# Patient Record
Sex: FEMALE | Race: BLACK OR AFRICAN AMERICAN | Employment: OTHER | URBAN - METROPOLITAN AREA
[De-identification: names, ages, dates, MRNs, and addresses within clinical notes are randomized per-mention and may not be internally consistent; named-entity substitution may affect disease eponyms.]

---

## 2018-02-23 ENCOUNTER — HOSPITAL ENCOUNTER (INPATIENT)
Age: 58
LOS: 10 days | Discharge: NURSING, REHAB OR LONG-TERM CARE | DRG: 482 | End: 2018-03-06
Admitting: HOSPITALIST
Payer: COMMERCIAL

## 2018-02-23 ENCOUNTER — APPOINTMENT (OUTPATIENT)
Dept: GENERAL RADIOLOGY | Age: 58
DRG: 482 | End: 2018-02-23
Payer: COMMERCIAL

## 2018-02-23 DIAGNOSIS — E11.65 TYPE 2 DIABETES MELLITUS WITH HYPERGLYCEMIA, UNSPECIFIED LONG TERM INSULIN USE STATUS: ICD-10-CM

## 2018-02-23 DIAGNOSIS — S72.141A CLOSED DISPLACED INTERTROCHANTERIC FRACTURE OF RIGHT FEMUR, INITIAL ENCOUNTER (HCC): Primary | ICD-10-CM

## 2018-02-23 PROCEDURE — 73502 X-RAY EXAM HIP UNI 2-3 VIEWS: CPT

## 2018-02-23 PROCEDURE — 71045 X-RAY EXAM CHEST 1 VIEW: CPT

## 2018-02-23 PROCEDURE — 99285 EMERGENCY DEPT VISIT HI MDM: CPT

## 2018-02-23 PROCEDURE — 73552 X-RAY EXAM OF FEMUR 2/>: CPT

## 2018-02-23 RX ORDER — ONDANSETRON 2 MG/ML
4 INJECTION INTRAMUSCULAR; INTRAVENOUS
Status: COMPLETED | OUTPATIENT
Start: 2018-02-23 | End: 2018-02-24

## 2018-02-23 RX ORDER — HYDROMORPHONE HYDROCHLORIDE 2 MG/ML
1 INJECTION, SOLUTION INTRAMUSCULAR; INTRAVENOUS; SUBCUTANEOUS
Status: COMPLETED | OUTPATIENT
Start: 2018-02-23 | End: 2018-02-24

## 2018-02-24 PROBLEM — S72.141A INTERTROCHANTERIC FRACTURE OF RIGHT FEMUR (HCC): Status: ACTIVE | Noted: 2018-02-24

## 2018-02-24 PROBLEM — E11.65 HYPERGLYCEMIA DUE TO TYPE 2 DIABETES MELLITUS (HCC): Status: ACTIVE | Noted: 2018-02-24

## 2018-02-24 LAB
ABO + RH BLD: NORMAL
ALBUMIN SERPL-MCNC: 3.1 G/DL (ref 3.5–5)
ALBUMIN/GLOB SERPL: 0.7 {RATIO} (ref 1.2–3.5)
ALP SERPL-CCNC: 63 U/L (ref 50–136)
ALT SERPL-CCNC: 17 U/L (ref 12–65)
ANION GAP SERPL CALC-SCNC: 11 MMOL/L (ref 7–16)
APPEARANCE UR: CLEAR
APTT PPP: 24.9 SEC (ref 23.2–35.3)
AST SERPL-CCNC: 14 U/L (ref 15–37)
BACTERIA SPEC CULT: NORMAL
BACTERIA URNS QL MICRO: 0 /HPF
BASOPHILS # BLD: 0 K/UL (ref 0–0.2)
BASOPHILS NFR BLD: 1 % (ref 0–2)
BILIRUB SERPL-MCNC: 0.2 MG/DL (ref 0.2–1.1)
BILIRUB UR QL: NEGATIVE
BLOOD GROUP ANTIBODIES SERPL: NORMAL
BUN SERPL-MCNC: 16 MG/DL (ref 6–23)
CALCIUM SERPL-MCNC: 9 MG/DL (ref 8.3–10.4)
CALCIUM SERPL-MCNC: 9 MG/DL (ref 8.3–10.4)
CASTS URNS QL MICRO: ABNORMAL /LPF
CHLORIDE SERPL-SCNC: 100 MMOL/L (ref 98–107)
CO2 SERPL-SCNC: 25 MMOL/L (ref 21–32)
COLOR UR: YELLOW
CREAT SERPL-MCNC: 0.76 MG/DL (ref 0.6–1)
DIFFERENTIAL METHOD BLD: ABNORMAL
EOSINOPHIL # BLD: 0.2 K/UL (ref 0–0.8)
EOSINOPHIL NFR BLD: 2 % (ref 0.5–7.8)
EPI CELLS #/AREA URNS HPF: ABNORMAL /HPF
ERYTHROCYTE [DISTWIDTH] IN BLOOD BY AUTOMATED COUNT: 12.4 % (ref 11.9–14.6)
EST. AVERAGE GLUCOSE BLD GHB EST-MCNC: 269 MG/DL
GLOBULIN SER CALC-MCNC: 4.6 G/DL (ref 2.3–3.5)
GLUCOSE BLD STRIP.AUTO-MCNC: 237 MG/DL (ref 65–100)
GLUCOSE BLD STRIP.AUTO-MCNC: 238 MG/DL (ref 65–100)
GLUCOSE BLD STRIP.AUTO-MCNC: 358 MG/DL (ref 65–100)
GLUCOSE BLD STRIP.AUTO-MCNC: 377 MG/DL (ref 65–100)
GLUCOSE SERPL-MCNC: 314 MG/DL (ref 65–100)
GLUCOSE UR STRIP.AUTO-MCNC: >1000 MG/DL
HBA1C MFR BLD: 11 % (ref 4.8–6)
HCT VFR BLD AUTO: 34.8 % (ref 35.8–46.3)
HGB BLD-MCNC: 12.2 G/DL (ref 11.7–15.4)
HGB UR QL STRIP: NEGATIVE
IMM GRANULOCYTES # BLD: 0 K/UL (ref 0–0.5)
IMM GRANULOCYTES NFR BLD AUTO: 0 % (ref 0–5)
INR PPP: 1.1
KETONES UR QL STRIP.AUTO: ABNORMAL MG/DL
LEUKOCYTE ESTERASE UR QL STRIP.AUTO: NEGATIVE
LYMPHOCYTES # BLD: 1.7 K/UL (ref 0.5–4.6)
LYMPHOCYTES NFR BLD: 20 % (ref 13–44)
MCH RBC QN AUTO: 29.8 PG (ref 26.1–32.9)
MCHC RBC AUTO-ENTMCNC: 35.1 G/DL (ref 31.4–35)
MCV RBC AUTO: 85.1 FL (ref 79.6–97.8)
MONOCYTES # BLD: 0.3 K/UL (ref 0.1–1.3)
MONOCYTES NFR BLD: 4 % (ref 4–12)
NEUTS SEG # BLD: 6.3 K/UL (ref 1.7–8.2)
NEUTS SEG NFR BLD: 73 % (ref 43–78)
NITRITE UR QL STRIP.AUTO: NEGATIVE
PH UR STRIP: 5.5 [PH] (ref 5–9)
PLATELET # BLD AUTO: 238 K/UL (ref 150–450)
PMV BLD AUTO: 11.5 FL (ref 10.8–14.1)
POTASSIUM SERPL-SCNC: 3.3 MMOL/L (ref 3.5–5.1)
PREALB SERPL-MCNC: 18.3 MG/DL (ref 18–35.7)
PROT SERPL-MCNC: 7.7 G/DL (ref 6.3–8.2)
PROT UR STRIP-MCNC: 30 MG/DL
PROTHROMBIN TIME: 13.5 SEC (ref 11.5–14.5)
PTH-INTACT SERPL-MCNC: 31.9 PG/ML (ref 14–72)
RBC # BLD AUTO: 4.09 M/UL (ref 4.05–5.25)
RBC #/AREA URNS HPF: ABNORMAL /HPF
SERVICE CMNT-IMP: NORMAL
SODIUM SERPL-SCNC: 136 MMOL/L (ref 136–145)
SP GR UR REFRACTOMETRY: 1.02 (ref 1–1.02)
SPECIMEN EXP DATE BLD: NORMAL
UROBILINOGEN UR QL STRIP.AUTO: 0.2 EU/DL (ref 0.2–1)
WBC # BLD AUTO: 8.6 K/UL (ref 4.3–11.1)
WBC URNS QL MICRO: ABNORMAL /HPF

## 2018-02-24 PROCEDURE — 83036 HEMOGLOBIN GLYCOSYLATED A1C: CPT | Performed by: HOSPITALIST

## 2018-02-24 PROCEDURE — 80053 COMPREHEN METABOLIC PANEL: CPT

## 2018-02-24 PROCEDURE — 74011250637 HC RX REV CODE- 250/637: Performed by: ORTHOPAEDIC SURGERY

## 2018-02-24 PROCEDURE — 81001 URINALYSIS AUTO W/SCOPE: CPT

## 2018-02-24 PROCEDURE — 77030036696 HC BOOT TRACT BUCKS S2SG -A

## 2018-02-24 PROCEDURE — 85730 THROMBOPLASTIN TIME PARTIAL: CPT | Performed by: ORTHOPAEDIC SURGERY

## 2018-02-24 PROCEDURE — 65270000029 HC RM PRIVATE

## 2018-02-24 PROCEDURE — 93005 ELECTROCARDIOGRAM TRACING: CPT

## 2018-02-24 PROCEDURE — 84134 ASSAY OF PREALBUMIN: CPT | Performed by: ORTHOPAEDIC SURGERY

## 2018-02-24 PROCEDURE — 82306 VITAMIN D 25 HYDROXY: CPT | Performed by: ORTHOPAEDIC SURGERY

## 2018-02-24 PROCEDURE — 96374 THER/PROPH/DIAG INJ IV PUSH: CPT

## 2018-02-24 PROCEDURE — 83970 ASSAY OF PARATHORMONE: CPT | Performed by: ORTHOPAEDIC SURGERY

## 2018-02-24 PROCEDURE — 85610 PROTHROMBIN TIME: CPT | Performed by: ORTHOPAEDIC SURGERY

## 2018-02-24 PROCEDURE — 85025 COMPLETE CBC W/AUTO DIFF WBC: CPT

## 2018-02-24 PROCEDURE — 82962 GLUCOSE BLOOD TEST: CPT

## 2018-02-24 PROCEDURE — 74011636637 HC RX REV CODE- 636/637: Performed by: HOSPITALIST

## 2018-02-24 PROCEDURE — 74011250636 HC RX REV CODE- 250/636: Performed by: HOSPITALIST

## 2018-02-24 PROCEDURE — 99253 IP/OBS CNSLTJ NEW/EST LOW 45: CPT | Performed by: PHYSICAL MEDICINE & REHABILITATION

## 2018-02-24 PROCEDURE — 51702 INSERT TEMP BLADDER CATH: CPT

## 2018-02-24 PROCEDURE — 36415 COLL VENOUS BLD VENIPUNCTURE: CPT | Performed by: HOSPITALIST

## 2018-02-24 PROCEDURE — 96376 TX/PRO/DX INJ SAME DRUG ADON: CPT

## 2018-02-24 PROCEDURE — 86900 BLOOD TYPING SEROLOGIC ABO: CPT

## 2018-02-24 PROCEDURE — 74011250636 HC RX REV CODE- 250/636

## 2018-02-24 PROCEDURE — 87641 MR-STAPH DNA AMP PROBE: CPT | Performed by: HOSPITALIST

## 2018-02-24 PROCEDURE — 77030005515 HC CATH URETH FOL14 BARD -B

## 2018-02-24 PROCEDURE — 86580 TB INTRADERMAL TEST: CPT | Performed by: HOSPITALIST

## 2018-02-24 PROCEDURE — 74011000302 HC RX REV CODE- 302: Performed by: HOSPITALIST

## 2018-02-24 PROCEDURE — 96375 TX/PRO/DX INJ NEW DRUG ADDON: CPT

## 2018-02-24 PROCEDURE — 74011250637 HC RX REV CODE- 250/637: Performed by: HOSPITALIST

## 2018-02-24 RX ORDER — LISINOPRIL 10 MG/1
10 TABLET ORAL DAILY
Status: ON HOLD | COMMUNITY
End: 2019-09-26 | Stop reason: SDUPTHER

## 2018-02-24 RX ORDER — SODIUM CHLORIDE, SODIUM LACTATE, POTASSIUM CHLORIDE, CALCIUM CHLORIDE 600; 310; 30; 20 MG/100ML; MG/100ML; MG/100ML; MG/100ML
75 INJECTION, SOLUTION INTRAVENOUS
Status: DISPENSED | OUTPATIENT
Start: 2018-02-24 | End: 2018-02-25

## 2018-02-24 RX ORDER — TRAMADOL HYDROCHLORIDE 50 MG/1
50 TABLET ORAL
Status: DISCONTINUED | OUTPATIENT
Start: 2018-02-24 | End: 2018-03-06 | Stop reason: HOSPADM

## 2018-02-24 RX ORDER — SODIUM CHLORIDE 0.9 % (FLUSH) 0.9 %
5-10 SYRINGE (ML) INJECTION EVERY 8 HOURS
Status: DISCONTINUED | OUTPATIENT
Start: 2018-02-24 | End: 2018-02-28 | Stop reason: HOSPADM

## 2018-02-24 RX ORDER — SODIUM CHLORIDE 0.9 % (FLUSH) 0.9 %
5-10 SYRINGE (ML) INJECTION AS NEEDED
Status: DISCONTINUED | OUTPATIENT
Start: 2018-02-24 | End: 2018-02-28 | Stop reason: HOSPADM

## 2018-02-24 RX ORDER — HYDROMORPHONE HYDROCHLORIDE 2 MG/ML
1 INJECTION, SOLUTION INTRAMUSCULAR; INTRAVENOUS; SUBCUTANEOUS
Status: COMPLETED | OUTPATIENT
Start: 2018-02-24 | End: 2018-02-24

## 2018-02-24 RX ORDER — SODIUM CHLORIDE 9 MG/ML
125 INJECTION, SOLUTION INTRAVENOUS CONTINUOUS
Status: DISCONTINUED | OUTPATIENT
Start: 2018-02-24 | End: 2018-02-28 | Stop reason: HOSPADM

## 2018-02-24 RX ORDER — ONDANSETRON 2 MG/ML
4 INJECTION INTRAMUSCULAR; INTRAVENOUS
Status: DISCONTINUED | OUTPATIENT
Start: 2018-02-24 | End: 2018-02-28 | Stop reason: HOSPADM

## 2018-02-24 RX ORDER — DEXTROSE 40 %
15 GEL (GRAM) ORAL AS NEEDED
Status: DISCONTINUED | OUTPATIENT
Start: 2018-02-24 | End: 2018-03-06 | Stop reason: HOSPADM

## 2018-02-24 RX ORDER — LISINOPRIL 5 MG/1
10 TABLET ORAL DAILY
Status: DISCONTINUED | OUTPATIENT
Start: 2018-02-25 | End: 2018-03-06 | Stop reason: HOSPADM

## 2018-02-24 RX ORDER — DEXTROSE 50 % IN WATER (D50W) INTRAVENOUS SYRINGE
25-50 AS NEEDED
Status: DISCONTINUED | OUTPATIENT
Start: 2018-02-24 | End: 2018-03-06 | Stop reason: HOSPADM

## 2018-02-24 RX ORDER — CEFAZOLIN SODIUM/WATER 2 G/20 ML
2 SYRINGE (ML) INTRAVENOUS ONCE
Status: ACTIVE | OUTPATIENT
Start: 2018-02-24 | End: 2018-02-24

## 2018-02-24 RX ORDER — OXYCODONE HYDROCHLORIDE 5 MG/1
5 TABLET ORAL
Status: DISCONTINUED | OUTPATIENT
Start: 2018-02-24 | End: 2018-02-28 | Stop reason: SDUPTHER

## 2018-02-24 RX ORDER — OXYCODONE HCL 5 MG/5 ML
5 SOLUTION, ORAL ORAL
Status: DISCONTINUED | OUTPATIENT
Start: 2018-02-24 | End: 2018-02-24

## 2018-02-24 RX ORDER — HYDROMORPHONE HYDROCHLORIDE 2 MG/ML
0.5 INJECTION, SOLUTION INTRAMUSCULAR; INTRAVENOUS; SUBCUTANEOUS
Status: DISCONTINUED | OUTPATIENT
Start: 2018-02-24 | End: 2018-02-28

## 2018-02-24 RX ORDER — METFORMIN HYDROCHLORIDE 500 MG/1
1000 TABLET ORAL 2 TIMES DAILY WITH MEALS
Status: ON HOLD | COMMUNITY
End: 2019-09-24

## 2018-02-24 RX ORDER — ACETAMINOPHEN 325 MG/1
650 TABLET ORAL EVERY 8 HOURS
Status: DISCONTINUED | OUTPATIENT
Start: 2018-02-24 | End: 2018-02-28 | Stop reason: HOSPADM

## 2018-02-24 RX ORDER — INSULIN LISPRO 100 [IU]/ML
INJECTION, SOLUTION INTRAVENOUS; SUBCUTANEOUS
Status: DISCONTINUED | OUTPATIENT
Start: 2018-02-24 | End: 2018-03-06 | Stop reason: HOSPADM

## 2018-02-24 RX ADMIN — OXYCODONE HYDROCHLORIDE 5 MG: 5 TABLET ORAL at 22:02

## 2018-02-24 RX ADMIN — ONDANSETRON 4 MG: 2 INJECTION INTRAMUSCULAR; INTRAVENOUS at 00:11

## 2018-02-24 RX ADMIN — ACETAMINOPHEN 650 MG: 325 TABLET ORAL at 14:16

## 2018-02-24 RX ADMIN — TRAMADOL HYDROCHLORIDE 50 MG: 50 TABLET, FILM COATED ORAL at 08:54

## 2018-02-24 RX ADMIN — ONDANSETRON 4 MG: 2 INJECTION INTRAMUSCULAR; INTRAVENOUS at 06:23

## 2018-02-24 RX ADMIN — OXYCODONE HYDROCHLORIDE 5 MG: 5 TABLET ORAL at 05:00

## 2018-02-24 RX ADMIN — TUBERCULIN PURIFIED PROTEIN DERIVATIVE 5 UNITS: 5 INJECTION, SOLUTION INTRADERMAL at 05:03

## 2018-02-24 RX ADMIN — INSULIN LISPRO 4 UNITS: 100 INJECTION, SOLUTION INTRAVENOUS; SUBCUTANEOUS at 22:11

## 2018-02-24 RX ADMIN — OXYCODONE HYDROCHLORIDE 5 MG: 5 TABLET ORAL at 11:47

## 2018-02-24 RX ADMIN — INSULIN LISPRO 4 UNITS: 100 INJECTION, SOLUTION INTRAVENOUS; SUBCUTANEOUS at 16:31

## 2018-02-24 RX ADMIN — SODIUM CHLORIDE 125 ML/HR: 900 INJECTION, SOLUTION INTRAVENOUS at 22:02

## 2018-02-24 RX ADMIN — INSULIN LISPRO 10 UNITS: 100 INJECTION, SOLUTION INTRAVENOUS; SUBCUTANEOUS at 08:40

## 2018-02-24 RX ADMIN — SODIUM CHLORIDE 2000 ML: 900 INJECTION, SOLUTION INTRAVENOUS at 07:30

## 2018-02-24 RX ADMIN — ACETAMINOPHEN 650 MG: 325 TABLET ORAL at 22:02

## 2018-02-24 RX ADMIN — HYDROMORPHONE HYDROCHLORIDE 1 MG: 2 INJECTION, SOLUTION INTRAMUSCULAR; INTRAVENOUS; SUBCUTANEOUS at 04:07

## 2018-02-24 RX ADMIN — INSULIN LISPRO 12 UNITS: 100 INJECTION, SOLUTION INTRAVENOUS; SUBCUTANEOUS at 11:30

## 2018-02-24 RX ADMIN — TRAMADOL HYDROCHLORIDE 50 MG: 50 TABLET, FILM COATED ORAL at 16:22

## 2018-02-24 RX ADMIN — HYDROMORPHONE HYDROCHLORIDE 1 MG: 2 INJECTION, SOLUTION INTRAMUSCULAR; INTRAVENOUS; SUBCUTANEOUS at 00:11

## 2018-02-24 RX ADMIN — ONDANSETRON 4 MG: 2 INJECTION INTRAMUSCULAR; INTRAVENOUS at 16:22

## 2018-02-24 RX ADMIN — HYDROMORPHONE HYDROCHLORIDE 1 MG: 2 INJECTION, SOLUTION INTRAMUSCULAR; INTRAVENOUS; SUBCUTANEOUS at 02:06

## 2018-02-24 NOTE — H&P
Hospitalist H&P/Consult Note     Admit Date:  2018 11:47 PM   Name:  Matias Mcallister   Age:  62 y.o.  :  1960   MRN:  [id number]   PCP:  None  Treatment Team: Attending Provider: Marleen Ferrer MD    HPI:   63 y/o female with hx diabetes was reportedly at a party or some other function. She was standing behind the DJ and went to sit in a chain but instead fell and landed on her right hip. No other injury reported. In ED xray reveals a comminuted intertrochanteric fracture. She has hx knee surgery and did not have any complications from anesthesia or bleeding. She is currently sedated from pain medications she received in ED. No reported hx cardiovascular or pulmonary disease. Her glucose is 314 without evidence of DKA or hyperosmolar coma. She used to be on insulin but her family members report she no longer takes it. Will admit for glucose control and surgical repair of hip.    10 systems reviewed and negative except as noted in HPI.cannot obtain due to somnolence  No past medical history on file. No past surgical history on file. None     No Known Allergies   Social History   Substance Use Topics    Smoking status: Not on file    Smokeless tobacco: Not on file    Alcohol use Not on file      No family history on file. There is no immunization history for the selected administration types on file for this patient.     Objective:   Patient Vitals for the past 24 hrs:   Temp Pulse Resp BP SpO2   18 0446 98.5 °F (36.9 °C) 93 16 146/88 99 %   18 0411 - 97 14 - 99 %   18 0325 - 90 13 125/74 96 %   18 0215 - 93 (!) 31 145/90 92 %   18 0151 - 99 - 156/78 98 %   18 0003 - - - 162/81 -   18 2350 - 100 - - 100 %   18 2343 98 °F (36.7 °C) 93 18 162/81 100 %     Oxygen Therapy  O2 Sat (%): 99 % (18 0446)  Pulse via Oximetry: 98 beats per minute (18 0411)  No intake or output data in the 24 hours ending 18 0502    Physical Exam:  General:    Well nourished. Somnolent secondary to pain medication  Eyes:   Normal sclera. Extraocular movements intact. ENT:  Normocephalic, atraumatic. Moist mucous membranes  CV:   Regular rate and rhythm. No murmur, rub, or gallop. Lungs:  Clear to auscultation bilaterally. No wheezing, rhonchi, or rales. Abdomen: Soft, nontender, nondistended. Bowel sounds normal.   Extremities: Warm and dry. No cyanosis or edema. tender right hip  Neurologic: CN II-XII grossly intact. Sensation intact. Skin:     No rashes or jaundice. No wounds. Psych:  sedated    I reviewed the labs, imaging, EKGs, telemetry, and other studies done this admission. Data Review:   Recent Results (from the past 24 hour(s))   CBC WITH AUTOMATED DIFF    Collection Time: 02/24/18 12:55 AM   Result Value Ref Range    WBC 8.6 4.3 - 11.1 K/uL    RBC 4.09 4.05 - 5.25 M/uL    HGB 12.2 11.7 - 15.4 g/dL    HCT 34.8 (L) 35.8 - 46.3 %    MCV 85.1 79.6 - 97.8 FL    MCH 29.8 26.1 - 32.9 PG    MCHC 35.1 (H) 31.4 - 35.0 g/dL    RDW 12.4 11.9 - 14.6 %    PLATELET 125 398 - 111 K/uL    MPV 11.5 10.8 - 14.1 FL    DF AUTOMATED      NEUTROPHILS 73 43 - 78 %    LYMPHOCYTES 20 13 - 44 %    MONOCYTES 4 4.0 - 12.0 %    EOSINOPHILS 2 0.5 - 7.8 %    BASOPHILS 1 0.0 - 2.0 %    IMMATURE GRANULOCYTES 0 0.0 - 5.0 %    ABS. NEUTROPHILS 6.3 1.7 - 8.2 K/UL    ABS. LYMPHOCYTES 1.7 0.5 - 4.6 K/UL    ABS. MONOCYTES 0.3 0.1 - 1.3 K/UL    ABS. EOSINOPHILS 0.2 0.0 - 0.8 K/UL    ABS. BASOPHILS 0.0 0.0 - 0.2 K/UL    ABS. IMM.  GRANS. 0.0 0.0 - 0.5 K/UL   METABOLIC PANEL, COMPREHENSIVE    Collection Time: 02/24/18 12:55 AM   Result Value Ref Range    Sodium 136 136 - 145 mmol/L    Potassium 3.3 (L) 3.5 - 5.1 mmol/L    Chloride 100 98 - 107 mmol/L    CO2 25 21 - 32 mmol/L    Anion gap 11 7 - 16 mmol/L    Glucose 314 (H) 65 - 100 mg/dL    BUN 16 6 - 23 MG/DL    Creatinine 0.76 0.6 - 1.0 MG/DL    GFR est AA >60 >60 ml/min/1.73m2    GFR est non-AA >60 >60 ml/min/1.73m2 Calcium 9.0 8.3 - 10.4 MG/DL    Bilirubin, total 0.2 0.2 - 1.1 MG/DL    ALT (SGPT) 17 12 - 65 U/L    AST (SGOT) 14 (L) 15 - 37 U/L    Alk. phosphatase 63 50 - 136 U/L    Protein, total 7.7 6.3 - 8.2 g/dL    Albumin 3.1 (L) 3.5 - 5.0 g/dL    Globulin 4.6 (H) 2.3 - 3.5 g/dL    A-G Ratio 0.7 (L) 1.2 - 3.5     URINALYSIS W/ RFLX MICROSCOPIC    Collection Time: 02/24/18  1:32 AM   Result Value Ref Range    Color YELLOW      Appearance CLEAR      Specific gravity 1.025 (H) 1.001 - 1.023      pH (UA) 5.5 5.0 - 9.0      Protein 30 (A) NEG mg/dL    Glucose >1000 mg/dL    Ketone TRACE (A) NEG mg/dL    Bilirubin NEGATIVE  NEG      Blood NEGATIVE  NEG      Urobilinogen 0.2 0.2 - 1.0 EU/dL    Nitrites NEGATIVE  NEG      Leukocyte Esterase NEGATIVE  NEG      WBC 0-3 0 /hpf    RBC 0-3 0 /hpf    Epithelial cells 0-3 0 /hpf    Bacteria 0 0 /hpf    Casts 0-3 0 /lpf   TYPE & SCREEN    Collection Time: 02/24/18  1:55 AM   Result Value Ref Range    Crossmatch Expiration 02/27/2018     ABO/Rh(D) Jeremiah Servant POSITIVE     Antibody screen NEG        Imaging Studies:  CXR Results  (Last 48 hours)               02/24/18 0039  XR CHEST SNGL V Final result    Impression:  IMPRESSION: Normal chest.               Narrative:  EXAM:  XR CHEST SNGL V       INDICATION:  Preop clearance       COMPARISON:  None. FINDINGS: A portable AP radiograph of the chest was obtained at 0036 hours. The   patient is on a cardiac monitor. The lungs are clear. The cardiac and   mediastinal contours and pulmonary vascularity are normal.  The bones and soft   tissues are grossly within normal limits.                 CT Results  (Last 48 hours)    None          Assessment and Plan:     Hospital Problems as of 2/24/2018  Never Reviewed          Codes Class Noted - Resolved POA    * (Principal)Intertrochanteric fracture of right femur ([facility] [address] ICD-10-CM: E41.769P  ICD-9-CM: 820.21  2/24/2018 - Present Yes        Hyperglycemia due to type 2 diabetes mellitus ([facility] [address] ICD-10-CM: E11.65  ICD-9-CM: 250.00  2/24/2018 - Present Yes                PLAN:  · Admit to ortho unit  · Hip fracture order set utilized  · Pain control  · Bedrest  · Hold antiplatelets/AC  · Ortho consulted  · Continue other home meds as ordered  · Will give insulin and IV fluid bolus to lower glucose. Other than the hyperglycemia do not see any contraindication for orthopedic surgery. Will add additional sliding scale coverage. Check A1c. Will need diabetic education and nutritional consult. Discussed with family at bedside    FEN:  NPO prior to surgery  DVT ppx:  SCDs until after surgery  Estimated LOS:  3 days  Anticipated DC needs:  Ppd ordered. CM consulted.   PT/OT evals after surgery  Risk:  high    Signed:  Fei Marroquin MD

## 2018-02-24 NOTE — PROGRESS NOTES
Incentive spirometer and fracture booklet teaching done per protocol. The opportunity for questions was given, all questions answered; will continue to monitor.

## 2018-02-24 NOTE — PROGRESS NOTES
Problem: Falls - Risk of  Goal: *Absence of Falls  Document Az Fall Risk and appropriate interventions in the flowsheet.    Outcome: Progressing Towards Goal  Fall Risk Interventions:            Medication Interventions: Assess postural VS orthostatic hypotension, Bed/chair exit alarm, Patient to call before getting OOB    Elimination Interventions: Bed/chair exit alarm, Call light in reach, Patient to call for help with toileting needs    History of Falls Interventions: Bed/chair exit alarm, Door open when patient unattended, Investigate reason for fall

## 2018-02-24 NOTE — PROGRESS NOTES
Problem: Falls - Risk of  Goal: *Absence of Falls  Document Az Fall Risk and appropriate interventions in the flowsheet.    Outcome: Progressing Towards Goal  Fall Risk Interventions:            Medication Interventions: Assess postural VS orthostatic hypotension, Bed/chair exit alarm, Evaluate medications/consider consulting pharmacy, Patient to call before getting OOB, Teach patient to arise slowly, Utilize gait belt for transfers/ambulation    Elimination Interventions: Bed/chair exit alarm, Call light in reach, Patient to call for help with toileting needs, Toilet paper/wipes in reach, Toileting schedule/hourly rounds    History of Falls Interventions: Bed/chair exit alarm, Consult care management for discharge planning, Door open when patient unattended, Evaluate medications/consider consulting pharmacy, Investigate reason for fall, Room close to nurse's station, Utilize gait belt for transfer/ambulation

## 2018-02-24 NOTE — PROGRESS NOTES
Orthopedic Joint Progress Note    2018  Admit Date: 2018  Admit Diagnosis: Intertrochanteric fracture of right femur ([address]    * No surgery found *    Subjective:     Mary Ellen Laguerre doing well. No complaints at this time. Review of Systems: Pertinent items are noted in HPI. Objective:     PT/OT:     PATIENT MOBILITY                           Vital Signs:    Blood pressure 138/86, pulse 95, temperature 98.1 °F (36.7 °C), resp. rate 17, height 5' 3\" (1.6 m), weight 79.4 kg (175 lb), SpO2 95 %.   Temp (24hrs), Av.2 °F (36.8 °C), Min:98 °F (36.7 °C), Max:98.5 °F (36.9 °C)      Pain Control:   Pain Assessment  Pain Scale 1: Visual  Pain Intensity 1: 0  Pain Onset 1: acute  Pain Location 1: Leg  Pain Orientation 1: Right  Pain Description 1: Tightness  Pain Intervention(s) 1: Medication (see MAR)    Meds:  Current Facility-Administered Medications   Medication Dose Route Frequency    0.9% sodium chloride infusion  125 mL/hr IntraVENous CONTINUOUS    sodium chloride 0.9 % bolus infusion 250 mL  250 mL IntraVENous CONTINUOUS    sodium chloride (NS) flush 5-10 mL  5-10 mL IntraVENous Q8H    sodium chloride (NS) flush 5-10 mL  5-10 mL IntraVENous PRN    acetaminophen (TYLENOL) tablet 650 mg  650 mg Oral Q8H    tuberculin injection 5 Units  5 Units IntraDERMal ONCE    ceFAZolin (ANCEF) 2 g/20 mL in sterile water IV syringe  2 g IntraVENous ONCE    ondansetron (ZOFRAN) injection 4 mg  4 mg IntraVENous Q6H PRN    dextrose 40% (GLUTOSE) oral gel 1 Tube  15 g Oral PRN    glucagon (GLUCAGEN) injection 1 mg  1 mg IntraMUSCular PRN    dextrose (D50W) injection syrg 12.5-25 g  25-50 mL IntraVENous PRN    insulin lispro (HUMALOG) injection   SubCUTAneous AC&HS    traMADol (ULTRAM) tablet 50 mg  50 mg Oral Q6H PRN    HYDROmorphone (PF) (DILAUDID) injection 0.5 mg  0.5 mg IntraVENous Q4H PRN    oxyCODONE IR (ROXICODONE) tablet 5 mg  5 mg Oral Q4H PRN    sodium chloride 0.45 % bolus infusion 500 mL  500 mL IntraVENous ONCE    lactated Ringers infusion  75 mL/hr IntraVENous ON CALL TO OR        LAB:    Lab Results   Component Value Date/Time    INR 1.1 02/24/2018 05:41 AM     Lab Results   Component Value Date/Time    HGB 12.2 02/24/2018 12:55 AM                 Physical Exam:  No significant changes    Assessment:      Principal Problem:    Intertrochanteric fracture of right femur ([facility] [address] (2/24/2018)    Active Problems:    Hyperglycemia due to type 2 diabetes mellitus ([facility] [address] (2/24/2018)         Plan:     Follow labs  Observe  Continue Care        Signed By: Linda Puente NP

## 2018-02-24 NOTE — ED PROVIDER NOTES
HPI Comments: 68-year-old female sent down  Hard in a chair having immediate pain in her right hip. Patient's unable to ambulate. Patient's leg appears slightly shortened and externally rotated. Patient is a 62 y.o. female presenting with hip pain. The history is provided by the patient. Hip Injury    This is a new problem. The problem occurs constantly. The pain is present in the right hip. The quality of the pain is described as aching. The pain is at a severity of 10/10. The pain is severe. Associated symptoms include limited range of motion and stiffness. She has tried nothing for the symptoms. There has been a history of trauma. No past medical history on file. No past surgical history on file. No family history on file. Social History     Social History    Marital status: SINGLE     Spouse name: N/A    Number of children: N/A    Years of education: N/A     Occupational History    Not on file. Social History Main Topics    Smoking status: Not on file    Smokeless tobacco: Not on file    Alcohol use Not on file    Drug use: Not on file    Sexual activity: Not on file     Other Topics Concern    Not on file     Social History Narrative         ALLERGIES: Review of patient's allergies indicates no known allergies. Review of Systems   Constitutional: Negative. Negative for activity change. HENT: Negative. Eyes: Negative. Respiratory: Negative. Cardiovascular: Negative. Gastrointestinal: Negative. Genitourinary: Negative. Musculoskeletal: Positive for stiffness. Skin: Negative. Neurological: Negative. Psychiatric/Behavioral: Negative. All other systems reviewed and are negative. Vitals:    02/23/18 2343   BP: 162/81   Pulse: 93   Resp: 18   Temp: 98 °F (36.7 °C)   SpO2: 100%   Weight: 79.4 kg (175 lb)   Height: 5' 3\" (1.6 m)            Physical Exam   Constitutional: She is oriented to person, place, and time.  She appears well-developed and well-nourished. No distress. HENT:   Head: Normocephalic and atraumatic. Right Ear: External ear normal.   Left Ear: External ear normal.   Nose: Nose normal.   Mouth/Throat: Oropharynx is clear and moist. No oropharyngeal exudate. Eyes: Conjunctivae and EOM are normal. Pupils are equal, round, and reactive to light. Right eye exhibits no discharge. Left eye exhibits no discharge. No scleral icterus. Neck: Normal range of motion. Neck supple. No JVD present. No tracheal deviation present. Cardiovascular: Normal rate, regular rhythm and intact distal pulses. Pulmonary/Chest: Effort normal and breath sounds normal. No stridor. No respiratory distress. She has no wheezes. She exhibits no tenderness. Abdominal: Soft. Bowel sounds are normal. She exhibits no distension and no mass. There is no tenderness. Musculoskeletal: She exhibits no edema or tenderness. Neurological: She is alert and oriented to person, place, and time. No cranial nerve deficit. Skin: Skin is warm and dry. No rash noted. She is not diaphoretic. No erythema. No pallor. Psychiatric: She has a normal mood and affect. Her behavior is normal. Thought content normal.   Nursing note and vitals reviewed. MDM  Number of Diagnoses or Management Options  Closed displaced intertrochanteric fracture of right femur, initial encounter [facility]):   Diagnosis management comments: theconsult to: Room 729    Assessment hip fracture admission for surgical repair.        Amount and/or Complexity of Data Reviewed  Clinical lab tests: ordered and reviewed  Tests in the radiology section of CPT®: ordered and reviewed  Tests in the medicine section of CPT®: ordered and reviewed    Risk of Complications, Morbidity, and/or Mortality  Presenting problems: moderate  Diagnostic procedures: moderate  Management options: moderate          ED Course       Procedures

## 2018-02-24 NOTE — PROGRESS NOTES
Blood sugar over normal limit; result-377; pt was given the 10 units prescribed in the sliding scale; Mary Moreno paged; waiting for a call back

## 2018-02-24 NOTE — PROGRESS NOTES
Pt blood  Sugar is 358; NP Gloria Tracy notified she ordered 12 units of humalog to be given; will continue to monitor pt condition

## 2018-02-24 NOTE — PROGRESS NOTES
TRANSFER - IN REPORT:    Verbal report received from HortenciaRN(name) on Sarai Anglin  being received from ER(unit) for routine progression of care      Report consisted of patients Situation, Background, Assessment and   Recommendations(SBAR). Information from the following report(s) SBAR, Kardex, ED Summary, [facility] and Recent Results was reviewed with the receiving nurse. Opportunity for questions and clarification was provided. Assessment completed upon patients arrival to unit and care assumed.

## 2018-02-24 NOTE — PROGRESS NOTES
Problem: Patient Education: Go to Patient Education Activity  Goal: Patient/Family Education  Nutrition   Received consult for Diet Education Diabetes Diet FRACTURE WITH OR 3/24, NO NEED TO COME TILL Monday (Manuel De Leon NP)  Reason for assessment: Referral received from nursing admission Malnutrition Screening Tool for recently lost 24-33# without trying and eating poorly due to decreased appetite. Assessment:  Diet order: Regular  Food/Nutrition and Pertinent History: The patient is noted to have a h/o diabetes. The patient reports that she has had it for about 5 years now. States that she recently stopped taking her insulin per her primary care doctor. States that she has had multiple stressors in her life recently. Her son  and she has recently been diagnosed with breast cancer. Discussed how stress can affect blood sugars. The patient reports that her appetite has been very poor over the past several months and the she has experienced a fair amount of weight loss of ~30# or so. Discussed how high blood sugars can cause weight loss. The patient understands. The patient states that she has already switched to diet sodas and mya and tries to watch the sugar she eats. RD reviewed carbohydrate sources with patient along with serving sizes and amounts per meal. RD provided various handouts for the patient. Anthropometrics:Height: 5' 3\" (160 cm),  Weight: 79.4 kg (175 lb), Weight Source: unknown, Body mass index is 31 kg/(m^2). BMI class of obesity class I. Patient with no weight history in EMR to verify stated weight loss. Macronutrient needs:  EER:  9694-3584 kcal /day (18-23 kcal/kg Actual BW)  EPR:  52-63 grams protein/day (1-1.2 grams/kg IBW)  Max CHO: 227 grams/day (50% of kcals)  Intake/Comparative Standards: Average intake for past 1 recorded meal(s): 25%.  This potentially meets ~41% of kcal and ~46% of protein needs Nutrition Diagnosis: Food and nutrition knowledge deficit r/t lack of exposure to nutrition related information as evidenced by patient with a current A1C of 11.0    Inadequate oral intake related to poor appetite as evidenced by patient reports recent 30# weight loss and not meeting needs since admission. Intervention:   Meals and snacks: Change to [facility] diet restriction  Nutrition Supplement therapy: Add Glucerna TID  Education: Provided patient with written and verbal instruction on diabetic diet. Handouts provided: My Diet Plan, Ready Set Start Counting, Diabetes Plate Planner  Patient verbalizes fair understanding of diet. Expect fair compliance. Nutrition Discharge Plan:  Too soon to be determined     [facility].  [address], [address] [place] [address],    [phone]

## 2018-02-24 NOTE — ROUTINE PROCESS
TRANSFER - OUT REPORT:    Verbal report given to Sarah Hatch RN on Juani Gaitan  being transferred to  for routine progression of care       Report consisted of patients Situation, Background, Assessment and   Recommendations(SBAR). Information from the following report(s) ED Summary was reviewed with the receiving nurse. Opportunity for questions and clarification was provided.       Patient transported with:   [employer]

## 2018-02-24 NOTE — CONSULTS
PM&R Rehab Consult    Subjective:     Date of Consultation:  February 24, 2018    Referring Physician:Dr Carola Dutton    Patient is a 62 y.o. female who is being seen for rehab recommendations and functional prognosis prior to surgical repair of a Right IT fx    Principal Problem:    Intertrochanteric fracture of right femur ([facility] [address] (2/24/2018)    Active Problems:    Hyperglycemia due to type 2 diabetes mellitus ([facility] [address] (2/24/2018)    HPI; Ms Franklin Naranjo is a previously functionally independent , obese 63 y/o AA female with hx diabetes and medical noncompliance with insulin who was reportedly at a party last night. She was standing behind the DJ and went to sit in a chair but instead fell and landed on her right hip. No other injury reported. In ED xray reveals a comminuted intertrochanteric fracture. She has hx knee surgery and did not have any complications from anesthesia or bleeding. Her glucose was 314 without evidence of DKA or hyperosmolar coma. She used to be on insulin but her family members report she no longer takes it. She was admitted by the hospitalist service glucose control and surgical repair of hip. She is currently on bedrest pending surgery tomorrow or Monday. Pain is controlled per patient. She is anxious to be able to get out of bed. Her hbg A1c is elevated at 11. She is tachycardic and hypertensive. dtgs are at bedside and asking if she can do rehab in [place] where they live. She lives independently and has worked at [employer] as a CNA . She is single. Reports that she will not be able to get into her own home right away due to steps. No past medical history on file. No family history on file.    Social History   Substance Use Topics    Smoking status: Not on file    Smokeless tobacco: Not on file    Alcohol use Not on file       0.9% sodium chloride infusion        CONTINUOUS        acetaminophen (TYLENOL) tablet 650 mg        EVERY 8 HOURS        ceFAZolin (ANCEF) 2 g/20 mL in sterile water IV syringe        ONCE        dextrose (D50W) injection syrg 12.5-25 g (HYPOGLYCEMIC PROTOCOL)        AS NEEDED        dextrose 40% (GLUTOSE) oral gel 1 Tube (HYPOGLYCEMIC PROTOCOL)        AS NEEDED        glucagon (GLUCAGEN) injection 1 mg (HYPOGLYCEMIC PROTOCOL)        AS NEEDED        HYDROmorphone (PF) (DILAUDID) injection 0.5 mg        EVERY 4 HOURS AS NEEDED        HYDROmorphone (PF) (DILAUDID) injection 1 mg        NOW        HYDROmorphone (PF) (DILAUDID) injection 1 mg        NOW        HYDROmorphone (PF) (DILAUDID) injection 1 mg        NOW        insulin lispro (HUMALOG) injection        4 TIMES DAILY BEFORE MEALS & NIGHTLY        lactated Ringers infusion        ON CALL TO OR        ondansetron (ZOFRAN) injection 4 mg        NOW        ondansetron (ZOFRAN) injection 4 mg        EVERY 6 HOURS AS NEEDED        oxyCODONE IR (ROXICODONE) tablet 5 mg        EVERY 4 HOURS AS NEEDED        sodium chloride (NS) flush 5-10 mL (SALINE LOCK FLUSH PANEL)        EVERY 8 HOURS        sodium chloride (NS) flush 5-10 mL (SALINE LOCK FLUSH PANEL)        AS NEEDED        sodium chloride 0.45 % bolus infusion 500 mL        ONCE        sodium chloride 0.9 % bolus infusion 250 mL        CONTINUOUS        traMADol (ULTRAM) tablet 50 mg        EVERY 6 HOURS AS NEEDED        tuberculin injection 5 Units        ONCE (FOR PPD USE)             No past surgical history on file. hx of previous knee surgery  Prior to Admission medications    Not on File     No Known Allergies     Review of Systems:  A comprehensive review of systems was negative except for that written in the HPI. Objective:     Vitals:  Blood pressure (!) 187/96, pulse (!) 115, temperature 97.3 °F (36.3 °C), resp. rate 18, height 5' 3\" (1.6 m), weight 175 lb (79.4 kg), SpO2 98 %.   Temp (24hrs), Av °F (36.7 °C), Min:97.3 °F (36.3 °C), Max:98.5 °F (36.9 °C)    Physical Exam:  General:  Alert, oriented and mood affect appropriate   Lungs:   Clear to auscultation bilaterally. Heart:  Regular rate and rhythm, S1, S2 stable, no murmur, click, rub or gallop. Abdomen:   Soft, non-tender. Bowel sounds present. No masses,  No organomegaly. obese   Genitourinary: Corey in place   Neuro Muscular: Stable. Skin:  No rashes, lesions, or signs/symptoms or infection. RLE externally rotated, shortened; significant tenderness and swelling right prox femur and hip. Pain with minimal ROM  No distal edema, pulses 2+, sensation intact       Labs/Studies:  Recent Results (from the past 72 hour(s))   CBC WITH AUTOMATED DIFF    Collection Time: 02/24/18 12:55 AM   Result Value Ref Range    WBC 8.6 4.3 - 11.1 K/uL    RBC 4.09 4.05 - 5.25 M/uL    HGB 12.2 11.7 - 15.4 g/dL    HCT 34.8 (L) 35.8 - 46.3 %    MCV 85.1 79.6 - 97.8 FL    MCH 29.8 26.1 - 32.9 PG    MCHC 35.1 (H) 31.4 - 35.0 g/dL    RDW 12.4 11.9 - 14.6 %    PLATELET 187 411 - 583 K/uL    MPV 11.5 10.8 - 14.1 FL    DF AUTOMATED      NEUTROPHILS 73 43 - 78 %    LYMPHOCYTES 20 13 - 44 %    MONOCYTES 4 4.0 - 12.0 %    EOSINOPHILS 2 0.5 - 7.8 %    BASOPHILS 1 0.0 - 2.0 %    IMMATURE GRANULOCYTES 0 0.0 - 5.0 %    ABS. NEUTROPHILS 6.3 1.7 - 8.2 K/UL    ABS. LYMPHOCYTES 1.7 0.5 - 4.6 K/UL    ABS. MONOCYTES 0.3 0.1 - 1.3 K/UL    ABS. EOSINOPHILS 0.2 0.0 - 0.8 K/UL    ABS. BASOPHILS 0.0 0.0 - 0.2 K/UL    ABS. IMM. GRANS. 0.0 0.0 - 0.5 K/UL   METABOLIC PANEL, COMPREHENSIVE    Collection Time: 02/24/18 12:55 AM   Result Value Ref Range    Sodium 136 136 - 145 mmol/L    Potassium 3.3 (L) 3.5 - 5.1 mmol/L    Chloride 100 98 - 107 mmol/L    CO2 25 21 - 32 mmol/L    Anion gap 11 7 - 16 mmol/L    Glucose 314 (H) 65 - 100 mg/dL    BUN 16 6 - 23 MG/DL    Creatinine 0.76 0.6 - 1.0 MG/DL    GFR est AA >60 >60 ml/min/1.73m2    GFR est non-AA >60 >60 ml/min/1.73m2    Calcium 9.0 8.3 - 10.4 MG/DL    Bilirubin, total 0.2 0.2 - 1.1 MG/DL    ALT (SGPT) 17 12 - 65 U/L    AST (SGOT) 14 (L) 15 - 37 U/L    Alk.  phosphatase 63 50 - 136 U/L    Protein, total 7.7 6.3 - 8.2 g/dL    Albumin 3.1 (L) 3.5 - 5.0 g/dL    Globulin 4.6 (H) 2.3 - 3.5 g/dL    A-G Ratio 0.7 (L) 1.2 - 3.5     URINALYSIS W/ RFLX MICROSCOPIC    Collection Time: 02/24/18  1:32 AM   Result Value Ref Range    Color YELLOW      Appearance CLEAR      Specific gravity 1.025 (H) 1.001 - 1.023      pH (UA) 5.5 5.0 - 9.0      Protein 30 (A) NEG mg/dL    Glucose >1000 mg/dL    Ketone TRACE (A) NEG mg/dL    Bilirubin NEGATIVE  NEG      Blood NEGATIVE  NEG      Urobilinogen 0.2 0.2 - 1.0 EU/dL    Nitrites NEGATIVE  NEG      Leukocyte Esterase NEGATIVE  NEG      WBC 0-3 0 /hpf    RBC 0-3 0 /hpf    Epithelial cells 0-3 0 /hpf    Bacteria 0 0 /hpf    Casts 0-3 0 /lpf   TYPE & SCREEN    Collection Time: 02/24/18  1:55 AM   Result Value Ref Range    Crossmatch Expiration 02/27/2018     ABO/Rh(D) Sebastián Gong POSITIVE     Antibody screen NEG    EKG, 12 LEAD, INITIAL    Collection Time: 02/24/18  2:12 AM   Result Value Ref Range    Ventricular Rate 92 BPM    Atrial Rate 92 BPM    P-R Interval 194 ms    QRS Duration 84 ms    Q-T Interval 382 ms    QTC Calculation (Bezet) 472 ms    Calculated P Axis 29 degrees    Calculated R Axis 7 degrees    Calculated T Axis 24 degrees    Diagnosis       !! AGE AND GENDER SPECIFIC ECG ANALYSIS !! Normal sinus rhythm  Possible Inferior infarct , age undetermined  Abnormal ECG  No previous ECGs available     MSSA/MRSA SC BY PCR, NASAL SWAB    Collection Time: 02/24/18  4:45 AM   Result Value Ref Range    Special Requests: NO SPECIAL REQUESTS      Culture result:        SA target not detected. A MRSA NEGATIVE, SA NEGATIVE test result does not preclude MRSA or SA nasal colonization.    PTH INTACT    Collection Time: 02/24/18  5:41 AM   Result Value Ref Range    Calcium 9.0 8.3 - 10.4 MG/DL    PTH, Intact 31.9 14.0 - 72.0 pg/mL   PREALBUMIN    Collection Time: 02/24/18  5:41 AM   Result Value Ref Range    Prealbumin 18.3 18.0 - 35.7 MG/DL   PROTHROMBIN TIME + INR Collection Time: 02/24/18  5:41 AM   Result Value Ref Range    Prothrombin time 13.5 11.5 - 14.5 sec    INR 1.1     PTT    Collection Time: 02/24/18  5:41 AM   Result Value Ref Range    aPTT 24.9 23.2 - 35.3 SEC   HEMOGLOBIN A1C WITH EAG    Collection Time: 02/24/18  5:41 AM   Result Value Ref Range    Hemoglobin A1c 11.0 (H) 4.8 - 6.0 %    Est. average glucose 269 mg/dL   GLUCOSE, POC    Collection Time: 02/24/18  8:59 AM   Result Value Ref Range    Glucose (POC) 377 (H) 65 - 100 mg/dL   GLUCOSE, POC    Collection Time: 02/24/18 11:46 AM   Result Value Ref Range    Glucose (POC) 358 (H) 65 - 100 mg/dL         Functional Assessment:  Functional Assessment  Fall in Past 12 Months: Yes  Fall With Injury: Yes (Describe)  Approximate Date of Fall: 2/23/2018  Decline in Gait/Transfer/Balance: Yes (comment) (Severe right hip pain after falling)       Ambulation:  Activity and Safety  Activity Level: Bed Rest  Ambulate: No (Comment)  Activity: In bed, Resting quietly  Activity Assistance: Partial (two people)  Weight Bearing Status: NWB (Non Weight Bearing)  NWB (Non Weight Bearing extremities: RLE (Right Lower Extremity)  Mode of Transportation: Stretcher, IV equipment  Repositioned: Head of bed elevated (degrees)  Patient Turned: Supine  Head of Bed Elevated: HOB 45  Assistive Device: Fall prevention device  Safety Measures: Bed in low position, Bed/Chair alarm on, Bed/Chair-Wheels locked, Fall prevention (comment), Family at bedside, Gripper socks, Call light within reach, Side rails X2     Impression/Plan:     Principal Problem:    Intertrochanteric fracture of right femur ([facility] [address] (2/24/2018)    Active Problems:    Hyperglycemia due to type 2 diabetes mellitus ([facility] [address] (2/24/2018)    s/p mechanical fall with a Right IT fx pending surgical fixation    Recommendations: Continue Acute Rehab Program  Coordination of rehab/medical care  Counseling of PM & R care issues management  Monitoring and management of medical conditions per plan of care/orders   -likely will not have the medical necessity to be appropriate for [facility]. Would need precert.  Her poorly controlled DM , htn and tachycardia may provide some medical necessity if this continues post op.   -Daughters request rehab in the [facility] where they live.   -ppd, dvt prev, pain mgt, risk of post op anemia, mgt of post op wound per primary  -excellent rehab potential. She is young and physically active and should do well with mobilization if pain tolerance is moderately high  Discussion with Family/Caregiver/Staff  Reviewed Therapies/Labs/Meds/Records    Signed By:  Jean Carlos Schwab MD     February 24, 2018

## 2018-02-24 NOTE — PROGRESS NOTES
ADMISSION FOLLOW UP:     Diabetic patient presented to ER late yesterday pm after attempting to sit down in a chair, missing chair and landed on right hip on floor. Immediate 10/10 aching pain to the right hip, found with displaced interochanteric fracture of the right femur. No additional complaint or injury. Admission glucose is 314. Patient somnolent due to narcotic pain meds and is unable to answer questions. Family reports she was formerly taking insulin, off for unknown length of time. Admitted to fracture center on routine pre op orders with Trauma Ortho consult, NPO. Also placed on SSI. Potassium is 3.3 on admission. Tachycardic and hypertensive on admission. Non compliance with insulin. A1C:  11.0. Single, lives in [place], [place], lives independently alone, works as a CNA at [employer]. Daughters live nearby, want rehab in [place], [place]. Current:  Glucose remains in the mid to high 300's despite covering ac and HS. Remains NPO. Seen by Sarah Beth Marroquin to at length regarding A1C of 11.0. Patient verbalizes understanding of end results of untreated diabetes. Understands she will need to restart lantus and add sliding scale. Agrees to nutrition and diabetes education. Pain controlled. Foam splint. Patient reports she last took insulin about 6 months ago, states was taking 10 units lantus bid x 2 years, MD took her off due to 30# weight loss. She is not aware of her A1C. Was taking metformin 100 mg bid and lisinopril 10 mg daily. See admission notes for additional test results. IMPRESSION:   Mechanical fall  Displaced interochanteric fracture of the right femur due to fall. Hypokalemia  Hyperglycemia  NIDDM:  A1C 11  Noncompliance with insulin.    Obesity    PLAN:  Plans in place for surgical fixation day after tomorrow   Diet until otherwise instructed  Analgesia  BMP, Mag, CBC in am  Diabetes and nutritionist consults post op  Begin lantus after surgery in addition to sliding scale insulin.    Reinstate lisinopril

## 2018-02-25 LAB
ANION GAP SERPL CALC-SCNC: 8 MMOL/L (ref 7–16)
APPEARANCE UR: ABNORMAL
ATRIAL RATE: 91 BPM
ATRIAL RATE: 92 BPM
BACTERIA URNS QL MICRO: 0 /HPF
BASOPHILS # BLD: 0 K/UL (ref 0–0.2)
BASOPHILS NFR BLD: 0 % (ref 0–2)
BILIRUB UR QL: NEGATIVE
BUN SERPL-MCNC: 14 MG/DL (ref 6–23)
CALCIUM SERPL-MCNC: 8 MG/DL (ref 8.3–10.4)
CALCULATED P AXIS, ECG09: 29 DEGREES
CALCULATED P AXIS, ECG09: 35 DEGREES
CALCULATED R AXIS, ECG10: 7 DEGREES
CALCULATED T AXIS, ECG11: 23 DEGREES
CALCULATED T AXIS, ECG11: 24 DEGREES
CHLORIDE SERPL-SCNC: 106 MMOL/L (ref 98–107)
CO2 SERPL-SCNC: 25 MMOL/L (ref 21–32)
COLOR UR: YELLOW
CREAT SERPL-MCNC: 0.56 MG/DL (ref 0.6–1)
CRYSTALS URNS QL MICRO: ABNORMAL /LPF
DIAGNOSIS, 93000: NORMAL
DIAGNOSIS, 93000: NORMAL
DIFFERENTIAL METHOD BLD: ABNORMAL
EOSINOPHIL # BLD: 0 K/UL (ref 0–0.8)
EOSINOPHIL NFR BLD: 1 % (ref 0.5–7.8)
ERYTHROCYTE [DISTWIDTH] IN BLOOD BY AUTOMATED COUNT: 12.5 % (ref 11.9–14.6)
FERRITIN SERPL-MCNC: 164 NG/ML (ref 8–388)
FOLATE SERPL-MCNC: 11.1 NG/ML (ref 3.1–17.5)
GLUCOSE BLD STRIP.AUTO-MCNC: 145 MG/DL (ref 65–100)
GLUCOSE BLD STRIP.AUTO-MCNC: 156 MG/DL (ref 65–100)
GLUCOSE BLD STRIP.AUTO-MCNC: 169 MG/DL (ref 65–100)
GLUCOSE BLD STRIP.AUTO-MCNC: 229 MG/DL (ref 65–100)
GLUCOSE BLD STRIP.AUTO-MCNC: 230 MG/DL (ref 65–100)
GLUCOSE SERPL-MCNC: 209 MG/DL (ref 65–100)
GLUCOSE UR STRIP.AUTO-MCNC: 100 MG/DL
HCT VFR BLD AUTO: 26.4 % (ref 35.8–46.3)
HGB BLD-MCNC: 8.9 G/DL (ref 11.7–15.4)
HGB UR QL STRIP: NEGATIVE
IMM GRANULOCYTES # BLD: 0 K/UL (ref 0–0.5)
IMM GRANULOCYTES NFR BLD AUTO: 0 % (ref 0–5)
IRON SERPL-MCNC: 37 UG/DL (ref 35–150)
KETONES UR QL STRIP.AUTO: NEGATIVE MG/DL
LEUKOCYTE ESTERASE UR QL STRIP.AUTO: ABNORMAL
LYMPHOCYTES # BLD: 1.5 K/UL (ref 0.5–4.6)
LYMPHOCYTES NFR BLD: 22 % (ref 13–44)
MAGNESIUM SERPL-MCNC: 1.6 MG/DL (ref 1.8–2.4)
MCH RBC QN AUTO: 28.7 PG (ref 26.1–32.9)
MCHC RBC AUTO-ENTMCNC: 33.7 G/DL (ref 31.4–35)
MCV RBC AUTO: 85.2 FL (ref 79.6–97.8)
MM INDURATION POC: 0 MM (ref 0–5)
MONOCYTES # BLD: 0.3 K/UL (ref 0.1–1.3)
MONOCYTES NFR BLD: 5 % (ref 4–12)
NEUTS SEG # BLD: 5 K/UL (ref 1.7–8.2)
NEUTS SEG NFR BLD: 72 % (ref 43–78)
NITRITE UR QL STRIP.AUTO: NEGATIVE
OTHER OBSERVATIONS,UCOM: ABNORMAL
P-R INTERVAL, ECG05: 192 MS
P-R INTERVAL, ECG05: 194 MS
PH UR STRIP: 5 [PH] (ref 5–9)
PLATELET # BLD AUTO: 185 K/UL (ref 150–450)
PMV BLD AUTO: 11.1 FL (ref 10.8–14.1)
POTASSIUM SERPL-SCNC: 3.3 MMOL/L (ref 3.5–5.1)
PPD POC: NEGATIVE NEGATIVE
PROT UR STRIP-MCNC: ABNORMAL MG/DL
Q-T INTERVAL, ECG07: 382 MS
Q-T INTERVAL, ECG07: 386 MS
QRS DURATION, ECG06: 80 MS
QRS DURATION, ECG06: 84 MS
QTC CALCULATION (BEZET), ECG08: 472 MS
QTC CALCULATION (BEZET), ECG08: 474 MS
RBC # BLD AUTO: 3.1 M/UL (ref 4.05–5.25)
RBC #/AREA URNS HPF: ABNORMAL /HPF
SODIUM SERPL-SCNC: 139 MMOL/L (ref 136–145)
SP GR UR REFRACTOMETRY: 1.03 (ref 1–1.02)
TRANSFERRIN SERPL-MCNC: 192 MG/DL (ref 202–364)
UROBILINOGEN UR QL STRIP.AUTO: 0.2 EU/DL (ref 0.2–1)
VENTRICULAR RATE, ECG03: 91 BPM
VENTRICULAR RATE, ECG03: 92 BPM
VIT B12 SERPL-MCNC: 282 PG/ML (ref 193–986)
WBC # BLD AUTO: 6.9 K/UL (ref 4.3–11.1)
WBC URNS QL MICRO: ABNORMAL /HPF

## 2018-02-25 PROCEDURE — 74011636637 HC RX REV CODE- 636/637: Performed by: HOSPITALIST

## 2018-02-25 PROCEDURE — 83735 ASSAY OF MAGNESIUM: CPT | Performed by: NURSE PRACTITIONER

## 2018-02-25 PROCEDURE — 84466 ASSAY OF TRANSFERRIN: CPT | Performed by: INTERNAL MEDICINE

## 2018-02-25 PROCEDURE — 74011250637 HC RX REV CODE- 250/637: Performed by: NURSE PRACTITIONER

## 2018-02-25 PROCEDURE — 93005 ELECTROCARDIOGRAM TRACING: CPT | Performed by: HOSPITALIST

## 2018-02-25 PROCEDURE — 82746 ASSAY OF FOLIC ACID SERUM: CPT | Performed by: INTERNAL MEDICINE

## 2018-02-25 PROCEDURE — 74011000258 HC RX REV CODE- 258: Performed by: INTERNAL MEDICINE

## 2018-02-25 PROCEDURE — 65270000029 HC RM PRIVATE

## 2018-02-25 PROCEDURE — 85025 COMPLETE CBC W/AUTO DIFF WBC: CPT | Performed by: NURSE PRACTITIONER

## 2018-02-25 PROCEDURE — 74011250637 HC RX REV CODE- 250/637: Performed by: HOSPITALIST

## 2018-02-25 PROCEDURE — 83540 ASSAY OF IRON: CPT | Performed by: INTERNAL MEDICINE

## 2018-02-25 PROCEDURE — 82607 VITAMIN B-12: CPT | Performed by: INTERNAL MEDICINE

## 2018-02-25 PROCEDURE — 77030019938 HC TBNG IV PCA ICUM -A

## 2018-02-25 PROCEDURE — 74011250636 HC RX REV CODE- 250/636: Performed by: HOSPITALIST

## 2018-02-25 PROCEDURE — 36415 COLL VENOUS BLD VENIPUNCTURE: CPT | Performed by: NURSE PRACTITIONER

## 2018-02-25 PROCEDURE — 74011250636 HC RX REV CODE- 250/636: Performed by: ORTHOPAEDIC SURGERY

## 2018-02-25 PROCEDURE — 82728 ASSAY OF FERRITIN: CPT | Performed by: INTERNAL MEDICINE

## 2018-02-25 PROCEDURE — 87086 URINE CULTURE/COLONY COUNT: CPT | Performed by: FAMILY MEDICINE

## 2018-02-25 PROCEDURE — 74011636637 HC RX REV CODE- 636/637: Performed by: INTERNAL MEDICINE

## 2018-02-25 PROCEDURE — 74011250637 HC RX REV CODE- 250/637: Performed by: INTERNAL MEDICINE

## 2018-02-25 PROCEDURE — 80048 BASIC METABOLIC PNL TOTAL CA: CPT | Performed by: NURSE PRACTITIONER

## 2018-02-25 PROCEDURE — 82962 GLUCOSE BLOOD TEST: CPT

## 2018-02-25 PROCEDURE — 74011250636 HC RX REV CODE- 250/636: Performed by: INTERNAL MEDICINE

## 2018-02-25 PROCEDURE — 74011250637 HC RX REV CODE- 250/637: Performed by: ORTHOPAEDIC SURGERY

## 2018-02-25 PROCEDURE — 81001 URINALYSIS AUTO W/SCOPE: CPT | Performed by: FAMILY MEDICINE

## 2018-02-25 RX ORDER — INSULIN GLARGINE 100 [IU]/ML
10 INJECTION, SOLUTION SUBCUTANEOUS ONCE
Status: COMPLETED | OUTPATIENT
Start: 2018-02-25 | End: 2018-02-25

## 2018-02-25 RX ORDER — POTASSIUM CHLORIDE 20 MEQ/1
40 TABLET, EXTENDED RELEASE ORAL
Status: COMPLETED | OUTPATIENT
Start: 2018-02-25 | End: 2018-02-25

## 2018-02-25 RX ADMIN — INSULIN LISPRO 2 UNITS: 100 INJECTION, SOLUTION INTRAVENOUS; SUBCUTANEOUS at 22:23

## 2018-02-25 RX ADMIN — ACETAMINOPHEN 650 MG: 325 TABLET ORAL at 14:38

## 2018-02-25 RX ADMIN — ACETAMINOPHEN 650 MG: 325 TABLET ORAL at 22:23

## 2018-02-25 RX ADMIN — SODIUM CHLORIDE 125 ML/HR: 900 INJECTION, SOLUTION INTRAVENOUS at 14:44

## 2018-02-25 RX ADMIN — Medication 10 ML: at 22:22

## 2018-02-25 RX ADMIN — ONDANSETRON 4 MG: 2 INJECTION INTRAMUSCULAR; INTRAVENOUS at 22:22

## 2018-02-25 RX ADMIN — OXYCODONE HYDROCHLORIDE 5 MG: 5 TABLET ORAL at 14:38

## 2018-02-25 RX ADMIN — HYDROMORPHONE HYDROCHLORIDE 0.5 MG: 2 INJECTION, SOLUTION INTRAMUSCULAR; INTRAVENOUS; SUBCUTANEOUS at 22:22

## 2018-02-25 RX ADMIN — LISINOPRIL 10 MG: 5 TABLET ORAL at 07:53

## 2018-02-25 RX ADMIN — INSULIN LISPRO 4 UNITS: 100 INJECTION, SOLUTION INTRAVENOUS; SUBCUTANEOUS at 07:53

## 2018-02-25 RX ADMIN — INSULIN LISPRO 2 UNITS: 100 INJECTION, SOLUTION INTRAVENOUS; SUBCUTANEOUS at 12:00

## 2018-02-25 RX ADMIN — OXYCODONE HYDROCHLORIDE 5 MG: 5 TABLET ORAL at 03:47

## 2018-02-25 RX ADMIN — TRAMADOL HYDROCHLORIDE 50 MG: 50 TABLET, FILM COATED ORAL at 12:00

## 2018-02-25 RX ADMIN — INSULIN GLARGINE 10 UNITS: 100 INJECTION, SOLUTION SUBCUTANEOUS at 11:01

## 2018-02-25 RX ADMIN — POTASSIUM CHLORIDE 40 MEQ: 20 TABLET, EXTENDED RELEASE ORAL at 16:46

## 2018-02-25 RX ADMIN — OXYCODONE HYDROCHLORIDE 5 MG: 5 TABLET ORAL at 09:22

## 2018-02-25 RX ADMIN — SODIUM CHLORIDE 125 ML/HR: 900 INJECTION, SOLUTION INTRAVENOUS at 03:48

## 2018-02-25 RX ADMIN — ACETAMINOPHEN 650 MG: 325 TABLET ORAL at 03:47

## 2018-02-25 RX ADMIN — MAGNESIUM SULFATE HEPTAHYDRATE 3 G: 500 INJECTION, SOLUTION INTRAMUSCULAR; INTRAVENOUS at 17:32

## 2018-02-25 RX ADMIN — SODIUM CHLORIDE 125 ML/HR: 900 INJECTION, SOLUTION INTRAVENOUS at 22:24

## 2018-02-25 RX ADMIN — TRAMADOL HYDROCHLORIDE 50 MG: 50 TABLET, FILM COATED ORAL at 06:39

## 2018-02-25 NOTE — PROGRESS NOTES
Problem: Falls - Risk of  Goal: *Absence of Falls  Document Az Fall Risk and appropriate interventions in the flowsheet.    Outcome: Progressing Towards Goal  Fall Risk Interventions:            Medication Interventions: Assess postural VS orthostatic hypotension, Bed/chair exit alarm, Evaluate medications/consider consulting pharmacy, Patient to call before getting OOB, Teach patient to arise slowly, Utilize gait belt for transfers/ambulation    Elimination Interventions: Bed/chair exit alarm, Call light in reach, Elevated toilet seat, Patient to call for help with toileting needs, Toilet paper/wipes in reach, Toileting schedule/hourly rounds    History of Falls Interventions: Bed/chair exit alarm, Consult care management for discharge planning, Door open when patient unattended, Investigate reason for fall, Evaluate medications/consider consulting pharmacy, Room close to nurse's station, Utilize gait belt for transfer/ambulation

## 2018-02-25 NOTE — PROGRESS NOTES
Problem: Falls - Risk of  Goal: *Absence of Falls  Document Az Fall Risk and appropriate interventions in the flowsheet.    Outcome: Progressing Towards Goal  Fall Risk Interventions:            Medication Interventions: Bed/chair exit alarm, Evaluate medications/consider consulting pharmacy, Patient to call before getting OOB    Elimination Interventions: Call light in reach, Patient to call for help with toileting needs    History of Falls Interventions: Bed/chair exit alarm, Consult care management for discharge planning

## 2018-02-25 NOTE — PROGRESS NOTES
Hospitalist Progress Note    2018  Admit Date: 2018 11:47 PM   NAME: Adrianna Boland   :  1960   MRN:  [id number]   Attending: Sean José MD  PCP:  None    SUBJECTIVE:   61 y/o female with hx diabetes was reportedly at a party or some other function. She was standing behind the DJ and went to sit in a chain but instead fell and landed on her right hip. In ED xray reveals a comminuted intertrochanteric fracture. Her glucose is 314 without evidence of DKA or hyperosmolar coma. She used to be on insulin but her family members report she no longer takes it. Admitted for glucose control and surgical repair of hip.  - pain controlled. Review of Systems negative with exception of pertinent positives noted above  PHYSICAL EXAM     Visit Vitals    /84 (BP 1 Location: Right arm, BP Patient Position: At rest)    Pulse 90    Temp 98.7 °F (37.1 °C)    Resp 16    Ht 5' 3\" (1.6 m)    Wt 79.4 kg (175 lb)    SpO2 95%    BMI 31 kg/m2      Temp (24hrs), Av.4 °F (36.9 °C), Min:97.7 °F (36.5 °C), Max:99.1 °F (37.3 °C)    Oxygen Therapy  O2 Sat (%): 95 % (18 1515)  Pulse via Oximetry: 90 beats per minute (18 0513)  O2 Device: Room air (18 0513)    Intake/Output Summary (Last 24 hours) at 18 1558  Last data filed at 18 0750   Gross per 24 hour   Intake                0 ml   Output              650 ml   Net             -650 ml      General: No acute distress    Lungs:  CTA Bilaterally. Heart:  Regular rate and rhythm,  No murmur, rub, or gallop  Abdomen: Soft, Non distended, Non tender, Positive bowel sounds  Extremities: No cyanosis, clubbing or edema  Neurologic:  No focal deficits    ASSESSMENT      Active Hospital Problems    Diagnosis Date Noted    Intertrochanteric fracture of right femur ([facility] [address] 2018    Hyperglycemia due to type 2 diabetes mellitus ([facility] [address] 2018     A/P  - Right hip fracture - plan for repair on .  Pain mgmt, dvt prophy as per ortho  - DM2 - uncontrolled. start lantus 10 units today and then re-order AFTER surgery planned for tomorrow. HA1C 11  - HTN - controlled. - Anemia - uncertain etiology.  Check lab panel  - hypokalemia/ hypomag - replete and repeat in AM    DVT Prophylaxis: scd    Signed By: Mitchell Ballard DO     February 25, 2018

## 2018-02-25 NOTE — PROGRESS NOTES
ORTHO:    PATIENT HAS A SUBTROCH HIP FRACTURE. SURGERY WILL BE PLANNED WITH Landon MCDOWELL FOR Monday OR Tuesday.

## 2018-02-26 ENCOUNTER — ANESTHESIA EVENT (OUTPATIENT)
Dept: SURGERY | Age: 58
DRG: 482 | End: 2018-02-26
Payer: COMMERCIAL

## 2018-02-26 LAB
25(OH)D3+25(OH)D2 SERPL-MCNC: 12.2 NG/ML (ref 30–100)
ANION GAP SERPL CALC-SCNC: 8 MMOL/L (ref 7–16)
BASOPHILS # BLD: 0 K/UL (ref 0–0.2)
BASOPHILS NFR BLD: 0 % (ref 0–2)
BUN SERPL-MCNC: 8 MG/DL (ref 6–23)
CALCIUM SERPL-MCNC: 7.9 MG/DL (ref 8.3–10.4)
CHLORIDE SERPL-SCNC: 106 MMOL/L (ref 98–107)
CO2 SERPL-SCNC: 25 MMOL/L (ref 21–32)
CREAT SERPL-MCNC: 0.48 MG/DL (ref 0.6–1)
DIFFERENTIAL METHOD BLD: ABNORMAL
EOSINOPHIL # BLD: 0.1 K/UL (ref 0–0.8)
EOSINOPHIL NFR BLD: 1 % (ref 0.5–7.8)
ERYTHROCYTE [DISTWIDTH] IN BLOOD BY AUTOMATED COUNT: 12.4 % (ref 11.9–14.6)
GLUCOSE BLD STRIP.AUTO-MCNC: 116 MG/DL (ref 65–100)
GLUCOSE BLD STRIP.AUTO-MCNC: 138 MG/DL (ref 65–100)
GLUCOSE BLD STRIP.AUTO-MCNC: 140 MG/DL (ref 65–100)
GLUCOSE BLD STRIP.AUTO-MCNC: 148 MG/DL (ref 65–100)
GLUCOSE SERPL-MCNC: 140 MG/DL (ref 65–100)
HCT VFR BLD AUTO: 27.1 % (ref 35.8–46.3)
HGB BLD-MCNC: 9.3 G/DL (ref 11.7–15.4)
IMM GRANULOCYTES # BLD: 0 K/UL (ref 0–0.5)
IMM GRANULOCYTES NFR BLD AUTO: 0 % (ref 0–5)
LYMPHOCYTES # BLD: 1.8 K/UL (ref 0.5–4.6)
LYMPHOCYTES NFR BLD: 25 % (ref 13–44)
MAGNESIUM SERPL-MCNC: 2.3 MG/DL (ref 1.8–2.4)
MCH RBC QN AUTO: 29.2 PG (ref 26.1–32.9)
MCHC RBC AUTO-ENTMCNC: 34.3 G/DL (ref 31.4–35)
MCV RBC AUTO: 85 FL (ref 79.6–97.8)
MM INDURATION POC: 0 MM (ref 0–5)
MONOCYTES # BLD: 0.3 K/UL (ref 0.1–1.3)
MONOCYTES NFR BLD: 5 % (ref 4–12)
NEUTS SEG # BLD: 5 K/UL (ref 1.7–8.2)
NEUTS SEG NFR BLD: 69 % (ref 43–78)
PLATELET # BLD AUTO: 196 K/UL (ref 150–450)
PMV BLD AUTO: 10.4 FL (ref 10.8–14.1)
POTASSIUM SERPL-SCNC: 3.7 MMOL/L (ref 3.5–5.1)
PPD POC: NEGATIVE NEGATIVE
RBC # BLD AUTO: 3.19 M/UL (ref 4.05–5.25)
SODIUM SERPL-SCNC: 139 MMOL/L (ref 136–145)
WBC # BLD AUTO: 7.1 K/UL (ref 4.3–11.1)

## 2018-02-26 PROCEDURE — 74011636637 HC RX REV CODE- 636/637: Performed by: INTERNAL MEDICINE

## 2018-02-26 PROCEDURE — 80048 BASIC METABOLIC PNL TOTAL CA: CPT | Performed by: NURSE PRACTITIONER

## 2018-02-26 PROCEDURE — 74011250637 HC RX REV CODE- 250/637: Performed by: NURSE PRACTITIONER

## 2018-02-26 PROCEDURE — 65270000029 HC RM PRIVATE

## 2018-02-26 PROCEDURE — 74011250636 HC RX REV CODE- 250/636: Performed by: ORTHOPAEDIC SURGERY

## 2018-02-26 PROCEDURE — 85025 COMPLETE CBC W/AUTO DIFF WBC: CPT | Performed by: NURSE PRACTITIONER

## 2018-02-26 PROCEDURE — 36415 COLL VENOUS BLD VENIPUNCTURE: CPT | Performed by: NURSE PRACTITIONER

## 2018-02-26 PROCEDURE — 74011250637 HC RX REV CODE- 250/637: Performed by: ORTHOPAEDIC SURGERY

## 2018-02-26 PROCEDURE — 74011250636 HC RX REV CODE- 250/636: Performed by: HOSPITALIST

## 2018-02-26 PROCEDURE — 74011250637 HC RX REV CODE- 250/637: Performed by: HOSPITALIST

## 2018-02-26 PROCEDURE — 82962 GLUCOSE BLOOD TEST: CPT

## 2018-02-26 PROCEDURE — 83735 ASSAY OF MAGNESIUM: CPT | Performed by: NURSE PRACTITIONER

## 2018-02-26 RX ORDER — SODIUM CHLORIDE, SODIUM LACTATE, POTASSIUM CHLORIDE, CALCIUM CHLORIDE 600; 310; 30; 20 MG/100ML; MG/100ML; MG/100ML; MG/100ML
75 INJECTION, SOLUTION INTRAVENOUS
Status: DISPENSED | OUTPATIENT
Start: 2018-02-26 | End: 2018-02-27

## 2018-02-26 RX ORDER — INSULIN GLARGINE 100 [IU]/ML
5 INJECTION, SOLUTION SUBCUTANEOUS
Status: DISCONTINUED | OUTPATIENT
Start: 2018-02-26 | End: 2018-02-27

## 2018-02-26 RX ORDER — CEFAZOLIN SODIUM/WATER 2 G/20 ML
2 SYRINGE (ML) INTRAVENOUS
Status: COMPLETED | OUTPATIENT
Start: 2018-02-27 | End: 2018-02-28

## 2018-02-26 RX ORDER — INSULIN GLARGINE 100 [IU]/ML
10 INJECTION, SOLUTION SUBCUTANEOUS
Status: DISCONTINUED | OUTPATIENT
Start: 2018-02-26 | End: 2018-02-26

## 2018-02-26 RX ADMIN — TRAMADOL HYDROCHLORIDE 50 MG: 50 TABLET, FILM COATED ORAL at 18:20

## 2018-02-26 RX ADMIN — ACETAMINOPHEN 650 MG: 325 TABLET ORAL at 05:11

## 2018-02-26 RX ADMIN — ACETAMINOPHEN 650 MG: 325 TABLET ORAL at 14:46

## 2018-02-26 RX ADMIN — ACETAMINOPHEN 650 MG: 325 TABLET ORAL at 22:45

## 2018-02-26 RX ADMIN — TRAMADOL HYDROCHLORIDE 50 MG: 50 TABLET, FILM COATED ORAL at 08:17

## 2018-02-26 RX ADMIN — ONDANSETRON 4 MG: 2 INJECTION INTRAMUSCULAR; INTRAVENOUS at 15:31

## 2018-02-26 RX ADMIN — Medication 5 ML: at 05:17

## 2018-02-26 RX ADMIN — ONDANSETRON 4 MG: 2 INJECTION INTRAMUSCULAR; INTRAVENOUS at 05:17

## 2018-02-26 RX ADMIN — SODIUM CHLORIDE 125 ML/HR: 900 INJECTION, SOLUTION INTRAVENOUS at 05:10

## 2018-02-26 RX ADMIN — ONDANSETRON 4 MG: 2 INJECTION INTRAMUSCULAR; INTRAVENOUS at 23:06

## 2018-02-26 RX ADMIN — Medication 10 ML: at 14:49

## 2018-02-26 RX ADMIN — Medication 10 ML: at 22:46

## 2018-02-26 RX ADMIN — LISINOPRIL 10 MG: 5 TABLET ORAL at 08:10

## 2018-02-26 RX ADMIN — INSULIN GLARGINE 5 UNITS: 100 INJECTION, SOLUTION SUBCUTANEOUS at 22:46

## 2018-02-26 RX ADMIN — OXYCODONE HYDROCHLORIDE 5 MG: 5 TABLET ORAL at 05:11

## 2018-02-26 RX ADMIN — OXYCODONE HYDROCHLORIDE 5 MG: 5 TABLET ORAL at 22:45

## 2018-02-26 RX ADMIN — OXYCODONE HYDROCHLORIDE 5 MG: 5 TABLET ORAL at 14:46

## 2018-02-26 NOTE — PROGRESS NOTES
Per notes:  Bishop Crain  Full code   No advance directives on file  Sister- Sheila Martinez, staff [address], [address] [facility]  /   [email or web address]\[facility]\"".[facility]

## 2018-02-26 NOTE — PROGRESS NOTES
Hospitalist Progress Note    2018  Admit Date: 2018 11:47 PM   NAME: Christiane Mackey   :  1960   MRN:  [id number]   Attending: Dante Marquez MD  PCP:  None    SUBJECTIVE:   61 y/o female with hx diabetes was reportedly at a party or some other function. She was standing behind the DJ and went to sit in a chain but instead fell and landed on her right hip. In ED xray reveals a comminuted intertrochanteric fracture. Her glucose is 314 without evidence of DKA or hyperosmolar coma. She used to be on insulin but her family members report she no longer takes it. Admitted for glucose control and surgical repair of hip.  - pt reports anxiety and pain. Review of Systems negative with exception of pertinent positives noted above  PHYSICAL EXAM     Visit Vitals    BP (!) 168/96 (BP 1 Location: Right arm, BP Patient Position: At rest)    Pulse (!) 112    Temp 98.3 °F (36.8 °C)    Resp 15    Ht 5' 3\" (1.6 m)    Wt 79.4 kg (175 lb)    SpO2 99%    BMI 31 kg/m2      Temp (24hrs), Av.6 °F (37 °C), Min:98.3 °F (36.8 °C), Max:98.9 °F (37.2 °C)    Oxygen Therapy  O2 Sat (%): 99 % (18 1444)  Pulse via Oximetry: 104 beats per minute (18 0529)  O2 Device: Room air (18 0529)    Intake/Output Summary (Last 24 hours) at 18 1753  Last data filed at 18 1514   Gross per 24 hour   Intake                0 ml   Output             1300 ml   Net            -1300 ml      General: No acute distress    Lungs:  CTA Bilaterally. Heart:  Regular rate and rhythm,  No murmur, rub, or gallop  Abdomen: Soft, Non distended, Non tender, Positive bowel sounds  Extremities: No cyanosis, clubbing or edema  Neurologic:  No focal deficits    ASSESSMENT      Active Hospital Problems    Diagnosis Date Noted    Intertrochanteric fracture of right femur ([facility] [address] 2018    Hyperglycemia due to type 2 diabetes mellitus ([facility] [address] 2018     A/P  - Right hip fracture - plan for repair on .  Pain mgmt, dvt prophy as per ortho  - Anxiety - ortho RN aware, will not add benzo or anxiolytic at this time. She does deny etoh use. - DM2 - reduced lantus dose to 5 units qhs for pre-op fasting. Recommend increasing back to 10 units qhs after surgery  - HTN - controlled. - Anemia - uncertain etiology.  Check lab panel  - hypokalemia/ hypomag - corrected    DVT Prophylaxis: scd    Signed By: Otilio Ruby,      February 26, 2018

## 2018-02-26 NOTE — PROGRESS NOTES
Spoke with patient regarding d/c planning. Alert and oriented x 4  Lives alone in a one story home with 5 step entry. Prior to admission totally independent and driving. PCP- Dr. Dhara Rivera and last seen 3 weeks ago  Denies having any problems filling Rx. Patient and family are requesting STR facility in [facility] post operatively. Patient and family will research available [place] facilities in her network and let me know of their preferences. Scheduled for surgery today. CM will continue to follow for any further needs. Care Management Interventions  PCP Verified by CM:  Yes  Mode of Transport at Discharge: BLS  Transition of Care Consult (CM Consult): Discharge Planning  Physical Therapy Consult: Yes  Current Support Network: Own Home, Lives Alone  Confirm Follow Up Transport: Family  Plan discussed with Pt/Family/Caregiver: Yes  Freedom of Choice Offered: Yes  Discharge Location  Discharge Placement: Rehab Unit Subacute

## 2018-02-26 NOTE — PROGRESS NOTES
Per Angelique Gonzalez, Fracture Coordinator, Patient's surgery will not be until Tuesday or Wednesday.

## 2018-02-26 NOTE — PROGRESS NOTES
Problem: Falls - Risk of  Goal: *Absence of Falls  Document Az Fall Risk and appropriate interventions in the flowsheet.    Outcome: Progressing Towards Goal  Fall Risk Interventions:            Medication Interventions: Bed/chair exit alarm, Evaluate medications/consider consulting pharmacy, Patient to call before getting OOB    Elimination Interventions: Bed/chair exit alarm, Call light in reach, Elevated toilet seat    History of Falls Interventions: Bed/chair exit alarm, Evaluate medications/consider consulting pharmacy

## 2018-02-26 NOTE — ANESTHESIA PREPROCEDURE EVALUATION
Anesthetic History   No history of anesthetic complications            Review of Systems / Medical History  Patient summary reviewed and pertinent labs reviewed    Pulmonary  Within defined limits                 Neuro/Psych   Within defined limits           Cardiovascular                  Exercise tolerance: >4 METS  Comments: Denies CP, SOB, Palps, Syncope, Fatigue   GI/Hepatic/Renal  Within defined limits              Endo/Other    Diabetes (A1c 11): poorly controlled, type 2    Anemia     Other Findings   Comments: K being repleted   Emery at a party           Physical Exam    Airway  Mallampati: II  TM Distance: 4 - 6 cm  Neck ROM: normal range of motion   Mouth opening: Normal     Cardiovascular    Rhythm: regular  Rate: normal         Dental  No notable dental hx       Pulmonary  Breath sounds clear to auscultation               Abdominal  GI exam deferred       Other Findings            Anesthetic Plan    ASA: 2  Anesthesia type: spinal            Anesthetic plan and risks discussed with: Patient and Family

## 2018-02-26 NOTE — DIABETES MGMT
Patient admitted with right hip fracture, seen by diabetes educator. Patient states she was diagnosed about five years ago and that her mother at the bedside is diabetic as well. Patient voices compliance with her metformin 5 00 mg BID that she states she gets for free at [email or web address]. Patient reports she was on insulin in the past, but does not state why she stopped it. Patient states she used insulin pens in the past and states she does not need reeducation on insulin injections. Patient states she has attended diabetic classes in the past. Patient was not checking her blood glucose at home. Patient requested a new glucometer. Provided patient with a One Touch Verio Flex glucometer as that is the meter she states she had in the past and is familiar with how to use it. Patient will need a prescription for glucometer supplies at discharge. Blood glucose 377 on admission. HgA1c 11 (eA). Patient's blood glucose ranged 145-230 yesterday with patient receiving a one time dose of Lantus 10 units and a total of 8 units of Humalog. Blood glucose 140 this morning. Reviewed current regimen of Humalog SSI with patient. Patient verbalized understanding. Pt given educational material, \"Diabetes Self-Management: A Patient Teaching Guide\" Patient does not feel up to education at this time. Patient to review at her convenience. Educator will follow along and provide education as patient condition allows.

## 2018-02-27 ENCOUNTER — ANESTHESIA (OUTPATIENT)
Dept: SURGERY | Age: 58
DRG: 482 | End: 2018-02-27
Payer: COMMERCIAL

## 2018-02-27 LAB
ANION GAP SERPL CALC-SCNC: 9 MMOL/L (ref 7–16)
BACTERIA SPEC CULT: NORMAL
BASOPHILS # BLD: 0 K/UL (ref 0–0.2)
BASOPHILS NFR BLD: 0 % (ref 0–2)
BUN SERPL-MCNC: 7 MG/DL (ref 6–23)
CALCIUM SERPL-MCNC: 8.1 MG/DL (ref 8.3–10.4)
CHLORIDE SERPL-SCNC: 105 MMOL/L (ref 98–107)
CO2 SERPL-SCNC: 25 MMOL/L (ref 21–32)
CREAT SERPL-MCNC: 0.44 MG/DL (ref 0.6–1)
DIFFERENTIAL METHOD BLD: ABNORMAL
EOSINOPHIL # BLD: 0.1 K/UL (ref 0–0.8)
EOSINOPHIL NFR BLD: 1 % (ref 0.5–7.8)
ERYTHROCYTE [DISTWIDTH] IN BLOOD BY AUTOMATED COUNT: 12.4 % (ref 11.9–14.6)
GLUCOSE BLD STRIP.AUTO-MCNC: 103 MG/DL (ref 65–100)
GLUCOSE BLD STRIP.AUTO-MCNC: 121 MG/DL (ref 65–100)
GLUCOSE BLD STRIP.AUTO-MCNC: 179 MG/DL (ref 65–100)
GLUCOSE BLD STRIP.AUTO-MCNC: 87 MG/DL (ref 65–100)
GLUCOSE SERPL-MCNC: 103 MG/DL (ref 65–100)
HCT VFR BLD AUTO: 28.2 % (ref 35.8–46.3)
HGB BLD-MCNC: 9.7 G/DL (ref 11.7–15.4)
IMM GRANULOCYTES # BLD: 0 K/UL (ref 0–0.5)
IMM GRANULOCYTES NFR BLD AUTO: 0 % (ref 0–5)
LYMPHOCYTES # BLD: 1.8 K/UL (ref 0.5–4.6)
LYMPHOCYTES NFR BLD: 24 % (ref 13–44)
MCH RBC QN AUTO: 29 PG (ref 26.1–32.9)
MCHC RBC AUTO-ENTMCNC: 34.4 G/DL (ref 31.4–35)
MCV RBC AUTO: 84.2 FL (ref 79.6–97.8)
MM INDURATION POC: NORMAL MM (ref 0–5)
MONOCYTES # BLD: 0.4 K/UL (ref 0.1–1.3)
MONOCYTES NFR BLD: 6 % (ref 4–12)
NEUTS SEG # BLD: 4.9 K/UL (ref 1.7–8.2)
NEUTS SEG NFR BLD: 69 % (ref 43–78)
PLATELET # BLD AUTO: 208 K/UL (ref 150–450)
PMV BLD AUTO: 10.7 FL (ref 10.8–14.1)
POTASSIUM SERPL-SCNC: 3.5 MMOL/L (ref 3.5–5.1)
PPD POC: NEGATIVE NEGATIVE
RBC # BLD AUTO: 3.35 M/UL (ref 4.05–5.25)
SERVICE CMNT-IMP: NORMAL
SODIUM SERPL-SCNC: 139 MMOL/L (ref 136–145)
WBC # BLD AUTO: 7.2 K/UL (ref 4.3–11.1)

## 2018-02-27 PROCEDURE — 65270000029 HC RM PRIVATE

## 2018-02-27 PROCEDURE — 74011250637 HC RX REV CODE- 250/637: Performed by: NURSE PRACTITIONER

## 2018-02-27 PROCEDURE — 74011250637 HC RX REV CODE- 250/637: Performed by: ORTHOPAEDIC SURGERY

## 2018-02-27 PROCEDURE — 74011250636 HC RX REV CODE- 250/636: Performed by: HOSPITALIST

## 2018-02-27 PROCEDURE — 74011636637 HC RX REV CODE- 636/637: Performed by: HOSPITALIST

## 2018-02-27 PROCEDURE — 74011250636 HC RX REV CODE- 250/636: Performed by: ORTHOPAEDIC SURGERY

## 2018-02-27 PROCEDURE — 80048 BASIC METABOLIC PNL TOTAL CA: CPT | Performed by: NURSE PRACTITIONER

## 2018-02-27 PROCEDURE — 36415 COLL VENOUS BLD VENIPUNCTURE: CPT | Performed by: NURSE PRACTITIONER

## 2018-02-27 PROCEDURE — 82962 GLUCOSE BLOOD TEST: CPT

## 2018-02-27 PROCEDURE — 85025 COMPLETE CBC W/AUTO DIFF WBC: CPT | Performed by: NURSE PRACTITIONER

## 2018-02-27 PROCEDURE — 74011250637 HC RX REV CODE- 250/637: Performed by: HOSPITALIST

## 2018-02-27 RX ADMIN — LISINOPRIL 10 MG: 5 TABLET ORAL at 09:33

## 2018-02-27 RX ADMIN — ACETAMINOPHEN 650 MG: 325 TABLET ORAL at 06:16

## 2018-02-27 RX ADMIN — HYDROMORPHONE HYDROCHLORIDE 0.5 MG: 2 INJECTION, SOLUTION INTRAMUSCULAR; INTRAVENOUS; SUBCUTANEOUS at 21:29

## 2018-02-27 RX ADMIN — ACETAMINOPHEN 650 MG: 325 TABLET ORAL at 21:29

## 2018-02-27 RX ADMIN — OXYCODONE HYDROCHLORIDE 5 MG: 5 TABLET ORAL at 06:16

## 2018-02-27 RX ADMIN — ONDANSETRON 4 MG: 2 INJECTION INTRAMUSCULAR; INTRAVENOUS at 06:25

## 2018-02-27 RX ADMIN — OXYCODONE HYDROCHLORIDE 5 MG: 5 TABLET ORAL at 11:51

## 2018-02-27 RX ADMIN — INSULIN LISPRO 2 UNITS: 100 INJECTION, SOLUTION INTRAVENOUS; SUBCUTANEOUS at 23:05

## 2018-02-27 RX ADMIN — Medication 10 ML: at 21:30

## 2018-02-27 RX ADMIN — HYDROMORPHONE HYDROCHLORIDE 0.5 MG: 2 INJECTION, SOLUTION INTRAMUSCULAR; INTRAVENOUS; SUBCUTANEOUS at 13:08

## 2018-02-27 RX ADMIN — SODIUM CHLORIDE 125 ML/HR: 900 INJECTION, SOLUTION INTRAVENOUS at 17:21

## 2018-02-27 RX ADMIN — ACETAMINOPHEN 650 MG: 325 TABLET ORAL at 13:08

## 2018-02-27 RX ADMIN — Medication 10 ML: at 06:16

## 2018-02-27 NOTE — PROGRESS NOTES
Hospitalist Progress Note    2018  Admit Date: 2018 11:47 PM   NAME: Crista Pham   :  1960   MRN:  [id number]   Attending: Tal Yao MD  PCP:  None    SUBJECTIVE:   Per H/P:  '61 y/o female with hx diabetes was reportedly at a party or some other function. She was standing behind the DJ and went to sit in a chain but instead fell and landed on her right hip. No other injury reported. In ED xray reveals a comminuted intertrochanteric fracture. She has hx knee surgery and did not have any complications from anesthesia or bleeding. She is currently sedated from pain medications she received in ED. No reported hx cardiovascular or pulmonary disease. Her glucose is 314 without evidence of DKA or hyperosmolar coma. She used to be on insulin but her family members report she no longer takes it. Will admit for glucose control and surgical repair of hip.'    - seen with her daughter, Randa Corral, at bedside. She reports her R hip pain is controlled. Awaiting surgery for today. Denies new concerns. Blood sugars controlled.       Review of Systems negative with exception of pertinent positives noted above  PHYSICAL EXAM     Visit Vitals    /82 (BP 1 Location: Right arm, BP Patient Position: At rest)    Pulse 86    Temp 98.5 °F (36.9 °C)    Resp 17    Ht 5' 3\" (1.6 m)    Wt 79.4 kg (175 lb)    SpO2 96%    BMI 31 kg/m2      Temp (24hrs), Av.6 °F (37 °C), Min:98.3 °F (36.8 °C), Max:99 °F (37.2 °C)    Patient Vitals for the past 24 hrs:   Temp Pulse Resp BP SpO2   18 0735 98.5 °F (36.9 °C) 86 17 144/82 96 %   18 0554 98.6 °F (37 °C) 98 17 160/89 97 %   18 2312 98.9 °F (37.2 °C) 71 15 (!) 159/92 99 %   18 2042 99 °F (37.2 °C) 97 15 148/84 97 %   18 1444 98.3 °F (36.8 °C) (!) 112 15 (!) 168/96 99 %   18 1136 98.5 °F (36.9 °C) (!) 107 15 156/85 97 %       Oxygen Therapy  O2 Sat (%): 96 % (18 0735)  Pulse via Oximetry: 104 beats per minute (18 0362)  O2 Device: Room air (02/26/18 0529)    Intake/Output Summary (Last 24 hours) at 02/27/18 0933  Last data filed at 02/27/18 0602   Gross per 24 hour   Intake                0 ml   Output             1875 ml   Net            -1875 ml      General: No acute distress, obese    Lungs:  CTA Bilaterally. Heart:  Regular rate and rhythm,  No murmur, rub, or gallop  Abdomen: Soft, Non distended, Non tender, Positive bowel sounds  Extremities: No cyanosis, clubbing or edema  Neurologic:  No focal deficits    Recent Results (from the past 24 hour(s))   GLUCOSE, POC    Collection Time: 02/26/18 11:12 AM   Result Value Ref Range    Glucose (POC) 148 (H) 65 - 100 mg/dL   GLUCOSE, POC    Collection Time: 02/26/18  4:16 PM   Result Value Ref Range    Glucose (POC) 140 (H) 65 - 100 mg/dL   GLUCOSE, POC    Collection Time: 02/26/18  9:39 PM   Result Value Ref Range    Glucose (POC) 116 (H) 65 - 100 mg/dL   PLEASE READ & DOCUMENT PPD TEST IN 72 HRS    Collection Time: 02/27/18  5:01 AM   Result Value Ref Range    PPD Negative Negative    mm Induration 0mm mm   CBC WITH AUTOMATED DIFF    Collection Time: 02/27/18  6:28 AM   Result Value Ref Range    WBC 7.2 4.3 - 11.1 K/uL    RBC 3.35 (L) 4.05 - 5.25 M/uL    HGB 9.7 (L) 11.7 - 15.4 g/dL    HCT 28.2 (L) 35.8 - 46.3 %    MCV 84.2 79.6 - 97.8 FL    MCH 29.0 26.1 - 32.9 PG    MCHC 34.4 31.4 - 35.0 g/dL    RDW 12.4 11.9 - 14.6 %    PLATELET 398 406 - 204 K/uL    MPV 10.7 (L) 10.8 - 14.1 FL    DF AUTOMATED      NEUTROPHILS 69 43 - 78 %    LYMPHOCYTES 24 13 - 44 %    MONOCYTES 6 4.0 - 12.0 %    EOSINOPHILS 1 0.5 - 7.8 %    BASOPHILS 0 0.0 - 2.0 %    IMMATURE GRANULOCYTES 0 0.0 - 5.0 %    ABS. NEUTROPHILS 4.9 1.7 - 8.2 K/UL    ABS. LYMPHOCYTES 1.8 0.5 - 4.6 K/UL    ABS. MONOCYTES 0.4 0.1 - 1.3 K/UL    ABS. EOSINOPHILS 0.1 0.0 - 0.8 K/UL    ABS. BASOPHILS 0.0 0.0 - 0.2 K/UL    ABS. IMM.  GRANS. 0.0 0.0 - 0.5 K/UL   METABOLIC PANEL, BASIC    Collection Time: 02/27/18  6:28 AM   Result Value Ref Range    Sodium 139 136 - 145 mmol/L    Potassium 3.5 3.5 - 5.1 mmol/L    Chloride 105 98 - 107 mmol/L    CO2 25 21 - 32 mmol/L    Anion gap 9 7 - 16 mmol/L    Glucose 103 (H) 65 - 100 mg/dL    BUN 7 6 - 23 MG/DL    Creatinine 0.44 (L) 0.6 - 1.0 MG/DL    GFR est AA >60 >60 ml/min/1.73m2    GFR est non-AA >60 >60 ml/min/1.73m2    Calcium 8.1 (L) 8.3 - 10.4 MG/DL   GLUCOSE, POC    Collection Time: 02/27/18  7:30 AM   Result Value Ref Range    Glucose (POC) 103 (H) 65 - 100 mg/dL     Imaging:    XR FEMUR RT 2 VS   Final Result   IMPRESSION: Comminuted intertrochanteric fracture. The mid and distal femur are   intact. XR CHEST SNGL V   Final Result   IMPRESSION: Normal chest.             XR HIP RT W OR WO PELV 2-3 VWS   Final Result   IMPRESSION: Comminuted intertrochanteric fracture which extends of the proximal   femoral diaphysis. ASSESSMENT      Hospital Problems as of 2/27/2018  Never Reviewed          Codes Class Noted - Resolved POA    * (Principal)Intertrochanteric fracture of right femur ([facility] ICD-10-CM: R82.546J  ICD-9-CM: 820.21  2/24/2018 - Present Yes        Hyperglycemia due to type 2 diabetes mellitus ([facility] ICD-10-CM: E11.65  ICD-9-CM: 250.00  2/24/2018 - Present Yes            Plan:  · To OR today per ortho. Pain management and DVT proph per Ortho. · T2DM- controlled. Increase lantus back to 10 units after surgery. · HTN- decent control. Adjust lisinopril as needed after surgery. · Anemia- slight iron deficiency. Monitor. DVT Prophylaxis: SCDs    Signed By: Marito Kline. Aleta Erickson MD     February 27, 2018      .

## 2018-02-27 NOTE — PROGRESS NOTES
Problem: Falls - Risk of  Goal: *Absence of Falls  Document Az Fall Risk and appropriate interventions in the flowsheet.    Outcome: Progressing Towards Goal  Fall Risk Interventions:            Medication Interventions: Bed/chair exit alarm, Patient to call before getting OOB    Elimination Interventions: Bed/chair exit alarm, Call light in reach    History of Falls Interventions: Bed/chair exit alarm, Door open when patient unattended

## 2018-02-27 NOTE — CONSULTS
Geriatric Fracture Consult  Patient: Spencer Jessica  YOB: 1960   MRN: [id number]      Consult Date: 2/27/2018     Consulting Physician: DR Darrian Turner    Chief Complaint: RIGHT INTERTROCHANTERIC HIP FRACTURE; OSTEOPOROSIS  History of Present Illness: Spencer Jessica is a 62 y.o.  female who is being seen for right hip pain after sustaining a fall at a party while trying to sit on a chain behind the DJ. Onset of symptoms was abrupt with unchanged course since that time. The pain is located in the right hip. Patient describes the pain as continuous and rated as moderate. Pain has been associated with movement. Patient denies other injuries. Review of Systems: A comprehensive review of systems was negative except for that written in the History of Present Illness. ED Presentation Time: < 8 hours  Mechanism of Injury: Fall from standing  Ambulatory Status: Independent  Past Medical History: No past medical history on file. Allergies: No Known Allergies   Past Surgical History: No past surgical history on file. Social History:   Social History     Social History    Marital status: SINGLE     Spouse name: N/A    Number of children: N/A    Years of education: N/A     Occupational History    Not on file. Social History Main Topics    Smoking status: Not on file    Smokeless tobacco: Not on file    Alcohol use Not on file    Drug use: Not on file    Sexual activity: Not on file     Other Topics Concern    Not on file     Social History Narrative      FAMILY HISTORY - REVIEWED - NO PERTINENT FAMILY HISTORY  Dwelling Status: Alone  Current Anticoagulant Medications: DENIES  History of falls: NO  Prior Fractures: DENIES  Osteoporosis Medications: NONE  Bone Density Tests: UNKNOWN  X-RAYS: Right Intertrochanteric/Subtrochanteric Fracture  Physical Exam:   PATIENT COMPLAINING OF RIGHT HIP PAIN AFTER A FALL AT A PARTY.  FOCUSED MUSCULOSKELETAL EXAM REVEALS DECREASED ROM TO RIGHT LE. THERE IS SHORTENING AND EXTERNAL ROTATION NOTED TO RIGHT LE. PATIENT IS TENDER TO PALPATION OVER RIGHT HIP AND GROIN. PATIENT HAS PAIN WITH LOG ROLLING. N/V INTACT. DENIES OTHER INJURIES.     Assessment / Plan: ORIF RIGHT IT/ST FRACTURE WITH IM NAIL; CONSULT PT/OT - NWB RIGHT LE; [address], WERE ADDRESSED WITH PATIENT AND DAUGHTER  Labs:    Lab Results   Component Value Date/Time    HGB 9.7 (L) 02/27/2018 06:28 AM    WBC 7.2 02/27/2018 06:28 AM    INR 1.1 02/24/2018 05:41 AM    Albumin 3.1 (L) 02/24/2018 12:55 AM      Preoperative Clearance: YES by Hospitalist          Signed by: Brina Stern NP   Today's Date: February 27, 2018

## 2018-02-27 NOTE — DIABETES MGMT
Patient's blood glucose ranged 116-148 yesterday with patient receiving Lantus 5 units. Blood glucose 103 this morning. Reviewed with patient. Patient reports her pain is controlled today but she is disappointed that she has to wait another day for surgery. Patient's daughter at bedside. \" I live in [place], I come down here every day thinking she is going to have surgery and we keep getting told it will be tomorrow. She's been here since Saturday. I need to work. Is it really going to be tomorrow? \" Primary nurse at bedside explaining why patient was rescheduled. Patient is not up to education at this time. Will follow along and provide education as patient condition allows.

## 2018-02-28 ENCOUNTER — APPOINTMENT (OUTPATIENT)
Dept: GENERAL RADIOLOGY | Age: 58
DRG: 482 | End: 2018-02-28
Attending: ORTHOPAEDIC SURGERY
Payer: COMMERCIAL

## 2018-02-28 PROBLEM — I10 ESSENTIAL HYPERTENSION: Status: ACTIVE | Noted: 2018-02-28

## 2018-02-28 LAB
BASOPHILS # BLD: 0 K/UL (ref 0–0.2)
BASOPHILS NFR BLD: 0 % (ref 0–2)
DIFFERENTIAL METHOD BLD: ABNORMAL
EOSINOPHIL # BLD: 0.2 K/UL (ref 0–0.8)
EOSINOPHIL NFR BLD: 2 % (ref 0.5–7.8)
ERYTHROCYTE [DISTWIDTH] IN BLOOD BY AUTOMATED COUNT: 12.3 % (ref 11.9–14.6)
GLUCOSE BLD STRIP.AUTO-MCNC: 118 MG/DL (ref 65–100)
GLUCOSE BLD STRIP.AUTO-MCNC: 150 MG/DL (ref 65–100)
GLUCOSE BLD STRIP.AUTO-MCNC: 164 MG/DL (ref 65–100)
GLUCOSE BLD STRIP.AUTO-MCNC: 210 MG/DL (ref 65–100)
HCT VFR BLD AUTO: 27.3 % (ref 35.8–46.3)
HGB BLD-MCNC: 9.3 G/DL (ref 11.7–15.4)
IMM GRANULOCYTES # BLD: 0 K/UL (ref 0–0.5)
IMM GRANULOCYTES NFR BLD AUTO: 0 % (ref 0–5)
LYMPHOCYTES # BLD: 1.5 K/UL (ref 0.5–4.6)
LYMPHOCYTES NFR BLD: 19 % (ref 13–44)
MCH RBC QN AUTO: 28.4 PG (ref 26.1–32.9)
MCHC RBC AUTO-ENTMCNC: 34.1 G/DL (ref 31.4–35)
MCV RBC AUTO: 83.5 FL (ref 79.6–97.8)
MONOCYTES # BLD: 0.4 K/UL (ref 0.1–1.3)
MONOCYTES NFR BLD: 5 % (ref 4–12)
NEUTS SEG # BLD: 5.9 K/UL (ref 1.7–8.2)
NEUTS SEG NFR BLD: 74 % (ref 43–78)
PLATELET # BLD AUTO: 225 K/UL (ref 150–450)
PMV BLD AUTO: 10.6 FL (ref 10.8–14.1)
RBC # BLD AUTO: 3.27 M/UL (ref 4.05–5.25)
WBC # BLD AUTO: 7.9 K/UL (ref 4.3–11.1)

## 2018-02-28 PROCEDURE — 74011250636 HC RX REV CODE- 250/636: Performed by: NURSE PRACTITIONER

## 2018-02-28 PROCEDURE — 97161 PT EVAL LOW COMPLEX 20 MIN: CPT

## 2018-02-28 PROCEDURE — 74011250636 HC RX REV CODE- 250/636: Performed by: HOSPITALIST

## 2018-02-28 PROCEDURE — 86850 RBC ANTIBODY SCREEN: CPT | Performed by: ANESTHESIOLOGY

## 2018-02-28 PROCEDURE — 73552 X-RAY EXAM OF FEMUR 2/>: CPT

## 2018-02-28 PROCEDURE — 74011250637 HC RX REV CODE- 250/637: Performed by: ANESTHESIOLOGY

## 2018-02-28 PROCEDURE — 77030007880 HC KT SPN EPDRL BBMI -B: Performed by: ANESTHESIOLOGY

## 2018-02-28 PROCEDURE — 76210000006 HC OR PH I REC 0.5 TO 1 HR: Performed by: ORTHOPAEDIC SURGERY

## 2018-02-28 PROCEDURE — 74011250637 HC RX REV CODE- 250/637: Performed by: ORTHOPAEDIC SURGERY

## 2018-02-28 PROCEDURE — 82962 GLUCOSE BLOOD TEST: CPT

## 2018-02-28 PROCEDURE — 77030008467 HC STPLR SKN COVD -B: Performed by: ORTHOPAEDIC SURGERY

## 2018-02-28 PROCEDURE — 74011000250 HC RX REV CODE- 250: Performed by: NURSE PRACTITIONER

## 2018-02-28 PROCEDURE — 77030035168: Performed by: ORTHOPAEDIC SURGERY

## 2018-02-28 PROCEDURE — 85025 COMPLETE CBC W/AUTO DIFF WBC: CPT | Performed by: NURSE PRACTITIONER

## 2018-02-28 PROCEDURE — C1713 ANCHOR/SCREW BN/BN,TIS/BN: HCPCS | Performed by: ORTHOPAEDIC SURGERY

## 2018-02-28 PROCEDURE — 77030003665 HC NDL SPN BBMI -A: Performed by: ANESTHESIOLOGY

## 2018-02-28 PROCEDURE — 77030002933 HC SUT MCRYL J&J -A: Performed by: ORTHOPAEDIC SURGERY

## 2018-02-28 PROCEDURE — 76010000162 HC OR TIME 1.5 TO 2 HR INTENSV-TIER 1: Performed by: ORTHOPAEDIC SURGERY

## 2018-02-28 PROCEDURE — 65270000029 HC RM PRIVATE

## 2018-02-28 PROCEDURE — 77030025487 HC SLV RESRB LOK SCR SYNT -C: Performed by: ORTHOPAEDIC SURGERY

## 2018-02-28 PROCEDURE — 77030018836 HC SOL IRR NACL ICUM -A: Performed by: ORTHOPAEDIC SURGERY

## 2018-02-28 PROCEDURE — C1769 GUIDE WIRE: HCPCS | Performed by: ORTHOPAEDIC SURGERY

## 2018-02-28 PROCEDURE — 74011250636 HC RX REV CODE- 250/636: Performed by: ANESTHESIOLOGY

## 2018-02-28 PROCEDURE — 74011250636 HC RX REV CODE- 250/636: Performed by: ORTHOPAEDIC SURGERY

## 2018-02-28 PROCEDURE — 0QS606Z REPOSITION RIGHT UPPER FEMUR WITH INTRAMEDULLARY INTERNAL FIXATION DEVICE, OPEN APPROACH: ICD-10-PCS | Performed by: ORTHOPAEDIC SURGERY

## 2018-02-28 PROCEDURE — 97166 OT EVAL MOD COMPLEX 45 MIN: CPT

## 2018-02-28 PROCEDURE — 74011000250 HC RX REV CODE- 250

## 2018-02-28 PROCEDURE — 76060000034 HC ANESTHESIA 1.5 TO 2 HR: Performed by: ORTHOPAEDIC SURGERY

## 2018-02-28 PROCEDURE — 74011250637 HC RX REV CODE- 250/637: Performed by: HOSPITALIST

## 2018-02-28 PROCEDURE — 77030014405 HC GD ROD RMR SYNT -C: Performed by: ORTHOPAEDIC SURGERY

## 2018-02-28 PROCEDURE — 74011250637 HC RX REV CODE- 250/637: Performed by: NURSE PRACTITIONER

## 2018-02-28 PROCEDURE — 77030016465 HC BIT DRL FLUT1 SYNT -C: Performed by: ORTHOPAEDIC SURGERY

## 2018-02-28 PROCEDURE — 74011250636 HC RX REV CODE- 250/636

## 2018-02-28 PROCEDURE — 74011636637 HC RX REV CODE- 636/637: Performed by: HOSPITALIST

## 2018-02-28 PROCEDURE — 77030011640 HC PAD GRND REM COVD -A: Performed by: ORTHOPAEDIC SURGERY

## 2018-02-28 PROCEDURE — 86923 COMPATIBILITY TEST ELECTRIC: CPT | Performed by: ANESTHESIOLOGY

## 2018-02-28 PROCEDURE — 36415 COLL VENOUS BLD VENIPUNCTURE: CPT | Performed by: ANESTHESIOLOGY

## 2018-02-28 DEVICE — 5.0MM TI ANGULAR STABLE LCKNG SCR T25 46MM F/IM NAILS-STER: Type: IMPLANTABLE DEVICE | Site: FEMUR | Status: FUNCTIONAL

## 2018-02-28 DEVICE — RESORBABLE SLEEVE FOR 5.0MM ANGULAR STABLE LCKNG SCR-STER: Type: IMPLANTABLE DEVICE | Site: FEMUR | Status: FUNCTIONAL

## 2018-02-28 DEVICE — 11MM/130 DEG TI CANN TFNA 380MM/RIGHT - STERILE
Type: IMPLANTABLE DEVICE | Site: FEMUR | Status: FUNCTIONAL
Brand: TFN-ADVANCE

## 2018-02-28 DEVICE — TFNA HELICAL BLADE 95MM STERILE
Type: IMPLANTABLE DEVICE | Site: FEMUR | Status: FUNCTIONAL
Brand: TFN-ADVANCE

## 2018-02-28 DEVICE — 5.0MM TI ANGULAR STABLE LCKNG SCR T25 44MM F/IM NAILS-STER: Type: IMPLANTABLE DEVICE | Site: FEMUR | Status: FUNCTIONAL

## 2018-02-28 RX ORDER — DOCUSATE SODIUM 100 MG/1
100 CAPSULE, LIQUID FILLED ORAL 2 TIMES DAILY
Status: DISCONTINUED | OUTPATIENT
Start: 2018-02-28 | End: 2018-03-06 | Stop reason: HOSPADM

## 2018-02-28 RX ORDER — OXYCODONE HYDROCHLORIDE 5 MG/1
10 TABLET ORAL
Status: DISCONTINUED | OUTPATIENT
Start: 2018-02-28 | End: 2018-02-28 | Stop reason: HOSPADM

## 2018-02-28 RX ORDER — ALBUTEROL SULFATE 0.83 MG/ML
2.5 SOLUTION RESPIRATORY (INHALATION) AS NEEDED
Status: DISCONTINUED | OUTPATIENT
Start: 2018-02-28 | End: 2018-02-28 | Stop reason: HOSPADM

## 2018-02-28 RX ORDER — ONDANSETRON 2 MG/ML
4 INJECTION INTRAMUSCULAR; INTRAVENOUS ONCE
Status: DISCONTINUED | OUTPATIENT
Start: 2018-02-28 | End: 2018-02-28 | Stop reason: HOSPADM

## 2018-02-28 RX ORDER — OXYCODONE HYDROCHLORIDE 5 MG/1
5 TABLET ORAL
Status: DISCONTINUED | OUTPATIENT
Start: 2018-02-28 | End: 2018-02-28

## 2018-02-28 RX ORDER — ENOXAPARIN SODIUM 100 MG/ML
30 INJECTION SUBCUTANEOUS EVERY 24 HOURS
Status: DISCONTINUED | OUTPATIENT
Start: 2018-03-01 | End: 2018-03-06 | Stop reason: HOSPADM

## 2018-02-28 RX ORDER — SODIUM CHLORIDE 0.9 % (FLUSH) 0.9 %
5-10 SYRINGE (ML) INJECTION AS NEEDED
Status: DISCONTINUED | OUTPATIENT
Start: 2018-02-28 | End: 2018-02-28 | Stop reason: HOSPADM

## 2018-02-28 RX ORDER — CEFAZOLIN SODIUM/WATER 2 G/20 ML
2 SYRINGE (ML) INTRAVENOUS
Status: DISPENSED | OUTPATIENT
Start: 2018-02-28 | End: 2018-03-01

## 2018-02-28 RX ORDER — HYDROMORPHONE HYDROCHLORIDE 2 MG/ML
0.5 INJECTION, SOLUTION INTRAMUSCULAR; INTRAVENOUS; SUBCUTANEOUS
Status: DISCONTINUED | OUTPATIENT
Start: 2018-02-28 | End: 2018-02-28 | Stop reason: HOSPADM

## 2018-02-28 RX ORDER — FERROUS SULFATE, DRIED 160(50) MG
1 TABLET, EXTENDED RELEASE ORAL
Status: DISCONTINUED | OUTPATIENT
Start: 2018-02-28 | End: 2018-03-06 | Stop reason: HOSPADM

## 2018-02-28 RX ORDER — MIDAZOLAM HYDROCHLORIDE 1 MG/ML
2 INJECTION, SOLUTION INTRAMUSCULAR; INTRAVENOUS ONCE
Status: DISCONTINUED | OUTPATIENT
Start: 2018-02-28 | End: 2018-02-28 | Stop reason: SDUPTHER

## 2018-02-28 RX ORDER — FENTANYL CITRATE 50 UG/ML
100 INJECTION, SOLUTION INTRAMUSCULAR; INTRAVENOUS ONCE
Status: DISCONTINUED | OUTPATIENT
Start: 2018-02-28 | End: 2018-02-28 | Stop reason: SDUPTHER

## 2018-02-28 RX ORDER — ONDANSETRON 2 MG/ML
4 INJECTION INTRAMUSCULAR; INTRAVENOUS
Status: DISCONTINUED | OUTPATIENT
Start: 2018-02-28 | End: 2018-03-06 | Stop reason: HOSPADM

## 2018-02-28 RX ORDER — MAG HYDROX/ALUMINUM HYD/SIMETH 200-200-20
30 SUSPENSION, ORAL (FINAL DOSE FORM) ORAL
Status: DISCONTINUED | OUTPATIENT
Start: 2018-02-28 | End: 2018-03-06 | Stop reason: HOSPADM

## 2018-02-28 RX ORDER — MIDAZOLAM HYDROCHLORIDE 1 MG/ML
2 INJECTION, SOLUTION INTRAMUSCULAR; INTRAVENOUS
Status: DISCONTINUED | OUTPATIENT
Start: 2018-02-28 | End: 2018-02-28 | Stop reason: HOSPADM

## 2018-02-28 RX ORDER — LIDOCAINE HYDROCHLORIDE 10 MG/ML
0.1 INJECTION INFILTRATION; PERINEURAL AS NEEDED
Status: DISCONTINUED | OUTPATIENT
Start: 2018-02-28 | End: 2018-02-28 | Stop reason: HOSPADM

## 2018-02-28 RX ORDER — SODIUM CHLORIDE, SODIUM LACTATE, POTASSIUM CHLORIDE, CALCIUM CHLORIDE 600; 310; 30; 20 MG/100ML; MG/100ML; MG/100ML; MG/100ML
75 INJECTION, SOLUTION INTRAVENOUS CONTINUOUS
Status: DISCONTINUED | OUTPATIENT
Start: 2018-02-28 | End: 2018-02-28 | Stop reason: SDUPTHER

## 2018-02-28 RX ORDER — SODIUM CHLORIDE, SODIUM LACTATE, POTASSIUM CHLORIDE, CALCIUM CHLORIDE 600; 310; 30; 20 MG/100ML; MG/100ML; MG/100ML; MG/100ML
100 INJECTION, SOLUTION INTRAVENOUS CONTINUOUS
Status: DISCONTINUED | OUTPATIENT
Start: 2018-02-28 | End: 2018-02-28 | Stop reason: HOSPADM

## 2018-02-28 RX ORDER — MIDAZOLAM HYDROCHLORIDE 1 MG/ML
2 INJECTION, SOLUTION INTRAMUSCULAR; INTRAVENOUS
Status: DISCONTINUED | OUTPATIENT
Start: 2018-02-28 | End: 2018-02-28 | Stop reason: SDUPTHER

## 2018-02-28 RX ORDER — DIPHENHYDRAMINE HYDROCHLORIDE 50 MG/ML
12.5 INJECTION, SOLUTION INTRAMUSCULAR; INTRAVENOUS
Status: DISCONTINUED | OUTPATIENT
Start: 2018-02-28 | End: 2018-02-28 | Stop reason: HOSPADM

## 2018-02-28 RX ORDER — OXYCODONE HYDROCHLORIDE 5 MG/1
5-10 TABLET ORAL
Status: DISCONTINUED | OUTPATIENT
Start: 2018-02-28 | End: 2018-03-06 | Stop reason: HOSPADM

## 2018-02-28 RX ORDER — SODIUM CHLORIDE 0.9 % (FLUSH) 0.9 %
5-10 SYRINGE (ML) INJECTION AS NEEDED
Status: DISCONTINUED | OUTPATIENT
Start: 2018-02-28 | End: 2018-03-06 | Stop reason: HOSPADM

## 2018-02-28 RX ORDER — SODIUM CHLORIDE 0.9 % (FLUSH) 0.9 %
5-10 SYRINGE (ML) INJECTION EVERY 8 HOURS
Status: DISCONTINUED | OUTPATIENT
Start: 2018-02-28 | End: 2018-03-06 | Stop reason: HOSPADM

## 2018-02-28 RX ORDER — ACETAMINOPHEN 325 MG/1
650 TABLET ORAL EVERY 8 HOURS
Status: DISCONTINUED | OUTPATIENT
Start: 2018-02-28 | End: 2018-03-06 | Stop reason: HOSPADM

## 2018-02-28 RX ORDER — NALOXONE HYDROCHLORIDE 0.4 MG/ML
0.2 INJECTION, SOLUTION INTRAMUSCULAR; INTRAVENOUS; SUBCUTANEOUS AS NEEDED
Status: DISCONTINUED | OUTPATIENT
Start: 2018-02-28 | End: 2018-02-28 | Stop reason: HOSPADM

## 2018-02-28 RX ORDER — LIDOCAINE HYDROCHLORIDE 10 MG/ML
0.1 INJECTION INFILTRATION; PERINEURAL AS NEEDED
Status: DISCONTINUED | OUTPATIENT
Start: 2018-02-28 | End: 2018-02-28 | Stop reason: SDUPTHER

## 2018-02-28 RX ORDER — OXYCODONE HYDROCHLORIDE 5 MG/1
5 TABLET ORAL
Status: DISCONTINUED | OUTPATIENT
Start: 2018-02-28 | End: 2018-02-28 | Stop reason: HOSPADM

## 2018-02-28 RX ORDER — NALOXONE HYDROCHLORIDE 0.4 MG/ML
0.1 INJECTION, SOLUTION INTRAMUSCULAR; INTRAVENOUS; SUBCUTANEOUS AS NEEDED
Status: DISCONTINUED | OUTPATIENT
Start: 2018-02-28 | End: 2018-02-28 | Stop reason: HOSPADM

## 2018-02-28 RX ORDER — FENTANYL CITRATE 50 UG/ML
100 INJECTION, SOLUTION INTRAMUSCULAR; INTRAVENOUS ONCE
Status: DISCONTINUED | OUTPATIENT
Start: 2018-02-28 | End: 2018-02-28 | Stop reason: HOSPADM

## 2018-02-28 RX ORDER — MIDAZOLAM HYDROCHLORIDE 1 MG/ML
2 INJECTION, SOLUTION INTRAMUSCULAR; INTRAVENOUS ONCE
Status: DISCONTINUED | OUTPATIENT
Start: 2018-02-28 | End: 2018-02-28 | Stop reason: HOSPADM

## 2018-02-28 RX ORDER — SODIUM CHLORIDE, SODIUM LACTATE, POTASSIUM CHLORIDE, CALCIUM CHLORIDE 600; 310; 30; 20 MG/100ML; MG/100ML; MG/100ML; MG/100ML
75 INJECTION, SOLUTION INTRAVENOUS CONTINUOUS
Status: DISCONTINUED | OUTPATIENT
Start: 2018-02-28 | End: 2018-03-03

## 2018-02-28 RX ORDER — FAMOTIDINE 20 MG/1
20 TABLET, FILM COATED ORAL ONCE
Status: COMPLETED | OUTPATIENT
Start: 2018-02-28 | End: 2018-02-28

## 2018-02-28 RX ORDER — SODIUM CHLORIDE, SODIUM LACTATE, POTASSIUM CHLORIDE, CALCIUM CHLORIDE 600; 310; 30; 20 MG/100ML; MG/100ML; MG/100ML; MG/100ML
75 INJECTION, SOLUTION INTRAVENOUS CONTINUOUS
Status: DISCONTINUED | OUTPATIENT
Start: 2018-02-28 | End: 2018-02-28 | Stop reason: HOSPADM

## 2018-02-28 RX ORDER — PROPOFOL 10 MG/ML
INJECTION, EMULSION INTRAVENOUS
Status: DISCONTINUED | OUTPATIENT
Start: 2018-02-28 | End: 2018-02-28 | Stop reason: HOSPADM

## 2018-02-28 RX ORDER — SODIUM CHLORIDE, SODIUM LACTATE, POTASSIUM CHLORIDE, CALCIUM CHLORIDE 600; 310; 30; 20 MG/100ML; MG/100ML; MG/100ML; MG/100ML
75 INJECTION, SOLUTION INTRAVENOUS
Status: DISPENSED | OUTPATIENT
Start: 2018-02-28 | End: 2018-03-01

## 2018-02-28 RX ORDER — OXYCODONE AND ACETAMINOPHEN 5; 325 MG/1; MG/1
1 TABLET ORAL AS NEEDED
Status: DISCONTINUED | OUTPATIENT
Start: 2018-02-28 | End: 2018-02-28 | Stop reason: HOSPADM

## 2018-02-28 RX ORDER — BUPIVACAINE HYDROCHLORIDE 5 MG/ML
INJECTION, SOLUTION EPIDURAL; INTRACAUDAL AS NEEDED
Status: DISCONTINUED | OUTPATIENT
Start: 2018-02-28 | End: 2018-02-28 | Stop reason: HOSPADM

## 2018-02-28 RX ORDER — PROPOFOL 10 MG/ML
INJECTION, EMULSION INTRAVENOUS AS NEEDED
Status: DISCONTINUED | OUTPATIENT
Start: 2018-02-28 | End: 2018-02-28 | Stop reason: HOSPADM

## 2018-02-28 RX ADMIN — MIDAZOLAM HYDROCHLORIDE 2 MG: 1 INJECTION, SOLUTION INTRAMUSCULAR; INTRAVENOUS at 07:09

## 2018-02-28 RX ADMIN — INSULIN LISPRO 2 UNITS: 100 INJECTION, SOLUTION INTRAVENOUS; SUBCUTANEOUS at 18:11

## 2018-02-28 RX ADMIN — PROPOFOL 30 MG: 10 INJECTION, EMULSION INTRAVENOUS at 07:59

## 2018-02-28 RX ADMIN — ONDANSETRON 4 MG: 2 INJECTION INTRAMUSCULAR; INTRAVENOUS at 18:17

## 2018-02-28 RX ADMIN — PROPOFOL 120 MCG/KG/MIN: 10 INJECTION, EMULSION INTRAVENOUS at 08:07

## 2018-02-28 RX ADMIN — ACETAMINOPHEN 650 MG: 325 TABLET ORAL at 05:54

## 2018-02-28 RX ADMIN — TRAMADOL HYDROCHLORIDE 50 MG: 50 TABLET, FILM COATED ORAL at 19:26

## 2018-02-28 RX ADMIN — OXYCODONE HYDROCHLORIDE 10 MG: 5 TABLET ORAL at 21:28

## 2018-02-28 RX ADMIN — ONDANSETRON 4 MG: 2 INJECTION INTRAMUSCULAR; INTRAVENOUS at 10:44

## 2018-02-28 RX ADMIN — OXYCODONE HYDROCHLORIDE 5 MG: 5 TABLET ORAL at 11:29

## 2018-02-28 RX ADMIN — ONDANSETRON 4 MG: 2 INJECTION INTRAMUSCULAR; INTRAVENOUS at 05:54

## 2018-02-28 RX ADMIN — DOCUSATE SODIUM 100 MG: 100 CAPSULE, LIQUID FILLED ORAL at 18:00

## 2018-02-28 RX ADMIN — OXYCODONE HYDROCHLORIDE 5 MG: 5 TABLET ORAL at 05:54

## 2018-02-28 RX ADMIN — Medication 10 ML: at 00:04

## 2018-02-28 RX ADMIN — ACETAMINOPHEN 650 MG: 325 TABLET ORAL at 12:24

## 2018-02-28 RX ADMIN — LISINOPRIL 10 MG: 5 TABLET ORAL at 11:29

## 2018-02-28 RX ADMIN — Medication 2 G: at 08:10

## 2018-02-28 RX ADMIN — CALCIUM CARBONATE 500 MG (1,250 MG)-VITAMIN D3 200 UNIT TABLET 1 TABLET: at 17:00

## 2018-02-28 RX ADMIN — ONDANSETRON 4 MG: 2 INJECTION INTRAMUSCULAR; INTRAVENOUS at 00:00

## 2018-02-28 RX ADMIN — ACETAMINOPHEN 650 MG: 325 TABLET ORAL at 21:27

## 2018-02-28 RX ADMIN — SODIUM CHLORIDE, SODIUM LACTATE, POTASSIUM CHLORIDE, AND CALCIUM CHLORIDE: 600; 310; 30; 20 INJECTION, SOLUTION INTRAVENOUS at 07:55

## 2018-02-28 RX ADMIN — TRAMADOL HYDROCHLORIDE 50 MG: 50 TABLET, FILM COATED ORAL at 13:06

## 2018-02-28 RX ADMIN — Medication 10 ML: at 05:55

## 2018-02-28 RX ADMIN — OXYCODONE HYDROCHLORIDE 5 MG: 5 TABLET ORAL at 00:00

## 2018-02-28 RX ADMIN — OXYCODONE HYDROCHLORIDE 10 MG: 5 TABLET ORAL at 15:53

## 2018-02-28 RX ADMIN — Medication 5 ML: at 13:08

## 2018-02-28 RX ADMIN — BUPIVACAINE HYDROCHLORIDE 2 ML: 5 INJECTION, SOLUTION EPIDURAL; INTRACAUDAL at 08:06

## 2018-02-28 RX ADMIN — INSULIN LISPRO 4 UNITS: 100 INJECTION, SOLUTION INTRAVENOUS; SUBCUTANEOUS at 21:54

## 2018-02-28 RX ADMIN — OXYCODONE HYDROCHLORIDE 5 MG: 5 TABLET ORAL at 10:44

## 2018-02-28 RX ADMIN — WATER 1 G: 1 INJECTION INTRAMUSCULAR; INTRAVENOUS; SUBCUTANEOUS at 15:53

## 2018-02-28 RX ADMIN — Medication 10 ML: at 21:28

## 2018-02-28 RX ADMIN — SODIUM CHLORIDE, SODIUM LACTATE, POTASSIUM CHLORIDE, AND CALCIUM CHLORIDE 75 ML/HR: 600; 310; 30; 20 INJECTION, SOLUTION INTRAVENOUS at 11:00

## 2018-02-28 RX ADMIN — FAMOTIDINE 20 MG: 20 TABLET, FILM COATED ORAL at 06:43

## 2018-02-28 RX ADMIN — PROPOFOL 30 MG: 10 INJECTION, EMULSION INTRAVENOUS at 08:00

## 2018-02-28 RX ADMIN — PROPOFOL 30 MG: 10 INJECTION, EMULSION INTRAVENOUS at 07:58

## 2018-02-28 NOTE — PROGRESS NOTES
Spoke with patient / family and they are no longer interested in [facility]. Patient / family have decided on [facility] and want no other choices because family have made contact with [facility] and have verified that they have beds available.     Referral sent

## 2018-02-28 NOTE — PERIOP NOTES
Patient Blood Band was placed on 2/24/18. This nurse contacted the blood bank and was informed to redraw a Type and screen and use the same blood band.

## 2018-02-28 NOTE — PERIOP NOTES
TRANSFER - IN REPORT:    Verbal report received from LORE Schmitz on Matias Gather  being received from  for routine progression of care      Report consisted of patients Situation, Background, Assessment and   Recommendations(SBAR). Information from the following report(s) SBAR was reviewed with the receiving nurse. Opportunity for questions and clarification was provided. Assessment to be completed upon patients arrival to unit and care to be assumed.

## 2018-02-28 NOTE — OP NOTES
Operative Report     Patient: Sarai Anglin MRN: [id number]  SSN: [id number]    YOB: 1960  Age: 62 y.o. Sex: female      Indications: Sarai Anglin was admitted to the hospital with a brief history of right intertrochanteric/subtrochanteric hip fracture. The patient now presents for ORIF. Date of Surgery: 2/28/2018     Preoperative Diagnosis:  Closed displaced subtrochanteric fracture of right femur, initial encounter ([facility] [S72.21XA]    Postoperative Diagnosis: Closed displaced subtrochanteric fracture of right femur, initial encounter ([facility] Elian Fontana    Surgeon(s) and Role:     * Janet Harper MD - Primary     Anesthesia: General    Procedures: Procedure(s):  ORIF RIGHT INTERTROCHANTERIC/SUBTROCHANTERIC FEMUR FRACTURE WITH INSERTION INTRA MEDULLARY NAIL        Procedure in Detail:  The patient was brought to the operative suite and placed in supine position. After adequate anesthesia was achieved, the patient was placed upon the fracture table. Peroneal post and foot holders were well padded. The patient underwent a closed reduction of the right intertrochanteric/subtrochanteric hip fracture. This was achieved by longitudinal traction, internal rotation, and adduction. AP and lateral fluoroscopic images confirmed the fracture was now reduced anatomically. right hip was then prepped and draped in the usual sterile fashion. A 3 cm incision was made over the tip of the greater trochanter. Hemostasis was achieved with Bovie cautery. Guide pin for a Synthes trochanteric fixation nail was inserted through the tip of the trochanter, across the fracture site, and into the canal of the femur. Its position was confirmed by fluoroscopy.   The proximal reamer was then passed by hand over this guide pin in order to open up a proximal portal.  Guide pin and reamer were removed a ball-tip guide wire was inserted through this portal, across the fracture site, and down to the epiphyseal scar of the knee.  The position of the guide wire was confirmed by AP and lateral fluoroscopic images. The 11.5 mm reamer was passed as a sound, and reaming was only performed by power if necessary. The Synthes trochanteric fixation nail was then passed over the guide wire without difficulty. The guide wire was removed and the targeting guide was inserted through a stab wound in the lateral aspect of the proximal femur. Guide pin was then inserted through the lateral cortex into the neck and head. It stopped just short of the subchondral bone. AP and lateral fluoroscopic images confirmed that the position of the guide pin was centered in the head. Depth gauge was used to determine the appropriate length helical blade. The reamers were passed over the guide pin in order to open up the lateral cortex neck and head. The appropriate length helical blade was then passed over the guide wire without difficulty. The set screw was passed from above with a spring wrench. Traction was removed. The position of the helical blade was confirmed by fluoroscopy. The fracture was then compressed to a stable position, using the proximal targeting guide. At this point, the targeting guides were removed. AP and lateral fluoroscopic images confirmed the fracture was reduced anatomically. Wounds were then irrigated with normal saline and closed in layers. Sterile, compressive dressings were applied. The patient was then removed from the fracture table and transferred to the postanesthesia care unit, alert and oriented, under the care of anesthesia. Estimated Blood Loss: 100 cc    Specimens: * No specimens in log *      Implants:   Implant Name Type Inv.  Item Serial No.  Lot No. LRB No. Used Action   NAIL TOR 130D [id number] RT -- [facility] STRL - [id number]  NAIL TOR 130D [id number] RT -- NA [facility] [id number] Right 1 Implanted   SLEEVE RESORB 5.0MM LCK SCR -- 2/PK STRL - [id number]  SLEEVE RESORB 5.0MM LCK SCR -- 2/PK STRL  [employer] [place] [id number] Right 1 Implanted   BLADE HELCL TFNA 95MM STRL --  - [id number]  BLADE HELCL TFNA 95MM STRL --   [employer] [place] [id number] Right 1 Implanted   SCR NL LCK ANGL STBL T25 5X46 -- TI STRL - [id number]  SCR NL LCK ANGL STBL T25 5X46 -- TI STRL  [employer] [place] [id number] Right 1 Implanted   SCR NL LCK ANGL STBL T25 5X44 -- TI STRL - [id number]   SCR NL LCK ANGL STBL T25 5X44 -- [place] [id number] Right 1 Implanted       Tourniquet Time: * No tourniquets in log *     Closure: Primary    Complications: None     Signed By: Luis Manuel Lora MD     February 28, 2018

## 2018-02-28 NOTE — PROGRESS NOTES
TRANSFER - OUT REPORT:    Verbal report given to Nick cheng RN on Margie Aviles  being transferred to PreOp for ordered procedure       Report consisted of patients Situation, Background, Assessment and   Recommendations(SBAR). Information from the following report(s) SBAR, Kardex and MAR was reviewed with the receiving nurse. Lines:   Peripheral IV 02/25/18 Right Forearm (Active)   Site Assessment Clean, dry, & intact 2/27/2018  8:11 PM   Phlebitis Assessment 0 2/27/2018  8:11 PM   Infiltration Assessment 0 2/27/2018  8:11 PM   Dressing Status Clean, dry, & intact 2/27/2018  8:11 PM   Dressing Type Transparent 2/27/2018  8:11 PM   Hub Color/Line Status Flushed;Patent; Infusing 2/27/2018  8:11 PM        Opportunity for questions and clarification was provided.

## 2018-02-28 NOTE — PROGRESS NOTES
Problem: Self Care Deficits Care Plan (Adult)  Goal: *Acute Goals and Plan of Care (Insert Text)  LTG 1: Pt will be S with toileting by 3/6/18 to prevent skin breakdown. LTG 2: Pt will be min A with LB dressing by 3/6/18 to reduce risk of falls. LTG 3: Pt will be SBA with bathing by 3/6/18 to promote good skin integrity. LTG 4: Pt will be SBA with toilet transfers by 3/6/18 to promote quality of life. LTG 5: Pt will be S with HEP by 3/6/18 to prevent deconditioning. OCCUPATIONAL THERAPY: Initial Assessment 2/28/2018  INPATIENT: Hospital Day: 6  Payor: BLUE CROSS / [place]: [place] BL[place] [facility] / Product Type: PPO /      NAME/AGE/GENDER: Cesar Posada is a 62 y.o. female   PRIMARY DIAGNOSIS:  Closed displaced subtrochanteric fracture of right femur, initial encounter ([facility] [S72.21XA] Intertrochanteric fracture of right femur ([facility] Intertrochanteric fracture of right femur ([facility]  Procedure(s) (LRB):  ORIF RIGHT SUBTROCHANTERIC FEMUR FRACTURE WITH INSERTION INTRA MEDULLARY NAIL  (Right)  Day of Surgery  ICD-10: Treatment Diagnosis:    · Generalized Muscle Weakness (M62.81)   Precautions/Allergies:     Review of patient's allergies indicates no known allergies. ASSESSMENT:     Ms. Aggie Cm presents at [facility] after PT. Pt was flat and a bit lethargic possibly due to pain meds. Pt transferred to chair with min Ax2. Discussed rehab options with pt being open to rehab. Pt would benefit from skilled services to address deficits in mobility to allow her to return home alone. This section established at most recent assessment   PROBLEM LIST (Impairments causing functional limitations):  1. Decreased Strength  2. Decreased ADL/Functional Activities  3. Decreased Transfer Abilities  4. Decreased Ambulation Ability/Technique  5. Decreased Balance  6. Increased Pain  7.  Decreased Flexibility/Joint Mobility   INTERVENTIONS PLANNED: (Benefits and precautions of occupational therapy have been discussed with the patient.)  1. Activities of daily living training  2. Group therapy  3. Therapeutic activity  4. Therapeutic exercise     TREATMENT PLAN: Frequency/Duration: Follow patient 3x to address above goals. Rehabilitation Potential For Stated Goals: Excellent     RECOMMENDED REHABILITATION/EQUIPMENT: (at time of discharge pending progress): Due to the probability of continued deficits (see above) this patient will likely need continued skilled occupational therapy after discharge. Equipment:    TBD              OCCUPATIONAL PROFILE AND HISTORY:   History of Present Injury/Illness (Reason for Referral):  Please see H&P  Past Medical History/Comorbidities:   Ms. Cb Avitia  has no past medical history on file. Ms. Cb Avitia  has no past surgical history on file. 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 Number of Personal Factors/Comorbidities that affect the Plan of Care: Expanded review of therapy/medical records (1-2):  MODERATE COMPLEXITY   ASSESSMENT OF OCCUPATIONAL PERFORMANCE[de-identified]   Activities of Daily Living:         Pt was I. Basic ADLs (From Assessment) Complex ADLs (From Assessment)   Basic ADL  Feeding: Independent  Oral Facial Hygiene/Grooming: Setup  Bathing: Moderate assistance  Upper Body Dressing: Setup  Lower Body Dressing: Maximum assistance  Toileting:  Moderate assistance     Grooming/Bathing/Dressing Activities of Daily Living                                     Most Recent Physical Functioning:   Gross Assessment:                  Posture:     Balance:  Sitting: Impaired  Sitting - Static: Good (unsupported)  Sitting - Dynamic: Fair (occasional)  Standing: Impaired  Standing - Static: Fair  Standing - Dynamic : Poor Bed Mobility:     Wheelchair Mobility:     Transfers:                 Patient Vitals for the past 6 hrs:   BP BP Patient Position SpO2 O2 Flow Rate (L/min) Pulse   18 0932 100/57 - 99 % - 89   18 0933 100/57 At rest 100 % 3 l/min 90   18 0937 104/64 - 98 % - 90   18 0942 124/68 - 100 % - 94   18 0947 115/72 - 99 % - 100   18 0952 123/71 - 95 % - 97   18 0957 116/76 - 93 % - 99   18 1002 115/79 At rest 93 % - 98   18 1004 - - 92 % - 100   18 1026 122/66 At rest 96 % - (!) 108   18 1040 150/89 At rest 98 % - 99   18 1055 171/89 At rest 98 % - 100   18 1110 (!) 157/97 At rest 97 % - 97   18 1503 123/79 At rest 97 % - (!) 107       Mental Status  Neurologic State: Alert  Orientation Level: Oriented X4  Cognition: Appropriate for age attention/concentration                          Physical Skills Involved:  1. Range of Motion  2. Balance  3. Strength  4. Gross Motor Control  5. Pain (acute) Cognitive Skills Affected (resulting in the inability to perform in a timely and safe manner):  1. N/A Psychosocial Skills Affected:  1. N/A   Number of elements that affect the Plan of Care: 5+:  HIGH COMPLEXITY   CLINICAL DECISION MAKIN [facility] [address] [id number] AM-PAC 6 Clicks   Daily Activity Inpatient Short Form  How much help from another person does the patient currently need. .. Total A Lot A Little None   1. Putting on and taking off regular lower body clothing? [x] 1   [] 2   [] 3   [] 4   2. Bathing (including washing, rinsing, drying)? [] 1   [x] 2   [] 3   [] 4   3. Toileting, which includes using toilet, bedpan or urinal?   [] 1   [x] 2   [] 3   [] 4   4. Putting on and taking off regular upper body clothing? [] 1   [] 2   [x] 3   [] 4   5. Taking care of personal grooming such as brushing teeth?    [] 1 [] 2   [x] 3   [] 4   6. Eating meals? [] 1   [] 2   [] 3   [x] 4   © 2007, Trustees of [address] [place] [address] [address] [address], under license to [employer]. All rights reserved      Score:  Initial: 15 Most Recent: X (Date: -- )    Interpretation of Tool:  Represents activities that are increasingly more difficult (i.e. Bed mobility, Transfers, Gait). Score 24 23 22-20 19-15 14-10 9-7 6     Modifier CH CI CJ CK CL CM CN      ? Self Care:     - CURRENT STATUS: CK - 40%-59% impaired, limited or restricted    - GOAL STATUS: CJ - 20%-39% impaired, limited or restricted    - D/C STATUS:  ---------------To be determined---------------  Payor: BLUE CROSS / [place] [facility] / Product Type: PPO /      Medical Necessity:     · Skilled intervention continues to be required due to decrease from PLOF. Reason for Services/Other Comments:  · Patient continues to require skilled intervention due to decreased mobility. Use of outcome tool(s) and clinical judgement create a POC that gives a: MODERATE COMPLEXITY         TREATMENT:   (In addition to Assessment/Re-Assessment sessions the following treatments were rendered)     Pre-treatment Symptoms/Complaints:  Pain in RLE  Pain: Initial:      Post Session:       Assessment/Reassessment only, no treatment provided today    Braces/Orthotics/Lines/Etc:   · O2 Device: Room air  Treatment/Session Assessment:    · Response to Treatment:  Agreeable  · Interdisciplinary Collaboration:   o Physical Therapist  · After treatment position/precautions:   o Up in chair  o Bed alarm/tab alert on  o Bed/Chair-wheels locked  o Bed in low position  o Call light within reach  o Family at bedside  o Side rails x 2   · Compliance with Program/Exercises: Will assess as treatment progresses. · Recommendations/Intent for next treatment session: \"Next visit will focus on advancements to more challenging activities and reduction in assistance provided\".   Total Treatment Duration:  OT Patient Time In/Time Out  Time In: 6008  Time Out: [address], OT

## 2018-02-28 NOTE — PROGRESS NOTES
Problem: Mobility Impaired (Adult and Pediatric)  Goal: *Acute Goals and Plan of Care (Insert Text)  STG:  (1.)Ms. Dee Cruz will perform bed mobility with CONTACT GUARD ASSIST within 3 treatment day(s). (2.)Ms. Dee Cruz will transfer from bed to chair and chair to bed with CONTACT GUARD ASSIST using the RW within 3 treatment day(s). (3.)Ms. Dee Cruz will ambulate with MINIMAL ASSIST for 50+ feet with RW within 3 treatment day(s). (4.)Ms. Dee Cruz will tolerate 25+ minutes of therapeutic activity/exercise while maintaining stable vitals to improve functional strength and mobility within 3 day(s). LTG:  (1.)Ms. Dee Cruz will perform bed mobility with STAND BY ASSISTANCE within 7 treatment day(s). (2.)Ms. Dee Cruz will transfer from bed to chair and chair to bed with STAND BY ASSIST using RWwithin 7 treatment day(s). (3.)Ms. Dee Cruz will ambulate with CONTACT GUARD ASSIST for 150+ feet with RW within 7 treatment day(s). (4.)Ms. Dee Cruz will demonstrate good dynamic standing balance utilizing RW within 7 day(s). ________________________________________________________________________________________________      PHYSICAL THERAPY: Initial Assessment, PM 2/28/2018  INPATIENT: Hospital Day: 6  Payor: BLUE CROSS / Plan[place] [facility] / Product Type: PPO /      WBAT R LE     NAME/AGE/GENDER: Rachna Flores is a 62 y.o. female   PRIMARY DIAGNOSIS: Closed displaced subtrochanteric fracture of right femur, [facility] [S72.21XA] Intertrochanteric fracture of right femur ([facility] Intertrochanteric fracture of right femur ([facility]  Procedure(s) (LRB):  ORIF RIGHT SUBTROCHANTERIC FEMUR FRACTURE WITH INSERTION INTRA MEDULLARY NAIL  (Right)  Day of Surgery  ICD-10: Treatment Diagnosis:   · Difficulty in walking, Not elsewhere classified (R26.2)  · History of falling (Z91.81)   Precaution/Allergies:  Review of patient's allergies indicates no known allergies.       ASSESSMENT:     Ms. Dee Cruz is a 62year old female s/p IM nailing of right subtrochanteric femur fracture. Patient seen this AM for initial physical therapy evaluation: presents s/p OT evaluation, oriented x4, and endorses 8/10 pain R LE; patient pre-medicated prior to mobility assessment. Patient lives alone in a single story residence with 5 steps to enter. At baseline, patient is an independent, community level ambulator, working as a CNA at [employer], and driving. Today BUE and LLE strength/ROM WFL, and sensation is intact to light touch distal B UE/LE. Patient performed bed mobility with minimal assistance and additional time, transfers with minimal assistance, and ambulation with x5ft from bed to chair with RW and minimal assistance x1-2. Patient demonstrated a slow, step-to, antalgic gait pattern with fair dynamic standing balance throughout. Ms. Dex Hull presents with decreased functional mobility and balance/gait status from independent baseline. Recommend continued skilled PT services to address stated deficits. Will follow with acute PT BID plan of care and progress toward stated goals during acute stay. This section established at most recent assessment   PROBLEM LIST (Impairments causing functional limitations):  1. Decreased Strength  2. Decreased ADL/Functional Activities  3. Decreased Transfer Abilities  4. Decreased Ambulation Ability/Technique  5. Decreased Balance  6. Increased Pain  7. Decreased Activity Tolerance  8. Increased Shortness of Breath  9. Decreased Flexibility/Joint Mobility  10. Decreased Knowledge of Precautions   INTERVENTIONS PLANNED: (Benefits and precautions of physical therapy have been discussed with the patient.)  1. Balance Exercise  2. Bed Mobility  3. Family Education  4. Gait Training  5. Home Exercise Program (HEP)  6. Range of Motion (ROM)  7. Therapeutic Activites  8. Therapeutic Exercise/Strengthening  9. Transfer Training  10.  Group Therapy     TREATMENT PLAN: Frequency/Duration: twice daily for duration of hospital stay  Rehabilitation Potential For Stated Goals: GOOD     RECOMMENDED REHABILITATION/EQUIPMENT: (at time of discharge pending progress): Due to the probability of continued deficits (see above) this patient will likely need continued skilled physical therapy after discharge. Equipment:    TBD              HISTORY:   History of Present Injury/Illness (Reason for Referral):  S/p right IM nailing; WBAT R LE  Past Medical History/Comorbidities:   Ms. Saud Mejia  has no past medical history on file. Ms. Saud Mejia  has no past surgical history on file. Social History/Living Environment:   Home Environment: Private residence  # Steps to Enter: 5  Rails to Enter: No  Wheelchair Ramp: No  One/Two Story Residence: One story  Living Alone: Yes  Support Systems: Family member(s)  Patient Expects to be Discharged to[de-identified] Rehabilitation facility  Current DME Used/Available at Home: None  Prior Level of Function/Work/Activity:  Patient lives alone in a single story residence with 5 steps to enter. At baseline, patient is an independent, community level ambulator, working as a CNA at [employer], and driving. Number of Personal Factors/Comorbidities that affect the Plan of Care: 1-2: MODERATE COMPLEXITY   EXAMINATION:   Most Recent Physical Functioning:   Gross Assessment:  AROM: Generally decreased, functional (R LE)  Strength: Generally decreased, functional (R LE)  Sensation: Intact (Light touch distal B UE/LE)               Posture:  Posture (WDL): Within defined limits  Balance:  Sitting: Impaired  Sitting - Static: Fair (occasional)  Sitting - Dynamic: Fair (occasional)  Standing: Impaired  Standing - Static: Fair  Standing - Dynamic : Poor Bed Mobility:  Supine to Sit: Minimum assistance; Additional time  Scooting: Minimum assistance; Additional time  Wheelchair Mobility:     Transfers:  Sit to Stand: Minimum assistance;Assist x2; Additional time  Stand to Sit: Minimum assistance  Bed to Chair: Minimum assistance; Additional time  Gait:     Base of Support: Narrowed; Center of gravity altered  Speed/Yaid: Shuffled; Slow  Step Length: Left shortened  Swing Pattern: Right asymmetrical  Stance: Left increased;Right decreased  Gait Abnormalities: Antalgic; Step to gait  Distance (ft): 5 Feet (ft)  Assistive Device: Walker, rolling  Ambulation - Level of Assistance: Minimal assistance;Assist x2;Assist x1  Interventions: Safety awareness training; Tactile cues; Verbal cues      Body Structures Involved:  1. Bones  2. Joints  3. Muscles  4. Ligaments Body Functions Affected:  1. Neuromusculoskeletal  2. Movement Related Activities and Participation Affected:  1. Mobility  2. Self Care  3. Domestic Life   Number of elements that affect the Plan of Care: 4+: HIGH COMPLEXITY   CLINICAL PRESENTATION:   Presentation: Stable and uncomplicated: LOW COMPLEXITY   CLINICAL DECISION MAKIN[address] [place] [id number] AM-PAC 6 Clicks   Basic Mobility Inpatient Short Form  How much difficulty does the patient currently have. .. Unable A Lot A Little None   1. Turning over in bed (including adjusting bedclothes, sheets and blankets)? [] 1   [] 2   [x] 3   [] 4   2. Sitting down on and standing up from a chair with arms ( e.g., wheelchair, bedside commode, etc.)   [] 1   [] 2   [x] 3   [] 4   3. Moving from lying on back to sitting on the side of the bed? [] 1   [] 2   [x] 3   [] 4   How much help from another person does the patient currently need. .. Total A Lot A Little None   4. Moving to and from a bed to a chair (including a wheelchair)? [] 1   [] 2   [x] 3   [] 4   5. Need to walk in hospital room? [] 1   [] 2   [x] 3   [] 4   6. Climbing 3-5 steps with a railing? [] 1   [x] 2   [] 3   [] 4   © , Trustees of [address] [place] [address], under license to [employer].  All rights reserved      Score:  Initial: 17 Most Recent: 17 (Date: 18 )    Interpretation of Tool:  Represents activities that are increasingly more difficult (i.e. Bed mobility, Transfers, Gait). Score 24 23 22-20 19-15 14-10 9-7 6     Modifier CH CI CJ CK CL CM CN      ? Mobility - Walking and Moving Around:     - CURRENT STATUS: CK - 40%-59% impaired, limited or restricted    - GOAL STATUS: CJ - 20%-39% impaired, limited or restricted    - D/C STATUS:  ---------------To be determined---------------  Payor: BLUE CROSS / [place] OF [address] / Product Type: PPO /      Medical Necessity:     · Patient demonstrates good rehab potential due to higher previous functional level. Reason for Services/Other Comments:  · Patient continues to require skilled intervention due to decreased functional mobility and balance/gait status from baseline. .   Use of outcome tool(s) and clinical judgement create a POC that gives a: Clear prediction of patient's progress: LOW COMPLEXITY            TREATMENT:   (In addition to Assessment/Re-Assessment sessions the following treatments were rendered)   Pre-treatment Symptoms/Complaints:    Pain: Initial:   Pain Intensity 1: 8  Pain Location 1: Hip  Pain Orientation 1: Right  Pain Intervention(s) 1: Ambulation/Increased Activity, Emotional support, Position, Repositioned, Rest  Post Session:  8/10; positioned to comfort     Assessment/Reassessment only, no treatment provided today    Braces/Orthotics/Lines/Etc:   · IV  · hinojosa catheter  · O2 Device: Room air  Treatment/Session Assessment:    · Response to Treatment:  See above. · Interdisciplinary Collaboration:   o Physical Therapist  o Occupational Therapist  o Registered Nurse  o Rehabilitation Attendant  · After treatment position/precautions:   o Up in chair  o Bed alarm/tab alert on  o Bed/Chair-wheels locked  o Bed in low position  o Call light within reach  o RN notified  o Family at bedside  o Need met; patient endorses comfort; RN updated   · Compliance with Program/Exercises: Will assess as treatment progresses.   · Recommendations/Intent for next treatment session: \"Next visit will focus on advancements to more challenging activities and reduction in assistance provided\".     Total Treatment Duration:  PT Patient Time In/Time Out  Time In: 1400  Time Out: [address], DPT

## 2018-02-28 NOTE — BRIEF OP NOTE
BRIEF OPERATIVE NOTE    Date of Procedure: 2/28/2018   Preoperative Diagnosis: Closed displaced subtrochanteric fracture of right femur, initial encounter ([facility] [S72.21XA]  Postoperative Diagnosis: Closed displaced subtrochanteric fracture of right [facility], [facility] [S72.21XA]    Procedure(s):  ORIF RIGHT SUBTROCHANTERIC FEMUR FRACTURE WITH INSERTION INTRA MEDULLARY NAIL   Surgeon(s) and Role:     * Valerie Chu MD - Primary         Assistant Staff: None      Surgical Staff:  Circ-1: Tomas Lim RN  Radiology Technician: RT Silverio  Scrub Tech-1: Alyse Poon  Scrub Tech-2: Bhavani Russell  Scrub Tech-3: Jackelyn Wheeler  Event Time In   Incision Start 0825   Incision Close 0920     Anesthesia: General   Estimated Blood Loss: 100 cc  Specimens: * No specimens in log *   Findings: none   Complications: none  Implants:   Implant Name Type Inv.  Item Serial No.  Lot No. LRB No. Used Action   NAIL TOR 130D [id number] RT -- TFNA STRL - [id number]  NAIL TOR 130D [id number] RT -- TFNA [facility] [place] [id number] Right 1 Implanted   SLEEVE RESORB 5.0MM LCK SCR -- 2/PK STRL - [id number]  SLEEVE RESORB 5.0MM LCK SCR -- 2/PK STRL  [employer] [place] [id number] Right 1 Implanted   BLADE HELCL TFNA 95MM STRL --  - [id number]  BLADE HELCL TFNA 95MM STRL --   [employer] [place] [id number] Right 1 Implanted   SCR NL LCK ANGL STBL T25 5X46 -- TI STRL - [id number]  SCR NL LCK ANGL STBL T25 5X46 -- TI STRL  [employer] [place] [id number] Right 1 Implanted   SCR NL LCK ANGL STBL T25 5X44 -- TI STRL - [id number]   SCR NL LCK ANGL STBL T25 5X44 -- Finn Rao [place] [id number] Right 1 Implanted

## 2018-02-28 NOTE — PERIOP NOTES
TRANSFER - OUT REPORT:    Verbal report given to Matilda TORREZ(name) on Juani Gaitan  being transferred to 727(unit) for routine post - op       Report consisted of patients Situation, Background, Assessment and   Recommendations(SBAR). Information from the following report(s) SBAR, OR Summary, Procedure Summary, Intake/Output and MAR was reviewed with the receiving nurse. Lines:   Peripheral IV 02/25/18 Right Forearm (Active)   Site Assessment Clean, dry, & intact 2/28/2018  9:33 AM   Phlebitis Assessment 0 2/28/2018  9:33 AM   Infiltration Assessment 0 2/28/2018  9:33 AM   Dressing Status Clean, dry, & intact 2/28/2018  9:33 AM   Dressing Type Transparent 2/28/2018  9:33 AM   Hub Color/Line Status Pink 2/28/2018  9:33 AM        Opportunity for questions and clarification was provided. Patient transported with:   Tech    VTE prophylaxis orders have been written for Juani Gaitan. Patient and family given floor number and nurses name. Family updated re: pt status after security code verified.

## 2018-02-28 NOTE — PROGRESS NOTES
TRANSFER - IN REPORT:    Verbal report received from Jovita Bautista RN(name) on Patricia Good  being received from PACU(unit) for routine progression of care      Report consisted of patients Situation, Background, Assessment and   Recommendations(SBAR). Information from the following report(s) SBAR, MAR and Recent Results was reviewed with the receiving nurse. Opportunity for questions and clarification was provided. Assessment to be completed upon patients arrival to unit and care assumed.

## 2018-02-28 NOTE — ANESTHESIA PROCEDURE NOTES
Spinal Block    Start time: 2/28/2018 8:00 AM  End time: 2/28/2018 8:06 AM  Performed by: Rafael Denson  Authorized by: Rafael Denson     Pre-procedure: Indications: primary anesthetic  Preanesthetic Checklist: patient identified, risks and benefits discussed, anesthesia consent, site marked, patient being monitored and timeout performed    Timeout Time: 08:00          Spinal Block:   Patient Position:  Left lateral decubitus  Prep Region:  Lumbar  Prep: chlorhexidine      Location:  L1-2  Technique:  Single shot  Local:  Lidocaine 1%  Local Dose (mL):  3    Needle:   Needle Type:  Pencan  Needle Gauge:  25 G  Attempts:  1      Events: CSF confirmed, no blood with aspiration and no paresthesia        Assessment:  Insertion:  Uncomplicated  Patient tolerance:  Patient tolerated the procedure well with no immediate complications  Anesthesiology Spinal Procedure Note    Risks and benefits were discussed with patient and plans are to proceed. Patient was placed in the left decubitus position. The back was prepped at the lumbar region with Chlorhexidine. 1% xylocaine was used as local at L1-L2. A  25g Pencan was passed 1 times. Easy aspiration of CSF was noted. 10 mg isobaric Bupivacaine  was injected.

## 2018-02-28 NOTE — PROGRESS NOTES
Problem: Interdisciplinary Rounds  Goal: Interdisciplinary Rounds  Outcome: Progressing Towards Goal  Interdisciplinary team rounds were held 2/28/2018 with the following team members:Care Management, Physical Therapy, Physician and . Plan of care discussed. See clinical pathway and/or care plan for interventions and desired outcomes.

## 2018-02-28 NOTE — ANESTHESIA POSTPROCEDURE EVALUATION
Post-Anesthesia Evaluation and Assessment    Patient: Ana Luisa Greco MRN: [id number]  SSN: [id number]    YOB: 1960  Age: 62 y.o. Sex: female       Cardiovascular Function/Vital Signs  Visit Vitals    /66 (BP 1 Location: Right arm, BP Patient Position: At rest)    Pulse (!) 108    Temp 36.4 °C (97.5 °F)    Resp 16    Ht 5' 3\" (1.6 m)    Wt 79.4 kg (175 lb)    SpO2 96%    BMI 31 kg/m2       Patient is status post spinal anesthesia for Procedure(s):  ORIF RIGHT SUBTROCHANTERIC FEMUR FRACTURE WITH INSERTION INTRA MEDULLARY NAIL . Nausea/Vomiting: None    Postoperative hydration reviewed and adequate. Pain:  Pain Scale 1: Numeric (0 - 10) (02/28/18 1002)  Pain Intensity 1: 0 (02/28/18 1002)   Managed    Neurological Status:   Neuro (WDL): Within Defined Limits (02/28/18 1002)  Neuro  Neurologic State: Alert (02/28/18 1002)  Orientation Level: Oriented X4 (02/28/18 1002)  Cognition: Appropriate decision making (02/28/18 1002)  Speech: Clear (02/28/18 1002)  LUE Motor Response: Purposeful (02/28/18 1002)  LLE Motor Response: Pharmacologically paralyzed (02/28/18 1002)  RUE Motor Response: Purposeful (02/28/18 1002)  RLE Motor Response: Pharmacologically paralyzed (02/28/18 1002)   At baseline    Mental Status and Level of Consciousness: Arousable    Pulmonary Status:   O2 Device: Room air (02/28/18 1002)   Adequate oxygenation and airway patent    Complications related to anesthesia: None    Post-anesthesia assessment completed.  No concerns    Signed By: Dee Dugan MD     February 28, 2018

## 2018-02-28 NOTE — PROGRESS NOTES
Hospitalist Progress Note    2018  Admit Date: 2018 11:47 PM   NAME: Neville Reyes   :  1960   MRN:  [id number]   Attending: Alma Crisostomo MD  PCP:  None    SUBJECTIVE:   Per H/P:  '63 y/o female with hx diabetes was reportedly at a party or some other function. She was standing behind the DJ and went to sit in a chain but instead fell and landed on her right hip. No other injury reported. In ED xray reveals a comminuted intertrochanteric fracture. She has hx knee surgery and did not have any complications from anesthesia or bleeding. She is currently sedated from pain medications she received in ED. No reported hx cardiovascular or pulmonary disease. Her glucose is 314 without evidence of DKA or hyperosmolar coma. She used to be on insulin but her family members report she no longer takes it. Will admit for glucose control and surgical repair of hip.'    - seen with her daughter, Gregory Halsted, at bedside. She reports her R hip pain is controlled. Awaiting surgery for today. Denies new concerns. Blood sugars controlled. - POD 0 from R femur intra-medullary nail insertion. Seen with daughter, Isabell Clark, and sister, Martha Richardson, at bedside. She reports she is in significant pain. Denies other concerns.        Review of Systems negative with exception of pertinent positives noted above  PHYSICAL EXAM     Visit Vitals    BP (!) 157/97 (BP 1 Location: Right arm, BP Patient Position: At rest)    Pulse 97    Temp 97.5 °F (36.4 °C)    Resp 16    Ht 5' 3\" (1.6 m)    Wt 79.4 kg (175 lb)    SpO2 97%    BMI 31 kg/m2      Temp (24hrs), Av.5 °F (36.9 °C), Min:97.5 °F (36.4 °C), Max:99.1 °F (37.3 °C)    Patient Vitals for the past 24 hrs:   Temp Pulse Resp BP SpO2   18 1110 - 97 16 (!) 157/97 97 %   18 1055 - 100 17 171/89 98 %   18 1040 - 99 16 150/89 98 %   18 1026 97.5 °F (36.4 °C) (!) 108 16 122/66 96 %   18 1004 - 100 14 - 92 %   18 1002 98.6 °F (37 °C) 98 14 115/79 93 %   02/28/18 0957 - 99 14 116/76 93 %   02/28/18 0952 - 97 14 123/71 95 %   02/28/18 0947 - 100 14 115/72 99 %   02/28/18 0942 - 94 14 124/68 100 %   02/28/18 0937 - 90 14 104/64 98 %   02/28/18 0933 98.6 °F (37 °C) 90 14 100/57 100 %   02/28/18 0932 - 89 - 100/57 99 %   02/28/18 0615 98.1 °F (36.7 °C) 98 16 (!) 191/101 96 %   02/28/18 0603 98.4 °F (36.9 °C) 100 17 (!) 191/108 97 %   02/27/18 2349 98.9 °F (37.2 °C) 99 17 147/77 96 %   02/27/18 2002 99.1 °F (37.3 °C) 97 17 165/90 99 %   02/27/18 1452 98.8 °F (37.1 °C) 95 17 146/85 95 %       Oxygen Therapy  O2 Sat (%): 97 % (02/28/18 1110)  Pulse via Oximetry: 100 beats per minute (02/28/18 1004)  O2 Device: Room air (02/28/18 1002)  O2 Flow Rate (L/min): 3 l/min (02/28/18 0933)    Intake/Output Summary (Last 24 hours) at 02/28/18 1209  Last data filed at 02/28/18 0933   Gross per 24 hour   Intake              500 ml   Output             2565 ml   Net            -2065 ml      General: Appears uncomfortable and in pain, obese    Lungs:  CTA Bilaterally.    Heart:  Regular rate and rhythm,  No murmur, rub, or gallop  Abdomen: Soft, Non distended, Non tender, Positive bowel sounds  Extremities: No cyanosis, clubbing or edema  Neurologic:  No focal deficits    Recent Results (from the past 24 hour(s))   GLUCOSE, POC    Collection Time: 02/27/18  3:46 PM   Result Value Ref Range    Glucose (POC) 121 (H) 65 - 100 mg/dL   GLUCOSE, POC    Collection Time: 02/27/18  9:54 PM   Result Value Ref Range    Glucose (POC) 179 (H) 65 - 100 mg/dL   GLUCOSE, POC    Collection Time: 02/28/18  7:00 AM   Result Value Ref Range    Glucose (POC) 150 (H) 65 - 100 mg/dL   CBC WITH AUTOMATED DIFF    Collection Time: 02/28/18  7:09 AM   Result Value Ref Range    WBC 7.9 4.3 - 11.1 K/uL    RBC 3.27 (L) 4.05 - 5.25 M/uL    HGB 9.3 (L) 11.7 - 15.4 g/dL    HCT 27.3 (L) 35.8 - 46.3 %    MCV 83.5 79.6 - 97.8 FL    MCH 28.4 26.1 - 32.9 PG    MCHC 34.1 31.4 - 35.0 g/dL    RDW 12.3 11.9 - 14.6 %    PLATELET 379 580 - 612 K/uL    MPV 10.6 (L) 10.8 - 14.1 FL    DF AUTOMATED      NEUTROPHILS 74 43 - 78 %    LYMPHOCYTES 19 13 - 44 %    MONOCYTES 5 4.0 - 12.0 %    EOSINOPHILS 2 0.5 - 7.8 %    BASOPHILS 0 0.0 - 2.0 %    IMMATURE GRANULOCYTES 0 0.0 - 5.0 %    ABS. NEUTROPHILS 5.9 1.7 - 8.2 K/UL    ABS. LYMPHOCYTES 1.5 0.5 - 4.6 K/UL    ABS. MONOCYTES 0.4 0.1 - 1.3 K/UL    ABS. EOSINOPHILS 0.2 0.0 - 0.8 K/UL    ABS. BASOPHILS 0.0 0.0 - 0.2 K/UL    ABS. IMM. GRANS. 0.0 0.0 - 0.5 K/UL   TYPE & SCREEN    Collection Time: 02/28/18  7:09 AM   Result Value Ref Range    Crossmatch Expiration 03/03/2018     ABO/Rh(D) Madonna Harness POSITIVE     Antibody screen NEG    GLUCOSE, POC    Collection Time: 02/28/18 11:51 AM   Result Value Ref Range    Glucose (POC) 118 (H) 65 - 100 mg/dL     Imaging:    XR FEMUR RT 2 VS   Final Result   IMPRESSION: Surgical fixation of right hip fracture. XR FEMUR RT 2 VS   Final Result   IMPRESSION: Comminuted intertrochanteric fracture. The mid and distal femur are   intact. XR CHEST SNGL V   Final Result   IMPRESSION: Normal chest.             XR HIP RT W OR WO PELV 2-3 VWS   Final Result   IMPRESSION: Comminuted intertrochanteric fracture which extends of the proximal   femoral diaphysis. NC XR TECHNOLOGIST SERVICE    (Results Pending)         ASSESSMENT      Hospital Problems as of 2/28/2018  Never Reviewed          Codes Class Noted - Resolved POA    Essential hypertension ICD-10-CM: I10  ICD-9-CM: 401.9  2/28/2018 - Present Unknown        * (Principal)Intertrochanteric fracture of right femur ([facility] ICD-10-CM: F62.440Q  ICD-9-CM: 820.21  2/24/2018 - Present Yes        Hyperglycemia due to type 2 diabetes mellitus ([facility] ICD-10-CM: E11.65  ICD-9-CM: 250.00  2/24/2018 - Present Yes            Plan:  · R femur fx- POD 0. Pain control per ortho. · T2DM- controlled. On SSI for now. Re-add lantus when eating better and needed.   · HTN- BP elevated and suspect this is pain related. If BP continues to be high when pain controlled, increase lisinopril. · Anemia- slight iron deficiency. Likely also related to blood loss from injury. Monitor and transfuse per ortho. DVT Prophylaxis: SCDs    Signed By: Jamison Wu. Cedrick Weinstein MD     February 28, 2018      .

## 2018-02-28 NOTE — PROGRESS NOTES
Problem: Falls - Risk of  Goal: *Absence of Falls  Document Az Fall Risk and appropriate interventions in the flowsheet.    Outcome: Progressing Towards Goal  Fall Risk Interventions:            Medication Interventions: Bed/chair exit alarm, Patient to call before getting OOB    Elimination Interventions: Bed/chair exit alarm, Call light in reach    History of Falls Interventions: Bed/chair exit alarm

## 2018-03-01 LAB
ANION GAP SERPL CALC-SCNC: 8 MMOL/L (ref 7–16)
BASOPHILS # BLD: 0 K/UL (ref 0–0.2)
BASOPHILS NFR BLD: 0 % (ref 0–2)
BUN SERPL-MCNC: 9 MG/DL (ref 6–23)
CALCIUM SERPL-MCNC: 7.6 MG/DL (ref 8.3–10.4)
CHLORIDE SERPL-SCNC: 104 MMOL/L (ref 98–107)
CO2 SERPL-SCNC: 27 MMOL/L (ref 21–32)
CREAT SERPL-MCNC: 0.54 MG/DL (ref 0.6–1)
DIFFERENTIAL METHOD BLD: ABNORMAL
EOSINOPHIL # BLD: 0.3 K/UL (ref 0–0.8)
EOSINOPHIL NFR BLD: 5 % (ref 0.5–7.8)
ERYTHROCYTE [DISTWIDTH] IN BLOOD BY AUTOMATED COUNT: 12.8 % (ref 11.9–14.6)
GLUCOSE BLD STRIP.AUTO-MCNC: 117 MG/DL (ref 65–100)
GLUCOSE BLD STRIP.AUTO-MCNC: 154 MG/DL (ref 65–100)
GLUCOSE BLD STRIP.AUTO-MCNC: 164 MG/DL (ref 65–100)
GLUCOSE BLD STRIP.AUTO-MCNC: 185 MG/DL (ref 65–100)
GLUCOSE BLD STRIP.AUTO-MCNC: 195 MG/DL (ref 65–100)
GLUCOSE BLD STRIP.AUTO-MCNC: 261 MG/DL (ref 65–100)
GLUCOSE BLD STRIP.AUTO-MCNC: 98 MG/DL (ref 65–100)
GLUCOSE SERPL-MCNC: 192 MG/DL (ref 65–100)
HCT VFR BLD AUTO: 24 % (ref 35.8–46.3)
HCT VFR BLD AUTO: 27 % (ref 35.8–46.3)
HGB BLD-MCNC: 8.2 G/DL (ref 11.7–15.4)
HGB BLD-MCNC: 8.5 G/DL (ref 11.7–15.4)
IMM GRANULOCYTES # BLD: 0 K/UL (ref 0–0.5)
IMM GRANULOCYTES NFR BLD AUTO: 0 % (ref 0–5)
LYMPHOCYTES # BLD: 1.1 K/UL (ref 0.5–4.6)
LYMPHOCYTES NFR BLD: 16 % (ref 13–44)
MCH RBC QN AUTO: 28.8 PG (ref 26.1–32.9)
MCHC RBC AUTO-ENTMCNC: 34.2 G/DL (ref 31.4–35)
MCV RBC AUTO: 84.2 FL (ref 79.6–97.8)
MONOCYTES # BLD: 0.4 K/UL (ref 0.1–1.3)
MONOCYTES NFR BLD: 6 % (ref 4–12)
NEUTS SEG # BLD: 4.9 K/UL (ref 1.7–8.2)
NEUTS SEG NFR BLD: 73 % (ref 43–78)
PLATELET # BLD AUTO: 221 K/UL (ref 150–450)
PMV BLD AUTO: 10.5 FL (ref 10.8–14.1)
POTASSIUM SERPL-SCNC: 3.2 MMOL/L (ref 3.5–5.1)
RBC # BLD AUTO: 2.85 M/UL (ref 4.05–5.25)
SODIUM SERPL-SCNC: 139 MMOL/L (ref 136–145)
WBC # BLD AUTO: 6.7 K/UL (ref 4.3–11.1)

## 2018-03-01 PROCEDURE — 97530 THERAPEUTIC ACTIVITIES: CPT

## 2018-03-01 PROCEDURE — 36415 COLL VENOUS BLD VENIPUNCTURE: CPT | Performed by: NURSE PRACTITIONER

## 2018-03-01 PROCEDURE — 74011250636 HC RX REV CODE- 250/636: Performed by: NURSE PRACTITIONER

## 2018-03-01 PROCEDURE — P9016 RBC LEUKOCYTES REDUCED: HCPCS | Performed by: ANESTHESIOLOGY

## 2018-03-01 PROCEDURE — 74011636637 HC RX REV CODE- 636/637: Performed by: HOSPITALIST

## 2018-03-01 PROCEDURE — 85025 COMPLETE CBC W/AUTO DIFF WBC: CPT | Performed by: NURSE PRACTITIONER

## 2018-03-01 PROCEDURE — 74011250637 HC RX REV CODE- 250/637: Performed by: INTERNAL MEDICINE

## 2018-03-01 PROCEDURE — 51798 US URINE CAPACITY MEASURE: CPT

## 2018-03-01 PROCEDURE — 80048 BASIC METABOLIC PNL TOTAL CA: CPT | Performed by: NURSE PRACTITIONER

## 2018-03-01 PROCEDURE — 65270000029 HC RM PRIVATE

## 2018-03-01 PROCEDURE — 85018 HEMOGLOBIN: CPT | Performed by: ORTHOPAEDIC SURGERY

## 2018-03-01 PROCEDURE — 74011250637 HC RX REV CODE- 250/637: Performed by: ORTHOPAEDIC SURGERY

## 2018-03-01 PROCEDURE — 36430 TRANSFUSION BLD/BLD COMPNT: CPT

## 2018-03-01 PROCEDURE — 82962 GLUCOSE BLOOD TEST: CPT

## 2018-03-01 PROCEDURE — 74011250637 HC RX REV CODE- 250/637: Performed by: NURSE PRACTITIONER

## 2018-03-01 PROCEDURE — 77030013131 HC IV BLD ST ICUM -A

## 2018-03-01 PROCEDURE — 74011000250 HC RX REV CODE- 250: Performed by: NURSE PRACTITIONER

## 2018-03-01 RX ORDER — SODIUM CHLORIDE 9 MG/ML
250 INJECTION, SOLUTION INTRAVENOUS AS NEEDED
Status: DISCONTINUED | OUTPATIENT
Start: 2018-03-01 | End: 2018-03-06 | Stop reason: HOSPADM

## 2018-03-01 RX ORDER — POTASSIUM CHLORIDE 20 MEQ/1
40 TABLET, EXTENDED RELEASE ORAL
Status: COMPLETED | OUTPATIENT
Start: 2018-03-01 | End: 2018-03-01

## 2018-03-01 RX ADMIN — OXYCODONE HYDROCHLORIDE 10 MG: 5 TABLET ORAL at 13:47

## 2018-03-01 RX ADMIN — WATER 1 G: 1 INJECTION INTRAMUSCULAR; INTRAVENOUS; SUBCUTANEOUS at 00:05

## 2018-03-01 RX ADMIN — INSULIN LISPRO 10 UNITS: 100 INJECTION, SOLUTION INTRAVENOUS; SUBCUTANEOUS at 15:34

## 2018-03-01 RX ADMIN — INSULIN LISPRO 2 UNITS: 100 INJECTION, SOLUTION INTRAVENOUS; SUBCUTANEOUS at 11:36

## 2018-03-01 RX ADMIN — ACETAMINOPHEN 650 MG: 325 TABLET ORAL at 22:08

## 2018-03-01 RX ADMIN — SODIUM CHLORIDE, SODIUM LACTATE, POTASSIUM CHLORIDE, AND CALCIUM CHLORIDE 75 ML/HR: 600; 310; 30; 20 INJECTION, SOLUTION INTRAVENOUS at 22:12

## 2018-03-01 RX ADMIN — ACETAMINOPHEN 650 MG: 325 TABLET ORAL at 04:40

## 2018-03-01 RX ADMIN — ENOXAPARIN SODIUM 30 MG: 30 INJECTION SUBCUTANEOUS at 09:10

## 2018-03-01 RX ADMIN — TRAMADOL HYDROCHLORIDE 50 MG: 50 TABLET, FILM COATED ORAL at 04:41

## 2018-03-01 RX ADMIN — OXYCODONE HYDROCHLORIDE 10 MG: 5 TABLET ORAL at 09:17

## 2018-03-01 RX ADMIN — INSULIN LISPRO 2 UNITS: 100 INJECTION, SOLUTION INTRAVENOUS; SUBCUTANEOUS at 09:09

## 2018-03-01 RX ADMIN — ONDANSETRON 4 MG: 2 INJECTION INTRAMUSCULAR; INTRAVENOUS at 10:19

## 2018-03-01 RX ADMIN — LISINOPRIL 10 MG: 5 TABLET ORAL at 09:09

## 2018-03-01 RX ADMIN — POTASSIUM CHLORIDE 40 MEQ: 20 TABLET, EXTENDED RELEASE ORAL at 09:08

## 2018-03-01 RX ADMIN — ACETAMINOPHEN 650 MG: 325 TABLET ORAL at 13:47

## 2018-03-01 RX ADMIN — SODIUM CHLORIDE, SODIUM LACTATE, POTASSIUM CHLORIDE, AND CALCIUM CHLORIDE 75 ML/HR: 600; 310; 30; 20 INJECTION, SOLUTION INTRAVENOUS at 04:46

## 2018-03-01 RX ADMIN — Medication 10 ML: at 04:41

## 2018-03-01 RX ADMIN — CALCIUM CARBONATE 500 MG (1,250 MG)-VITAMIN D3 200 UNIT TABLET 1 TABLET: at 09:09

## 2018-03-01 RX ADMIN — DOCUSATE SODIUM 100 MG: 100 CAPSULE, LIQUID FILLED ORAL at 09:09

## 2018-03-01 RX ADMIN — Medication 10 ML: at 22:08

## 2018-03-01 NOTE — PROGRESS NOTES
Problem: Mobility Impaired (Adult and Pediatric)  Goal: *Acute Goals and Plan of Care (Insert Text)  STG:  (1.)Ms. Zulema Lyon will perform bed mobility with CONTACT GUARD ASSIST within 3 treatment day(s). (2.)Ms. Zulema Lyon will transfer from bed to chair and chair to bed with CONTACT GUARD ASSIST using the RW within 3 treatment day(s). (3.)Ms. Zulema Lyon will ambulate with MINIMAL ASSIST for 50+ feet with RW within 3 treatment day(s). (4.)Ms. Zulema Lyon will tolerate 25+ minutes of therapeutic activity/exercise while maintaining stable vitals to improve functional strength and mobility within 3 day(s). LTG:  (1.)Ms. Zulema Lyon will perform bed mobility with STAND BY ASSISTANCE within 7 treatment day(s). (2.)Ms. Zulema Lyon will transfer from bed to chair and chair to bed with STAND BY ASSIST using RWwithin 7 treatment day(s). (3.)Ms. Zulema Lyon will ambulate with CONTACT GUARD ASSIST for 150+ feet with RW within 7 treatment day(s). (4.)Ms. Zulema Lyon will demonstrate good dynamic standing balance utilizing RW within 7 day(s). ________________________________________________________________________________________________    PHYSICAL THERAPY: Daily Note, Treatment Day: 1st, AM 3/1/2018  INPATIENT: Hospital Day: 7  Payor: Blanca Weems / Plan: [facility] hospital / Product Type: PPO /      WBAT R ANGIE     NAME/AGE/GENDER: Neville Reyes is a 62 y.o. female   PRIMARY DIAGNOSIS: Closed displaced subtrochanteric fracture of right femur, initial encounter ([facility] [S72.21XA] Intertrochanteric fracture of right femur ([facility] Intertrochanteric fracture of right femur ([facility]  Procedure(s) (LRB):  ORIF RIGHT SUBTROCHANTERIC FEMUR FRACTURE WITH INSERTION INTRA MEDULLARY NAIL  (Right)  1 Day Post-Op  ICD-10: Treatment Diagnosis:   · Difficulty in walking, Not elsewhere classified (R26.2)  · History of falling (Z91.81)   Precaution/Allergies:  Review of patient's allergies indicates no known allergies.       ASSESSMENT:     Ms. Barby Finch is a 62year old female s/p IM nailing of right subtrochanteric femur fracture. Patient was supine upon contact and agreeable to PT. Patient able to perform supine to sit and transfer to standing with min assist, additional time, and cues for improved/proper technique. Once standing patient able to ambulate 6' with use of rolling walker, CGA and cues for sequencing/walker manipulation. After 6' patient reports she feels dizzy/lightheaded. RN to bedside who checked BP and BGL (both WNL). Patient was positioned for comfort in recliner chair with needs in reach and RN at bedside. Will continue efforts. No goals have been met thus far. This section established at most recent assessment   PROBLEM LIST (Impairments causing functional limitations):  1. Decreased Strength  2. Decreased ADL/Functional Activities  3. Decreased Transfer Abilities  4. Decreased Ambulation Ability/Technique  5. Decreased Balance  6. Increased Pain  7. Decreased Activity Tolerance  8. Increased Shortness of Breath  9. Decreased Flexibility/Joint Mobility  10. Decreased Knowledge of Precautions   INTERVENTIONS PLANNED: (Benefits and precautions of physical therapy have been discussed with the patient.)  1. Balance Exercise  2. Bed Mobility  3. Family Education  4. Gait Training  5. Home Exercise Program (HEP)  6. Range of Motion (ROM)  7. Therapeutic Activites  8. Therapeutic Exercise/Strengthening  9. Transfer Training  10. Group Therapy     TREATMENT PLAN: Frequency/Duration: twice daily for duration of hospital stay  Rehabilitation Potential For Stated Goals: GOOD     RECOMMENDED REHABILITATION/EQUIPMENT: (at time of discharge pending progress): Due to the probability of continued deficits (see above) this patient will likely need continued skilled physical therapy after discharge.   Equipment:    TBD              HISTORY:   History of Present Injury/Illness (Reason for Referral):  S/p right IM nailing; WBAT R LE  Past Medical History/Comorbidities:   Ms. Dee Cruz  has no past medical history on file. Ms. Dee Cruz  has no past surgical history on file. Social History/Living Environment:   Home Environment: Private residence  # Steps to Enter: 5  Rails to Enter: No  Wheelchair Ramp: No  One/Two Story Residence: One story  Living Alone: Yes  Support Systems: Family member(s)  Patient Expects to be Discharged to[de-identified] Rehabilitation facility  Current DME Used/Available at Home: None  Prior Level of Function/Work/Activity:  Patient lives alone in a single story residence with 5 steps to enter. At baseline, patient is an independent, community level ambulator, working as a CNA at [employer], and driving. Number of Personal Factors/Comorbidities that affect the Plan of Care: 1-2: MODERATE COMPLEXITY   EXAMINATION:   Most Recent Physical Functioning:   Gross Assessment:                  Posture:     Balance:  Sitting: Impaired  Sitting - Static: Prop sitting  Sitting - Dynamic: Prop sitting  Standing: Impaired  Standing - Static: Fair  Standing - Dynamic : Fair Bed Mobility:  Supine to Sit: Minimum assistance; Additional time  Sit to Supine:  (NT)  Wheelchair Mobility:     Transfers:  Sit to Stand: Minimum assistance  Stand to Sit: Contact guard assistance  Gait:     Base of Support: Center of gravity altered; Widened  Speed/Yadi: Shuffled; Slow  Step Length: Right shortened;Left shortened  Gait Abnormalities: Decreased step clearance;Trunk sway increased; Shuffling gait; Step to gait  Distance (ft): 6 Feet (ft)  Assistive Device: Walker, rolling  Ambulation - Level of Assistance: Contact guard assistance  Interventions: Safety awareness training; Tactile cues; Verbal cues      Body Structures Involved:  1. Bones  2. Joints  3. Muscles  4. Ligaments Body Functions Affected:  1. Neuromusculoskeletal  2. Movement Related Activities and Participation Affected:  1. Mobility  2. Self Care  3.  Domestic Life   Number of elements that affect the Plan of Care: 4+: HIGH COMPLEXITY   CLINICAL PRESENTATION:   Presentation: Stable and uncomplicated: LOW COMPLEXITY   CLINICAL DECISION MAKING:   [facility] AM-PAC 6 Clicks   Basic Mobility Inpatient Short Form  How much difficulty does the patient currently have. .. Unable A Lot A Little None   1. Turning over in bed (including adjusting bedclothes, sheets and blankets)? [] 1   [] 2   [x] 3   [] 4   2. Sitting down on and standing up from a chair with arms ( e.g., wheelchair, bedside commode, etc.)   [] 1   [] 2   [x] 3   [] 4   3. Moving from lying on back to sitting on the side of the bed? [] 1   [] 2   [x] 3   [] 4   How much help from another person does the patient currently need. .. Total A Lot A Little None   4. Moving to and from a bed to a chair (including a wheelchair)? [] 1   [] 2   [x] 3   [] 4   5. Need to walk in hospital room? [] 1   [] 2   [x] 3   [] 4   6. Climbing 3-5 steps with a railing? [] 1   [x] 2   [] 3   [] 4   © 2007, Trustees of [facility], under license to [employer]. All rights reserved      Score:  Initial: 17 Most Recent: 17 (Date: 2/28/18 )    Interpretation of Tool:  Represents activities that are increasingly more difficult (i.e. Bed mobility, Transfers, Gait). Score 24 23 22-20 19-15 14-10 9-7 6     Modifier CH CI CJ CK CL CM CN      ? Mobility - Walking and Moving Around:     - CURRENT STATUS: CK - 40%-59% impaired, limited or restricted    - GOAL STATUS: CJ - 20%-39% impaired, limited or restricted    - D/C STATUS:  ---------------To be determined---------------  Payor: BLUE CROSS / Plan: [place]OSS [address] / Product Type: PPO /      Medical Necessity:     · Patient demonstrates good rehab potential due to higher previous functional level. Reason for Services/Other Comments:  · Patient continues to require skilled intervention due to decreased functional mobility and balance/gait status from baseline. .   Use of outcome tool(s) and clinical judgement create a POC that gives a: Clear prediction of patient's progress: LOW COMPLEXITY            TREATMENT:   (In addition to Assessment/Re-Assessment sessions the following treatments were rendered)   Pre-treatment Symptoms/Complaints:  No complaints  Pain: Initial:   Pain Intensity 1: 0  Post Session:  0/10 visually post treatment     Therapeutic Activity: (    15 Minutes): Therapeutic activities including bed mobility training, transfer training, static/dynamic standing balance training, ambulation on level ground, posture training, instruction in sequencing with rolling walker, and patient education to improve mobility, strength and balance. Required moderate Safety awareness training; Tactile cues; Verbal cues to promote static and dynamic balance in standing, promote coordination of bilateral, lower extremity(s) and to promote improved sequencing with rolling walker. Braces/Orthotics/Lines/Etc:   · IV  Treatment/Session Assessment:    · Response to Treatment:  See above. · Interdisciplinary Collaboration:   o Physical Therapy Assistant  o Registered Nurse  · After treatment position/precautions:   o Up in chair  o Bed alarm/tab alert on  o Bed/Chair-wheels locked  o Call light within reach  o RN notified  o Family at bedside  o Nurse at bedside  o Need met; patient endorses comfort; RN updated   · Compliance with Program/Exercises: Will assess as treatment progresses. · Recommendations/Intent for next treatment session: \"Next visit will focus on advancements to more challenging activities and reduction in assistance provided\".     Total Treatment Duration:  PT Patient Time In/Time Out  Time In: 0926  Time Out: [facility]

## 2018-03-01 NOTE — PROGRESS NOTES
OT Note:    OT attempted to see patient x2 today for therapy. Pt was not feeling well and was crying at this time. Will re-attempt to see patient at a later date/time.     Thanks,  Marilia Borrero

## 2018-03-01 NOTE — PROGRESS NOTES
Met with pt at bedside made aware declined for [address], agreeable to referral to [facility] completed via CC link will await response.

## 2018-03-01 NOTE — PROGRESS NOTES
Problem: Patient Education: Go to Patient Education Activity  Goal: Patient/Family Education  Nutrition  F/U  Assessment:  Diet order: [facility] with glucerna shake TID and ensure enlive TID  Food/Nutrition and Pertinent History: The patient was educated by Maikel Callaway RD, on [facility] on 2/24. I f/u with the patient today to provide further education, however, the patient was not interested in further education. She was seen getting her hair brushed by her daughter. Her daughter reports that she is not eating as she does not have an appetite. The patient states that she does not like the food here. However, her daughter has brought her in outside foods (hamburger yesterday) and she could not eat that either. She does not like the glucerna shakes being provided. She is s/p repair of hip fracture yesterday. Anthropometrics:Height: 5' 3\" (160 cm),  Weight: 79.4 kg (175 lb), Weight Source: unknown, Body mass index is 31 kg/(m^2). BMI class of obesity class I. Patient with no weight history in EMR to verify stated weight loss. Macronutrient needs:  EER:  0566-5501 kcal /day (18-23 kcal/kg Actual BW)  EPR:  52-63 grams protein/day (1-1.2 grams/kg IBW)  Max CHO: 227 grams/day (50% of kcals)  Intake/Comparative Standards: Average intake for past 2 recorded meal(s): 13%. This potentially meets ~16% of kcal and ~23% of protein needs      Nutrition Diagnosis:  Inadequate oral intake related to poor appetite as evidenced by patient report of no appetite and meeting above noted needs.     Intervention:   Meals and snacks: Continue [facility] diet-collected preferences. I encouraged patient's daughter to bring in outside foods. Nutrition Supplement therapy: D/C ensure enlive TID and decrease glucerna from TID to 1x/day. Education: Patient has been provided with [facility] diet education.    Nutrition Discharge Plan:  Too soon to be determined     Bronwyn Sanchez 87, [address], [address] [place] [address], [phone]

## 2018-03-01 NOTE — PROGRESS NOTES
Pt given 10 units of humalog; pt supposed to receive no more than 2 units of humalog; Dr. David Parra made aware; Blood sugar rechecked; value is 164; pt eating and drinking; will continue to monitor

## 2018-03-01 NOTE — DIABETES MGMT
Patient s/p hip surgery yesterday. Blood glucose ranged 118-210 yesterday with patient receiving Humalog 6 units. Blood glucose 185 this morning. Reviewed with patient. Patient reports she is still in a good bit of pain. Rates 8 on pain scale. Patient states her appetite is fair. Reviewed current regimen of Humalog SSI with patient. Educated patient that given her elevated fasting blood sugar provider may consider restarting Lantus to aid in glycemic control and to help with healing. Patient verbalized understanding. Physical therapy in to work with patient. Patient voices no questions regarding diabetes at this time.

## 2018-03-01 NOTE — PROGRESS NOTES
Hospitalist Progress Note    3/1/2018  Admit Date: 2018 11:47 PM   NAME: Nathan Alejandra   :  1960   MRN:  [id number]   Attending: Ok Castillo MD  PCP:  None    SUBJECTIVE:   Per H/P:  '61 y/o female with hx diabetes was reportedly at a party or some other function. She was standing behind the DJ and went to sit in a chain but instead fell and landed on her right hip. No other injury reported. In ED xray reveals a comminuted intertrochanteric fracture. She has hx knee surgery and did not have any complications from anesthesia or bleeding. She is currently sedated from pain medications she received in ED. No reported hx cardiovascular or pulmonary disease. Her glucose is 314 without evidence of DKA or hyperosmolar coma. She used to be on insulin but her family members report she no longer takes it. Will admit for glucose control and surgical repair of hip.'    - seen with her daughter, Kulwinder Boyd, at bedside. She reports her R hip pain is controlled. Awaiting surgery for today. Denies new concerns. Blood sugars controlled. - POD 0 from R femur intra-medullary nail insertion. Seen with daughter, Khadijah Swift, and sister, Josemanuel Gomez, at bedside. She reports she is in significant pain. Denies other concerns. 3/1- POD 1 for R femur intra-medullary nail insertion. Pain well controlled. No fever, chills, CP, abd pain or urinary symptoms. Had some nausea earlier which resolved with Zofran as per pt.       Review of Systems negative with exception of pertinent positives noted above  PHYSICAL EXAM     Visit Vitals    BP (!) 168/97 (BP 1 Location: Right arm, BP Patient Position: Sitting)    Pulse (!) 103    Temp 99.5 °F (37.5 °C)    Resp 12    Ht 5' 3\" (1.6 m)    Wt 79.4 kg (175 lb)    SpO2 95%    BMI 31 kg/m2      Temp (24hrs), Av.1 °F (37.3 °C), Min:98.3 °F (36.8 °C), Max:99.5 °F (37.5 °C)    Patient Vitals for the past 24 hrs:   Temp Pulse Resp BP SpO2   18 1339 99.5 °F (37.5 °C) (!) 103 12 (!) 168/97 95 %   03/01/18 1241 98.3 °F (36.8 °C) (!) 104 12 147/84 95 %   03/01/18 1135 99.5 °F (37.5 °C) 98 16 128/78 95 %   03/01/18 1112 99.4 °F (37.4 °C) 99 12 126/77 92 %   03/01/18 1057 99 °F (37.2 °C) 99 12 128/77 93 %   03/01/18 1038 99.5 °F (37.5 °C) (!) 101 16 129/73 93 %   03/01/18 0747 98.8 °F (37.1 °C) 97 18 147/82 91 %   03/01/18 0537 99 °F (37.2 °C) (!) 103 18 135/80 90 %   02/28/18 2354 98.8 °F (37.1 °C) (!) 101 19 111/51 96 %   02/28/18 1919 99 °F (37.2 °C) (!) 105 16 153/87 94 %   02/28/18 1503 98.8 °F (37.1 °C) (!) 107 17 123/79 97 %       Oxygen Therapy  O2 Sat (%): 95 % (03/01/18 1339)  Pulse via Oximetry: 103 beats per minute (03/01/18 0537)  O2 Device: Room air (03/01/18 0537)  O2 Flow Rate (L/min): 3 l/min (02/28/18 0933)    Intake/Output Summary (Last 24 hours) at 03/01/18 1450  Last data filed at 03/01/18 1339   Gross per 24 hour   Intake             1750 ml   Output              300 ml   Net             1450 ml      General: Appears uncomfortable and in pain, obese    Lungs:  CTA Bilaterally.    Heart:  Regular rate and rhythm,  No murmur, rub, or gallop  Abdomen: Soft, Non distended, Non tender, Positive bowel sounds  Extremities: No cyanosis, clubbing or edema  Neurologic:  No focal deficits    Recent Results (from the past 24 hour(s))   GLUCOSE, POC    Collection Time: 02/28/18  3:46 PM   Result Value Ref Range    Glucose (POC) 164 (H) 65 - 100 mg/dL   GLUCOSE, POC    Collection Time: 02/28/18  9:39 PM   Result Value Ref Range    Glucose (POC) 210 (H) 65 - 100 mg/dL   CBC WITH AUTOMATED DIFF    Collection Time: 03/01/18  5:55 AM   Result Value Ref Range    WBC 6.7 4.3 - 11.1 K/uL    RBC 2.85 (L) 4.05 - 5.25 M/uL    HGB 8.2 (L) 11.7 - 15.4 g/dL    HCT 24.0 (L) 35.8 - 46.3 %    MCV 84.2 79.6 - 97.8 FL    MCH 28.8 26.1 - 32.9 PG    MCHC 34.2 31.4 - 35.0 g/dL    RDW 12.8 11.9 - 14.6 %    PLATELET 768 500 - 096 K/uL    MPV 10.5 (L) 10.8 - 14.1 FL    DF AUTOMATED      NEUTROPHILS 73 43 - 78 % LYMPHOCYTES 16 13 - 44 %    MONOCYTES 6 4.0 - 12.0 %    EOSINOPHILS 5 0.5 - 7.8 %    BASOPHILS 0 0.0 - 2.0 %    IMMATURE GRANULOCYTES 0 0.0 - 5.0 %    ABS. NEUTROPHILS 4.9 1.7 - 8.2 K/UL    ABS. LYMPHOCYTES 1.1 0.5 - 4.6 K/UL    ABS. MONOCYTES 0.4 0.1 - 1.3 K/UL    ABS. EOSINOPHILS 0.3 0.0 - 0.8 K/UL    ABS. BASOPHILS 0.0 0.0 - 0.2 K/UL    ABS. IMM. GRANS. 0.0 0.0 - 0.5 K/UL   METABOLIC PANEL, BASIC    Collection Time: 03/01/18  5:55 AM   Result Value Ref Range    Sodium 139 136 - 145 mmol/L    Potassium 3.2 (L) 3.5 - 5.1 mmol/L    Chloride 104 98 - 107 mmol/L    CO2 27 21 - 32 mmol/L    Anion gap 8 7 - 16 mmol/L    Glucose 192 (H) 65 - 100 mg/dL    BUN 9 6 - 23 MG/DL    Creatinine 0.54 (L) 0.6 - 1.0 MG/DL    GFR est AA >60 >60 ml/min/1.73m2    GFR est non-AA >60 >60 ml/min/1.73m2    Calcium 7.6 (L) 8.3 - 10.4 MG/DL   GLUCOSE, POC    Collection Time: 03/01/18  7:34 AM   Result Value Ref Range    Glucose (POC) 185 (H) 65 - 100 mg/dL   GLUCOSE, POC    Collection Time: 03/01/18  9:39 AM   Result Value Ref Range    Glucose (POC) 261 (H) 65 - 100 mg/dL   GLUCOSE, POC    Collection Time: 03/01/18 11:08 AM   Result Value Ref Range    Glucose (POC) 195 (H) 65 - 100 mg/dL     Imaging:    XR FEMUR RT 2 VS   Final Result   IMPRESSION: Surgical fixation of right hip fracture. XR FEMUR RT 2 VS   Final Result   IMPRESSION: Comminuted intertrochanteric fracture. The mid and distal femur are   intact. XR CHEST SNGL V   Final Result   IMPRESSION: Normal chest.             XR HIP RT W OR WO PELV 2-3 VWS   Final Result   IMPRESSION: Comminuted intertrochanteric fracture which extends of the proximal   femoral diaphysis.                NC XR TECHNOLOGIST SERVICE    (Results Pending)         ASSESSMENT      Hospital Problems as of 3/1/2018  Never Reviewed          Codes Class Noted - Resolved POA    Essential hypertension ICD-10-CM: I10  ICD-9-CM: 401.9  2/28/2018 - Present Unknown        * (Principal)Intertrochanteric fracture of right femur [facility]) ICD-10-CM: S90.763T  ICD-9-CM: 820.21  2/24/2018 - Present Yes        Hyperglycemia due to type 2 diabetes mellitus ([facility] ICD-10-CM: E11.65  ICD-9-CM: 250.00  2/24/2018 - Present Yes            Plan:  · R femur fx- POD 0. Pain control per ortho. · T2DM- controlled. On SSI for now. Re-add lantus when eating better and needed. · HTN- BP elevated and suspect this is pain related. If BP continues to be high when pain controlled, increase lisinopril. · Anemia- slight iron deficiency. Likely also related to blood loss from injury. Monitor and transfuse per ortho. Dispo: Discussed with CM. Awaiting rehab approval.     DVT Prophylaxis: SCDs    Signed By: Socorro Bolivar MD     March 1, 2018      .

## 2018-03-01 NOTE — PROGRESS NOTES
Problem: Mobility Impaired (Adult and Pediatric)  Goal: *Acute Goals and Plan of Care (Insert Text)  STG:  (1.)Ms. Ash Boone will perform bed mobility with CONTACT GUARD ASSIST within 3 treatment day(s). (2.)Ms. Ash Boone will transfer from bed to chair and chair to bed with CONTACT GUARD ASSIST using the RW within 3 treatment day(s). (3.)Ms. Ash Boone will ambulate with MINIMAL ASSIST for 50+ feet with RW within 3 treatment day(s). (4.)Ms. Ash Boone will tolerate 25+ minutes of therapeutic activity/exercise while maintaining stable vitals to improve functional strength and mobility within 3 day(s). LTG:  (1.)Ms. Ash Boone will perform bed mobility with STAND BY ASSISTANCE within 7 treatment day(s). (2.)Ms. Ash Boone will transfer from bed to chair and chair to bed with STAND BY ASSIST using RWwithin 7 treatment day(s). (3.)Ms. Ash Boone will ambulate with CONTACT GUARD ASSIST for 150+ feet with RW within 7 treatment day(s). (4.)Ms. Ash Boone will demonstrate good dynamic standing balance utilizing RW within 7 day(s). ________________________________________________________________________________________________    PHYSICAL THERAPY: Daily Note, Treatment Day: 1st, PM 3/1/2018  INPATIENT: Hospital Day: 7  Payor: Janna Velasco / Plan: [facility] / Product Type: PPO /      WBAT R ANGIE     NAME/AGE/GENDER: Mauricio Born is a 62 y.o. female   PRIMARY DIAGNOSIS: Closed displaced subtrochanteric fracture of right femur, initial encounter ([facility] [S72.21XA] Intertrochanteric fracture of right femur ([facility] Intertrochanteric fracture of right femur ([facility]  Procedure(s) (LRB):  ORIF RIGHT SUBTROCHANTERIC FEMUR FRACTURE WITH INSERTION INTRA MEDULLARY NAIL  (Right)  1 Day Post-Op  ICD-10: Treatment Diagnosis:   · Difficulty in walking, Not elsewhere classified (R26.2)  · History of falling (Z91.81)   Precaution/Allergies:  Review of patient's allergies indicates no known allergies.       ASSESSMENT:     Ms. Aggie Cm is a 62year old female 1 day s/p IM nailing of right subtrochanteric femur fracture and is WBAT R LE. Patient seen this PM for physical therapy treatment session: presents sitting up in bedside chair, endorses 8/10 R LE pain as well as abdominal pain; RN aware. Patient performed scooting with CGA, transfers sit to stand with CGA, and ambulation x3ft from chair to bed with RW and CGA x1. Demonstrated a slow, shuffled, step-to gait pattern with decreased step clearance on right and fair dynamic balance throughout. Required moderate assistance for sit to supine transfer for B LE. Positioned to comfort and needs met. Progress limited this PM by patient's pain; no goals met thus far. Will continue with stated plan of care. Plan to progress gait distances next treatment session. This section established at most recent assessment   PROBLEM LIST (Impairments causing functional limitations):  1. Decreased Strength  2. Decreased ADL/Functional Activities  3. Decreased Transfer Abilities  4. Decreased Ambulation Ability/Technique  5. Decreased Balance  6. Increased Pain  7. Decreased Activity Tolerance  8. Increased Shortness of Breath  9. Decreased Flexibility/Joint Mobility  10. Decreased Knowledge of Precautions   INTERVENTIONS PLANNED: (Benefits and precautions of physical therapy have been discussed with the patient.)  1. Balance Exercise  2. Bed Mobility  3. Family Education  4. Gait Training  5. Home Exercise Program (HEP)  6. Range of Motion (ROM)  7. Therapeutic Activites  8. Therapeutic Exercise/Strengthening  9. Transfer Training  10.  Group Therapy     TREATMENT PLAN: Frequency/Duration: twice daily for duration of hospital stay  Rehabilitation Potential For Stated Goals: GOOD     RECOMMENDED REHABILITATION/EQUIPMENT: (at time of discharge pending progress): Due to the probability of continued deficits (see above) this patient will likely need continued skilled physical therapy after discharge. Equipment:    TBD              HISTORY:   History of Present Injury/Illness (Reason for Referral):  S/p right IM nailing; WBAT R LE  Past Medical History/Comorbidities:   Ms. Fareed Gaytan  has no past medical history on file. Ms. Fareed Gaytan  has no past surgical history on file. Social History/Living Environment:   Home Environment: Private residence  # Steps to Enter: 5  Rails to Enter: No  Wheelchair Ramp: No  One/Two Story Residence: One story  Living Alone: Yes  Support Systems: Family member(s)  Patient Expects to be Discharged to[de-identified] Rehabilitation facility  Current DME Used/Available at Home: None  Prior Level of Function/Work/Activity:  Patient lives alone in a single story residence with 5 steps to enter. At baseline, patient is an independent, community level ambulator, working as a CNA at [employer], and driving. Number of Personal Factors/Comorbidities that affect the Plan of Care: 1-2: MODERATE COMPLEXITY   EXAMINATION:   Most Recent Physical Functioning:   Gross Assessment:  AROM: Generally decreased, functional (R LE)  Strength: Generally decreased, functional (R LE)  Sensation: Intact (Light touch distal B UE/LE)               Posture:  Posture (WDL): Within defined limits  Balance:  Sitting: Impaired  Sitting - Static: Good (unsupported); Prop sitting  Sitting - Dynamic: Fair (occasional)  Standing: Impaired  Standing - Static: Fair  Standing - Dynamic : Fair Bed Mobility:  Sit to Supine: Moderate assistance (B LE)  Interventions: Safety awareness training; Tactile cues; Verbal cues  Wheelchair Mobility:     Transfers:  Sit to Stand: Contact guard assistance  Stand to Sit: Minimum assistance  Bed to Chair: Contact guard assistance  Gait:     Base of Support: Center of gravity altered;Shift to left  Speed/Yadi: Slow;Shuffled  Step Length: Right shortened;Left shortened  Gait Abnormalities: Decreased step clearance;Shuffling gait; Step to gait  Distance (ft): 3 Feet (ft)  Assistive Device: Walker, rolling  Ambulation - Level of Assistance: Contact guard assistance  Interventions: Safety awareness training; Tactile cues; Verbal cues      Body Structures Involved:  1. Bones  2. Joints  3. Muscles  4. Ligaments Body Functions Affected:  1. Neuromusculoskeletal  2. Movement Related Activities and Participation Affected:  1. Mobility  2. Self Care  3. Domestic Life   Number of elements that affect the Plan of Care: 4+: HIGH COMPLEXITY   CLINICAL PRESENTATION:   Presentation: Stable and uncomplicated: LOW COMPLEXITY   CLINICAL DECISION MAKIN[address] [place] [address] AM-PAC 6 Clicks   Basic Mobility Inpatient Short Form  How much difficulty does the patient currently have. .. Unable A Lot A Little None   1. Turning over in bed (including adjusting bedclothes, sheets and blankets)? [] 1   [] 2   [x] 3   [] 4   2. Sitting down on and standing up from a chair with arms ( e.g., wheelchair, bedside commode, etc.)   [] 1   [] 2   [x] 3   [] 4   3. Moving from lying on back to sitting on the side of the bed? [] 1   [] 2   [x] 3   [] 4   How much help from another person does the patient currently need. .. Total A Lot A Little None   4. Moving to and from a bed to a chair (including a wheelchair)? [] 1   [] 2   [x] 3   [] 4   5. Need to walk in hospital room? [] 1   [] 2   [x] 3   [] 4   6. Climbing 3-5 steps with a railing? [] 1   [x] 2   [] 3   [] 4   © , Trustees of [address] [place] [address], under license to [employer]. All rights reserved      Score:  Initial: 17 Most Recent: 17 (Date: 18 )    Interpretation of Tool:  Represents activities that are increasingly more difficult (i.e. Bed mobility, Transfers, Gait). Score 24 23 22-20 19-15 14-10 9-7 6     Modifier CH CI CJ CK CL CM CN      ?  Mobility - Walking and Moving Around:     - CURRENT STATUS: CK - 40%-59% impaired, limited or restricted    - GOAL STATUS: CJ - 20%-39% impaired, limited or restricted    - D/C STATUS:  ---------------To be determined---------------  Payor: BLUE CROSS / Plan: [place] BL[place] [facility] / Product Type: PPO /      Medical Necessity:     · Patient demonstrates good rehab potential due to higher previous functional level. Reason for Services/Other Comments:  · Patient continues to require skilled intervention due to decreased functional mobility and balance/gait status from baseline. .   Use of outcome tool(s) and clinical judgement create a POC that gives a: Clear prediction of patient's progress: LOW COMPLEXITY            TREATMENT:   (In addition to Assessment/Re-Assessment sessions the following treatments were rendered)   Pre-treatment Symptoms/Complaints:  No complaints  Pain: Initial:   Pain Intensity 1: 8  Pain Location 1: Hip, Abdomen  Pain Orientation 1: Right  Pain Intervention(s) 1: Ambulation/Increased Activity, Emotional support, Position, Repositioned, Rest  Post Session:  0/10 visually post treatment     Therapeutic Activity: (    15 Minutes): Therapeutic activities including bed mobility training, transfer training, static/dynamic standing balance training, ambulation on level ground, and patient education to improve mobility, strength and balance. Required moderate Safety awareness training; Tactile cues; Verbal cues to promote static and dynamic balance in standing, promote coordination of bilateral, lower extremity(s) and to promote improved sequencing with rolling walker. Braces/Orthotics/Lines/Etc:   · IV  Treatment/Session Assessment:    · Response to Treatment:  See above.   · Interdisciplinary Collaboration:   o Physical Therapist  o Physical Therapy Assistant  o Registered Nurse  o   o Certified Nursing Assistant/Patient Care Technician  · After treatment position/precautions:   o Supine in bed  o Bed alarm/tab alert on  o Bed/Chair-wheels locked  o Call light within reach  o RN notified  o Family at bedside  o Nurse at bedside  o Needs met; patient in NAD; PCT at bedside; positioned to comfort   · Compliance with Program/Exercises: Will assess as treatment progresses. · Recommendations/Intent for next treatment session: \"Next visit will focus on advancements to more challenging activities and reduction in assistance provided\".     Total Treatment Duration:  PT Patient Time In/Time Out  Time In: 1530  Time Out: 4400 [facility]

## 2018-03-01 NOTE — PROGRESS NOTES
Spoke with Dr. Sina Mederos about pt condition; Dr. Sina Mederos states to give 10 units per sliding scale, recheck BS in one hour; if elevated give an extra dose of Humalog per sliding scale; will continue to monitor.

## 2018-03-01 NOTE — PROGRESS NOTES
Problem: Falls - Risk of  Goal: *Absence of Falls  Document Az Fall Risk and appropriate interventions in the flowsheet.    Outcome: Progressing Towards Goal  Fall Risk Interventions:  Mobility Interventions: Bed/chair exit alarm, Communicate number of staff needed for ambulation/transfer, OT consult for ADLs, Patient to call before getting OOB, PT Consult for mobility concerns, Strengthening exercises (ROM-active/passive), PT Consult for assist device competence, Utilize walker, cane, or other assitive device, Utilize gait belt for transfers/ambulation         Medication Interventions: Assess postural VS orthostatic hypotension, Bed/chair exit alarm, Evaluate medications/consider consulting pharmacy, Patient to call before getting OOB, Teach patient to arise slowly, Utilize gait belt for transfers/ambulation    Elimination Interventions: Bed/chair exit alarm, Call light in reach, Toileting schedule/hourly rounds, Elevated toilet seat, Toilet paper/wipes in reach, Patient to call for help with toileting needs    History of Falls Interventions: Bed/chair exit alarm, Consult care management for discharge planning, Door open when patient unattended, Evaluate medications/consider consulting pharmacy, Investigate reason for fall, Room close to nurse's station, Utilize gait belt for transfer/ambulation

## 2018-03-02 ENCOUNTER — APPOINTMENT (OUTPATIENT)
Dept: GENERAL RADIOLOGY | Age: 58
DRG: 482 | End: 2018-03-02
Attending: NURSE PRACTITIONER
Payer: COMMERCIAL

## 2018-03-02 LAB
ABO + RH BLD: NORMAL
BASOPHILS # BLD: 0 K/UL (ref 0–0.2)
BASOPHILS NFR BLD: 0 % (ref 0–2)
BLD PROD TYP BPU: NORMAL
BLOOD GROUP ANTIBODIES SERPL: NORMAL
BPU ID: NORMAL
CROSSMATCH RESULT,%XM: NORMAL
DIFFERENTIAL METHOD BLD: ABNORMAL
EOSINOPHIL # BLD: 0.5 K/UL (ref 0–0.8)
EOSINOPHIL NFR BLD: 6 % (ref 0.5–7.8)
ERYTHROCYTE [DISTWIDTH] IN BLOOD BY AUTOMATED COUNT: 13.2 % (ref 11.9–14.6)
GLUCOSE BLD STRIP.AUTO-MCNC: 115 MG/DL (ref 65–100)
GLUCOSE BLD STRIP.AUTO-MCNC: 131 MG/DL (ref 65–100)
GLUCOSE BLD STRIP.AUTO-MCNC: 163 MG/DL (ref 65–100)
GLUCOSE BLD STRIP.AUTO-MCNC: 183 MG/DL (ref 65–100)
GLUCOSE BLD STRIP.AUTO-MCNC: 188 MG/DL (ref 65–100)
HCT VFR BLD AUTO: 29.4 % (ref 35.8–46.3)
HGB BLD-MCNC: 10.2 G/DL (ref 11.7–15.4)
IMM GRANULOCYTES # BLD: 0 K/UL (ref 0–0.5)
IMM GRANULOCYTES NFR BLD AUTO: 0 % (ref 0–5)
LYMPHOCYTES # BLD: 1.6 K/UL (ref 0.5–4.6)
LYMPHOCYTES NFR BLD: 18 % (ref 13–44)
MCH RBC QN AUTO: 29.6 PG (ref 26.1–32.9)
MCHC RBC AUTO-ENTMCNC: 34.7 G/DL (ref 31.4–35)
MCV RBC AUTO: 85.2 FL (ref 79.6–97.8)
MONOCYTES # BLD: 0.5 K/UL (ref 0.1–1.3)
MONOCYTES NFR BLD: 6 % (ref 4–12)
NEUTS SEG # BLD: 5.9 K/UL (ref 1.7–8.2)
NEUTS SEG NFR BLD: 70 % (ref 43–78)
PLATELET # BLD AUTO: 242 K/UL (ref 150–450)
PMV BLD AUTO: 10.7 FL (ref 10.8–14.1)
RBC # BLD AUTO: 3.45 M/UL (ref 4.05–5.25)
SPECIMEN EXP DATE BLD: NORMAL
STATUS OF UNIT,%ST: NORMAL
UNIT DIVISION, %UDIV: 0
WBC # BLD AUTO: 8.6 K/UL (ref 4.3–11.1)

## 2018-03-02 PROCEDURE — 97150 GROUP THERAPEUTIC PROCEDURES: CPT

## 2018-03-02 PROCEDURE — 74011250637 HC RX REV CODE- 250/637: Performed by: NURSE PRACTITIONER

## 2018-03-02 PROCEDURE — 97530 THERAPEUTIC ACTIVITIES: CPT

## 2018-03-02 PROCEDURE — 97110 THERAPEUTIC EXERCISES: CPT

## 2018-03-02 PROCEDURE — 36415 COLL VENOUS BLD VENIPUNCTURE: CPT | Performed by: NURSE PRACTITIONER

## 2018-03-02 PROCEDURE — 74011250636 HC RX REV CODE- 250/636: Performed by: NURSE PRACTITIONER

## 2018-03-02 PROCEDURE — 74011250637 HC RX REV CODE- 250/637: Performed by: ORTHOPAEDIC SURGERY

## 2018-03-02 PROCEDURE — 97535 SELF CARE MNGMENT TRAINING: CPT

## 2018-03-02 PROCEDURE — 82962 GLUCOSE BLOOD TEST: CPT

## 2018-03-02 PROCEDURE — 74011636637 HC RX REV CODE- 636/637: Performed by: HOSPITALIST

## 2018-03-02 PROCEDURE — 65270000029 HC RM PRIVATE

## 2018-03-02 PROCEDURE — 85025 COMPLETE CBC W/AUTO DIFF WBC: CPT | Performed by: NURSE PRACTITIONER

## 2018-03-02 PROCEDURE — 73502 X-RAY EXAM HIP UNI 2-3 VIEWS: CPT

## 2018-03-02 PROCEDURE — 73552 X-RAY EXAM OF FEMUR 2/>: CPT

## 2018-03-02 RX ADMIN — CALCIUM CARBONATE 500 MG (1,250 MG)-VITAMIN D3 200 UNIT TABLET 1 TABLET: at 12:19

## 2018-03-02 RX ADMIN — TRAMADOL HYDROCHLORIDE 50 MG: 50 TABLET, FILM COATED ORAL at 05:27

## 2018-03-02 RX ADMIN — OXYCODONE HYDROCHLORIDE 10 MG: 5 TABLET ORAL at 20:30

## 2018-03-02 RX ADMIN — ACETAMINOPHEN 650 MG: 325 TABLET ORAL at 14:23

## 2018-03-02 RX ADMIN — INSULIN LISPRO 2 UNITS: 100 INJECTION, SOLUTION INTRAVENOUS; SUBCUTANEOUS at 22:21

## 2018-03-02 RX ADMIN — ACETAMINOPHEN 650 MG: 325 TABLET ORAL at 20:30

## 2018-03-02 RX ADMIN — Medication 10 ML: at 20:30

## 2018-03-02 RX ADMIN — DOCUSATE SODIUM 100 MG: 100 CAPSULE, LIQUID FILLED ORAL at 10:04

## 2018-03-02 RX ADMIN — INSULIN LISPRO 2 UNITS: 100 INJECTION, SOLUTION INTRAVENOUS; SUBCUTANEOUS at 12:20

## 2018-03-02 RX ADMIN — SODIUM CHLORIDE, SODIUM LACTATE, POTASSIUM CHLORIDE, AND CALCIUM CHLORIDE 75 ML/HR: 600; 310; 30; 20 INJECTION, SOLUTION INTRAVENOUS at 12:20

## 2018-03-02 RX ADMIN — Medication 10 ML: at 05:27

## 2018-03-02 RX ADMIN — DOCUSATE SODIUM 100 MG: 100 CAPSULE, LIQUID FILLED ORAL at 16:39

## 2018-03-02 RX ADMIN — ONDANSETRON 4 MG: 2 INJECTION INTRAMUSCULAR; INTRAVENOUS at 15:59

## 2018-03-02 RX ADMIN — LISINOPRIL 10 MG: 5 TABLET ORAL at 10:03

## 2018-03-02 RX ADMIN — CALCIUM CARBONATE 500 MG (1,250 MG)-VITAMIN D3 200 UNIT TABLET 1 TABLET: at 16:39

## 2018-03-02 RX ADMIN — CALCIUM CARBONATE 500 MG (1,250 MG)-VITAMIN D3 200 UNIT TABLET 1 TABLET: at 10:04

## 2018-03-02 RX ADMIN — ACETAMINOPHEN 650 MG: 325 TABLET ORAL at 05:26

## 2018-03-02 RX ADMIN — INSULIN LISPRO 2 UNITS: 100 INJECTION, SOLUTION INTRAVENOUS; SUBCUTANEOUS at 16:40

## 2018-03-02 RX ADMIN — ENOXAPARIN SODIUM 30 MG: 30 INJECTION SUBCUTANEOUS at 10:04

## 2018-03-02 RX ADMIN — OXYCODONE HYDROCHLORIDE 10 MG: 5 TABLET ORAL at 10:20

## 2018-03-02 NOTE — PROGRESS NOTES
Problem: Mobility Impaired (Adult and Pediatric)  Goal: *Acute Goals and Plan of Care (Insert Text)  STG:  (1.)Ms. Christie Duran will perform bed mobility with CONTACT GUARD ASSIST within 3 treatment day(s). (2.)Ms. Christie Duran will transfer from bed to chair and chair to bed with CONTACT GUARD ASSIST using the RW within 3 treatment day(s). (3.)Ms. Christie Duran will ambulate with MINIMAL ASSIST for 50+ feet with RW within 3 treatment day(s). Goal met 3/2/18  (4.)Ms. Christie Duran will tolerate 25+ minutes of therapeutic activity/exercise while maintaining stable vitals to improve functional strength and mobility within 3 day(s). LTG:  (1.)Ms. Christie Duran will perform bed mobility with STAND BY ASSISTANCE within 7 treatment day(s). (2.)Ms. Christie Duran will transfer from bed to chair and chair to bed with STAND BY ASSIST using RWwithin 7 treatment day(s). (3.)Ms. Christie Duran will ambulate with CONTACT GUARD ASSIST for 150+ feet with RW within 7 treatment day(s). (4.)Ms. Christie Duran will demonstrate good dynamic standing balance utilizing RW within 7 day(s). ________________________________________________________________________________________________    PHYSICAL THERAPY: Daily Note, Treatment Day: 1st, PM 3/2/2018  INPATIENT: Hospital Day: 8  Payor: BLUE CROSS / [place] [facility] / Product Type: PPO /      WBAT R LE     NAME/AGE/GENDER: Titus Mendez is a 62 y.o. female   PRIMARY DIAGNOSIS: Closed displaced [facility]rochanteric fracture of right [facility] [S72.21XA] [facility]tro[facility]ter[facility] Intertrochanteric fracture of right femur (HCC)  Procedure(s) (LRB):  ORIF RIGHT SUBTROCHANTERIC FEMUR FRACTURE WITH INSERTION INTRA MEDULLARY NAIL  (Right)  2 Days Post-Op  ICD-10: Treatment Diagnosis:   · Difficulty in walking, Not elsewhere classified (R26.2)  · History of falling (Z91.81)   Precaution/Allergies:  Review of patient's allergies indicates no known allergies. ASSESSMENT:     Ms. Shannan Alcazar is a 62year old female 1 day s/p IM nailing of right subtrochanteric femur fracture and is WBAT R LE. Patient presents sitting up in bedside chair agreeable to therapy. Patient performed scooting with supervision,  transfers sit to stand with CGA, and ambulation x50 feet. Demonstrated a slow, shuffled, step-to gait pattern with decreased step clearance on right and fair dynamic balance throughout. Patient reminded of WBAT status as she is avoiding weight bearing onto the RLE. Patient returned to the recliner after gait but did attend the group exercises class. Tolerated well however the patient required assistance with the RLE. At the end of the treatment the patient was  positioned for  comfort and needs met. Gait distance goal met. Will continue with stated plan of care and PT efforts. This section established at most recent assessment   PROBLEM LIST (Impairments causing functional limitations):  1. Decreased Strength  2. Decreased ADL/Functional Activities  3. Decreased Transfer Abilities  4. Decreased Ambulation Ability/Technique  5. Decreased Balance  6. Increased Pain  7. Decreased Activity Tolerance  8. Increased Shortness of Breath  9. Decreased Flexibility/Joint Mobility  10. Decreased Knowledge of Precautions   INTERVENTIONS PLANNED: (Benefits and precautions of physical therapy have been discussed with the patient.)  1. Balance Exercise  2. Bed Mobility  3. Family Education  4. Gait Training  5. Home Exercise Program (HEP)  6. Range of Motion (ROM)  7. Therapeutic Activites  8. Therapeutic Exercise/Strengthening  9. Transfer Training  10.  Group Therapy     TREATMENT PLAN: Frequency/Duration: twice daily for duration of hospital stay  Rehabilitation Potential For Stated Goals: GOOD     RECOMMENDED REHABILITATION/EQUIPMENT: (at time of discharge pending progress): Due to the probability of continued deficits (see above) this patient will likely need continued skilled physical therapy after discharge. Equipment:    TBD              HISTORY:   History of Present Injury/Illness (Reason for Referral):  S/p right IM nailing; WBAT R LE  Past Medical History/Comorbidities:   Ms. Radha Go  has no past medical history on file. Ms. Radha Go  has no past surgical history on file. Social History/Living Environment:   Home Environment: Private residence  # Steps to Enter: 5  Rails to Enter: No  Wheelchair Ramp: No  One/Two Story Residence: One story  Living Alone: Yes  Support Systems: Family member(s)  Patient Expects to be Discharged to[de-identified] Rehabilitation facility  Current DME Used/Available at Home: None  Prior Level of Function/Work/Activity:  Patient lives alone in a single story residence with 5 steps to enter. At baseline, patient is an independent, community level ambulator, working as a CNA at [employer], and driving. Number of Personal Factors/Comorbidities that affect the Plan of Care: 1-2: MODERATE COMPLEXITY   EXAMINATION:   Most Recent Physical Functioning:   Gross Assessment:                  Posture:     Balance:  Sitting: Intact  Sitting - Static: Good (unsupported)  Sitting - Dynamic: Fair (occasional)  Standing: Intact  Standing - Static: Fair  Standing - Dynamic : Fair Bed Mobility:     Wheelchair Mobility:     Transfers:  Sit to Stand: Contact guard assistance  Stand to Sit: Contact guard assistance  Gait:     Speed/Yadi: Slow  Step Length: Right shortened  Distance (ft): 50 Feet (ft)  Assistive Device: Walker, rolling      Body Structures Involved:  1. Bones  2. Joints  3. Muscles  4. Ligaments Body Functions Affected:  1. Neuromusculoskeletal  2. Movement Related Activities and Participation Affected:  1. Mobility  2. Self Care  3.  Domestic Life   Number of elements that affect the Plan of Care: 4+: HIGH COMPLEXITY   CLINICAL PRESENTATION:   Presentation: Stable and uncomplicated: LOW COMPLEXITY   CLINICAL DECISION MAKING:   [employer] AM-PAC [employer] Clicks   Basic Mobility Inpatient Short Form  How much difficulty does the patient currently have. .. Unable A Lot A Little None   1. Turning over in bed (including adjusting bedclothes, sheets and blankets)? [] 1   [] 2   [x] 3   [] 4   2. Sitting down on and standing up from a chair with arms ( e.g., wheelchair, bedside commode, etc.)   [] 1   [] 2   [x] 3   [] 4   3. Moving from lying on back to sitting on the side of the bed? [] 1   [] 2   [x] 3   [] 4   How much help from another person does the patient currently need. .. Total A Lot A Little None   4. Moving to and from a bed to a chair (including a wheelchair)? [] 1   [] 2   [x] 3   [] 4   5. Need to walk in hospital room? [] 1   [] 2   [x] 3   [] 4   6. Climbing 3-5 steps with a railing? [] 1   [x] 2   [] 3   [] 4   © 2007, Trustees of [address] [place] [address] [address] [address], under license to [employer]. All rights reserved      Score:  Initial: 17 Most Recent: 17 (Date: 2/28/18 )    Interpretation of Tool:  Represents activities that are increasingly more difficult (i.e. Bed mobility, Transfers, Gait). Score 24 23 22-20 19-15 14-10 9-7 6     Modifier CH CI CJ CK CL CM CN      ? Mobility - Walking and Moving Around:     - CURRENT STATUS: CK - 40%-59% impaired, limited or restricted    - GOAL STATUS: CJ - 20%-39% impaired, limited or restricted    - D/C STATUS:  ---------------To be determined---------------  Payor: BLUE CROSS / Plan: [facility] [employer] / Product Type: PPO /      Medical Necessity:     · Patient demonstrates good rehab potential due to higher previous functional level. Reason for Services/Other Comments:  · Patient continues to require skilled intervention due to decreased functional mobility and balance/gait status from baseline. .   Use of outcome tool(s) and clinical judgement create a POC that gives a: Clear prediction of patient's progress: LOW COMPLEXITY            TREATMENT:   (In addition to Assessment/Re-Assessment sessions the following treatments were rendered)   Pre-treatment Symptoms/Complaints:  \"OK\"  Pain: Initial: 0/10     Post Session:  0/10      Therapeutic Activity: (    29 minutes Minutes): Therapeutic activities including bed mobility training, transfer training, static/dynamic standing balance training, ambulation on level ground, and patient education to improve mobility, strength and balance. Required contact  Guard assist    to promote static and dynamic balance in standing, promote coordination of bilateral, lower extremity(s) and to promote improved sequencing with rolling walker. Therapeutic Group Exercise: ( 10 minutes):  Exercises per grid below to improve mobility, strength, balance and coordination. Required min assist with  verbal and tactile cues to promote proper body alignment and promote proper body breathing techniques. Progressed repetitions and complexity of movement as indicated. Date:  3/2/18 Date:   Date:     ACTIVITY/EXERCISE AM PM AM PM AM PM   Ankle pumps 20        abduction 20        Hip flexion  20        LAQ 20                                   B = bilateral; AA = active assistive; A = active; P = passive  Braces/Orthotics/Lines/Etc:   · IV  Treatment/Session Assessment:    · Response to Treatment:  See above. · Interdisciplinary Collaboration:   o Physical Therapist  o Physical Therapy Assistant  o Registered Nurse  o   o Certified Nursing Assistant/Patient Care Technician  · After treatment position/precautions:   o Supine in bed  o Bed alarm/tab alert on  o Bed/Chair-wheels locked  o Call light within reach  o RN notified  o Family at bedside  o Nurse at bedside  o Needs met; patient in NAD; PCT at bedside; positioned to comfort   · Compliance with Program/Exercises: Will assess as treatment progresses. · Recommendations/Intent for next treatment session:   \"Next visit will focus on advancements to more challenging activities and reduction in assistance provided\".     Total Treatment Duration:  PT Patient Time In/Time Out  Time In: 0931 (11:40)  Time Out: 1000 (10:50)    Iliana Mcdaniels-Michael, PTA

## 2018-03-02 NOTE — PROGRESS NOTES
Bed offers from [address] [place] [address], spoke with sister Johnny Mani and she has agreed to [facility] and CM has asked admission staff to start precert.

## 2018-03-02 NOTE — PROGRESS NOTES
ORTH FRACTURE PROGRESS NOTE    2018  Admit Date:   2018    Post Op day: 1 Days Post-Op    Subjective:    [address]; C/O OF EXPECTED PAIN     PT/OT:   Gait:  Gait  Base of Support: Center of gravity altered, Shift to left  Speed/Yadi: Slow, Shuffled  Step Length: Right shortened, Left shortened  Swing Pattern: Right asymmetrical  Stance: Left increased, Right decreased  Gait Abnormalities: Decreased step clearance, Shuffling gait, Step to gait  Ambulation - Level of Assistance: Contact guard assistance  Distance (ft): 3 Feet (ft)  Assistive Device: Walker, rolling  Interventions: Safety awareness training, Tactile cues, Verbal cues            Interventions: Safety awareness training, Tactile cues, Verbal cues    Vital Signs:    Patient Vitals for the past 8 hrs:   BP Temp Pulse Resp SpO2   18 0715 147/83 97.9 °F (36.6 °C) 97 18 97 %   18 0501 149/84 99.4 °F (37.4 °C) (!) 103 18 95 %   18 0033 123/82 99 °F (37.2 °C) 93 17 93 %     Temp (24hrs), Av.1 °F (37.3 °C), Min:97.9 °F (36.6 °C), Max:99.5 °F (37.5 °C)      Pain Control:   Pain Assessment  Pain Scale 1: Numeric (0 - 10)  Pain Intensity 1: 7  Pain Onset 1: post op  Pain Location 1: Hip  Pain Orientation 1: Right  Pain Description 1: Aching  Pain Intervention(s) 1: Medication (see MAR)    Meds:    Current Facility-Administered Medications   Medication Dose Route Frequency    0.9% sodium chloride infusion 250 mL  250 mL IntraVENous PRN    lactated Ringers infusion  75 mL/hr IntraVENous CONTINUOUS    sodium chloride (NS) flush 5-10 mL  5-10 mL IntraVENous Q8H    sodium chloride (NS) flush 5-10 mL  5-10 mL IntraVENous PRN    acetaminophen (TYLENOL) tablet 650 mg  650 mg Oral Q8H    ondansetron (ZOFRAN) injection 4 mg  4 mg IntraVENous Q4H PRN    docusate sodium (COLACE) capsule 100 mg  100 mg Oral BID    alum-mag hydroxide-simeth (MYLANTA) oral suspension 30 mL  30 mL Oral Q4H PRN    calcium-vitamin D (OS-MONICA) 500 mg-200 unit tablet  1 Tab Oral TID WITH MEALS    enoxaparin (LOVENOX) injection 30 mg  30 mg SubCUTAneous Q24H    oxyCODONE IR (ROXICODONE) tablet 5-10 mg  5-10 mg Oral Q4H PRN    dextrose 40% (GLUTOSE) oral gel 1 Tube  15 g Oral PRN    glucagon (GLUCAGEN) injection 1 mg  1 mg IntraMUSCular PRN    dextrose (D50W) injection syrg 12.5-25 g  25-50 mL IntraVENous PRN    insulin lispro (HUMALOG) injection   SubCUTAneous AC&HS    traMADol (ULTRAM) tablet 50 mg  50 mg Oral Q6H PRN    lisinopril (PRINIVIL, ZESTRIL) tablet 10 mg  10 mg Oral DAILY       LAB:    Recent Labs      03/02/18   0543   HCT  29.4*   HGB  10.2*       24 Hour Assessment Issues:    Oriented    Discharge Planning: HOME VS SNF    Transfuse PRBC's:      Assessment & Physician's Comment:  Dressing is clean, dry, and intact  Neurovascular checks within normal limits    Principal Problem:    Intertrochanteric fracture of right femur ([facility] [address] (2/24/2018)    Active Problems:    Hyperglycemia due to type 2 diabetes mellitus ([address] (2/24/2018)      Essential hypertension (2/28/2018)        Plan:  CONTINUE THERAPY   NWB   DC TO SNF VS HOME FRIDAY      Clarisa Rodriguez NP

## 2018-03-02 NOTE — PROGRESS NOTES
Problem: Mobility Impaired (Adult and Pediatric)  Goal: *Acute Goals and Plan of Care (Insert Text)  STG:  (1.)Ms. Padmini Garcia will perform bed mobility with CONTACT GUARD ASSIST within 3 treatment day(s). (2.)Ms. Padmini Garcia will transfer from bed to chair and chair to bed with CONTACT GUARD ASSIST using the RW within 3 treatment day(s). (3.)Ms. Padmini Garcia will ambulate with MINIMAL ASSIST for 50+ feet with RW within 3 treatment day(s). Goal met 3/2/18  (4.)Ms. Padmini Garcia will tolerate 25+ minutes of therapeutic activity/exercise while maintaining stable vitals to improve functional strength and mobility within 3 day(s). LTG:  (1.)Ms. Padmini Garcia will perform bed mobility with STAND BY ASSISTANCE within 7 treatment day(s). (2.)Ms. Padmini Garcia will transfer from bed to chair and chair to bed with STAND BY ASSIST using RWwithin 7 treatment day(s). (3.)Ms. Padmini Garcia will ambulate with CONTACT GUARD ASSIST for 150+ feet with RW within 7 treatment day(s). (4.)Ms. Padmini Garcia will demonstrate good dynamic standing balance utilizing RW within 7 day(s). ________________________________________________________________________________________________    PHYSICAL THERAPY: Daily Note, Treatment Day: 1st, PM 3/2/2018  INPATIENT: Hospital Day: 8  Payor[place]UE CR[place] / [place] [facility] / Product Type: PPO /      WBAT R LE     NAME/AGE/GENDER: Sarai Anglin is a 62 y.o. female   PRIMARY DIAGNOSIS: Closed displaced subtrochanteric fracture of right femur, initial encounter ([facility] [S72.21XA] Intertrochanteric fracture of right femur ([facility] Intertrochanteric fracture of right femur (HCC)  Procedure(s) (LRB):  ORIF RIGHT SUBTROCHANTERIC FEMUR FRACTURE WITH INSERTION INTRA MEDULLARY NAIL  (Right)  2 Days Post-Op  ICD-10: Treatment Diagnosis:   · Difficulty in walking, Not elsewhere classified (R26.2)  · History of falling (Z91.81)   Precaution/Allergies:  Review of patient's allergies indicates no known allergies. ASSESSMENT:     Ms. Shelli Miller is a 62year old female 1 day s/p IM nailing of right subtrochanteric femur fracture and is WBAT R LE. Patient presents sitting up in bedside chair agreeable to therapy. Patient is in tears and clearly upset. RN and  needed to assist with situation. Patient performed scooting to the edge of the chair with supervision,  transfers sit to stand with CGA/SBA. Ambulation distance increased. Demonstrated a slow, shuffled, step-to gait pattern with decreased step clearance on right and fair dynamic balance throughout. Patient appears to be increased weight bearing onto the RLE this pm.    IV intact. Patient returned to the recliner after gait and   positioned for  comfort and needs within reach. Patient is pleasant and cooperative and doing well even though mobility is slow. She has great potential to progress. Will continue with stated plan of care and PT efforts. This section established at most recent assessment   PROBLEM LIST (Impairments causing functional limitations):  1. Decreased Strength  2. Decreased ADL/Functional Activities  3. Decreased Transfer Abilities  4. Decreased Ambulation Ability/Technique  5. Decreased Balance  6. Increased Pain  7. Decreased Activity Tolerance  8. Increased Shortness of Breath  9. Decreased Flexibility/Joint Mobility  10. Decreased Knowledge of Precautions   INTERVENTIONS PLANNED: (Benefits and precautions of physical therapy have been discussed with the patient.)  1. Balance Exercise  2. Bed Mobility  3. Family Education  4. Gait Training  5. Home Exercise Program (HEP)  6. Range of Motion (ROM)  7. Therapeutic Activites  8. Therapeutic Exercise/Strengthening  9. Transfer Training  10.  Group Therapy     TREATMENT PLAN: Frequency/Duration: twice daily for duration of hospital stay  Rehabilitation Potential For Stated Goals: GOOD     RECOMMENDED REHABILITATION/EQUIPMENT: (at time of discharge pending progress): Due to the probability of continued deficits (see above) this patient will likely need continued skilled physical therapy after discharge. Equipment:    TBD              HISTORY:   History of Present Injury/Illness (Reason for Referral):  S/p right IM nailing; WBAT R LE  Past Medical History/Comorbidities:   Ms. Madhu Velasquez  has no past medical history on file. Ms. Madhu Velasquez  has no past surgical history on file. Social History/Living Environment:   Home Environment: Private residence  # Steps to Enter: 5  Rails to Enter: No  Wheelchair Ramp: No  One/Two Story Residence: One story  Living Alone: Yes  Support Systems: Family member(s)  Patient Expects to be Discharged to[de-identified] Rehabilitation facility  Current DME Used/Available at Home: None  Prior Level of Function/Work/Activity:  Patient lives alone in a single story residence with 5 steps to enter. At baseline, patient is an independent, community level ambulator, working as a CNA at [employer], and driving. Number of Personal Factors/Comorbidities that affect the Plan of Care: 1-2: MODERATE COMPLEXITY   EXAMINATION:   Most Recent Physical Functioning:   Gross Assessment:                  Posture:     Balance:  Sitting: Intact  Sitting - Static: Good (unsupported)  Sitting - Dynamic: Good (unsupported)  Standing: Intact  Standing - Static: Fair  Standing - Dynamic : Fair Bed Mobility:     Wheelchair Mobility:     Transfers:  Sit to Stand: Stand-by assistance  Stand to Sit: Stand-by assistance  Gait:     Speed/Yadi: Slow  Step Length: Right shortened  Distance (ft): 55 Feet (ft)  Assistive Device: Walker, rolling;Gait belt      Body Structures Involved:  1. Bones  2. Joints  3. Muscles  4. Ligaments Body Functions Affected:  1. Neuromusculoskeletal  2. Movement Related Activities and Participation Affected:  1. Mobility  2. Self Care  3.  Domestic Life   Number of elements that affect the Plan of Care: 4+: HIGH COMPLEXITY   CLINICAL PRESENTATION:   Presentation: Stable and uncomplicated: LOW COMPLEXITY   CLINICAL DECISION MAKIN[address] [place] [id number] AM-PAC 6 Clicks   Basic Mobility Inpatient Short Form  How much difficulty does the patient currently have. .. Unable A Lot A Little None   1. Turning over in bed (including adjusting bedclothes, sheets and blankets)? [] 1   [] 2   [x] 3   [] 4   2. Sitting down on and standing up from a chair with arms ( e.g., wheelchair, bedside commode, etc.)   [] 1   [] 2   [x] 3   [] 4   3. Moving from lying on back to sitting on the side of the bed? [] 1   [] 2   [x] 3   [] 4   How much help from another person does the patient currently need. .. Total A Lot A Little None   4. Moving to and from a bed to a chair (including a wheelchair)? [] 1   [] 2   [x] 3   [] 4   5. Need to walk in hospital room? [] 1   [] 2   [x] 3   [] 4   6. Climbing 3-5 steps with a railing? [] 1   [x] 2   [] 3   [] 4   © , Trustees of [address] [place] [address] [address] [address], under license to [employer]. All rights reserved      Score:  Initial: 17 Most Recent: 17 (Date: 18 )    Interpretation of Tool:  Represents activities that are increasingly more difficult (i.e. Bed mobility, Transfers, Gait). Score 24 23 22-20 19-15 14-10 9-7 6     Modifier CH CI CJ CK CL CM CN      ? Mobility - Walking and Moving Around:     - CURRENT STATUS: CK - 40%-59% impaired, limited or restricted    - GOAL STATUS: CJ - 20%-39% impaired, limited or restricted    - D/C STATUS:  ---------------To be determined---------------  Payor: BLUE CROSS / [place] [address] / Product Type: PPO /      Medical Necessity:     · Patient demonstrates good rehab potential due to higher previous functional level. Reason for Services/Other Comments:  · Patient continues to require skilled intervention due to decreased functional mobility and balance/gait status from baseline. .   Use of outcome tool(s) and clinical judgement create a POC that gives a: Clear prediction of patient's progress: LOW COMPLEXITY            TREATMENT:   (In addition to Assessment/Re-Assessment sessions the following treatments were rendered)   Pre-treatment Symptoms/Complaints:  \" I am ready\"  Pain: Initial: 0/10  Pain Intensity 1: 0  Post Session:  0/10      Therapeutic Activity: (    23 minutes Minutes): Therapeutic activities including bed mobility training, transfer training, static/dynamic standing balance training, ambulation on level ground, and patient education to improve mobility, strength and balance. Required contact guard assist to stand by assist    to promote static and dynamic balance in standing, promote coordination of bilateral, lower extremity(s) and to promote improved sequencing with rolling walker. Therapeutic Group Exercise: (  minutes):  Exercises per grid below to improve mobility, strength, balance and coordination. Required min assist with  verbal and tactile cues to promote proper body alignment and promote proper body breathing techniques. Progressed repetitions and complexity of movement as indicated. Date:  3/2/18 Date:   Date:     ACTIVITY/EXERCISE AM PM AM PM AM PM   Ankle pumps 20        abduction 20        Hip flexion  20        LAQ 20                                   B = bilateral; AA = active assistive; A = active; P = passive  Braces/Orthotics/Lines/Etc:   · IV  Treatment/Session Assessment:    · Response to Treatment:  See above. · Interdisciplinary Collaboration:   o Physical Therapy Assistant  o Registered Nurse  o   · After treatment position/precautions:   o Up in chair  o Bed/Chair-wheels locked  o Call light within reach  o RN notified  o Nurse at bedside  o    · Compliance with Program/Exercises: compliant  · Recommendations/Intent for next treatment session: \"Next visit will focus on advancements to more challenging activities and reduction in assistance provided\".     Total Treatment Duration:  PT Patient Time In/Time Out  Time In: 1310  Time Out: 1333    King Coy, PTA

## 2018-03-02 NOTE — PROGRESS NOTES
Problem: Falls - Risk of  Goal: *Absence of Falls  Document Az Fall Risk and appropriate interventions in the flowsheet.    Outcome: Progressing Towards Goal  Fall Risk Interventions:  Mobility Interventions: Bed/chair exit alarm, Communicate number of staff needed for ambulation/transfer, OT consult for ADLs, Patient to call before getting OOB, PT Consult for mobility concerns, PT Consult for assist device competence, Strengthening exercises (ROM-active/passive), Utilize walker, cane, or other assitive device, Utilize gait belt for transfers/ambulation         Medication Interventions: Assess postural VS orthostatic hypotension, Bed/chair exit alarm, Evaluate medications/consider consulting pharmacy, Patient to call before getting OOB, Teach patient to arise slowly, Utilize gait belt for transfers/ambulation    Elimination Interventions: Bed/chair exit alarm, Call light in reach, Elevated toilet seat, Patient to call for help with toileting needs, Toilet paper/wipes in reach, Toileting schedule/hourly rounds    History of Falls Interventions: Bed/chair exit alarm, Consult care management for discharge planning, Door open when patient unattended, Evaluate medications/consider consulting pharmacy, Investigate reason for fall, Room close to nurse's station, Utilize gait belt for transfer/ambulation

## 2018-03-02 NOTE — PROGRESS NOTES
Problem: Self Care Deficits Care Plan (Adult)  Goal: *Acute Goals and Plan of Care (Insert Text)  LTG 1: Pt will be S with toileting by 3/6/18 to prevent skin breakdown. LTG 2: Pt will be min A with LB dressing by 3/6/18 to reduce risk of falls. LTG 3: Pt will be SBA with bathing by 3/6/18 to promote good skin integrity. LTG 4: Pt will be SBA with toilet transfers by 3/6/18 to promote quality of life. LTG 5: Pt will be S with HEP by 3/6/18 to prevent deconditioning. OCCUPATIONAL THERAPY: Daily Note, Treatment Day: 1st, AM and PM 3/2/2018  INPATIENT: Hospital Day: 8  Payor[place]UE CR[place] / [place] [facility] / Product Type: PPO /      NAME/AGE/GENDER: Adeline Nagel is a 62 y.o. female   PRIMARY DIAGNOSIS:  Closed displaced subtrochanteric fracture of right femur, initial encounter ([facility] [S72.21XA] Intertrochanteric fracture of right femur ([facility] Intertrochanteric fracture of right femur (HCC)  Procedure(s) (LRB):  ORIF RIGHT SUBTROCHANTERIC FEMUR FRACTURE WITH INSERTION INTRA MEDULLARY NAIL  (Right)  2 Days Post-Op  ICD-10: Treatment Diagnosis:    · Generalized Muscle Weakness (M62.81)   Precautions/Allergies:     Review of patient's allergies indicates no known allergies. ASSESSMENT:   Ms. Nick Cuello was admitted for the above diagnosis. Pt was brought to therapy gym to participate in group exercises (in grid below) to increase UE strength and activity tolerance to perform mobility and ADLs. Pt required visual, verbal, and tactile cueing to complete exercises. Pt tolerated session well. Returned to room by recliner. Pt was later introduced to and practiced using adaptive equipment to assist with LB dressing. Pt did well using equipment. Pt assisted back to bed for XR. Progressing towards goals. Will continue to benefit from skilled OT during stay. This section established at most recent assessment   PROBLEM LIST (Impairments causing functional limitations):  1.  Decreased Strength  2. Decreased ADL/Functional Activities  3. Decreased Transfer Abilities  4. Decreased Ambulation Ability/Technique  5. Decreased Balance  6. Increased Pain  7. Decreased Flexibility/Joint Mobility   INTERVENTIONS PLANNED: (Benefits and precautions of occupational therapy have been discussed with the patient.)  1. Activities of daily living training  2. Group therapy  3. Therapeutic activity  4. Therapeutic exercise     TREATMENT PLAN: Frequency/Duration: Follow patient 3x to address above goals. Rehabilitation Potential For Stated Goals: Excellent     RECOMMENDED REHABILITATION/EQUIPMENT: (at time of discharge pending progress): Due to the probability of continued deficits (see above) this patient will likely need continued skilled occupational therapy after discharge. Equipment:    TBD              OCCUPATIONAL PROFILE AND HISTORY:   History of Present Injury/Illness (Reason for Referral):  Please see H&P  Past Medical History/Comorbidities:   Ms. Nick Cuello  has no past medical history on file. Ms. Nick Cuello  has no past surgical history on file. 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 Number of Personal Factors/Comorbidities that affect the Plan of Care: Expanded review of therapy/medical records (1-2):  MODERATE COMPLEXITY   ASSESSMENT OF OCCUPATIONAL PERFORMANCE[de-identified]   Activities of Daily Living:         Pt was I. Basic ADLs (From Assessment) Complex ADLs (From Assessment)   Basic ADL  Feeding: Independent  Oral Facial Hygiene/Grooming: Setup  Bathing:  Moderate assistance  Upper Body Dressing: Setup  Lower Body Dressing: Maximum assistance  Toileting: Moderate assistance     Grooming/Bathing/Dressing Activities of Daily Living     Cognitive Retraining  Safety/Judgement: Awareness of environment                     Lower Body Dressing Assistance  Socks: Minimum assistance  Leg Crossed Method Used: No  Position Performed: Seated in chair  Cues: Doff; Don  Adaptive Equipment Used: Reacher;Sock aid Bed/Mat Mobility  Sit to Supine: Moderate assistance  Sit to Stand: Stand-by assistance       Most Recent Physical Functioning:   Gross Assessment:                  Posture:  Posture (WDL): Within defined limits  Balance:  Sitting: Intact  Sitting - Static: Good (unsupported)  Sitting - Dynamic: Good (unsupported)  Standing: Intact  Standing - Static: Fair  Standing - Dynamic : Fair Bed Mobility:  Sit to Supine: Moderate assistance  Wheelchair Mobility:     Transfers:  Sit to Stand: Stand-by assistance  Stand to Sit: Stand-by assistance              Patient Vitals for the past 6 hrs:   BP BP Patient Position SpO2 Pulse   18 1055 133/80 At rest 95 % 98       Mental Status  Neurologic State: Alert, Appropriate for age  Orientation Level: Oriented X4  Cognition: Appropriate decision making, Follows commands  Perception: Appears intact  Perseveration: No perseveration noted  Safety/Judgement: Awareness of environment                          Physical Skills Involved:  1. Range of Motion  2. Balance  3. Strength  4. Gross Motor Control  5. Pain (acute) Cognitive Skills Affected (resulting in the inability to perform in a timely and safe manner):  1. N/A Psychosocial Skills Affected:  1. N/A   Number of elements that affect the Plan of Care: 5+:  HIGH COMPLEXITY   CLINICAL DECISION MAKIN [facility] [address] [address] AM-PAC 6 Clicks   Daily Activity Inpatient Short Form  How much help from another person does the patient currently need. .. Total A Lot A Little None   1. Putting on and taking off regular lower body clothing?    [x] 1   [] 2 [] 3   [] 4   2. Bathing (including washing, rinsing, drying)? [] 1   [x] 2   [] 3   [] 4   3. Toileting, which includes using toilet, bedpan or urinal?   [] 1   [x] 2   [] 3   [] 4   4. Putting on and taking off regular upper body clothing? [] 1   [] 2   [x] 3   [] 4   5. Taking care of personal grooming such as brushing teeth? [] 1   [] 2   [x] 3   [] 4   6. Eating meals? [] 1   [] 2   [] 3   [x] 4   © 2007, Trustees of [address] [place] [address] [address] [address], under license to [employer]. All rights reserved      Score:  Initial: 15 Most Recent: X (Date: -- )    Interpretation of Tool:  Represents activities that are increasingly more difficult (i.e. Bed mobility, Transfers, Gait). Score 24 23 22-20 19-15 14-10 9-7 6     Modifier CH CI CJ CK CL CM CN      ? Self Care:     - CURRENT STATUS: CK - 40%-59% impaired, limited or restricted    - GOAL STATUS: CJ - 20%-39% impaired, limited or restricted    - D/C STATUS:  ---------------To be determined---------------  Payor[place] BLUE CR[place] / [place] [facility] / Product Type: PPO /      Medical Necessity:     · Skilled intervention continues to be required due to decrease from PLOF. Reason for Services/Other Comments:  · Patient continues to require skilled intervention due to decreased mobility. Use of outcome tool(s) and clinical judgement create a POC that gives a: MODERATE COMPLEXITY         TREATMENT:   (In addition to Assessment/Re-Assessment sessions the following treatments were rendered)     Pre-treatment Symptoms/Complaints:  Pain in RLE  Pain: Initial:   Pain Intensity 1: 8  Pain Location 1: Hip  Pain Orientation 1: Right  Post Session:       Self Care: (10 minutes): Procedure(s) (per grid) utilized to improve and/or restore self-care/home management as related to dressing. Required minimal visual, verbal and tactile cueing to facilitate activities of daily living skills and compensatory activities.     Group Therapeutic Exercise: (10 minutes):  Exercises per grid below to improve mobility, strength and activity tolerance. Required minimal visual, verbal and tactile cues to promote proper body alignment and promote proper body mechanics. Progressed resistance and repetitions as indicated. UE Exercises Date:  3/2/2018 Date:   Date:     Activity/Exercise Parameters Parameters Parameters   Shoulder Abd/Adduction 20 reps with red theraband     Shoulder Flexion 20 reps with 4# dowel     Elbow Flexion 25 reps with 4# dowel     Punches 20 reps with 4# dowel     Shoulder Shrugs 10 reps     Rows (Forward and Backwards) 10 reps each way                 Braces/Orthotics/Lines/Etc:   · O2 Device: Room air  Treatment/Session Assessment:    · Response to Treatment:  Agreeable  · Interdisciplinary Collaboration:   o Certified Occupational Therapy Assistant  o Registered Nurse  · After treatment position/precautions:   o Up in chair  o Bed alarm/tab alert on  o Bed/Chair-wheels locked  o Bed in low position  o Call light within reach   · Compliance with Program/Exercises: Will assess as treatment progresses. · Recommendations/Intent for next treatment session: \"Next visit will focus on advancements to more challenging activities and reduction in assistance provided\".   Total Treatment Duration:  OT Patient Time In/Time Out  Time In: 1130 (9950)  Time Out: [phone])     Marilia Longoria

## 2018-03-02 NOTE — PROGRESS NOTES
Hospitalist Progress Note    3/2/2018  Admit Date: 2018 11:47 PM   NAME: Juani Gaitan   :  1960   MRN:  [id number]   Attending: Elliot Gayle MD  PCP:  None    SUBJECTIVE:   Per H/P:  '63 y/o female with hx diabetes was reportedly at a party or some other function. She was standing behind the DJ and went to sit in a chain but instead fell and landed on her right hip. No other injury reported. In ED xray reveals a comminuted intertrochanteric fracture. She has hx knee surgery and did not have any complications from anesthesia or bleeding. She is currently sedated from pain medications she received in ED. No reported hx cardiovascular or pulmonary disease. Her glucose is 314 without evidence of DKA or hyperosmolar coma. She used to be on insulin but her family members report she no longer takes it. Will admit for glucose control and surgical repair of hip.'    - seen with her daughter,  [facility], at bedside. She reports her R hip pain is controlled. Awaiting surgery for today. Denies new concerns. Blood sugars controlled. - POD 0 from R femur intra-medullary nail insertion. Seen with daughter, Lizzette Melara, and sister, Feliz Hewitt, at bedside. She reports she is in significant pain. Denies other concerns. 3/1- POD 1 for R femur intra-medullary nail insertion. Pain well controlled. No fever, chills, CP, abd pain or urinary symptoms. Had some nausea earlier which resolved with Zofran as per pt.    3/2- POD 2 for R femur intra-medullary nail insertion. Pain well controlled. No fever, chills, CP, abd pain or urinary symptoms. Worked with PT today with no issues.      Review of Systems negative with exception of pertinent positives noted above  PHYSICAL EXAM     Visit Vitals    BP (!) 176/96 (BP 1 Location: Right arm, BP Patient Position: At rest)    Pulse (!) 106    Temp 99.5 °F (37.5 °C)    Resp 16    Ht 5' 3\" (1.6 m)    Wt 79.4 kg (175 lb)    SpO2 95%    BMI 31 kg/m2      Temp (24hrs), Av.9 °F (37.2 °C), Min:97.9 °F (36.6 °C), Max:99.5 °F (37.5 °C)    Patient Vitals for the past 24 hrs:   Temp Pulse Resp BP SpO2   03/02/18 1600 99.5 °F (37.5 °C) (!) 106 16 (!) 176/96 95 %   03/02/18 1055 98.5 °F (36.9 °C) 98 16 133/80 95 %   03/02/18 0715 97.9 °F (36.6 °C) 97 18 147/83 97 %   03/02/18 0501 99.4 °F (37.4 °C) (!) 103 18 149/84 95 %   03/02/18 0033 99 °F (37.2 °C) 93 17 123/82 93 %   03/01/18 2011 99.1 °F (37.3 °C) 95 14 127/78 93 %       Oxygen Therapy  O2 Sat (%): 95 % (03/02/18 1600)  Pulse via Oximetry: 103 beats per minute (03/02/18 0501)  O2 Device: Room air (03/02/18 0501)  O2 Flow Rate (L/min): 3 l/min (02/28/18 0933)    Intake/Output Summary (Last 24 hours) at 03/02/18 1750  Last data filed at 03/02/18 1203   Gross per 24 hour   Intake             1820 ml   Output              450 ml   Net             1370 ml      General: NAD, obese    Lungs:  CTA Bilaterally.    Heart:  Regular rate and rhythm,  No murmur, rub, or gallop  Abdomen: Soft, Non distended, Non tender, Positive bowel sounds  Extremities: No cyanosis, clubbing or edema  Neurologic:  No focal deficits    Recent Results (from the past 24 hour(s))   GLUCOSE, POC    Collection Time: 03/01/18  5:54 PM   Result Value Ref Range    Glucose (POC) 117 (H) 65 - 100 mg/dL   GLUCOSE, POC    Collection Time: 03/01/18  8:52 PM   Result Value Ref Range    Glucose (POC) 98 65 - 100 mg/dL   GLUCOSE, POC    Collection Time: 03/02/18  2:12 AM   Result Value Ref Range    Glucose (POC) 115 (H) 65 - 100 mg/dL   CBC WITH AUTOMATED DIFF    Collection Time: 03/02/18  5:43 AM   Result Value Ref Range    WBC 8.6 4.3 - 11.1 K/uL    RBC 3.45 (L) 4.05 - 5.25 M/uL    HGB 10.2 (L) 11.7 - 15.4 g/dL    HCT 29.4 (L) 35.8 - 46.3 %    MCV 85.2 79.6 - 97.8 FL    MCH 29.6 26.1 - 32.9 PG    MCHC 34.7 31.4 - 35.0 g/dL    RDW 13.2 11.9 - 14.6 %    PLATELET 995 575 - 637 K/uL    MPV 10.7 (L) 10.8 - 14.1 FL    DF AUTOMATED      NEUTROPHILS 70 43 - 78 %    LYMPHOCYTES 18 13 - 44 % MONOCYTES 6 4.0 - 12.0 %    EOSINOPHILS 6 0.5 - 7.8 %    BASOPHILS 0 0.0 - 2.0 %    IMMATURE GRANULOCYTES 0 0.0 - 5.0 %    ABS. NEUTROPHILS 5.9 1.7 - 8.2 K/UL    ABS. LYMPHOCYTES 1.6 0.5 - 4.6 K/UL    ABS. MONOCYTES 0.5 0.1 - 1.3 K/UL    ABS. EOSINOPHILS 0.5 0.0 - 0.8 K/UL    ABS. BASOPHILS 0.0 0.0 - 0.2 K/UL    ABS. IMM. GRANS. 0.0 0.0 - 0.5 K/UL   GLUCOSE, POC    Collection Time: 03/02/18  8:04 AM   Result Value Ref Range    Glucose (POC) 131 (H) 65 - 100 mg/dL   GLUCOSE, POC    Collection Time: 03/02/18 12:03 PM   Result Value Ref Range    Glucose (POC) 188 (H) 65 - 100 mg/dL   GLUCOSE, POC    Collection Time: 03/02/18  4:09 PM   Result Value Ref Range    Glucose (POC) 183 (H) 65 - 100 mg/dL     Imaging:    XR HIP RT W OR WO PELV 2-3 VWS   Final Result   Impression: Unchanged postoperative findings as above. XR FEMUR RT 2 VS   Final Result   Impression: Unchanged postoperative findings as above. XR FEMUR RT 2 VS   Final Result   IMPRESSION: Surgical fixation of right hip fracture. XR FEMUR RT 2 VS   Final Result   IMPRESSION: Comminuted intertrochanteric fracture. The mid and distal femur are   intact. XR CHEST SNGL V   Final Result   IMPRESSION: Normal chest.             XR HIP RT W OR WO PELV 2-3 VWS   Final Result   IMPRESSION: Comminuted intertrochanteric fracture which extends of the proximal   femoral diaphysis.                NC XR TECHNOLOGIST SERVICE    (Results Pending)         ASSESSMENT      Hospital Problems as of 3/2/2018  Never Reviewed          Codes Class Noted - Resolved POA    Essential hypertension ICD-10-CM: I10  ICD-9-CM: 401.9  2/28/2018 - Present Unknown        * (Principal)Intertrochanteric fracture of right femur ([facility] [address] ICD-10-CM: W05.147Q  ICD-9-CM: 820.21  2/24/2018 - Present Yes        Hyperglycemia due to type 2 diabetes mellitus ([facility] [address] ICD-10-CM: E11.65  ICD-9-CM: 250.00  2/24/2018 - Present Yes            Plan:  · R femur fx- s/p ORIF 2/28.  Pain control per ortho. · T2DM- controlled. On SSI for now. · HTN- BP elevated and suspect this is pain related. If BP continues to be high when pain controlled, increase lisinopril. · Anemia- slight iron deficiency. Likely also related to blood loss from injury. Monitor and transfuse as needed. Dispo: Discussed with CM. Awaiting rehab approval.     DVT Prophylaxis: SCDs    Signed By: Aislinn Austin MD     March 2, 2018      .

## 2018-03-02 NOTE — PROGRESS NOTES
ORTH FRACTURE PROGRESS NOTE    2018  Admit Date:   2018    Post Op day: 2 Days Post-Op    Subjective:    Geovani Khan PATIENT DOING FINE     PT/OT:   Gait:  Gait  Base of Support: Center of gravity altered, Shift to left  Speed/Yadi: Slow  Step Length: Right shortened  Swing Pattern: Right asymmetrical  Stance: Left increased, Right decreased  Gait Abnormalities: Decreased step clearance, Shuffling gait, Step to gait  Ambulation - Level of Assistance: Contact guard assistance  Distance (ft): 50 Feet (ft)  Assistive Device: Walker, rolling  Interventions: Safety awareness training, Tactile cues, Verbal cues            Interventions: Safety awareness training, Tactile cues, Verbal cues    Vital Signs:    Patient Vitals for the past 8 hrs:   BP Temp Pulse Resp SpO2   18 1055 133/80 98.5 °F (36.9 °C) 98 16 95 %   18 0715 147/83 97.9 °F (36.6 °C) 97 18 97 %     Temp (24hrs), Av °F (37.2 °C), Min:97.9 °F (36.6 °C), Max:99.5 °F (37.5 °C)      Pain Control:   Pain Assessment  Pain Scale 1: Visual  Pain Intensity 1: 0  Pain Onset 1: post op  Pain Location 1: Hip  Pain Orientation 1: Right  Pain Description 1: Pressure  Pain Intervention(s) 1: Medication (see MAR)    Meds:    Current Facility-Administered Medications   Medication Dose Route Frequency    0.9% sodium chloride infusion 250 mL  250 mL IntraVENous PRN    lactated Ringers infusion  75 mL/hr IntraVENous CONTINUOUS    sodium chloride (NS) flush 5-10 mL  5-10 mL IntraVENous Q8H    sodium chloride (NS) flush 5-10 mL  5-10 mL IntraVENous PRN    acetaminophen (TYLENOL) tablet 650 mg  650 mg Oral Q8H    ondansetron (ZOFRAN) injection 4 mg  4 mg IntraVENous Q4H PRN    docusate sodium (COLACE) capsule 100 mg  100 mg Oral BID    alum-mag hydroxide-simeth (MYLANTA) oral suspension 30 mL  30 mL Oral Q4H PRN    calcium-vitamin D (OS-MONICA) 500 mg-200 unit tablet  1 Tab Oral TID WITH MEALS    enoxaparin (LOVENOX) injection 30 mg  30 mg SubCUTAneous Q24H    oxyCODONE IR (ROXICODONE) tablet 5-10 mg  5-10 mg Oral Q4H PRN    dextrose 40% (GLUTOSE) oral gel 1 Tube  15 g Oral PRN    glucagon (GLUCAGEN) injection 1 mg  1 mg IntraMUSCular PRN    dextrose (D50W) injection syrg 12.5-25 g  25-50 mL IntraVENous PRN    insulin lispro (HUMALOG) injection   SubCUTAneous AC&HS    traMADol (ULTRAM) tablet 50 mg  50 mg Oral Q6H PRN    lisinopril (PRINIVIL, ZESTRIL) tablet 10 mg  10 mg Oral DAILY       LAB:    Recent Labs      03/02/18   0543   HCT  29.4*   HGB  10.2*       24 Hour Assessment Issues:    Oriented    Discharge Planning: SNF VS HOJE    Transfuse PRBC's:      Assessment & Physician's Comment:  Dressing is clean, dry, and intact  Neurovascular checks within normal limits    Principal Problem:    Intertrochanteric fracture of right femur ([address] (2/24/2018)    Active Problems:    Hyperglycemia due to type 2 diabetes mellitus ([address] (2/24/2018)      Essential hypertension (2/28/2018)        Plan:  [address] Frank Reyes MD

## 2018-03-02 NOTE — DIABETES MGMT
Patient sitting up in chair conversing on phone. Patient reports feeling \"good \" this morning. Patient is upbeat. Blood glucose ranged  yesterday with patient receiving Humalog 14 units. Blood glucose 131 this morning. Patient restates that she knows how to give herself insulin injections and needs no education. Patient voiced understanding of the impact of uncontrolled diabetes on her health. Patient acknowledges she was not \"doing what she was supposed to do\" in regards to her diabetes. \" I understand that I need to do that now. \" Patient states her mother is on dialysis because of her diabetes. Noted patient was drinking a regular Pepsi. Encouraged compliance with discharge medication regimen, blood glucose monitoring, and to follow up with her PCP for further titration of regimen. Patient verbalized understanding of teaching and voices no questions. Patient will need prescription for glucometer supplies at discharge.

## 2018-03-03 LAB
ANION GAP SERPL CALC-SCNC: 9 MMOL/L (ref 7–16)
BASOPHILS # BLD: 0 K/UL (ref 0–0.2)
BASOPHILS NFR BLD: 1 % (ref 0–2)
BUN SERPL-MCNC: 7 MG/DL (ref 6–23)
CALCIUM SERPL-MCNC: 8.3 MG/DL (ref 8.3–10.4)
CHLORIDE SERPL-SCNC: 104 MMOL/L (ref 98–107)
CO2 SERPL-SCNC: 28 MMOL/L (ref 21–32)
CREAT SERPL-MCNC: 0.37 MG/DL (ref 0.6–1)
DIFFERENTIAL METHOD BLD: ABNORMAL
EOSINOPHIL # BLD: 0.4 K/UL (ref 0–0.8)
EOSINOPHIL NFR BLD: 7 % (ref 0.5–7.8)
ERYTHROCYTE [DISTWIDTH] IN BLOOD BY AUTOMATED COUNT: 13.3 % (ref 11.9–14.6)
GLUCOSE BLD STRIP.AUTO-MCNC: 131 MG/DL (ref 65–100)
GLUCOSE BLD STRIP.AUTO-MCNC: 149 MG/DL (ref 65–100)
GLUCOSE BLD STRIP.AUTO-MCNC: 162 MG/DL (ref 65–100)
GLUCOSE BLD STRIP.AUTO-MCNC: 165 MG/DL (ref 65–100)
GLUCOSE SERPL-MCNC: 125 MG/DL (ref 65–100)
HCT VFR BLD AUTO: 25.9 % (ref 35.8–46.3)
HGB BLD-MCNC: 9 G/DL (ref 11.7–15.4)
IMM GRANULOCYTES # BLD: 0 K/UL (ref 0–0.5)
IMM GRANULOCYTES NFR BLD AUTO: 0 % (ref 0–5)
LYMPHOCYTES # BLD: 1.9 K/UL (ref 0.5–4.6)
LYMPHOCYTES NFR BLD: 29 % (ref 13–44)
MCH RBC QN AUTO: 29.4 PG (ref 26.1–32.9)
MCHC RBC AUTO-ENTMCNC: 34.7 G/DL (ref 31.4–35)
MCV RBC AUTO: 84.6 FL (ref 79.6–97.8)
MONOCYTES # BLD: 0.5 K/UL (ref 0.1–1.3)
MONOCYTES NFR BLD: 8 % (ref 4–12)
NEUTS SEG # BLD: 3.6 K/UL (ref 1.7–8.2)
NEUTS SEG NFR BLD: 55 % (ref 43–78)
PLATELET # BLD AUTO: 253 K/UL (ref 150–450)
PMV BLD AUTO: 10.3 FL (ref 10.8–14.1)
POTASSIUM SERPL-SCNC: 3 MMOL/L (ref 3.5–5.1)
RBC # BLD AUTO: 3.06 M/UL (ref 4.05–5.25)
SODIUM SERPL-SCNC: 141 MMOL/L (ref 136–145)
WBC # BLD AUTO: 6.5 K/UL (ref 4.3–11.1)

## 2018-03-03 PROCEDURE — 74011250637 HC RX REV CODE- 250/637: Performed by: NURSE PRACTITIONER

## 2018-03-03 PROCEDURE — 85025 COMPLETE CBC W/AUTO DIFF WBC: CPT | Performed by: NURSE PRACTITIONER

## 2018-03-03 PROCEDURE — 74011636637 HC RX REV CODE- 636/637: Performed by: HOSPITALIST

## 2018-03-03 PROCEDURE — 80048 BASIC METABOLIC PNL TOTAL CA: CPT | Performed by: NURSE PRACTITIONER

## 2018-03-03 PROCEDURE — 82962 GLUCOSE BLOOD TEST: CPT

## 2018-03-03 PROCEDURE — 36415 COLL VENOUS BLD VENIPUNCTURE: CPT | Performed by: NURSE PRACTITIONER

## 2018-03-03 PROCEDURE — 74011250636 HC RX REV CODE- 250/636: Performed by: NURSE PRACTITIONER

## 2018-03-03 PROCEDURE — 74011250637 HC RX REV CODE- 250/637: Performed by: INTERNAL MEDICINE

## 2018-03-03 PROCEDURE — 65270000029 HC RM PRIVATE

## 2018-03-03 PROCEDURE — 97150 GROUP THERAPEUTIC PROCEDURES: CPT

## 2018-03-03 PROCEDURE — 97530 THERAPEUTIC ACTIVITIES: CPT

## 2018-03-03 RX ORDER — POTASSIUM CHLORIDE 20 MEQ/1
40 TABLET, EXTENDED RELEASE ORAL
Status: COMPLETED | OUTPATIENT
Start: 2018-03-03 | End: 2018-03-03

## 2018-03-03 RX ORDER — BISACODYL 5 MG
10 TABLET, DELAYED RELEASE (ENTERIC COATED) ORAL DAILY
Status: DISCONTINUED | OUTPATIENT
Start: 2018-03-03 | End: 2018-03-06 | Stop reason: HOSPADM

## 2018-03-03 RX ADMIN — INSULIN LISPRO 2 UNITS: 100 INJECTION, SOLUTION INTRAVENOUS; SUBCUTANEOUS at 13:07

## 2018-03-03 RX ADMIN — Medication 10 ML: at 23:01

## 2018-03-03 RX ADMIN — POTASSIUM CHLORIDE 40 MEQ: 20 TABLET, EXTENDED RELEASE ORAL at 09:15

## 2018-03-03 RX ADMIN — ACETAMINOPHEN 650 MG: 325 TABLET ORAL at 06:04

## 2018-03-03 RX ADMIN — BISACODYL 10 MG: 5 TABLET, COATED ORAL at 17:17

## 2018-03-03 RX ADMIN — DOCUSATE SODIUM 100 MG: 100 CAPSULE, LIQUID FILLED ORAL at 09:14

## 2018-03-03 RX ADMIN — OXYCODONE HYDROCHLORIDE 5 MG: 5 TABLET ORAL at 06:04

## 2018-03-03 RX ADMIN — Medication 10 ML: at 13:08

## 2018-03-03 RX ADMIN — Medication 10 ML: at 06:04

## 2018-03-03 RX ADMIN — ACETAMINOPHEN 650 MG: 325 TABLET ORAL at 23:00

## 2018-03-03 RX ADMIN — ACETAMINOPHEN 650 MG: 325 TABLET ORAL at 13:06

## 2018-03-03 RX ADMIN — CALCIUM CARBONATE 500 MG (1,250 MG)-VITAMIN D3 200 UNIT TABLET 1 TABLET: at 13:06

## 2018-03-03 RX ADMIN — CALCIUM CARBONATE 500 MG (1,250 MG)-VITAMIN D3 200 UNIT TABLET 1 TABLET: at 09:15

## 2018-03-03 RX ADMIN — OXYCODONE HYDROCHLORIDE 5 MG: 5 TABLET ORAL at 13:06

## 2018-03-03 RX ADMIN — DOCUSATE SODIUM 100 MG: 100 CAPSULE, LIQUID FILLED ORAL at 17:17

## 2018-03-03 RX ADMIN — OXYCODONE HYDROCHLORIDE 5 MG: 5 TABLET ORAL at 23:00

## 2018-03-03 RX ADMIN — INSULIN LISPRO 2 UNITS: 100 INJECTION, SOLUTION INTRAVENOUS; SUBCUTANEOUS at 17:18

## 2018-03-03 RX ADMIN — ENOXAPARIN SODIUM 30 MG: 30 INJECTION SUBCUTANEOUS at 09:19

## 2018-03-03 RX ADMIN — LISINOPRIL 10 MG: 5 TABLET ORAL at 09:15

## 2018-03-03 NOTE — PROGRESS NOTES
Problem: Mobility Impaired (Adult and Pediatric)  Goal: *Acute Goals and Plan of Care (Insert Text)  STG:  (1.)Ms. Mc Elmore will perform bed mobility with CONTACT GUARD ASSIST within 3 treatment day(s). (2.)Ms. Mc Elmore will transfer from bed to chair and chair to bed with CONTACT GUARD ASSIST using the RW within 3 treatment day(s). (3.)Ms. Mc Elmore will ambulate with MINIMAL ASSIST for 50+ feet with RW within 3 treatment day(s). Goal met 3/2/18  (4.)Ms. Mc Elmore will tolerate 25+ minutes of therapeutic activity/exercise while maintaining stable vitals to improve functional strength and mobility within 3 day(s). LTG:  (1.)Ms. Mc Elmore will perform bed mobility with STAND BY ASSISTANCE within 7 treatment day(s). (2.)Ms. Mc Elmore will transfer from bed to chair and chair to bed with STAND BY ASSIST using RWwithin 7 treatment day(s). (3.)Ms. Mc Elmore will ambulate with CONTACT GUARD ASSIST for 150+ feet with RW within 7 treatment day(s). (4.)Ms. Mc Elmore will demonstrate good dynamic standing balance utilizing RW within 7 day(s). ________________________________________________________________________________________________    PHYSICAL THERAPY: Daily Note, Treatment Day: 2nd, AM 3/3/2018  INPATIENT: Hospital Day: 9  Payor: BLUE CR[place] / [place] [facility] / Product Type: PPO /      WBAT R LE     NAME/AGE/GENDER: Meir Gutierrez is a 62 y.o. female   PRIMARY DIAGNOSIS: Closed displaced subtrochanteric fracture of right femur, initial encounter ([email or web address] [S72.21XA] Intertrochanteric fracture of right femur ([email or web address] Intertrochanteric fracture of right femur (HCC)  Procedure(s) (LRB):  ORIF RIGHT SUBTROCHANTERIC FEMUR FRACTURE WITH INSERTION INTRA MEDULLARY NAIL  (Right)  3 Days Post-Op  ICD-10: Treatment Diagnosis:   · Difficulty in walking, Not elsewhere classified (R26.2)  · History of falling (Z91.81)   Precaution/Allergies:  Review of patient's allergies indicates no known allergies. ASSESSMENT:     Ms. Aggie Cm is a 62year old female s/p IM nailing of right subtrochanteric femur fracture and is WBAT R LE. Patient presents sitting up in bedside chair agreeable to therapy. Pt was taken to group therapy in the gym on 7th floor where she was able to participate in group activities including LE ex as listed below to improve mobility, strength and balance . Tolerated well however the patient required assistance with the RLE. At the end of the treatment the patient was  positioned for  comfort and needs met. Gait distance goal met. Will continue with stated plan of care and PT efforts. This section established at most recent assessment   PROBLEM LIST (Impairments causing functional limitations):  1. Decreased Strength  2. Decreased ADL/Functional Activities  3. Decreased Transfer Abilities  4. Decreased Ambulation Ability/Technique  5. Decreased Balance  6. Increased Pain  7. Decreased Activity Tolerance  8. Increased Shortness of Breath  9. Decreased Flexibility/Joint Mobility  10. Decreased Knowledge of Precautions   INTERVENTIONS PLANNED: (Benefits and precautions of physical therapy have been discussed with the patient.)  1. Balance Exercise  2. Bed Mobility  3. Family Education  4. Gait Training  5. Home Exercise Program (HEP)  6. Range of Motion (ROM)  7. Therapeutic Activites  8. Therapeutic Exercise/Strengthening  9. Transfer Training  10. Group Therapy     TREATMENT PLAN: Frequency/Duration: twice daily for duration of hospital stay  Rehabilitation Potential For Stated Goals: GOOD     RECOMMENDED REHABILITATION/EQUIPMENT: (at time of discharge pending progress): Due to the probability of continued deficits (see above) this patient will likely need continued skilled physical therapy after discharge.   Equipment:    TBD              HISTORY:   History of Present Injury/Illness (Reason for Referral):  S/p right IM nailing; WBAT R LE  Past Medical History/Comorbidities:   Ms. Aggie Cm has no past medical history on file. Ms. Hilario Bob  has no past surgical history on file. Social History/Living Environment:   Home Environment: Private residence  # Steps to Enter: 5  Rails to Enter: No  Wheelchair Ramp: No  One/Two Story Residence: One story  Living Alone: Yes  Support Systems: Family member(s)  Patient Expects to be Discharged to[de-identified] Rehabilitation facility  Current DME Used/Available at Home: None  Prior Level of Function/Work/Activity:  Patient lives alone in a single story residence with 5 steps to enter. At baseline, patient is an independent, community level ambulator, working as a CNA at [employer], and driving. Number of Personal Factors/Comorbidities that affect the Plan of Care: 1-2: MODERATE COMPLEXITY   EXAMINATION:   Most Recent Physical Functioning:   Gross Assessment:                  Posture:     Balance:    Bed Mobility:     Wheelchair Mobility:     Transfers:     Gait:            Body Structures Involved:  1. Bones  2. Joints  3. Muscles  4. Ligaments Body Functions Affected:  1. Neuromusculoskeletal  2. Movement Related Activities and Participation Affected:  1. Mobility  2. Self Care  3. Domestic Life   Number of elements that affect the Plan of Care: 4+: HIGH COMPLEXITY   CLINICAL PRESENTATION:   Presentation: Stable and uncomplicated: LOW COMPLEXITY   CLINICAL DECISION MAKIN [facility] [address] [id number] AM-PAC 6 Clicks   Basic Mobility Inpatient Short Form  How much difficulty does the patient currently have. .. Unable A Lot A Little None   1. Turning over in bed (including adjusting bedclothes, sheets and blankets)? [] 1   [] 2   [x] 3   [] 4   2. Sitting down on and standing up from a chair with arms ( e.g., wheelchair, bedside commode, etc.)   [] 1   [] 2   [x] 3   [] 4   3. Moving from lying on back to sitting on the side of the bed? [] 1   [] 2   [x] 3   [] 4   How much help from another person does the patient currently need. .. Total A Lot A Little None   4. Moving to and from a bed to a chair (including a wheelchair)? [] 1   [] 2   [x] 3   [] 4   5. Need to walk in hospital room? [] 1   [] 2   [x] 3   [] 4   6. Climbing 3-5 steps with a railing? [] 1   [x] 2   [] 3   [] 4   © 2007, Trustees of [address] [place] [address] [address] [address], under license to [employer]. All rights reserved      Score:  Initial: 17 Most Recent: 17 (Date: 2/28/18 )    Interpretation of Tool:  Represents activities that are increasingly more difficult (i.e. Bed mobility, Transfers, Gait). Score 24 23 22-20 19-15 14-10 9-7 6     Modifier CH CI CJ CK CL CM CN      ? Mobility - Walking and Moving Around:     - CURRENT STATUS: CK - 40%-59% impaired, limited or restricted    - GOAL STATUS: CJ - 20%-39% impaired, limited or restricted    - D/C STATUS:  ---------------To be determined---------------  Payor: BLUE CR[place] / [place] OF [address] / Product Type: PPO /      Medical Necessity:     · Patient demonstrates good rehab potential due to higher previous functional level. Reason for Services/Other Comments:  · Patient continues to require skilled intervention due to decreased functional mobility and balance/gait status from baseline. .   Use of outcome tool(s) and clinical judgement create a POC that gives a: Clear prediction of patient's progress: LOW COMPLEXITY            TREATMENT:   (In addition to Assessment/Re-Assessment sessions the following treatments were rendered)   Pre-treatment Symptoms/Complaints:  \"OK\"  Pain: Initial: 0/10     Post Session:  0/10      Therapeutic Activity: (     minutes Minutes): Therapeutic activities including bed mobility training, transfer training, static/dynamic standing balance training, ambulation on level ground, and patient education to improve mobility, strength and balance.   Required contact  Guard assist    to promote static and dynamic balance in standing, promote coordination of bilateral, lower extremity(s) and to promote improved sequencing with rolling walker. Therapeutic Group Exercise: ( 20 minutes):  Exercises per grid below to improve mobility, strength, balance and coordination. Required min assist with  verbal and tactile cues to promote proper body alignment and promote proper body breathing techniques. Progressed repetitions and complexity of movement as indicated. Date:  3/2/18 Date:  3/3/18 Date:     ACTIVITY/EXERCISE AM PM AM PM AM PM   Ankle pumps 20  X 20 B      abduction 20  X 20 B      Hip flexion  20  X 20 B      LAQ 20  X 20 B               Shld flex   X 10 B      Elbow flex   X 10 B      Elbow ext   X 10 B      shld horizontal abd/add   X 10 B                        B = bilateral; AA = active assistive; A = active; P = passive  Braces/Orthotics/Lines/Etc:   · O2 Device: Room air  Treatment/Session Assessment:    · Response to Treatment:  See above. · Interdisciplinary Collaboration:   o Physical Therapy Assistant  o Registered Nurse  · After treatment position/precautions:   o Up in chair  o Bed alarm/tab alert on  o Bed/Chair-wheels locked  o Call light within reach  o RN notified   · Compliance with Program/Exercises: Compliant  · Recommendations/Intent for next treatment session: \"Next visit will focus on advancements to more challenging activities and reduction in assistance provided\".     Total Treatment Duration:  PT Patient Time In/Time Out  Time In: 1015  Time Out: Randy 51 Sushma, PTA

## 2018-03-03 NOTE — PROGRESS NOTES
Hospitalist Progress Note    3/3/2018  Admit Date: 2018 11:47 PM   NAME: Sosa Hsu   :  1960   MRN:  [id number]   Attending: Kevin Anthony MD  PCP:  None    SUBJECTIVE:   Per H/P:  '61 y/o female with hx diabetes was reportedly at a party or some other function. She was standing behind the DJ and went to sit in a chain but instead fell and landed on her right hip. No other injury reported. In ED xray reveals a comminuted intertrochanteric fracture. She has hx knee surgery and did not have any complications from anesthesia or bleeding. She is currently sedated from pain medications she received in ED. No reported hx cardiovascular or pulmonary disease. Her glucose is 314 without evidence of DKA or hyperosmolar coma. She used to be on insulin but her family members report she no longer takes it. Will admit for glucose control and surgical repair of hip.'    - seen with her daughter, Arturo Gary, at bedside. She reports her R hip pain is controlled. Awaiting surgery for today. Denies new concerns. Blood sugars controlled. - POD 0 from R femur intra-medullary nail insertion. Seen with daughter, Laureano, and sister, Jefferson Amezquita, at bedside. She reports she is in significant pain. Denies other concerns. 3/1- POD 1 for R femur intra-medullary nail insertion. Pain well controlled. No fever, chills, CP, abd pain or urinary symptoms. Had some nausea earlier which resolved with Zofran as per pt.    3/2- POD 2 for R femur intra-medullary nail insertion. Pain well controlled. No fever, chills, CP, abd pain or urinary symptoms. Worked with PT today with no issues. 3/3- Pt seen and examined. She was lying in bed. Denies any symptoms of bleeding or dark stool. However, has not had a BM for the past few days. Dulcolox ordered. Hb continues to drop. IVF discontinued. Instructions given to pt to inform RN if any dark or bloody stools. Pt eating and drinking well.        Review of Systems negative with exception of pertinent positives noted above  PHYSICAL EXAM     Visit Vitals    BP (!) 160/91 (BP 1 Location: Right arm, BP Patient Position: Sitting)    Pulse 92    Temp 98.1 °F (36.7 °C)    Resp 16    Ht 5' 3\" (1.6 m)    Wt 79.4 kg (175 lb)    SpO2 97%    BMI 31 kg/m2      Temp (24hrs), Av.8 °F (37.1 °C), Min:98.1 °F (36.7 °C), Max:99.5 °F (37.5 °C)    Patient Vitals for the past 24 hrs:   Temp Pulse Resp BP SpO2   18 1104 98.1 °F (36.7 °C) 92 16 (!) 160/91 97 %   18 0723 98.6 °F (37 °C) 89 16 152/83 96 %   18 0504 98.9 °F (37.2 °C) 96 18 149/88 95 %   18 2353 99.2 °F (37.3 °C) 98 16 139/80 95 %   18 2020 98.6 °F (37 °C) 98 16 144/82 95 %   18 1600 99.5 °F (37.5 °C) (!) 106 16 (!) 176/96 95 %       Oxygen Therapy  O2 Sat (%): 97 % (18 1104)  Pulse via Oximetry: 103 beats per minute (18 0501)  O2 Device: Room air (18 0501)  O2 Flow Rate (L/min): 3 l/min (18 0933)    Intake/Output Summary (Last 24 hours) at 18 1430  Last data filed at 18 7879   Gross per 24 hour   Intake                0 ml   Output              800 ml   Net             -800 ml      General: NAD, obese    Lungs:  CTA Bilaterally.    Heart:  Regular rate and rhythm,  No murmur, rub, or gallop  Abdomen: Soft, Non distended, Non tender, Positive bowel sounds  Extremities: No cyanosis, clubbing or edema  Neurologic:  No focal deficits    Recent Results (from the past 24 hour(s))   GLUCOSE, POC    Collection Time: 18  4:09 PM   Result Value Ref Range    Glucose (POC) 183 (H) 65 - 100 mg/dL   GLUCOSE, POC    Collection Time: 18  9:12 PM   Result Value Ref Range    Glucose (POC) 163 (H) 65 - 100 mg/dL   CBC WITH AUTOMATED DIFF    Collection Time: 18  5:58 AM   Result Value Ref Range    WBC 6.5 4.3 - 11.1 K/uL    RBC 3.06 (L) 4.05 - 5.25 M/uL    HGB 9.0 (L) 11.7 - 15.4 g/dL    HCT 25.9 (L) 35.8 - 46.3 %    MCV 84.6 79.6 - 97.8 FL    MCH 29.4 26.1 - 32.9 PG MCHC 34.7 31.4 - 35.0 g/dL    RDW 13.3 11.9 - 14.6 %    PLATELET 801 563 - 211 K/uL    MPV 10.3 (L) 10.8 - 14.1 FL    DF AUTOMATED      NEUTROPHILS 55 43 - 78 %    LYMPHOCYTES 29 13 - 44 %    MONOCYTES 8 4.0 - 12.0 %    EOSINOPHILS 7 0.5 - 7.8 %    BASOPHILS 1 0.0 - 2.0 %    IMMATURE GRANULOCYTES 0 0.0 - 5.0 %    ABS. NEUTROPHILS 3.6 1.7 - 8.2 K/UL    ABS. LYMPHOCYTES 1.9 0.5 - 4.6 K/UL    ABS. MONOCYTES 0.5 0.1 - 1.3 K/UL    ABS. EOSINOPHILS 0.4 0.0 - 0.8 K/UL    ABS. BASOPHILS 0.0 0.0 - 0.2 K/UL    ABS. IMM. GRANS. 0.0 0.0 - 0.5 K/UL   METABOLIC PANEL, BASIC    Collection Time: 03/03/18  5:58 AM   Result Value Ref Range    Sodium 141 136 - 145 mmol/L    Potassium 3.0 (L) 3.5 - 5.1 mmol/L    Chloride 104 98 - 107 mmol/L    CO2 28 21 - 32 mmol/L    Anion gap 9 7 - 16 mmol/L    Glucose 125 (H) 65 - 100 mg/dL    BUN 7 6 - 23 MG/DL    Creatinine 0.37 (L) 0.6 - 1.0 MG/DL    GFR est AA >60 >60 ml/min/1.73m2    GFR est non-AA >60 >60 ml/min/1.73m2    Calcium 8.3 8.3 - 10.4 MG/DL   GLUCOSE, POC    Collection Time: 03/03/18  7:26 AM   Result Value Ref Range    Glucose (POC) 131 (H) 65 - 100 mg/dL   GLUCOSE, POC    Collection Time: 03/03/18 11:05 AM   Result Value Ref Range    Glucose (POC) 162 (H) 65 - 100 mg/dL     Imaging:    XR HIP RT W OR WO PELV 2-3 VWS   Final Result   Impression: Unchanged postoperative findings as above. XR FEMUR RT 2 VS   Final Result   Impression: Unchanged postoperative findings as above. XR FEMUR RT 2 VS   Final Result   IMPRESSION: Surgical fixation of right hip fracture. XR FEMUR RT 2 VS   Final Result   IMPRESSION: Comminuted intertrochanteric fracture. The mid and distal femur are   intact. XR CHEST SNGL V   Final Result   IMPRESSION: Normal chest.             XR HIP RT W OR WO PELV 2-3 VWS   Final Result   IMPRESSION: Comminuted intertrochanteric fracture which extends of the proximal   femoral diaphysis.                NC XR TECHNOLOGIST SERVICE    (Results Pending)         ASSESSMENT      Hospital Problems as of 3/3/2018  Never Reviewed          Codes Class Noted - Resolved POA    Essential hypertension ICD-10-CM: I10  ICD-9-CM: 401.9  2/28/2018 - Present Unknown        * (Principal)Intertrochanteric fracture of right femur ([facility] ICD-10-CM: S78.612I  ICD-9-CM: 820.21  2/24/2018 - Present Yes        Hyperglycemia due to type 2 diabetes mellitus ([facility] ICD-10-CM: E11.65  ICD-9-CM: 250.00  2/24/2018 - Present Yes            Plan:  · R femur fx- s/p ORIF 2/28. Pain control per ortho. · T2DM- controlled. On SSI for now. · HTN- BP better controlled and suspect this is pain related. Cont current regimen. · Anemia- slight iron deficiency. Likely also related to blood loss from injury/surgery. Monitor for bleeding and transfuse as needed. Dispo: Discussed with CM. Awaiting rehab approval.     DVT Prophylaxis: SCDs    Signed By: Gavino Kennedy MD     March 3, 2018      .

## 2018-03-03 NOTE — PROGRESS NOTES
Problem: Mobility Impaired (Adult and Pediatric)  Goal: *Acute Goals and Plan of Care (Insert Text)  STG:  (1.)Ms. Em Santo will perform bed mobility with CONTACT GUARD ASSIST within 3 treatment day(s). (2.)Ms. Em Santo will transfer from bed to chair and chair to bed with CONTACT GUARD ASSIST using the RW within 3 treatment day(s). (3.)Ms. Em Santo will ambulate with MINIMAL ASSIST for 50+ feet with RW within 3 treatment day(s). Goal met 3/2/18  (4.)Ms. Em Santo will tolerate 25+ minutes of therapeutic activity/exercise while maintaining stable vitals to improve functional strength and mobility within 3 day(s). LTG:  (1.)Ms. Em Santo will perform bed mobility with STAND BY ASSISTANCE within 7 treatment day(s). (2.)Ms. Em Santo will transfer from bed to chair and chair to bed with STAND BY ASSIST using RWwithin 7 treatment day(s). (3.)Ms. Em Santo will ambulate with CONTACT GUARD ASSIST for 150+ feet with RW within 7 treatment day(s). (4.)Ms. Em Santo will demonstrate good dynamic standing balance utilizing RW within 7 day(s). ________________________________________________________________________________________________    PHYSICAL THERAPY: Daily Note, Treatment Day: 2nd, PM 3/3/2018  INPATIENT: Hospital Day: 9  Payor[place] BLUE CR[place] / [place] [facility] / Product Type: ARMAAN /      Charles ADAMS     NAME/AGE/GENDER: Elon Gitelman is a 62 y.o. female   PRIMARY DIAGNOSIS: Closed displaced subtrochanteric fracture of right femur, initial encounter ([facility] [address] [S72.21XA] Intertrochanteric fracture of right femur ([facility] [address] Intertrochanteric fracture of right femur ([address]  Procedure(s) (LRB):  ORIF RIGHT SUBTROCHANTERIC FEMUR FRACTURE WITH INSERTION INTRA MEDULLARY NAIL  (Right)  3 Days Post-Op  ICD-10: Treatment Diagnosis:   · Difficulty in walking, Not elsewhere classified (R26.2)  · History of falling (Z91.81)   Precaution/Allergies:  Review of patient's allergies indicates no known allergies. ASSESSMENT:     Ms. Nicole Ervin is a 62year old female s/p IM nailing of right subtrochanteric femur fracture and is WBAT R LE. Patient performed scooting to the edge of the chair with supervision,  transfers sit to stand with SBA. Demonstrated a slow, shuffled, step-to gait pattern with very short steps and with decreased step clearance on right and fair dynamic balance throughout. Patient returned to bed with mod assist to LE's and positioned for  comfort and needs within reach. Patient became teary and clearly upset but stated she was fine and didn't need anything. Patient is pleasant and cooperative and doing well even though mobility is slow. She has great potential to progress. Will continue with stated plan of care and PT efforts. This section established at most recent assessment   PROBLEM LIST (Impairments causing functional limitations):  1. Decreased Strength  2. Decreased ADL/Functional Activities  3. Decreased Transfer Abilities  4. Decreased Ambulation Ability/Technique  5. Decreased Balance  6. Increased Pain  7. Decreased Activity Tolerance  8. Increased Shortness of Breath  9. Decreased Flexibility/Joint Mobility  10. Decreased Knowledge of Precautions   INTERVENTIONS PLANNED: (Benefits and precautions of physical therapy have been discussed with the patient.)  1. Balance Exercise  2. Bed Mobility  3. Family Education  4. Gait Training  5. Home Exercise Program (HEP)  6. Range of Motion (ROM)  7. Therapeutic Activites  8. Therapeutic Exercise/Strengthening  9. Transfer Training  10. Group Therapy     TREATMENT PLAN: Frequency/Duration: twice daily for duration of hospital stay  Rehabilitation Potential For Stated Goals: GOOD     RECOMMENDED REHABILITATION/EQUIPMENT: (at time of discharge pending progress): Due to the probability of continued deficits (see above) this patient will likely need continued skilled physical therapy after discharge.   Equipment:    TBD HISTORY:   History of Present Injury/Illness (Reason for Referral):  S/p right IM nailing; WBAT R LE  Past Medical History/Comorbidities:   Ms. Sonido Bowling  has no past medical history on file. Ms. Sonido Bowling  has no past surgical history on file. Social History/Living Environment:   Home Environment: Private residence  # Steps to Enter: 5  Rails to Enter: No  Wheelchair Ramp: No  One/Two Story Residence: One story  Living Alone: Yes  Support Systems: Family member(s)  Patient Expects to be Discharged to[de-identified] Rehabilitation facility  Current DME Used/Available at Home: None  Prior Level of Function/Work/Activity:  Patient lives alone in a single story residence with 5 steps to enter. At baseline, patient is an independent, community level ambulator, working as a CNA at [employer], and driving. Number of Personal Factors/Comorbidities that affect the Plan of Care: 1-2: MODERATE COMPLEXITY   EXAMINATION:   Most Recent Physical Functioning:   Gross Assessment:                  Posture:     Balance:  Standing - Static: Fair  Standing - Dynamic : Fair Bed Mobility:  Sit to Supine: Moderate assistance  Wheelchair Mobility:     Transfers:     Gait:     Speed/Yadi: Slow  Step Length: Left shortened;Right shortened  Distance (ft): 45 Feet (ft)  Assistive Device: Walker, rolling      Body Structures Involved:  1. Bones  2. Joints  3. Muscles  4. Ligaments Body Functions Affected:  1. Neuromusculoskeletal  2. Movement Related Activities and Participation Affected:  1. Mobility  2. Self Care  3. Domestic Life   Number of elements that affect the Plan of Care: 4+: HIGH COMPLEXITY   CLINICAL PRESENTATION:   Presentation: Stable and uncomplicated: LOW COMPLEXITY   CLINICAL DECISION MAKIN [facility] [address] [id number] AM-PAC 6 Clicks   Basic Mobility Inpatient Short Form  How much difficulty does the patient currently have. .. Unable A Lot A Little None   1.   Turning over in bed (including adjusting bedclothes, sheets and blankets)? [] 1   [] 2   [x] 3   [] 4   2. Sitting down on and standing up from a chair with arms ( e.g., wheelchair, bedside commode, etc.)   [] 1   [] 2   [x] 3   [] 4   3. Moving from lying on back to sitting on the side of the bed? [] 1   [] 2   [x] 3   [] 4   How much help from another person does the patient currently need. .. Total A Lot A Little None   4. Moving to and from a bed to a chair (including a wheelchair)? [] 1   [] 2   [x] 3   [] 4   5. Need to walk in hospital room? [] 1   [] 2   [x] 3   [] 4   6. Climbing 3-5 steps with a railing? [] 1   [x] 2   [] 3   [] 4   © 2007, Trustees of [address] [place], [place] [address], under license to [employer]. All rights reserved      Score:  Initial: 17 Most Recent: 17 (Date: 2/28/18 )    Interpretation of Tool:  Represents activities that are increasingly more difficult (i.e. Bed mobility, Transfers, Gait). Score 24 23 22-20 19-15 14-10 9-7 6     Modifier CH CI CJ CK CL CM CN      ? Mobility - Walking and Moving Around:     - CURRENT STATUS: CK - 40%-59% impaired, limited or restricted    - GOAL STATUS: CJ - 20%-39% impaired, limited or restricted    - D/C STATUS:  ---------------To be determined---------------  Payor: BLUE CR[place] / [place] [address] / Product Type: PPO /      Medical Necessity:     · Patient demonstrates good rehab potential due to higher previous functional level. Reason for Services/Other Comments:  · Patient continues to require skilled intervention due to decreased functional mobility and balance/gait status from baseline. .   Use of outcome tool(s) and clinical judgement create a POC that gives a: Clear prediction of patient's progress: LOW COMPLEXITY            TREATMENT:   (In addition to Assessment/Re-Assessment sessions the following treatments were rendered)   Pre-treatment Symptoms/Complaints:  \" I am ready\"  Pain: Initial: 0/10     Post Session:  0/10      Therapeutic Activity: (    23 minutes Minutes): Therapeutic activities including bed mobility training, transfer training, static/dynamic standing balance training, ambulation on level ground, and patient education to improve mobility, strength and balance. Required contact guard assist to stand by assist    to promote static and dynamic balance in standing, promote coordination of bilateral, lower extremity(s) and to promote improved sequencing with rolling walker. Date:  3/2/18 Date:   Date:     ACTIVITY/EXERCISE AM PM AM PM AM PM   Ankle pumps 20        abduction 20        Hip flexion  20        LAQ 20                                   B = bilateral; AA = active assistive; A = active; P = passive  Braces/Orthotics/Lines/Etc:   · O2 Device: Room air  Treatment/Session Assessment:    · Response to Treatment:  See above. · Interdisciplinary Collaboration:   o Physical Therapy Assistant  o Registered Nurse  · After treatment position/precautions:   o Up in chair  o Bed/Chair-wheels locked  o Call light within reach  o RN notified   · Compliance with Program/Exercises: compliant  · Recommendations/Intent for next treatment session: \"Next visit will focus on advancements to more challenging activities and reduction in assistance provided\".     Total Treatment Duration:  PT Patient Time In/Time Out  Time In: 1345  Time Out: Aleida Sanchez [address] Sushma, PTA

## 2018-03-04 LAB
ANION GAP SERPL CALC-SCNC: 9 MMOL/L (ref 7–16)
BUN SERPL-MCNC: 9 MG/DL (ref 6–23)
CALCIUM SERPL-MCNC: 8.4 MG/DL (ref 8.3–10.4)
CHLORIDE SERPL-SCNC: 104 MMOL/L (ref 98–107)
CO2 SERPL-SCNC: 29 MMOL/L (ref 21–32)
CREAT SERPL-MCNC: 0.45 MG/DL (ref 0.6–1)
ERYTHROCYTE [DISTWIDTH] IN BLOOD BY AUTOMATED COUNT: 13.1 % (ref 11.9–14.6)
GLUCOSE BLD STRIP.AUTO-MCNC: 176 MG/DL (ref 65–100)
GLUCOSE BLD STRIP.AUTO-MCNC: 185 MG/DL (ref 65–100)
GLUCOSE BLD STRIP.AUTO-MCNC: 190 MG/DL (ref 65–100)
GLUCOSE BLD STRIP.AUTO-MCNC: 198 MG/DL (ref 65–100)
GLUCOSE SERPL-MCNC: 164 MG/DL (ref 65–100)
HCT VFR BLD AUTO: 27.3 % (ref 35.8–46.3)
HGB BLD-MCNC: 9.3 G/DL (ref 11.7–15.4)
MCH RBC QN AUTO: 29.1 PG (ref 26.1–32.9)
MCHC RBC AUTO-ENTMCNC: 34.1 G/DL (ref 31.4–35)
MCV RBC AUTO: 85.3 FL (ref 79.6–97.8)
PLATELET # BLD AUTO: 302 K/UL (ref 150–450)
PMV BLD AUTO: 10.3 FL (ref 10.8–14.1)
POTASSIUM SERPL-SCNC: 3.1 MMOL/L (ref 3.5–5.1)
RBC # BLD AUTO: 3.2 M/UL (ref 4.05–5.25)
SODIUM SERPL-SCNC: 142 MMOL/L (ref 136–145)
WBC # BLD AUTO: 6.4 K/UL (ref 4.3–11.1)

## 2018-03-04 PROCEDURE — 97530 THERAPEUTIC ACTIVITIES: CPT

## 2018-03-04 PROCEDURE — 74011636637 HC RX REV CODE- 636/637: Performed by: HOSPITALIST

## 2018-03-04 PROCEDURE — 82962 GLUCOSE BLOOD TEST: CPT

## 2018-03-04 PROCEDURE — 74011250637 HC RX REV CODE- 250/637: Performed by: NURSE PRACTITIONER

## 2018-03-04 PROCEDURE — 97150 GROUP THERAPEUTIC PROCEDURES: CPT

## 2018-03-04 PROCEDURE — 74011250637 HC RX REV CODE- 250/637: Performed by: ORTHOPAEDIC SURGERY

## 2018-03-04 PROCEDURE — 80048 BASIC METABOLIC PNL TOTAL CA: CPT | Performed by: INTERNAL MEDICINE

## 2018-03-04 PROCEDURE — 65270000029 HC RM PRIVATE

## 2018-03-04 PROCEDURE — 74011250636 HC RX REV CODE- 250/636: Performed by: NURSE PRACTITIONER

## 2018-03-04 PROCEDURE — 36415 COLL VENOUS BLD VENIPUNCTURE: CPT | Performed by: INTERNAL MEDICINE

## 2018-03-04 PROCEDURE — 74011250637 HC RX REV CODE- 250/637: Performed by: INTERNAL MEDICINE

## 2018-03-04 PROCEDURE — 85027 COMPLETE CBC AUTOMATED: CPT | Performed by: INTERNAL MEDICINE

## 2018-03-04 RX ORDER — POTASSIUM CHLORIDE 20 MEQ/1
40 TABLET, EXTENDED RELEASE ORAL
Status: COMPLETED | OUTPATIENT
Start: 2018-03-04 | End: 2018-03-04

## 2018-03-04 RX ADMIN — OXYCODONE HYDROCHLORIDE 5 MG: 5 TABLET ORAL at 21:40

## 2018-03-04 RX ADMIN — INSULIN LISPRO 2 UNITS: 100 INJECTION, SOLUTION INTRAVENOUS; SUBCUTANEOUS at 17:10

## 2018-03-04 RX ADMIN — ACETAMINOPHEN 650 MG: 325 TABLET ORAL at 21:40

## 2018-03-04 RX ADMIN — BISACODYL 10 MG: 5 TABLET, COATED ORAL at 08:04

## 2018-03-04 RX ADMIN — Medication 10 ML: at 04:50

## 2018-03-04 RX ADMIN — TRAMADOL HYDROCHLORIDE 50 MG: 50 TABLET, FILM COATED ORAL at 13:57

## 2018-03-04 RX ADMIN — Medication 5 ML: at 17:11

## 2018-03-04 RX ADMIN — CALCIUM CARBONATE 500 MG (1,250 MG)-VITAMIN D3 200 UNIT TABLET 1 TABLET: at 11:43

## 2018-03-04 RX ADMIN — OXYCODONE HYDROCHLORIDE 5 MG: 5 TABLET ORAL at 11:42

## 2018-03-04 RX ADMIN — ENOXAPARIN SODIUM 30 MG: 30 INJECTION SUBCUTANEOUS at 08:05

## 2018-03-04 RX ADMIN — POTASSIUM CHLORIDE 40 MEQ: 20 TABLET, EXTENDED RELEASE ORAL at 17:09

## 2018-03-04 RX ADMIN — INSULIN LISPRO 2 UNITS: 100 INJECTION, SOLUTION INTRAVENOUS; SUBCUTANEOUS at 12:29

## 2018-03-04 RX ADMIN — CALCIUM CARBONATE 500 MG (1,250 MG)-VITAMIN D3 200 UNIT TABLET 1 TABLET: at 17:09

## 2018-03-04 RX ADMIN — DOCUSATE SODIUM 100 MG: 100 CAPSULE, LIQUID FILLED ORAL at 17:09

## 2018-03-04 RX ADMIN — OXYCODONE HYDROCHLORIDE 5 MG: 5 TABLET ORAL at 04:49

## 2018-03-04 RX ADMIN — Medication 5 ML: at 21:42

## 2018-03-04 RX ADMIN — DOCUSATE SODIUM 100 MG: 100 CAPSULE, LIQUID FILLED ORAL at 08:04

## 2018-03-04 RX ADMIN — OXYCODONE HYDROCHLORIDE 5 MG: 5 TABLET ORAL at 18:46

## 2018-03-04 RX ADMIN — ACETAMINOPHEN 650 MG: 325 TABLET ORAL at 04:49

## 2018-03-04 RX ADMIN — INSULIN LISPRO 2 UNITS: 100 INJECTION, SOLUTION INTRAVENOUS; SUBCUTANEOUS at 08:04

## 2018-03-04 RX ADMIN — ACETAMINOPHEN 650 MG: 325 TABLET ORAL at 13:57

## 2018-03-04 RX ADMIN — LISINOPRIL 10 MG: 5 TABLET ORAL at 08:04

## 2018-03-04 RX ADMIN — INSULIN LISPRO 2 UNITS: 100 INJECTION, SOLUTION INTRAVENOUS; SUBCUTANEOUS at 21:40

## 2018-03-04 NOTE — PROGRESS NOTES
OT Note:    OT attempted to see patient this afternoon for therapy. Pt not feeling well and did not want to participate at this time. Will re-attempt to see patient at a later date/time.     Thanks,  Marilia Godinez Master

## 2018-03-04 NOTE — PROGRESS NOTES
Hospitalist Progress Note    3/4/2018  Admit Date: 2018 11:47 PM   NAME: Ivon Goins   :  1960   MRN:  [id number]   Attending: Malachi Manley MD  PCP:  None    SUBJECTIVE:   Per H/P:  '63 y/o female with hx diabetes was reportedly at a party or some other function. She was standing behind the DJ and went to sit in a chain but instead fell and landed on her right hip. No other injury reported. In ED xray reveals a comminuted intertrochanteric fracture. She has hx knee surgery and did not have any complications from anesthesia or bleeding. She is currently sedated from pain medications she received in ED. No reported hx cardiovascular or pulmonary disease. Her glucose is 314 without evidence of DKA or hyperosmolar coma. She used to be on insulin but her family members report she no longer takes it. Will admit for glucose control and surgical repair of hip.'    - seen with her daughter, Milagros Hi, at bedside. She reports her R hip pain is controlled. Awaiting surgery for today. Denies new concerns. Blood sugars controlled. - POD 0 from R femur intra-medullary nail insertion. Seen with daughter, Baldomero Portillo, and sister, [address] [facility], at bedside. She reports she is in significant pain. Denies other concerns. 3/1- POD 1 for R femur intra-medullary nail insertion. Pain well controlled. No fever, chills, CP, abd pain or urinary symptoms. Had some nausea earlier which resolved with Zofran as per pt.    3/2- POD 2 for R femur intra-medullary nail insertion. Pain well controlled. No fever, chills, CP, abd pain or urinary symptoms. Worked with PT today with no issues. 3/3- Pt seen and examined. She was lying in bed. Denies any symptoms of bleeding or dark stool. However, has not had a BM for the past few days. Dulcolox ordered. Hb continues to drop. IVF discontinued. Instructions given to pt to inform RN if any dark or bloody stools. Pt eating and drinking well. 3/4- Pt seen and examined.  She was lying in bed. Reports not having a BM yet. No fever, chills, CP, abd pain or urinary symptoms. No symptoms of bleeding. Worked with PT today. Hb stable today. Eating and drinking well. Review of Systems negative with exception of pertinent positives noted above  PHYSICAL EXAM     Visit Vitals    BP (!) 172/95 (BP 1 Location: Left arm, BP Patient Position: At rest)    Pulse 97    Temp 99 °F (37.2 °C)    Resp 17    Ht 5' 3\" (1.6 m)    Wt 79.4 kg (175 lb)    SpO2 97%    BMI 31 kg/m2      Temp (24hrs), Av.5 °F (36.9 °C), Min:97.8 °F (36.6 °C), Max:99.1 °F (37.3 °C)    Patient Vitals for the past 24 hrs:   Temp Pulse Resp BP SpO2   18 1436 99 °F (37.2 °C) 97 17 (!) 172/95 97 %   18 1136 99 °F (37.2 °C) 97 18 116/73 97 %   18 0717 98.2 °F (36.8 °C) 90 16 (!) 155/93 96 %   18 0420 98.1 °F (36.7 °C) 91 14 (!) 160/94 95 %   18 0034 99.1 °F (37.3 °C) 91 18 150/88 96 %   18 1939 97.8 °F (36.6 °C) 94 17 128/78 95 %       Oxygen Therapy  O2 Sat (%): 97 % (18 1436)  Pulse via Oximetry: 91 beats per minute (18 0420)  O2 Device: Room air (18 042)  O2 Flow Rate (L/min): 3 l/min (18 0933)  No intake or output data in the 24 hours ending 18 1519   General: NAD, obese    Lungs:  CTA Bilaterally.    Heart:  Regular rate and rhythm,  No murmur, rub, or gallop  Abdomen: Soft, Non distended, Non tender, Positive bowel sounds  Extremities: No cyanosis, clubbing or edema  Neurologic:  No focal deficits    Recent Results (from the past 24 hour(s))   GLUCOSE, POC    Collection Time: 18  3:39 PM   Result Value Ref Range    Glucose (POC) 165 (H) 65 - 100 mg/dL   GLUCOSE, POC    Collection Time: 18  9:58 PM   Result Value Ref Range    Glucose (POC) 149 (H) 65 - 100 mg/dL   CBC W/O DIFF    Collection Time: 18  6:16 AM   Result Value Ref Range    WBC 6.4 4.3 - 11.1 K/uL    RBC 3.20 (L) 4.05 - 5.25 M/uL    HGB 9.3 (L) 11.7 - 15.4 g/dL    HCT 27.3 (L) 35.8 - 46.3 %    MCV 85.3 79.6 - 97.8 FL    MCH 29.1 26.1 - 32.9 PG    MCHC 34.1 31.4 - 35.0 g/dL    RDW 13.1 11.9 - 14.6 %    PLATELET 061 283 - 477 K/uL    MPV 10.3 (L) 10.8 - 45.7 FL   METABOLIC PANEL, BASIC    Collection Time: 03/04/18  6:16 AM   Result Value Ref Range    Sodium 142 136 - 145 mmol/L    Potassium 3.1 (L) 3.5 - 5.1 mmol/L    Chloride 104 98 - 107 mmol/L    CO2 29 21 - 32 mmol/L    Anion gap 9 7 - 16 mmol/L    Glucose 164 (H) 65 - 100 mg/dL    BUN 9 6 - 23 MG/DL    Creatinine 0.45 (L) 0.6 - 1.0 MG/DL    GFR est AA >60 >60 ml/min/1.73m2    GFR est non-AA >60 >60 ml/min/1.73m2    Calcium 8.4 8.3 - 10.4 MG/DL   GLUCOSE, POC    Collection Time: 03/04/18  7:20 AM   Result Value Ref Range    Glucose (POC) 198 (H) 65 - 100 mg/dL   GLUCOSE, POC    Collection Time: 03/04/18 11:39 AM   Result Value Ref Range    Glucose (POC) 185 (H) 65 - 100 mg/dL     Imaging:    XR HIP RT W OR WO PELV 2-3 VWS   Final Result   Impression: Unchanged postoperative findings as above. XR FEMUR RT 2 VS   Final Result   Impression: Unchanged postoperative findings as above. XR FEMUR RT 2 VS   Final Result   IMPRESSION: Surgical fixation of right hip fracture. XR FEMUR RT 2 VS   Final Result   IMPRESSION: Comminuted intertrochanteric fracture. The mid and distal femur are   intact. XR CHEST SNGL V   Final Result   IMPRESSION: Normal chest.             XR HIP RT W OR WO PELV 2-3 VWS   Final Result   IMPRESSION: Comminuted intertrochanteric fracture which extends of the proximal   femoral diaphysis.                NC XR TECHNOLOGIST SERVICE    (Results Pending)         ASSESSMENT      Hospital Problems as of 3/4/2018  Never Reviewed          Codes Class Noted - Resolved POA    Essential hypertension ICD-10-CM: I10  ICD-9-CM: 401.9  2/28/2018 - Present Unknown        * (Principal)Intertrochanteric fracture of right femur ([facility] ICD-10-CM: F25.413S  ICD-9-CM: 820.21  2/24/2018 - Present Yes Hyperglycemia due to type 2 diabetes mellitus ([facility] ICD-10-CM: E11.65  ICD-9-CM: 250.00  2/24/2018 - Present Yes            Plan:  · R femur fx- s/p ORIF 2/28. Pain control. · T2DM- controlled. On SSI for now. · HTN- BP better controlled and suspect this is pain related. Cont current regimen. · Anemia- slight iron deficiency. Likely also related to blood loss from injury/surgery. Stable. Monitor for bleeding and transfuse as needed. Dispo: Discussed with CM. Awaiting rehab approval.     DVT Prophylaxis: SCDs    Signed By: Gatito Rabago MD     March 4, 2018      .

## 2018-03-04 NOTE — PROGRESS NOTES
ORTHO    Sub:  Pt complains of expected pain. Obj:    Vitals:    03/03/18 1939 03/04/18 0034 03/04/18 0420 03/04/18 0717   BP: 128/78 150/88 (!) 160/94 (!) 155/93   Pulse: 94 91 91 90   Resp: 17 18 14 16   Temp: 97.8 °F (36.6 °C) 99.1 °F (37.3 °C) 98.1 °F (36.7 °C) 98.2 °F (36.8 °C)   SpO2: 95% 96% 95% 96%   Weight:       Height:             Recent Labs      03/04/18   0616  03/03/18   0558  03/02/18   0543  03/01/18   1601   WBC  6.4  6.5  8.6   --    HGB  9.3*  9.0*  10.2*  8.5*                 Dressing is dry and intact. Calves are soft and non tender. There is no redness or appearance of infection. N/V status is good. Assessment:   Planning to go to [address]:   Continue with Physical Therapy.                         Eunice Ghosh MD

## 2018-03-04 NOTE — PROGRESS NOTES
Physical therapy note: Pt declined treatment this PM. She did not make eye contact and stated she did not want to ambulate nor did she want to get back into bed. Pt was educated in the importance of ambulation/activity but she shook her head no. Will continue to encourage pt to participate and treat as time/schedule permit.

## 2018-03-04 NOTE — PROGRESS NOTES
Problem: Mobility Impaired (Adult and Pediatric)  Goal: *Acute Goals and Plan of Care (Insert Text)  STG:  (1.)Ms. Raad Solo will perform bed mobility with CONTACT GUARD ASSIST within 3 treatment day(s). (2.)Ms. Raad Solo will transfer from bed to chair and chair to bed with CONTACT GUARD ASSIST using the RW within 3 treatment day(s). (3.)Ms. Raad Solo will ambulate with MINIMAL ASSIST for 50+ feet with RW within 3 treatment day(s). Goal met 3/2/18  (4.)Ms. Raad Solo will tolerate 25+ minutes of therapeutic activity/exercise while maintaining stable vitals to improve functional strength and mobility within 3 day(s). LTG:  (1.)Ms. Raad Solo will perform bed mobility with STAND BY ASSISTANCE within 7 treatment day(s). (2.)Ms. Raad Solo will transfer from bed to chair and chair to bed with STAND BY ASSIST using RWwithin 7 treatment day(s). (3.)Ms. Raad Solo will ambulate with CONTACT GUARD ASSIST for 150+ feet with RW within 7 treatment day(s). (4.)Ms. Raad Solo will demonstrate good dynamic standing balance utilizing RW within 7 day(s). ________________________________________________________________________________________________    PHYSICAL THERAPY: Daily Note, Treatment Day: 3rd, AM 3/4/2018  INPATIENT: Hospital Day: 10  Payor[place] BLUE CROSS / [place] [facility] / Product Type: PPO /      WBAT R ANGIE     NAME/AGE/GENDER: Spencer Jessica is a 62 y.o. female   PRIMARY DIAGNOSIS: Closed displaced subtrochanteric fracture of right femur, [facility] [S72.21XA] Intertrochanteric fracture of right femur [facility] Intertrochanteric fracture of right femur (HCC)  Procedure(s) (LRB):  ORIF RIGHT SUBTROCHANTERIC FEMUR FRACTURE WITH INSERTION INTRA MEDULLARY NAIL  (Right)  4 Days Post-Op  ICD-10: Treatment Diagnosis:   · Difficulty in walking, Not elsewhere classified (R26.2)  · History of falling (Z91.81)   Precaution/Allergies:  Review of patient's allergies indicates no known allergies. ASSESSMENT:     Ms. Hilario Bob is a 62year old female s/p IM nailing of right subtrochanteric femur fracture and is WBAT R LE. Pt supine on contact and requested a later treatment time on first attempt stating she had a headache but had been giving Tylenol. Later in AM she agreed. She sat to EOB with very min assist. Stood and transferred to [facility]. After voiding she stood and took a few steps to recliner chair. Chair was pushed to sink so she could get cleaned up. Pt was taken to group therapy in the gym on 7th floor where she was able to participate in group activities including LE ex as listed below to improve mobility, strength and balance . Patient is pleasant and cooperative and doing well even though mobility is slow. Slow progress. Will continue with stated plan of care and PT efforts. This section established at most recent assessment   PROBLEM LIST (Impairments causing functional limitations):  1. Decreased Strength  2. Decreased ADL/Functional Activities  3. Decreased Transfer Abilities  4. Decreased Ambulation Ability/Technique  5. Decreased Balance  6. Increased Pain  7. Decreased Activity Tolerance  8. Increased Shortness of Breath  9. Decreased Flexibility/Joint Mobility  10. Decreased Knowledge of Precautions   INTERVENTIONS PLANNED: (Benefits and precautions of physical therapy have been discussed with the patient.)  1. Balance Exercise  2. Bed Mobility  3. Family Education  4. Gait Training  5. Home Exercise Program (HEP)  6. Range of Motion (ROM)  7. Therapeutic Activites  8. Therapeutic Exercise/Strengthening  9. Transfer Training  10.  Group Therapy     TREATMENT PLAN: Frequency/Duration: twice daily for duration of hospital stay  Rehabilitation Potential For Stated Goals: GOOD     RECOMMENDED REHABILITATION/EQUIPMENT: (at time of discharge pending progress): Due to the probability of continued deficits (see above) this patient will likely need continued skilled physical therapy after discharge. Equipment:    TBD              HISTORY:   History of Present Injury/Illness (Reason for Referral):  S/p right IM nailing; WBAT R LE  Past Medical History/Comorbidities:   Ms. Jaimee Beatty  has no past medical history on file. Ms. Jaimee Beatty  has no past surgical history on file. Social History/Living Environment:   Home Environment: Private residence  # Steps to Enter: 5  Rails to Enter: No  Wheelchair Ramp: No  One/Two Story Residence: One story  Living Alone: Yes  Support Systems: Family member(s)  Patient Expects to be Discharged to[de-identified] Rehabilitation facility  Current DME Used/Available at Home: None  Prior Level of Function/Work/Activity:  Patient lives alone in a single story residence with 5 steps to enter. At baseline, patient is an independent, community level ambulator, working as a CNA at [employer], and driving. Number of Personal Factors/Comorbidities that affect the Plan of Care: 1-2: MODERATE COMPLEXITY   EXAMINATION:   Most Recent Physical Functioning:   Gross Assessment:                  Posture:     Balance:  Sitting: Intact  Standing: Impaired  Standing - Static: Fair (+)  Standing - Dynamic : Fair Bed Mobility:  Supine to Sit: Stand-by assistance; Additional time  Sit to Supine: Moderate assistance  Wheelchair Mobility:     Transfers:  Sit to Stand: Contact guard assistance  Stand to Sit: Contact guard assistance  Gait:     Base of Support: Center of gravity altered;Narrowed  Speed/Yadi: Shuffled; Slow  Step Length: Left shortened;Right shortened  Distance (ft): 6 Feet (ft)  Assistive Device: Walker, rolling  Ambulation - Level of Assistance: Contact guard assistance  Interventions: Safety awareness training;Verbal cues      Body Structures Involved:  1. Bones  2. Joints  3. Muscles  4. Ligaments Body Functions Affected:  1. Neuromusculoskeletal  2. Movement Related Activities and Participation Affected:  1. Mobility  2. Self Care  3.  Domestic Life   Number of elements that affect the Plan of Care: 4+: HIGH COMPLEXITY   CLINICAL PRESENTATION:   Presentation: Stable and uncomplicated: LOW COMPLEXITY   CLINICAL DECISION MAKIN [facility] [address] [address] AM-PAC 6 Clicks   Basic Mobility Inpatient Short Form  How much difficulty does the patient currently have. .. Unable A Lot A Little None   1. Turning over in bed (including adjusting bedclothes, sheets and blankets)? [] 1   [] 2   [x] 3   [] 4   2. Sitting down on and standing up from a chair with arms ( e.g., wheelchair, bedside commode, etc.)   [] 1   [] 2   [x] 3   [] 4   3. Moving from lying on back to sitting on the side of the bed? [] 1   [] 2   [x] 3   [] 4   How much help from another person does the patient currently need. .. Total A Lot A Little None   4. Moving to and from a bed to a chair (including a wheelchair)? [] 1   [] 2   [x] 3   [] 4   5. Need to walk in hospital room? [] 1   [] 2   [x] 3   [] 4   6. Climbing 3-5 steps with a railing? [] 1   [x] 2   [] 3   [] 4   © , Trustees of [address] [facility] [address] [address], under license to [employer]. All rights reserved      Score:  Initial: 17 Most Recent: 17 (Date: 18 )    Interpretation of Tool:  Represents activities that are increasingly more difficult (i.e. Bed mobility, Transfers, Gait). Score 24 23 22-20 19-15 14-10 9-7 6     Modifier CH CI CJ CK CL CM CN      ? Mobility - Walking and Moving Around:     - CURRENT STATUS: CK - 40%-59% impaired, limited or restricted    - GOAL STATUS: CJ - 20%-39% impaired, limited or restricted    - D/C STATUS:  ---------------To be determined---------------  Payor: BLUE CROSS / [place] [address] / Product Type: PPO /      Medical Necessity:     · Patient demonstrates good rehab potential due to higher previous functional level. Reason for Services/Other Comments:  · Patient continues to require skilled intervention due to decreased functional mobility and balance/gait status from baseline. .   Use of outcome tool(s) and clinical judgement create a POC that gives a: Clear prediction of patient's progress: LOW COMPLEXITY            TREATMENT:   (In addition to Assessment/Re-Assessment sessions the following treatments were rendered)   Pre-treatment Symptoms/Complaints:  \" I have a headache. \" Stated she had tylenol earlier. Pain: Initial: 0/10     Post Session:  0/10      Therapeutic Activity: (    10 minutes Minutes): Therapeutic activities including bed mobility training, transfer training, static/dynamic standing balance training, ambulation on level ground, and patient education to improve mobility, strength and balance. Required contact guard assist to stand by assist  Safety awareness training;Verbal cues to promote static and dynamic balance in standing, promote coordination of bilateral, lower extremity(s) and to promote improved sequencing with rolling walker. GroupTherapeutic Exercise: (  20 min):  Exercises per grid below to improve mobility, strength and balance. Required min visual, verbal and manual cues to promote proper body alignment, promote proper body posture and promote proper body mechanics. Date:  3/2/18 Date:  3/4/18 Date:     ACTIVITY/EXERCISE AM PM AM PM AM PM   Ankle pumps 20  X 15 B      abduction 20  X 15 B      Hip flexion  20  X 15 B      LAQ 20  X 15 B               Shld flex   X 15 t-band      Elbow flex   X 15 t-band      Elbow ext   X 15 t-band      Shld horizontal ABD/ADD   X 15 t-band      B = bilateral; AA = active assistive; A = active; P = passive  Braces/Orthotics/Lines/Etc:   · O2 Device: Room air  Treatment/Session Assessment:    · Response to Treatment:  See above.   · Interdisciplinary Collaboration:   o Physical Therapy Assistant  o Registered Nurse  · After treatment position/precautions:   o Up in chair  o Bed/Chair-wheels locked  o Call light within reach  o RN notified   · Compliance with Program/Exercises: compliant  · Recommendations/Intent for next treatment session: \"Next visit will focus on advancements to more challenging activities and reduction in assistance provided\".     Total Treatment Duration:  PT Patient Time In/Time Out  Time In: 1058 (1110)  Time Out: 1108 (1130)    Severo Bishop, PTA

## 2018-03-05 LAB
ANION GAP SERPL CALC-SCNC: 8 MMOL/L (ref 7–16)
BUN SERPL-MCNC: 10 MG/DL (ref 6–23)
CALCIUM SERPL-MCNC: 8.8 MG/DL (ref 8.3–10.4)
CHLORIDE SERPL-SCNC: 106 MMOL/L (ref 98–107)
CO2 SERPL-SCNC: 30 MMOL/L (ref 21–32)
CREAT SERPL-MCNC: 0.47 MG/DL (ref 0.6–1)
ERYTHROCYTE [DISTWIDTH] IN BLOOD BY AUTOMATED COUNT: 13.4 % (ref 11.9–14.6)
GLUCOSE BLD STRIP.AUTO-MCNC: 136 MG/DL (ref 65–100)
GLUCOSE BLD STRIP.AUTO-MCNC: 145 MG/DL (ref 65–100)
GLUCOSE BLD STRIP.AUTO-MCNC: 186 MG/DL (ref 65–100)
GLUCOSE BLD STRIP.AUTO-MCNC: 196 MG/DL (ref 65–100)
GLUCOSE SERPL-MCNC: 124 MG/DL (ref 65–100)
HCT VFR BLD AUTO: 31 % (ref 35.8–46.3)
HGB BLD-MCNC: 10.3 G/DL (ref 11.7–15.4)
MCH RBC QN AUTO: 29.1 PG (ref 26.1–32.9)
MCHC RBC AUTO-ENTMCNC: 33.2 G/DL (ref 31.4–35)
MCV RBC AUTO: 87.6 FL (ref 79.6–97.8)
PLATELET # BLD AUTO: 324 K/UL (ref 150–450)
PMV BLD AUTO: 10.1 FL (ref 10.8–14.1)
POTASSIUM SERPL-SCNC: 3.3 MMOL/L (ref 3.5–5.1)
RBC # BLD AUTO: 3.54 M/UL (ref 4.05–5.25)
SODIUM SERPL-SCNC: 144 MMOL/L (ref 136–145)
WBC # BLD AUTO: 7.5 K/UL (ref 4.3–11.1)

## 2018-03-05 PROCEDURE — 74011636637 HC RX REV CODE- 636/637: Performed by: HOSPITALIST

## 2018-03-05 PROCEDURE — 80048 BASIC METABOLIC PNL TOTAL CA: CPT | Performed by: INTERNAL MEDICINE

## 2018-03-05 PROCEDURE — 74011250637 HC RX REV CODE- 250/637: Performed by: INTERNAL MEDICINE

## 2018-03-05 PROCEDURE — 97150 GROUP THERAPEUTIC PROCEDURES: CPT

## 2018-03-05 PROCEDURE — 74011250637 HC RX REV CODE- 250/637: Performed by: NURSE PRACTITIONER

## 2018-03-05 PROCEDURE — 74011250637 HC RX REV CODE- 250/637: Performed by: ORTHOPAEDIC SURGERY

## 2018-03-05 PROCEDURE — 82962 GLUCOSE BLOOD TEST: CPT

## 2018-03-05 PROCEDURE — 74011250636 HC RX REV CODE- 250/636: Performed by: NURSE PRACTITIONER

## 2018-03-05 PROCEDURE — 36415 COLL VENOUS BLD VENIPUNCTURE: CPT | Performed by: INTERNAL MEDICINE

## 2018-03-05 PROCEDURE — 65270000029 HC RM PRIVATE

## 2018-03-05 PROCEDURE — 97530 THERAPEUTIC ACTIVITIES: CPT

## 2018-03-05 PROCEDURE — 85027 COMPLETE CBC AUTOMATED: CPT | Performed by: INTERNAL MEDICINE

## 2018-03-05 RX ORDER — POTASSIUM CHLORIDE 20 MEQ/1
40 TABLET, EXTENDED RELEASE ORAL
Status: COMPLETED | OUTPATIENT
Start: 2018-03-05 | End: 2018-03-05

## 2018-03-05 RX ADMIN — POTASSIUM CHLORIDE 40 MEQ: 20 TABLET, EXTENDED RELEASE ORAL at 10:10

## 2018-03-05 RX ADMIN — CALCIUM CARBONATE 500 MG (1,250 MG)-VITAMIN D3 200 UNIT TABLET 1 TABLET: at 10:05

## 2018-03-05 RX ADMIN — Medication 10 ML: at 22:53

## 2018-03-05 RX ADMIN — DOCUSATE SODIUM 100 MG: 100 CAPSULE, LIQUID FILLED ORAL at 10:05

## 2018-03-05 RX ADMIN — OXYCODONE HYDROCHLORIDE 10 MG: 5 TABLET ORAL at 18:19

## 2018-03-05 RX ADMIN — DOCUSATE SODIUM 100 MG: 100 CAPSULE, LIQUID FILLED ORAL at 18:19

## 2018-03-05 RX ADMIN — CALCIUM CARBONATE 500 MG (1,250 MG)-VITAMIN D3 200 UNIT TABLET 1 TABLET: at 12:15

## 2018-03-05 RX ADMIN — INSULIN LISPRO 2 UNITS: 100 INJECTION, SOLUTION INTRAVENOUS; SUBCUTANEOUS at 18:27

## 2018-03-05 RX ADMIN — TRAMADOL HYDROCHLORIDE 50 MG: 50 TABLET, FILM COATED ORAL at 15:18

## 2018-03-05 RX ADMIN — ACETAMINOPHEN 650 MG: 325 TABLET ORAL at 22:52

## 2018-03-05 RX ADMIN — INSULIN LISPRO 2 UNITS: 100 INJECTION, SOLUTION INTRAVENOUS; SUBCUTANEOUS at 12:16

## 2018-03-05 RX ADMIN — LISINOPRIL 10 MG: 5 TABLET ORAL at 10:05

## 2018-03-05 RX ADMIN — BISACODYL 10 MG: 5 TABLET, COATED ORAL at 10:05

## 2018-03-05 RX ADMIN — ENOXAPARIN SODIUM 30 MG: 30 INJECTION SUBCUTANEOUS at 10:05

## 2018-03-05 RX ADMIN — OXYCODONE HYDROCHLORIDE 10 MG: 5 TABLET ORAL at 13:48

## 2018-03-05 RX ADMIN — Medication 10 ML: at 14:00

## 2018-03-05 RX ADMIN — ONDANSETRON 4 MG: 2 INJECTION INTRAMUSCULAR; INTRAVENOUS at 13:48

## 2018-03-05 NOTE — PROGRESS NOTES
Problem: Falls - Risk of  Goal: *Absence of Falls  Document Az Fall Risk and appropriate interventions in the flowsheet.    Outcome: Progressing Towards Goal  Fall Risk Interventions:  Mobility Interventions: Communicate number of staff needed for ambulation/transfer, Patient to call before getting OOB         Medication Interventions: Assess postural VS orthostatic hypotension, Bed/chair exit alarm, Patient to call before getting OOB    Elimination Interventions: Bed/chair exit alarm, Call light in reach, Patient to call for help with toileting needs    History of Falls Interventions: Bed/chair exit alarm, Door open when patient unattended, Investigate reason for fall

## 2018-03-05 NOTE — PROGRESS NOTES
Problem: Self Care Deficits Care Plan (Adult)  Goal: *Acute Goals and Plan of Care (Insert Text)  LTG 1: Pt will be S with toileting by 3/6/18 to prevent skin breakdown. LTG 2: Pt will be min A with LB dressing by 3/6/18 to reduce risk of falls. LTG 3: Pt will be SBA with bathing by 3/6/18 to promote good skin integrity. LTG 4: Pt will be SBA with toilet transfers by 3/6/18 to promote quality of life. LTG 5: Pt will be S with HEP by 3/6/18 to prevent deconditioning. OCCUPATIONAL THERAPY: Daily Note, Treatment Day: 2nd and AM 3/5/2018  INPATIENT: Hospital Day: 11  Payor: BLUE CROSS / [place] [facility] / Product Type: PPO /      NAME/AGE/GENDER: Meir Gutierrez is a 62 y.o. female   PRIMARY DIAGNOSIS:  Closed displaced subtrochanteric fracture of right femur, initial encounter ([facility] [S72.21XA] Intertrochanteric fracture of right femur ([facility] Intertrochanteric fracture of right femur ([facility]  Procedure(s) (LRB):  ORIF RIGHT SUBTROCHANTERIC FEMUR FRACTURE WITH INSERTION INTRA MEDULLARY NAIL  (Right)  5 Days Post-Op  ICD-10: Treatment Diagnosis:    · Generalized Muscle Weakness (M62.81)   Precautions/Allergies:     Review of patient's allergies indicates no known allergies. ASSESSMENT:   Ms. Mc Elmore was admitted for the above diagnosis. Pt was brought to therapy gym to participate in group exercises (in grid below) to increase UE strength and activity tolerance to perform mobility and ADLs. Pt required visual and verbal cueing to complete exercises. Pt tolerated session well. Returned to room by recliner. Will continue to benefit from skilled OT during stay. This section established at most recent assessment   PROBLEM LIST (Impairments causing functional limitations):  1. Decreased Strength  2. Decreased ADL/Functional Activities  3. Decreased Transfer Abilities  4. Decreased Ambulation Ability/Technique  5. Decreased Balance  6. Increased Pain  7.  Decreased Flexibility/Joint Mobility INTERVENTIONS PLANNED: (Benefits and precautions of occupational therapy have been discussed with the patient.)  1. Activities of daily living training  2. Group therapy  3. Therapeutic activity  4. Therapeutic exercise     TREATMENT PLAN: Frequency/Duration: Follow patient 3x to address above goals. Rehabilitation Potential For Stated Goals: Excellent     RECOMMENDED REHABILITATION/EQUIPMENT: (at time of discharge pending progress): Due to the probability of continued deficits (see above) this patient will likely need continued skilled occupational therapy after discharge. Equipment:    TBD              OCCUPATIONAL PROFILE AND HISTORY:   History of Present Injury/Illness (Reason for Referral):  Please see H&P  Past Medical History/Comorbidities:   Ms. Alan Bergeron  has no past medical history on file. Ms. Alan Bergeron  has no past surgical history on file. 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 Number of Personal Factors/Comorbidities that affect the Plan of Care: Expanded review of therapy/medical records (1-2):  MODERATE COMPLEXITY   ASSESSMENT OF OCCUPATIONAL PERFORMANCE[de-identified]   Activities of Daily Living:         Pt was I. Basic ADLs (From Assessment) Complex ADLs (From Assessment)   Basic ADL  Feeding: Independent  Oral Facial Hygiene/Grooming: Setup  Bathing: Moderate assistance  Upper Body Dressing: Setup  Lower Body Dressing: Maximum assistance  Toileting:  Moderate assistance     Grooming/Bathing/Dressing Activities of Daily Living     Cognitive Retraining  Safety/Judgement: Awareness of environment Most Recent Physical Functioning:   Gross Assessment:                  Posture:  Posture (WDL): Within defined limits  Balance:  Sitting: Impaired  Sitting - Static: Good (unsupported)  Sitting - Dynamic: Fair (occasional) Bed Mobility:     Wheelchair Mobility:     Transfers:                 Patient Vitals for the past 6 hrs:   BP BP Patient Position SpO2 Pulse   18 0705 (!) 181/92 At rest 95 % 93   18 1059 (!) 172/94 At rest 96 % 96       Mental Status  Neurologic State: Alert  Orientation Level: Oriented X4  Cognition: Follows commands  Perception: Appears intact  Perseveration: No perseveration noted  Safety/Judgement: Awareness of environment                          Physical Skills Involved:  1. Range of Motion  2. Balance  3. Strength  4. Gross Motor Control  5. Pain (acute) Cognitive Skills Affected (resulting in the inability to perform in a timely and safe manner):  1. N/A Psychosocial Skills Affected:  1. N/A   Number of elements that affect the Plan of Care: 5+:  HIGH COMPLEXITY   CLINICAL DECISION MAKIN[address] [place] [id number] AM-PAC 6 Clicks   Daily Activity Inpatient Short Form  How much help from another person does the patient currently need. .. Total A Lot A Little None   1. Putting on and taking off regular lower body clothing? [x] 1   [] 2   [] 3   [] 4   2. Bathing (including washing, rinsing, drying)? [] 1   [x] 2   [] 3   [] 4   3. Toileting, which includes using toilet, bedpan or urinal?   [] 1   [x] 2   [] 3   [] 4   4. Putting on and taking off regular upper body clothing? [] 1   [] 2   [x] 3   [] 4   5. Taking care of personal grooming such as brushing teeth? [] 1   [] 2   [x] 3   [] 4   6. Eating meals? [] 1   [] 2   [] 3   [x] 4   © , Trustees of [address] [place] [address] [address] [address], under license to [employer].  All rights reserved      Score:  Initial: 15 Most Recent: X (Date: -- )    Interpretation of Tool:  Represents activities that are increasingly more difficult (i.e. Bed mobility, Transfers, Gait). Score 24 23 22-20 19-15 14-10 9-7 6     Modifier CH CI CJ CK CL CM CN      ? Self Care:     - CURRENT STATUS: CK - 40%-59% impaired, limited or restricted    - GOAL STATUS: CJ - 20%-39% impaired, limited or restricted    - D/C STATUS:  ---------------To be determined---------------  Payor: BLUE CROSS / [place] [facility] [place] / Product Type: PPO /      Medical Necessity:     · Skilled intervention continues to be required due to decrease from PLOF. Reason for Services/Other Comments:  · Patient continues to require skilled intervention due to decreased mobility. Use of outcome tool(s) and clinical judgement create a POC that gives a: MODERATE COMPLEXITY         TREATMENT:   (In addition to Assessment/Re-Assessment sessions the following treatments were rendered)     Pre-treatment Symptoms/Complaints:  Pain in RLE  Pain: Initial:   Pain Intensity 1: 0  Pain Location 1: Hip  Pain Orientation 1: Right  Post Session:       Group Therapeutic Exercise: (10 minutes):  Exercises per grid below to improve mobility, strength and activity tolerance. Required minimal visual, verbal and tactile cues to promote proper body alignment and promote proper body mechanics. Progressed resistance and repetitions as indicated.     UE Exercises Date:  3/2/2018 Date:  3/5/2018 Date:     Activity/Exercise Parameters Parameters Parameters   Shoulder Abd/Adduction 20 reps with red theraband 20 reps with theraband    Shoulder Flexion 20 reps with 4# dowel 20 reps with 4# dowel    Elbow Flexion 25 reps with 4# dowel 25 reps with 4# dowel    Punches 20 reps with 4# dowel 20 reps with 4# dowel    Shoulder Shrugs 10 reps     Rows (Forward and Backwards) 10 reps each way                 Braces/Orthotics/Lines/Etc:   · O2 Device: Room air  Treatment/Session Assessment:    · Response to Treatment:  Agreeable  · Interdisciplinary Collaboration:   o Certified Occupational Therapy Assistant  o Registered Nurse  · After treatment position/precautions:   o Up in chair  o Bed alarm/tab alert on  o Bed/Chair-wheels locked  o Bed in low position  o Call light within reach   · Compliance with Program/Exercises: Will assess as treatment progresses. · Recommendations/Intent for next treatment session: \"Next visit will focus on advancements to more challenging activities and reduction in assistance provided\".   Total Treatment Duration:  OT Patient Time In/Time Out  Time In: 1130  Time Out: [address] [place]

## 2018-03-05 NOTE — PROGRESS NOTES
Problem: Falls - Risk of  Goal: *Absence of Falls  Document Az Fall Risk and appropriate interventions in the flowsheet.    Outcome: Progressing Towards Goal  Fall Risk Interventions:  Mobility Interventions: Bed/chair exit alarm, Communicate number of staff needed for ambulation/transfer, OT consult for ADLs, Patient to call before getting OOB, PT Consult for mobility concerns, PT Consult for assist device competence, Strengthening exercises (ROM-active/passive), Utilize walker, cane, or other assitive device, Utilize gait belt for transfers/ambulation         Medication Interventions: Assess postural VS orthostatic hypotension, Bed/chair exit alarm, Evaluate medications/consider consulting pharmacy, Patient to call before getting OOB, Teach patient to arise slowly, Utilize gait belt for transfers/ambulation    Elimination Interventions: Bed/chair exit alarm, Call light in reach, Elevated toilet seat, Patient to call for help with toileting needs, Toilet paper/wipes in reach, Toileting schedule/hourly rounds    History of Falls Interventions: Bed/chair exit alarm, Consult care management for discharge planning, Door open when patient unattended, Evaluate medications/consider consulting pharmacy, Investigate reason for fall, Utilize gait belt for transfer/ambulation, Room close to nurse's station

## 2018-03-05 NOTE — PROGRESS NOTES
Problem: Mobility Impaired (Adult and Pediatric)  Goal: *Acute Goals and Plan of Care (Insert Text)  STG:  (1.)Ms. Jaimee Beatty will perform bed mobility with CONTACT GUARD ASSIST within 3 treatment day(s). (2.)Ms. Jaimee Beatty will transfer from bed to chair and chair to bed with CONTACT GUARD ASSIST using the RW within 3 treatment day(s). (3.)Ms. Jaimee Beatty will ambulate with MINIMAL ASSIST for 50+ feet with RW within 3 treatment day(s). Goal met 3/2/18  (4.)Ms. Jaimee Beatty will tolerate 25+ minutes of therapeutic activity/exercise while maintaining stable vitals to improve functional strength and mobility within 3 day(s). LTG:  (1.)Ms. Jaimee Beatty will perform bed mobility with STAND BY ASSISTANCE within 7 treatment day(s). (2.)Ms. Jaimee Beatty will transfer from bed to chair and chair to bed with STAND BY ASSIST using RWwithin 7 treatment day(s). (3.)Ms. Jaimee Beatty will ambulate with CONTACT GUARD ASSIST for 150+ feet with RW within 7 treatment day(s). (4.)Ms. Jaimee Beatty will demonstrate good dynamic standing balance utilizing RW within 7 day(s). ________________________________________________________________________________________________    PHYSICAL THERAPY: Daily Note, Treatment Day: 4th, AM 3/5/2018  INPATIENT: Hospital Day: 11  Payor: Trenton Martinez / Plan: [place] [facility] / Product Type: PPO /      WBAT R ANGIE     NAME/AGE/GENDER: Geovani Khan is a 62 y.o. female   PRIMARY DIAGNOSIS: Closed displaced subtrochanteric fracture of right femur, initial encounter [facility] [S72.21XA] Intertrochanteric fracture of right femur ([facility] Intertrochanteric fracture of right femur (HCC)  Procedure(s) (LRB):  ORIF RIGHT SUBTROCHANTERIC FEMUR FRACTURE WITH INSERTION INTRA MEDULLARY NAIL  (Right)  5 Days Post-Op  ICD-10: Treatment Diagnosis:   · Difficulty in walking, Not elsewhere classified (R26.2)  · History of falling (Z91.81)   Precaution/Allergies:  Review of patient's allergies indicates no known allergies. ASSESSMENT:     Ms. Shelli Miller is a 62year old female s/p IM nailing of right subtrochanteric femur fracture and is WBAT R LE. Patient presents sitting up in bedside chair agreeable to therapy. On initial contact pt was in chair and wanted to wait until just before group time to ambulate. On return to room pt had returned to bed with the help of PCT. She sat up and transferred to chair with SBA. Pt was taken to group therapy in the gym on 7th floor where she was able to participate in group activities including LE ex as listed below to improve mobility, strength and balance . Motivation is low. Slow progress. Will continue with stated plan of care and PT efforts. This section established at most recent assessment   PROBLEM LIST (Impairments causing functional limitations):  1. Decreased Strength  2. Decreased ADL/Functional Activities  3. Decreased Transfer Abilities  4. Decreased Ambulation Ability/Technique  5. Decreased Balance  6. Increased Pain  7. Decreased Activity Tolerance  8. Increased Shortness of Breath  9. Decreased Flexibility/Joint Mobility  10. Decreased Knowledge of Precautions   INTERVENTIONS PLANNED: (Benefits and precautions of physical therapy have been discussed with the patient.)  1. Balance Exercise  2. Bed Mobility  3. Family Education  4. Gait Training  5. Home Exercise Program (HEP)  6. Range of Motion (ROM)  7. Therapeutic Activites  8. Therapeutic Exercise/Strengthening  9. Transfer Training  10. Group Therapy     TREATMENT PLAN: Frequency/Duration: twice daily for duration of hospital stay  Rehabilitation Potential For Stated Goals: GOOD     RECOMMENDED REHABILITATION/EQUIPMENT: (at time of discharge pending progress): Due to the probability of continued deficits (see above) this patient will likely need continued skilled physical therapy after discharge.   Equipment:    TBD              HISTORY:   History of Present Injury/Illness (Reason for Referral):  S/p right IM nailing; WBAT R LE  Past Medical History/Comorbidities:   Ms. Ash Boone  has no past medical history on file. Ms. Ash Boone  has no past surgical history on file. Social History/Living Environment:   Home Environment: Private residence  # Steps to Enter: 5  Rails to Enter: No  Wheelchair Ramp: No  One/Two Story Residence: One story  Living Alone: Yes  Support Systems: Family member(s)  Patient Expects to be Discharged to[de-identified] Rehabilitation facility  Current DME Used/Available at Home: None  Prior Level of Function/Work/Activity:  Patient lives alone in a single story residence with 5 steps to enter. At baseline, patient is an independent, community level ambulator, working as a CNA at [employer], and driving. Number of Personal Factors/Comorbidities that affect the Plan of Care: 1-2: MODERATE COMPLEXITY   EXAMINATION:   Most Recent Physical Functioning:   Gross Assessment:                  Posture:     Balance:  Standing: Impaired  Standing - Static: Good (-)  Standing - Dynamic : Fair Bed Mobility:  Supine to Sit: Contact guard assistance  Wheelchair Mobility:     Transfers:  Sit to Stand: Stand-by assistance  Stand to Sit: Stand-by assistance  Gait:     Base of Support: Center of gravity altered;Narrowed  Speed/Yadi: Shuffled; Slow  Step Length: Left shortened;Right shortened  Distance (ft): 4 Feet (ft)  Assistive Device: Walker, rolling  Ambulation - Level of Assistance: Stand-by assistance      Body Structures Involved:  1. Bones  2. Joints  3. Muscles  4. Ligaments Body Functions Affected:  1. Neuromusculoskeletal  2. Movement Related Activities and Participation Affected:  1. Mobility  2. Self Care  3.  Domestic Life   Number of elements that affect the Plan of Care: 4+: HIGH COMPLEXITY   CLINICAL PRESENTATION:   Presentation: Stable and uncomplicated: LOW COMPLEXITY   CLINICAL DECISION MAKIN [place] [employer] Mobility Inpatient Short Form  How much difficulty does the patient currently have. .. Unable A Lot A Little None   1. Turning over in bed (including adjusting bedclothes, sheets and blankets)? [] 1   [] 2   [x] 3   [] 4   2. Sitting down on and standing up from a chair with arms ( e.g., wheelchair, bedside commode, etc.)   [] 1   [] 2   [x] 3   [] 4   3. Moving from lying on back to sitting on the side of the bed? [] 1   [] 2   [x] 3   [] 4   How much help from another person does the patient currently need. .. Total A Lot A Little None   4. Moving to and from a bed to a chair (including a wheelchair)? [] 1   [] 2   [x] 3   [] 4   5. Need to walk in hospital room? [] 1   [] 2   [x] 3   [] 4   6. Climbing 3-5 steps with a railing? [] 1   [x] 2   [] 3   [] 4   © 2007, Trustees of [address] [place] [address] [address] [address], under license to [employer]. All rights reserved      Score:  Initial: 17 Most Recent: 17 (Date: 2/28/18 )    Interpretation of Tool:  Represents activities that are increasingly more difficult (i.e. Bed mobility, Transfers, Gait). Score 24 23 22-20 19-15 14-10 9-7 6     Modifier CH CI CJ CK CL CM CN      ? Mobility - Walking and Moving Around:     - CURRENT STATUS: CK - 40%-59% impaired, limited or restricted    - GOAL STATUS: CJ - 20%-39% impaired, limited or restricted    - D/C STATUS:  ---------------To be determined---------------  Payor: BLUE CROSS / Plan: [place] [employer] OF [address] [place] [address] Rd / Product Type: PPO /      Medical Necessity:     · Patient demonstrates good rehab potential due to higher previous functional level. Reason for Services/Other Comments:  · Patient continues to require skilled intervention due to decreased functional mobility and balance/gait status from baseline. .   Use of outcome tool(s) and clinical judgement create a POC that gives a: Clear prediction of patient's progress: LOW COMPLEXITY            TREATMENT:   (In addition to Assessment/Re-Assessment sessions the following treatments were rendered)   Pre-treatment Symptoms/Complaints:  \" I have a headache. \" Stated she had tylenol earlier. Pain: Initial: 0/10     Post Session:  0/10      Therapeutic Activity: (    10 minutes Minutes): Therapeutic activities including bed mobility training, transfer training, static/dynamic standing balance training, ambulation on level ground, and patient education to improve mobility, strength and balance. Required contact guard assist to stand by assist    to promote static and dynamic balance in standing, promote coordination of bilateral, lower extremity(s) and to promote improved sequencing with rolling walker. GroupTherapeutic Exercise: (  10 min):  Exercises per grid below to improve mobility, strength and balance. Required min visual, verbal and manual cues to promote proper body alignment, promote proper body posture and promote proper body mechanics. Date:  3/2/18 Date:  3/4/18 Date:  3/5/18   ACTIVITY/EXERCISE AM PM AM PM AM PM   Ankle pumps 20  X 15 B  X 15 B    abduction 20  X 15 B  X 15 B    Hip flexion  20  X 15 B  X 15 B    LAQ 20  X 15 B  X 15 B             Shld flex   X 15 t-band      Elbow flex   X 15 t-band      Elbow ext   X 15 t-band      Shld horizontal ABD/ADD   X 15 t-band      B = bilateral; AA = active assistive; A = active; P = passive  Braces/Orthotics/Lines/Etc:   · O2 Device: Room air  Treatment/Session Assessment:    · Response to Treatment:  See above. · Interdisciplinary Collaboration:   o Physical Therapy Assistant  o Registered Nurse  · After treatment position/precautions:   o Up in chair  o Bed/Chair-wheels locked  o Call light within reach  o RN notified   · Compliance with Program/Exercises: compliant  · Recommendations/Intent for next treatment session: \"Next visit will focus on advancements to more challenging activities and reduction in assistance provided\".     Total Treatment Duration:  PT Patient Time In/Time Out  Time In: 1102 (1120)  Time Out: 1112 (1130)    Marito Gardiner Sushma, PTA

## 2018-03-05 NOTE — PROGRESS NOTES
Hospitalist Progress Note    3/5/2018  Admit Date: 2018 11:47 PM   NAME: Spencer Jessica   :  1960   MRN:  [id number]   Attending: Randy Martinez MD  PCP:  None    SUBJECTIVE:   Per H/P:  '61 y/o female with hx diabetes was reportedly at a party or some other function. She was standing behind the DJ and went to sit in a chain but instead fell and landed on her right hip. No other injury reported. In ED xray reveals a comminuted intertrochanteric fracture. She has hx knee surgery and did not have any complications from anesthesia or bleeding. She is currently sedated from pain medications she received in ED. No reported hx cardiovascular or pulmonary disease. Her glucose is 314 without evidence of DKA or hyperosmolar coma. She used to be on insulin but her family members report she no longer takes it. Will admit for glucose control and surgical repair of hip.'    - seen with her daughter, Luis Longoria, at bedside. She reports her R hip pain is controlled. Awaiting surgery for today. Denies new concerns. Blood sugars controlled. - POD 0 from R femur intra-medullary nail insertion. Seen with daughter, Tory Escalante, and sister, Leonor Torres, at bedside. She reports she is in significant pain. Denies other concerns. 3/1- POD 1 for R femur intra-medullary nail insertion. Pain well controlled. No fever, chills, CP, abd pain or urinary symptoms. Had some nausea earlier which resolved with Zofran as per pt.    3/2- POD 2 for R femur intra-medullary nail insertion. Pain well controlled. No fever, chills, CP, abd pain or urinary symptoms. Worked with PT today with no issues. 3/3- Pt seen and examined. She was lying in bed. Denies any symptoms of bleeding or dark stool. However, has not had a BM for the past few days. Dulcolox ordered. Hb continues to drop. IVF discontinued. Instructions given to pt to inform RN if any dark or bloody stools. Pt eating and drinking well. 3/4- Pt seen and examined.  She was lying in bed. Reports not having a BM yet. No fever, chills, CP, abd pain or urinary symptoms. No symptoms of bleeding. Worked with PT today. Hb stable today. Eating and drinking well. 3/5- Pt seen and examined. Pain well controlled. No fever, chills, CP, abd pain, N/V/D, bleeding or urinary symptoms. Review of Systems negative with exception of pertinent positives noted above  PHYSICAL EXAM     Visit Vitals    BP (!) 172/94 (BP 1 Location: Right arm, BP Patient Position: At rest)    Pulse 96    Temp 99.6 °F (37.6 °C)    Resp 18    Ht 5' 3\" (1.6 m)    Wt 79.4 kg (175 lb)    SpO2 96%    BMI 31 kg/m2      Temp (24hrs), Av.4 °F (36.9 °C), Min:97.3 °F (36.3 °C), Max:99.6 °F (37.6 °C)    Patient Vitals for the past 24 hrs:   Temp Pulse Resp BP SpO2   18 1059 99.6 °F (37.6 °C) 96 18 (!) 172/94 96 %   18 0705 98.7 °F (37.1 °C) 93 18 (!) 181/92 95 %   18 0454 97.9 °F (36.6 °C) 92 17 (!) 164/94 96 %   18 2318 98.6 °F (37 °C) 90 17 151/90 98 %   18 1929 97.3 °F (36.3 °C) 92 17 (!) 157/91 96 %       Oxygen Therapy  O2 Sat (%): 96 % (18 1059)  Pulse via Oximetry: 91 beats per minute (18 0420)  O2 Device: Room air (18 042)  O2 Flow Rate (L/min): 3 l/min (18 0933)  No intake or output data in the 24 hours ending 18 1503     General: NAD, obese    Lungs:  CTA Bilaterally.    Heart:  Regular rate and rhythm,  No murmur, rub, or gallop  Abdomen: Soft, Non distended, Non tender, Positive bowel sounds  Extremities: No cyanosis, clubbing or edema  Neurologic:  No focal deficits    Recent Results (from the past 24 hour(s))   GLUCOSE, POC    Collection Time: 18  4:04 PM   Result Value Ref Range    Glucose (POC) 176 (H) 65 - 100 mg/dL   GLUCOSE, POC    Collection Time: 18  8:59 PM   Result Value Ref Range    Glucose (POC) 190 (H) 65 - 100 mg/dL   CBC W/O DIFF    Collection Time: 18  6:08 AM   Result Value Ref Range    WBC 7.5 4.3 - 11.1 K/uL RBC 3.54 (L) 4.05 - 5.25 M/uL    HGB 10.3 (L) 11.7 - 15.4 g/dL    HCT 31.0 (L) 35.8 - 46.3 %    MCV 87.6 79.6 - 97.8 FL    MCH 29.1 26.1 - 32.9 PG    MCHC 33.2 31.4 - 35.0 g/dL    RDW 13.4 11.9 - 14.6 %    PLATELET 237 018 - 932 K/uL    MPV 10.1 (L) 10.8 - 95.4 FL   METABOLIC PANEL, BASIC    Collection Time: 03/05/18  6:08 AM   Result Value Ref Range    Sodium 144 136 - 145 mmol/L    Potassium 3.3 (L) 3.5 - 5.1 mmol/L    Chloride 106 98 - 107 mmol/L    CO2 30 21 - 32 mmol/L    Anion gap 8 7 - 16 mmol/L    Glucose 124 (H) 65 - 100 mg/dL    BUN 10 6 - 23 MG/DL    Creatinine 0.47 (L) 0.6 - 1.0 MG/DL    GFR est AA >60 >60 ml/min/1.73m2    GFR est non-AA >60 >60 ml/min/1.73m2    Calcium 8.8 8.3 - 10.4 MG/DL   GLUCOSE, POC    Collection Time: 03/05/18  7:08 AM   Result Value Ref Range    Glucose (POC) 145 (H) 65 - 100 mg/dL   GLUCOSE, POC    Collection Time: 03/05/18 11:04 AM   Result Value Ref Range    Glucose (POC) 186 (H) 65 - 100 mg/dL     Imaging:    XR HIP RT W OR WO PELV 2-3 VWS   Final Result   Impression: Unchanged postoperative findings as above. XR FEMUR RT 2 VS   Final Result   Impression: Unchanged postoperative findings as above. XR FEMUR RT 2 VS   Final Result   IMPRESSION: Surgical fixation of right hip fracture. XR FEMUR RT 2 VS   Final Result   IMPRESSION: Comminuted intertrochanteric fracture. The mid and distal femur are   intact. XR CHEST SNGL V   Final Result   IMPRESSION: Normal chest.             XR HIP RT W OR WO PELV 2-3 VWS   Final Result   IMPRESSION: Comminuted intertrochanteric fracture which extends of the proximal   femoral diaphysis.                NC XR TECHNOLOGIST SERVICE    (Results Pending)         ASSESSMENT      Hospital Problems as of 3/5/2018  Never Reviewed          Codes Class Noted - Resolved POA    Essential hypertension ICD-10-CM: I10  ICD-9-CM: 401.9  2/28/2018 - Present Unknown        * (Principal)Intertrochanteric fracture of right femur [facility]) ICD-10-CM: R87.072B  ICD-9-CM: 820.21  2/24/2018 - Present Yes        Hyperglycemia due to type 2 diabetes mellitus ([facility] ICD-10-CM: E11.65  ICD-9-CM: 250.00  2/24/2018 - Present Yes            Plan:  · R femur fx- s/p ORIF 2/28. Pain well controlled. · T2DM- controlled. On SSI for now. · HTN- BP better controlled and suspect this is pain related. Cont current regimen. · Anemia- slight iron deficiency. Likely also related to blood loss from injury/surgery. Stable. Monitor for bleeding and transfuse as needed. Dispo: Discussed with CM. Awaiting rehab approval.     DVT Prophylaxis: SCDs    Signed By: Alejandra Guzman MD     March 5, 2018      .

## 2018-03-05 NOTE — PROGRESS NOTES
Problem: Mobility Impaired (Adult and Pediatric)  Goal: *Acute Goals and Plan of Care (Insert Text)  STG:  (1.)Ms. Sonido Bowling will perform bed mobility with CONTACT GUARD ASSIST within 3 treatment day(s). (2.)Ms. Sonido Bowling will transfer from bed to chair and chair to bed with CONTACT GUARD ASSIST using the RW within 3 treatment day(s). (3.)Ms. Sonido Bowling will ambulate with MINIMAL ASSIST for 50+ feet with RW within 3 treatment day(s). Goal met 3/2/18  (4.)Ms. Sonido Bowling will tolerate 25+ minutes of therapeutic activity/exercise while maintaining stable vitals to improve functional strength and mobility within 3 day(s). LTG:  (1.)Ms. Sonido Bowling will perform bed mobility with STAND BY ASSISTANCE within 7 treatment day(s). (2.)Ms. Sonido Bowling will transfer from bed to chair and chair to bed with STAND BY ASSIST using RWwithin 7 treatment day(s). (3.)Ms. Sonido Bowling will ambulate with CONTACT GUARD ASSIST for 150+ feet with RW within 7 treatment day(s). (4.)Ms. Sonido Bowling will demonstrate good dynamic standing balance utilizing RW within 7 day(s). ________________________________________________________________________________________________    PHYSICAL THERAPY: Daily Note, Treatment Day: 4th, PM 3/5/2018  INPATIENT: Hospital Day: 11  Payor: BLUE CROSS / [place] [facility] / Product Type: PPO /      WBAT R LE     NAME/AGE/GENDER: Yuan White is a 62 y.o. female   PRIMARY DIAGNOSIS: Closed displaced subtrochanteric fracture of right femur, initial encounter ([facility] [S72.21XA] Intertrochanteric fracture of right femur ([facility] Intertrochanteric fracture of right femur ([facility]  Procedure(s) (LRB):  ORIF RIGHT SUBTROCHANTERIC FEMUR FRACTURE WITH INSERTION INTRA MEDULLARY NAIL  (Right)  5 Days Post-Op  ICD-10: Treatment Diagnosis:   · Difficulty in walking, Not elsewhere classified (R26.2)  · History of falling (Z91.81)   Precaution/Allergies:  Review of patient's allergies indicates no known allergies. ASSESSMENT:     Ms. Dee Cruz is a 62year old female s/p IM nailing of right subtrochanteric femur fracture and is WBAT R LE. Pt in chair from AM treatment. PCT stated she was ready to get back in bed. Pt stood and ambulated ~30' with RW and SBA. Returned EOB then min assist to get to supine. Pt bears wt through her R LE but doesn't bend R knee knee. Motivation is low. Slow progress. Will continue with stated plan of care and PT efforts. This section established at most recent assessment   PROBLEM LIST (Impairments causing functional limitations):  1. Decreased Strength  2. Decreased ADL/Functional Activities  3. Decreased Transfer Abilities  4. Decreased Ambulation Ability/Technique  5. Decreased Balance  6. Increased Pain  7. Decreased Activity Tolerance  8. Increased Shortness of Breath  9. Decreased Flexibility/Joint Mobility  10. Decreased Knowledge of Precautions   INTERVENTIONS PLANNED: (Benefits and precautions of physical therapy have been discussed with the patient.)  1. Balance Exercise  2. Bed Mobility  3. Family Education  4. Gait Training  5. Home Exercise Program (HEP)  6. Range of Motion (ROM)  7. Therapeutic Activites  8. Therapeutic Exercise/Strengthening  9. Transfer Training  10. Group Therapy     TREATMENT PLAN: Frequency/Duration: twice daily for duration of hospital stay  Rehabilitation Potential For Stated Goals: GOOD     RECOMMENDED REHABILITATION/EQUIPMENT: (at time of discharge pending progress): Due to the probability of continued deficits (see above) this patient will likely need continued skilled physical therapy after discharge. Equipment:    TBD              HISTORY:   History of Present Injury/Illness (Reason for Referral):  S/p right IM nailing; WBAT R LE  Past Medical History/Comorbidities:   Ms. Dee Cruz  has no past medical history on file. Ms. Dee Cruz  has no past surgical history on file.   Social History/Living Environment:   Home Environment: Private residence  # Steps to Enter: 5  Rails to Enter: No  Wheelchair Ramp: No  One/Two Story Residence: One story  Living Alone: Yes  Support Systems: Family member(s)  Patient Expects to be Discharged to[de-identified] Rehabilitation facility  Current DME Used/Available at Home: None  Prior Level of Function/Work/Activity:  Patient lives alone in a single story residence with 5 steps to enter. At baseline, patient is an independent, community level ambulator, working as a CNA at [employer], and driving. Number of Personal Factors/Comorbidities that affect the Plan of Care: 1-2: MODERATE COMPLEXITY   EXAMINATION:   Most Recent Physical Functioning:   Gross Assessment:                  Posture:     Balance:  Standing: Impaired  Standing - Static: Good (-)  Standing - Dynamic : Fair Bed Mobility:  Supine to Sit: Contact guard assistance  Wheelchair Mobility:     Transfers:  Sit to Stand: Stand-by assistance  Stand to Sit: Stand-by assistance  Gait:     Base of Support: Center of gravity altered;Narrowed  Speed/Yadi: Shuffled; Slow  Step Length: Left shortened;Right shortened  Distance (ft): 4 Feet (ft)  Assistive Device: Walker, rolling  Ambulation - Level of Assistance: Stand-by assistance      Body Structures Involved:  1. Bones  2. Joints  3. Muscles  4. Ligaments Body Functions Affected:  1. Neuromusculoskeletal  2. Movement Related Activities and Participation Affected:  1. Mobility  2. Self Care  3. Domestic Life   Number of elements that affect the Plan of Care: 4+: HIGH COMPLEXITY   CLINICAL PRESENTATION:   Presentation: Stable and uncomplicated: LOW COMPLEXITY   CLINICAL DECISION MAKIN [facility] [address] [address] AM-PAC 6 Clicks   Basic Mobility Inpatient Short Form  How much difficulty does the patient currently have. .. Unable A Lot A Little None   1. Turning over in bed (including adjusting bedclothes, sheets and blankets)? [] 1   [] 2   [x] 3   [] 4   2.   Sitting down on and standing up from a chair with arms ( e.g., wheelchair, bedside commode, etc.)   [] 1   [] 2   [x] 3   [] 4   3. Moving from lying on back to sitting on the side of the bed? [] 1   [] 2   [x] 3   [] 4   How much help from another person does the patient currently need. .. Total A Lot A Little None   4. Moving to and from a bed to a chair (including a wheelchair)? [] 1   [] 2   [x] 3   [] 4   5. Need to walk in hospital room? [] 1   [] 2   [x] 3   [] 4   6. Climbing 3-5 steps with a railing? [] 1   [x] 2   [] 3   [] 4   © 2007, Trustees of [address] [place] [address] [address] [address], under license to [employer]. All rights reserved      Score:  Initial: 17 Most Recent: 17 (Date: 2/28/18 )    Interpretation of Tool:  Represents activities that are increasingly more difficult (i.e. Bed mobility, Transfers, Gait). Score 24 23 22-20 19-15 14-10 9-7 6     Modifier CH CI CJ CK CL CM CN      ? Mobility - Walking and Moving Around:     - CURRENT STATUS: CK - 40%-59% impaired, limited or restricted    - GOAL STATUS: CJ - 20%-39% impaired, limited or restricted    - D/C STATUS:  ---------------To be determined---------------  Payor: BLUE CROSS / [place] OF [address] [place] [address] Rd / Product Type: PPO /      Medical Necessity:     · Patient demonstrates good rehab potential due to higher previous functional level. Reason for Services/Other Comments:  · Patient continues to require skilled intervention due to decreased functional mobility and balance/gait status from baseline. .   Use of outcome tool(s) and clinical judgement create a POC that gives a: Clear prediction of patient's progress: LOW COMPLEXITY            TREATMENT:   (In addition to Assessment/Re-Assessment sessions the following treatments were rendered)   Pre-treatment Symptoms/Complaints:    Pain: Initial: 0/10     Post Session:  0/10      Therapeutic Activity: (    15 Minutes):   Therapeutic activities including bed mobility training, transfer training, static/dynamic standing balance training, ambulation on level ground, and patient education to improve mobility, strength and balance. Required contact guard assist to stand by assist    to promote static and dynamic balance in standing, promote coordination of bilateral, lower extremity(s) and to promote improved sequencing with rolling walker. GroupTherapeutic Exercise: (   min):  Exercises per grid below to improve mobility, strength and balance. Required min visual, verbal and manual cues to promote proper body alignment, promote proper body posture and promote proper body mechanics. Date:  3/2/18 Date:  3/4/18 Date:  3/5/18   ACTIVITY/EXERCISE AM PM AM PM AM PM   Ankle pumps 20  X 15 B  X 15 B    abduction 20  X 15 B  X 15 B    Hip flexion  20  X 15 B  X 15 B    LAQ 20  X 15 B  X 15 B             Shld flex   X 15 t-band      Elbow flex   X 15 t-band      Elbow ext   X 15 t-band      Shld horizontal ABD/ADD   X 15 t-band      B = bilateral; AA = active assistive; A = active; P = passive  Braces/Orthotics/Lines/Etc:   · O2 Device: Room air  Treatment/Session Assessment:    · Response to Treatment:  See above. · Interdisciplinary Collaboration:   o Physical Therapy Assistant  o Registered Nurse  · After treatment position/precautions:   o Supine in bed  o Bed/Chair-wheels locked  o Call light within reach  o RN notified   · Compliance with Program/Exercises: compliant  · Recommendations/Intent for next treatment session: \"Next visit will focus on advancements to more challenging activities and reduction in assistance provided\".     Total Treatment Duration:  PT Patient Time In/Time Out  Time In: 1242  Time Out: 35 [facility]

## 2018-03-06 VITALS
TEMPERATURE: 99.2 F | SYSTOLIC BLOOD PRESSURE: 157 MMHG | RESPIRATION RATE: 17 BRPM | DIASTOLIC BLOOD PRESSURE: 84 MMHG | OXYGEN SATURATION: 96 % | HEART RATE: 97 BPM | HEIGHT: 63 IN | BODY MASS INDEX: 31.01 KG/M2 | WEIGHT: 175 LBS

## 2018-03-06 LAB
ANION GAP SERPL CALC-SCNC: 8 MMOL/L (ref 7–16)
BUN SERPL-MCNC: 8 MG/DL (ref 6–23)
CALCIUM SERPL-MCNC: 8.6 MG/DL (ref 8.3–10.4)
CHLORIDE SERPL-SCNC: 104 MMOL/L (ref 98–107)
CO2 SERPL-SCNC: 29 MMOL/L (ref 21–32)
CREAT SERPL-MCNC: 0.55 MG/DL (ref 0.6–1)
GLUCOSE BLD STRIP.AUTO-MCNC: 135 MG/DL (ref 65–100)
GLUCOSE BLD STRIP.AUTO-MCNC: 136 MG/DL (ref 65–100)
GLUCOSE SERPL-MCNC: 130 MG/DL (ref 65–100)
POTASSIUM SERPL-SCNC: 3.4 MMOL/L (ref 3.5–5.1)
SODIUM SERPL-SCNC: 141 MMOL/L (ref 136–145)

## 2018-03-06 PROCEDURE — 97150 GROUP THERAPEUTIC PROCEDURES: CPT

## 2018-03-06 PROCEDURE — 82962 GLUCOSE BLOOD TEST: CPT

## 2018-03-06 PROCEDURE — 74011250637 HC RX REV CODE- 250/637: Performed by: NURSE PRACTITIONER

## 2018-03-06 PROCEDURE — 36415 COLL VENOUS BLD VENIPUNCTURE: CPT | Performed by: INTERNAL MEDICINE

## 2018-03-06 PROCEDURE — 74011250636 HC RX REV CODE- 250/636: Performed by: NURSE PRACTITIONER

## 2018-03-06 PROCEDURE — 80048 BASIC METABOLIC PNL TOTAL CA: CPT | Performed by: INTERNAL MEDICINE

## 2018-03-06 PROCEDURE — 74011250637 HC RX REV CODE- 250/637: Performed by: INTERNAL MEDICINE

## 2018-03-06 PROCEDURE — 97530 THERAPEUTIC ACTIVITIES: CPT

## 2018-03-06 RX ORDER — POTASSIUM CHLORIDE 20 MEQ/1
20 TABLET, EXTENDED RELEASE ORAL DAILY
Status: DISCONTINUED | OUTPATIENT
Start: 2018-03-06 | End: 2018-03-06 | Stop reason: HOSPADM

## 2018-03-06 RX ORDER — POTASSIUM CHLORIDE 20 MEQ/1
20 TABLET, EXTENDED RELEASE ORAL DAILY
Qty: 30 TAB | Refills: 0 | Status: SHIPPED | OUTPATIENT
Start: 2018-03-07 | End: 2019-09-26

## 2018-03-06 RX ORDER — OXYCODONE HYDROCHLORIDE 5 MG/1
5-10 TABLET ORAL
Qty: 30 TAB | Refills: 0 | Status: ON HOLD | OUTPATIENT
Start: 2018-03-06 | End: 2018-03-21

## 2018-03-06 RX ORDER — ENOXAPARIN SODIUM 100 MG/ML
30 INJECTION SUBCUTANEOUS DAILY
Qty: 25 SYRINGE | Refills: 0 | Status: SHIPPED | OUTPATIENT
Start: 2018-03-07 | End: 2018-03-09

## 2018-03-06 RX ORDER — FERROUS SULFATE, DRIED 160(50) MG
1 TABLET, EXTENDED RELEASE ORAL 2 TIMES DAILY WITH MEALS
Qty: 60 TAB | Refills: 2 | Status: ON HOLD | OUTPATIENT
Start: 2018-03-06 | End: 2019-09-26 | Stop reason: SDUPTHER

## 2018-03-06 RX ADMIN — ONDANSETRON 4 MG: 2 INJECTION INTRAMUSCULAR; INTRAVENOUS at 12:02

## 2018-03-06 RX ADMIN — ACETAMINOPHEN 650 MG: 325 TABLET ORAL at 05:28

## 2018-03-06 RX ADMIN — ENOXAPARIN SODIUM 30 MG: 30 INJECTION SUBCUTANEOUS at 09:10

## 2018-03-06 RX ADMIN — CALCIUM CARBONATE 500 MG (1,250 MG)-VITAMIN D3 200 UNIT TABLET 1 TABLET: at 12:02

## 2018-03-06 RX ADMIN — CALCIUM CARBONATE 500 MG (1,250 MG)-VITAMIN D3 200 UNIT TABLET 1 TABLET: at 09:10

## 2018-03-06 RX ADMIN — BISACODYL 10 MG: 5 TABLET, COATED ORAL at 09:13

## 2018-03-06 RX ADMIN — LISINOPRIL 10 MG: 5 TABLET ORAL at 09:10

## 2018-03-06 RX ADMIN — DOCUSATE SODIUM 100 MG: 100 CAPSULE, LIQUID FILLED ORAL at 09:10

## 2018-03-06 RX ADMIN — Medication 10 ML: at 05:28

## 2018-03-06 NOTE — PROGRESS NOTES
Problem: Mobility Impaired (Adult and Pediatric)  Goal: *Acute Goals and Plan of Care (Insert Text)  STG:  (1.)Ms. Natalie Laws will perform bed mobility with CONTACT GUARD ASSIST within 3 treatment day(s). (2.)Ms. Natalie Laws will transfer from bed to chair and chair to bed with CONTACT GUARD ASSIST using the RW within 3 treatment day(s). (3.)Ms. Natalie Laws will ambulate with MINIMAL ASSIST for 50+ feet with RW within 3 treatment day(s). Goal met 3/2/18  (4.)Ms. Natalie Laws will tolerate 25+ minutes of therapeutic activity/exercise while maintaining stable vitals to improve functional strength and mobility within 3 day(s). LTG:  (1.)Ms. Natalie Laws will perform bed mobility with STAND BY ASSISTANCE within 7 treatment day(s). (2.)Ms. Natalie Laws will transfer from bed to chair and chair to bed with STAND BY ASSIST using RWwithin 7 treatment day(s). (3.)Ms. Natalie Laws will ambulate with CONTACT GUARD ASSIST for 150+ feet with RW within 7 treatment day(s). (4.)Ms. Natalie Laws will demonstrate good dynamic standing balance utilizing RW within 7 day(s). ________________________________________________________________________________________________    PHYSICAL THERAPY: Daily Note, Treatment Day: 4th, AM 3/6/2018  INPATIENT: Hospital Day: 12  [place]UE CR[place] / [place] formerly [facility] / Product Type: PPO /      WBAT R LE     NAME/AGE/GENDER: Patricia Good is a 62 y.o. female   PRIMARY DIAGNOSIS: Closed displaced subtrochanteric [facility] right [facility] [S72.21XA] [facility]tro[facility] right [facility] Intertrochanteric fracture of right femur (HCC)  Procedure(s) (LRB):  ORIF RIGHT SUBTROCHANTERIC FEMUR FRACTURE WITH INSERTION INTRA MEDULLARY NAIL  (Right)  6 Days Post-Op  ICD-10: Treatment Diagnosis:   · Difficulty in walking, Not elsewhere classified (R26.2)  · History of falling (Z91.81)   Precaution/Allergies:  Review of patient's allergies indicates no known allergies. ASSESSMENT:     Ms. Nicole Ervin is a 62year old female s/p IM nailing of right subtrochanteric femur fracture and is WBAT R LE. Pt was brought to therapy gym where she was able to participate in group exercises as below. Pt did require some assist for her R LE. Pt then stood and ambulated about 48' with rolling walker and SBA. Pt did require cues to keep looking forward and not down. Pt ambulates with a step to gait pattern with a slow ziggy. Pt returned to chair in room and was left up with needs in reach. Good progress today towards goals. Will continue with POC. This section established at most recent assessment   PROBLEM LIST (Impairments causing functional limitations):  1. Decreased Strength  2. Decreased ADL/Functional Activities  3. Decreased Transfer Abilities  4. Decreased Ambulation Ability/Technique  5. Decreased Balance  6. Increased Pain  7. Decreased Activity Tolerance  8. Increased Shortness of Breath  9. Decreased Flexibility/Joint Mobility  10. Decreased Knowledge of Precautions   INTERVENTIONS PLANNED: (Benefits and precautions of physical therapy have been discussed with the patient.)  1. Balance Exercise  2. Bed Mobility  3. Family Education  4. Gait Training  5. Home Exercise Program (HEP)  6. Range of Motion (ROM)  7. Therapeutic Activites  8. Therapeutic Exercise/Strengthening  9. Transfer Training  10. Group Therapy     TREATMENT PLAN: Frequency/Duration: twice daily for duration of hospital stay  Rehabilitation Potential For Stated Goals: GOOD     RECOMMENDED REHABILITATION/EQUIPMENT: (at time of discharge pending progress): Due to the probability of continued deficits (see above) this patient will likely need continued skilled physical therapy after discharge.   Equipment:    TBD              HISTORY:   History of Present Injury/Illness (Reason for Referral):  S/p right IM nailing; WBAT R LE  Past Medical History/Comorbidities:   Ms. Nicole Ervin  has no past medical history on file.  Ms. Padmini Garcia  has no past surgical history on file. Social History/Living Environment:   Home Environment: Private residence  # Steps to Enter: 5  Rails to Enter: No  Wheelchair Ramp: No  One/Two Story Residence: One story  Living Alone: Yes  Support Systems: Family member(s)  Patient Expects to be Discharged to[de-identified] Rehabilitation facility  Current DME Used/Available at Home: None  Prior Level of Function/Work/Activity:  Patient lives alone in a single story residence with 5 steps to enter. At baseline, patient is an independent, community level ambulator, working as a CNA at [employer], and driving. Number of Personal Factors/Comorbidities that affect the Plan of Care: 1-2: MODERATE COMPLEXITY   EXAMINATION:   Most Recent Physical Functioning:   Gross Assessment:                  Posture:     Balance:  Sitting: Intact  Standing - Static: Good  Standing - Dynamic : Fair (+) Bed Mobility:     Wheelchair Mobility:     Transfers:  Sit to Stand: Stand-by assistance  Stand to Sit: Stand-by assistance  Gait:     Base of Support: Narrowed  Speed/Yadi: Pace decreased (<100 feet/min)  Step Length: Left shortened;Right shortened  Gait Abnormalities: Decreased step clearance; Step to gait  Distance (ft): 50 Feet (ft)  Assistive Device: Walker, rolling  Interventions: Safety awareness training;Verbal cues      Body Structures Involved:  1. Bones  2. Joints  3. Muscles  4. Ligaments Body Functions Affected:  1. Neuromusculoskeletal  2. Movement Related Activities and Participation Affected:  1. Mobility  2. Self Care  3. Domestic Life   Number of elements that affect the Plan of Care: 4+: HIGH COMPLEXITY   CLINICAL PRESENTATION:   Presentation: Stable and uncomplicated: LOW COMPLEXITY   CLINICAL DECISION MAKIN [facility] [address] [address] AM-PAC 6 Clicks   Basic Mobility Inpatient Short Form  How much difficulty does the patient currently have. .. Unable A Lot A Little None   1.   Turning over in bed (including adjusting bedclothes, sheets and blankets)? [] 1   [] 2   [x] 3   [] 4   2. Sitting down on and standing up from a chair with arms ( e.g., wheelchair, bedside commode, etc.)   [] 1   [] 2   [x] 3   [] 4   3. Moving from lying on back to sitting on the side of the bed? [] 1   [] 2   [x] 3   [] 4   How much help from another person does the patient currently need. .. Total A Lot A Little None   4. Moving to and from a bed to a chair (including a wheelchair)? [] 1   [] 2   [x] 3   [] 4   5. Need to walk in hospital room? [] 1   [] 2   [x] 3   [] 4   6. Climbing 3-5 steps with a railing? [] 1   [x] 2   [] 3   [] 4   © 2007, Trustees of [address] [place] [address], under license to [employer]. All rights reserved      Score:  Initial: 17 Most Recent: 17 (Date: 2/28/18 )    Interpretation of Tool:  Represents activities that are increasingly more difficult (i.e. Bed mobility, Transfers, Gait). Score 24 23 22-20 19-15 14-10 9-7 6     Modifier CH CI CJ CK CL CM CN      ? Mobility - Walking and Moving Around:     - CURRENT STATUS: CK - 40%-59% impaired, limited or restricted    - GOAL STATUS: CJ - 20%-39% impaired, limited or restricted    - D/C STATUS:  ---------------To be determined---------------  Payor: BLUE CROSS / Plan: [place] BLUE [address] / Product Type: PPO /      Medical Necessity:     · Patient demonstrates good rehab potential due to higher previous functional level. Reason for Services/Other Comments:  · Patient continues to require skilled intervention due to decreased functional mobility and balance/gait status from baseline. .   Use of outcome tool(s) and clinical judgement create a POC that gives a: Clear prediction of patient's progress: LOW COMPLEXITY            TREATMENT:      Pre-treatment Symptoms/Complaints:    Pain: Initial: 0/10     Post Session:  0/10      Therapeutic Activity: (   14 Minutes):   Therapeutic activities including bed mobility training, transfer training, static/dynamic standing balance training, ambulation on level ground, and patient education to improve mobility, strength and balance. Required contact guard assist to stand by assist  Safety awareness training;Verbal cues to promote static and dynamic balance in standing, promote coordination of bilateral, lower extremity(s) and to promote improved sequencing with rolling walker. GroupTherapeutic Exercise: (  10 minutes):  Exercises per grid below to improve mobility, strength and balance. Required min visual, verbal and manual cues to promote proper body alignment, promote proper body posture and promote proper body mechanics. Date:  3/2/18 Date:  3/4/18 Date:  3/5/18 Date:  3/6/18   ACTIVITY/EXERCISE AM PM AM PM AM PM AM   Ankle pumps 20  X 15 B  X 15 B  X 15 B   abduction 20  X 15 B  X 15 B  X 15 B, AA-R   Hip flexion  20  X 15 B  X 15 B  X 15 B, AA-R   LAQ 20  X 15 B  X 15 B  X 15 B             Shld flex   X 15 t-band       Elbow flex   X 15 t-band       Elbow ext   X 15 t-band       Shld horizontal ABD/ADD   X 15 t-band       B = bilateral; AA = active assistive; A = active; P = passive  Braces/Orthotics/Lines/Etc:   · O2 Device: Room air  Treatment/Session Assessment:    · Response to Treatment:  See above. · Interdisciplinary Collaboration:   o Physical Therapy Assistant  o Registered Nurse  · After treatment position/precautions:   o Up in chair  o Bed/Chair-wheels locked  o Call light within reach  o RN notified   · Compliance with Program/Exercises: compliant  · Recommendations/Intent for next treatment session: \"Next visit will focus on advancements to more challenging activities and reduction in assistance provided\".     Total Treatment Duration:  PT Patient Time In/Time Out  Time In: 1130 (1141)  Time Out: 1140 (1155)    [facility]

## 2018-03-06 NOTE — PROGRESS NOTES
Problem: Self Care Deficits Care Plan (Adult)  Goal: *Acute Goals and Plan of Care (Insert Text)  LTG 1: Pt will be S with toileting by 3/6/18 to prevent skin breakdown. LTG 2: Pt will be min A with LB dressing by 3/6/18 to reduce risk of falls. LTG 3: Pt will be SBA with bathing by 3/6/18 to promote good skin integrity. LTG 4: Pt will be SBA with toilet transfers by 3/6/18 to promote quality of life. LTG 5: Pt will be S with HEP by 3/6/18 to prevent deconditioning. OCCUPATIONAL THERAPY: Daily Note, Treatment Day: 3rd and AM 3/6/2018  INPATIENT: Hospital Day: 12  Payor: BLUE CROSS / [place] [facility] / Product Type: PPO /      NAME/AGE/GENDER: Margie Aviles is a 62 y.o. female   PRIMARY DIAGNOSIS:  Closed displaced subtrochanteric fracture of right femur, initial encounter ([facility] [address] [S72.21XA] Intertrochanteric fracture of right femur ([facility] [address] Intertrochanteric fracture of right femur (HCC)  Procedure(s) (LRB):  ORIF RIGHT SUBTROCHANTERIC FEMUR FRACTURE WITH INSERTION INTRA MEDULLARY NAIL  (Right)  6 Days Post-Op  ICD-10: Treatment Diagnosis:    · Generalized Muscle Weakness (M62.81)   Precautions/Allergies:     Review of patient's allergies indicates no known allergies. ASSESSMENT:   Ms. Cb Avitia was admitted for the above diagnosis. Pt was brought to therapy gym to participate in group exercises (in grid below) to increase UE strength and activity tolerance to perform mobility and ADLs. Pt required visual and verbal cueing to complete exercises. Pt tolerated session well. Returned to room by PTA. Will continue to benefit from skilled OT during stay. This section established at most recent assessment   PROBLEM LIST (Impairments causing functional limitations):  1. Decreased Strength  2. Decreased ADL/Functional Activities  3. Decreased Transfer Abilities  4. Decreased Ambulation Ability/Technique  5. Decreased Balance  6. Increased Pain  7.  Decreased Flexibility/Joint Mobility INTERVENTIONS PLANNED: (Benefits and precautions of occupational therapy have been discussed with the patient.)  1. Activities of daily living training  2. Group therapy  3. Therapeutic activity  4. Therapeutic exercise     TREATMENT PLAN: Frequency/Duration: Follow patient 3x to address above goals. Rehabilitation Potential For Stated Goals: Excellent     RECOMMENDED REHABILITATION/EQUIPMENT: (at time of discharge pending progress): Due to the probability of continued deficits (see above) this patient will likely need continued skilled occupational therapy after discharge. Equipment:    TBD              OCCUPATIONAL PROFILE AND HISTORY:   History of Present Injury/Illness (Reason for Referral):  Please see H&P  Past Medical History/Comorbidities:   Ms. Cb Avitia  has no past medical history on file. Ms. Cb Avitia  has no past surgical history on file. 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 Number of Personal Factors/Comorbidities that affect the Plan of Care: Expanded review of therapy/medical records (1-2):  MODERATE COMPLEXITY   ASSESSMENT OF OCCUPATIONAL PERFORMANCE[de-identified]   Activities of Daily Living:         Pt was I. Basic ADLs (From Assessment) Complex ADLs (From Assessment)   Basic ADL  Feeding: Independent  Oral Facial Hygiene/Grooming: Setup  Bathing: Moderate assistance  Upper Body Dressing: Setup  Lower Body Dressing: Maximum assistance  Toileting:  Moderate assistance     Grooming/Bathing/Dressing Activities of Daily Living                                     Most Recent Physical Functioning:   Gross Assessment: Posture:  Posture (WDL): Within defined limits  Balance:  Sitting: Intact Bed Mobility:     Wheelchair Mobility:     Transfers:                 Patient Vitals for the past 6 hrs:   BP BP Patient Position SpO2 Pulse   03/06/18 0755 143/85 At rest 95 % 92   03/06/18 1201 (!) 170/96 Sitting 97 % 97       Mental Status  Neurologic State: Alert  Orientation Level: Oriented X4  Cognition: Follows commands  Perception: Appears intact  Perseveration: No perseveration noted  Safety/Judgement: Awareness of environment                          Physical Skills Involved:  1. Range of Motion  2. Balance  3. Strength  4. Gross Motor Control  5. Pain (acute) Cognitive Skills Affected (resulting in the inability to perform in a timely and safe manner):  1. N/A Psychosocial Skills Affected:  1. N/A   Number of elements that affect the Plan of Care: 5+:  HIGH COMPLEXITY   CLINICAL DECISION MAKING:   [employer] -[facility] 6 Clicks   Daily Activity Inpatient Short Form  How much help from another person does the patient currently need. .. Total A Lot A Little None   1. Putting on and taking off regular lower body clothing? [x] 1   [] 2   [] 3   [] 4   2. Bathing (including washing, rinsing, drying)? [] 1   [x] 2   [] 3   [] 4   3. Toileting, which includes using toilet, bedpan or urinal?   [] 1   [x] 2   [] 3   [] 4   4. Putting on and taking off regular upper body clothing? [] 1   [] 2   [x] 3   [] 4   5. Taking care of personal grooming such as brushing teeth? [] 1   [] 2   [x] 3   [] 4   6. Eating meals? [] 1   [] 2   [] 3   [x] 4   © 2007, Trustees of [employer], under license to [employer]. All rights reserved      Score:  Initial: 15 Most Recent: X (Date: -- )    Interpretation of Tool:  Represents activities that are increasingly more difficult (i.e. Bed mobility, Transfers, Gait). Score 24 23 22-20 19-15 14-10 9-7 6     Modifier CH CI CJ CK CL CM CN      ?  Self Care:     - CURRENT STATUS: CK - 40%-59% impaired, limited or restricted    - GOAL STATUS: CJ - 20%-39% impaired, limited or restricted    - D/C STATUS:  ---------------To be determined---------------  Payor: BLUE CROSS / [place] [facility] / Product Type: PPO /      Medical Necessity:     · Skilled intervention continues to be required due to decrease from PLOF. Reason for Services/Other Comments:  · Patient continues to require skilled intervention due to decreased mobility. Use of outcome tool(s) and clinical judgement create a POC that gives a: MODERATE COMPLEXITY         TREATMENT:   (In addition to Assessment/Re-Assessment sessions the following treatments were rendered)     Pre-treatment Symptoms/Complaints:  Pain in RLE  Pain: Initial:   Pain Intensity 1: 0  Pain Location 1: Hip  Pain Orientation 1: Right  Post Session:       Group Therapeutic Exercise: (10 minutes):  Exercises per grid below to improve mobility, strength and activity tolerance. Required minimal visual, verbal and tactile cues to promote proper body alignment and promote proper body mechanics. Progressed resistance and repetitions as indicated.     UE Exercises Date:  3/2/2018 Date:  3/5/2018 Date:  3/6/2018   Activity/Exercise Parameters Parameters Parameters   Shoulder Abd/Adduction 20 reps with red theraband 20 reps with theraband 20 reps with theraband   Shoulder Flexion 20 reps with 4# dowel 20 reps with 4# dowel 15 reps with theraband   Elbow Flexion 25 reps with 4# dowel 25 reps with 4# dowel 20 reps with dowel   Punches 20 reps with 4# dowel 20 reps with 4# dowel 20 reps with dowel   Shoulder Shrugs 10 reps     Rows (Forward and Backwards) 10 reps each way                 Braces/Orthotics/Lines/Etc:   · O2 Device: Room air  Treatment/Session Assessment:    · Response to Treatment:  Agreeable  · Interdisciplinary Collaboration:   o Certified Occupational Therapy Assistant  o Registered Nurse  · After treatment position/precautions:   o Up in chair  o Bed alarm/tab alert on  o Bed/Chair-wheels locked  o Bed in low position  o Call light within reach   · Compliance with Program/Exercises: Will assess as treatment progresses. · Recommendations/Intent for next treatment session: \"Next visit will focus on advancements to more challenging activities and reduction in assistance provided\".   Total Treatment Duration:  OT Patient Time In/Time Out  Time In: 1120  Time Out: 500 [address] [place] [address]

## 2018-03-06 NOTE — PROGRESS NOTES
TRANSFER - OUT REPORT:    Verbal report given to LORE Anderson on Barney Sharp  being transferred to [facility] for routine progression of care       Report consisted of patients Situation, Background, Assessment and   Recommendations(SBAR). Information from the following report(s) SBAR, ED Summary, OR Summary, Intake/Output, MAR and Recent Results was reviewed with the receiving nurse. Lines:   Peripheral IV 02/25/18 Right Forearm (Active)   Site Assessment Clean, dry, & intact 3/6/2018  9:16 AM   Phlebitis Assessment 0 3/6/2018  9:16 AM   Infiltration Assessment 0 3/6/2018  9:16 AM   Dressing Status Clean, dry, & intact 3/6/2018  9:16 AM   Dressing Type Tape;Transparent 3/6/2018  9:16 AM   Hub Color/Line Status Capped 3/6/2018  9:16 AM   Action Taken Tubing changed 3/2/2018  4:46 PM   Alcohol Cap Used No 3/6/2018  9:16 AM        Opportunity for questions and clarification was provided.

## 2018-03-06 NOTE — DISCHARGE SUMMARY
Hospitalist Discharge Summary     Admit Date:  2018 11:47 PM   Name:  Bessie Anderson   Age:  62 y.o.  :  1960   MRN:  [id number]   PCP:  None  Treatment Team: Attending Provider: Damián Davison MD; Consulting Provider: Valerie Chu MD; Care Manager: Ronni Corrales RN; Primary Nurse: Caryle Hillock, RN    Problem List for this Hospitalization:  Hospital Problems as of 3/6/2018  Never Reviewed          Codes Class Noted - Resolved POA    Essential hypertension ICD-10-CM: I10  ICD-9-CM: 401.9  2018 - Present Unknown        * (Principal)Intertrochanteric fracture of right femur ([facility] [address] ICD-10-CM: B84.439B  ICD-9-CM: 820.21  2018 - Present Yes        Hyperglycemia due to type 2 diabetes mellitus [facility]) ICD-10-CM: E11.65  ICD-9-CM: 250.00  2018 - Present Yes                Admission HPI from 2018:    Per H/P:  '56 y/o female with hx diabetes was reportedly at a party or some other function. She was standing behind the DJ and went to sit in a chain but instead fell and landed on her right hip. No other injury reported. In ED xray reveals a comminuted intertrochanteric fracture. She has hx knee surgery and did not have any complications from anesthesia or bleeding. She is currently sedated from pain medications she received in ED. No reported hx cardiovascular or pulmonary disease. Her glucose is 314 without evidence of DKA or hyperosmolar coma. She used to be on insulin but her family members report she no longer takes it. Will admit for glucose control and surgical repair of hip.'    Hospital Course:  · R femur fx- s/p R femur intra-medullary nail insertion 18. Pain well controlled. · T2DM- controlled. Cont home metformin. · HTN-  Cont home meds. · Anemia- slight iron deficiency. Also related to blood loss from injury/surgery. Stable. · Hypokalemia: Will send on potassium supplements.    · DVT ppx Lovenox 30 mg SQ for total of 30 days.      3/6- Pt seen and examined. Pain well controlled. No fever, chills, CP, abd pain, N/V/D, bleeding or urinary symptoms. Follow up instructions below. Plan was discussed with patient. All questions answered. Patient was stable at time of discharge and was instructed to call or return if there are any concerns or recurrence of symptoms. Diagnostic Imaging/Tests:   Xr Chest Sngl V    Result Date: 2/24/2018  EXAM:  XR CHEST SNGL V INDICATION:  Preop clearance COMPARISON:  None. FINDINGS: A portable AP radiograph of the chest was obtained at 0036 hours. The patient is on a cardiac monitor. The lungs are clear. The cardiac and mediastinal contours and pulmonary vascularity are normal.  The bones and soft tissues are grossly within normal limits. IMPRESSION: Normal chest.      Xr Hip Rt W Or Wo Pelv 2-3 Vws    Result Date: 2/24/2018  EXAM:  XR HIP RT W OR WO PELV 2-3 VWS INDICATION:   fall COMPARISON: None. FINDINGS: An AP view of the pelvis and a frogleg lateral view of the right hip demonstrate a comminuted intertrochanteric fracture which extends into the proximal femoral diaphysis. The femoral head and neck are anatomically aligned. There is degenerative changes of both hips. No pelvic fractures are identified. IMPRESSION: Comminuted intertrochanteric fracture which extends of the proximal femoral diaphysis. Xr Femur Rt 2 Vs    Result Date: 2/24/2018  EXAM:  XR FEMUR RT 2 VS INDICATION:   fall COMPARISON: None. FINDINGS: 2  views of the right femur demonstrate a comminuted proximal intertrochanteric fracture. The mid and distal femur are intact. .       IMPRESSION: Comminuted intertrochanteric fracture. The mid and distal femur are intact. Echocardiogram results:  No results found for this visit on 02/23/18.       All Micro Results     Procedure Component Value Units Date/Time    CULTURE, URINE [[id number]] Collected:  02/25/18 0456    Order Status:  Completed Specimen:  Urine from [place] Specimen Updated: 02/27/18 0748     Special Requests: NO SPECIAL REQUESTS        Culture result: NO GROWTH 2 DAYS       MSSA/MRSA SC BY PCR, NASAL SWAB [[id number]] Collected:  02/24/18 0445    Order Status:  Completed Specimen:  Nasal Swab Updated:  02/24/18 0745     Special Requests: NO SPECIAL REQUESTS        Culture result:         SA target not detected. A MRSA NEGATIVE, SA NEGATIVE test result does not preclude MRSA or SA nasal colonization. Labs: Results:       BMP, Mg, Phos Recent Labs      03/06/18   0609 03/05/18   0608  03/04/18   0616   NA  141  144  142   K  3.4*  3.3*  3.1*   CL  104  106  104   CO2  29  30  29   AGAP  8  8  9   BUN  8  10  9   CREA  0.55*  0.47*  0.45*   CA  8.6  8.8  8.4   GLU  130*  124*  164*      CBC Recent Labs      03/05/18 0608 03/04/18   0616   WBC  7.5  6.4   RBC  3.54*  3.20*   HGB  10.3*  9.3*   HCT  31.0*  27.3*   PLT  324  302      LFT No results for input(s): SGOT, ALT, TBIL, AP, TP, ALB, GLOB, AGRAT, GPT in the last 72 hours.    Cardiac Testing No results found for: BNPP, BNP, CPK, RCK1, RCK2, RCK3, RCK4, CKMB, CKNDX, CKND1, TROPT, TROIQ   Coagulation Tests Lab Results   Component Value Date/Time    Prothrombin time 13.5 02/24/2018 05:41 AM    INR 1.1 02/24/2018 05:41 AM    aPTT 24.9 02/24/2018 05:41 AM      A1c Lab Results   Component Value Date/Time    Hemoglobin A1c 11.0 (H) 02/24/2018 05:41 AM      Lipid Panel No results found for: CHOL, CHOLPOCT, CHOLX, CHLST, CHOLV, 709602, HDL, LDL, LDLC, DLDLP, 607038, VLDLC, VLDL, TGLX, TRIGL, TRIGP, TGLPOCT, CHHD, CHHDX   Thyroid Panel No results found for: T4, T3U, TSH, TSHEXT     Most Recent UA Lab Results   Component Value Date/Time    Color YELLOW 02/25/2018 04:56 AM    Appearance TURBID 02/25/2018 04:56 AM    Specific gravity 1.026 (H) 02/25/2018 04:56 AM    pH (UA) 5.0 02/25/2018 04:56 AM    Protein TRACE (A) 02/25/2018 04:56 AM    Glucose 100 02/25/2018 04:56 AM    Ketone NEGATIVE  02/25/2018 04:56 AM    Bilirubin NEGATIVE  02/25/2018 04:56 AM    Blood NEGATIVE  02/25/2018 04:56 AM    Urobilinogen 0.2 02/25/2018 04:56 AM    Nitrites NEGATIVE  02/25/2018 04:56 AM    Leukocyte Esterase SMALL (A) 02/25/2018 04:56 AM        No Known Allergies  Immunization History   Administered Date(s) Administered    TB Skin Test (PPD) Intradermal 02/24/2018       All Labs from Last 24 Hrs:  Recent Results (from the past 24 hour(s))   GLUCOSE, POC    Collection Time: 03/05/18  6:24 PM   Result Value Ref Range    Glucose (POC) 196 (H) 65 - 100 mg/dL   GLUCOSE, POC    Collection Time: 03/05/18 10:11 PM   Result Value Ref Range    Glucose (POC) 136 (H) 65 - 523 mg/dL   METABOLIC PANEL, BASIC    Collection Time: 03/06/18  6:09 AM   Result Value Ref Range    Sodium 141 136 - 145 mmol/L    Potassium 3.4 (L) 3.5 - 5.1 mmol/L    Chloride 104 98 - 107 mmol/L    CO2 29 21 - 32 mmol/L    Anion gap 8 7 - 16 mmol/L    Glucose 130 (H) 65 - 100 mg/dL    BUN 8 6 - 23 MG/DL    Creatinine 0.55 (L) 0.6 - 1.0 MG/DL    GFR est AA >60 >60 ml/min/1.73m2    GFR est non-AA >60 >60 ml/min/1.73m2    Calcium 8.6 8.3 - 10.4 MG/DL   GLUCOSE, POC    Collection Time: 03/06/18  7:11 AM   Result Value Ref Range    Glucose (POC) 136 (H) 65 - 100 mg/dL   GLUCOSE, POC    Collection Time: 03/06/18 11:52 AM   Result Value Ref Range    Glucose (POC) 135 (H) 65 - 100 mg/dL       Discharge Exam:  Patient Vitals for the past 24 hrs:   Temp Pulse Resp BP SpO2   03/06/18 1201 98 °F (36.7 °C) 97 17 (!) 170/96 97 %   03/06/18 0755 99.2 °F (37.3 °C) 92 16 143/85 95 %   03/06/18 0511 98.9 °F (37.2 °C) (!) 105 15 133/78 93 %   03/06/18 0052 97.8 °F (36.6 °C) 93 18 126/84 96 %   03/05/18 2004 97.9 °F (36.6 °C) 92 14 114/71 95 %   03/05/18 1504 99 °F (37.2 °C) 97 18 154/83 95 %     Oxygen Therapy  O2 Sat (%): 97 % (03/06/18 1201)  Pulse via Oximetry: 105 beats per minute (03/06/18 0511)  O2 Device: Room air (03/06/18 0511)  O2 Flow Rate (L/min): 3 l/min (02/28/18 0933)  No intake or output data in the 24 hours ending 03/06/18 1356        General:                    NAD, obese    Lungs:                                 CTA Bilaterally. Heart:                                  Regular rate and rhythm,  No murmur, rub, or gallop  Abdomen:                  Soft, Non distended, Non tender, Positive bowel sounds  Extremities:               No cyanosis, clubbing or edema  Neurologic:                No focal deficits      Discharge Info:   Current Discharge Medication List      START taking these medications    Details   calcium-vitamin D (OYSTER SHELL) 500 mg(1,250mg) -200 unit per tablet Take 1 Tab by mouth two (2) times daily (with meals). Qty: 60 Tab, Refills: 2      enoxaparin (LOVENOX) 30 mg/0.3 mL injection 0.3 mL by SubCUTAneous route daily. Qty: 25 Syringe, Refills: 0      oxyCODONE IR (ROXICODONE) 5 mg immediate release tablet Take 1-2 Tabs by mouth every four (4) hours as needed. Max Daily Amount: 60 mg.  Qty: 30 Tab, Refills: 0    Associated Diagnoses: Closed displaced intertrochanteric fracture of right femur, initial encounter ([facility])      potassium chloride (K-DUR, KLOR-CON) 20 mEq tablet Take 1 Tab by mouth daily. Qty: 30 Tab, Refills: 0         CONTINUE these medications which have NOT CHANGED    Details   metFORMIN (GLUCOPHAGE) 500 mg tablet Take 1,000 mg by mouth two (2) times daily (with meals). Indications: type 2 diabetes mellitus      lisinopril (PRINIVIL, ZESTRIL) 10 mg tablet Take 10 mg by mouth daily.  Indications: hypertension               Disposition: SNF    Activity: Activity as tolerated  Diet: DIET DIABETIC CONSISTENT CARB Regular  DIET NUTRITIONAL SUPPLEMENTS Breakfast; Glucerna  FOOD [facility] COMMENTS    Follow-up Information     Follow up With Details Comments Contact Info    Wilian Garrett MD On 3/15/2018 Appointment at [place] [id number]  [place] [address] [facility]   [address] [address]  [phone]      None   None ([phone]) Patient stated that they have no PCP              Time spent in patient discharge planning and coordination 45 minutes. Signed:   Rayna Kauffman MD

## 2018-03-06 NOTE — PROGRESS NOTES
Problem: Mobility Impaired (Adult and Pediatric)  Goal: *Acute Goals and Plan of Care (Insert Text)  STG:  (1.)Ms. Yu Cole will perform bed mobility with CONTACT GUARD ASSIST within 3 treatment day(s). (2.)Ms. Yu Cole will transfer from bed to chair and chair to bed with CONTACT GUARD ASSIST using the RW within 3 treatment day(s). (3.)Ms. Yu Cole will ambulate with MINIMAL ASSIST for 50+ feet with RW within 3 treatment day(s). Goal met 3/2/18  (4.)Ms. Yu Cole will tolerate 25+ minutes of therapeutic activity/exercise while maintaining stable vitals to improve functional strength and mobility within 3 day(s). LTG:  (1.)Ms. Yu Cole will perform bed mobility with STAND BY ASSISTANCE within 7 treatment day(s). (2.)Ms. Yu Cole will transfer from bed to chair and chair to bed with STAND BY ASSIST using RWwithin 7 treatment day(s). (3.)Ms. Yu Cole will ambulate with CONTACT GUARD ASSIST for 150+ feet with RW within 7 treatment day(s). (4.)Ms. Yu Cole will demonstrate good dynamic standing balance utilizing RW within 7 day(s). ________________________________________________________________________________________________    PHYSICAL THERAPY: Daily Note, Treatment Day: 4th, PM 3/6/2018  INPATIENT: Hospital Day: 12  Payor: BLUE CROSS / [place] [facility] / Product Type: PPO /      WBAT R LE     NAME/AGE/GENDER: Darwin Morrell is a 62 y.o. female   PRIMARY DIAGNOSIS: Closed displaced subtrochanteric fracture of right femur, initial encounter ([facility] [address] [S72.21XA] Intertrochanteric fracture of right femur ([facility] [address] Intertrochanteric fracture of right femur ([facility] [address]  Procedure(s) (LRB):  ORIF RIGHT SUBTROCHANTERIC FEMUR FRACTURE WITH INSERTION INTRA MEDULLARY NAIL  (Right)  6 Days Post-Op  ICD-10: Treatment Diagnosis:   · Difficulty in walking, Not elsewhere classified (R26.2)  · History of falling (Z91.81)   Precaution/Allergies:  Review of patient's allergies indicates no known allergies. ASSESSMENT:     Ms. Aggie Cm is a 62year old female s/p IM nailing of right subtrochanteric femur fracture and is WBAT R LE. This afternoon, pt sitting up in chair and agreed to therapy. Pt stood and increased her ambulation to 79' with rolling walker and SBA. Pt returned supine in bed with min assist for LE's. Pt was left with needs in reach. Good progress today towards goals. Will continue with POC. This section established at most recent assessment   PROBLEM LIST (Impairments causing functional limitations):  1. Decreased Strength  2. Decreased ADL/Functional Activities  3. Decreased Transfer Abilities  4. Decreased Ambulation Ability/Technique  5. Decreased Balance  6. Increased Pain  7. Decreased Activity Tolerance  8. Increased Shortness of Breath  9. Decreased Flexibility/Joint Mobility  10. Decreased Knowledge of Precautions   INTERVENTIONS PLANNED: (Benefits and precautions of physical therapy have been discussed with the patient.)  1. Balance Exercise  2. Bed Mobility  3. Family Education  4. Gait Training  5. Home Exercise Program (HEP)  6. Range of Motion (ROM)  7. Therapeutic Activites  8. Therapeutic Exercise/Strengthening  9. Transfer Training  10. Group Therapy     TREATMENT PLAN: Frequency/Duration: twice daily for duration of hospital stay  Rehabilitation Potential For Stated Goals: GOOD     RECOMMENDED REHABILITATION/EQUIPMENT: (at time of discharge pending progress): Due to the probability of continued deficits (see above) this patient will likely need continued skilled physical therapy after discharge. Equipment:    TBD              HISTORY:   History of Present Injury/Illness (Reason for Referral):  S/p right IM nailing; WBAT R LE  Past Medical History/Comorbidities:   Ms. Aggie Cm  has no past medical history on file. Ms. Aggie Cm  has no past surgical history on file.   Social History/Living Environment:   Home Environment: Private residence  # Steps to Enter: 5  Rails to Enter: No  Wheelchair Ramp: No  One/Two Story Residence: One story  Living Alone: Yes  Support Systems: Family member(s)  Patient Expects to be Discharged to[de-identified] Rehabilitation facility  Current DME Used/Available at Home: None  Prior Level of Function/Work/Activity:  Patient lives alone in a single story residence with 5 steps to enter. At baseline, patient is an independent, community level ambulator, working as a CNA at [employer], and driving. Number of Personal Factors/Comorbidities that affect the Plan of Care: 1-2: MODERATE COMPLEXITY   EXAMINATION:   Most Recent Physical Functioning:   Gross Assessment:                  Posture:     Balance:  Sitting: Intact  Standing - Static: Good  Standing - Dynamic : Fair (+) Bed Mobility:  Sit to Supine: Minimum assistance (with LE's)  Wheelchair Mobility:     Transfers:  Sit to Stand: Stand-by assistance  Stand to Sit: Stand-by assistance  Gait:     Base of Support: Narrowed  Speed/Yadi: Pace decreased (<100 feet/min)  Step Length: Left shortened;Right shortened  Gait Abnormalities: Decreased step clearance; Step to gait  Distance (ft): 70 Feet (ft)  Assistive Device: Walker, rolling  Interventions: Safety awareness training;Verbal cues      Body Structures Involved:  1. Bones  2. Joints  3. Muscles  4. Ligaments Body Functions Affected:  1. Neuromusculoskeletal  2. Movement Related Activities and Participation Affected:  1. Mobility  2. Self Care  3. Domestic Life   Number of elements that affect the Plan of Care: 4+: HIGH COMPLEXITY   CLINICAL PRESENTATION:   Presentation: Stable and uncomplicated: LOW COMPLEXITY   CLINICAL DECISION MAKIN [facility] [address] [id number] AM-PAC 6 Clicks   Basic Mobility Inpatient Short Form  How much difficulty does the patient currently have. .. Unable A Lot A Little None   1. Turning over in bed (including adjusting bedclothes, sheets and blankets)? [] 1   [] 2   [x] 3   [] 4   2.   Sitting down on and standing up from a chair with arms ( e.g., wheelchair, bedside commode, etc.)   [] 1   [] 2   [x] 3   [] 4   3. Moving from lying on back to sitting on the side of the bed? [] 1   [] 2   [x] 3   [] 4   How much help from another person does the patient currently need. .. Total A Lot A Little None   4. Moving to and from a bed to a chair (including a wheelchair)? [] 1   [] 2   [x] 3   [] 4   5. Need to walk in hospital room? [] 1   [] 2   [x] 3   [] 4   6. Climbing 3-5 steps with a railing? [] 1   [x] 2   [] 3   [] 4   © 2007, Trustees of [address] [place] [address] [address] [id number], under license to [employer]. All rights reserved      Score:  Initial: 17 Most Recent: 17 (Date: 2/28/18 )    Interpretation of Tool:  Represents activities that are increasingly more difficult (i.e. Bed mobility, Transfers, Gait). Score 24 23 22-20 19-15 14-10 9-7 6     Modifier CH CI CJ CK CL CM CN      ? Mobility - Walking and Moving Around:     - CURRENT STATUS: CK - 40%-59% impaired, limited or restricted    - GOAL STATUS: CJ - 20%-39% impaired, limited or restricted    - D/C STATUS:  ---------------To be determined---------------  Payor[place] BLUE CR[place] / [place] [address] / Product Type: PPO /      Medical Necessity:     · Patient demonstrates good rehab potential due to higher previous functional level. Reason for Services/Other Comments:  · Patient continues to require skilled intervention due to decreased functional mobility and balance/gait status from baseline. .   Use of outcome tool(s) and clinical judgement create a POC that gives a: Clear prediction of patient's progress: LOW COMPLEXITY            TREATMENT:      Pre-treatment Symptoms/Complaints:    Pain: Initial: 0/10     Post Session:  0/10      Therapeutic Activity: (   17 Minutes):   Therapeutic activities including bed mobility training, transfer training, static/dynamic standing balance training, ambulation on level ground, and patient education to improve mobility, strength and balance. Required contact guard assist to stand by assist  Safety awareness training;Verbal cues to promote static and dynamic balance in standing, promote coordination of bilateral, lower extremity(s) and to promote improved sequencing with rolling walker. GroupTherapeutic Exercise: (  0 minutes):  Exercises per grid below to improve mobility, strength and balance. Required min visual, verbal and manual cues to promote proper body alignment, promote proper body posture and promote proper body mechanics. Date:  3/2/18 Date:  3/4/18 Date:  3/5/18 Date:  3/6/18   ACTIVITY/EXERCISE AM PM AM PM AM PM AM   Ankle pumps 20  X 15 B  X 15 B  X 15 B   abduction 20  X 15 B  X 15 B  X 15 B, AA-R   Hip flexion  20  X 15 B  X 15 B  X 15 B, AA-R   LAQ 20  X 15 B  X 15 B  X 15 B             Shld flex   X 15 t-band       Elbow flex   X 15 t-band       Elbow ext   X 15 t-band       Shld horizontal ABD/ADD   X 15 t-band       B = bilateral; AA = active assistive; A = active; P = passive  Braces/Orthotics/Lines/Etc:   · O2 Device: Room air  Treatment/Session Assessment:    · Response to Treatment:  See above. · Interdisciplinary Collaboration:   o Physical Therapy Assistant  o Registered Nurse  · After treatment position/precautions:   o Supine in bed  o Bed/Chair-wheels locked  o Call light within reach  o RN notified   · Compliance with Program/Exercises: compliant  · Recommendations/Intent for next treatment session: \"Next visit will focus on advancements to more challenging activities and reduction in assistance provided\".     Total Treatment Duration:  PT Patient Time In/Time Out  Time In: 1320  Time Out: [address] [place], [place]

## 2018-03-06 NOTE — PROGRESS NOTES
Problem: Falls - Risk of  Goal: *Absence of Falls  Document Az Fall Risk and appropriate interventions in the flowsheet.    Outcome: Progressing Towards Goal  Fall Risk Interventions:  Mobility Interventions: Bed/chair exit alarm, Communicate number of staff needed for ambulation/transfer, OT consult for ADLs, Patient to call before getting OOB, PT Consult for mobility concerns, PT Consult for assist device competence, Strengthening exercises (ROM-active/passive), Utilize walker, cane, or other assitive device, Utilize gait belt for transfers/ambulation         Medication Interventions: Assess postural VS orthostatic hypotension, Bed/chair exit alarm, Evaluate medications/consider consulting pharmacy, Patient to call before getting OOB, Teach patient to arise slowly, Utilize gait belt for transfers/ambulation    Elimination Interventions: Bed/chair exit alarm, Call light in reach, Elevated toilet seat, Patient to call for help with toileting needs, Toilet paper/wipes in reach, Toileting schedule/hourly rounds    History of Falls Interventions: Bed/chair exit alarm, Door open when patient unattended, Consult care management for discharge planning, Evaluate medications/consider consulting pharmacy, Investigate reason for fall, Utilize gait belt for transfer/ambulation, Room close to nurse's station

## 2018-03-09 ENCOUNTER — HOSPITAL ENCOUNTER (EMERGENCY)
Age: 58
Discharge: NURSING, REHAB OR LONG-TERM CARE | End: 2018-03-09
Attending: EMERGENCY MEDICINE
Payer: COMMERCIAL

## 2018-03-09 VITALS
RESPIRATION RATE: 20 BRPM | HEART RATE: 100 BPM | TEMPERATURE: 98.1 F | HEIGHT: 63 IN | OXYGEN SATURATION: 98 % | BODY MASS INDEX: 31.01 KG/M2 | WEIGHT: 175 LBS | SYSTOLIC BLOOD PRESSURE: 141 MMHG | DIASTOLIC BLOOD PRESSURE: 77 MMHG

## 2018-03-09 DIAGNOSIS — R00.2 PALPITATIONS: Primary | ICD-10-CM

## 2018-03-09 LAB
ALBUMIN SERPL-MCNC: 2.6 G/DL (ref 3.5–5)
ALBUMIN/GLOB SERPL: 0.5 {RATIO} (ref 1.2–3.5)
ALP SERPL-CCNC: 96 U/L (ref 50–136)
ALT SERPL-CCNC: 35 U/L (ref 12–65)
ANION GAP SERPL CALC-SCNC: 10 MMOL/L (ref 7–16)
AST SERPL-CCNC: 22 U/L (ref 15–37)
ATRIAL RATE: 109 BPM
BASOPHILS # BLD: 0 K/UL (ref 0–0.2)
BASOPHILS NFR BLD: 0 % (ref 0–2)
BILIRUB SERPL-MCNC: 0.6 MG/DL (ref 0.2–1.1)
BUN SERPL-MCNC: 9 MG/DL (ref 6–23)
CALCIUM SERPL-MCNC: 8.9 MG/DL (ref 8.3–10.4)
CALCULATED P AXIS, ECG09: 53 DEGREES
CALCULATED R AXIS, ECG10: 13 DEGREES
CALCULATED T AXIS, ECG11: 38 DEGREES
CHLORIDE SERPL-SCNC: 105 MMOL/L (ref 98–107)
CO2 SERPL-SCNC: 24 MMOL/L (ref 21–32)
CREAT SERPL-MCNC: 0.48 MG/DL (ref 0.6–1)
DIAGNOSIS, 93000: NORMAL
DIFFERENTIAL METHOD BLD: ABNORMAL
EOSINOPHIL # BLD: 0.4 K/UL (ref 0–0.8)
EOSINOPHIL NFR BLD: 5 % (ref 0.5–7.8)
ERYTHROCYTE [DISTWIDTH] IN BLOOD BY AUTOMATED COUNT: 13.2 % (ref 11.9–14.6)
GLOBULIN SER CALC-MCNC: 4.9 G/DL (ref 2.3–3.5)
GLUCOSE SERPL-MCNC: 160 MG/DL (ref 65–100)
HCT VFR BLD AUTO: 32.7 % (ref 35.8–46.3)
HGB BLD-MCNC: 11.1 G/DL (ref 11.7–15.4)
IMM GRANULOCYTES # BLD: 0 K/UL (ref 0–0.5)
IMM GRANULOCYTES NFR BLD AUTO: 0 % (ref 0–5)
LACTATE BLD-SCNC: 1.2 MMOL/L (ref 0.5–1.9)
LYMPHOCYTES # BLD: 1.7 K/UL (ref 0.5–4.6)
LYMPHOCYTES NFR BLD: 20 % (ref 13–44)
MCH RBC QN AUTO: 29.4 PG (ref 26.1–32.9)
MCHC RBC AUTO-ENTMCNC: 33.9 G/DL (ref 31.4–35)
MCV RBC AUTO: 86.5 FL (ref 79.6–97.8)
MONOCYTES # BLD: 0.3 K/UL (ref 0.1–1.3)
MONOCYTES NFR BLD: 4 % (ref 4–12)
NEUTS SEG # BLD: 6.2 K/UL (ref 1.7–8.2)
NEUTS SEG NFR BLD: 71 % (ref 43–78)
P-R INTERVAL, ECG05: 178 MS
PLATELET # BLD AUTO: 445 K/UL (ref 150–450)
PMV BLD AUTO: 10.1 FL (ref 10.8–14.1)
POTASSIUM SERPL-SCNC: 3.6 MMOL/L (ref 3.5–5.1)
PROT SERPL-MCNC: 7.5 G/DL (ref 6.3–8.2)
Q-T INTERVAL, ECG07: 354 MS
QRS DURATION, ECG06: 78 MS
QTC CALCULATION (BEZET), ECG08: 476 MS
RBC # BLD AUTO: 3.78 M/UL (ref 4.05–5.25)
SODIUM SERPL-SCNC: 139 MMOL/L (ref 136–145)
TROPONIN I BLD-MCNC: 0 NG/ML (ref 0.02–0.05)
VENTRICULAR RATE, ECG03: 109 BPM
WBC # BLD AUTO: 8.7 K/UL (ref 4.3–11.1)

## 2018-03-09 PROCEDURE — 84484 ASSAY OF TROPONIN QUANT: CPT

## 2018-03-09 PROCEDURE — 83605 ASSAY OF LACTIC ACID: CPT

## 2018-03-09 PROCEDURE — 80053 COMPREHEN METABOLIC PANEL: CPT | Performed by: EMERGENCY MEDICINE

## 2018-03-09 PROCEDURE — 85025 COMPLETE CBC W/AUTO DIFF WBC: CPT | Performed by: EMERGENCY MEDICINE

## 2018-03-09 PROCEDURE — 96360 HYDRATION IV INFUSION INIT: CPT | Performed by: EMERGENCY MEDICINE

## 2018-03-09 PROCEDURE — 74011250636 HC RX REV CODE- 250/636: Performed by: EMERGENCY MEDICINE

## 2018-03-09 PROCEDURE — 99285 EMERGENCY DEPT VISIT HI MDM: CPT | Performed by: EMERGENCY MEDICINE

## 2018-03-09 PROCEDURE — 93005 ELECTROCARDIOGRAM TRACING: CPT | Performed by: EMERGENCY MEDICINE

## 2018-03-09 RX ORDER — INSULIN ASPART 100 [IU]/ML
INJECTION, SOLUTION INTRAVENOUS; SUBCUTANEOUS
Status: ON HOLD | COMMUNITY
End: 2019-09-24

## 2018-03-09 RX ADMIN — SODIUM CHLORIDE: 900 INJECTION, SOLUTION INTRAVENOUS at 17:38

## 2018-03-09 NOTE — ED NOTES
TRANSFER - OUT REPORT:    Verbal report given to Bekah on Cesar Posada  being transferred to [facility](unit) for routine progression of care       Report consisted of patients Situation, Background, Assessment and   Recommendations(SBAR). Information from the following report(s) ED Summary was reviewed with the receiving nurse. Lines:   Peripheral IV 03/09/18 Right Hand (Active)   Site Assessment Clean, dry, & intact 3/9/2018  4:54 PM   Phlebitis Assessment 0 3/9/2018  4:54 PM   Infiltration Assessment 0 3/9/2018  4:54 PM   Dressing Status Clean, dry, & intact 3/9/2018  4:54 PM   Hub Color/Line Status Pink 3/9/2018  4:54 PM   Action Taken Blood drawn 3/9/2018  4:54 PM        Opportunity for questions and clarification was provided.

## 2018-03-09 NOTE — ED PROVIDER NOTES
HPI Comments: Patient is a 71-year-old female with history of hypertension and diabetes who fell one week ago and suffered a right hip fracture. She was admitted to the hospital had surgical repair. She is now rehabilitation facility. She states everything has been going well. She has not been eating or drinking much because she states the pain medicine makes her nauseated. This afternoon lying in bed she states she felt some palpitations. EMS was called and transported here. She denies any chest pain or dyspnea. She states she feels a lot better now. Patient is a 62 y.o. female presenting with palpitations. The history is provided by the patient. Palpitations    This is a new problem. The current episode started less than 1 hour ago. The problem has been resolved. The problem is associated with nothing. Pertinent negatives include no diaphoresis, no chest pain, no chest pressure, no syncope, no abdominal pain, no headaches, no back pain, no lower extremity edema, no cough and no shortness of breath. Her past medical history is significant for DM and hypertension. Past Medical History:   Diagnosis Date    Cancer ([facility] [address]     breast- L    Diabetes ([facility] [address]     Hypertension        Past Surgical History:   Procedure Laterality Date    HX BREAST LUMPECTOMY      HX HIP FRACTURE TX Right          History reviewed. No pertinent family history. Social History     Social History    Marital status: SINGLE     Spouse name: N/A    Number of children: N/A    Years of education: N/A     Occupational History    Not on file. Social History Main Topics    Smoking status: Never Smoker    Smokeless tobacco: Never Used    Alcohol use Yes      Comment: social    Drug use: No    Sexual activity: Not on file     Other Topics Concern    Not on file     Social History Narrative         ALLERGIES: Review of patient's allergies indicates no known allergies.     Review of Systems   Constitutional: Negative for diaphoresis. HENT: Negative. Respiratory: Negative for cough and shortness of breath. Cardiovascular: Positive for palpitations. Negative for chest pain, leg swelling and syncope. Gastrointestinal: Negative for abdominal pain. Endocrine: Negative. Genitourinary: Negative. Musculoskeletal: Negative for back pain. Neurological: Negative for syncope and headaches. Vitals:    03/09/18 1624   BP: 144/78   Pulse: (!) 106   Resp: 18   Temp: 99.1 °F (37.3 °C)   SpO2: 98%   Weight: 79.4 kg (175 lb)   Height: 5' 3\" (1.6 m)            Physical Exam   Constitutional: She is oriented to person, place, and time. She appears well-developed and well-nourished. HENT:   Head: Normocephalic. Cardiovascular: Normal rate, regular rhythm and intact distal pulses. Pulmonary/Chest: Effort normal and breath sounds normal. No respiratory distress. She exhibits no tenderness. Abdominal: Soft. There is no tenderness. There is no rebound and no guarding. Neurological: She is alert and oriented to person, place, and time. She has normal strength. No cranial nerve deficit or sensory deficit. GCS eye subscore is 4. GCS verbal subscore is 5. GCS motor subscore is 6. Skin: Skin is warm. No rash noted. Nursing note and vitals reviewed. MDM  Number of Diagnoses or Management Options  Diagnosis management comments: Differential diagnosis includes palpitations, arrhythmia, anemia, dehydration    EKG shows sinus tachycardia at a rate of 109.        Amount and/or Complexity of Data Reviewed  Clinical lab tests: ordered and reviewed  Review and summarize past medical records: yes  Independent visualization of images, tracings, or specimens: yes    Risk of Complications, Morbidity, and/or Mortality  Presenting problems: low  Diagnostic procedures: low  Management options: low    Patient Progress  Patient progress: stable        ED Course     5:37 PM  Work is unremarkable, patient has no complaints of chest pain, dyspnea, or palpitations currently. She is reassured with the negative workup and we will discharge back to her rehabilitation facility. Voice dictation software was used during the making of this note. This software is not perfect and grammatical and other typographical errors may be present. This note has been proofread, but may still contain errors.   Byron Meléndez MD; 3/9/2018 @5:38 PM   ===================================================================      Procedures

## 2018-03-09 NOTE — DISCHARGE INSTRUCTIONS
Palpitations: Care Instructions  Your Care Instructions    Heart palpitations are the uncomfortable sensation that your heart is beating fast or irregularly. You might feel pounding or fluttering in your chest. It might feel like your heart is skipping a beat. Although palpitations may be caused by a heart problem, they also occur because of stress, fatigue, or use of alcohol, caffeine, or nicotine. Many medicines, including diet pills, antihistamines, decongestants, and some herbal products, can cause heart palpitations. Nearly everyone has palpitations from time to time. Depending on your symptoms, your doctor may need to do more tests to try to find the cause of your palpitations. Follow-up care is a key part of your treatment and safety. Be sure to make and go to all appointments, and call your doctor if you are having problems. It's also a good idea to know your test results and keep a list of the medicines you take. How can you care for yourself at home? · Avoid caffeine, nicotine, and excess alcohol. · Do not take illegal drugs, such as methamphetamines and cocaine. · Do not take weight loss or diet medicines unless you talk with your doctor first.  · Get plenty of sleep. · Do not overeat. · If you have palpitations again, take deep breaths and try to relax. This may slow a racing heart. · If you start to feel lightheaded, lie down to avoid injuries that might result if you pass out and fall down. · Keep a record of your palpitations and bring it to your next doctor's appointment. Write down:  ¨ The date and time. ¨ Your pulse. (If your heart is beating fast, it may be hard to count your pulse.)  ¨ What you were doing when the palpitations started. ¨ How long the palpitations lasted. ¨ Any other symptoms. · If an activity causes palpitations, slow down or stop. Talk to your doctor before you do that activity again. · Take your medicines exactly as prescribed.  Call your doctor if you think you are having a problem with your medicine. When should you call for help? Call 911 anytime you think you may need emergency care. For example, call if:  ? · You passed out (lost consciousness). ? · You have symptoms of a heart attack. These may include:  ¨ Chest pain or pressure, or a strange feeling in the chest.  ¨ Sweating. ¨ Shortness of breath. ¨ Pain, pressure, or a strange feeling in the back, neck, jaw, or upper belly or in one or both shoulders or arms. ¨ Lightheadedness or sudden weakness. ¨ A fast or irregular heartbeat. After you call 911, the  may tell you to chew 1 adult-strength or 2 to 4 low-dose aspirin. Wait for an ambulance. Do not try to drive yourself. ? · You have symptoms of a stroke. These may include:  ¨ Sudden numbness, tingling, weakness, or loss of movement in your face, arm, or leg, especially on only one side of your body. ¨ Sudden vision changes. ¨ Sudden trouble speaking. ¨ Sudden confusion or trouble understanding simple statements. ¨ Sudden problems with walking or balance. ¨ A sudden, severe headache that is different from past headaches. ?Call your doctor now or seek immediate medical care if:  ? · You have heart palpitations and:  ¨ Are dizzy or lightheaded, or you feel like you may faint. ¨ Have new or increased shortness of breath. ? Watch closely for changes in your health, and be sure to contact your doctor if:  ? · You continue to have heart palpitations. Where can you learn more? Go to http://liliane-fox.info/. Enter R508 in the search box to learn more about \"Palpitations: Care Instructions. \"  Current as of: September 21, 2016  Content Version: 11.4  © 5608-6002 66. [employer]. Care instructions adapted under license by [employer] (which disclaims liability or warranty for this information).  If you have questions about a medical condition or this instruction, always ask your healthcare professional. [address] any warranty or liability for your use of this information.

## 2018-03-10 NOTE — PROGRESS NOTES
Auth received today, MD made aware and DC order received, pt will be going to [facility] and rehab today to room 411, report number [id number], transport time arranged with [facility] for 1500 pm today. Pt and sister Manpreet Louie notified and agreeable to transport time. Delilah Cabello(PA)

## 2018-03-15 ENCOUNTER — APPOINTMENT (OUTPATIENT)
Dept: CT IMAGING | Age: 58
DRG: 689 | End: 2018-03-15
Attending: EMERGENCY MEDICINE
Payer: COMMERCIAL

## 2018-03-15 ENCOUNTER — HOSPITAL ENCOUNTER (OUTPATIENT)
Dept: LAB | Age: 58
Discharge: HOME | End: 2018-03-15

## 2018-03-15 ENCOUNTER — HOSPITAL ENCOUNTER (INPATIENT)
Age: 58
LOS: 6 days | Discharge: NURSING, REHAB OR LONG-TERM CARE | DRG: 689 | End: 2018-03-21
Attending: EMERGENCY MEDICINE | Admitting: INTERNAL MEDICINE
Payer: COMMERCIAL

## 2018-03-15 DIAGNOSIS — R11.2 NON-INTRACTABLE VOMITING WITH NAUSEA, UNSPECIFIED VOMITING TYPE: ICD-10-CM

## 2018-03-15 DIAGNOSIS — J18.9 PNEUMONIA DUE TO INFECTIOUS ORGANISM, UNSPECIFIED LATERALITY, UNSPECIFIED PART OF LUNG: ICD-10-CM

## 2018-03-15 DIAGNOSIS — S72.141A CLOSED DISPLACED INTERTROCHANTERIC FRACTURE OF RIGHT FEMUR, INITIAL ENCOUNTER (HCC): ICD-10-CM

## 2018-03-15 DIAGNOSIS — N39.0 URINARY TRACT INFECTION WITHOUT HEMATURIA, SITE UNSPECIFIED: Primary | ICD-10-CM

## 2018-03-15 DIAGNOSIS — R10.84 ABDOMINAL PAIN, GENERALIZED: ICD-10-CM

## 2018-03-15 PROBLEM — J15.9 HEALTHCARE ASSOCIATED BACTERIAL PNEUMONIA: Status: ACTIVE | Noted: 2018-03-15

## 2018-03-15 PROBLEM — R53.1 GENERALIZED WEAKNESS: Status: ACTIVE | Noted: 2018-03-15

## 2018-03-15 LAB
ALBUMIN SERPL-MCNC: 2.9 G/DL (ref 3.5–5)
ALBUMIN SERPL-MCNC: 2.9 G/DL (ref 3.5–5)
ALBUMIN/GLOB SERPL: 0.5 {RATIO} (ref 1.2–3.5)
ALBUMIN/GLOB SERPL: 0.7 {RATIO} (ref 1.2–3.5)
ALP SERPL-CCNC: 113 U/L (ref 50–136)
ALP SERPL-CCNC: 122 U/L (ref 50–136)
ALT SERPL-CCNC: 18 U/L (ref 12–65)
ALT SERPL-CCNC: 19 U/L (ref 12–65)
AMORPH CRY URNS QL MICRO: ABNORMAL
ANION GAP SERPL CALC-SCNC: 13 MMOL/L (ref 7–16)
ANION GAP SERPL CALC-SCNC: 13 MMOL/L (ref 7–16)
APPEARANCE UR: ABNORMAL
AST SERPL-CCNC: 20 U/L (ref 15–37)
AST SERPL-CCNC: 28 U/L (ref 15–37)
BACTERIA URNS QL MICRO: ABNORMAL /HPF
BASOPHILS # BLD: 0 K/UL (ref 0–0.2)
BASOPHILS # BLD: 0 K/UL (ref 0–0.2)
BASOPHILS NFR BLD: 0 % (ref 0–2)
BASOPHILS NFR BLD: 0 % (ref 0–2)
BILIRUB SERPL-MCNC: 0.5 MG/DL (ref 0.2–1.1)
BILIRUB SERPL-MCNC: 0.6 MG/DL (ref 0.2–1.1)
BILIRUB UR QL: NEGATIVE
BUN SERPL-MCNC: 17 MG/DL (ref 6–23)
BUN SERPL-MCNC: 18 MG/DL (ref 6–23)
CALCIUM SERPL-MCNC: 8.8 MG/DL (ref 8.3–10.4)
CALCIUM SERPL-MCNC: 9.7 MG/DL (ref 8.3–10.4)
CHLORIDE SERPL-SCNC: 104 MMOL/L (ref 98–107)
CHLORIDE SERPL-SCNC: 105 MMOL/L (ref 98–107)
CO2 SERPL-SCNC: 20 MMOL/L (ref 21–32)
CO2 SERPL-SCNC: 22 MMOL/L (ref 21–32)
COLOR UR: YELLOW
CREAT SERPL-MCNC: 0.63 MG/DL (ref 0.6–1)
CREAT SERPL-MCNC: 0.72 MG/DL (ref 0.6–1)
DIFFERENTIAL METHOD BLD: ABNORMAL
DIFFERENTIAL METHOD BLD: ABNORMAL
EOSINOPHIL # BLD: 0.1 K/UL (ref 0–0.8)
EOSINOPHIL # BLD: 0.1 K/UL (ref 0–0.8)
EOSINOPHIL NFR BLD: 1 % (ref 0.5–7.8)
EOSINOPHIL NFR BLD: 1 % (ref 0.5–7.8)
EPI CELLS #/AREA URNS HPF: ABNORMAL /HPF
ERYTHROCYTE [DISTWIDTH] IN BLOOD BY AUTOMATED COUNT: 13.5 % (ref 11.9–14.6)
ERYTHROCYTE [DISTWIDTH] IN BLOOD BY AUTOMATED COUNT: 13.5 % (ref 11.9–14.6)
GLOBULIN SER CALC-MCNC: 4.2 G/DL (ref 2.3–3.5)
GLOBULIN SER CALC-MCNC: 5.4 G/DL (ref 2.3–3.5)
GLUCOSE BLD STRIP.AUTO-MCNC: 118 MG/DL (ref 65–100)
GLUCOSE SERPL-MCNC: 124 MG/DL (ref 65–100)
GLUCOSE SERPL-MCNC: 149 MG/DL (ref 65–100)
GLUCOSE UR STRIP.AUTO-MCNC: NEGATIVE MG/DL
HCT VFR BLD AUTO: 36.9 % (ref 35.8–46.3)
HCT VFR BLD AUTO: 39.7 % (ref 35.8–46.3)
HGB BLD-MCNC: 12.1 G/DL (ref 11.7–15.4)
HGB BLD-MCNC: 13.2 G/DL (ref 11.7–15.4)
HGB UR QL STRIP: ABNORMAL
IMM GRANULOCYTES # BLD: 0 K/UL (ref 0–0.5)
IMM GRANULOCYTES # BLD: 0 K/UL (ref 0–0.5)
IMM GRANULOCYTES NFR BLD AUTO: 0 % (ref 0–5)
IMM GRANULOCYTES NFR BLD AUTO: 0 % (ref 0–5)
KETONES UR QL STRIP.AUTO: 15 MG/DL
LACTATE BLD-SCNC: 0.9 MMOL/L (ref 0.5–1.9)
LEUKOCYTE ESTERASE UR QL STRIP.AUTO: ABNORMAL
LIPASE SERPL-CCNC: 60 U/L (ref 73–393)
LYMPHOCYTES # BLD: 1.6 K/UL (ref 0.5–4.6)
LYMPHOCYTES # BLD: 1.8 K/UL (ref 0.5–4.6)
LYMPHOCYTES NFR BLD: 21 % (ref 13–44)
LYMPHOCYTES NFR BLD: 22 % (ref 13–44)
MCH RBC QN AUTO: 29 PG (ref 26.1–32.9)
MCH RBC QN AUTO: 29.2 PG (ref 26.1–32.9)
MCHC RBC AUTO-ENTMCNC: 32.8 G/DL (ref 31.4–35)
MCHC RBC AUTO-ENTMCNC: 33.2 G/DL (ref 31.4–35)
MCV RBC AUTO: 87.8 FL (ref 79.6–97.8)
MCV RBC AUTO: 88.5 FL (ref 79.6–97.8)
MONOCYTES # BLD: 0.3 K/UL (ref 0.1–1.3)
MONOCYTES # BLD: 0.3 K/UL (ref 0.1–1.3)
MONOCYTES NFR BLD: 3 % (ref 4–12)
MONOCYTES NFR BLD: 4 % (ref 4–12)
NEUTS SEG # BLD: 5.2 K/UL (ref 1.7–8.2)
NEUTS SEG # BLD: 6.5 K/UL (ref 1.7–8.2)
NEUTS SEG NFR BLD: 73 % (ref 43–78)
NEUTS SEG NFR BLD: 75 % (ref 43–78)
NITRITE UR QL STRIP.AUTO: NEGATIVE
OTHER OBSERVATIONS,UCOM: ABNORMAL
PH UR STRIP: 5.5 [PH] (ref 5–9)
PLATELET # BLD AUTO: 441 K/UL (ref 150–450)
PLATELET # BLD AUTO: 473 K/UL (ref 150–450)
PMV BLD AUTO: 10.3 FL (ref 10.8–14.1)
PMV BLD AUTO: 11 FL (ref 10.8–14.1)
POTASSIUM SERPL-SCNC: 3.9 MMOL/L (ref 3.5–5.1)
POTASSIUM SERPL-SCNC: 4.4 MMOL/L (ref 3.5–5.1)
PROT SERPL-MCNC: 7.1 G/DL (ref 6.3–8.2)
PROT SERPL-MCNC: 8.3 G/DL (ref 6.3–8.2)
PROT UR STRIP-MCNC: 100 MG/DL
RBC # BLD AUTO: 4.17 M/UL (ref 4.05–5.25)
RBC # BLD AUTO: 4.52 M/UL (ref 4.05–5.25)
RBC #/AREA URNS HPF: ABNORMAL /HPF
SODIUM SERPL-SCNC: 137 MMOL/L (ref 136–145)
SODIUM SERPL-SCNC: 140 MMOL/L (ref 136–145)
SP GR UR REFRACTOMETRY: 1.04 (ref 1–1.02)
UROBILINOGEN UR QL STRIP.AUTO: 1 EU/DL (ref 0.2–1)
WBC # BLD AUTO: 7.2 K/UL (ref 4.3–11.1)
WBC # BLD AUTO: 8.7 K/UL (ref 4.3–11.1)
WBC URNS QL MICRO: >100 /HPF

## 2018-03-15 PROCEDURE — 87086 URINE CULTURE/COLONY COUNT: CPT | Performed by: EMERGENCY MEDICINE

## 2018-03-15 PROCEDURE — 96375 TX/PRO/DX INJ NEW DRUG ADDON: CPT | Performed by: EMERGENCY MEDICINE

## 2018-03-15 PROCEDURE — 85025 COMPLETE CBC W/AUTO DIFF WBC: CPT

## 2018-03-15 PROCEDURE — 99285 EMERGENCY DEPT VISIT HI MDM: CPT | Performed by: EMERGENCY MEDICINE

## 2018-03-15 PROCEDURE — 81001 URINALYSIS AUTO W/SCOPE: CPT | Performed by: EMERGENCY MEDICINE

## 2018-03-15 PROCEDURE — 74011636320 HC RX REV CODE- 636/320: Performed by: EMERGENCY MEDICINE

## 2018-03-15 PROCEDURE — 74011250636 HC RX REV CODE- 250/636: Performed by: EMERGENCY MEDICINE

## 2018-03-15 PROCEDURE — 83690 ASSAY OF LIPASE: CPT | Performed by: EMERGENCY MEDICINE

## 2018-03-15 PROCEDURE — 83605 ASSAY OF LACTIC ACID: CPT

## 2018-03-15 PROCEDURE — 74011000258 HC RX REV CODE- 258: Performed by: EMERGENCY MEDICINE

## 2018-03-15 PROCEDURE — 93005 ELECTROCARDIOGRAM TRACING: CPT | Performed by: HOSPITALIST

## 2018-03-15 PROCEDURE — 94640 AIRWAY INHALATION TREATMENT: CPT

## 2018-03-15 PROCEDURE — 80053 COMPREHEN METABOLIC PANEL: CPT

## 2018-03-15 PROCEDURE — 87186 SC STD MICRODIL/AGAR DIL: CPT | Performed by: EMERGENCY MEDICINE

## 2018-03-15 PROCEDURE — 82962 GLUCOSE BLOOD TEST: CPT

## 2018-03-15 PROCEDURE — 96365 THER/PROPH/DIAG IV INF INIT: CPT | Performed by: EMERGENCY MEDICINE

## 2018-03-15 PROCEDURE — 87088 URINE BACTERIA CULTURE: CPT | Performed by: EMERGENCY MEDICINE

## 2018-03-15 PROCEDURE — 74011000250 HC RX REV CODE- 250: Performed by: EMERGENCY MEDICINE

## 2018-03-15 PROCEDURE — 74011000250 HC RX REV CODE- 250: Performed by: HOSPITALIST

## 2018-03-15 PROCEDURE — 74177 CT ABD & PELVIS W/CONTRAST: CPT

## 2018-03-15 PROCEDURE — 65270000029 HC RM PRIVATE

## 2018-03-15 PROCEDURE — 80053 COMPREHEN METABOLIC PANEL: CPT | Performed by: EMERGENCY MEDICINE

## 2018-03-15 RX ORDER — PANTOPRAZOLE SODIUM 40 MG/1
40 TABLET, DELAYED RELEASE ORAL
Status: DISCONTINUED | OUTPATIENT
Start: 2018-03-16 | End: 2018-03-21 | Stop reason: HOSPADM

## 2018-03-15 RX ORDER — LISINOPRIL 5 MG/1
10 TABLET ORAL DAILY
Status: DISCONTINUED | OUTPATIENT
Start: 2018-03-16 | End: 2018-03-21 | Stop reason: HOSPADM

## 2018-03-15 RX ORDER — VANCOMYCIN HYDROCHLORIDE
1250 ONCE
Status: COMPLETED | OUTPATIENT
Start: 2018-03-15 | End: 2018-03-15

## 2018-03-15 RX ORDER — SODIUM CHLORIDE 0.9 % (FLUSH) 0.9 %
10 SYRINGE (ML) INJECTION
Status: COMPLETED | OUTPATIENT
Start: 2018-03-15 | End: 2018-03-15

## 2018-03-15 RX ORDER — POTASSIUM CHLORIDE 20 MEQ/1
20 TABLET, EXTENDED RELEASE ORAL DAILY
Status: DISCONTINUED | OUTPATIENT
Start: 2018-03-16 | End: 2018-03-21 | Stop reason: HOSPADM

## 2018-03-15 RX ORDER — INSULIN GLARGINE 100 [IU]/ML
12 INJECTION, SOLUTION SUBCUTANEOUS
Status: DISCONTINUED | OUTPATIENT
Start: 2018-03-15 | End: 2018-03-17

## 2018-03-15 RX ORDER — OXYCODONE HYDROCHLORIDE 5 MG/1
5-10 TABLET ORAL
Status: DISCONTINUED | OUTPATIENT
Start: 2018-03-15 | End: 2018-03-21 | Stop reason: HOSPADM

## 2018-03-15 RX ORDER — IPRATROPIUM BROMIDE AND ALBUTEROL SULFATE 2.5; .5 MG/3ML; MG/3ML
3 SOLUTION RESPIRATORY (INHALATION)
Status: DISCONTINUED | OUTPATIENT
Start: 2018-03-15 | End: 2018-03-17

## 2018-03-15 RX ORDER — ENOXAPARIN SODIUM 100 MG/ML
40 INJECTION SUBCUTANEOUS EVERY 24 HOURS
Status: DISCONTINUED | OUTPATIENT
Start: 2018-03-16 | End: 2018-03-21 | Stop reason: HOSPADM

## 2018-03-15 RX ORDER — METOCLOPRAMIDE HYDROCHLORIDE 5 MG/ML
10 INJECTION INTRAMUSCULAR; INTRAVENOUS
Status: COMPLETED | OUTPATIENT
Start: 2018-03-15 | End: 2018-03-15

## 2018-03-15 RX ORDER — ACETAMINOPHEN 325 MG/1
650 TABLET ORAL
Status: DISCONTINUED | OUTPATIENT
Start: 2018-03-15 | End: 2018-03-21 | Stop reason: HOSPADM

## 2018-03-15 RX ORDER — SODIUM CHLORIDE 0.9 % (FLUSH) 0.9 %
5-10 SYRINGE (ML) INJECTION AS NEEDED
Status: DISCONTINUED | OUTPATIENT
Start: 2018-03-15 | End: 2018-03-21 | Stop reason: HOSPADM

## 2018-03-15 RX ORDER — ONDANSETRON 2 MG/ML
4 INJECTION INTRAMUSCULAR; INTRAVENOUS
Status: DISCONTINUED | OUTPATIENT
Start: 2018-03-15 | End: 2018-03-16

## 2018-03-15 RX ORDER — NALOXONE HYDROCHLORIDE 0.4 MG/ML
0.4 INJECTION, SOLUTION INTRAMUSCULAR; INTRAVENOUS; SUBCUTANEOUS AS NEEDED
Status: DISCONTINUED | OUTPATIENT
Start: 2018-03-15 | End: 2018-03-21 | Stop reason: HOSPADM

## 2018-03-15 RX ORDER — SODIUM CHLORIDE 0.9 % (FLUSH) 0.9 %
5-10 SYRINGE (ML) INJECTION EVERY 8 HOURS
Status: DISCONTINUED | OUTPATIENT
Start: 2018-03-16 | End: 2018-03-21 | Stop reason: HOSPADM

## 2018-03-15 RX ORDER — INSULIN LISPRO 100 [IU]/ML
INJECTION, SOLUTION INTRAVENOUS; SUBCUTANEOUS
Status: DISCONTINUED | OUTPATIENT
Start: 2018-03-15 | End: 2018-03-21 | Stop reason: HOSPADM

## 2018-03-15 RX ORDER — FERROUS SULFATE, DRIED 160(50) MG
1 TABLET, EXTENDED RELEASE ORAL 2 TIMES DAILY WITH MEALS
Status: DISCONTINUED | OUTPATIENT
Start: 2018-03-16 | End: 2018-03-21 | Stop reason: HOSPADM

## 2018-03-15 RX ORDER — LEVOFLOXACIN 5 MG/ML
750 INJECTION, SOLUTION INTRAVENOUS
Status: COMPLETED | OUTPATIENT
Start: 2018-03-15 | End: 2018-03-15

## 2018-03-15 RX ORDER — METOCLOPRAMIDE HYDROCHLORIDE 5 MG/ML
5 INJECTION INTRAMUSCULAR; INTRAVENOUS EVERY 6 HOURS
Status: DISCONTINUED | OUTPATIENT
Start: 2018-03-16 | End: 2018-03-21 | Stop reason: HOSPADM

## 2018-03-15 RX ORDER — ADHESIVE BANDAGE
30 BANDAGE TOPICAL DAILY PRN
Status: DISCONTINUED | OUTPATIENT
Start: 2018-03-15 | End: 2018-03-21 | Stop reason: HOSPADM

## 2018-03-15 RX ORDER — SODIUM CHLORIDE 9 MG/ML
125 INJECTION, SOLUTION INTRAVENOUS CONTINUOUS
Status: DISCONTINUED | OUTPATIENT
Start: 2018-03-15 | End: 2018-03-16

## 2018-03-15 RX ORDER — GUAIFENESIN 600 MG/1
600 TABLET, EXTENDED RELEASE ORAL EVERY 12 HOURS
Status: DISCONTINUED | OUTPATIENT
Start: 2018-03-15 | End: 2018-03-21 | Stop reason: HOSPADM

## 2018-03-15 RX ORDER — ONDANSETRON 2 MG/ML
4 INJECTION INTRAMUSCULAR; INTRAVENOUS
Status: COMPLETED | OUTPATIENT
Start: 2018-03-15 | End: 2018-03-15

## 2018-03-15 RX ORDER — HYDRALAZINE HYDROCHLORIDE 20 MG/ML
20 INJECTION INTRAMUSCULAR; INTRAVENOUS
Status: DISCONTINUED | OUTPATIENT
Start: 2018-03-15 | End: 2018-03-21 | Stop reason: HOSPADM

## 2018-03-15 RX ORDER — MORPHINE SULFATE 2 MG/ML
1 INJECTION, SOLUTION INTRAMUSCULAR; INTRAVENOUS
Status: DISCONTINUED | OUTPATIENT
Start: 2018-03-15 | End: 2018-03-21 | Stop reason: HOSPADM

## 2018-03-15 RX ORDER — MORPHINE SULFATE 2 MG/ML
2 INJECTION, SOLUTION INTRAMUSCULAR; INTRAVENOUS
Status: ACTIVE | OUTPATIENT
Start: 2018-03-15 | End: 2018-03-16

## 2018-03-15 RX ORDER — METOPROLOL TARTRATE 25 MG/1
12.5 TABLET, FILM COATED ORAL 2 TIMES DAILY
Status: DISCONTINUED | OUTPATIENT
Start: 2018-03-15 | End: 2018-03-21 | Stop reason: HOSPADM

## 2018-03-15 RX ADMIN — SODIUM CHLORIDE 100 ML: 900 INJECTION, SOLUTION INTRAVENOUS at 19:03

## 2018-03-15 RX ADMIN — SODIUM CHLORIDE 12.5 MG: 9 INJECTION INTRAMUSCULAR; INTRAVENOUS; SUBCUTANEOUS at 20:02

## 2018-03-15 RX ADMIN — SODIUM CHLORIDE 1000 ML: 900 INJECTION, SOLUTION INTRAVENOUS at 17:53

## 2018-03-15 RX ADMIN — Medication 10 ML: at 19:03

## 2018-03-15 RX ADMIN — ONDANSETRON 4 MG: 2 INJECTION INTRAMUSCULAR; INTRAVENOUS at 17:53

## 2018-03-15 RX ADMIN — IPRATROPIUM BROMIDE AND ALBUTEROL SULFATE 3 ML: .5; 3 SOLUTION RESPIRATORY (INHALATION) at 23:06

## 2018-03-15 RX ADMIN — VANCOMYCIN HYDROCHLORIDE 1250 MG: 10 INJECTION, POWDER, LYOPHILIZED, FOR SOLUTION INTRAVENOUS at 22:24

## 2018-03-15 RX ADMIN — IOPAMIDOL 100 ML: 755 INJECTION, SOLUTION INTRAVENOUS at 19:02

## 2018-03-15 RX ADMIN — METOCLOPRAMIDE 10 MG: 5 INJECTION, SOLUTION INTRAMUSCULAR; INTRAVENOUS at 17:53

## 2018-03-15 RX ADMIN — LEVOFLOXACIN 750 MG: 5 INJECTION, SOLUTION INTRAVENOUS at 20:03

## 2018-03-15 NOTE — ED PROVIDER NOTES
HPI Comments: 59-year-old female without prior history of abdominal surgery of 1 week of occasional abdominal pains associated with vomiting about 3 times a day. Currently she is in rehabilitation as she is process recovering from hip surgery. Has history of hypertension and diabetes and also breast cancer. No fever or diarrhea. Does any history of  gastroparesis    Patient is a 62 y.o. female presenting with vomiting. The history is provided by the patient. Vomiting    This is a new problem. The current episode started more than 2 days ago. The problem occurs 2 to 4 times per day. The problem has not changed since onset. The emesis has an appearance of stomach contents. There has been no fever. Associated symptoms include chills and abdominal pain. Pertinent negatives include no fever, no diarrhea, no headaches, no myalgias, no cough and no headaches. Past Medical History:   Diagnosis Date    Cancer ([facility] [address]     breast- L    Diabetes ([facility] [address]     Hypertension        Past Surgical History:   Procedure Laterality Date    HX BREAST LUMPECTOMY      HX HIP FRACTURE TX Right          No family history on file. Social History     Social History    Marital status: SINGLE     Spouse name: N/A    Number of children: N/A    Years of education: N/A     Occupational History    Not on file. Social History Main Topics    Smoking status: Never Smoker    Smokeless tobacco: Never Used    Alcohol use Yes      Comment: social    Drug use: No    Sexual activity: Not on file     Other Topics Concern    Not on file     Social History Narrative         ALLERGIES: Review of patient's allergies indicates no known allergies. Review of Systems   Constitutional: Positive for chills. Negative for fever. Respiratory: Negative for cough and shortness of breath. Cardiovascular: Negative for chest pain and palpitations. Gastrointestinal: Positive for abdominal pain and vomiting. Negative for diarrhea. Genitourinary: Negative for dysuria and flank pain. Musculoskeletal: Negative for back pain, myalgias and neck pain. Skin: Negative for color change and rash. Neurological: Negative for syncope and headaches. All other systems reviewed and are negative. Vitals:    03/15/18 1538   BP: 134/81   Pulse: (!) 113   Resp: 16   Temp: 98.7 °F (37.1 °C)   SpO2: 97%   Weight: 74.8 kg (165 lb)   Height: 5' 3\" (1.6 m)            Physical Exam   Constitutional: She is oriented to person, place, and time. She appears well-developed and well-nourished. No distress. HENT:   Head: Normocephalic and atraumatic. Mouth/Throat: Oropharynx is clear and moist. No oropharyngeal exudate. Eyes: Conjunctivae and EOM are normal. Pupils are equal, round, and reactive to light. Neck: Normal range of motion. Neck supple. Cardiovascular: Normal rate, regular rhythm and intact distal pulses. No murmur heard. Pulmonary/Chest: Breath sounds normal. No respiratory distress. Abdominal: Soft. Bowel sounds are normal. She exhibits no mass. There is tenderness in the left upper quadrant and left lower quadrant. There is no rebound and no guarding. No hernia. Neurological: She is alert and oriented to person, place, and time. Gait normal.   Nl speech   Skin: Skin is warm and dry. Psychiatric: She has a normal mood and affect. Her speech is normal.   Nursing note and vitals reviewed. MDM  Number of Diagnoses or Management Options  Abdominal pain, generalized:   Non-intractable vomiting with nausea, unspecified vomiting type:   Pneumonia due to infectious organism, unspecified laterality, unspecified part of lung:   Urinary tract infection without hematuria, site unspecified:   Diagnosis management comments: Doubt appendicitis or cholecystitis given the fact the pain and tenderness is more on the left side. Very mild tenderness to palpation. Checked for all bowel obstruction, pyelonephritis,  Diverticulitis.   Trial of Reglan. 8:41 PM  Labs okay UTI on CAT scan and urinalysis small right basilar infiltrate. Patient with intractable vomiting failing Phenergan can keep food down, may well not be able to keep antibiotics down. Will admit for intractable nausea and vomiting, with UTI.        Amount and/or Complexity of Data Reviewed  Clinical lab tests: reviewed and ordered  Tests in the radiology section of CPT®: reviewed and ordered  Independent visualization of images, tracings, or specimens: yes    Risk of Complications, Morbidity, and/or Mortality  Presenting problems: moderate  Diagnostic procedures: low  Management options: moderate          ED Course       Procedures

## 2018-03-15 NOTE — ED TRIAGE NOTES
Pt arrived via ems from a rehab facility for nausea and vomiting for a week. Pt was given promethazine at the facility. Pt states abdominal pain but denies any urinary symptoms.

## 2018-03-16 ENCOUNTER — HOME HEALTH ADMISSION (OUTPATIENT)
Dept: HOME HEALTH SERVICES | Facility: HOME HEALTH | Age: 58
End: 2018-03-16

## 2018-03-16 PROBLEM — J18.9 RIGHT LOWER LOBE PNEUMONIA: Status: ACTIVE | Noted: 2018-03-15

## 2018-03-16 PROBLEM — N30.00 ACUTE CYSTITIS WITHOUT HEMATURIA: Status: ACTIVE | Noted: 2018-03-15

## 2018-03-16 PROBLEM — N39.0 UTI (URINARY TRACT INFECTION): Status: ACTIVE | Noted: 2018-03-16

## 2018-03-16 LAB
ALBUMIN SERPL-MCNC: 2.8 G/DL (ref 3.5–5)
ALBUMIN/GLOB SERPL: 0.6 {RATIO} (ref 1.2–3.5)
ALP SERPL-CCNC: 113 U/L (ref 50–136)
ALT SERPL-CCNC: 15 U/L (ref 12–65)
ANION GAP SERPL CALC-SCNC: 11 MMOL/L (ref 7–16)
AST SERPL-CCNC: 18 U/L (ref 15–37)
ATRIAL RATE: 103 BPM
BILIRUB SERPL-MCNC: 0.6 MG/DL (ref 0.2–1.1)
BUN SERPL-MCNC: 14 MG/DL (ref 6–23)
CALCIUM SERPL-MCNC: 8.6 MG/DL (ref 8.3–10.4)
CALCULATED P AXIS, ECG09: 51 DEGREES
CALCULATED R AXIS, ECG10: 14 DEGREES
CALCULATED T AXIS, ECG11: 32 DEGREES
CHLORIDE SERPL-SCNC: 106 MMOL/L (ref 98–107)
CO2 SERPL-SCNC: 24 MMOL/L (ref 21–32)
CREAT SERPL-MCNC: 0.71 MG/DL (ref 0.6–1)
DIAGNOSIS, 93000: NORMAL
ERYTHROCYTE [DISTWIDTH] IN BLOOD BY AUTOMATED COUNT: 13.6 % (ref 11.9–14.6)
GLOBULIN SER CALC-MCNC: 4.6 G/DL (ref 2.3–3.5)
GLUCOSE BLD STRIP.AUTO-MCNC: 85 MG/DL (ref 65–100)
GLUCOSE BLD STRIP.AUTO-MCNC: 86 MG/DL (ref 65–100)
GLUCOSE BLD STRIP.AUTO-MCNC: 88 MG/DL (ref 65–100)
GLUCOSE SERPL-MCNC: 90 MG/DL (ref 65–100)
HCT VFR BLD AUTO: 36.4 % (ref 35.8–46.3)
HGB BLD-MCNC: 12.2 G/DL (ref 11.7–15.4)
LACTATE SERPL-SCNC: 1.5 MMOL/L (ref 0.4–2)
MCH RBC QN AUTO: 29.3 PG (ref 26.1–32.9)
MCHC RBC AUTO-ENTMCNC: 33.5 G/DL (ref 31.4–35)
MCV RBC AUTO: 87.3 FL (ref 79.6–97.8)
P-R INTERVAL, ECG05: 176 MS
PLATELET # BLD AUTO: 432 K/UL (ref 150–450)
PMV BLD AUTO: 11.1 FL (ref 10.8–14.1)
POTASSIUM SERPL-SCNC: 3.3 MMOL/L (ref 3.5–5.1)
PROT SERPL-MCNC: 7.4 G/DL (ref 6.3–8.2)
Q-T INTERVAL, ECG07: 308 MS
QRS DURATION, ECG06: 84 MS
QTC CALCULATION (BEZET), ECG08: 403 MS
RBC # BLD AUTO: 4.17 M/UL (ref 4.05–5.25)
SODIUM SERPL-SCNC: 141 MMOL/L (ref 136–145)
VENTRICULAR RATE, ECG03: 103 BPM
WBC # BLD AUTO: 6.5 K/UL (ref 4.3–11.1)

## 2018-03-16 PROCEDURE — 74011636637 HC RX REV CODE- 636/637: Performed by: HOSPITALIST

## 2018-03-16 PROCEDURE — 94760 N-INVAS EAR/PLS OXIMETRY 1: CPT

## 2018-03-16 PROCEDURE — 97161 PT EVAL LOW COMPLEX 20 MIN: CPT

## 2018-03-16 PROCEDURE — 36415 COLL VENOUS BLD VENIPUNCTURE: CPT | Performed by: HOSPITALIST

## 2018-03-16 PROCEDURE — 74011000258 HC RX REV CODE- 258: Performed by: INTERNAL MEDICINE

## 2018-03-16 PROCEDURE — 74011000250 HC RX REV CODE- 250: Performed by: HOSPITALIST

## 2018-03-16 PROCEDURE — 97166 OT EVAL MOD COMPLEX 45 MIN: CPT

## 2018-03-16 PROCEDURE — 74011250637 HC RX REV CODE- 250/637: Performed by: HOSPITALIST

## 2018-03-16 PROCEDURE — 86580 TB INTRADERMAL TEST: CPT | Performed by: HOSPITALIST

## 2018-03-16 PROCEDURE — 74011250636 HC RX REV CODE- 250/636: Performed by: HOSPITALIST

## 2018-03-16 PROCEDURE — 65270000029 HC RM PRIVATE

## 2018-03-16 PROCEDURE — 74011250636 HC RX REV CODE- 250/636: Performed by: INTERNAL MEDICINE

## 2018-03-16 PROCEDURE — 74011000302 HC RX REV CODE- 302: Performed by: HOSPITALIST

## 2018-03-16 PROCEDURE — 99218 HC RM OBSERVATION: CPT

## 2018-03-16 PROCEDURE — 80053 COMPREHEN METABOLIC PANEL: CPT | Performed by: HOSPITALIST

## 2018-03-16 PROCEDURE — 85027 COMPLETE CBC AUTOMATED: CPT | Performed by: HOSPITALIST

## 2018-03-16 PROCEDURE — 83605 ASSAY OF LACTIC ACID: CPT | Performed by: HOSPITALIST

## 2018-03-16 PROCEDURE — G8989 SELF CARE D/C STATUS: HCPCS

## 2018-03-16 PROCEDURE — 82962 GLUCOSE BLOOD TEST: CPT

## 2018-03-16 PROCEDURE — 74011000258 HC RX REV CODE- 258: Performed by: HOSPITALIST

## 2018-03-16 PROCEDURE — 94640 AIRWAY INHALATION TREATMENT: CPT

## 2018-03-16 RX ORDER — VANCOMYCIN HYDROCHLORIDE
1250 EVERY 12 HOURS
Status: DISCONTINUED | OUTPATIENT
Start: 2018-03-16 | End: 2018-03-16

## 2018-03-16 RX ORDER — SODIUM CHLORIDE 9 MG/ML
50 INJECTION, SOLUTION INTRAVENOUS CONTINUOUS
Status: DISCONTINUED | OUTPATIENT
Start: 2018-03-16 | End: 2018-03-16

## 2018-03-16 RX ORDER — ONDANSETRON 2 MG/ML
4 INJECTION INTRAMUSCULAR; INTRAVENOUS EVERY 6 HOURS
Status: DISCONTINUED | OUTPATIENT
Start: 2018-03-16 | End: 2018-03-18

## 2018-03-16 RX ORDER — POTASSIUM CHLORIDE 14.9 MG/ML
20 INJECTION INTRAVENOUS
Status: DISCONTINUED | OUTPATIENT
Start: 2018-03-16 | End: 2018-03-16

## 2018-03-16 RX ADMIN — IPRATROPIUM BROMIDE AND ALBUTEROL SULFATE 3 ML: .5; 3 SOLUTION RESPIRATORY (INHALATION) at 13:30

## 2018-03-16 RX ADMIN — GUAIFENESIN 600 MG: 600 TABLET, EXTENDED RELEASE ORAL at 09:13

## 2018-03-16 RX ADMIN — Medication 10 ML: at 14:04

## 2018-03-16 RX ADMIN — MORPHINE SULFATE 1 MG: 2 INJECTION, SOLUTION INTRAMUSCULAR; INTRAVENOUS at 09:12

## 2018-03-16 RX ADMIN — MORPHINE SULFATE 1 MG: 2 INJECTION, SOLUTION INTRAMUSCULAR; INTRAVENOUS at 16:15

## 2018-03-16 RX ADMIN — Medication 10 ML: at 00:00

## 2018-03-16 RX ADMIN — TUBERCULIN PURIFIED PROTEIN DERIVATIVE 5 UNITS: 5 INJECTION, SOLUTION INTRADERMAL at 00:56

## 2018-03-16 RX ADMIN — INSULIN GLARGINE 12 UNITS: 100 INJECTION, SOLUTION SUBCUTANEOUS at 21:10

## 2018-03-16 RX ADMIN — PIPERACILLIN SODIUM,TAZOBACTAM SODIUM 3.38 G: 3; .375 INJECTION, POWDER, FOR SOLUTION INTRAVENOUS at 09:22

## 2018-03-16 RX ADMIN — MORPHINE SULFATE 1 MG: 2 INJECTION, SOLUTION INTRAMUSCULAR; INTRAVENOUS at 21:10

## 2018-03-16 RX ADMIN — IPRATROPIUM BROMIDE AND ALBUTEROL SULFATE 3 ML: .5; 3 SOLUTION RESPIRATORY (INHALATION) at 02:18

## 2018-03-16 RX ADMIN — PIPERACILLIN SODIUM,TAZOBACTAM SODIUM 3.38 G: 3; .375 INJECTION, POWDER, FOR SOLUTION INTRAVENOUS at 00:37

## 2018-03-16 RX ADMIN — POTASSIUM CHLORIDE: 149 INJECTION, SOLUTION, CONCENTRATE INTRAVENOUS at 11:29

## 2018-03-16 RX ADMIN — METOPROLOL TARTRATE 12.5 MG: 25 TABLET, FILM COATED ORAL at 09:13

## 2018-03-16 RX ADMIN — INSULIN GLARGINE 10 UNITS: 100 INJECTION, SOLUTION SUBCUTANEOUS at 00:37

## 2018-03-16 RX ADMIN — Medication 10 ML: at 05:45

## 2018-03-16 RX ADMIN — CEFTRIAXONE SODIUM 1 G: 1 INJECTION, POWDER, FOR SOLUTION INTRAMUSCULAR; INTRAVENOUS at 10:21

## 2018-03-16 RX ADMIN — ENOXAPARIN SODIUM 40 MG: 40 INJECTION SUBCUTANEOUS at 00:38

## 2018-03-16 RX ADMIN — IPRATROPIUM BROMIDE AND ALBUTEROL SULFATE 3 ML: .5; 3 SOLUTION RESPIRATORY (INHALATION) at 07:30

## 2018-03-16 RX ADMIN — IPRATROPIUM BROMIDE AND ALBUTEROL SULFATE 3 ML: .5; 3 SOLUTION RESPIRATORY (INHALATION) at 20:25

## 2018-03-16 RX ADMIN — ONDANSETRON 4 MG: 2 INJECTION INTRAMUSCULAR; INTRAVENOUS at 17:24

## 2018-03-16 RX ADMIN — Medication 10 ML: at 21:14

## 2018-03-16 RX ADMIN — ONDANSETRON 4 MG: 2 INJECTION INTRAMUSCULAR; INTRAVENOUS at 09:12

## 2018-03-16 RX ADMIN — ONDANSETRON 4 MG: 2 INJECTION INTRAMUSCULAR; INTRAVENOUS at 11:58

## 2018-03-16 RX ADMIN — METOCLOPRAMIDE 5 MG: 5 INJECTION, SOLUTION INTRAMUSCULAR; INTRAVENOUS at 05:44

## 2018-03-16 RX ADMIN — METOPROLOL TARTRATE 12.5 MG: 25 TABLET, FILM COATED ORAL at 17:25

## 2018-03-16 RX ADMIN — SODIUM CHLORIDE 125 ML/HR: 900 INJECTION, SOLUTION INTRAVENOUS at 00:37

## 2018-03-16 RX ADMIN — METOCLOPRAMIDE 5 MG: 5 INJECTION, SOLUTION INTRAMUSCULAR; INTRAVENOUS at 12:00

## 2018-03-16 RX ADMIN — METOCLOPRAMIDE 5 MG: 5 INJECTION, SOLUTION INTRAMUSCULAR; INTRAVENOUS at 00:38

## 2018-03-16 RX ADMIN — METOCLOPRAMIDE 5 MG: 5 INJECTION, SOLUTION INTRAMUSCULAR; INTRAVENOUS at 18:00

## 2018-03-16 NOTE — PROGRESS NOTES
MD notified pt  and not eating r/t not feeling well. Order to reduce bedtime Lantus to 10 units this shift. Will continue to monitor.

## 2018-03-16 NOTE — PROGRESS NOTES
Hourly rounds performed; all pt needs met. Pt slept well throughout night; stated she did not \"feel well\" but denied any pain.

## 2018-03-16 NOTE — PROGRESS NOTES
Problem: Self Care Deficits Care Plan (Adult)  Goal: *Acute Goals and Plan of Care (Insert Text)  1. Patient will complete lower body bathing and dressing with minimal assistance and adaptive equipment as needed. 2. Patient will complete toileting with modified independence. 3. Patient will tolerate 20 minutes of OT treatment with no rest breaks to increase activity tolerance for ADLs. 4. Patient will complete functional transfers with modified independence and adaptive equipment as needed. Timeframe: 7 visits       OCCUPATIONAL THERAPY: Initial Assessment and Discharge 3/16/2018  OBSERVATION: Hospital Day: 2  Payor: BLUE CROSS / Plan: [place] BLUE CROSS BLUE ESSENTIALS JOANNE / Product Type: JOANNE /      NAME/AGE/GENDER: Cesar Posada is a 62 y.o. female   PRIMARY DIAGNOSIS:  Healthcare associated bacterial pneumonia  Urinary tract infection  Generalized weakness  Nausea & vomiting  UTI (urinary tract infection) Nausea & vomiting Nausea & vomiting        ICD-10: Treatment Diagnosis:    · Generalized Muscle Weakness (M62.81)   Precautions/Allergies:     Review of patient's allergies indicates no known allergies. ASSESSMENT:     Ms. Aggie Cm is a 62year old female admitted from rehab with N/V and UTI. Patient has been at rehab since recent discharge from [facility] for hip surgery with Dr Jenny Calle. Per Summer Govea, patient is NW[facility] in [facility]. 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 Patient agreeable to OT evaluation. Reports no pain. Patient demonstrates ability to complete toileting and bedside commode transfer with supervision, although non-compliant with WB restrictions. BUE assessment reveals ROM, strength, coordination are WNL. Patient is currently functioning below her baseline due to recent illness and would benefit from continued occupational therapy to increase independence and safety. Will follow.      Ms. Aggie Cm was discharged from our facility before further treatment could be provided in this setting. This section established at most recent assessment   PROBLEM LIST (Impairments causing functional limitations):  1. Decreased ADL/Functional Activities  2. Decreased Transfer Abilities  3. Decreased Ambulation Ability/Technique  4. Decreased Balance  5. Decreased Activity Tolerance   INTERVENTIONS PLANNED: (Benefits and precautions of occupational therapy have been discussed with the patient.)  1. Activities of daily living training  2. Adaptive equipment training  3. Donning&doffing training  4. Group therapy  5. Therapeutic activity  6. Therapeutic exercise     TREATMENT PLAN: Frequency/Duration: Follow patient 3x/ week to address above goals. Rehabilitation Potential For Stated Goals: Good     RECOMMENDED REHABILITATION/EQUIPMENT: (at time of discharge pending progress): Due to the probability of continued deficits (see above) this patient will likely need continued skilled occupational therapy after discharge. Equipment:    Has a  and BSC already               OCCUPATIONAL PROFILE AND HISTORY:   History of Present Injury/Illness (Reason for Referral):  Per H&P, \"Patient is is 62years old female with pmhx of HTN, DM-2, recent right hip fracture currently in [facility] rehab presented to ER with generalized weakness, nausea/vomiting for last 3-4 days. Pt is having on an average 2-3 episodes of vomiting for last 3-4 days, oral intake is minimal. She also reports dysuria and increased frequency of urination. She denies cough, SOB, fever, chills, abdominal pain, diarrhea, night sweats, palpitations, headache, blurry vision. In ER, pt was hypertensive with SBP > 180, tachycardic 110's, U/A positive for leukocyte esterase with 4+ bacteria. CT abd/pelvis showed thickening of urinary bladder with intraluminal gas, mild right lung base infiltrate. \"  Past Medical History/Comorbidities:   Ms. Dee Cruz  has a past medical history of Cancer ([facility] [address] Diabetes ([facility] [address] and Hypertension.   Ms. Malik Aguilar  has a past surgical history that includes hx hip fracture tx (Right); hx breast lumpectomy; and hx hip replacement. Social History/Living Environment:   Home Environment: Private residence  # Steps to Enter: 5  One/Two Story Residence: One story  Living Alone: No  Support Systems: Family member(s)  Patient Expects to be Discharged to[de-identified] Private residence  Current DME Used/Available at Home: Commode, bedside, Walker, rolling  Tub or Shower Type: Tub/Shower combination  Prior Level of Function/Work/Activity:  Patient has been at rehab since hip surgery late February 2018. She is normally independent with ADLs. SHe works as a Hospice RN. Number of Personal Factors/Comorbidities that affect the Plan of Care: Expanded review of therapy/medical records (1-2):  MODERATE COMPLEXITY   ASSESSMENT OF OCCUPATIONAL PERFORMANCE[de-identified]   Activities of Daily Living:           Basic ADLs (From Assessment) Complex ADLs (From Assessment)   Basic ADL  Feeding: Setup  Oral Facial Hygiene/Grooming: Setup  Bathing: Moderate assistance  Upper Body Dressing: Setup  Lower Body Dressing: Moderate assistance  Toileting: Minimum assistance Instrumental ADL  Meal Preparation: Maximum assistance  Homemaking: Maximum assistance   Grooming/Bathing/Dressing Activities of Daily Living     Cognitive Retraining  Safety/Judgement: Awareness of environment; Fall prevention;Decreased awareness of need for safety           Toileting  Toileting Assistance: Supervision/set up  Bladder Hygiene: Supervision/set-up  Bowel Hygiene: Supervision/set-up  Clothing Management: Supervision/set-up     Functional Transfers  Toilet Transfer : Supervision  Adaptive Equipment: Brooks Santamaria (comment); Bedside commode     Bed/Mat Mobility  Supine to Sit: Modified independent  Sit to Supine: Modified independent  Sit to Stand: Supervision       Most Recent Physical Functioning:   Gross Assessment:  AROM: Within functional limits (BUE)  Strength:  Within functional limits (BUE)  Coordination: Within functional limits (BUE)               Posture:  Posture (WDL): Exceptions to WDL  Posture Assessment: Trunk flexion, Forward head, Rounded shoulders  Balance:  Sitting: Intact  Standing: Impaired  Standing - Static: Good  Standing - Dynamic : Fair Bed Mobility:  Supine to Sit: Modified independent  Sit to Supine: Modified independent  Wheelchair Mobility:     Transfers:  Sit to Stand: Supervision  Stand to Sit: Supervision                Patient Vitals for the past 6 hrs:   BP SpO2 Pulse   18 1104 (!) 147/91 97 % 92   18 1330 - 98 % -   18 1515 160/89 98 % 95       Mental Status  Neurologic State: Alert  Orientation Level: Oriented X4  Cognition: Follows commands  Perception: Appears intact  Perseveration: No perseveration noted  Safety/Judgement: Awareness of environment, Fall prevention, Decreased awareness of need for safety                          Physical Skills Involved:  1. Balance  2. Activity Tolerance Cognitive Skills Affected (resulting in the inability to perform in a timely and safe manner):  1. None Psychosocial Skills Affected:  1. Habits/Routines  2. Environmental Adaptation   Number of elements that affect the Plan of Care: 3-5:  MODERATE COMPLEXITY   CLINICAL DECISION MAKIN [facility] [place] [address] [address] AM-PAC 6 Clicks   Daily Activity Inpatient Short Form  How much help from another person does the patient currently need. .. Total A Lot A Little None   1. Putting on and taking off regular lower body clothing? [] 1   [] 2   [x] 3   [] 4   2. Bathing (including washing, rinsing, drying)? [] 1   [] 2   [x] 3   [] 4   3. Toileting, which includes using toilet, bedpan or urinal?   [] 1   [] 2   [x] 3   [] 4   4. Putting on and taking off regular upper body clothing? [] 1   [] 2   [] 3   [x] 4   5. Taking care of personal grooming such as brushing teeth? [] 1   [] 2   [] 3   [x] 4   6. Eating meals?    [] 1   [] 2   [] 3   [x] 4   © 2007, Trustees of [address] [place] [facility]\"" [address] [address], under license to [employer]. All rights reserved      Score:  Initial: 21 Most Recent: X (Date: -- )    Interpretation of Tool:  Represents activities that are increasingly more difficult (i.e. Bed mobility, Transfers, Gait). Score 24 23 22-20 19-15 14-10 9-7 6     Modifier CH CI CJ CK CL CM CN      ? Self Care:     - CURRENT STATUS: CJ - 20%-39% impaired, limited or restricted    - GOAL STATUS: CI - 1%-19% impaired, limited or restricted    - D/C STATUS:  CJ - 20%-39% impaired, limited or restricted  Payor: BLUE CROSS / Plan: [address] [place] [address] / Product Type: JOANNE /      Medical Necessity:     · Patient demonstrates good rehab potential due to higher previous functional level. Reason for Services/Other Comments:  · Patient continues to require present interventions due to patient's inability to care for self at independent baseline level of function. Use of outcome tool(s) and clinical judgement create a POC that gives a: LOW COMPLEXITY         TREATMENT:   (In addition to Assessment/Re-Assessment sessions the following treatments were rendered)     Pre-treatment Symptoms/Complaints:  None   Pain: Initial:   Pain Intensity 1: 0  Post Session:  none     Assessment/Reassessment only, no treatment provided today    Braces/Orthotics/Lines/Etc:   · IV  · O2 Device: Room air  Treatment/Session Assessment:    · Response to Treatment:  Tolerated well   · Interdisciplinary Collaboration:   o Occupational Therapist  o Registered Nurse  o   o Ortho NP  · After treatment position/precautions:   o with PT for PT evaluation   · Compliance with Program/Exercises: Will assess as treatment progresses. · Recommendations/Intent for next treatment session: \"Next visit will focus on advancements to more challenging activities and reduction in assistance provided\".   Total Treatment Duration:  OT Patient Time In/Time Out  Time In: 1340  Time Out: 1350     [place] P Jose, OTR/L

## 2018-03-16 NOTE — PROGRESS NOTES
Problem: Nutrition Deficit  Goal: *Optimize nutritional status  Nutrition  Reason for assessment: General Nutrition Management and Supplements (Dr. Jeanna Nowak)   Assessment:   Diet order(s): [facility]  Food/Nutrition Patient History:  The patient is known well to RD for previous admissions this year. The patient has a h/o diabetes. The patient currently reports severe nausea with vomiting. States that she has not had any vomiting today but she already feels nauseous. The patient states that her nausea with vomiting has been going on for the past week. RD observed the patient's breakfast tray this morning with nothing consumed off of it. The patient is dislikes Glucerna and other milk based supplements. Discussed Ensure Clear and the patient is receptive to this. The patient denies any issues with constipation, diarrhea, chewing or swallowing. Weight history in the EMR cannot be verified as accurate due to unknown weight source (pt stated vs estimated vs measured). WT / BMI WEIGHT   3/16/2018 170 lb   3/15/2018    3/9/2018 175 lb   2/23/2018 175 lb   According to the EMR the patient has lost ~5# over the past month. Anthropometrics:Height: 5' 3\" (160 cm),  Weight: 77.1 kg (170 lb), Weight Source: Bed, Body mass index is 30.11 kg/(m^2). BMI class of obesity class I. Macronutrient needs:  EER:  1060-3914 kcal /day (20-23 kcal/kg actual BW)  EPR:  52-63 grams protein/day (1-1.2 grams/kg IBW)  Intake/Comparative Standards:  Per RD observation of breakfast: 0%. This does not meet estimated kcal or protein needs. Nutrition Diagnosis: Inadequate oral intake related to altered gi function and weight loss as evidenced by patient with nausea and vomiting with 5# weight loss over th past month. Intervention:  Meals and snacks: Continue current diet. Nutrition Supplement Therapy: Add Ensure Clear TID   Nutrition Discharge Plan: Too soon to be determined    Bronwyn Bone [address], [address] [place] [address],  [phone]

## 2018-03-16 NOTE — PROGRESS NOTES
EMIL  Face to Face Encounter    Patients Name: Adeline Nagel    YOB: 1960    Ordering Physician: Dr. Tomas Mojica    Primary Diagnosis: Healthcare associated bacterial pneumonia  Urinary tract infection  Generalized weakness  Nausea & vomiting    Date of Face to Face:   3/16/2018                                  Face to Face Encounter findings are related to primary reason for home care:   yes. 1. I certify that the patient needs intermittent care as follows: skilled nursing care:  skilled observation/assessment, patient education and rehabilitative nursing  physical therapy: strengthening, stretching/ROM, transfer training, gait/stair training, balance training and pt/caregiver education  occupational therapy:  ADL safety (ie. cooking, bathing, dressing), ROM and pt/caregiver education    2. I certify that this patient is homebound, that is: 1) patient requires the use of a walker device, special transportation, or assistance of another to leave the home; or 2) patient's condition makes leaving the home medically contraindicated; and 3) patient has a normal inability to leave the home and leaving the home requires considerable and taxing effort. Patient may leave the home for infrequent and short duration for medical reasons, and occasional absences for non-medical reasons. Homebound status is due to the following functional limitations: Patient's ambulation limited secondary to severe pain and requires the use of an assistive device and the assistance of a caregiver for safe completion. Patient with strength and ROM deficits limiting ambulation endurance requiring the use of an assistive device and the assistance of a caregiver. Patient deemed temporarily homebound secondary to increased risk for infection when leaving home and going out into the community.   Patient with poor safety awareness and is at risk for falls without assistance of another person and the use of an assistive device. Patient with poor ambulation endurance limiting their safe ability to ascend/descend the required number of steps to leave the home. 3. I certify that this patient is under my care and that I, or a nurse practitioner or  [id number], or clinical nurse specialist, or certified nurse midwife, working with me, had a Face-to-Face Encounter that meets the physician Face-to-Face Encounter requirements. The following are the clinical findings from the [address] [place] [address] encounter that support the need for skilled services and is a summary of the encounter: see hospital chart    See hospital chart      Sveta Jules RN  3/16/2018      THE FOLLOWING TO BE COMPLETED BY THE COMMUNITY PHYSICIAN:    I concur with the findings described above from the F2F encounter that this patient is homebound and in need of a skilled service.     Certifying Physician: _____________________________________      Printed Certifying Physician Name: _____________________________________    Date: _________________

## 2018-03-16 NOTE — ED NOTES
TRANSFER - OUT REPORT:    Verbal report given to Ami Dhaliwal RN on Matias Mcallister  being transferred to [phone] for routine progression of care       Report consisted of patients Situation, Background, Assessment and   Recommendations(SBAR). Information from the following report(s) ED Summary was reviewed with the receiving nurse. Lines:       Opportunity for questions and clarification was provided.

## 2018-03-16 NOTE — PROGRESS NOTES
CM asked to speak with patient. Patient states she is now agreeable to SNF Rehab. [facility]. Referral sent to [facility]. Patient has blue cross and will require pre-cert. Awaiting response.

## 2018-03-16 NOTE — PROGRESS NOTES
[facility] has offered a bed. Patient notified. Patient has accepted bed offer at [facility]. RGV to begin pre-cert and notify CM when approved.

## 2018-03-16 NOTE — PROGRESS NOTES
Per [employer], they do not take blue cross. Notified patient. Patient requested referral be sent to [facility]. Referral sent through CC link. Awaiting response.

## 2018-03-16 NOTE — H&P
HOSPITALIST H&P/CONSULT  NAME:  Jocelin Vallejo   Age:  62 y.o.  :   1960   MRN:   [id number]  PCP: PROVIDER Carmina Wiggins  Consulting MD:  Treatment Team: Attending Provider: Anastacio Cuevas MD; Primary Nurse: Verner Aid  HPI:   Patient is is 62years old female with pmhx of HTN, DM-2, recent right hip fracture currently in [facility] rehab presented to ER with generalized weakness, nausea/vomiting for last 3-4 days. Pt is having on an average 2-3 episodes of vomiting for last 3-4 days, oral intake is minimal. She also reports dysuria and increased frequency of urination. She denies cough, SOB, fever, chills, abdominal pain, diarrhea, night sweats, palpitations, headache, blurry vision. In ER, pt was hypertensive with SBP > 180, tachycardic 110's, U/A positive for leukocyte esterase with 4+ bacteria. CT abd/pelvis showed thickening of urinary bladder with intraluminal gas, mild right lung base infiltrate. Complete ROS done and is as stated in HPI or otherwise negative  Past Medical History:   Diagnosis Date    Cancer ([facility] [address]     breast- L    Diabetes ([facility] [address]     Hypertension       Past Surgical History:   Procedure Laterality Date    HX BREAST LUMPECTOMY      HX HIP FRACTURE TX Right       Prior to Admission Medications   Prescriptions Last Dose Informant Patient Reported? Taking?   calcium-vitamin D (OYSTER SHELL) 500 mg(1,250mg) -200 unit per tablet   No No   Sig: Take 1 Tab by mouth two (2) times daily (with meals). insulin aspart U-100 (NOVOLOG U-100 INSULIN ASPART) 100 unit/mL injection   Yes No   Sig: by SubCUTAneous route. lisinopril (PRINIVIL, ZESTRIL) 10 mg tablet   Yes No   Sig: Take 10 mg by mouth daily. Indications: hypertension   metFORMIN (GLUCOPHAGE) 500 mg tablet   Yes No   Sig: Take 1,000 mg by mouth two (2) times daily (with meals).  Indications: type 2 diabetes mellitus   oxyCODONE IR (ROXICODONE) 5 mg immediate release tablet   No No   Sig: Take 1-2 Tabs by mouth every four (4) hours as needed. Max Daily Amount: 60 mg.   potassium chloride (K-DUR, KLOR-CON) 20 mEq tablet   No No   Sig: Take 1 Tab by mouth daily. Facility-Administered Medications: None     No Known Allergies   Social History   Substance Use Topics    Smoking status: Never Smoker    Smokeless tobacco: Never Used    Alcohol use Yes      Comment: social      No family history on file. Objective:     Visit Vitals    BP (!) 180/100    Pulse (!) 106    Temp 98.7 °F (37.1 °C)    Resp 16    Ht 5' 3\" (1.6 m)    Wt 74.8 kg (165 lb)    SpO2 99%    BMI 29.23 kg/m2      Temp (24hrs), Av.7 °F (37.1 °C), Min:98.7 °F (37.1 °C), Max:98.7 °F (37.1 °C)    Oxygen Therapy  O2 Sat (%): 99 % (03/15/18 2003)  Pulse via Oximetry: 106 beats per minute (03/15/18 2003)  O2 Device: Room air (03/15/18 2003)  Physical Exam:  General:    Alert, NAD, resting comfortably     Head:   Normocephalic, without obvious abnormality, atraumatic. Nose:  Nares normal. No drainage or sinus tenderness. Lungs:   CTAB/L with bibasilar ronchi  Heart:   Regular rhythm, tachycardic,  no murmur, rub or gallop. Abdomen:   Soft, non-tender. Not distended. Bowel sounds normal.   Extremities: No cyanosis. No edema. No clubbing  Skin:     Texture, turgor normal. No rashes or lesions.   Not Jaundiced  Neurologic: GCS 15, no motor or sensory deficits, CN 2-12 intact, cerebellar functions intact  Psych:             AO x3, mood and affect appropriate  Lymphatics:    No LAD      Data Review:   Recent Results (from the past 24 hour(s))   CBC WITH AUTOMATED DIFF    Collection Time: 03/15/18 10:50 AM   Result Value Ref Range    WBC 8.7 4.3 - 11.1 K/uL    RBC 4.17 4.05 - 5.25 M/uL    HGB 12.1 11.7 - 15.4 g/dL    HCT 36.9 35.8 - 46.3 %    MCV 88.5 79.6 - 97.8 FL    MCH 29.0 26.1 - 32.9 PG    MCHC 32.8 31.4 - 35.0 g/dL    RDW 13.5 11.9 - 14.6 %    PLATELET 608 423 - 359 K/uL    MPV 11.0 10.8 - 14.1 FL    DF AUTOMATED      NEUTROPHILS 75 43 - 78 % LYMPHOCYTES 21 13 - 44 %    MONOCYTES 3 (L) 4.0 - 12.0 %    EOSINOPHILS 1 0.5 - 7.8 %    BASOPHILS 0 0.0 - 2.0 %    IMMATURE GRANULOCYTES 0 0.0 - 5.0 %    ABS. NEUTROPHILS 6.5 1.7 - 8.2 K/UL    ABS. LYMPHOCYTES 1.8 0.5 - 4.6 K/UL    ABS. MONOCYTES 0.3 0.1 - 1.3 K/UL    ABS. EOSINOPHILS 0.1 0.0 - 0.8 K/UL    ABS. BASOPHILS 0.0 0.0 - 0.2 K/UL    ABS. IMM. GRANS. 0.0 0.0 - 0.5 K/UL   METABOLIC PANEL, COMPREHENSIVE    Collection Time: 03/15/18 10:50 AM   Result Value Ref Range    Sodium 137 136 - 145 mmol/L    Potassium 4.4 3.5 - 5.1 mmol/L    Chloride 104 98 - 107 mmol/L    CO2 20 (L) 21 - 32 mmol/L    Anion gap 13 7 - 16 mmol/L    Glucose 124 (H) 65 - 100 mg/dL    BUN 17 6 - 23 MG/DL    Creatinine 0.63 0.6 - 1.0 MG/DL    GFR est AA >60 >60 ml/min/1.73m2    GFR est non-AA >60 >60 ml/min/1.73m2    Calcium 8.8 8.3 - 10.4 MG/DL    Bilirubin, total 0.5 0.2 - 1.1 MG/DL    ALT (SGPT) 19 12 - 65 U/L    AST (SGOT) 20 15 - 37 U/L    Alk. phosphatase 113 50 - 136 U/L    Protein, total 7.1 6.3 - 8.2 g/dL    Albumin 2.9 (L) 3.5 - 5.0 g/dL    Globulin 4.2 (H) 2.3 - 3.5 g/dL    A-G Ratio 0.7 (L) 1.2 - 3.5     CBC WITH AUTOMATED DIFF    Collection Time: 03/15/18  3:46 PM   Result Value Ref Range    WBC 7.2 4.3 - 11.1 K/uL    RBC 4.52 4.05 - 5.25 M/uL    HGB 13.2 11.7 - 15.4 g/dL    HCT 39.7 35.8 - 46.3 %    MCV 87.8 79.6 - 97.8 FL    MCH 29.2 26.1 - 32.9 PG    MCHC 33.2 31.4 - 35.0 g/dL    RDW 13.5 11.9 - 14.6 %    PLATELET 529 (H) 963 - 450 K/uL    MPV 10.3 (L) 10.8 - 14.1 FL    DF AUTOMATED      NEUTROPHILS 73 43 - 78 %    LYMPHOCYTES 22 13 - 44 %    MONOCYTES 4 4.0 - 12.0 %    EOSINOPHILS 1 0.5 - 7.8 %    BASOPHILS 0 0.0 - 2.0 %    IMMATURE GRANULOCYTES 0 0.0 - 5.0 %    ABS. NEUTROPHILS 5.2 1.7 - 8.2 K/UL    ABS. LYMPHOCYTES 1.6 0.5 - 4.6 K/UL    ABS. MONOCYTES 0.3 0.1 - 1.3 K/UL    ABS. EOSINOPHILS 0.1 0.0 - 0.8 K/UL    ABS. BASOPHILS 0.0 0.0 - 0.2 K/UL    ABS. IMM.  GRANS. 0.0 0.0 - 0.5 K/UL   METABOLIC PANEL, COMPREHENSIVE Collection Time: 03/15/18  3:46 PM   Result Value Ref Range    Sodium 140 136 - 145 mmol/L    Potassium 3.9 3.5 - 5.1 mmol/L    Chloride 105 98 - 107 mmol/L    CO2 22 21 - 32 mmol/L    Anion gap 13 7 - 16 mmol/L    Glucose 149 (H) 65 - 100 mg/dL    BUN 18 6 - 23 MG/DL    Creatinine 0.72 0.6 - 1.0 MG/DL    GFR est AA >60 >60 ml/min/1.73m2    GFR est non-AA >60 >60 ml/min/1.73m2    Calcium 9.7 8.3 - 10.4 MG/DL    Bilirubin, total 0.6 0.2 - 1.1 MG/DL    ALT (SGPT) 18 12 - 65 U/L    AST (SGOT) 28 15 - 37 U/L    Alk. phosphatase 122 50 - 136 U/L    Protein, total 8.3 (H) 6.3 - 8.2 g/dL    Albumin 2.9 (L) 3.5 - 5.0 g/dL    Globulin 5.4 (H) 2.3 - 3.5 g/dL    A-G Ratio 0.5 (L) 1.2 - 3.5     LIPASE    Collection Time: 03/15/18  3:46 PM   Result Value Ref Range    Lipase 60 (L) 73 - 393 U/L   URINALYSIS W/ RFLX MICROSCOPIC    Collection Time: 03/15/18  7:52 PM   Result Value Ref Range    Color YELLOW      Appearance TURBID      Specific gravity 1.045 (H) 1.001 - 1.023      pH (UA) 5.5 5.0 - 9.0      Protein 100 (A) NEG mg/dL    Glucose NEGATIVE  mg/dL    Ketone 15 (A) NEG mg/dL    Bilirubin NEGATIVE  NEG      Blood MODERATE (A) NEG      Urobilinogen 1.0 0.2 - 1.0 EU/dL    Nitrites NEGATIVE  NEG      Leukocyte Esterase LARGE (A) NEG       Imaging /Procedures /Studies   All diagnostic imaging personally reviewed by me. CT abd/pelvis with IV contrast:  IMPRESSION:   1. Urinary bladder wall thickening and trace intraluminal gas suspicious for  infection. 2. Enlarged heterogeneous uterus with probable fibroids. 3. Mild right lung base infiltrate. 4. Right femur fracture and fixation hardware, further evaluated on the recent  dedicated radiography. Assessment and Plan:      Active Hospital Problems    Diagnosis Date Noted    Urinary tract infection 03/15/2018    Nausea & vomiting 03/15/2018    Generalized weakness 03/15/2018    Healthcare associated bacterial pneumonia 03/15/2018       PLAN  · Admit to inpatient for healthcare associated bacterial pneumonia and UTI, generalized weakness with associated nausea/vomiting. · Follow up with urine culture, U/A suggestive of UTI  · Continue Vanc and Zosyn, will cover both right lower lobe healthcare associated bacterial pneumonia and UTI. · Lactic acid pending  · Given 1 ltr of NF bolus in ER, will continue maintenance of 125 cc/hr NS for 24 hours. · Continue duonebs and mucinex-ER  · Prn hydralazine for SBP>180, DBP>100, continue lisinopril and start metoprolol for optimizing heart rate/ continue to monitor. · POC glucose monitoring QACHS, will hold metformin. Continue Humalog SSI and low dose lantus 12 units. · Continue other home meds as reconciled in [facility]. · Prn zofran and with reglan for nausea/vomiting  · PPI for GI prophylaxis  · lovenox for DVT prophylaxis  · PT/OT eval  · PPD placed  · High risk with opioids on board  · May consult Ortho in AM, as pt had an out patient follow up with Dr. Kat Clifford today which pt has missed. Rescheduled for next Monday.     Code Status: full  High risk with opioids on board    Anticipated discharge: >2 MN    Signed By: Dahiana Perez MD     March 15, 2018

## 2018-03-16 NOTE — PROGRESS NOTES
Met with patient at bedside. Patient is alert and oriented. Patient lives in own home alone. Patient states she has great family support. Home has about 5 steps to enter. Prior to admission, patient stated she was at [facility] for rehab. Patient states \"I will not go back there. It was horrible. I just want to go home with home health. \" Has no issues with getting medications. Currently has no DME at home. 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 Patient is agreeable to [facility]. Requests referral be sent to [facility] PT/OT/RN. Referral sent to [facility]. [facility]3], liaison notified. Patient also requested a rolling walker and 3 in 1 bedside commode. Referral faxed to [facility] at [phone]. No other needs voiced at this time. CM will continue to follow for discharge needs. Care Management Interventions  Mode of Transport at Discharge:  Other (see comment)  Transition of Care Consult (CM Consult): Discharge Planning, [address]: Yes  Discharge Durable Medical Equipment: Yes  Physical Therapy Consult: Yes  Occupational Therapy Consult: Yes  Current Support Network: Own Home, Lives Alone  Confirm Follow Up Transport: Family  Plan discussed with Pt/Family/Caregiver: Yes  Freedom of Choice Offered: Yes  Discharge Location  Discharge Placement: Home with home health

## 2018-03-16 NOTE — PROGRESS NOTES
Problem: Mobility Impaired (Adult and Pediatric)  Goal: *Acute Goals and Plan of Care (Insert Text)  LTG:  (1.)Ms. Ramana Rojas will participate in >=20 minutes of therapeutic activity in order to improve her tolerance for participation in functional activities with SUPERVISION within 7 treatment day(s). (2.)Ms. Ramana Rojas will transfer from bed to chair and chair to bed with SUPERVISION using the least restrictive device within 7 treatment day(s). (3.)Ms. Ramana Rojas will ambulate with STAND BY ASSIST for 10 feet with the least restrictive device and NWB through the R LE within 7 treatment day(s). ________________________________________________________________________________________________       PHYSICAL THERAPY: Initial Assessment 3/16/2018  OBSERVATION: Hospital Day: 2  Payor: BLUE CROSS / [place] BLUE ESSENTIALS JOANNE / Product Type: JOANNE /      NAME/AGE/GENDER: Angel Gaona is a 62 y.o. female   PRIMARY DIAGNOSIS: Healthcare associated bacterial pneumonia  Urinary tract infection  Generalized weakness  Nausea & vomiting  UTI (urinary tract infection) Nausea & vomiting Nausea & vomiting        ICD-10: Treatment Diagnosis:   · Generalized Muscle Weakness (M62.81)  · Other abnormalities of gait and mobility (R26.89)   Precaution/Allergies:  Review of patient's allergies indicates no known allergies. ASSESSMENT:   NWB REINA ADAMS    Ms. Ramana Rojas is a 62 y.o female admitted to inpatient stay after having generalized weakness and nausea/vomiting for 3-4 days. Pt was recently discharged from inpatient stay s/p IM nailing of right subtrochanteric femur fracture on 02/28/18 and is NWB R LE. Pt is seated EOB after working with OT upon PT entry. Pt reports she planned to be discharged from inpatient stay to [facility] rehab facility. However, SW stopped by during initial PT evaluation and reported they do no accpet the pt's insurance.  Pt reports she would like to just go home independently, but will consider an [facility] facility with more thought. Pt reports non-compliance with weight bearing restrictions. Pt demonstrated 4-4+/5 gross MMT in bilateral LEs. Pt performed stand pivot transfer with RW and supervision to bedside chair. Pt was non-compliant with WB restrictions throughout. Pt positioned to comfort and all needs within reach at PT exit. Will continue to follow pt throughout current inpatient stay, in order to progress mobility efforts. Recommending pt be discharged to rehabilitation facility in order to progress mobility and maintain NWB status. This section established at most recent assessment   PROBLEM LIST (Impairments causing functional limitations):  1. Decreased Strength  2. Decreased ADL/Functional Activities  3. Decreased Ambulation Ability/Technique  4. Decreased Balance  5. Increased Pain  6. Decreased Activity Tolerance  7. Increased Fatigue  8. Decreased Knowledge of Precautions   INTERVENTIONS PLANNED: (Benefits and precautions of physical therapy have been discussed with the patient.)  1. Balance Exercise  2. Bed Mobility  3. Family Education  4. Gait Training  5. Home Exercise Program (HEP)  6. Neuromuscular Re-education/Strengthening  7. Range of Motion (ROM)  8. Therapeutic Activites  9. Therapeutic Exercise/Strengthening  10. Transfer Training  11. Group Therapy     TREATMENT PLAN: Frequency/Duration: 3 times a week for duration of hospital stay  Rehabilitation Potential For Stated Goals: Good     RECOMMENDED REHABILITATION/EQUIPMENT: (at time of discharge pending progress): Due to the probability of continued deficits (see above) this patient will likely need continued skilled physical therapy after discharge.   Equipment:    None at this time              HISTORY:   History of Present Injury/Illness (Reason for Referral):  Patient is is 62years old female with pmhx of HTN, DM-2, recent right hip fracture currently in [facility] rehab presented to ER with generalized weakness, nausea/vomiting for last 3-4 days. Pt is having on an average 2-3 episodes of vomiting for last 3-4 days, oral intake is minimal. She also reports dysuria and increased frequency of urination. She denies cough, SOB, fever, chills, abdominal pain, diarrhea, night sweats, palpitations, headache, blurry vision. In ER, pt was hypertensive with SBP > 180, tachycardic 110's, U/A positive for leukocyte esterase with 4+ bacteria. CT abd/pelvis showed thickening of urinary bladder with intraluminal gas, mild right lung base infiltrate. Past Medical History/Comorbidities:   Ms. Cb Avitia  has a past medical history of Cancer ([facility] [address] Diabetes ([facility] [address] and Hypertension. Ms. Cb Avitia  has a past surgical history that includes hx hip fracture tx (Right); hx breast lumpectomy; and hx hip replacement. Social History/Living Environment:   Home Environment: Private residence  # Steps to Enter: 5  One/Two Story Residence: One story  Living Alone: No  Support Systems: Family member(s)  Patient Expects to be Discharged to[de-identified] Private residence  Current DME Used/Available at Home: Commode, bedside, Walker, rolling  Tub or Shower Type: Tub/Shower combination  Prior Level of Function/Work/Activity:  Pt NWB prior to most recent admission, secondary to being s/p R hip fx repair; Patient was independent prior to recent R hip fx    Number of Personal Factors/Comorbidities that affect the Plan of Care: 0: LOW COMPLEXITY   EXAMINATION:   Most Recent Physical Functioning:   Gross Assessment:  AROM: Within functional limits  Strength:  Within functional limits  Tone: Normal               Posture:  Posture (WDL): Exceptions to WDL  Posture Assessment: Trunk flexion, Forward head, Rounded shoulders  Balance:  Sitting: Intact  Standing: Impaired  Standing - Static: Good  Standing - Dynamic : Fair Bed Mobility:  Supine to Sit: Modified independent  Sit to Supine: Modified independent  Wheelchair Mobility:     Transfers:  Sit to Stand: Supervision  Stand to Sit: Supervision  Gait:            Body Structures Involved:  1. Bones  2. Joints  3. Muscles  4. Ligaments Body Functions Affected:  1. Neuromusculoskeletal  2. Movement Related Activities and Participation Affected:  1. General Tasks and Demands  2. Mobility  3. Self Care  4. Domestic Life  5. Community, Social and [place]   Number of elements that affect the Plan of Care: 4+: HIGH COMPLEXITY   CLINICAL PRESENTATION:   Presentation: Stable and uncomplicated: LOW COMPLEXITY   CLINICAL DECISION MAKIN [facility] Mobility Inpatient Short Form  How much difficulty does the patient currently have. .. Unable A Lot A Little None   1. Turning over in bed (including adjusting bedclothes, sheets and blankets)? [] 1   [] 2   [] 3   [x] 4   2. Sitting down on and standing up from a chair with arms ( e.g., wheelchair, bedside commode, etc.)   [] 1   [] 2   [] 3   [x] 4   3. Moving from lying on back to sitting on the side of the bed? [] 1   [] 2   [] 3   [x] 4   How much help from another person does the patient currently need. .. Total A Lot A Little None   4. Moving to and from a bed to a chair (including a wheelchair)? [] 1   [] 2   [x] 3   [] 4   5. Need to walk in hospital room? [] 1   [x] 2   [] 3   [] 4   6. Climbing 3-5 steps with a railing? [] 1   [x] 2   [] 3   [] 4   © , Trustees of [facility], under license to [employer]. All rights reserved      Score:  Initial: 19 Most Recent: X (Date: -- )    Interpretation of Tool:  Represents activities that are increasingly more difficult (i.e. Bed mobility, Transfers, Gait). Score 24 23 22-20 19-15 14-10 9-7 6     Modifier CH CI CJ CK CL CM CN      ?  Mobility - Walking and Moving Around:     - CURRENT STATUS: CK - 40%-59% impaired, limited or restricted    - GOAL STATUS: CJ - 20%-39% impaired, limited or restricted    - D/C STATUS:  ---------------To be determined---------------  Payor: BLUE CROSS / [place]UE ESSENTIALS JOANNE / Product Type: JOANNE /      Medical Necessity:     · Patient is expected to demonstrate progress in strength, range of motion, balance, coordination and functional technique to increase independence with ambulation and overall functional mobility. Reason for Services/Other Comments:  · Patient continues to require modification of therapeutic interventions to increase complexity of exercises. Use of outcome tool(s) and clinical judgement create a POC that gives a: Clear prediction of patient's progress: LOW COMPLEXITY            TREATMENT:   (In addition to Assessment/Re-Assessment sessions the following treatments were rendered)   Pre-treatment Symptoms/Complaints: \"I'm feeling bad. \"  Pain: Initial:     0/10 Post Session:  0/10     Assessment/Reassessment only, no treatment provided today    Braces/Orthotics/Lines/Etc:   · IV  · O2 Device: Room air  Treatment/Session Assessment:    · Response to Treatment:  Patient is NWB s/p recent R hip fx   · Interdisciplinary Collaboration:   o Physical Therapist  o Occupational Therapist  o Registered Nurse  · After treatment position/precautions:   o Up in chair  o Bed/Chair-wheels locked  o Call light within reach  o RN notified   · Compliance with Program/Exercises: Will assess as treatment progresses. · Recommendations/Intent for next treatment session: \"Next visit will focus on advancements to more challenging activities and reduction in assistance provided\".   Total Treatment Duration:  PT Patient Time In/Time Out  Time In: 1350  Time Out: 1600 [facility] , DPT

## 2018-03-16 NOTE — PHYSICIAN ADVISORY
Letter of Determination:  Outpatient status receiving Observation Services    This patient was originally hospitalized as Inpatient Status on 3/15/2018 for nausea and vomiting. At this time this patient does not appear to meet the medical necessity requirements in CMS regulation Section 43 .3 to support an inpatient level of care. It is our recommendation that this patient's hospitalization status should be changed from INPATIENT to [place] receiving OBSERVATION services via Condition Code 44.      This may change due to the medical condition of the patient and new clinical evidence as the patients care progreses. The final decision regarding the patient's hospitalization status depends on the attending physician's judgement.     Scout Maya MD, AMBER,   Physician Ciaran Pacheco.

## 2018-03-16 NOTE — PROGRESS NOTES
Patient and family also [facility] and [email or web address]. Referrals sent to above facilities. Awaiting response. Will require pre-cert prior to admission to SNF.

## 2018-03-16 NOTE — PROGRESS NOTES
Hospitalist Progress Note     Admit Date:  3/15/2018  4:33 PM   Name:  Matias Mcallister   Age:  62 y.o.  :  1960   MRN:  [id number]   PCP:  PROVIDER UNKNOWN  Treatment Team: Attending Provider: Carter Allen MD; Utilization Review: Nano Hardin; Care Manager: Renata Ríos RN    Subjective:   Patient is is 62years old female with pmhx of HTN, DM-2, recent right hip fracture currently in [facility] rehab presented to ER with generalized weakness, nausea/vomiting for last 3-4 days. In ER, pt was hypertensive with SBP > 180, tachycardic 110's, U/A positive for leukocyte esterase with 4+ bacteria. CT abd/pelvis showed thickening of urinary bladder with intraluminal gas, mild right lung base infiltrate. She was admitted with PNA and UTI.    3/16 - pt feeling a little better, trying some liquids. No vomiting but still nauseated. Some suprapubic pain on exam.  No other complaints. No cough, SOB.       Objective:     Patient Vitals for the past 24 hrs:   Temp Pulse Resp BP SpO2   18 0730 - - - - 98 %   18 0727 98.7 °F (37.1 °C) 96 18 112/63 98 %   18 0503 98.5 °F (36.9 °C) 99 18 150/87 97 %   18 0219 - - - - 98 %   03/15/18 2307 - - - - 98 %   03/15/18 2251 98.7 °F (37.1 °C) (!) 104 18 (!) 162/92 97 %   03/15/18 2225 - 99 - (!) 163/97 100 %   03/15/18 2215 - 100 - - 100 %   03/15/18 2205 - - - (!) 179/96 100 %   03/15/18 2145 - - - - 97 %   03/15/18 2144 - - - (!) 180/107 -   03/15/18 2116 - 100 - - 100 %   03/15/18 2003 - (!) 106 16 (!) 180/100 99 %   03/15/18 1924 - - - (!) 180/95 100 %   03/15/18 1826 - - - - 99 %   03/15/18 1817 - - - 155/86 100 %   03/15/18 1753 - (!) 110 - - 100 %   03/15/18 1749 - - - (!) 174/101 100 %   03/15/18 1538 98.7 °F (37.1 °C) (!) 113 16 134/81 97 %     Oxygen Therapy  O2 Sat (%): 98 % (18)  Pulse via Oximetry: 90 beats per minute (18)  O2 Device: Room air (18)    Intake/Output Summary (Last 24 hours) at 18 0870 [facility] filed at 03/16/18 0854   Gross per 24 hour   Intake              120 ml   Output                0 ml   Net              120 ml         General:    Well nourished. Alert. CV:   RRR. No murmur, rub, or gallop. Lungs:   CTAB. No wheezing, rhonchi, or rales. Abdomen:   Soft, TTP suprapubic. Extremities: Warm and dry. No cyanosis or edema. Skin:     No rashes or jaundice. Data Review:  I have reviewed all labs, meds, telemetry events, and studies from the last 24 hours. Recent Results (from the past 24 hour(s))   CBC WITH AUTOMATED DIFF    Collection Time: 03/15/18 10:50 AM   Result Value Ref Range    WBC 8.7 4.3 - 11.1 K/uL    RBC 4.17 4.05 - 5.25 M/uL    HGB 12.1 11.7 - 15.4 g/dL    HCT 36.9 35.8 - 46.3 %    MCV 88.5 79.6 - 97.8 FL    MCH 29.0 26.1 - 32.9 PG    MCHC 32.8 31.4 - 35.0 g/dL    RDW 13.5 11.9 - 14.6 %    PLATELET 093 643 - 506 K/uL    MPV 11.0 10.8 - 14.1 FL    DF AUTOMATED      NEUTROPHILS 75 43 - 78 %    LYMPHOCYTES 21 13 - 44 %    MONOCYTES 3 (L) 4.0 - 12.0 %    EOSINOPHILS 1 0.5 - 7.8 %    BASOPHILS 0 0.0 - 2.0 %    IMMATURE GRANULOCYTES 0 0.0 - 5.0 %    ABS. NEUTROPHILS 6.5 1.7 - 8.2 K/UL    ABS. LYMPHOCYTES 1.8 0.5 - 4.6 K/UL    ABS. MONOCYTES 0.3 0.1 - 1.3 K/UL    ABS. EOSINOPHILS 0.1 0.0 - 0.8 K/UL    ABS. BASOPHILS 0.0 0.0 - 0.2 K/UL    ABS. IMM. GRANS. 0.0 0.0 - 0.5 K/UL   METABOLIC PANEL, COMPREHENSIVE    Collection Time: 03/15/18 10:50 AM   Result Value Ref Range    Sodium 137 136 - 145 mmol/L    Potassium 4.4 3.5 - 5.1 mmol/L    Chloride 104 98 - 107 mmol/L    CO2 20 (L) 21 - 32 mmol/L    Anion gap 13 7 - 16 mmol/L    Glucose 124 (H) 65 - 100 mg/dL    BUN 17 6 - 23 MG/DL    Creatinine 0.63 0.6 - 1.0 MG/DL    GFR est AA >60 >60 ml/min/1.73m2    GFR est non-AA >60 >60 ml/min/1.73m2    Calcium 8.8 8.3 - 10.4 MG/DL    Bilirubin, total 0.5 0.2 - 1.1 MG/DL    ALT (SGPT) 19 12 - 65 U/L    AST (SGOT) 20 15 - 37 U/L    Alk.  phosphatase 113 50 - 136 U/L    Protein, total 7.1 6.3 - 8.2 g/dL    Albumin 2.9 (L) 3.5 - 5.0 g/dL    Globulin 4.2 (H) 2.3 - 3.5 g/dL    A-G Ratio 0.7 (L) 1.2 - 3.5     CBC WITH AUTOMATED DIFF    Collection Time: 03/15/18  3:46 PM   Result Value Ref Range    WBC 7.2 4.3 - 11.1 K/uL    RBC 4.52 4.05 - 5.25 M/uL    HGB 13.2 11.7 - 15.4 g/dL    HCT 39.7 35.8 - 46.3 %    MCV 87.8 79.6 - 97.8 FL    MCH 29.2 26.1 - 32.9 PG    MCHC 33.2 31.4 - 35.0 g/dL    RDW 13.5 11.9 - 14.6 %    PLATELET 933 (H) 144 - 450 K/uL    MPV 10.3 (L) 10.8 - 14.1 FL    DF AUTOMATED      NEUTROPHILS 73 43 - 78 %    LYMPHOCYTES 22 13 - 44 %    MONOCYTES 4 4.0 - 12.0 %    EOSINOPHILS 1 0.5 - 7.8 %    BASOPHILS 0 0.0 - 2.0 %    IMMATURE GRANULOCYTES 0 0.0 - 5.0 %    ABS. NEUTROPHILS 5.2 1.7 - 8.2 K/UL    ABS. LYMPHOCYTES 1.6 0.5 - 4.6 K/UL    ABS. MONOCYTES 0.3 0.1 - 1.3 K/UL    ABS. EOSINOPHILS 0.1 0.0 - 0.8 K/UL    ABS. BASOPHILS 0.0 0.0 - 0.2 K/UL    ABS. IMM. GRANS. 0.0 0.0 - 0.5 K/UL   METABOLIC PANEL, COMPREHENSIVE    Collection Time: 03/15/18  3:46 PM   Result Value Ref Range    Sodium 140 136 - 145 mmol/L    Potassium 3.9 3.5 - 5.1 mmol/L    Chloride 105 98 - 107 mmol/L    CO2 22 21 - 32 mmol/L    Anion gap 13 7 - 16 mmol/L    Glucose 149 (H) 65 - 100 mg/dL    BUN 18 6 - 23 MG/DL    Creatinine 0.72 0.6 - 1.0 MG/DL    GFR est AA >60 >60 ml/min/1.73m2    GFR est non-AA >60 >60 ml/min/1.73m2    Calcium 9.7 8.3 - 10.4 MG/DL    Bilirubin, total 0.6 0.2 - 1.1 MG/DL    ALT (SGPT) 18 12 - 65 U/L    AST (SGOT) 28 15 - 37 U/L    Alk.  phosphatase 122 50 - 136 U/L    Protein, total 8.3 (H) 6.3 - 8.2 g/dL    Albumin 2.9 (L) 3.5 - 5.0 g/dL    Globulin 5.4 (H) 2.3 - 3.5 g/dL    A-G Ratio 0.5 (L) 1.2 - 3.5     LIPASE    Collection Time: 03/15/18  3:46 PM   Result Value Ref Range    Lipase 60 (L) 73 - 393 U/L   CULTURE, URINE    Collection Time: 03/15/18  7:52 PM   Result Value Ref Range    Special Requests: NO SPECIAL REQUESTS      Culture result:        NO GROWTH AFTER SHORT PERIOD OF INCUBATION. FURTHER RESULTS TO FOLLOW AFTER OVERNIGHT INCUBATION. URINALYSIS W/ RFLX MICROSCOPIC    Collection Time: 03/15/18  7:52 PM   Result Value Ref Range    Color YELLOW      Appearance TURBID      Specific gravity 1.045 (H) 1.001 - 1.023      pH (UA) 5.5 5.0 - 9.0      Protein 100 (A) NEG mg/dL    Glucose NEGATIVE  mg/dL    Ketone 15 (A) NEG mg/dL    Bilirubin NEGATIVE  NEG      Blood MODERATE (A) NEG      Urobilinogen 1.0 0.2 - 1.0 EU/dL    Nitrites NEGATIVE  NEG      Leukocyte Esterase LARGE (A) NEG      WBC >100 0 /hpf    RBC 3-5 0 /hpf    Epithelial cells 0-3 0 /hpf    Bacteria 4+ (H) 0 /hpf    Amorphous Crystals TRACE 0      Other observations RESULTS VERIFIED MANUALLY     EKG, 12 LEAD, INITIAL    Collection Time: 03/15/18  9:09 PM   Result Value Ref Range    Ventricular Rate 103 BPM    Atrial Rate 103 BPM    P-R Interval 176 ms    QRS Duration 84 ms    Q-T Interval 308 ms    QTC Calculation (Bezet) 403 ms    Calculated P Axis 51 degrees    Calculated R Axis 14 degrees    Calculated T Axis 32 degrees    Diagnosis       !! AGE AND GENDER SPECIFIC ECG ANALYSIS !!   Sinus tachycardia  Nonspecific T wave abnormality  Abnormal ECG  When compared with ECG of 09-MAR-2018 16:28,  QT has shortened  Confirmed by MAGDALENE VAN (), Harmeet Solo ([id number]) on 3/16/2018 6:48:45 AM     POC LACTIC ACID    Collection Time: 03/15/18 10:25 PM   Result Value Ref Range    Lactic Acid (POC) 0.9 0.5 - 1.9 mmol/L   GLUCOSE, POC    Collection Time: 03/15/18 10:47 PM   Result Value Ref Range    Glucose (POC) 118 (H) 65 - 100 mg/dL   LACTIC ACID    Collection Time: 03/16/18 12:10 AM   Result Value Ref Range    Lactic acid 1.5 0.4 - 2.0 MMOL/L   METABOLIC PANEL, COMPREHENSIVE    Collection Time: 03/16/18  5:01 AM   Result Value Ref Range    Sodium 141 136 - 145 mmol/L    Potassium 3.3 (L) 3.5 - 5.1 mmol/L    Chloride 106 98 - 107 mmol/L    CO2 24 21 - 32 mmol/L    Anion gap 11 7 - 16 mmol/L    Glucose 90 65 - 100 mg/dL    BUN 14 6 - 23 MG/DL Creatinine 0.71 0.6 - 1.0 MG/DL    GFR est AA >60 >60 ml/min/1.73m2    GFR est non-AA >60 >60 ml/min/1.73m2    Calcium 8.6 8.3 - 10.4 MG/DL    Bilirubin, total 0.6 0.2 - 1.1 MG/DL    ALT (SGPT) 15 12 - 65 U/L    AST (SGOT) 18 15 - 37 U/L    Alk. phosphatase 113 50 - 136 U/L    Protein, total 7.4 6.3 - 8.2 g/dL    Albumin 2.8 (L) 3.5 - 5.0 g/dL    Globulin 4.6 (H) 2.3 - 3.5 g/dL    A-G Ratio 0.6 (L) 1.2 - 3.5     CBC W/O DIFF    Collection Time: 03/16/18  5:01 AM   Result Value Ref Range    WBC 6.5 4.3 - 11.1 K/uL    RBC 4.17 4.05 - 5.25 M/uL    HGB 12.2 11.7 - 15.4 g/dL    HCT 36.4 35.8 - 46.3 %    MCV 87.3 79.6 - 97.8 FL    MCH 29.3 26.1 - 32.9 PG    MCHC 33.5 31.4 - 35.0 g/dL    RDW 13.6 11.9 - 14.6 %    PLATELET 337 963 - 034 K/uL    MPV 11.1 10.8 - 14.1 FL        All Micro Results     Procedure Component Value Units Date/Time    CULTURE, URINE [[id number]] Collected:  03/15/18 1952    Order Status:  Completed Specimen:  Urine from Urine Updated:  03/16/18 0835     Special Requests: NO SPECIAL REQUESTS        Culture result:         NO GROWTH AFTER SHORT PERIOD OF INCUBATION. FURTHER RESULTS TO FOLLOW AFTER OVERNIGHT INCUBATION.           Current Meds:  Current Facility-Administered Medications   Medication Dose Route Frequency    cefTRIAXone (ROCEPHIN) 1 g in 0.9% sodium chloride (MBP/ADV) 50 mL  1 g IntraVENous Q24H    ondansetron (ZOFRAN) injection 4 mg  4 mg IntraVENous Q6H    0.9% sodium chloride 250 mL with potassium chloride 40 mEq infusion   IntraVENous ONCE    calcium-vitamin D (OS-MONICA) 500 mg-200 unit tablet  1 Tab Oral BID WITH MEALS    lisinopril (PRINIVIL, ZESTRIL) tablet 10 mg  10 mg Oral DAILY    oxyCODONE IR (ROXICODONE) tablet 5-10 mg  5-10 mg Oral Q4H PRN    potassium chloride (K-DUR, KLOR-CON) SR tablet 20 mEq  20 mEq Oral DAILY    sodium chloride (NS) flush 5-10 mL  5-10 mL IntraVENous Q8H    sodium chloride (NS) flush 5-10 mL  5-10 mL IntraVENous PRN    acetaminophen (TYLENOL) tablet 650 mg  650 mg Oral Q6H PRN    morphine injection 1 mg  1 mg IntraVENous Q3H PRN    naloxone (NARCAN) injection 0.4 mg  0.4 mg IntraVENous PRN    magnesium hydroxide (MILK OF MAGNESIA) 400 mg/5 mL oral suspension 30 mL  30 mL Oral DAILY PRN    enoxaparin (LOVENOX) injection 40 mg  40 mg SubCUTAneous Q24H    metoclopramide HCl (REGLAN) injection 5 mg  5 mg IntraVENous Q6H    pantoprazole (PROTONIX) tablet 40 mg  40 mg Oral ACB    albuterol-ipratropium (DUO-NEB) 2.5 MG-0.5 MG/3 ML  3 mL Nebulization Q6H RT    guaiFENesin ER (MUCINEX) tablet 600 mg  600 mg Oral Q12H    insulin lispro (HUMALOG) injection   SubCUTAneous AC&HS    insulin glargine (LANTUS) injection 12 Units  12 Units SubCUTAneous QHS    hydrALAZINE (APRESOLINE) 20 mg/mL injection 20 mg  20 mg IntraVENous Q6H PRN    metoprolol tartrate (LOPRESSOR) tablet 12.5 mg  12.5 mg Oral BID       Other Studies (last 24 hours):  Ct Abd Pelv W Cont    Result Date: 3/15/2018  CT of the Abdomen and Pelvis with contrast CLINICAL INDICATION:  One week of generalized abdominal pain, nausea and vomiting; history of breast cancer, diabetes and hypertension, right femur fracture and surgery COMPARISON: none TECHNIQUE: Automated exposure Control was used. Multiple axial images were obtained through the abdomen and pelvis after intravenous injection of 125cc of isovue 370. No oral contrast given per request, limiting evaluation of GI tract and surrounding structures. Coronal reformatted images obtained for further evaluation of organs. FINDINGS: The partially seen lung bases demonstrate mild dependent infiltrate posteriorly involving a small portion of the right lower lobe, and minimal scattered linear atelectasis bilaterally. Abdomen:  No suspicious lesions in the liver or spleen. The adrenal glands, gallbladder and pancreas appear unremarkable. There is normal enhancement of the kidneys, no hydronephrosis.  Few tiny round hypodensities in the renal cortices are too small to characterize, statistically benign. No lymphadenopathy, free air, focal inflammatory changes or fluid collections in the abdomen. Aorta normal caliber. Major vessels are patent. There is no evidence of bowel obstruction. GI evaluation is limited without oral contrast. Large bowel is tortuous with prominent stool and scattered diverticulosis. Normal right lower quadrant appendix. Pelvis: Uterus is enlarged and heterogeneous with multiple masses and calcifications. Urinary bladder demonstrates circumferential moderate wall thickening and trace intraluminal gas. Ovaries demonstrate no suspicious finding on limited evaluation by CT. No pelvic lymphadenopathy or focal fluid collection. Bones: Partial visualization of right femur fracture with fixation hardware. No evidence of acute fracture or dislocation otherwise. IMPRESSION: 1. Urinary bladder wall thickening and trace intraluminal gas suspicious for infection. 2. Enlarged heterogeneous uterus with probable fibroids. 3. Mild right lung base infiltrate. 4. Right femur fracture and fixation hardware, further evaluated on the recent dedicated radiography. GI details are limited without oral contrast.      Assessment and Plan:     Hospital Problems as of 3/16/2018  Never Reviewed          Codes Class Noted - Resolved POA    Acute cystitis without hematuria ICD-10-CM: N30.00  ICD-9-CM: 595.0  3/15/2018 - Present Yes        * (Principal)Nausea & vomiting ICD-10-CM: R11.2  ICD-9-CM: 787.01  3/15/2018 - Present Yes        Generalized weakness ICD-10-CM: R53.1  ICD-9-CM: 780.79  3/15/2018 - Present Yes        Hyperglycemia due to type 2 diabetes mellitus ([facility] ICD-10-CM: E11.65  ICD-9-CM: 250.00  2/24/2018 - Present Yes              PLAN:    · UTI - rocephin  · ? PNA - however no symptoms. Not clear to me she has PNA given lack of symptoms so will treat with rocephin already being given for UTI.   HCAP designation is obsolete so rocephin should suffice even if she has PNA  · N/V - Still with some nausea; likely from UTI. Encouraged PO today; she is drinking some fluids. Cont zofran/reglan  · Give 60meq K today. Likely from vomiting    DC planning/Dispo:  Pt initially refused SNF, but then apparently family convinced her to go.   SW working on getting bed  DVT ppx:  lovenox    Signed:  Jennifer Sweeney MD

## 2018-03-16 NOTE — PROGRESS NOTES
Tristan Preciado NP and notified her that patient has decided to go to SNF Rehab and follow up appointment with Dr. Astrid Bourne would be missed.  Kaylynn to have patient seen here in hospital.

## 2018-03-17 LAB
GLUCOSE BLD STRIP.AUTO-MCNC: 115 MG/DL (ref 65–100)
GLUCOSE BLD STRIP.AUTO-MCNC: 122 MG/DL (ref 65–100)
GLUCOSE BLD STRIP.AUTO-MCNC: 125 MG/DL (ref 65–100)
GLUCOSE BLD STRIP.AUTO-MCNC: 126 MG/DL (ref 65–100)
GLUCOSE BLD STRIP.AUTO-MCNC: 63 MG/DL (ref 65–100)
MM INDURATION POC: 0 MM (ref 0–5)
MM INDURATION POC: NORMAL MM (ref 0–5)
PPD POC: NEGATIVE NEGATIVE
PPD POC: NORMAL NEGATIVE

## 2018-03-17 PROCEDURE — 94760 N-INVAS EAR/PLS OXIMETRY 1: CPT

## 2018-03-17 PROCEDURE — 74011250636 HC RX REV CODE- 250/636: Performed by: INTERNAL MEDICINE

## 2018-03-17 PROCEDURE — 74011250637 HC RX REV CODE- 250/637: Performed by: HOSPITALIST

## 2018-03-17 PROCEDURE — 74011250636 HC RX REV CODE- 250/636: Performed by: HOSPITALIST

## 2018-03-17 PROCEDURE — 74011000258 HC RX REV CODE- 258: Performed by: INTERNAL MEDICINE

## 2018-03-17 PROCEDURE — 82962 GLUCOSE BLOOD TEST: CPT

## 2018-03-17 PROCEDURE — 74011000250 HC RX REV CODE- 250: Performed by: HOSPITALIST

## 2018-03-17 PROCEDURE — 65270000029 HC RM PRIVATE

## 2018-03-17 PROCEDURE — 94640 AIRWAY INHALATION TREATMENT: CPT

## 2018-03-17 PROCEDURE — 74011636637 HC RX REV CODE- 636/637: Performed by: INTERNAL MEDICINE

## 2018-03-17 PROCEDURE — 99218 HC RM OBSERVATION: CPT

## 2018-03-17 RX ORDER — IPRATROPIUM BROMIDE AND ALBUTEROL SULFATE 2.5; .5 MG/3ML; MG/3ML
3 SOLUTION RESPIRATORY (INHALATION)
Status: DISCONTINUED | OUTPATIENT
Start: 2018-03-17 | End: 2018-03-21 | Stop reason: HOSPADM

## 2018-03-17 RX ORDER — ONDANSETRON 2 MG/ML
4 INJECTION INTRAMUSCULAR; INTRAVENOUS
Status: DISCONTINUED | OUTPATIENT
Start: 2018-03-17 | End: 2018-03-17

## 2018-03-17 RX ORDER — PROCHLORPERAZINE EDISYLATE 5 MG/ML
5 INJECTION INTRAMUSCULAR; INTRAVENOUS
Status: DISCONTINUED | OUTPATIENT
Start: 2018-03-17 | End: 2018-03-21 | Stop reason: HOSPADM

## 2018-03-17 RX ORDER — INSULIN GLARGINE 100 [IU]/ML
8 INJECTION, SOLUTION SUBCUTANEOUS
Status: DISCONTINUED | OUTPATIENT
Start: 2018-03-17 | End: 2018-03-21 | Stop reason: HOSPADM

## 2018-03-17 RX ADMIN — INSULIN GLARGINE 8 UNITS: 100 INJECTION, SOLUTION SUBCUTANEOUS at 21:34

## 2018-03-17 RX ADMIN — OYSTER SHELL CALCIUM WITH VITAMIN D 1 TABLET: 500; 200 TABLET, FILM COATED ORAL at 08:20

## 2018-03-17 RX ADMIN — ONDANSETRON 4 MG: 2 INJECTION INTRAMUSCULAR; INTRAVENOUS at 00:22

## 2018-03-17 RX ADMIN — IPRATROPIUM BROMIDE AND ALBUTEROL SULFATE 3 ML: .5; 3 SOLUTION RESPIRATORY (INHALATION) at 01:31

## 2018-03-17 RX ADMIN — LISINOPRIL 10 MG: 5 TABLET ORAL at 08:19

## 2018-03-17 RX ADMIN — PROCHLORPERAZINE EDISYLATE 5 MG: 5 INJECTION INTRAMUSCULAR; INTRAVENOUS at 15:32

## 2018-03-17 RX ADMIN — ENOXAPARIN SODIUM 40 MG: 40 INJECTION SUBCUTANEOUS at 00:22

## 2018-03-17 RX ADMIN — POTASSIUM CHLORIDE 20 MEQ: 20 TABLET, EXTENDED RELEASE ORAL at 08:20

## 2018-03-17 RX ADMIN — CEFTRIAXONE SODIUM 1 G: 1 INJECTION, POWDER, FOR SOLUTION INTRAMUSCULAR; INTRAVENOUS at 12:12

## 2018-03-17 RX ADMIN — METOCLOPRAMIDE 5 MG: 5 INJECTION, SOLUTION INTRAMUSCULAR; INTRAVENOUS at 00:22

## 2018-03-17 RX ADMIN — ONDANSETRON 4 MG: 2 INJECTION INTRAMUSCULAR; INTRAVENOUS at 12:13

## 2018-03-17 RX ADMIN — Medication 10 ML: at 05:45

## 2018-03-17 RX ADMIN — METOCLOPRAMIDE 5 MG: 5 INJECTION, SOLUTION INTRAMUSCULAR; INTRAVENOUS at 05:40

## 2018-03-17 RX ADMIN — ONDANSETRON 4 MG: 2 INJECTION INTRAMUSCULAR; INTRAVENOUS at 05:40

## 2018-03-17 RX ADMIN — Medication 5 ML: at 14:00

## 2018-03-17 RX ADMIN — GUAIFENESIN 600 MG: 600 TABLET, EXTENDED RELEASE ORAL at 08:19

## 2018-03-17 RX ADMIN — Medication 10 ML: at 21:34

## 2018-03-17 RX ADMIN — METOCLOPRAMIDE 5 MG: 5 INJECTION, SOLUTION INTRAMUSCULAR; INTRAVENOUS at 12:13

## 2018-03-17 RX ADMIN — METOPROLOL TARTRATE 12.5 MG: 25 TABLET, FILM COATED ORAL at 17:16

## 2018-03-17 RX ADMIN — METOCLOPRAMIDE 5 MG: 5 INJECTION, SOLUTION INTRAMUSCULAR; INTRAVENOUS at 17:16

## 2018-03-17 RX ADMIN — ONDANSETRON 4 MG: 2 INJECTION INTRAMUSCULAR; INTRAVENOUS at 17:17

## 2018-03-17 RX ADMIN — METOPROLOL TARTRATE 12.5 MG: 25 TABLET, FILM COATED ORAL at 08:18

## 2018-03-17 RX ADMIN — IPRATROPIUM BROMIDE AND ALBUTEROL SULFATE 3 ML: .5; 3 SOLUTION RESPIRATORY (INHALATION) at 07:38

## 2018-03-17 NOTE — PROGRESS NOTES
Hourly rounds done. Pt c/o nausea, medicated per MAR. Denies pain, vomiting. Voiding this shift, no BM. Refused PO meds this shift. All needs met at this time.

## 2018-03-17 NOTE — PROGRESS NOTES
Hospitalist Progress Note    3/17/2018  Admit Date: 3/15/2018  4:33 PM   NAME: Barney Sharp   :  1960   MRN:  [id number]   Attending: Armani Pang MD  PCP:  Jim Lockwood MD    SUBJECTIVE:     Barney Sharp is a 60yoF with recent hip fx (DC 3/6) currently in [facility] who was admitted on 3/15 with weakness, n/v for 3 days prior to admission. She was found to have a UTI and RLL pneumonia. Not hypoxic    3/17: still having nausea, but reports that it has improved since admission. Denies CP, SOB. Cough improved. No abd pain. Review of Systems negative with exception of pertinent positives noted above      PHYSICAL EXAM       Visit Vitals    /77 (BP 1 Location: Right arm, BP Patient Position: At rest)    Pulse 93    Temp 97.9 °F (36.6 °C)    Resp 17    Ht 5' 3\" (1.6 m)    Wt 78 kg (172 lb)    SpO2 97%    BMI 30.47 kg/m2      Temp (24hrs), Av.1 °F (36.7 °C), Min:97.9 °F (36.6 °C), Max:98.4 °F (36.9 °C)    Oxygen Therapy  O2 Sat (%): 97 % (18 0738)  Pulse via Oximetry: 98 beats per minute (18 0738)  O2 Device: Room air (18 0738)    Intake/Output Summary (Last 24 hours) at 18 0910  Last data filed at 18 1733   Gross per 24 hour   Intake              120 ml   Output                0 ml   Net              120 ml          General: No acute distress. Head:  Atraumatic Normocephalic. Lungs:  Clear anteriorly, normal resp effort on RA  CVS:  RRR, no BLE edema  Abdomen: Soft, ND, NTTP, +NABS  MSK:  Spontaneously moves extremities.   Neurologic:  No focal deficits      Recent Results (from the past 24 hour(s))   GLUCOSE, POC    Collection Time: 18 11:21 AM   Result Value Ref Range    Glucose (POC) 88 65 - 100 mg/dL   GLUCOSE, POC    Collection Time: 18  4:00 PM   Result Value Ref Range    Glucose (POC) 85 65 - 100 mg/dL   GLUCOSE, POC    Collection Time: 18  7:27 PM   Result Value Ref Range    Glucose (POC) 86 65 - 100 mg/dL   PLEASE READ & DOCUMENT PPD TEST IN 24 HRS    Collection Time: 03/17/18 12:28 AM   Result Value Ref Range    PPD negative Negative    mm Induration 0 mm   GLUCOSE, POC    Collection Time: 03/17/18  6:57 AM   Result Value Ref Range    Glucose (POC) 63 (L) 65 - 100 mg/dL   GLUCOSE, POC    Collection Time: 03/17/18  8:17 AM   Result Value Ref Range    Glucose (POC) 125 (H) 65 - 100 mg/dL         Imaging /Procedures /Studies   CT ABD PELV W CONT   Final Result   IMPRESSION:    1. Urinary bladder wall thickening and trace intraluminal gas suspicious for   infection. 2. Enlarged heterogeneous uterus with probable fibroids. 3. Mild right lung base infiltrate. 4. Right femur fracture and fixation hardware, further evaluated on the recent   dedicated radiography.       GI details are limited without oral contrast.            ASSESSMENT      Hospital Problems as of 3/17/2018  Never Reviewed          Codes Class Noted - Resolved POA    Acute cystitis without hematuria ICD-10-CM: N30.00  ICD-9-CM: 595.0  3/15/2018 - Present Yes        * (Principal)Nausea & vomiting ICD-10-CM: R11.2  ICD-9-CM: 787.01  3/15/2018 - Present Yes        Generalized weakness ICD-10-CM: R53.1  ICD-9-CM: 780.79  3/15/2018 - Present Yes        Hyperglycemia due to type 2 diabetes mellitus ([facility] ICD-10-CM: E11.65  ICD-9-CM: 250.00  2/24/2018 - Present Yes                  Plan:  - UTI:  Urine cx with GNRs, speciation pending  Continue rocephin    - n/v:  Suspect from UTI  Continue scheduled zofran and reglan  Add prn compazine    - RLL PNA:  No symptoms of PNA  Improving, so will continue rocephin only  Nebs prn    - DM2 with hypoglycemia:  Decrease lantus as low BS this AM  Continue SSI ACHS      DVT Prophylaxis: Lovenox  Dispo: PT/OT following, PPD place; planning for discharge to [facility] pending insurance pre-cert    Hamilton Villatoor MD

## 2018-03-18 LAB
BACTERIA SPEC CULT: ABNORMAL
GLUCOSE BLD STRIP.AUTO-MCNC: 116 MG/DL (ref 65–100)
GLUCOSE BLD STRIP.AUTO-MCNC: 179 MG/DL (ref 65–100)
GLUCOSE BLD STRIP.AUTO-MCNC: 200 MG/DL (ref 65–100)
GLUCOSE BLD STRIP.AUTO-MCNC: 91 MG/DL (ref 65–100)
MM INDURATION POC: NORMAL 0MM (ref 0–5)
PPD POC: NORMAL NEGATIVE
SERVICE CMNT-IMP: ABNORMAL

## 2018-03-18 PROCEDURE — 74011636637 HC RX REV CODE- 636/637: Performed by: INTERNAL MEDICINE

## 2018-03-18 PROCEDURE — 65270000029 HC RM PRIVATE

## 2018-03-18 PROCEDURE — 82962 GLUCOSE BLOOD TEST: CPT

## 2018-03-18 PROCEDURE — 74011250636 HC RX REV CODE- 250/636: Performed by: INTERNAL MEDICINE

## 2018-03-18 PROCEDURE — 74011636637 HC RX REV CODE- 636/637: Performed by: HOSPITALIST

## 2018-03-18 PROCEDURE — 74011000258 HC RX REV CODE- 258: Performed by: INTERNAL MEDICINE

## 2018-03-18 PROCEDURE — 74011250637 HC RX REV CODE- 250/637: Performed by: HOSPITALIST

## 2018-03-18 PROCEDURE — 99218 HC RM OBSERVATION: CPT

## 2018-03-18 PROCEDURE — 74011250636 HC RX REV CODE- 250/636: Performed by: HOSPITALIST

## 2018-03-18 RX ORDER — ONDANSETRON 2 MG/ML
4 INJECTION INTRAMUSCULAR; INTRAVENOUS
Status: DISCONTINUED | OUTPATIENT
Start: 2018-03-18 | End: 2018-03-21 | Stop reason: HOSPADM

## 2018-03-18 RX ADMIN — CEFTRIAXONE SODIUM 1 G: 1 INJECTION, POWDER, FOR SOLUTION INTRAMUSCULAR; INTRAVENOUS at 11:27

## 2018-03-18 RX ADMIN — METOCLOPRAMIDE 5 MG: 5 INJECTION, SOLUTION INTRAMUSCULAR; INTRAVENOUS at 23:54

## 2018-03-18 RX ADMIN — PANTOPRAZOLE SODIUM 40 MG: 40 TABLET, DELAYED RELEASE ORAL at 06:20

## 2018-03-18 RX ADMIN — ONDANSETRON 4 MG: 2 INJECTION INTRAMUSCULAR; INTRAVENOUS at 00:41

## 2018-03-18 RX ADMIN — ENOXAPARIN SODIUM 40 MG: 40 INJECTION SUBCUTANEOUS at 00:40

## 2018-03-18 RX ADMIN — PROCHLORPERAZINE EDISYLATE 5 MG: 5 INJECTION INTRAMUSCULAR; INTRAVENOUS at 08:53

## 2018-03-18 RX ADMIN — INSULIN LISPRO 2 UNITS: 100 INJECTION, SOLUTION INTRAVENOUS; SUBCUTANEOUS at 16:52

## 2018-03-18 RX ADMIN — INSULIN LISPRO 4 UNITS: 100 INJECTION, SOLUTION INTRAVENOUS; SUBCUTANEOUS at 22:00

## 2018-03-18 RX ADMIN — METOPROLOL TARTRATE 12.5 MG: 25 TABLET, FILM COATED ORAL at 17:41

## 2018-03-18 RX ADMIN — Medication 10 ML: at 06:21

## 2018-03-18 RX ADMIN — ONDANSETRON 4 MG: 2 INJECTION INTRAMUSCULAR; INTRAVENOUS at 11:27

## 2018-03-18 RX ADMIN — ENOXAPARIN SODIUM 40 MG: 40 INJECTION SUBCUTANEOUS at 23:53

## 2018-03-18 RX ADMIN — Medication 10 ML: at 14:41

## 2018-03-18 RX ADMIN — METOPROLOL TARTRATE 12.5 MG: 25 TABLET, FILM COATED ORAL at 08:46

## 2018-03-18 RX ADMIN — METOCLOPRAMIDE 5 MG: 5 INJECTION, SOLUTION INTRAMUSCULAR; INTRAVENOUS at 06:20

## 2018-03-18 RX ADMIN — METOCLOPRAMIDE 5 MG: 5 INJECTION, SOLUTION INTRAMUSCULAR; INTRAVENOUS at 17:41

## 2018-03-18 RX ADMIN — METOCLOPRAMIDE 5 MG: 5 INJECTION, SOLUTION INTRAMUSCULAR; INTRAVENOUS at 00:42

## 2018-03-18 RX ADMIN — INSULIN GLARGINE 8 UNITS: 100 INJECTION, SOLUTION SUBCUTANEOUS at 23:41

## 2018-03-18 RX ADMIN — ONDANSETRON 4 MG: 2 INJECTION INTRAMUSCULAR; INTRAVENOUS at 06:20

## 2018-03-18 RX ADMIN — LISINOPRIL 10 MG: 5 TABLET ORAL at 08:50

## 2018-03-18 NOTE — PROGRESS NOTES
Problem: Falls - Risk of  Goal: *Absence of Falls  Document Az Fall Risk and appropriate interventions in the flowsheet.    Outcome: Progressing Towards Goal  Fall Risk Interventions:  Mobility Interventions: Patient to call before getting OOB         Medication Interventions: Patient to call before getting OOB, Teach patient to arise slowly    Elimination Interventions: Call light in reach, Patient to call for help with toileting needs, Toileting schedule/hourly rounds

## 2018-03-18 NOTE — PROGRESS NOTES
Hospitalist Progress Note    3/18/2018  Admit Date: 3/15/2018  4:33 PM   NAME: Matias Mcallister   :  1960   MRN:  [id number]   Attending: Carter Allen MD  PCP:  Lobo José MD    SUBJECTIVE:     Matias Mcallister is a 60yoF with recent hip fx (DC 3/6) currently in [place] who was admitted on 3/15 with weakness, n/v for 3 days prior to admission. She was found to have a UTI and RLL pneumonia. Not hypoxic    3/18: Nausea resolved. Patient feeling much better. Wants to go home, but initial PT eval suggested that she needed STR. I have called PT to ask if they will re-eval her today. PT has requested Ortho eval for weight bearing status. Review of Systems negative with exception of pertinent positives noted above      PHYSICAL EXAM       Visit Vitals    BP (!) 154/93 (BP 1 Location: Right arm, BP Patient Position: At rest)    Pulse 90    Temp 98.4 °F (36.9 °C)    Resp 15    Ht 5' 3\" (1.6 m)    Wt 78.4 kg (172 lb 14.4 oz)    SpO2 98%    BMI 30.63 kg/m2      Temp (24hrs), Av.1 °F (36.7 °C), Min:97.8 °F (36.6 °C), Max:98.4 °F (36.9 °C)    Oxygen Therapy  O2 Sat (%): 98 % (18 1210)  Pulse via Oximetry: 98 beats per minute (18 0738)  O2 Device: Room air (18 0738)    Intake/Output Summary (Last 24 hours) at 18 1253  Last data filed at 18 0840   Gross per 24 hour   Intake              240 ml   Output                0 ml   Net              240 ml          General: No acute distress. Head:  Atraumatic Normocephalic. Lungs:  Clear anteriorly, normal resp effort on RA  CVS:  RRR, no BLE edema  Abdomen: Soft, ND, NTTP, +NABS  MSK:  Spontaneously moves extremities.   Neurologic:  No focal deficits      Recent Results (from the past 24 hour(s))   GLUCOSE, POC    Collection Time: 18  4:16 PM   Result Value Ref Range    Glucose (POC) 126 (H) 65 - 100 mg/dL   PLEASE READ & DOCUMENT PPD TEST IN 48 HRS    Collection Time: 18  5:23 PM   Result Value Ref Range    PPD Negative    mm Induration  0 mm   GLUCOSE, POC    Collection Time: 03/17/18  8:33 PM   Result Value Ref Range    Glucose (POC) 122 (H) 65 - 100 mg/dL   PLEASE READ & DOCUMENT PPD TEST IN 72 HRS    Collection Time: 03/18/18 12:43 AM   Result Value Ref Range    PPD  Negative    mm Induration  0mm   GLUCOSE, POC    Collection Time: 03/18/18  7:10 AM   Result Value Ref Range    Glucose (POC) 91 65 - 100 mg/dL   GLUCOSE, POC    Collection Time: 03/18/18 12:09 PM   Result Value Ref Range    Glucose (POC) 116 (H) 65 - 100 mg/dL         Imaging /Procedures /Studies   CT ABD PELV W CONT   Final Result   IMPRESSION:    1. Urinary bladder wall thickening and trace intraluminal gas suspicious for   infection. 2. Enlarged heterogeneous uterus with probable fibroids. 3. Mild right lung base infiltrate. 4. Right femur fracture and fixation hardware, further evaluated on the recent   dedicated radiography.       GI details are limited without oral contrast.            ASSESSMENT      Hospital Problems as of 3/18/2018  Never Reviewed          Codes Class Noted - Resolved POA    Acute cystitis without hematuria ICD-10-CM: N30.00  ICD-9-CM: 595.0  3/15/2018 - Present Yes        * (Principal)Nausea & vomiting ICD-10-CM: R11.2  ICD-9-CM: 787.01  3/15/2018 - Present Yes        Generalized weakness ICD-10-CM: R53.1  ICD-9-CM: 780.79  3/15/2018 - Present Yes        Hyperglycemia due to type 2 diabetes mellitus ([facility] ICD-10-CM: E11.65  ICD-9-CM: 250.00  2/24/2018 - Present Yes                  Plan:  - UTI:  Urine cx with Ecoli, cipro resistant  Continue rocephin, day 4/5     - n/v:  Suspect from UTI  Continue scheduled reglan and zofran prn  Add prn compazine    - RLL PNA:  No symptoms of PNA  Improving, so will continue rocephin only  Nebs prn    - DM2 with hypoglycemia:  BS better today after lantus decreased yesterday  Continue SSI ACHS    - Recent hip fx:  Pending PT eval to determine if patient needs STR vs HH  PT asking for ortho consult to determine patient's weight bearing status      DVT Prophylaxis: Lovenox  Dispo: PT/OT following, PPD place; planning for discharge to [facility] pending insurance pre-cert vs  if appropriate per PT shahzad Galeana MD

## 2018-03-18 NOTE — PROGRESS NOTES
PT Note: RN requesting for pt to be seen by PT for discharge planning. Pt came to [facility] from [facility] but states she prefers to return home with [facility]. Upon chart review, it is noted that pt was NWB status on the RLE s/p IM nailing on 2/4/18. Per Dr. Lee Wright note, ortho to be consulted but pt has not yet been seen by ortho. Per PT initial evaluation, pt is non-compliant with her WB precautions and is requesting return home with [facility]. PT requesting clarification on pt's weight bearing status prior to assessment of dc planning home vs. Rehab. Discussed with RN Diane Vizcaino who will request ortho consult. Will follow up with pt after ortho consult and as scheduling permits.      Krys Case, PT

## 2018-03-19 ENCOUNTER — APPOINTMENT (OUTPATIENT)
Dept: GENERAL RADIOLOGY | Age: 58
DRG: 689 | End: 2018-03-19
Attending: NURSE PRACTITIONER
Payer: COMMERCIAL

## 2018-03-19 LAB
GLUCOSE BLD STRIP.AUTO-MCNC: 179 MG/DL (ref 65–100)
GLUCOSE BLD STRIP.AUTO-MCNC: 184 MG/DL (ref 65–100)
GLUCOSE BLD STRIP.AUTO-MCNC: 215 MG/DL (ref 65–100)
GLUCOSE BLD STRIP.AUTO-MCNC: 237 MG/DL (ref 65–100)

## 2018-03-19 PROCEDURE — 74011636637 HC RX REV CODE- 636/637: Performed by: INTERNAL MEDICINE

## 2018-03-19 PROCEDURE — 73502 X-RAY EXAM HIP UNI 2-3 VIEWS: CPT

## 2018-03-19 PROCEDURE — 82962 GLUCOSE BLOOD TEST: CPT

## 2018-03-19 PROCEDURE — 74011250636 HC RX REV CODE- 250/636: Performed by: INTERNAL MEDICINE

## 2018-03-19 PROCEDURE — 74011250636 HC RX REV CODE- 250/636: Performed by: HOSPITALIST

## 2018-03-19 PROCEDURE — 74011636637 HC RX REV CODE- 636/637: Performed by: HOSPITALIST

## 2018-03-19 PROCEDURE — 99218 HC RM OBSERVATION: CPT

## 2018-03-19 PROCEDURE — 65270000029 HC RM PRIVATE

## 2018-03-19 PROCEDURE — 74011000258 HC RX REV CODE- 258: Performed by: INTERNAL MEDICINE

## 2018-03-19 PROCEDURE — 73552 X-RAY EXAM OF FEMUR 2/>: CPT

## 2018-03-19 PROCEDURE — 97530 THERAPEUTIC ACTIVITIES: CPT

## 2018-03-19 PROCEDURE — 74011250637 HC RX REV CODE- 250/637: Performed by: HOSPITALIST

## 2018-03-19 RX ORDER — NITROFURANTOIN MACROCRYSTALS 50 MG/1
100 CAPSULE ORAL EVERY 6 HOURS
Status: DISCONTINUED | OUTPATIENT
Start: 2018-03-20 | End: 2018-03-21 | Stop reason: HOSPADM

## 2018-03-19 RX ADMIN — INSULIN GLARGINE 8 UNITS: 100 INJECTION, SOLUTION SUBCUTANEOUS at 22:23

## 2018-03-19 RX ADMIN — METOPROLOL TARTRATE 12.5 MG: 25 TABLET, FILM COATED ORAL at 17:09

## 2018-03-19 RX ADMIN — POTASSIUM CHLORIDE 20 MEQ: 20 TABLET, EXTENDED RELEASE ORAL at 12:31

## 2018-03-19 RX ADMIN — METOCLOPRAMIDE 5 MG: 5 INJECTION, SOLUTION INTRAMUSCULAR; INTRAVENOUS at 06:39

## 2018-03-19 RX ADMIN — LISINOPRIL 10 MG: 5 TABLET ORAL at 10:01

## 2018-03-19 RX ADMIN — OYSTER SHELL CALCIUM WITH VITAMIN D 1 TABLET: 500; 200 TABLET, FILM COATED ORAL at 12:31

## 2018-03-19 RX ADMIN — Medication 10 ML: at 00:00

## 2018-03-19 RX ADMIN — ENOXAPARIN SODIUM 40 MG: 40 INJECTION SUBCUTANEOUS at 23:26

## 2018-03-19 RX ADMIN — PANTOPRAZOLE SODIUM 40 MG: 40 TABLET, DELAYED RELEASE ORAL at 06:49

## 2018-03-19 RX ADMIN — METOPROLOL TARTRATE 12.5 MG: 25 TABLET, FILM COATED ORAL at 10:01

## 2018-03-19 RX ADMIN — METOCLOPRAMIDE 5 MG: 5 INJECTION, SOLUTION INTRAMUSCULAR; INTRAVENOUS at 12:30

## 2018-03-19 RX ADMIN — INSULIN LISPRO 4 UNITS: 100 INJECTION, SOLUTION INTRAVENOUS; SUBCUTANEOUS at 09:58

## 2018-03-19 RX ADMIN — GUAIFENESIN 600 MG: 600 TABLET, EXTENDED RELEASE ORAL at 12:31

## 2018-03-19 RX ADMIN — INSULIN LISPRO 2 UNITS: 100 INJECTION, SOLUTION INTRAVENOUS; SUBCUTANEOUS at 12:30

## 2018-03-19 RX ADMIN — INSULIN LISPRO 4 UNITS: 100 INJECTION, SOLUTION INTRAVENOUS; SUBCUTANEOUS at 17:10

## 2018-03-19 RX ADMIN — INSULIN LISPRO 2 UNITS: 100 INJECTION, SOLUTION INTRAVENOUS; SUBCUTANEOUS at 22:24

## 2018-03-19 RX ADMIN — NITROFURANTOIN MACROCRYSTALS 100 MG: 50 CAPSULE ORAL at 23:26

## 2018-03-19 RX ADMIN — Medication 10 ML: at 06:41

## 2018-03-19 NOTE — INTERDISCIPLINARY ROUNDS
Interdisciplinary team rounds were held 3/19/2018 with the following team members:Care Management, Nursing, Physical Therapy and Physician and the patient. Plan of care discussed. See clinical pathway and/or care plan for interventions and desired outcomes. Awaiting insurance approval for discharge to [facility].

## 2018-03-19 NOTE — PROGRESS NOTES
Problem: Mobility Impaired (Adult and Pediatric)  Goal: *Acute Goals and Plan of Care (Insert Text)  LTG:  (1.)Ms. Dee Cruz will participate in >=20 minutes of therapeutic activity in order to improve her tolerance for participation in functional activities with SUPERVISION within 7 treatment day(s). (2.)Ms. Dee Cruz will transfer from bed to chair and chair to bed with SUPERVISION using the least restrictive device within 7 treatment day(s). (3.)Ms. Dee Cruz will ambulate with STAND BY ASSIST for 10 feet with the least restrictive device and NWB through the R LE within 7 treatment day(s). ________________________________________________________________________________________________       PHYSICAL THERAPY: Daily Note, Treatment Day: 1st, AM 3/19/2018  OBSERVATION: Hospital Day: 5  Payor: BLUE CR[place] / [place] BLUE ESSENTIALS JOANNE / Product Type: JOANNE /      NAME/AGE/GENDER: Rachna Flores is a 62 y.o. female   PRIMARY DIAGNOSIS: Healthcare associated bacterial pneumonia  Urinary tract infection  Generalized weakness  Nausea & vomiting  UTI (urinary tract infection) Nausea & vomiting Nausea & vomiting        ICD-10: Treatment Diagnosis:   · Generalized Muscle Weakness (M62.81)  · Other abnormalities of gait and mobility (R26.89)   Precaution/Allergies:  Review of patient's allergies indicates no known allergies. ASSESSMENT:   NWB R LE    Ms. Dee Cruz is standing in room on arrival with RW, not maintaining NWB R LE. Pt educated on NWB status R LE, reconfirmed by Anitha. Pt emotional about being NWB R LE and expressed her desire to be able to take care of herself. Pt able to ambulate with RW for 15' while maintaining NWB R LE. Pt verabalized understanding of need to maintain NWB status. Pt lives alone and has stairs to perform at home. Pt states does not have any assist at home. Pt fall risk at this time, safety concerns regarding mobility.  Pt would benefit from further skilled PT services at discharge due to functioning well below baseline level of independent. This section established at most recent assessment   PROBLEM LIST (Impairments causing functional limitations):  1. Decreased Strength  2. Decreased ADL/Functional Activities  3. Decreased Ambulation Ability/Technique  4. Decreased Balance  5. Increased Pain  6. Decreased Activity Tolerance  7. Increased Fatigue  8. Decreased Knowledge of Precautions   INTERVENTIONS PLANNED: (Benefits and precautions of physical therapy have been discussed with the patient.)  1. Balance Exercise  2. Bed Mobility  3. Family Education  4. Gait Training  5. Home Exercise Program (HEP)  6. Neuromuscular Re-education/Strengthening  7. Range of Motion (ROM)  8. Therapeutic Activites  9. Therapeutic Exercise/Strengthening  10. Transfer Training  11. Group Therapy     TREATMENT PLAN: Frequency/Duration: 3 times a week for duration of hospital stay  Rehabilitation Potential For Stated Goals: Good     RECOMMENDED REHABILITATION/EQUIPMENT: (at time of discharge pending progress): Due to the probability of continued deficits (see above) this patient will likely need continued skilled physical therapy after discharge. Equipment:    None at this time              HISTORY:   History of Present Injury/Illness (Reason for Referral):  Patient is is 62years old female with pmhx of HTN, DM-2, recent right hip fracture currently in [facility] rehab presented to ER with generalized weakness, nausea/vomiting for last 3-4 days. Pt is having on an average 2-3 episodes of vomiting for last 3-4 days, oral intake is minimal. She also reports dysuria and increased frequency of urination. She denies cough, SOB, fever, chills, abdominal pain, diarrhea, night sweats, palpitations, headache, blurry vision. In ER, pt was hypertensive with SBP > 180, tachycardic 110's, U/A positive for leukocyte esterase with 4+ bacteria.  CT abd/pelvis showed thickening of urinary bladder with intraluminal gas, mild right lung base infiltrate. Past Medical History/Comorbidities:   Ms. Analia Hall  has a past medical history of Cancer ([facility] Diabetes ([facility] and Hypertension. Ms. Analia Hall  has a past surgical history that includes hx hip fracture tx (Right); hx breast lumpectomy; and hx hip replacement. Social History/Living Environment:   Home Environment: Private residence  # Steps to Enter: 5  One/Two Story Residence: One story  Living Alone: No  Support Systems: Family member(s)  Patient Expects to be Discharged to[de-identified] Private residence  Current DME Used/Available at Home: Commode, bedside, Walker, rolling  Tub or Shower Type: Tub/Shower combination  Prior Level of Function/Work/Activity:  Pt NWB prior to most recent admission, secondary to being s/p R hip fx repair; Patient was independent prior to recent R hip fx    Number of Personal Factors/Comorbidities that affect the Plan of Care: 0: LOW COMPLEXITY   EXAMINATION:   Most Recent Physical Functioning:   Gross Assessment:                  Posture:  Posture Assessment: Rounded shoulders  Balance:  Sitting: Intact  Standing: Impaired  Standing - Static: Good  Standing - Dynamic : Fair Bed Mobility:  Supine to Sit:  (standing in room on arrival)  Sit to Supine:  (left up in chair)  Wheelchair Mobility:     Transfers:  Sit to Stand: Contact guard assistance  Stand to Sit: Contact guard assistance  Bed to Chair: Contact guard assistance  Gait:  Right Side Weight Bearing: Non-weight bearing  Base of Support: Shift to left  Speed/Yadi:  (hopping)  Step Length: Left shortened  Gait Abnormalities:  (hopping)  Distance (ft): 15 Feet (ft)  Assistive Device: Walker, rolling  Ambulation - Level of Assistance: Contact guard assistance  Interventions: Safety awareness training;Verbal cues      Body Structures Involved:  1. Bones  2. Joints  3. Muscles  4. Ligaments Body Functions Affected:  1. Neuromusculoskeletal  2.  Movement Related Activities and Participation Affected:  1. General Tasks and Demands  2. Mobility  3. Self Care  4. Domestic Life  5. Community, Social and [place]   Number of elements that affect the Plan of Care: 4+: HIGH COMPLEXITY   CLINICAL PRESENTATION:   Presentation: Stable and uncomplicated: LOW COMPLEXITY   CLINICAL DECISION MAKIN [facility] Mobility Inpatient Short Form  How much difficulty does the patient currently have. .. Unable A Lot A Little None   1. Turning over in bed (including adjusting bedclothes, sheets and blankets)? [] 1   [] 2   [] 3   [x] 4   2. Sitting down on and standing up from a chair with arms ( e.g., wheelchair, bedside commode, etc.)   [] 1   [] 2   [] 3   [x] 4   3. Moving from lying on back to sitting on the side of the bed? [] 1   [] 2   [] 3   [x] 4   How much help from another person does the patient currently need. .. Total A Lot A Little None   4. Moving to and from a bed to a chair (including a wheelchair)? [] 1   [] 2   [x] 3   [] 4   5. Need to walk in hospital room? [] 1   [x] 2   [] 3   [] 4   6. Climbing 3-5 steps with a railing? [] 1   [x] 2   [] 3   [] 4   © , Trustees of [address] [place] [address] [address] [address], under license to [employer]. All rights reserved      Score:  Initial: 19 Most Recent: X (Date: -- )    Interpretation of Tool:  Represents activities that are increasingly more difficult (i.e. Bed mobility, Transfers, Gait). Score 24 23 22-20 19-15 14-10 9-7 6     Modifier CH CI CJ CK CL CM CN      ?  Mobility - Walking and Moving Around:     - CURRENT STATUS: CK - 40%-59% impaired, limited or restricted    - GOAL STATUS: CJ - 20%-39% impaired, limited or restricted    - D/C STATUS:  ---------------To be determined---------------  Payor: BLUE CROSS / [place] BLUE ESSENTIALS JOANNE / Product Type: JOANNE /      Medical Necessity:     · Patient is expected to demonstrate progress in strength, range of motion, balance, coordination and functional technique to increase independence with ambulation and overall functional mobility. Reason for Services/Other Comments:  · Patient continues to require modification of therapeutic interventions to increase complexity of exercises. Use of outcome tool(s) and clinical judgement create a POC that gives a: Clear prediction of patient's progress: LOW COMPLEXITY            TREATMENT:   (In addition to Assessment/Re-Assessment sessions the following treatments were rendered)   Pre-treatment Symptoms/Complaints: \"I just don't know why they were letting me put weight on it at rehab\"  Pain: Initial:   Pain Intensity 1: 0 0/10 Post Session:  0/10     Therapeutic Activity: (    23 min): Therapeutic activities including Chair transfers, Ambulation on level ground and educated in NWB R, weight shifting, reviewed exercises for R LE to improve mobility, strength, balance and coordination. Required minimal Safety awareness training;Verbal cues to promote static and dynamic balance in standing and promote coordination of bilateral, lower extremity(s). Braces/Orthotics/Lines/Etc:   · O2 Device: Room air  Treatment/Session Assessment:    · Response to Treatment: NWB R LE, educated on maintaining   · Interdisciplinary Collaboration:   o Physical Therapist  o Registered Nurse  o   · After treatment position/precautions:   o Up in chair  o Bed/Chair-wheels locked  o Call light within reach  o RN notified   · Compliance with Program/Exercises: Will assess as treatment progresses. · Recommendations/Intent for next treatment session: \"Next visit will focus on advancements to more challenging activities and reduction in assistance provided\".   Total Treatment Duration:  PT Patient Time In/Time Out  Time In: 1118  Time Out: [address] [facility]3],

## 2018-03-19 NOTE — PROGRESS NOTES
OT Note:    OT attempted to see patient this afternoon for therapy. Pt asleep at this time. OT will re-attempt to see patient at a later date/time.     Thanks,  Marilia Richmond

## 2018-03-19 NOTE — PROGRESS NOTES
Spoke with Daysi Smith liaison, and she stated we are still awaiting pre-cert from [employer].

## 2018-03-19 NOTE — PROGRESS NOTES
Hourly rounds completed. Resting quietly in bed. Reminded on non weight bearing on Rt leg. Verbalizes understanding. Voices no complaints. All needs met at this time.  Will continue to monitor until night shift nurse takes over

## 2018-03-19 NOTE — PROGRESS NOTES
Hospitalist Progress Note    3/19/2018  Admit Date: 3/15/2018  4:33 PM   NAME: Devaughn Aguirre   :  1960   MRN:  [id number]   Attending: Mckenzie Rodriguez MD  PCP:  Evonne Ritter MD    SUBJECTIVE:     Devaughn Aguirre is a 60yoF with recent hip fx (DC 3/) currently in [facility] who was admitted on 3/15 with weakness, n/v for 3 days prior to admission. She was found to have a UTI and RLL pneumonia. Not hypoxic    3/19: Doing well. No nausea or abd pain, SOB or CP. She reports that she was putting weight on her leg while at rehab per their instructions, but has been told by PT here that she should be NWB,     Review of Systems negative with exception of pertinent positives noted above      PHYSICAL EXAM       Visit Vitals    /71    Pulse 90    Temp 98.3 °F (36.8 °C)    Resp 20    Ht 5' 3\" (1.6 m)    Wt 78.4 kg (172 lb 12.8 oz)    SpO2 97%    BMI 30.61 kg/m2      Temp (24hrs), Av.2 °F (36.8 °C), Min:97.3 °F (36.3 °C), Max:98.8 °F (37.1 °C)    Oxygen Therapy  O2 Sat (%): 97 % (18 0732)  Pulse via Oximetry: 98 beats per minute (18 0738)  O2 Device: Room air (18 0738)    Intake/Output Summary (Last 24 hours) at 18 0919  Last data filed at 18 1754   Gross per 24 hour   Intake              480 ml   Output                0 ml   Net              480 ml          General: No acute distress. Head:  Atraumatic Normocephalic. Lungs:  Clear anteriorly, normal resp effort on RA  CVS:  RRR, no BLE edema  Abdomen: Soft, ND, NTTP, +NABS  MSK:  Spontaneously moves extremities.   Neurologic:  No focal deficits      Recent Results (from the past 24 hour(s))   GLUCOSE, POC    Collection Time: 18 12:09 PM   Result Value Ref Range    Glucose (POC) 116 (H) 65 - 100 mg/dL   GLUCOSE, POC    Collection Time: 18  4:45 PM   Result Value Ref Range    Glucose (POC) 179 (H) 65 - 100 mg/dL   GLUCOSE, POC    Collection Time: 18  9:34 PM   Result Value Ref Range    Glucose (POC) 200 (H) 65 - 100 mg/dL   GLUCOSE, POC    Collection Time: 03/19/18  6:37 AM   Result Value Ref Range    Glucose (POC) 237 (H) 65 - 100 mg/dL         Imaging /Procedures /Studies   XR FEMUR RT 2 VS   Final Result   IMPRESSION: Stable proximal right femur fracture      CT ABD PELV W CONT   Final Result   IMPRESSION:    1. Urinary bladder wall thickening and trace intraluminal gas suspicious for   infection. 2. Enlarged heterogeneous uterus with probable fibroids. 3. Mild right lung base infiltrate. 4. Right femur fracture and fixation hardware, further evaluated on the recent   dedicated radiography.       GI details are limited without oral contrast.      XR HIP RT W OR WO PELV 2-3 VWS    (Results Pending)         ASSESSMENT      Hospital Problems as of 3/19/2018  Never Reviewed          Codes Class Noted - Resolved POA    Acute cystitis without hematuria ICD-10-CM: N30.00  ICD-9-CM: 595.0  3/15/2018 - Present Yes        * (Principal)Nausea & vomiting ICD-10-CM: R11.2  ICD-9-CM: 787.01  3/15/2018 - Present Yes        Generalized weakness ICD-10-CM: R53.1  ICD-9-CM: 780.79  3/15/2018 - Present Yes        Hyperglycemia due to type 2 diabetes mellitus ([facility] ICD-10-CM: E11.65  ICD-9-CM: 250.00  2/24/2018 - Present Yes                  Plan:  - UTI:  Urine cx with Ecoli, cipro resistant  Continue rocephin, day 5/5     - n/v:  Suspect from UTI  Continue scheduled reglan and zofran prn  Add prn compazine    - RLL PNA:  No symptoms of PNA  Improving, so will continue rocephin only  Nebs prn    - DM2 with hypoglycemia:  BS better today after lantus decreased yesterday  Continue SSI ACHS    - Recent hip fx:  Pending PT eval to determine if patient needs STR vs HH  PT asking for ortho consult to determine patient's weight bearing status      DVT Prophylaxis: Lovenox  Dispo: PT/OT following, PPD place; planning for discharge to STR pending insurance pre-cert vs HH if appropriate per PT eval; medically stable for discharge    Leelee Guajardo MD

## 2018-03-20 LAB
GLUCOSE BLD STRIP.AUTO-MCNC: 139 MG/DL (ref 65–100)
GLUCOSE BLD STRIP.AUTO-MCNC: 178 MG/DL (ref 65–100)
GLUCOSE BLD STRIP.AUTO-MCNC: 181 MG/DL (ref 65–100)
GLUCOSE BLD STRIP.AUTO-MCNC: 196 MG/DL (ref 65–100)

## 2018-03-20 PROCEDURE — 82962 GLUCOSE BLOOD TEST: CPT

## 2018-03-20 PROCEDURE — 99218 HC RM OBSERVATION: CPT

## 2018-03-20 PROCEDURE — 74011250637 HC RX REV CODE- 250/637: Performed by: HOSPITALIST

## 2018-03-20 PROCEDURE — 65270000029 HC RM PRIVATE

## 2018-03-20 PROCEDURE — 74011636637 HC RX REV CODE- 636/637: Performed by: HOSPITALIST

## 2018-03-20 PROCEDURE — 74011636637 HC RX REV CODE- 636/637: Performed by: INTERNAL MEDICINE

## 2018-03-20 PROCEDURE — 74011250636 HC RX REV CODE- 250/636: Performed by: HOSPITALIST

## 2018-03-20 RX ADMIN — METOPROLOL TARTRATE 12.5 MG: 25 TABLET, FILM COATED ORAL at 17:26

## 2018-03-20 RX ADMIN — INSULIN LISPRO 2 UNITS: 100 INJECTION, SOLUTION INTRAVENOUS; SUBCUTANEOUS at 08:43

## 2018-03-20 RX ADMIN — NITROFURANTOIN MACROCRYSTALS 100 MG: 50 CAPSULE ORAL at 05:25

## 2018-03-20 RX ADMIN — INSULIN LISPRO 2 UNITS: 100 INJECTION, SOLUTION INTRAVENOUS; SUBCUTANEOUS at 22:00

## 2018-03-20 RX ADMIN — POTASSIUM CHLORIDE 20 MEQ: 20 TABLET, EXTENDED RELEASE ORAL at 08:43

## 2018-03-20 RX ADMIN — Medication 10 ML: at 23:17

## 2018-03-20 RX ADMIN — Medication 5 ML: at 14:15

## 2018-03-20 RX ADMIN — METOPROLOL TARTRATE 12.5 MG: 25 TABLET, FILM COATED ORAL at 08:45

## 2018-03-20 RX ADMIN — INSULIN LISPRO 2 UNITS: 100 INJECTION, SOLUTION INTRAVENOUS; SUBCUTANEOUS at 17:29

## 2018-03-20 RX ADMIN — NITROFURANTOIN MACROCRYSTALS 100 MG: 50 CAPSULE ORAL at 17:26

## 2018-03-20 RX ADMIN — LISINOPRIL 10 MG: 5 TABLET ORAL at 08:43

## 2018-03-20 RX ADMIN — ENOXAPARIN SODIUM 40 MG: 40 INJECTION SUBCUTANEOUS at 23:13

## 2018-03-20 RX ADMIN — INSULIN GLARGINE 8 UNITS: 100 INJECTION, SOLUTION SUBCUTANEOUS at 23:13

## 2018-03-20 RX ADMIN — NITROFURANTOIN MACROCRYSTALS 100 MG: 50 CAPSULE ORAL at 12:09

## 2018-03-20 NOTE — PROGRESS NOTES
Hospitalist Progress Note    3/20/2018  Admit Date: 3/15/2018  4:33 PM   NAME: Geovani Khan   :  1960   MRN:  [id number]   Attending: Agustin Gutierrez MD  PCP:  Malik Valencia MD    SUBJECTIVE:     Geovani Khan is a 60yoF with recent hip fx (DC 3/6) currently in [place] who was admitted on 3/15 with weakness, n/v for 3 days prior to admission. She was found to have a UTI and RLL pneumonia. Not hypoxic    3/20: No complaints. Symptoms resolved. Appropriately frustrated that insurance has not approved STR placement yet. Review of Systems negative with exception of pertinent positives noted above      PHYSICAL EXAM       Visit Vitals    /79    Pulse 91    Temp 97.5 °F (36.4 °C)    Resp 18    Ht 5' 3\" (1.6 m)    Wt 78.4 kg (172 lb 12.8 oz)    SpO2 98%    BMI 30.61 kg/m2      Temp (24hrs), Av.9 °F (36.6 °C), Min:97.5 °F (36.4 °C), Max:98.2 °F (36.8 °C)    Oxygen Therapy  O2 Sat (%): 98 % (18 1511)  Pulse via Oximetry: 98 beats per minute (18 0738)  O2 Device: Room air (18 1118)    Intake/Output Summary (Last 24 hours) at 18 1526  Last data filed at 18 1800   Gross per 24 hour   Intake              120 ml   Output                0 ml   Net              120 ml          General: No acute distress. Head:  Atraumatic Normocephalic. Lungs:  Clear anteriorly, normal resp effort on RA  CVS:  RRR, no BLE edema  Abdomen: Soft, ND, NTTP, +NABS  MSK:  Spontaneously moves extremities.   Neurologic:  No focal deficits      Recent Results (from the past 24 hour(s))   GLUCOSE, POC    Collection Time: 18  3:54 PM   Result Value Ref Range    Glucose (POC) 215 (H) 65 - 100 mg/dL   GLUCOSE, POC    Collection Time: 18  8:21 PM   Result Value Ref Range    Glucose (POC) 179 (H) 65 - 100 mg/dL   GLUCOSE, POC    Collection Time: 18  6:59 AM   Result Value Ref Range    Glucose (POC) 196 (H) 65 - 100 mg/dL   GLUCOSE, POC    Collection Time: 18 10:44 AM Result Value Ref Range    Glucose (POC) 139 (H) 65 - 100 mg/dL         Imaging /Procedures /Studies   XR HIP RT W OR WO PELV 2-3 VWS   Final Result   IMPRESSION: Stable right femur fracture         XR FEMUR RT 2 VS   Final Result   IMPRESSION: Stable proximal right femur fracture      CT ABD PELV W CONT   Final Result   IMPRESSION:    1. Urinary bladder wall thickening and trace intraluminal gas suspicious for   infection. 2. Enlarged heterogeneous uterus with probable fibroids. 3. Mild right lung base infiltrate. 4. Right femur fracture and fixation hardware, further evaluated on the recent   dedicated radiography.       GI details are limited without oral contrast.            ASSESSMENT      Hospital Problems as of 3/20/2018  Never Reviewed          Codes Class Noted - Resolved POA    Acute cystitis without hematuria ICD-10-CM: N30.00  ICD-9-CM: 595.0  3/15/2018 - Present Yes        * (Principal)Nausea & vomiting ICD-10-CM: R11.2  ICD-9-CM: 787.01  3/15/2018 - Present Yes        Generalized weakness ICD-10-CM: R53.1  ICD-9-CM: 780.79  3/15/2018 - Present Yes        Hyperglycemia due to type 2 diabetes mellitus ([facility] ICD-10-CM: E11.65  ICD-9-CM: 250.00  2/24/2018 - Present Yes                  Plan:  - UTI:  Urine cx with Ecoli, cipro resistant  Treated for 5 days with rocephin    - n/v:  Suspect from UTI  Continue scheduled reglan and zofran prn  Add prn compazine    - RLL PNA:  No symptoms of PNA  Improving, so will continue rocephin only  Nebs prn    - DM2 with hypoglycemia:  BS better today after lantus decreased   Continue SSI ACHS    - Recent hip fx:  NWB on RLE  Continue PT      DVT Prophylaxis: Lovenox  Dispo: PT/OT following, PPD place; awaiting insurance pre-cert; medically stable for discharge    Lakhwinder Ford MD

## 2018-03-20 NOTE — PROGRESS NOTES
ORTHO:    PATIENT MISSED POSTOP APPT    SAW PATIENT IN HOSPITAL    STAPLES REMOVED - WOUND LOOKS GREAT - CAN LEAVE OPEN TO AIR    XRAYS ORDERED - REVIEWED BY DR Luc Shaw    PATIENT IS STRICT NWB RIGHT LE!     FOLLOW-UP APPT WITH DR Luc Shaw - 4/19/18 @ 8:00 AM

## 2018-03-20 NOTE — PROGRESS NOTES
Spoke with Betty Lawton from [employer], and she stated that pre cert has not been approved at this time and it's still pending.

## 2018-03-20 NOTE — PROGRESS NOTES
Patient lost IV access. Antibiotic can't be administered. Started on Nitrofurantoin based on sensitivity.

## 2018-03-20 NOTE — ADT AUTH CERT NOTES
Vomiting - Care Day 4 (3/18/2018) by Jennifer Duff RN        Review Entered Review Status       3/18/2018 Completed       Details              Care Day: 4 Care Date: 3/18/2018 Level of Care: Inpatient Floor       Guideline Day 2        Level Of Care       (X) Floor to discharge [C]              Clinical Status       (X) * Hemodynamic stability       (X) * Vomiting absent or controlled       ( ) * Electrolyte levels adequate       3/18/2018 1:28 PM EDT by Moi RUSSO 3.3              (X) * Life-threatening causes of vomiting excluded       (X) * Pain absent or managed       ( ) * Discharge plans and education understood              Activity       (X) * Ambulatory              Routes       (X) * Oral hydration       (X) * Liquid or advanced diet              Interventions       (X) Electrolytes              Medications       (X) Possible antiemetics                                   * Milestone              Additional Notes       3/18/18              VS  Blood pressure (!) 154/93, pulse 90, temperature 98.4 °F (36.9 °C), resp. rate 15, height 5' 3\" (1.6 m), weight 78.4 kg (172 lb 14.4 oz), SpO2 98 %.                NOTE  ASSESSMENT                       Hospital Problems as of 3/18/2018          Acute cystitis without hematuria             * (Principal)Nausea & vomiting             Generalized weakness          Hyperglycemia due to type 2 diabetes mellitus          Plan:       - UTI:       Urine cx with Ecoli, cipro resistant       Continue rocephin, day 4/5                - n/v:       Suspect from UTI       Continue scheduled reglan and zofran prn       Add prn compazine               - RLL PNA:       No symptoms of PNA       Improving, so will continue rocephin only       Nebs prn               - DM2 with hypoglycemia:       BS better today after lantus decreased yesterday       Continue SSI ACHS               - Recent hip fx:       Pending PT eval to determine if patient needs STR vs HH       PT asking for ortho consult to determine patient's weight bearing status              DVT Prophylaxis: Lovenox       Dispo: PT/OT following, PPD place; planning for discharge to [facility] pending insurance pre-cert vs HH if appropriate per PT shahzad Ma MD       Electronically signed by Shavon Morton           Vomiting - Care Day 3 (3/17/2018) by Jessica Ferguson Entered Review Status       3/17/2018 Completed       Details              Care Day: 3 Care Date: 3/17/2018 Level of Care: Inpatient Floor       Guideline Day 2        Level Of Care       (X) Floor to discharge [C]              Clinical Status       (X) * Hemodynamic stability       (X) * Vomiting absent or controlled       ( ) * Electrolyte levels adequate       (X) * Life-threatening causes of vomiting excluded       (X) * Pain absent or managed       ( ) * Discharge plans and education understood              Activity       (X) * Ambulatory              Routes       (X) * Oral hydration       (X) * Liquid or advanced diet              Interventions       (X) Electrolytes              Medications       (X) Possible antiemetics              3/17/2018 12:06 PM EDT by Otilio Crystal       Subject: Additional Clinical Information       3/17/18  still having nausea, but reports that it has improved since admission. Denies CP, SOB. Cough improved. No abd pain.97.9-/77-17-97%CT ABD PELV W CONTFinal ResultIMPRESSION: 1. Urinary bladder wall thickening and trace intraluminal gas suspicious forinfection. 2. Enlarged heterogeneous uterus with probable fibroids. 3. Mild right lung base infiltrate. 4. Right femur fracture and fixation hardware, further evaluated on the recentdedicated radiography. Plan:- UTI:Urine cx with GNRs, speciation pendingContinue rocephin  - n/v:Suspect from UTIContinue scheduled zofran and reglanAdd prn compazine  - RLL PNA:No symptoms of PNAImproving, so will continue rocephin onlyNebs prn  - DM2 with hypoglycemia:Decrease lantus as low BS this AMContinue SSI ACHS    DVT Prophylaxis: LovenoxDispo: PT/OT following,  PPD place; planning for discharge to [facility] pending insurance pre-cert                                   * Milestone                  Vomiting - Care Day 2 (3/16/2018) by Ankur Esparza        Review Entered Review Status       3/17/2018 Completed       Details              Care Day: 2 Care Date: 3/16/2018 Level of Care: Inpatient Floor       Guideline Day 2        Level Of Care       (X) Floor to discharge [C]              Clinical Status       (X) * Hemodynamic stability       (X) * Vomiting absent or controlled       ( ) * Electrolyte levels adequate       (X) * Life-threatening causes of vomiting excluded       ( ) * Pain absent or managed       ( ) * Discharge plans and education understood              Activity       (X) * Ambulatory              Routes       (X) * Oral hydration       (X) * Liquid or advanced diet              Interventions       (X) Electrolytes       3/17/2018 11:57 AM EDT by Yuly QUIROGA 40 mEQ                     Medications       (X) Possible antiemetics       3/17/2018 11:57 AM EDT by Yuly Weiss         ZOFRAN 4 MG IV X2                                          * Milestone

## 2018-03-21 VITALS
RESPIRATION RATE: 18 BRPM | HEIGHT: 63 IN | TEMPERATURE: 98.3 F | BODY MASS INDEX: 30.62 KG/M2 | SYSTOLIC BLOOD PRESSURE: 137 MMHG | HEART RATE: 96 BPM | OXYGEN SATURATION: 98 % | DIASTOLIC BLOOD PRESSURE: 79 MMHG | WEIGHT: 172.8 LBS

## 2018-03-21 LAB — GLUCOSE BLD STRIP.AUTO-MCNC: 183 MG/DL (ref 65–100)

## 2018-03-21 PROCEDURE — 74011636637 HC RX REV CODE- 636/637: Performed by: HOSPITALIST

## 2018-03-21 PROCEDURE — 74011250637 HC RX REV CODE- 250/637: Performed by: HOSPITALIST

## 2018-03-21 PROCEDURE — 74011250637 HC RX REV CODE- 250/637: Performed by: INTERNAL MEDICINE

## 2018-03-21 PROCEDURE — 99218 HC RM OBSERVATION: CPT

## 2018-03-21 PROCEDURE — 82962 GLUCOSE BLOOD TEST: CPT

## 2018-03-21 RX ORDER — METOPROLOL TARTRATE 25 MG/1
12.5 TABLET, FILM COATED ORAL 2 TIMES DAILY
Qty: 60 TAB | Refills: 1 | Status: SHIPPED
Start: 2018-03-21 | End: 2019-09-26

## 2018-03-21 RX ORDER — INSULIN GLARGINE 100 [IU]/ML
8 INJECTION, SOLUTION SUBCUTANEOUS DAILY
Qty: 2.4 ML | Refills: 0 | Status: SHIPPED
Start: 2018-03-21 | End: 2018-04-20

## 2018-03-21 RX ORDER — DOCUSATE SODIUM 100 MG/1
100 CAPSULE, LIQUID FILLED ORAL DAILY
Status: DISCONTINUED | OUTPATIENT
Start: 2018-03-21 | End: 2018-03-21 | Stop reason: HOSPADM

## 2018-03-21 RX ORDER — OXYCODONE HYDROCHLORIDE 5 MG/1
5-10 TABLET ORAL
Qty: 30 TAB | Refills: 0 | Status: SHIPPED | OUTPATIENT
Start: 2018-03-21 | End: 2018-03-22

## 2018-03-21 RX ADMIN — DOCUSATE SODIUM 100 MG: 100 CAPSULE, LIQUID FILLED ORAL at 08:28

## 2018-03-21 RX ADMIN — INSULIN LISPRO 2 UNITS: 100 INJECTION, SOLUTION INTRAVENOUS; SUBCUTANEOUS at 08:29

## 2018-03-21 RX ADMIN — METOPROLOL TARTRATE 12.5 MG: 25 TABLET, FILM COATED ORAL at 08:29

## 2018-03-21 RX ADMIN — LISINOPRIL 10 MG: 5 TABLET ORAL at 08:29

## 2018-03-21 RX ADMIN — Medication 10 ML: at 06:50

## 2018-03-21 NOTE — DISCHARGE SUMMARY
Hospitalist Discharge Summary     Patient ID:  Nathan Alejandra  [id number]  46 y.o.  1960  Admit date: 3/15/2018  4:33 PM  Discharge date and time: 3/21/2018  Attending: Debi Gordillo MD  PCP:  Cyril Xiao MD  Treatment Team: Attending Provider: Debi Gordillo MD; Care Manager: Malcolm Castelan RN    Principal Diagnosis Nausea & vomiting   Hospital Problems as of 3/21/2018  Never Reviewed          Codes Class Noted - Resolved POA    Acute cystitis without hematuria ICD-10-CM: N30.00  ICD-9-CM: 595.0  3/15/2018 - Present Yes        * (Principal)Nausea & vomiting ICD-10-CM: R11.2  ICD-9-CM: 787.01  3/15/2018 - Present Yes        Generalized weakness ICD-10-CM: R53.1  ICD-9-CM: 780.79  3/15/2018 - Present Yes        Hyperglycemia due to type 2 diabetes mellitus [facility]) ICD-10-CM: E11.65  ICD-9-CM: 250.00  2/24/2018 - Present Yes              HPI: 62years old female with pmhx of HTN, DM-2, [facility] weakness, nausea/vomiting for last 3-4 days. Pt is having on an average 2-3 episodes of vomiting for last 3-4 days, oral intake is minimal. She also reports dysuria and increased frequency of urination. She denies cough, SOB, fever, chills, abdominal pain, diarrhea, night sweats, palpitations, headache, blurry vision. In ER, pt was hypertensive with SBP > 180, tachycardic 110's, U/A positive for leukocyte esterase with 4+ bacteria. CT abd/pelvis showed thickening of urinary bladder with intraluminal gas, mild right lung base infiltrate    Hospital Course: 57yoF with recent hip fx (DC 3/6) currently in [place] who was admitted on 3/15 with weakness, n/v for 3 days prior to admission. She was found to have a UTI and RLL pneumonia. Not hypoxic. Was treated with a 5 day course of Rocephin. Has responded well. Had significant n/v on admission, which has completely resolved.  Will be discharged to [address] [place] [address] per PT recommendation that she complete another 1-2 weeks in STR prior to discharge home. Significant Diagnostic Studies:   Labs: Results:       Chemistry No results for input(s): GLU, NA, K, CL, CO2, BUN, CREA, CA, AGAP, BUCR, TBIL, GPT, AP, TP, ALB, GLOB, AGRAT in the last 72 hours. CBC w/Diff No results for input(s): WBC, RBC, HGB, HCT, PLT, GRANS, LYMPH, EOS, HGBEXT, HCTEXT, PLTEXT, HGBEXT, HCTEXT, PLTEXT in the last 72 hours. Cardiac Enzymes No results for input(s): CPK, CKND1, EMILY in the last 72 hours. No lab exists for component: CKRMB, TROIP   Coagulation No results for input(s): PTP, INR, APTT in the last 72 hours. No lab exists for component: INREXT, INREXT    Lipid Panel No results found for: CHOL, CHOLPOCT, CHOLX, CHLST, CHOLV, 770960, HDL, LDL, LDLC, DLDLP, 772481, VLDLC, VLDL, TGLX, TRIGL, TRIGP, TGLPOCT, CHHD, CHHDX   BNP No results for input(s): BNPP in the last 72 hours. Liver Enzymes No results for input(s): TP, ALB, TBIL, AP, SGOT, GPT in the last 72 hours. No lab exists for component: DBIL   Thyroid Studies No results found for: T4, T3U, TSH, TSHEXT, TSHEXT         Discharge Exam:  Visit Vitals    /79    Pulse 96    Temp 98.3 °F (36.8 °C)    Resp 18    Ht 5' 3\" (1.6 m)    Wt 78.4 kg (172 lb 12.8 oz)    SpO2 98%    BMI 30.61 kg/m2     General appearance: alert, cooperative, NAD  Lungs: clear to auscultation bilaterally  Heart: regular rate and rhythm  Abdomen: soft, NTTP  Extremities: no cyanosis or edema  Neurologic: Grossly normal    Disposition: STR  Discharge Condition: stable  Patient Instructions:   Current Discharge Medication List      START taking these medications    Details   insulin glargine (LANTUS) 100 unit/mL injection 8 Units by SubCUTAneous route daily for 30 days. Qty: 2.4 mL, Refills: 0      metoprolol tartrate (LOPRESSOR) 25 mg tablet Take 0.5 Tabs by mouth two (2) times a day.   Qty: 60 Tab, Refills: 1         CONTINUE these medications which have CHANGED Details   oxyCODONE IR (ROXICODONE) 5 mg immediate release tablet Take 1-2 Tabs by mouth every four (4) hours as needed. Max Daily Amount: 60 mg.  Qty: 30 Tab, Refills: 0    Associated Diagnoses: Closed displaced intertrochanteric fracture of right femur[facility]         CONTINUE these medications which have NOT CHANGED    Details   insulin aspart U-100 (NOVOLOG U-100 INSULIN ASPART) 100 unit/mL injection by SubCUTAneous route. calcium-vitamin D (OYSTER SHELL) 500 mg(1,250mg) -200 unit per tablet Take 1 Tab by mouth two (2) times daily (with meals). Qty: 60 Tab, Refills: 2      potassium chloride (K-DUR, KLOR-CON) 20 mEq tablet Take 1 Tab by mouth daily. Qty: 30 Tab, Refills: 0      metFORMIN (GLUCOPHAGE) 500 mg tablet Take 1,000 mg by mouth two (2) times daily (with meals). Indications: type 2 diabetes mellitus      lisinopril (PRINIVIL, ZESTRIL) 10 mg tablet Take 10 mg by mouth daily.  Indications: hypertension             Activity: PT/OT Eval and Treat; NWB R leg  Diet: Diabetic Diet  Wound Care: Reinforce dressing PRN    Follow-up  -   Ortho as scheduled    Signed:  Claus Miller MD  3/21/2018  9:04 AM

## 2018-03-21 NOTE — PROGRESS NOTES
Report given to [facility], RN at [address] (phone #[phone]). Opportunity for questions and concerns given. Pt going to room 316.

## 2018-03-21 NOTE — PROGRESS NOTES
Hourly rounds complete this shift, no new complaints at this time. Patient refusing some evening and morning meds. , Will continue to monitor. .Will report to day shift nurse.

## 2018-03-21 NOTE — PROGRESS NOTES
Attempted to call [facility] to give report. No answer. Unable to leave voice message. Will attempt to call later.

## 2018-03-21 NOTE — PROGRESS NOTES
Spoke with aden at [facility] and they stated pre cert has been approved. MD notified. Patient to discharge to [place] [address]. Report line [phone] given to [facility]. Transportation setup via Dr. Tariff. Pickup time is 11am. Patient notified and in agreement.

## 2018-03-22 ENCOUNTER — APPOINTMENT (OUTPATIENT)
Dept: GENERAL RADIOLOGY | Age: 58
End: 2018-03-22
Attending: EMERGENCY MEDICINE
Payer: COMMERCIAL

## 2018-03-22 ENCOUNTER — HOSPITAL ENCOUNTER (EMERGENCY)
Age: 58
Discharge: HOME | End: 2018-03-22
Attending: EMERGENCY MEDICINE
Payer: COMMERCIAL

## 2018-03-22 VITALS
OXYGEN SATURATION: 99 % | BODY MASS INDEX: 30.48 KG/M2 | SYSTOLIC BLOOD PRESSURE: 145 MMHG | TEMPERATURE: 99 F | DIASTOLIC BLOOD PRESSURE: 85 MMHG | HEART RATE: 105 BPM | HEIGHT: 63 IN | WEIGHT: 172 LBS | RESPIRATION RATE: 20 BRPM

## 2018-03-22 DIAGNOSIS — K59.03 CONSTIPATION DUE TO PAIN MEDICATION: Primary | ICD-10-CM

## 2018-03-22 PROCEDURE — 74019 RADEX ABDOMEN 2 VIEWS: CPT

## 2018-03-22 PROCEDURE — 74011250637 HC RX REV CODE- 250/637: Performed by: EMERGENCY MEDICINE

## 2018-03-22 PROCEDURE — 99285 EMERGENCY DEPT VISIT HI MDM: CPT | Performed by: EMERGENCY MEDICINE

## 2018-03-22 PROCEDURE — 74011000250 HC RX REV CODE- 250: Performed by: EMERGENCY MEDICINE

## 2018-03-22 RX ORDER — LIDOCAINE HYDROCHLORIDE 20 MG/ML
JELLY TOPICAL
Status: COMPLETED | OUTPATIENT
Start: 2018-03-22 | End: 2018-03-22

## 2018-03-22 RX ORDER — MAGNESIUM CITRATE
296 SOLUTION, ORAL ORAL
Status: COMPLETED | OUTPATIENT
Start: 2018-03-22 | End: 2018-03-22

## 2018-03-22 RX ADMIN — MAGESIUM CITRATE 296 ML: 1.75 LIQUID ORAL at 18:26

## 2018-03-22 RX ADMIN — LIDOCAINE HYDROCHLORIDE 30 ML: 20 JELLY TOPICAL at 18:27

## 2018-03-22 NOTE — ED PROVIDER NOTES
HPI Comments: 59-year-old female presents via EMS from [facility] with complaints of constipation. She was recently hospitalized for right hip fracture as well as urinary tract infection. they gave her a suppository as well as milk of Magnesia without significant relief. He reports no good bowel movement in 3 days. States she can \"feel it\" but staff was unable to remove suspected stool ball. Denies any fevers, vomiting or previous abdominal surgeries. Patient is a 62 y.o. female presenting with constipation. The history is provided by the patient. No  was used. Constipation    Associated symptoms include abdominal pain, nausea, vomiting and constipation. Pertinent negatives include no fever and no back pain. Past Medical History:   Diagnosis Date    Cancer ([facility]     breast- L    Diabetes ([facility]     Hypertension        Past Surgical History:   Procedure Laterality Date    HX BREAST LUMPECTOMY      HX HIP FRACTURE TX Right     HX HIP REPLACEMENT           History reviewed. No pertinent family history. Social History     Social History    Marital status: SINGLE     Spouse name: N/A    Number of children: N/A    Years of education: N/A     Occupational History    Not on file. Social History Main Topics    Smoking status: Never Smoker    Smokeless tobacco: Never Used    Alcohol use Yes      Comment: social    Drug use: No    Sexual activity: Not on file     Other Topics Concern    Not on file     Social History Narrative         ALLERGIES: Review of patient's allergies indicates no known allergies. Review of Systems   Constitutional: Negative for fever. Respiratory: Negative for cough. Gastrointestinal: Positive for abdominal pain, constipation, nausea and vomiting. Musculoskeletal: Negative for back pain. Psychiatric/Behavioral: Negative for confusion.        Vitals:    03/22/18 1725   BP: (!) 196/102   Pulse: (!) 105   Resp: 19   Temp: 99 °F (37.2 °C)   SpO2: 99%   Weight: 78 kg (172 lb)   Height: 5' 3\" (1.6 m)            Physical Exam   Constitutional: She is oriented to person, place, and time. She appears well-developed and well-nourished. No distress. HENT:   Head: Normocephalic and atraumatic. Eyes: Conjunctivae and EOM are normal. Pupils are equal, round, and reactive to light. Neck: Normal range of motion. Neck supple. Cardiovascular: Normal rate, regular rhythm and normal heart sounds. Pulmonary/Chest: Effort normal and breath sounds normal. No respiratory distress. She has no wheezes. She has no rales. Abdominal: Soft. She exhibits no distension. There is tenderness. There is no rebound. Mild diffuse tenderness. Patient reports most of the pain is in her rectum. Musculoskeletal: Normal range of motion. She exhibits no edema or tenderness. Neurological: She is alert and oriented to person, place, and time. Skin: Skin is warm and dry. No rash noted. She is not diaphoretic. Psychiatric: She has a normal mood and affect. Her behavior is normal.   Vitals reviewed. MDM  Number of Diagnoses or Management Options  Constipation due to pain medication: new and does not require workup  Diagnosis management comments: She received an enema as well as magnesium citrate with significant improvement. A large stool ball was removed from her rectum. She states she feels much better. Her abdomen is nontender to palpation on repeat examination. I feel he can safely discharge her follow up with PCP. Discharged in stable condition. Return precautions discussed. Amount and/or Complexity of Data Reviewed  Tests in the radiology section of CPT®: ordered and reviewed (Xr Abd (ap And Erect Or Decub)    Result Date: 3/22/2018  ABDOMINAL FILMS, 2 views. HISTORY: Dominant pain. Possible stool impaction. TECHNIQUE:  Supine and upright views of the abdomen on 3 images.  COMPARISON: None FINDINGS: Large amount of stool in the left colon. No free air under the diaphragm. Lung bases are clear. Bowel loops do not appear abnormally dilated. Patient is status post right hip surgery. IMPRESSION: Negative for free air, ileus or obstruction.  Large amount of stool in the left colon.     )  Review and summarize past medical records: yes  Independent visualization of images, tracings, or specimens: yes    Risk of Complications, Morbidity, and/or Mortality  Diagnostic procedures: moderate  Management options: moderate    Patient Progress  Patient progress: improved        ED Course       Procedures

## 2018-03-22 NOTE — ED TRIAGE NOTES
Pt in from [facility] c/o constipation. States no bowel movement in 3 days. States had right hip fracture repair 3 weeks ago. States attempted suppository and milk of magnesia without relief. Nursing staff unable to do enema due to pain.

## 2018-03-22 NOTE — DISCHARGE INSTRUCTIONS
Constipation: Care Instructions  Your Care Instructions    Constipation means that you have a hard time passing stools (bowel movements). People pass stools from 3 times a day to once every 3 days. What is normal for you may be different. Constipation may occur with pain in the rectum and cramping. The pain may get worse when you try to pass stools. Sometimes there are small amounts of bright red blood on toilet paper or the surface of stools. This is because of enlarged veins near the rectum (hemorrhoids). A few changes in your diet and lifestyle may help you avoid ongoing constipation. Your doctor may also prescribe medicine to help loosen your stool. Some medicines can cause constipation. These include pain medicines and antidepressants. Tell your doctor about all the medicines you take. Your doctor may want to make a medicine change to ease your symptoms. Follow-up care is a key part of your treatment and safety. Be sure to make and go to all appointments, and call your doctor if you are having problems. It's also a good idea to know your test results and keep a list of the medicines you take. How can you care for yourself at home? · Drink plenty of fluids, enough so that your urine is light yellow or clear like water. If you have kidney, heart, or liver disease and have to limit fluids, talk with your doctor before you increase the amount of fluids you drink. · Include high-fiber foods in your diet each day. These include fruits, vegetables, beans, and whole grains. · Get at least 30 minutes of exercise on most days of the week. Walking is a good choice. You also may want to do other activities, such as running, swimming, cycling, or playing tennis or team sports. · Take a fiber supplement, such as Citrucel or Metamucil, every day. Read and follow all instructions on the label. · Schedule time each day for a bowel movement. A daily routine may help.  Take your time having your bowel movement. · Support your feet with a small step stool when you sit on the toilet. This helps flex your hips and places your pelvis in a squatting position. · Your doctor may recommend an over-the-counter laxative to relieve your constipation. Examples are Milk of Magnesia and MiraLax. Read and follow all instructions on the label. Do not use laxatives on a long-term basis. When should you call for help? Call your doctor now or seek immediate medical care if:  ? · You have new or worse belly pain. ? · You have new or worse nausea or vomiting. ? · You have blood in your stools. ? Watch closely for changes in your health, and be sure to contact your doctor if:  ? · Your constipation is getting worse. ? · You do not get better as expected. Where can you learn more? Go to http://liliane-fox.info/. Enter [phone] in the search box to learn more about \"Constipation: Care Instructions. \"  Current as of: March 20, 2017  Content Version: 11.4  © 9973-1632 [employer]. Care instructions adapted under license by [employer] (which disclaims liability or warranty for this information). If you have questions about a medical condition or this instruction, always ask your healthcare professional. [address] any warranty or liability for your use of this information.

## 2018-03-22 NOTE — ED NOTES
[facility] ambulance called for transport back to [facility]. ETA approx 20 mins.

## 2018-03-22 NOTE — ED NOTES
[facility] to [facility]. Report given to paramedic.

## 2018-03-29 ENCOUNTER — HOME HEALTH ADMISSION (OUTPATIENT)
Dept: HOME HEALTH SERVICES | Facility: HOME HEALTH | Age: 58
End: 2018-03-29
Payer: COMMERCIAL

## 2018-03-30 ENCOUNTER — HOME CARE VISIT (OUTPATIENT)
Dept: SCHEDULING | Facility: HOME HEALTH | Age: 58
End: 2018-03-30
Payer: COMMERCIAL

## 2018-03-30 VITALS
SYSTOLIC BLOOD PRESSURE: 124 MMHG | WEIGHT: 165 LBS | RESPIRATION RATE: 18 BRPM | HEART RATE: 96 BPM | HEIGHT: 63 IN | BODY MASS INDEX: 29.23 KG/M2 | TEMPERATURE: 98.4 F | DIASTOLIC BLOOD PRESSURE: 78 MMHG

## 2018-03-30 PROCEDURE — G0151 HHCP-SERV OF PT,EA 15 MIN: HCPCS

## 2018-03-30 PROCEDURE — 400013 HH SOC

## 2018-04-02 ENCOUNTER — HOME CARE VISIT (OUTPATIENT)
Dept: SCHEDULING | Facility: HOME HEALTH | Age: 58
End: 2018-04-02
Payer: COMMERCIAL

## 2018-04-02 PROCEDURE — G0151 HHCP-SERV OF PT,EA 15 MIN: HCPCS

## 2018-04-03 VITALS — SYSTOLIC BLOOD PRESSURE: 128 MMHG | DIASTOLIC BLOOD PRESSURE: 82 MMHG | HEART RATE: 76 BPM | RESPIRATION RATE: 18 BRPM

## 2018-04-04 ENCOUNTER — HOME CARE VISIT (OUTPATIENT)
Dept: SCHEDULING | Facility: HOME HEALTH | Age: 58
End: 2018-04-04
Payer: COMMERCIAL

## 2018-04-04 VITALS
SYSTOLIC BLOOD PRESSURE: 118 MMHG | HEART RATE: 92 BPM | DIASTOLIC BLOOD PRESSURE: 75 MMHG | TEMPERATURE: 97.9 F | RESPIRATION RATE: 16 BRPM

## 2018-04-04 PROCEDURE — G0152 HHCP-SERV OF OT,EA 15 MIN: HCPCS

## 2018-04-05 ENCOUNTER — HOME CARE VISIT (OUTPATIENT)
Dept: SCHEDULING | Facility: HOME HEALTH | Age: 58
End: 2018-04-05
Payer: COMMERCIAL

## 2018-04-06 ENCOUNTER — HOME CARE VISIT (OUTPATIENT)
Dept: SCHEDULING | Facility: HOME HEALTH | Age: 58
End: 2018-04-06
Payer: COMMERCIAL

## 2018-04-06 VITALS — HEART RATE: 94 BPM | DIASTOLIC BLOOD PRESSURE: 76 MMHG | RESPIRATION RATE: 18 BRPM | SYSTOLIC BLOOD PRESSURE: 122 MMHG

## 2018-04-06 PROCEDURE — G0151 HHCP-SERV OF PT,EA 15 MIN: HCPCS

## 2018-05-02 ENCOUNTER — HOSPITAL ENCOUNTER (OUTPATIENT)
Dept: PHYSICAL THERAPY | Age: 58
End: 2018-05-02

## 2018-05-04 ENCOUNTER — APPOINTMENT (OUTPATIENT)
Dept: PHYSICAL THERAPY | Age: 58
End: 2018-05-04

## 2018-05-07 ENCOUNTER — APPOINTMENT (OUTPATIENT)
Dept: PHYSICAL THERAPY | Age: 58
End: 2018-05-07

## 2018-05-09 ENCOUNTER — APPOINTMENT (OUTPATIENT)
Dept: PHYSICAL THERAPY | Age: 58
End: 2018-05-09

## 2018-05-09 ENCOUNTER — HOSPITAL ENCOUNTER (OUTPATIENT)
Dept: PHYSICAL THERAPY | Age: 58
Discharge: HOME | End: 2018-05-09
Payer: COMMERCIAL

## 2018-05-09 PROCEDURE — 97162 PT EVAL MOD COMPLEX 30 MIN: CPT

## 2018-05-09 PROCEDURE — 97110 THERAPEUTIC EXERCISES: CPT

## 2018-05-09 NOTE — THERAPY EVALUATION
Elvin Lower  : 1960 [id number] [facility],[address]  [address] [place] [address].  [phone]   [phone]        OUTPATIENT PHYSICAL THERAPY:Initial Assessment 2018         ICD-10: Treatment Diagnosis: Pain in right hip (M25.551) , Difficulty in walking, not elsewhere classified (R26.2)  Precautions/Allergies:   Review of patient's allergies indicates no known allergies. Fall Risk Score: 6 (? 5 = High Risk)  MD Orders: Eval and Treat  MEDICAL/REFERRING DIAGNOSIS:  Hip fracture ([facility] [address] [S72.009A]   DATE OF ONSET: 2018  REFERRING PHYSICIAN: Ankita Palacios MD  RETURN PHYSICIAN APPOINTMENT: 18     INITIAL ASSESSMENT:  Ms. Margarita Jo presents to therapy with pain in the R hip and difficulty walking due to a displaced intertrochanteric fracture in the femur due to a fall from a chair. She is s/p intramedullary nailing and is currently NWB with a RW and is 10 weeks post op. Pt would benefit from skilled PT for above deficits to return to prior level of function painfree. PROBLEM LIST (Impacting functional limitations):  1. Decreased Strength  2. Decreased ADL/Functional Activities  3. Decreased Transfer Abilities  4. Decreased Ambulation Ability/Technique  5. Decreased Balance  6. Increased Pain  7. Decreased Activity Tolerance  8. Decreased Flexibility/Joint Mobility INTERVENTIONS PLANNED:  1. Balance Exercise  2. Bed Mobility  3. Cold  4. Electrical Stimulation  5. Gait Training  6. Heat  7. Home Exercise Program (HEP)  8. Manual Therapy  9. Range of Motion (ROM)  10. Therapeutic Activites  11. Therapeutic Exercise/Strengthening  12. Transfer Training  13. Aquatics    TREATMENT PLAN:  Effective Dates: 2018 TO 2018 (90 days). Frequency/Duration: 2-3 times a week for 90 Days  GOALS: (Goals have been discussed and agreed upon with patient.)  Short-Term Functional Goals: Time Frame: 4 weeks   1. Ms. Margarita Jo to be independent with HEP. 2. Pt able to ambulate WBAT R LE with AD as needed to return to normal mobility as soon as possible. 3. Pt to demo increase in strength in the R leg to overall 4/5 to return to functional activities. Discharge Goals: Time Frame: 12 weeks   1. Pt able to return to work without limitations and painfree in the R hip. 2. Pt to demo 5/5 strength in the R lower extremity as prior level of function. 3. Pt to return to all functional activities painfree and without limitations in ROM or strength. 4. Pt able to ambulate without AD and no antalgic gait pattern and no LOB. Rehabilitation Potential For Stated Goals: Good  Regarding Jaspal Hyde therapy, I certify that the treatment plan above will be carried out by a therapist or under their direction. Thank you for this referral,  David Garcia, PT, DPT       Referring Physician Signature: Gus Fitzgerald MD              Date                      HISTORY:   History of Present Injury/Illness (Reason for Referral):  Pt 63 y/o F with pain in the R hip secondary to a closed displaced intertrochanteric fracture of the R femur s/p intramedullary nailing with ORIF R subtrochanteric femur. She is having difficulty walking needing a RW and is NWB currently. She is 10 weeks post op and is not having a lot of pain. She had therapy at rehab and then at home. She is not driving and is out of work currently. Past Medical History/Comorbidities:   Ms. Dion Davidson  has a past medical history of Cancer ([address] Diabetes ([facility] [address] and Hypertension. Ms. Dion Davidson  has a past surgical history that includes hx hip fracture tx (Right); hx breast lumpectomy; and hx hip replacement.   Social History/Living Environment:     Lives by herself but is currently living with daughter for help   Prior Level of Function/Work/Activity:  Independent working and driving   Dominant Side:         RIGHT  Current Medications:    Current Outpatient Prescriptions:     metoprolol tartrate (LOPRESSOR) 25 mg tablet, Take 0.5 Tabs by mouth two (2) times a day., Disp: 60 Tab, Rfl: 1    insulin aspart U-100 (NOVOLOG U-100 INSULIN ASPART) 100 unit/mL injection, by SubCUTAneous route., Disp: , Rfl:     calcium-vitamin D (OYSTER SHELL) 500 mg(1,250mg) -200 unit per tablet, Take 1 Tab by mouth two (2) times daily (with meals). , Disp: 60 Tab, Rfl: 2    potassium chloride (K-DUR, KLOR-CON) 20 mEq tablet, Take 1 Tab by mouth daily. , Disp: 30 Tab, Rfl: 0    metFORMIN (GLUCOPHAGE) 500 mg tablet, Take 1,000 mg by mouth two (2) times daily (with meals). Indications: type 2 diabetes mellitus, Disp: , Rfl:     lisinopril (PRINIVIL, ZESTRIL) 10 mg tablet, Take 10 mg by mouth daily. Indications: hypertension, Disp: , Rfl:    Date Last Reviewed:  5/9/2018   # of Personal Factors/Comorbidities that affect the Plan of Care: 3+: HIGH COMPLEXITY   EXAMINATION:   Observation/Orthostatic Postural Assessment:          Good   Palpation:          No tenderness to touch over incision   ROM:     R hip flexion: 80 °   R hip extension  R hip abduction: 15 °   R knee flexion: 115 °   R knee extension: 0 °    L hip flexion: 110 °   L hip extension  L hip abduction : 22 °   L knee flexion: 130 °   L knee extension 0 °    Strength:     L hip flexion 5  L hip extension 5  L hip abduction 5  L knee flexion 5  L knee extension 5   R hip flexion: 3  R hip extension: 3-  R hip abduction: 3  R knee flexion: 4  R knee extension: 4-   Special Tests:          N/a   Neurological Screen:        Sensation: no complaint of numbness or tingling   Functional Mobility:         Independent   Balance:          Good    Body Structures Involved:  1. Bones  2. Joints  3. Muscles  4. Ligaments Body Functions Affected:  1. Movement Related Activities and Participation Affected:  1. General Tasks and Demands  2.  Mobility   # of elements that affect the Plan of Care: 4+: HIGH COMPLEXITY   CLINICAL PRESENTATION:   Presentation: Evolving clinical presentation with changing clinical characteristics: MODERATE COMPLEXITY   CLINICAL DECISION MAKING:   Outcome Measure: Tool Used: Lower Extremity Functional Scale (LEFS)  Score:  Initial: 69/80 Most Recent: X/80 (Date: -- )   Interpretation of Score: 20 questions each scored on a 5 point scale with 0 representing \"extreme difficulty or unable to perform\" and 4 representing \"no difficulty\". The lower the score, the greater the functional disability. 80/80 represents no disability. Minimal detectable change is 9 points. Score 80 79-63 62-48 47-32 31-16 15-1 0   Modifier CH CI CJ CK CL CM CN       Medical Necessity:   · Patient is expected to demonstrate progress in strength, range of motion and balance to increase independence with functional activities painfree. .  Reason for Services/Other Comments:  · Patient continues to require present interventions due to patient's inability to perform functional activities painfree. .   Use of outcome tool(s) and clinical judgement create a POC that gives a: Questionable prediction of patient's progress: MODERATE COMPLEXITY   TREATMENT:   (In addition to Assessment/Re-Assessment sessions the following treatments were rendered)  THERAPEUTIC EXERCISE: (see chart below for  minutes):  Exercises per grid below to improve mobility, strength and balance. Required minimal visual and verbal cues to promote proper body alignment, promote proper body posture and promote proper body mechanics. Progressed resistance, range and repetitions as indicated. MANUAL THERAPY: (see chart below for  minutes): Joint mobilization and Soft tissue mobilization was utilized and necessary because of the patient's restricted joint motion and restricted motion of soft tissue. MODALITIES: (see chart below for  minutes):      see chart below for details on pain management.       Date: 5-9-18       Modalities:                                Therapeutic Exercise: 15 mins        HS stretch with strap  5x15SH       SLR  3x10 LAQ 3x10        SL clams  3x10                        Proprioceptive Activities:                                Manual Therapy:                        Therapeutic Activities:                                        HEP: pt was given the above exercises and was told to continue with NWB per MD protocol. [employer] Portal  Treatment/Session Assessment:  Pt would benefit from land based therapy over aquatics due to the limitations in visits with insurance. Pt wants to return to work as soon as possible and wants to be able to ambulate without the walker as soon as possible. · Pain/ Symptoms: Initial:   0/10 \"I have no pain. \"  Post Session:  No increase in pain following session. ·   Compliance with Program/Exercises: Will assess as treatment progresses. · Recommendations/Intent for next treatment session: \"Next visit will focus on advancements to more challenging activities\".   Total Treatment Duration:  PT Patient Time In/Time Out  Time In: 1045  Time Out: [address] [place] Rayshawn Fontana, PT, DPT

## 2018-05-09 NOTE — PROGRESS NOTES
Ambulatory/Rehab Services H2 Model Falls Risk Assessment    Risk Factor Pts. ·   Confusion/Disorientation/Impulsivity  []    4 ·   Symptomatic Depression  []   2 ·   Altered Elimination  []   1 ·   Dizziness/Vertigo  []   1 ·   Gender (Male)  []   1 ·   Any administered antiepileptics (anticonvulsants):  []   2 ·   Any administered benzodiazepines:  []   1 ·   Visual Impairment (specify):  []   1 ·   Portable Oxygen Use  []   1 ·   Orthostatic ? BP  []   1 ·   History of Recent Falls (within 3 mos.)  [x]   5     Ability to Rise from Chair (choose one) Pts. ·   Ability to rise in a single movement  []   0 ·   Pushes up, successful in one attempt  [x]   1 ·   Multiple attempts, but successful  []   3 ·   Unable to rise without assistance  []   4   Total: (5 or greater = High Risk) 6     Falls Prevention Plan:   []                Physical Limitations to Exercise (specify):   []                Mobility Assistance Device (type):   []                Exercise/Equipment Adaptation (specify):    ©2010 [facility] [address] [place] [address] Patent #[id number].  Federal Law prohibits the replication, distribution or use without written permission from [facility] [employer]

## 2018-05-11 ENCOUNTER — HOSPITAL ENCOUNTER (OUTPATIENT)
Dept: PHYSICAL THERAPY | Age: 58
Discharge: HOME | End: 2018-05-11
Payer: COMMERCIAL

## 2018-05-11 ENCOUNTER — APPOINTMENT (OUTPATIENT)
Dept: PHYSICAL THERAPY | Age: 58
End: 2018-05-11

## 2018-05-11 PROCEDURE — 97110 THERAPEUTIC EXERCISES: CPT

## 2018-05-11 NOTE — PROGRESS NOTES
Shalonda Pendleton  : 1960 [id number] [facility],[address]  [address] [place] [address]  [phone]   [phone]        OUTPATIENT PHYSICAL THERAPY:Daily Note 2018         ICD-10: Treatment Diagnosis: Pain in right hip (M25.551) , Difficulty in walking, not elsewhere classified (R26.2)  Precautions/Allergies:   Review of patient's allergies indicates no known allergies. Fall Risk Score: 6 (? 5 = High Risk)  MD Orders: Eval and Treat  MEDICAL/REFERRING DIAGNOSIS:  Hip fracture ([facility] [address] [S72.009A]   DATE OF ONSET: 2018  REFERRING PHYSICIAN: Belia Litten, MD  RETURN PHYSICIAN APPOINTMENT: 18     INITIAL ASSESSMENT:  Ms. Alex Carpenter presents to therapy with pain in the R hip and difficulty walking due to a displaced intertrochanteric fracture in the femur due to a fall from a chair. She is s/p intramedullary nailing and is currently NWB with a RW and is 10 weeks post op. Pt would benefit from skilled PT for above deficits to return to prior level of function painfree. PROBLEM LIST (Impacting functional limitations):  1. Decreased Strength  2. Decreased ADL/Functional Activities  3. Decreased Transfer Abilities  4. Decreased Ambulation Ability/Technique  5. Decreased Balance  6. Increased Pain  7. Decreased Activity Tolerance  8. Decreased Flexibility/Joint Mobility INTERVENTIONS PLANNED:  1. Balance Exercise  2. Bed Mobility  3. Cold  4. Electrical Stimulation  5. Gait Training  6. Heat  7. Home Exercise Program (HEP)  8. Manual Therapy  9. Range of Motion (ROM)  10. Therapeutic Activites  11. Therapeutic Exercise/Strengthening  12. Transfer Training  13. Aquatics    TREATMENT PLAN:  Effective Dates: 2018 TO 2018 (90 days). Frequency/Duration: 2-3 times a week for 90 Days  GOALS: (Goals have been discussed and agreed upon with patient.)  Short-Term Functional Goals: Time Frame: 4 weeks   1. Ms. Alex Carpenter to be independent with HEP.    2. Pt able to ambulate WBAT R LE with AD as needed to return to normal mobility as soon as possible. 3. Pt to demo increase in strength in the R leg to overall 4/5 to return to functional activities. Discharge Goals: Time Frame: 12 weeks   1. Pt able to return to work without limitations and painfree in the R hip. 2. Pt to demo 5/5 strength in the R lower extremity as prior level of function. 3. Pt to return to all functional activities painfree and without limitations in ROM or strength. 4. Pt able to ambulate without AD and no antalgic gait pattern and no LOB. Rehabilitation Potential For Stated Goals: Good                HISTORY:   History of Present Injury/Illness (Reason for Referral):  Pt 63 y/o F with pain in the R hip secondary to a closed displaced intertrochanteric fracture of the R femur s/p intramedullary nailing with ORIF R subtrochanteric femur. She is having difficulty walking needing a RW and is NWB currently. She is 10 weeks post op and is not having a lot of pain. She had therapy at rehab and then at home. She is not driving and is out of work currently. Past Medical History/Comorbidities:   Ms. Jewels Sung  has a past medical history of Cancer ([facility] [address] Diabetes ([facility] [address] and Hypertension. Ms. Jewels Sung  has a past surgical history that includes hx hip fracture tx (Right); hx breast lumpectomy; and hx hip replacement.   Social History/Living Environment:     Lives by herself but is currently living with daughter for help   Prior Level of Function/Work/Activity:  Independent working and driving   Dominant Side:         RIGHT  Current Medications:    Current Outpatient Prescriptions:     metoprolol tartrate (LOPRESSOR) 25 mg tablet, Take 0.5 Tabs by mouth two (2) times a day., Disp: 60 Tab, Rfl: 1    insulin aspart U-100 (NOVOLOG U-100 INSULIN ASPART) 100 unit/mL injection, by SubCUTAneous route., Disp: , Rfl:     calcium-vitamin D (OYSTER SHELL) 500 mg(1,250mg) -200 unit per tablet, Take 1 Tab by mouth two (2) times daily (with meals). , Disp: 60 Tab, Rfl: 2    potassium chloride (K-DUR, KLOR-CON) 20 mEq tablet, Take 1 Tab by mouth daily. , Disp: 30 Tab, Rfl: 0    metFORMIN (GLUCOPHAGE) 500 mg tablet, Take 1,000 mg by mouth two (2) times daily (with meals). Indications: type 2 diabetes mellitus, Disp: , Rfl:     lisinopril (PRINIVIL, ZESTRIL) 10 mg tablet, Take 10 mg by mouth daily. Indications: hypertension, Disp: , Rfl:    Date Last Reviewed:  5/11/2018   EXAMINATION:   Observation/Orthostatic Postural Assessment:          Good   Palpation:          No tenderness to touch over incision   ROM:     R hip flexion: 80 °   R hip extension  R hip abduction: 15 °   R knee flexion: 115 °   R knee extension: 0 °    L hip flexion: 110 °   L hip extension  L hip abduction : 22 °   L knee flexion: 130 °   L knee extension 0 °    Strength:     L hip flexion 5  L hip extension 5  L hip abduction 5  L knee flexion 5  L knee extension 5   R hip flexion: 3  R hip extension: 3-  R hip abduction: 3  R knee flexion: 4  R knee extension: 4-   Special Tests:          N/a   Neurological Screen:        Sensation: no complaint of numbness or tingling   Functional Mobility:         Independent   Balance:          Good    Body Structures Involved:  1. Bones  2. Joints  3. Muscles  4. Ligaments Body Functions Affected:  1. Movement Related Activities and Participation Affected:  1. General Tasks and Demands  2. Mobility   CLINICAL PRESENTATION:   CLINICAL DECISION MAKING:   Outcome Measure: Tool Used: Lower Extremity Functional Scale (LEFS)  Score:  Initial: 69/80 Most Recent: X/80 (Date: -- )   Interpretation of Score: 20 questions each scored on a 5 point scale with 0 representing \"extreme difficulty or unable to perform\" and 4 representing \"no difficulty\". The lower the score, the greater the functional disability. 80/80 represents no disability. Minimal detectable change is 9 points.   Score 80 79-63 62-48 47-32 31-16 15-1 0 Modifier CH CI CJ CK CL CM CN       Medical Necessity:   · Patient is expected to demonstrate progress in strength, range of motion and balance to increase independence with functional activities painfree. .  Reason for Services/Other Comments:  · Patient continues to require present interventions due to patient's inability to perform functional activities painfree. .   TREATMENT:   (In addition to Assessment/Re-Assessment sessions the following treatments were rendered)  THERAPEUTIC EXERCISE: (see chart below for  minutes):  Exercises per grid below to improve mobility, strength and balance. Required minimal visual and verbal cues to promote proper body alignment, promote proper body posture and promote proper body mechanics. Progressed resistance, range and repetitions as indicated. MANUAL THERAPY: (see chart below for  minutes): Joint mobilization and Soft tissue mobilization was utilized and necessary because of the patient's restricted joint motion and restricted motion of soft tissue. MODALITIES: (see chart below for  minutes):      see chart below for details on pain management. Date: 5-9-18 5-11-18  (Visit 2)       Modalities:        IFC with ice                         Therapeutic Exercise: 15 mins  45 mins       HS stretch with strap  5x15SH Repeat bilateral       SLR  3x10  2x10       LAQ 3x10  3x10       SL clams  3x10  3x10       Nustep   6 mins L1      Standing HS curls   3x10       Standing march   3x10       Standing abduction   3x10       Bridging   3x10       Proprioceptive Activities:                                Manual Therapy:                        Therapeutic Activities:                                        HEP: pt was given the above exercises and was told to continue with NWB per MD protocol. [employer] Portal  Treatment/Session Assessment:  Pt tolerated the exercises well and is showing progress with strengthening already by demoing her HEP well and painfree.  Continue with POC.     · Pain/ Symptoms: Initial:   0/10 \"Im feeling good. \"  Post Session:  No increase in pain following session. ·   Compliance with Program/Exercises: Will assess as treatment progresses. · Recommendations/Intent for next treatment session: \"Next visit will focus on advancements to more challenging activities\".   Total Treatment Duration:  PT Patient Time In/Time Out  Time In: 0930  Time Out: 0066 Mando Rodriguez, PT, DPT

## 2018-05-14 ENCOUNTER — HOSPITAL ENCOUNTER (OUTPATIENT)
Dept: PHYSICAL THERAPY | Age: 58
Discharge: HOME | End: 2018-05-14
Payer: COMMERCIAL

## 2018-05-14 ENCOUNTER — APPOINTMENT (OUTPATIENT)
Dept: PHYSICAL THERAPY | Age: 58
End: 2018-05-14

## 2018-05-16 ENCOUNTER — HOSPITAL ENCOUNTER (OUTPATIENT)
Dept: PHYSICAL THERAPY | Age: 58
Discharge: HOME | End: 2018-05-16
Payer: COMMERCIAL

## 2018-05-16 ENCOUNTER — APPOINTMENT (OUTPATIENT)
Dept: PHYSICAL THERAPY | Age: 58
End: 2018-05-16

## 2018-05-18 ENCOUNTER — APPOINTMENT (OUTPATIENT)
Dept: PHYSICAL THERAPY | Age: 58
End: 2018-05-18

## 2018-05-21 ENCOUNTER — HOSPITAL ENCOUNTER (OUTPATIENT)
Dept: PHYSICAL THERAPY | Age: 58
Discharge: HOME | End: 2018-05-21
Payer: COMMERCIAL

## 2018-05-21 ENCOUNTER — APPOINTMENT (OUTPATIENT)
Dept: PHYSICAL THERAPY | Age: 58
End: 2018-05-21

## 2018-05-21 PROCEDURE — 97110 THERAPEUTIC EXERCISES: CPT

## 2018-05-21 NOTE — PROGRESS NOTES
Pernell Umana  : 1960 [address] [facility],[address]  [address] [place] [address].  [phone]   [phone]        OUTPATIENT PHYSICAL THERAPY:Daily Note 2018         ICD-10: Treatment Diagnosis: Pain in right hip (M25.551) , Difficulty in walking, not elsewhere classified (R26.2)  Precautions/Allergies:   Review of patient's allergies indicates no known allergies. Fall Risk Score: 6 (? 5 = High Risk)  MD Orders: Eval and Treat  MEDICAL/REFERRING DIAGNOSIS:  Hip fracture ([facility] [address] [S72.009A]   DATE OF ONSET: 2018  REFERRING PHYSICIAN: Paolo Aguilar MD  RETURN PHYSICIAN APPOINTMENT: 18     INITIAL ASSESSMENT:  Ms. Jesus Galeano presents to therapy with pain in the R hip and difficulty walking due to a displaced intertrochanteric fracture in the femur due to a fall from a chair. She is s/p intramedullary nailing and is currently NWB with a RW and is 10 weeks post op. Pt would benefit from skilled PT for above deficits to return to prior level of function painfree. PROBLEM LIST (Impacting functional limitations):  1. Decreased Strength  2. Decreased ADL/Functional Activities  3. Decreased Transfer Abilities  4. Decreased Ambulation Ability/Technique  5. Decreased Balance  6. Increased Pain  7. Decreased Activity Tolerance  8. Decreased Flexibility/Joint Mobility INTERVENTIONS PLANNED:  1. Balance Exercise  2. Bed Mobility  3. Cold  4. Electrical Stimulation  5. Gait Training  6. Heat  7. Home Exercise Program (HEP)  8. Manual Therapy  9. Range of Motion (ROM)  10. Therapeutic Activites  11. Therapeutic Exercise/Strengthening  12. Transfer Training  13. Aquatics    TREATMENT PLAN:  Effective Dates: 2018 TO 2018 (90 days). Frequency/Duration: 2-3 times a week for 90 Days  GOALS: (Goals have been discussed and agreed upon with patient.)  Short-Term Functional Goals: Time Frame: 4 weeks   1. Ms. Jesus Galeano to be independent with HEP.    2. Pt able to ambulate WBAT R LE with AD as needed to return to normal mobility as soon as possible. 3. Pt to demo increase in strength in the R leg to overall 4/5 to return to functional activities. Discharge Goals: Time Frame: 12 weeks   1. Pt able to return to work without limitations and painfree in the R hip. 2. Pt to demo 5/5 strength in the R lower extremity as prior level of function. 3. Pt to return to all functional activities painfree and without limitations in ROM or strength. 4. Pt able to ambulate without AD and no antalgic gait pattern and no LOB. Rehabilitation Potential For Stated Goals: Good                HISTORY:   History of Present Injury/Illness (Reason for Referral):  Pt 63 y/o F with pain in the R hip secondary to a closed displaced intertrochanteric fracture of the R femur s/p intramedullary nailing with ORIF R subtrochanteric femur. She is having difficulty walking needing a RW and is NWB currently. She is 10 weeks post op and is not having a lot of pain. She had therapy at rehab and then at home. She is not driving and is out of work currently. Past Medical History/Comorbidities:   Ms. Shawna Leger  has a past medical history of Cancer ([facility] [address] Diabetes ([facility] [address] and Hypertension. Ms. Shawna Leger  has a past surgical history that includes hx hip fracture tx (Right); hx breast lumpectomy; and hx hip replacement.   Social History/Living Environment:     Lives by herself but is currently living with daughter for help   Prior Level of Function/Work/Activity:  Independent working and driving   Dominant Side:         RIGHT  Current Medications:    Current Outpatient Prescriptions:     metoprolol tartrate (LOPRESSOR) 25 mg tablet, Take 0.5 Tabs by mouth two (2) times a day., Disp: 60 Tab, Rfl: 1    insulin aspart U-100 (NOVOLOG U-100 INSULIN ASPART) 100 unit/mL injection, by SubCUTAneous route., Disp: , Rfl:     calcium-vitamin D (OYSTER SHELL) 500 mg(1,250mg) -200 unit per tablet, Take 1 Tab by mouth two (2) times daily (with meals). , Disp: 60 Tab, Rfl: 2    potassium chloride (K-DUR, KLOR-CON) 20 mEq tablet, Take 1 Tab by mouth daily. , Disp: 30 Tab, Rfl: 0    metFORMIN (GLUCOPHAGE) 500 mg tablet, Take 1,000 mg by mouth two (2) times daily (with meals). Indications: type 2 diabetes mellitus, Disp: , Rfl:     lisinopril (PRINIVIL, ZESTRIL) 10 mg tablet, Take 10 mg by mouth daily. Indications: hypertension, Disp: , Rfl:    Date Last Reviewed:  5/21/2018   EXAMINATION:   Observation/Orthostatic Postural Assessment:          Good   Palpation:          No tenderness to touch over incision   ROM:     R hip flexion: 80 °   R hip extension  R hip abduction: 15 °   R knee flexion: 115 °   R knee extension: 0 °    L hip flexion: 110 °   L hip extension  L hip abduction : 22 °   L knee flexion: 130 °   L knee extension 0 °    Strength:     L hip flexion 5  L hip extension 5  L hip abduction 5  L knee flexion 5  L knee extension 5   R hip flexion: 3  R hip extension: 3-  R hip abduction: 3  R knee flexion: 4  R knee extension: 4-   Special Tests:          N/a   Neurological Screen:        Sensation: no complaint of numbness or tingling   Functional Mobility:         Independent   Balance:          Good    Body Structures Involved:  1. Bones  2. Joints  3. Muscles  4. Ligaments Body Functions Affected:  1. Movement Related Activities and Participation Affected:  1. General Tasks and Demands  2. Mobility   CLINICAL PRESENTATION:   CLINICAL DECISION MAKING:   Outcome Measure: Tool Used: Lower Extremity Functional Scale (LEFS)  Score:  Initial: 69/80 Most Recent: X/80 (Date: -- )   Interpretation of Score: 20 questions each scored on a 5 point scale with 0 representing \"extreme difficulty or unable to perform\" and 4 representing \"no difficulty\". The lower the score, the greater the functional disability. 80/80 represents no disability. Minimal detectable change is 9 points.   Score 80 79-63 62-48 47-32 31-16 15-1 0 Modifier CH CI CJ CK CL CM CN       Medical Necessity:   · Patient is expected to demonstrate progress in strength, range of motion and balance to increase independence with functional activities painfree. .  Reason for Services/Other Comments:  · Patient continues to require present interventions due to patient's inability to perform functional activities painfree. .   TREATMENT:   (In addition to Assessment/Re-Assessment sessions the following treatments were rendered)  THERAPEUTIC EXERCISE: (see chart below for  minutes):  Exercises per grid below to improve mobility, strength and balance. Required minimal visual and verbal cues to promote proper body alignment, promote proper body posture and promote proper body mechanics. Progressed resistance, range and repetitions as indicated. MANUAL THERAPY: (see chart below for  minutes): Joint mobilization and Soft tissue mobilization was utilized and necessary because of the patient's restricted joint motion and restricted motion of soft tissue. MODALITIES: (see chart below for  minutes):      see chart below for details on pain management.       Date: 5-9-18 5-11-18  (Visit 2)  5-21-18 (visit 3)     Modalities:        IFC with ice                         Therapeutic Exercise: 15 mins  45 mins  40 min     HS stretch with strap  5x15SH Repeat bilateral       iSLR  3x10  2x10  3x10     LAQ 3x10  3x10       SL clams  3x10  3x10  x30     Nustep   6 mins L1 6 min L3     Standing HS curls   3x10       Standing march   3x10       Standing abduction   3x10  Side lying 2x10     Bridging   3x10  3x10     Proprioceptive Activities:                                Manual Therapy:   5 min     STM with roller to R hip and ITB   5 min             Therapeutic Activities:        Sit to stand   x10 with UE support from chair     Step ups (forward and lateral)   2x10 each, 4 in     Heel raises   x30 in // bars     High knee marching   3 L in // bars     Lateral walk   3 L in // bars HEP: pt was given the above exercises and was told to continue with NWB per MD protocol. [employer] Portal  Treatment/Session Assessment:  Pt did not report any pain with full weight bearing and the walker was raised. Continue to work on gait training. Continue with POC. · Pain/ Symptoms: Initial:   0/10  R hip  \"I saw the doctor and he said I could put weight on the leg. \" Post Session:  No increase in pain following session. ·   Compliance with Program/Exercises: Will assess as treatment progresses. · Recommendations/Intent for next treatment session: \"Next visit will focus on advancements to more challenging activities\".   Total Treatment Duration:  PT Patient Time In/Time Out  Time In: 0845  Time Out: [address], PTA

## 2018-05-23 ENCOUNTER — APPOINTMENT (OUTPATIENT)
Dept: PHYSICAL THERAPY | Age: 58
End: 2018-05-23

## 2018-05-23 ENCOUNTER — HOSPITAL ENCOUNTER (OUTPATIENT)
Dept: PHYSICAL THERAPY | Age: 58
Discharge: HOME | End: 2018-05-23
Payer: COMMERCIAL

## 2018-05-23 PROCEDURE — 97110 THERAPEUTIC EXERCISES: CPT

## 2018-05-23 NOTE — PROGRESS NOTES
Halie Swain  : 1960 [address] [facility] [address]  [address] [place] [address].  [phone]   [phone]        OUTPATIENT PHYSICAL THERAPY:Daily Note 2018         ICD-10: Treatment Diagnosis: Pain in right hip (M25.551) , Difficulty in walking, not elsewhere classified (R26.2)  Precautions/Allergies:   Review of patient's allergies indicates no known allergies. Fall Risk Score: 6 (? 5 = High Risk)  MD Orders: Eval and Treat  MEDICAL/REFERRING DIAGNOSIS:  Hip fracture ([facility] [address] [S72.009A]   DATE OF ONSET: 2018  REFERRING PHYSICIAN: Liborio Bhat MD  RETURN PHYSICIAN APPOINTMENT: 18     INITIAL ASSESSMENT:  Ms. Bandar Joyner presents to therapy with pain in the R hip and difficulty walking due to a displaced intertrochanteric fracture in the femur due to a fall from a chair. She is s/p intramedullary nailing and is currently NWB with a RW and is 10 weeks post op. Pt would benefit from skilled PT for above deficits to return to prior level of function painfree. PROBLEM LIST (Impacting functional limitations):  1. Decreased Strength  2. Decreased ADL/Functional Activities  3. Decreased Transfer Abilities  4. Decreased Ambulation Ability/Technique  5. Decreased Balance  6. Increased Pain  7. Decreased Activity Tolerance  8. Decreased Flexibility/Joint Mobility INTERVENTIONS PLANNED:  1. Balance Exercise  2. Bed Mobility  3. Cold  4. Electrical Stimulation  5. Gait Training  6. Heat  7. Home Exercise Program (HEP)  8. Manual Therapy  9. Range of Motion (ROM)  10. Therapeutic Activites  11. Therapeutic Exercise/Strengthening  12. Transfer Training  13. Aquatics    TREATMENT PLAN:  Effective Dates: 2018 TO 2018 (90 days). Frequency/Duration: 2-3 times a week for 90 Days  GOALS: (Goals have been discussed and agreed upon with patient.)  Short-Term Functional Goals: Time Frame: 4 weeks   1. Ms. Bandar Joyner to be independent with HEP.    2. Pt able to ambulate WBAT R LE with AD as needed to return to normal mobility as soon as possible. 3. Pt to demo increase in strength in the R leg to overall 4/5 to return to functional activities. Discharge Goals: Time Frame: 12 weeks   1. Pt able to return to work without limitations and painfree in the R hip. 2. Pt to demo 5/5 strength in the R lower extremity as prior level of function. 3. Pt to return to all functional activities painfree and without limitations in ROM or strength. 4. Pt able to ambulate without AD and no antalgic gait pattern and no LOB. Rehabilitation Potential For Stated Goals: Good                HISTORY:   History of Present Injury/Illness (Reason for Referral):  Pt 63 y/o F with pain in the R hip secondary to a closed displaced intertrochanteric fracture of the R femur s/p intramedullary nailing with ORIF R subtrochanteric femur. She is having difficulty walking needing a RW and is NWB currently. She is 10 weeks post op and is not having a lot of pain. She had therapy at rehab and then at home. She is not driving and is out of work currently. Past Medical History/Comorbidities:   Ms. Issac Palacios  has a past medical history of Cancer ([facility] [address] Diabetes ([facility] [address] and Hypertension. Ms. Issac Palacios  has a past surgical history that includes hx hip fracture tx (Right); hx breast lumpectomy; and hx hip replacement.   Social History/Living Environment:     Lives by herself but is currently living with daughter for help   Prior Level of Function/Work/Activity:  Independent working and driving   Dominant Side:         RIGHT  Current Medications:    Current Outpatient Prescriptions:     metoprolol tartrate (LOPRESSOR) 25 mg tablet, Take 0.5 Tabs by mouth two (2) times a day., Disp: 60 Tab, Rfl: 1    insulin aspart U-100 (NOVOLOG U-100 INSULIN ASPART) 100 unit/mL injection, by SubCUTAneous route., Disp: , Rfl:     calcium-vitamin D (OYSTER SHELL) 500 mg(1,250mg) -200 unit per tablet, Take 1 Tab by mouth two (2) times daily (with meals). , Disp: 60 Tab, Rfl: 2    potassium chloride (K-DUR, KLOR-CON) 20 mEq tablet, Take 1 Tab by mouth daily. , Disp: 30 Tab, Rfl: 0    metFORMIN (GLUCOPHAGE) 500 mg tablet, Take 1,000 mg by mouth two (2) times daily (with meals). Indications: type 2 diabetes mellitus, Disp: , Rfl:     lisinopril (PRINIVIL, ZESTRIL) 10 mg tablet, Take 10 mg by mouth daily. Indications: hypertension, Disp: , Rfl:    Date Last Reviewed:  5/23/2018   EXAMINATION:   Observation/Orthostatic Postural Assessment:          Good   Palpation:          No tenderness to touch over incision   ROM:     R hip flexion: 80 °   R hip extension  R hip abduction: 15 °   R knee flexion: 115 °   R knee extension: 0 °    L hip flexion: 110 °   L hip extension  L hip abduction : 22 °   L knee flexion: 130 °   L knee extension 0 °    Strength:     L hip flexion 5  L hip extension 5  L hip abduction 5  L knee flexion 5  L knee extension 5   R hip flexion: 3  R hip extension: 3-  R hip abduction: 3  R knee flexion: 4  R knee extension: 4-   Special Tests:          N/a   Neurological Screen:        Sensation: no complaint of numbness or tingling   Functional Mobility:         Independent   Balance:          Good    Body Structures Involved:  1. Bones  2. Joints  3. Muscles  4. Ligaments Body Functions Affected:  1. Movement Related Activities and Participation Affected:  1. General Tasks and Demands  2. Mobility   CLINICAL PRESENTATION:   CLINICAL DECISION MAKING:   Outcome Measure: Tool Used: Lower Extremity Functional Scale (LEFS)  Score:  Initial: 69/80 Most Recent: X/80 (Date: -- )   Interpretation of Score: 20 questions each scored on a 5 point scale with 0 representing \"extreme difficulty or unable to perform\" and 4 representing \"no difficulty\". The lower the score, the greater the functional disability. 80/80 represents no disability. Minimal detectable change is 9 points.   Score 80 79-63 62-48 47-32 31-16 15-1 0 Modifier CH CI CJ CK CL CM CN       Medical Necessity:   · Patient is expected to demonstrate progress in strength, range of motion and balance to increase independence with functional activities painfree. .  Reason for Services/Other Comments:  · Patient continues to require present interventions due to patient's inability to perform functional activities painfree. .   TREATMENT:   (In addition to Assessment/Re-Assessment sessions the following treatments were rendered)  THERAPEUTIC EXERCISE: (see chart below for  minutes):  Exercises per grid below to improve mobility, strength and balance. Required minimal visual and verbal cues to promote proper body alignment, promote proper body posture and promote proper body mechanics. Progressed resistance, range and repetitions as indicated. MANUAL THERAPY: (see chart below for  minutes): Joint mobilization and Soft tissue mobilization was utilized and necessary because of the patient's restricted joint motion and restricted motion of soft tissue. MODALITIES: (see chart below for  minutes):      see chart below for details on pain management.       Date: 5-9-18 5-11-18  (Visit 2)  5-21-18 (visit 3) 5-23-18 (visit 4)    Modalities:        IFC with ice                         Therapeutic Exercise: 15 mins  45 mins  40 min 40 min    SKTC with strap    10 sec hold x6 B    Piriformis stretch    30 sec hold x4 B    HS stretch with strap  5x15SH Repeat bilateral   30 SH x4 B LE    iSLR  3x10  2x10  3x10     LAQ 3x10  3x10       SL clams  3x10  3x10  x30     Nustep   6 mins L1 6 min L3 6 min L4    Standing HS curls   3x10       Standing march   3x10       Standing abduction   3x10  Side lying 2x10 Standing hip 3 way x15 BLE with blue band and UE support    Bridging   3x10  3x10     Proprioceptive Activities:                                Manual Therapy:   5 min 5 min    STM with roller to R hip and ITB   5 min 5 min            Therapeutic Activities:        Gait training without AD    300 ft with CGA    Sit to stand   x10 with UE support from chair     Step ups (forward and lateral)   2x10 each, 4 in x15 B LE onto 6 in with UE and lateral step up and over x15    Heel raises   x30 in // bars x30 with UE    High knee marching   3 L in // bars     Lateral walk   3 L in // bars 3 L in bars with blue band    HEP: pt was given the above exercises and was told to continue with NWB per MD protocol. [employer]  Treatment/Session Assessment:  Pt reported mild pain with gait training and demonstrates decreased stance time on the R LE. Continue with POC. · Pain/ Symptoms: Initial:   0/10  R hip  \"The muscles are a little sore, especially near the hip socket. \" Post Session:  No increase in pain following session. ·   Compliance with Program/Exercises: Will assess as treatment progresses. · Recommendations/Intent for next treatment session: \"Next visit will focus on advancements to more challenging activities\".   Total Treatment Duration:  PT Patient Time In/Time Out  Time In: 0845  Time Out: [address], PTA

## 2018-05-25 ENCOUNTER — APPOINTMENT (OUTPATIENT)
Dept: PHYSICAL THERAPY | Age: 58
End: 2018-05-25
Payer: COMMERCIAL

## 2018-05-25 ENCOUNTER — APPOINTMENT (OUTPATIENT)
Dept: PHYSICAL THERAPY | Age: 58
End: 2018-05-25

## 2018-05-30 ENCOUNTER — HOSPITAL ENCOUNTER (OUTPATIENT)
Dept: PHYSICAL THERAPY | Age: 58
Discharge: HOME | End: 2018-05-30
Payer: COMMERCIAL

## 2018-06-01 ENCOUNTER — HOSPITAL ENCOUNTER (OUTPATIENT)
Dept: PHYSICAL THERAPY | Age: 58
Discharge: HOME | End: 2018-06-01
Payer: COMMERCIAL

## 2018-06-06 ENCOUNTER — HOSPITAL ENCOUNTER (OUTPATIENT)
Dept: PHYSICAL THERAPY | Age: 58
Discharge: HOME | End: 2018-06-06

## 2018-06-06 ENCOUNTER — HOSPITAL ENCOUNTER (OUTPATIENT)
Dept: PHYSICAL THERAPY | Age: 58
Discharge: HOME | End: 2018-06-06
Payer: COMMERCIAL

## 2018-06-06 PROCEDURE — 97110 THERAPEUTIC EXERCISES: CPT

## 2018-06-06 NOTE — PROGRESS NOTES
Skip Pack  : 1960 [id number] [facility],[address]  [address] [place] [address]  [phone]   [phone]        OUTPATIENT PHYSICAL THERAPY:Daily Note 2018         ICD-10: Treatment Diagnosis: Pain in right hip (M25.551) , Difficulty in walking, not elsewhere classified (R26.2)  Precautions/Allergies:   Review of patient's allergies indicates no known allergies. Fall Risk Score: 6 (? 5 = High Risk)  MD Orders: Eval and Treat  MEDICAL/REFERRING DIAGNOSIS:  Hip fracture ([facility] [address] [S72.009A]   DATE OF ONSET: 2018  REFERRING PHYSICIAN: Pardeep Jeffers MD  RETURN PHYSICIAN APPOINTMENT: 18     INITIAL ASSESSMENT:  Ms. Artemio Bellamy presents to therapy with pain in the R hip and difficulty walking due to a displaced intertrochanteric fracture in the femur due to a fall from a chair. She is s/p intramedullary nailing and is currently NWB with a RW and is 10 weeks post op. Pt would benefit from skilled PT for above deficits to return to prior level of function painfree. PROBLEM LIST (Impacting functional limitations):  1. Decreased Strength  2. Decreased ADL/Functional Activities  3. Decreased Transfer Abilities  4. Decreased Ambulation Ability/Technique  5. Decreased Balance  6. Increased Pain  7. Decreased Activity Tolerance  8. Decreased Flexibility/Joint Mobility INTERVENTIONS PLANNED:  1. Balance Exercise  2. Bed Mobility  3. Cold  4. Electrical Stimulation  5. Gait Training  6. Heat  7. Home Exercise Program (HEP)  8. Manual Therapy  9. Range of Motion (ROM)  10. Therapeutic Activites  11. Therapeutic Exercise/Strengthening  12. Transfer Training  13. Aquatics    TREATMENT PLAN:  Effective Dates: 2018 TO 2018 (90 days). Frequency/Duration: 2-3 times a week for 90 Days  GOALS: (Goals have been discussed and agreed upon with patient.)  Short-Term Functional Goals: Time Frame: 4 weeks   1. Ms. Artemio Bellamy to be independent with HEP.    2. Pt able to ambulate WBAT R LE with AD as needed to return to normal mobility as soon as possible. 3. Pt to demo increase in strength in the R leg to overall 4/5 to return to functional activities. Discharge Goals: Time Frame: 12 weeks   1. Pt able to return to work without limitations and painfree in the R hip. 2. Pt to demo 5/5 strength in the R lower extremity as prior level of function. 3. Pt to return to all functional activities painfree and without limitations in ROM or strength. 4. Pt able to ambulate without AD and no antalgic gait pattern and no LOB. Rehabilitation Potential For Stated Goals: Good                HISTORY:   History of Present Injury/Illness (Reason for Referral):  Pt 63 y/o F with pain in the R hip secondary to a closed displaced intertrochanteric fracture of the R femur s/p intramedullary nailing with ORIF R subtrochanteric femur. She is having difficulty walking needing a RW and is NWB currently. She is 10 weeks post op and is not having a lot of pain. She had therapy at rehab and then at home. She is not driving and is out of work currently. Past Medical History/Comorbidities:   Ms. Cisco Foster  has a past medical history of Cancer ([facility] [address] Diabetes ([facility] [address] and Hypertension. Ms. Cisco Foster  has a past surgical history that includes hx hip fracture tx (Right); hx breast lumpectomy; and hx hip replacement.   Social History/Living Environment:     Lives by herself but is currently living with daughter for help   Prior Level of Function/Work/Activity:  Independent working and driving   Dominant Side:         RIGHT  Current Medications:    Current Outpatient Prescriptions:     metoprolol tartrate (LOPRESSOR) 25 mg tablet, Take 0.5 Tabs by mouth two (2) times a day., Disp: 60 Tab, Rfl: 1    insulin aspart U-100 (NOVOLOG U-100 INSULIN ASPART) 100 unit/mL injection, by SubCUTAneous route., Disp: , Rfl:     calcium-vitamin D (OYSTER SHELL) 500 mg(1,250mg) -200 unit per tablet, Take 1 Tab by mouth two (2) times daily (with meals). , Disp: 60 Tab, Rfl: 2    potassium chloride (K-DUR, KLOR-CON) 20 mEq tablet, Take 1 Tab by mouth daily. , Disp: 30 Tab, Rfl: 0    metFORMIN (GLUCOPHAGE) 500 mg tablet, Take 1,000 mg by mouth two (2) times daily (with meals). Indications: type 2 diabetes mellitus, Disp: , Rfl:     lisinopril (PRINIVIL, ZESTRIL) 10 mg tablet, Take 10 mg by mouth daily. Indications: hypertension, Disp: , Rfl:    Date Last Reviewed:  6/6/2018   EXAMINATION:   Observation/Orthostatic Postural Assessment:          Good   Palpation:          No tenderness to touch over incision   ROM:     R hip flexion: 80 °   R hip extension  R hip abduction: 15 °   R knee flexion: 115 °   R knee extension: 0 °    L hip flexion: 110 °   L hip extension  L hip abduction : 22 °   L knee flexion: 130 °   L knee extension 0 °    Strength:     L hip flexion 5  L hip extension 5  L hip abduction 5  L knee flexion 5  L knee extension 5   R hip flexion: 3  R hip extension: 3-  R hip abduction: 3  R knee flexion: 4  R knee extension: 4-   Special Tests:          N/a   Neurological Screen:        Sensation: no complaint of numbness or tingling   Functional Mobility:         Independent   Balance:          Good    Body Structures Involved:  1. Bones  2. Joints  3. Muscles  4. Ligaments Body Functions Affected:  1. Movement Related Activities and Participation Affected:  1. General Tasks and Demands  2. Mobility   CLINICAL PRESENTATION:   CLINICAL DECISION MAKING:   Outcome Measure: Tool Used: Lower Extremity Functional Scale (LEFS)  Score:  Initial: 69/80 Most Recent: X/80 (Date: -- )   Interpretation of Score: 20 questions each scored on a 5 point scale with 0 representing \"extreme difficulty or unable to perform\" and 4 representing \"no difficulty\". The lower the score, the greater the functional disability. 80/80 represents no disability. Minimal detectable change is 9 points.   Score 80 79-63 62-48 47-32 31-16 15-1 0 Modifier CH CI CJ CK CL CM CN       Medical Necessity:   · Patient is expected to demonstrate progress in strength, range of motion and balance to increase independence with functional activities painfree. .  Reason for Services/Other Comments:  · Patient continues to require present interventions due to patient's inability to perform functional activities painfree. .   TREATMENT:   (In addition to Assessment/Re-Assessment sessions the following treatments were rendered)  THERAPEUTIC EXERCISE: (see chart below for  minutes):  Exercises per grid below to improve mobility, strength and balance. Required minimal visual and verbal cues to promote proper body alignment, promote proper body posture and promote proper body mechanics. Progressed resistance, range and repetitions as indicated. MANUAL THERAPY: (see chart below for  minutes): Joint mobilization and Soft tissue mobilization was utilized and necessary because of the patient's restricted joint motion and restricted motion of soft tissue. MODALITIES: (see chart below for  minutes):      see chart below for details on pain management.       Date: 5-9-18 5-11-18  (Visit 2)  5-21-18 (visit 3) 5-23-18 (visit 4) 6-06-18 (visit 5)   Modalities:                                Therapeutic Exercise: 15 mins  45 mins  40 min 40 min 45 min   SKTC with strap    10 sec hold x6 B    Piriformis stretch    30 sec hold x4 B Modified with stool 1 min hold   HS stretch with strap  5x15SH Repeat bilateral   30 SH x4 B LE    iSLR  3x10  2x10  3x10     LAQ 3x10  3x10    x15 BLE   SL clams  3x10  3x10  x30     Nustep   6 mins L1 6 min L3 6 min L4 6 min L4   Standing HS curls   3x10       Standing march   3x10       Standing abduction   3x10  Side lying 2x10 Standing hip 3 way x15 BLE with blue band and UE support Standing hip 3 way with UE support x15 BLE   Bridging   3x10  3x10     Proprioceptive Activities:        SLS     30 sec hold x3 on R LE with UE support Manual Therapy:   5 min 5 min    STM with roller to R hip and ITB   5 min 5 min            Therapeutic Activities:        Gait training without AD    300 ft with  ft SBA and treadmill 1.1 mph for 4 min   Sit to stand   x10 with UE support from chair     Step ups (forward and lateral)   2x10 each, 4 in x15 B LE onto 6 in with UE and lateral step up and over x15    Heel raises   x30 in // bars x30 with UE x30 with UE    High knee marching   3 L in // bars     Lateral walk   3 L in // bars 3 L in bars with blue band    HEP: pt was given the above exercises and was told to continue with NWB per MD protocol. [email or web address]  Treatment/Session Assessment:  Pt reported no pain with exercise but ambulates with decreased stance time on the R LE. Continue with POC. · Pain/ Symptoms: Initial:   0/10  R hip    Pt states \"I don't have any pain. I'm driving. \" Post Session:  No increase in pain following session. ·   Compliance with Program/Exercises: Will assess as treatment progresses. · Recommendations/Intent for next treatment session: \"Next visit will focus on advancements to more challenging activities\".   Total Treatment Duration:  PT Patient Time In/Time Out  Time In: 0930  Time Out: [address] [facility]

## 2018-06-11 ENCOUNTER — APPOINTMENT (OUTPATIENT)
Dept: PHYSICAL THERAPY | Age: 58
End: 2018-06-11
Payer: COMMERCIAL

## 2018-06-13 ENCOUNTER — HOSPITAL ENCOUNTER (OUTPATIENT)
Dept: PHYSICAL THERAPY | Age: 58
Discharge: HOME | End: 2018-06-13
Payer: COMMERCIAL

## 2018-06-13 ENCOUNTER — APPOINTMENT (OUTPATIENT)
Dept: PHYSICAL THERAPY | Age: 58
End: 2018-06-13
Payer: COMMERCIAL

## 2018-06-13 ENCOUNTER — APPOINTMENT (OUTPATIENT)
Dept: PHYSICAL THERAPY | Age: 58
End: 2018-06-13

## 2018-06-15 ENCOUNTER — APPOINTMENT (OUTPATIENT)
Dept: PHYSICAL THERAPY | Age: 58
End: 2018-06-15
Payer: COMMERCIAL

## 2018-06-15 ENCOUNTER — APPOINTMENT (OUTPATIENT)
Dept: PHYSICAL THERAPY | Age: 58
End: 2018-06-15

## 2018-06-18 ENCOUNTER — APPOINTMENT (OUTPATIENT)
Dept: PHYSICAL THERAPY | Age: 58
End: 2018-06-18
Payer: COMMERCIAL

## 2018-06-18 ENCOUNTER — HOSPITAL ENCOUNTER (OUTPATIENT)
Dept: PHYSICAL THERAPY | Age: 58
Discharge: HOME | End: 2018-06-18
Payer: COMMERCIAL

## 2018-06-18 ENCOUNTER — APPOINTMENT (OUTPATIENT)
Dept: PHYSICAL THERAPY | Age: 58
End: 2018-06-18

## 2018-06-18 PROCEDURE — 97110 THERAPEUTIC EXERCISES: CPT

## 2018-06-18 NOTE — PROGRESS NOTES
Masood Rutledge  : [id number] [facility],[address]  [address] [place] [address]  [phone]   [phone]        OUTPATIENT PHYSICAL THERAPY:Daily Note 2018         ICD-10: Treatment Diagnosis: Pain in right hip (M25.551) , Difficulty in walking, not elsewhere classified (R26.2)  Precautions/Allergies:   Review of patient's allergies indicates no known allergies. Fall Risk Score: 6 (? 5 = High Risk)  MD Orders: Eval and Treat  MEDICAL/REFERRING DIAGNOSIS:  Hip fracture ([facility] [address] [S72.009A]   DATE OF ONSET: 2018  REFERRING PHYSICIAN: Billie Cronin MD  RETURN PHYSICIAN APPOINTMENT: 18     INITIAL ASSESSMENT:  Ms. Soham Broussard presents to therapy with pain in the R hip and difficulty walking due to a displaced intertrochanteric fracture in the femur due to a fall from a chair. She is s/p intramedullary nailing and is currently NWB with a RW and is 10 weeks post op. Pt would benefit from skilled PT for above deficits to return to prior level of function painfree. PROBLEM LIST (Impacting functional limitations):  1. Decreased Strength  2. Decreased ADL/Functional Activities  3. Decreased Transfer Abilities  4. Decreased Ambulation Ability/Technique  5. Decreased Balance  6. Increased Pain  7. Decreased Activity Tolerance  8. Decreased Flexibility/Joint Mobility INTERVENTIONS PLANNED:  1. Balance Exercise  2. Bed Mobility  3. Cold  4. Electrical Stimulation  5. Gait Training  6. Heat  7. Home Exercise Program (HEP)  8. Manual Therapy  9. Range of Motion (ROM)  10. Therapeutic Activites  11. Therapeutic Exercise/Strengthening  12. Transfer Training  13. Aquatics    TREATMENT PLAN:  Effective Dates: 2018 TO 2018 (90 days). Frequency/Duration: 2-3 times a week for 90 Days  GOALS: (Goals have been discussed and agreed upon with patient.)  Short-Term Functional Goals: Time Frame: 4 weeks   1. Ms. Soham Broussard to be independent with HEP.    2. Pt able to ambulate WBAT R LE with AD as needed to return to normal mobility as soon as possible. 3. Pt to demo increase in strength in the R leg to overall 4/5 to return to functional activities. Discharge Goals: Time Frame: 12 weeks   1. Pt able to return to work without limitations and painfree in the R hip. 2. Pt to demo 5/5 strength in the R lower extremity as prior level of function. 3. Pt to return to all functional activities painfree and without limitations in ROM or strength. 4. Pt able to ambulate without AD and no antalgic gait pattern and no LOB. Rehabilitation Potential For Stated Goals: Good                HISTORY:   History of Present Injury/Illness (Reason for Referral):  Pt 63 y/o F with pain in the R hip secondary to a closed displaced intertrochanteric fracture of the R femur s/p intramedullary nailing with ORIF R subtrochanteric femur. She is having difficulty walking needing a RW and is NWB currently. She is 10 weeks post op and is not having a lot of pain. She had therapy at rehab and then at home. She is not driving and is out of work currently. Past Medical History/Comorbidities:   Ms. Fabi Christianson  has a past medical history of Cancer ([facility] [address] Diabetes ([facility] [address] and Hypertension. Ms. Fabi Christianson  has a past surgical history that includes hx hip fracture tx (Right); hx breast lumpectomy; and hx hip replacement.   Social History/Living Environment:     Lives by herself but is currently living with daughter for help   Prior Level of Function/Work/Activity:  Independent working and driving   Dominant Side:         RIGHT  Current Medications:    Current Outpatient Prescriptions:     metoprolol tartrate (LOPRESSOR) 25 mg tablet, Take 0.5 Tabs by mouth two (2) times a day., Disp: 60 Tab, Rfl: 1    insulin aspart U-100 (NOVOLOG U-100 INSULIN ASPART) 100 unit/mL injection, by SubCUTAneous route., Disp: , Rfl:     calcium-vitamin D (OYSTER SHELL) 500 mg(1,250mg) -200 unit per tablet, Take 1 Tab by mouth two (2) times daily (with meals). , Disp: 60 Tab, Rfl: 2    potassium chloride (K-DUR, KLOR-CON) 20 mEq tablet, Take 1 Tab by mouth daily. , Disp: 30 Tab, Rfl: 0    metFORMIN (GLUCOPHAGE) 500 mg tablet, Take 1,000 mg by mouth two (2) times daily (with meals). Indications: type 2 diabetes mellitus, Disp: , Rfl:     lisinopril (PRINIVIL, ZESTRIL) 10 mg tablet, Take 10 mg by mouth daily. Indications: hypertension, Disp: , Rfl:    Date Last Reviewed:  6/18/2018   EXAMINATION:   Observation/Orthostatic Postural Assessment:          Good   Palpation:          No tenderness to touch over incision   ROM:     R hip flexion: 80 °   R hip extension  R hip abduction: 15 °   R knee flexion: 115 °   R knee extension: 0 °    L hip flexion: 110 °   L hip extension  L hip abduction : 22 °   L knee flexion: 130 °   L knee extension 0 °    Strength:     L hip flexion 5  L hip extension 5  L hip abduction 5  L knee flexion 5  L knee extension 5   R hip flexion: 3  R hip extension: 3-  R hip abduction: 3  R knee flexion: 4  R knee extension: 4-   Special Tests:          N/a   Neurological Screen:        Sensation: no complaint of numbness or tingling   Functional Mobility:         Independent   Balance:          Good    Body Structures Involved:  1. Bones  2. Joints  3. Muscles  4. Ligaments Body Functions Affected:  1. Movement Related Activities and Participation Affected:  1. General Tasks and Demands  2. Mobility   CLINICAL PRESENTATION:   CLINICAL DECISION MAKING:   Outcome Measure: Tool Used: Lower Extremity Functional Scale (LEFS)  Score:  Initial: 69/80 Most Recent: X/80 (Date: -- )   Interpretation of Score: 20 questions each scored on a 5 point scale with 0 representing \"extreme difficulty or unable to perform\" and 4 representing \"no difficulty\". The lower the score, the greater the functional disability. 80/80 represents no disability. Minimal detectable change is 9 points.   Score 80 79-63 62-48 47-32 31-16 15-1 0 Modifier CH CI CJ CK CL CM CN       Medical Necessity:   · Patient is expected to demonstrate progress in strength, range of motion and balance to increase independence with functional activities painfree. .  Reason for Services/Other Comments:  · Patient continues to require present interventions due to patient's inability to perform functional activities painfree. .   TREATMENT:   (In addition to Assessment/Re-Assessment sessions the following treatments were rendered)  THERAPEUTIC EXERCISE: (see chart below for  minutes):  Exercises per grid below to improve mobility, strength and balance. Required minimal visual and verbal cues to promote proper body alignment, promote proper body posture and promote proper body mechanics. Progressed resistance, range and repetitions as indicated. MANUAL THERAPY: (see chart below for  minutes): Joint mobilization and Soft tissue mobilization was utilized and necessary because of the patient's restricted joint motion and restricted motion of soft tissue. MODALITIES: (see chart below for  minutes):      see chart below for details on pain management.       Date: 5-9-18 5-11-18  (Visit 2)  5-21-18 (visit 3) 5-23-18 (visit 4) 6-06-18 (visit 5) 6-18-18  (Visit 6)    Modalities:                                    Therapeutic Exercise: 15 mins  45 mins  40 min 40 min 45 min 60 mins    SKTC with strap    10 sec hold x6 B  Repeat    Piriformis stretch    30 sec hold x4 B Modified with stool 1 min hold    HS stretch with strap  5x15SH Repeat bilateral   30 SH x4 B LE  Repeat    iSLR  3x10  2x10  3x10   With ER 2x5    LAQ 3x10  3x10    x15 BLE    SL clams  3x10  3x10  x30   Repeat    Nustep   6 mins L1 6 min L3 6 min L4 6 min L4 Repeat L8   Standing HS curls   3x10        Standing march   3x10        Standing abduction   3x10  Side lying 2x10 Standing hip 3 way x15 BLE with blue band and UE support Standing hip 3 way with UE support x15 BLE SL abduction with extension 2x5    Bridging 3x10  3x10   Repeat    Proprioceptive Activities:         SLS     30 sec hold x3 on R LE with UE support                      Manual Therapy:   5 min 5 min     STM with roller to R hip and ITB   5 min 5 min              Therapeutic Activities:         Gait training without AD    300 ft with  ft SBA and treadmill 1.1 mph for 4 min Repeat gait training with the hurdles and with VC to correct gait    Sit to stand   x10 with UE support from chair      Step ups (forward and lateral)   2x10 each, 4 in x15 B LE onto 6 in with UE and lateral step up and over x15     Heel raises   x30 in // bars x30 with UE x30 with UE     High knee marching   3 L in // bars   With hurdles    Lateral walk   3 L in // bars 3 L in bars with blue band  With hurdles    HEP: pt was given the above exercises and was told to continue with NWB per MD protocol. [employer]  Treatment/Session Assessment:  Pt told to ambulate in the home without the walker and was told to contiue to focus on gait pattern as well as focus on strengthening of the hips due to functional leg length discrepancy. Continue with POC. · Pain/ Symptoms: Initial:   0/10  R hip  \"I was told I could get off the walker. \"  Post Session:  No increase in pain following session. ·   Compliance with Program/Exercises: Will assess as treatment progresses. · Recommendations/Intent for next treatment session: \"Next visit will focus on advancements to more challenging activities\".   Total Treatment Duration:  PT Patient Time In/Time Out  Time In: 1015  Time Out: [address], PT, DPT

## 2018-06-20 ENCOUNTER — APPOINTMENT (OUTPATIENT)
Dept: PHYSICAL THERAPY | Age: 58
End: 2018-06-20
Payer: COMMERCIAL

## 2018-06-20 ENCOUNTER — APPOINTMENT (OUTPATIENT)
Dept: PHYSICAL THERAPY | Age: 58
End: 2018-06-20

## 2018-06-20 ENCOUNTER — HOSPITAL ENCOUNTER (OUTPATIENT)
Dept: PHYSICAL THERAPY | Age: 58
Discharge: HOME | End: 2018-06-20
Payer: COMMERCIAL

## 2018-06-22 ENCOUNTER — APPOINTMENT (OUTPATIENT)
Dept: PHYSICAL THERAPY | Age: 58
End: 2018-06-22
Payer: COMMERCIAL

## 2018-06-22 ENCOUNTER — HOSPITAL ENCOUNTER (OUTPATIENT)
Dept: PHYSICAL THERAPY | Age: 58
Discharge: HOME | End: 2018-06-22
Payer: COMMERCIAL

## 2018-06-22 ENCOUNTER — APPOINTMENT (OUTPATIENT)
Dept: PHYSICAL THERAPY | Age: 58
End: 2018-06-22

## 2018-06-22 PROCEDURE — 97110 THERAPEUTIC EXERCISES: CPT

## 2018-06-22 NOTE — PROGRESS NOTES
Gissel Chavira  : 1960 [address] [facility],[address]  [address] [place] [address].  [phone]   [phone]        OUTPATIENT PHYSICAL THERAPY:Daily Note and Progress Report 2018         ICD-10: Treatment Diagnosis: Pain in right hip (M25.551) , Difficulty in walking, not elsewhere classified (R26.2)  Precautions/Allergies:   Review of patient's allergies indicates no known allergies. Fall Risk Score: 6 (? 5 = High Risk)  MD Orders: Eval and Treat  MEDICAL/REFERRING DIAGNOSIS:  Hip fracture ([facility] [S72.009A]   DATE OF ONSET: 2018  REFERRING PHYSICIAN: Filippo Wolf MD  RETURN PHYSICIAN APPOINTMENT: 18     INITIAL ASSESSMENT:  Ms. Tyree Valdez presents to therapy with pain in the R hip and difficulty walking due to a displaced intertrochanteric fracture in the femur due to a fall from a chair. She is s/p intramedullary nailing and is currently NWB with a RW and is 10 weeks post op. Pt would benefit from skilled PT for above deficits to return to prior level of function painfree. PROBLEM LIST (Impacting functional limitations):  1. Decreased Strength  2. Decreased ADL/Functional Activities  3. Decreased Transfer Abilities  4. Decreased Ambulation Ability/Technique  5. Decreased Balance  6. Increased Pain  7. Decreased Activity Tolerance  8. Decreased Flexibility/Joint Mobility INTERVENTIONS PLANNED:  1. Balance Exercise  2. Bed Mobility  3. Cold  4. Electrical Stimulation  5. Gait Training  6. Heat  7. Home Exercise Program (HEP)  8. Manual Therapy  9. Range of Motion (ROM)  10. Therapeutic Activites  11. Therapeutic Exercise/Strengthening  12. Transfer Training  13. Aquatics    TREATMENT PLAN:  Effective Dates: 2018 TO 2018 (90 days). Frequency/Duration: 2-3 times a week for 90 Days  GOALS: (Goals have been discussed and agreed upon with patient.)  Short-Term Functional Goals: Time Frame: 4 weeks   1. Ms. Tyree Valdez to be independent with HEP. (MET)  2. Pt able to ambulate WBAT R LE with AD as needed to return to normal mobility as soon as possible. (MET)  3. Pt to demo increase in strength in the R leg to overall 4/5 to return to functional activities. (PROGRESSING)  Discharge Goals: Time Frame: 12 weeks (ALL ONGOING)  1. Pt able to return to work without limitations and painfree in the R hip. 2. Pt to demo 5/5 strength in the R lower extremity as prior level of function. 3. Pt to return to all functional activities painfree and without limitations in ROM or strength. 4. Pt able to ambulate without AD and no antalgic gait pattern and no LOB. Rehabilitation Potential For Stated Goals: Good                HISTORY:   History of Present Injury/Illness (Reason for Referral):  Pt 61 y/o F with pain in the R hip secondary to a closed displaced intertrochanteric fracture of the R femur s/p intramedullary nailing with ORIF R subtrochanteric femur. She is having difficulty walking needing a RW and is NWB currently. She is 10 weeks post op and is not having a lot of pain. She had therapy at rehab and then at home. She is not driving and is out of work currently. Past Medical History/Comorbidities:   Ms. Fabio Biggs  has a past medical history of Cancer ([address] Diabetes ([facility] and Hypertension. Ms. Fabio Biggs  has a past surgical history that includes hx hip fracture tx (Right); hx breast lumpectomy; and hx hip replacement.   Social History/Living Environment:     Lives by herself but is currently living with daughter for help   Prior Level of Function/Work/Activity:  Independent working and driving   Dominant Side:         RIGHT  Current Medications:    Current Outpatient Prescriptions:     metoprolol tartrate (LOPRESSOR) 25 mg tablet, Take 0.5 Tabs by mouth two (2) times a day., Disp: 60 Tab, Rfl: 1    insulin aspart U-100 (NOVOLOG U-100 INSULIN ASPART) 100 unit/mL injection, by SubCUTAneous route., Disp: , Rfl:     calcium-vitamin D (OYSTER SHELL) 500 mg(1,250mg) -200 unit per tablet, Take 1 Tab by mouth two (2) times daily (with meals). , Disp: 60 Tab, Rfl: 2    potassium chloride (K-DUR, KLOR-CON) 20 mEq tablet, Take 1 Tab by mouth daily. , Disp: 30 Tab, Rfl: 0    metFORMIN (GLUCOPHAGE) 500 mg tablet, Take 1,000 mg by mouth two (2) times daily (with meals). Indications: type 2 diabetes mellitus, Disp: , Rfl:     lisinopril (PRINIVIL, ZESTRIL) 10 mg tablet, Take 10 mg by mouth daily. Indications: hypertension, Disp: , Rfl:    Date Last Reviewed:  6/22/2018   EXAMINATION:   Observation/Orthostatic Postural Assessment:          Good   Palpation:          No tenderness to touch over incision   ROM:     R hip flexion: 80 °   R hip extension  R hip abduction: 15 °   R knee flexion: 115 °   R knee extension: 0 °    L hip flexion: 110 °   L hip extension  L hip abduction : 22 °   L knee flexion: 130 °   L knee extension 0 °    Strength:     L hip flexion 5  L hip extension 5  L hip abduction 5  L knee flexion 5  L knee extension 5   R hip flexion: 3  R hip extension: 3-  R hip abduction: 3  R knee flexion: 4  R knee extension: 4-    R LE MMT (6-22-18)    Hip flexion 3+/5  Hip extension 4/5  Abduction 4/5    R knee flexion 4/5  R knee extension 5/5   Special Tests:          N/a   Neurological Screen:        Sensation: no complaint of numbness or tingling   Functional Mobility:         Independent   Balance:          Good    Body Structures Involved:  1. Bones  2. Joints  3. Muscles  4. Ligaments Body Functions Affected:  1. Movement Related Activities and Participation Affected:  1. General Tasks and Demands  2. Mobility   CLINICAL PRESENTATION:   CLINICAL DECISION MAKING:   Outcome Measure:    Tool Used: Lower Extremity Functional Scale (LEFS)  Score:  Initial: 69/80 Most Recent: 57/80 (Date: 6-22-18 )   Interpretation of Score: 20 questions each scored on a 5 point scale with 0 representing \"extreme difficulty or unable to perform\" and 4 representing \"no difficulty\". The lower the score, the greater the functional disability. 80/80 represents no disability. Minimal detectable change is 9 points. Score 80 79-63 62-48 47-32 31-16 15-1 0   Modifier CH CI CJ CK CL CM CN       Medical Necessity:   · Patient is expected to demonstrate progress in strength, range of motion and balance to increase independence with functional activities painfree. .  Reason for Services/Other Comments:  · Patient continues to require present interventions due to patient's inability to perform functional activities painfree. .   TREATMENT:   (In addition to Assessment/Re-Assessment sessions the following treatments were rendered)  THERAPEUTIC EXERCISE: (see chart below for  minutes):  Exercises per grid below to improve mobility, strength and balance. Required minimal visual and verbal cues to promote proper body alignment, promote proper body posture and promote proper body mechanics. Progressed resistance, range and repetitions as indicated. MANUAL THERAPY: (see chart below for  minutes): Joint mobilization and Soft tissue mobilization was utilized and necessary because of the patient's restricted joint motion and restricted motion of soft tissue. MODALITIES: (see chart below for  minutes):      see chart below for details on pain management.       Date: 5-9-18 5-11-18  (Visit 2)  5-21-18 (visit 3) 5-23-18 (visit 4) 6-06-18 (visit 5) 6-18-18  (Visit 6)  6-22-18 (visit 7) PN   Modalities:                                        Therapeutic Exercise: 15 mins  45 mins  40 min 40 min 45 min 60 mins  35 min   SKTC with strap    10 sec hold x6 B  Repeat     Piriformis stretch    30 sec hold x4 B Modified with stool 1 min hold     HS stretch with strap  5x15SH Repeat bilateral   30 SH x4 B LE  Repeat     iSLR  3x10  2x10  3x10   With ER 2x5  3X10   LAQ 3x10  3x10    x15 BLE     SL clams  3x10  3x10  x30   Repeat     Nustep   6 mins L1 6 min L3 6 min L4 6 min L4 Repeat L8 L4 6 min   Standing HS curls   3x10         Standing march   3x10         Standing abduction   3x10  Side lying 2x10 Standing hip 3 way x15 BLE with blue band and UE support Standing hip 3 way with UE support x15 BLE SL abduction with extension 2x5  SL abduction 3x10   Bridging   3x10  3x10   Repeat  3x10   Proprioceptive Activities:          SLS     30 sec hold x3 on R LE with UE support                         Manual Therapy:   5 min 5 min      STM with roller to R hip and ITB   5 min 5 min                Therapeutic Activities:          Gait training without AD    300 ft with  ft SBA and treadmill 1.1 mph for 4 min Repeat gait training with the hurdles and with VC to correct gait  On treadmill 1.3 mph for 4 mins with verbal cues for heel strike   Sit to stand   x10 with UE support from chair    2x10 with UE support   Step ups (forward and lateral)   2x10 each, 4 in x15 B LE onto 6 in with UE and lateral step up and over x15      Heel raises   x30 in // bars x30 with UE x30 with UE      High knee marching   3 L in // bars   With hurdles  2 laps with hurdles   Lateral walk   3 L in // bars 3 L in bars with blue band  With hurdles  2 laps with hurdles   HEP: pt was given the above exercises and was told to continue with NWB per MD protocol. [facility]  Treatment/Session Assessment:  Pt wants to ambulate in the community without the walker but pt was advised to bring it with her in case she gets fatigued. Pt requested to take a rest break after the treadmill. Pt fatigues quickly and she continues to ambulated with an antalgic gait. Continue with POC. · Pain/ Symptoms: Initial:   0/10  R hip    Pt was 10 minutes late. Post Session:  No increase in pain following session. ·   Compliance with Program/Exercises: Will assess as treatment progresses. · Recommendations/Intent for next treatment session: \"Next visit will focus on advancements to more challenging activities\".   Total Treatment Duration:  PT Patient Time In/Time Out  Time In: 0855  Time Out: [address], P[place]

## 2018-06-25 ENCOUNTER — APPOINTMENT (OUTPATIENT)
Dept: PHYSICAL THERAPY | Age: 58
End: 2018-06-25
Payer: COMMERCIAL

## 2018-06-26 ENCOUNTER — HOSPITAL ENCOUNTER (OUTPATIENT)
Dept: PHYSICAL THERAPY | Age: 58
Discharge: HOME | End: 2018-06-26
Payer: COMMERCIAL

## 2018-06-27 ENCOUNTER — APPOINTMENT (OUTPATIENT)
Dept: PHYSICAL THERAPY | Age: 58
End: 2018-06-27
Payer: COMMERCIAL

## 2018-06-28 ENCOUNTER — HOSPITAL ENCOUNTER (OUTPATIENT)
Dept: PHYSICAL THERAPY | Age: 58
Discharge: HOME | End: 2018-06-28
Payer: COMMERCIAL

## 2018-07-10 ENCOUNTER — HOSPITAL ENCOUNTER (OUTPATIENT)
Dept: PHYSICAL THERAPY | Age: 58
Discharge: HOME | End: 2018-07-10

## 2018-07-12 ENCOUNTER — HOSPITAL ENCOUNTER (OUTPATIENT)
Dept: PHYSICAL THERAPY | Age: 58
Discharge: HOME | End: 2018-07-12

## 2018-07-17 ENCOUNTER — HOSPITAL ENCOUNTER (OUTPATIENT)
Dept: PHYSICAL THERAPY | Age: 58
Discharge: HOME | End: 2018-07-17

## 2018-07-19 ENCOUNTER — HOSPITAL ENCOUNTER (OUTPATIENT)
Dept: PHYSICAL THERAPY | Age: 58
Discharge: HOME | End: 2018-07-19

## 2018-07-24 ENCOUNTER — HOSPITAL ENCOUNTER (OUTPATIENT)
Dept: PHYSICAL THERAPY | Age: 58
Discharge: HOME | End: 2018-07-24

## 2019-04-02 ENCOUNTER — HOSPITAL ENCOUNTER (EMERGENCY)
Age: 59
Discharge: HOME | End: 2019-04-03
Attending: EMERGENCY MEDICINE
Payer: SELF-PAY

## 2019-04-02 DIAGNOSIS — J01.90 ACUTE SINUSITIS, RECURRENCE NOT SPECIFIED, UNSPECIFIED LOCATION: Primary | ICD-10-CM

## 2019-04-02 LAB — DEPRECATED S PYO AG THROAT QL EIA: NEGATIVE

## 2019-04-02 PROCEDURE — 99283 EMERGENCY DEPT VISIT LOW MDM: CPT | Performed by: EMERGENCY MEDICINE

## 2019-04-02 PROCEDURE — 87081 CULTURE SCREEN ONLY: CPT

## 2019-04-02 PROCEDURE — 87880 STREP A ASSAY W/OPTIC: CPT

## 2019-04-03 VITALS
HEART RATE: 96 BPM | HEIGHT: 63 IN | TEMPERATURE: 98.4 F | WEIGHT: 160 LBS | OXYGEN SATURATION: 97 % | RESPIRATION RATE: 20 BRPM | DIASTOLIC BLOOD PRESSURE: 86 MMHG | BODY MASS INDEX: 28.35 KG/M2 | SYSTOLIC BLOOD PRESSURE: 148 MMHG

## 2019-04-03 PROCEDURE — 74011250637 HC RX REV CODE- 250/637: Performed by: EMERGENCY MEDICINE

## 2019-04-03 RX ORDER — AMOXICILLIN AND CLAVULANATE POTASSIUM 875; 125 MG/1; MG/1
1 TABLET, FILM COATED ORAL 2 TIMES DAILY
Qty: 10 TAB | Refills: 0 | Status: SHIPPED | OUTPATIENT
Start: 2019-04-03 | End: 2019-09-26

## 2019-04-03 RX ORDER — DEXAMETHASONE SODIUM PHOSPHATE 100 MG/10ML
10 INJECTION INTRAMUSCULAR; INTRAVENOUS
Status: COMPLETED | OUTPATIENT
Start: 2019-04-03 | End: 2019-04-03

## 2019-04-03 RX ADMIN — DEXAMETHASONE SODIUM PHOSPHATE 10 MG: 10 INJECTION INTRAMUSCULAR; INTRAVENOUS at 00:55

## 2019-04-03 NOTE — ED PROVIDER NOTES
Presents with complaint of right sinus pain and pain in her right throat from drainage. She has right ear pain. No fever. Some cough is dry. Thinks she has seasonal allergies. The history is provided by the patient. Sore Throat This is a new problem. The current episode started more than 2 days ago. The problem has been gradually worsening. There has been no fever. Associated symptoms include congestion, ear pain, swollen glands and trouble swallowing. Pertinent negatives include no diarrhea, no vomiting, no shortness of breath and no cough. Past Medical History:  
Diagnosis Date  Cancer ([facility] breast- L  Diabetes ([facility]  Hypertension Past Surgical History:  
Procedure Laterality Date  HX BREAST LUMPECTOMY  HX HIP FRACTURE TX Right  HX HIP REPLACEMENT No family history on file. Social History Socioeconomic History  Marital status: SINGLE Spouse name: Not on file  Number of children: Not on file  Years of education: Not on file  Highest education level: Not on file Occupational History  Not on file Social Needs  Financial resource strain: Not on file  Food insecurity:  
  Worry: Not on file Inability: Not on file  Transportation needs:  
  Medical: Not on file Non-medical: Not on file Tobacco Use  Smoking status: Never Smoker  Smokeless tobacco: Never Used Substance and Sexual Activity  Alcohol use: Yes Comment: social  
 Drug use: No  
 Sexual activity: Not on file Lifestyle  Physical activity:  
  Days per week: Not on file Minutes per session: Not on file  Stress: Not on file Relationships  Social connections:  
  Talks on phone: Not on file Gets together: Not on file Attends Jew service: Not on file Active member of club or organization: Not on file Attends meetings of clubs or organizations: Not on file Relationship status: Not on file  Intimate partner violence:  
  Fear of current or ex partner: Not on file Emotionally abused: Not on file Physically abused: Not on file Forced sexual activity: Not on file Other Topics Concern  Not on file Social History Narrative  Not on file ALLERGIES: Patient has no known allergies. Review of Systems HENT: Positive for congestion, ear pain, sore throat and trouble swallowing. Respiratory: Negative for cough and shortness of breath. Gastrointestinal: Negative for diarrhea and vomiting. All other systems reviewed and are negative. Vitals:  
 04/02/19 2242 04/03/19 1254 BP: 145/87 148/86 Pulse: (!) 115 96 Resp: 18 20 Temp: 98.5 °F (36.9 °C) 98.4 °F (36.9 °C) SpO2: 98% 97% Weight: 72.6 kg (160 lb) Height: 5' 3\" (1.6 m) Physical Exam  
Constitutional: She is oriented to person, place, and time. She appears well-developed and well-nourished. No distress. HENT:  
Head: Normocephalic and atraumatic. Right Ear: Tympanic membrane normal.  
Nose: Mucosal edema present. Right sinus exhibits maxillary sinus tenderness. Mouth/Throat: Uvula is midline, oropharynx is clear and moist and mucous membranes are normal.  
Eyes: Conjunctivae are normal. Right eye exhibits no discharge. Left eye exhibits no discharge. Neck: Normal range of motion. Neck supple. Cardiovascular: Normal rate and regular rhythm. Pulmonary/Chest: Effort normal and breath sounds normal. No respiratory distress. Abdominal: Soft. She exhibits no distension. There is no tenderness. Musculoskeletal: Normal range of motion. She exhibits no edema. Neurological: She is alert and oriented to person, place, and time. No cranial nerve deficit. Skin: Skin is warm and dry. Capillary refill takes less than 2 seconds. She is not diaphoretic. Psychiatric: She has a normal mood and affect. Her behavior is normal.  
Nursing note and vitals reviewed.  
  
 
MDM 
 Number of Diagnoses or Management Options Acute sinusitis, recurrence not specified, unspecified location:  
Diagnosis management comments: Decadron here, tx for sinusitis Amount and/or Complexity of Data Reviewed Clinical lab tests: ordered and reviewed Risk of Complications, Morbidity, and/or Mortality Presenting problems: low Diagnostic procedures: low Management options: low Patient Progress Patient progress: stable Procedures

## 2019-04-03 NOTE — ED NOTES
I have reviewed discharge instructions with the patient. The patient verbalized understanding.  Patient ambulatory from ED in NAD with Rx x 1

## 2019-04-03 NOTE — DISCHARGE INSTRUCTIONS
Patient Education          Take over the counter allergy medication daily  Saline Nasal Washes: Care Instructions  Your Care Instructions  Saline nasal washes help keep the nasal passages open by washing out thick or dried mucus. This simple remedy can help relieve symptoms of allergies, sinusitis, and colds. It also can make the nose feel more comfortable by keeping the mucous membranes moist. You may notice a little burning sensation in your nose the first few times you use the solution, but this usually gets better in a few days. Follow-up care is a key part of your treatment and safety. Be sure to make and go to all appointments, and call your doctor if you are having problems. It's also a good idea to know your test results and keep a list of the medicines you take. How can you care for yourself at home? · You can buy premixed saline solution in a squeeze bottle or other sinus rinse products at a drugstore. Read and follow the instructions on the label. · You also can make your own saline solution by adding 1 teaspoon of salt and 1 teaspoon of baking soda to 2 cups of distilled water. · If you use a homemade solution, pour a small amount into a clean bowl. Using a rubber bulb syringe, squeeze the syringe and place the tip in the salt water. Pull a small amount of the salt water into the syringe by relaxing your hand. · Sit down with your head tilted slightly back. Do not lie down. Put the tip of the bulb syringe or the squeeze bottle a little way into one of your nostrils. Gently drip or squirt a few drops into the nostril. Repeat with the other nostril. Some sneezing and gagging are normal at first.  · Gently blow your nose. · Wipe the syringe or bottle tip clean after each use. · Repeat this 2 or 3 times a day. · Use nasal washes gently if you have nosebleeds often. When should you call for help?   Watch closely for changes in your health, and be sure to contact your doctor if:    · You often get nosebleeds.     · You have problems doing the nasal washes. Where can you learn more? Go to http://liliane-fox.info/. Enter [id number] [id number] [phone] in the search box to learn more about \"Saline Nasal Washes: Care Instructions. \"  Current as of: March 27, 2018  Content Version: 11.9  © 9290-1747 [employer]. Care instructions adapted under license by [employer] (which disclaims liability or warranty for this information). If you have questions about a medical condition or this instruction, always ask your healthcare professional. [address] any warranty or liability for your use of this information. Patient Education        Sinusitis: Care Instructions  Your Care Instructions    Sinusitis is an infection of the lining of the sinus cavities in your head. Sinusitis often follows a cold. It causes pain and pressure in your head and face. In most cases, sinusitis gets better on its own in 1 to 2 weeks. But some mild symptoms may last for several weeks. Sometimes antibiotics are needed. Follow-up care is a key part of your treatment and safety. Be sure to make and go to all appointments, and call your doctor if you are having problems. It's also a good idea to know your test results and keep a list of the medicines you take. How can you care for yourself at home? · Take an over-the-counter pain medicine, such as acetaminophen (Tylenol), ibuprofen (Advil, Motrin), or naproxen (Aleve). Read and follow all instructions on the label. · If the doctor prescribed antibiotics, take them as directed. Do not stop taking them just because you feel better. You need to take the full course of antibiotics. · Be careful when taking over-the-counter cold or flu medicines and Tylenol at the same time. Many of these medicines have acetaminophen, which is Tylenol. Read the labels to make sure that you are not taking more than the recommended dose.  Too much acetaminophen (Tylenol) can be harmful. · Breathe warm, moist air from a steamy shower, a hot bath, or a sink filled with hot water. Avoid cold, dry air. Using a humidifier in your home may help. Follow the directions for cleaning the machine. · Use saline (saltwater) nasal washes to help keep your nasal passages open and wash out mucus and bacteria. You can buy saline nose drops at a grocery store or drugstore. Or you can make your own at home by adding 1 teaspoon of salt and 1 teaspoon of baking soda to 2 cups of distilled water. If you make your own, fill a bulb syringe with the solution, insert the tip into your nostril, and squeeze gently. Sidonie Brittany your nose. · Put a hot, wet towel or a warm gel pack on your face 3 or 4 times a day for 5 to 10 minutes each time. · Try a decongestant nasal spray like oxymetazoline (Afrin). Do not use it for more than 3 days in a row. Using it for more than 3 days can make your congestion worse. When should you call for help? Call your doctor now or seek immediate medical care if:    · You have new or worse swelling or redness in your face or around your eyes.     · You have a new or higher fever.    Watch closely for changes in your health, and be sure to contact your doctor if:    · You have new or worse facial pain.     · The mucus from your nose becomes thicker (like pus) or has new blood in it.     · You are not getting better as expected. Where can you learn more? Go to http://liliane-fox.info/. Enter J612 in the search box to learn more about \"Sinusitis: Care Instructions. \"  Current as of: March 27, 2018  Content Version: 11.9  © 6844-9051 [employer]. Care instructions adapted under license by [employer] (which disclaims liability or warranty for this information).  If you have questions about a medical condition or this instruction, always ask your healthcare professional. Katelyn Cam disclaims any warranty or liability for your use of this information.

## 2019-04-03 NOTE — ED TRIAGE NOTES
Pt c/o throat pain x3 days. Denies cp or sob. Pt states shes had a head cold for the last three days.

## 2019-04-05 LAB
BACTERIA SPEC CULT: NORMAL
SERVICE CMNT-IMP: NORMAL

## 2019-09-21 ENCOUNTER — HOSPITAL ENCOUNTER (INPATIENT)
Age: 59
LOS: 4 days | Discharge: HOME | DRG: 191 | End: 2019-09-26
Attending: EMERGENCY MEDICINE | Admitting: FAMILY MEDICINE
Payer: MEDICAID

## 2019-09-21 ENCOUNTER — APPOINTMENT (OUTPATIENT)
Dept: CT IMAGING | Age: 59
DRG: 191 | End: 2019-09-21
Attending: EMERGENCY MEDICINE
Payer: MEDICAID

## 2019-09-21 ENCOUNTER — APPOINTMENT (OUTPATIENT)
Dept: GENERAL RADIOLOGY | Age: 59
DRG: 191 | End: 2019-09-21
Attending: EMERGENCY MEDICINE
Payer: MEDICAID

## 2019-09-21 DIAGNOSIS — R06.02 SOB (SHORTNESS OF BREATH): Primary | ICD-10-CM

## 2019-09-21 LAB
ALBUMIN SERPL-MCNC: 2.8 G/DL (ref 3.5–5)
ALBUMIN/GLOB SERPL: 0.6 {RATIO} (ref 1.2–3.5)
ALP SERPL-CCNC: 72 U/L (ref 50–136)
ALT SERPL-CCNC: 19 U/L (ref 12–65)
ANION GAP SERPL CALC-SCNC: 7 MMOL/L (ref 7–16)
AST SERPL-CCNC: 17 U/L (ref 15–37)
BASOPHILS # BLD: 0 K/UL (ref 0–0.2)
BASOPHILS NFR BLD: 1 % (ref 0–2)
BILIRUB SERPL-MCNC: 0.1 MG/DL (ref 0.2–1.1)
BNP SERPL-MCNC: 542 PG/ML
BUN SERPL-MCNC: 11 MG/DL (ref 6–23)
CALCIUM SERPL-MCNC: 9 MG/DL (ref 8.3–10.4)
CHLORIDE SERPL-SCNC: 107 MMOL/L (ref 98–107)
CO2 SERPL-SCNC: 26 MMOL/L (ref 21–32)
CREAT SERPL-MCNC: 0.94 MG/DL (ref 0.6–1)
DIFFERENTIAL METHOD BLD: ABNORMAL
EOSINOPHIL # BLD: 0.3 K/UL (ref 0–0.8)
EOSINOPHIL NFR BLD: 5 % (ref 0.5–7.8)
ERYTHROCYTE [DISTWIDTH] IN BLOOD BY AUTOMATED COUNT: 12.4 % (ref 11.9–14.6)
GLOBULIN SER CALC-MCNC: 5 G/DL (ref 2.3–3.5)
GLUCOSE BLD STRIP.AUTO-MCNC: 339 MG/DL (ref 65–100)
GLUCOSE SERPL-MCNC: 317 MG/DL (ref 65–100)
HCT VFR BLD AUTO: 32.4 % (ref 35.8–46.3)
HGB BLD-MCNC: 11.5 G/DL (ref 11.7–15.4)
IMM GRANULOCYTES # BLD AUTO: 0 K/UL (ref 0–0.5)
IMM GRANULOCYTES NFR BLD AUTO: 0 % (ref 0–5)
LYMPHOCYTES # BLD: 2.2 K/UL (ref 0.5–4.6)
LYMPHOCYTES NFR BLD: 35 % (ref 13–44)
MAGNESIUM SERPL-MCNC: 1.6 MG/DL (ref 1.8–2.4)
MCH RBC QN AUTO: 29 PG (ref 26.1–32.9)
MCHC RBC AUTO-ENTMCNC: 35.5 G/DL (ref 31.4–35)
MCV RBC AUTO: 81.6 FL (ref 79.6–97.8)
MONOCYTES # BLD: 0.3 K/UL (ref 0.1–1.3)
MONOCYTES NFR BLD: 4 % (ref 4–12)
NEUTS SEG # BLD: 3.6 K/UL (ref 1.7–8.2)
NEUTS SEG NFR BLD: 56 % (ref 43–78)
NRBC # BLD: 0 K/UL (ref 0–0.2)
PLATELET # BLD AUTO: 230 K/UL (ref 150–450)
PMV BLD AUTO: 11.1 FL (ref 9.4–12.3)
POTASSIUM SERPL-SCNC: 3.7 MMOL/L (ref 3.5–5.1)
PROT SERPL-MCNC: 7.8 G/DL (ref 6.3–8.2)
RBC # BLD AUTO: 3.97 M/UL (ref 4.05–5.2)
SODIUM SERPL-SCNC: 140 MMOL/L (ref 136–145)
TROPONIN I SERPL-MCNC: 0.02 NG/ML (ref 0.02–0.05)
WBC # BLD AUTO: 6.4 K/UL (ref 4.3–11.1)

## 2019-09-21 PROCEDURE — 83880 ASSAY OF NATRIURETIC PEPTIDE: CPT

## 2019-09-21 PROCEDURE — 83735 ASSAY OF MAGNESIUM: CPT

## 2019-09-21 PROCEDURE — 93005 ELECTROCARDIOGRAM TRACING: CPT | Performed by: EMERGENCY MEDICINE

## 2019-09-21 PROCEDURE — 74011000258 HC RX REV CODE- 258: Performed by: EMERGENCY MEDICINE

## 2019-09-21 PROCEDURE — 85025 COMPLETE CBC W/AUTO DIFF WBC: CPT

## 2019-09-21 PROCEDURE — 71260 CT THORAX DX C+: CPT

## 2019-09-21 PROCEDURE — 99285 EMERGENCY DEPT VISIT HI MDM: CPT | Performed by: EMERGENCY MEDICINE

## 2019-09-21 PROCEDURE — 71046 X-RAY EXAM CHEST 2 VIEWS: CPT

## 2019-09-21 PROCEDURE — 84484 ASSAY OF TROPONIN QUANT: CPT

## 2019-09-21 PROCEDURE — 80053 COMPREHEN METABOLIC PANEL: CPT

## 2019-09-21 PROCEDURE — 74011636320 HC RX REV CODE- 636/320: Performed by: EMERGENCY MEDICINE

## 2019-09-21 PROCEDURE — 82962 GLUCOSE BLOOD TEST: CPT

## 2019-09-21 RX ORDER — FUROSEMIDE 10 MG/ML
40 INJECTION INTRAMUSCULAR; INTRAVENOUS
Status: COMPLETED | OUTPATIENT
Start: 2019-09-21 | End: 2019-09-22

## 2019-09-21 RX ORDER — SODIUM CHLORIDE 0.9 % (FLUSH) 0.9 %
10 SYRINGE (ML) INJECTION
Status: COMPLETED | OUTPATIENT
Start: 2019-09-21 | End: 2019-09-21

## 2019-09-21 RX ORDER — HYDRALAZINE HYDROCHLORIDE 20 MG/ML
10 INJECTION INTRAMUSCULAR; INTRAVENOUS
Status: DISCONTINUED | OUTPATIENT
Start: 2019-09-21 | End: 2019-09-22 | Stop reason: SDUPTHER

## 2019-09-21 RX ADMIN — Medication 10 ML: at 23:42

## 2019-09-21 RX ADMIN — SODIUM CHLORIDE 100 ML: 900 INJECTION, SOLUTION INTRAVENOUS at 23:42

## 2019-09-21 RX ADMIN — IOPAMIDOL 100 ML: 755 INJECTION, SOLUTION INTRAVENOUS at 23:42

## 2019-09-22 PROBLEM — I10 ESSENTIAL HYPERTENSION: Chronic | Status: ACTIVE | Noted: 2018-02-28

## 2019-09-22 PROBLEM — Z79.4 CONTROLLED TYPE 2 DIABETES MELLITUS, WITH LONG-TERM CURRENT USE OF INSULIN (HCC): Chronic | Status: ACTIVE | Noted: 2019-09-22

## 2019-09-22 PROBLEM — E11.65 HYPERGLYCEMIA DUE TO TYPE 2 DIABETES MELLITUS (HCC): Chronic | Status: ACTIVE | Noted: 2018-02-24

## 2019-09-22 PROBLEM — R11.2 NAUSEA & VOMITING: Status: RESOLVED | Noted: 2018-03-15 | Resolved: 2019-09-22

## 2019-09-22 PROBLEM — N30.00 ACUTE CYSTITIS WITHOUT HEMATURIA: Status: RESOLVED | Noted: 2018-03-15 | Resolved: 2019-09-22

## 2019-09-22 PROBLEM — E11.9 CONTROLLED TYPE 2 DIABETES MELLITUS, WITH LONG-TERM CURRENT USE OF INSULIN (HCC): Chronic | Status: ACTIVE | Noted: 2019-09-22

## 2019-09-22 PROBLEM — R53.1 GENERALIZED WEAKNESS: Status: RESOLVED | Noted: 2018-03-15 | Resolved: 2019-09-22

## 2019-09-22 PROBLEM — S72.141A INTERTROCHANTERIC FRACTURE OF RIGHT FEMUR (HCC): Status: RESOLVED | Noted: 2018-02-24 | Resolved: 2019-09-22

## 2019-09-22 PROBLEM — C50.912 MALIGNANT NEOPLASM OF LEFT FEMALE BREAST (HCC): Chronic | Status: ACTIVE | Noted: 2019-09-22

## 2019-09-22 PROBLEM — J81.1 PULMONARY EDEMA: Status: ACTIVE | Noted: 2019-09-22

## 2019-09-22 LAB
ANION GAP SERPL CALC-SCNC: 7 MMOL/L (ref 7–16)
ATRIAL RATE: 113 BPM
BASOPHILS # BLD: 0 K/UL (ref 0–0.2)
BASOPHILS NFR BLD: 1 % (ref 0–2)
BUN SERPL-MCNC: 9 MG/DL (ref 6–23)
CALCIUM SERPL-MCNC: 9.3 MG/DL (ref 8.3–10.4)
CALCULATED P AXIS, ECG09: 66 DEGREES
CALCULATED R AXIS, ECG10: 47 DEGREES
CALCULATED T AXIS, ECG11: -36 DEGREES
CHLORIDE SERPL-SCNC: 106 MMOL/L (ref 98–107)
CO2 SERPL-SCNC: 27 MMOL/L (ref 21–32)
CREAT SERPL-MCNC: 0.82 MG/DL (ref 0.6–1)
DIAGNOSIS, 93000: NORMAL
DIFFERENTIAL METHOD BLD: ABNORMAL
EOSINOPHIL # BLD: 0.3 K/UL (ref 0–0.8)
EOSINOPHIL NFR BLD: 4 % (ref 0.5–7.8)
ERYTHROCYTE [DISTWIDTH] IN BLOOD BY AUTOMATED COUNT: 12.7 % (ref 11.9–14.6)
GLUCOSE BLD STRIP.AUTO-MCNC: 168 MG/DL (ref 65–100)
GLUCOSE BLD STRIP.AUTO-MCNC: 212 MG/DL (ref 65–100)
GLUCOSE BLD STRIP.AUTO-MCNC: 222 MG/DL (ref 65–100)
GLUCOSE BLD STRIP.AUTO-MCNC: 235 MG/DL (ref 65–100)
GLUCOSE SERPL-MCNC: 236 MG/DL (ref 65–100)
HCT VFR BLD AUTO: 34.8 % (ref 35.8–46.3)
HGB BLD-MCNC: 12 G/DL (ref 11.7–15.4)
IMM GRANULOCYTES # BLD AUTO: 0 K/UL (ref 0–0.5)
IMM GRANULOCYTES NFR BLD AUTO: 0 % (ref 0–5)
LYMPHOCYTES # BLD: 1.7 K/UL (ref 0.5–4.6)
LYMPHOCYTES NFR BLD: 25 % (ref 13–44)
MAGNESIUM SERPL-MCNC: 2.3 MG/DL (ref 1.8–2.4)
MCH RBC QN AUTO: 29.3 PG (ref 26.1–32.9)
MCHC RBC AUTO-ENTMCNC: 34.5 G/DL (ref 31.4–35)
MCV RBC AUTO: 84.9 FL (ref 79.6–97.8)
MONOCYTES # BLD: 0.3 K/UL (ref 0.1–1.3)
MONOCYTES NFR BLD: 5 % (ref 4–12)
NEUTS SEG # BLD: 4.2 K/UL (ref 1.7–8.2)
NEUTS SEG NFR BLD: 65 % (ref 43–78)
NRBC # BLD: 0 K/UL (ref 0–0.2)
P-R INTERVAL, ECG05: 180 MS
PLATELET # BLD AUTO: 247 K/UL (ref 150–450)
PMV BLD AUTO: 11.7 FL (ref 9.4–12.3)
POTASSIUM SERPL-SCNC: 3.1 MMOL/L (ref 3.5–5.1)
Q-T INTERVAL, ECG07: 348 MS
QRS DURATION, ECG06: 100 MS
QTC CALCULATION (BEZET), ECG08: 477 MS
RBC # BLD AUTO: 4.1 M/UL (ref 4.05–5.2)
SODIUM SERPL-SCNC: 140 MMOL/L (ref 136–145)
VENTRICULAR RATE, ECG03: 113 BPM
WBC # BLD AUTO: 6.6 K/UL (ref 4.3–11.1)

## 2019-09-22 PROCEDURE — 74011250637 HC RX REV CODE- 250/637: Performed by: NURSE PRACTITIONER

## 2019-09-22 PROCEDURE — 74011250636 HC RX REV CODE- 250/636: Performed by: EMERGENCY MEDICINE

## 2019-09-22 PROCEDURE — 80048 BASIC METABOLIC PNL TOTAL CA: CPT

## 2019-09-22 PROCEDURE — 94640 AIRWAY INHALATION TREATMENT: CPT

## 2019-09-22 PROCEDURE — 96375 TX/PRO/DX INJ NEW DRUG ADDON: CPT | Performed by: EMERGENCY MEDICINE

## 2019-09-22 PROCEDURE — 74011250636 HC RX REV CODE- 250/636: Performed by: NURSE PRACTITIONER

## 2019-09-22 PROCEDURE — 94760 N-INVAS EAR/PLS OXIMETRY 1: CPT

## 2019-09-22 PROCEDURE — 96374 THER/PROPH/DIAG INJ IV PUSH: CPT | Performed by: EMERGENCY MEDICINE

## 2019-09-22 PROCEDURE — 77010033678 HC OXYGEN DAILY

## 2019-09-22 PROCEDURE — 83735 ASSAY OF MAGNESIUM: CPT

## 2019-09-22 PROCEDURE — 74011000250 HC RX REV CODE- 250: Performed by: FAMILY MEDICINE

## 2019-09-22 PROCEDURE — 74011250637 HC RX REV CODE- 250/637: Performed by: FAMILY MEDICINE

## 2019-09-22 PROCEDURE — 85025 COMPLETE CBC W/AUTO DIFF WBC: CPT

## 2019-09-22 PROCEDURE — 74011250636 HC RX REV CODE- 250/636: Performed by: FAMILY MEDICINE

## 2019-09-22 PROCEDURE — 36415 COLL VENOUS BLD VENIPUNCTURE: CPT

## 2019-09-22 PROCEDURE — 77030013140 HC MSK NEB VYRM -A

## 2019-09-22 PROCEDURE — 65660000000 HC RM CCU STEPDOWN

## 2019-09-22 PROCEDURE — 74011636637 HC RX REV CODE- 636/637: Performed by: FAMILY MEDICINE

## 2019-09-22 PROCEDURE — 82962 GLUCOSE BLOOD TEST: CPT

## 2019-09-22 PROCEDURE — 74011250637 HC RX REV CODE- 250/637: Performed by: INTERNAL MEDICINE

## 2019-09-22 RX ORDER — HYDROCODONE BITARTRATE AND ACETAMINOPHEN 10; 325 MG/1; MG/1
1 TABLET ORAL
Status: DISCONTINUED | OUTPATIENT
Start: 2019-09-22 | End: 2019-09-24 | Stop reason: SDUPTHER

## 2019-09-22 RX ORDER — SODIUM CHLORIDE 0.9 % (FLUSH) 0.9 %
5-40 SYRINGE (ML) INJECTION EVERY 8 HOURS
Status: DISCONTINUED | OUTPATIENT
Start: 2019-09-22 | End: 2019-09-25 | Stop reason: HOSPADM

## 2019-09-22 RX ORDER — HYDROCHLOROTHIAZIDE 25 MG/1
25 TABLET ORAL DAILY
Status: DISCONTINUED | OUTPATIENT
Start: 2019-09-22 | End: 2019-09-26

## 2019-09-22 RX ORDER — ALBUTEROL SULFATE 0.83 MG/ML
2.5 SOLUTION RESPIRATORY (INHALATION)
Status: DISCONTINUED | OUTPATIENT
Start: 2019-09-22 | End: 2019-09-26 | Stop reason: HOSPADM

## 2019-09-22 RX ORDER — DEXTROSE 40 %
15 GEL (GRAM) ORAL AS NEEDED
Status: DISCONTINUED | OUTPATIENT
Start: 2019-09-22 | End: 2019-09-26 | Stop reason: HOSPADM

## 2019-09-22 RX ORDER — MAGNESIUM SULFATE HEPTAHYDRATE 40 MG/ML
2 INJECTION, SOLUTION INTRAVENOUS ONCE
Status: COMPLETED | OUTPATIENT
Start: 2019-09-22 | End: 2019-09-22

## 2019-09-22 RX ORDER — LISINOPRIL 5 MG/1
10 TABLET ORAL 2 TIMES DAILY
Status: DISCONTINUED | OUTPATIENT
Start: 2019-09-22 | End: 2019-09-26

## 2019-09-22 RX ORDER — SODIUM CHLORIDE 0.9 % (FLUSH) 0.9 %
5-40 SYRINGE (ML) INJECTION AS NEEDED
Status: DISCONTINUED | OUTPATIENT
Start: 2019-09-22 | End: 2019-09-25 | Stop reason: HOSPADM

## 2019-09-22 RX ORDER — MAGNESIUM SULFATE HEPTAHYDRATE 40 MG/ML
2 INJECTION, SOLUTION INTRAVENOUS ONCE
Status: DISCONTINUED | OUTPATIENT
Start: 2019-09-22 | End: 2019-09-22

## 2019-09-22 RX ORDER — POTASSIUM CHLORIDE 20 MEQ/1
40 TABLET, EXTENDED RELEASE ORAL
Status: COMPLETED | OUTPATIENT
Start: 2019-09-22 | End: 2019-09-22

## 2019-09-22 RX ORDER — FERROUS SULFATE, DRIED 160(50) MG
1 TABLET, EXTENDED RELEASE ORAL 2 TIMES DAILY WITH MEALS
Status: DISCONTINUED | OUTPATIENT
Start: 2019-09-22 | End: 2019-09-26 | Stop reason: HOSPADM

## 2019-09-22 RX ORDER — METOPROLOL TARTRATE 25 MG/1
12.5 TABLET, FILM COATED ORAL ONCE
Status: COMPLETED | OUTPATIENT
Start: 2019-09-22 | End: 2019-09-22

## 2019-09-22 RX ORDER — LORAZEPAM 1 MG/1
1 TABLET ORAL
Status: DISCONTINUED | OUTPATIENT
Start: 2019-09-22 | End: 2019-09-26 | Stop reason: HOSPADM

## 2019-09-22 RX ORDER — ONDANSETRON 2 MG/ML
4 INJECTION INTRAMUSCULAR; INTRAVENOUS
Status: DISCONTINUED | OUTPATIENT
Start: 2019-09-22 | End: 2019-09-26 | Stop reason: HOSPADM

## 2019-09-22 RX ORDER — METOPROLOL TARTRATE 25 MG/1
12.5 TABLET, FILM COATED ORAL 2 TIMES DAILY
Status: DISCONTINUED | OUTPATIENT
Start: 2019-09-22 | End: 2019-09-22

## 2019-09-22 RX ORDER — HYDRALAZINE HYDROCHLORIDE 20 MG/ML
10 INJECTION INTRAMUSCULAR; INTRAVENOUS
Status: COMPLETED | OUTPATIENT
Start: 2019-09-22 | End: 2019-09-22

## 2019-09-22 RX ORDER — ENOXAPARIN SODIUM 100 MG/ML
40 INJECTION SUBCUTANEOUS EVERY 24 HOURS
Status: DISCONTINUED | OUTPATIENT
Start: 2019-09-22 | End: 2019-09-26 | Stop reason: HOSPADM

## 2019-09-22 RX ORDER — ACETAMINOPHEN 325 MG/1
650 TABLET ORAL
Status: DISCONTINUED | OUTPATIENT
Start: 2019-09-22 | End: 2019-09-24 | Stop reason: SDUPTHER

## 2019-09-22 RX ORDER — BISACODYL 5 MG
5 TABLET, DELAYED RELEASE (ENTERIC COATED) ORAL DAILY PRN
Status: DISCONTINUED | OUTPATIENT
Start: 2019-09-22 | End: 2019-09-26 | Stop reason: HOSPADM

## 2019-09-22 RX ORDER — DIPHENHYDRAMINE HCL 25 MG
25 CAPSULE ORAL
Status: DISCONTINUED | OUTPATIENT
Start: 2019-09-22 | End: 2019-09-26 | Stop reason: HOSPADM

## 2019-09-22 RX ORDER — NALOXONE HYDROCHLORIDE 0.4 MG/ML
0.4 INJECTION, SOLUTION INTRAMUSCULAR; INTRAVENOUS; SUBCUTANEOUS AS NEEDED
Status: DISCONTINUED | OUTPATIENT
Start: 2019-09-22 | End: 2019-09-24 | Stop reason: SDUPTHER

## 2019-09-22 RX ORDER — CARVEDILOL 6.25 MG/1
6.25 TABLET ORAL 2 TIMES DAILY WITH MEALS
Status: DISCONTINUED | OUTPATIENT
Start: 2019-09-22 | End: 2019-09-23

## 2019-09-22 RX ORDER — POTASSIUM CHLORIDE 20 MEQ/1
20 TABLET, EXTENDED RELEASE ORAL DAILY
Status: DISCONTINUED | OUTPATIENT
Start: 2019-09-22 | End: 2019-09-23

## 2019-09-22 RX ORDER — DEXTROSE 50 % IN WATER (D50W) INTRAVENOUS SYRINGE
25-50 AS NEEDED
Status: DISCONTINUED | OUTPATIENT
Start: 2019-09-22 | End: 2019-09-26 | Stop reason: HOSPADM

## 2019-09-22 RX ORDER — LISINOPRIL 5 MG/1
10 TABLET ORAL DAILY
Status: DISCONTINUED | OUTPATIENT
Start: 2019-09-22 | End: 2019-09-22

## 2019-09-22 RX ADMIN — FUROSEMIDE 40 MG: 10 INJECTION, SOLUTION INTRAMUSCULAR; INTRAVENOUS at 00:06

## 2019-09-22 RX ADMIN — CARVEDILOL 6.25 MG: 6.25 TABLET, FILM COATED ORAL at 17:34

## 2019-09-22 RX ADMIN — METOPROLOL TARTRATE 12.5 MG: 25 TABLET ORAL at 04:36

## 2019-09-22 RX ADMIN — INSULIN HUMAN 4 UNITS: 100 INJECTION, SOLUTION PARENTERAL at 12:06

## 2019-09-22 RX ADMIN — LISINOPRIL 10 MG: 5 TABLET ORAL at 17:33

## 2019-09-22 RX ADMIN — LISINOPRIL 10 MG: 5 TABLET ORAL at 09:16

## 2019-09-22 RX ADMIN — HYDROCHLOROTHIAZIDE 25 MG: 25 TABLET ORAL at 09:16

## 2019-09-22 RX ADMIN — Medication 10 ML: at 21:40

## 2019-09-22 RX ADMIN — INSULIN HUMAN 2 UNITS: 100 INJECTION, SOLUTION PARENTERAL at 17:34

## 2019-09-22 RX ADMIN — Medication 10 ML: at 02:31

## 2019-09-22 RX ADMIN — INSULIN HUMAN 4 UNITS: 100 INJECTION, SOLUTION PARENTERAL at 06:45

## 2019-09-22 RX ADMIN — INSULIN HUMAN 4 UNITS: 100 INJECTION, SOLUTION PARENTERAL at 21:39

## 2019-09-22 RX ADMIN — Medication 1 TABLET: at 09:16

## 2019-09-22 RX ADMIN — Medication 5 ML: at 06:49

## 2019-09-22 RX ADMIN — Medication 10 ML: at 12:08

## 2019-09-22 RX ADMIN — HYDRALAZINE HYDROCHLORIDE 10 MG: 20 INJECTION INTRAMUSCULAR; INTRAVENOUS at 02:31

## 2019-09-22 RX ADMIN — MAGNESIUM SULFATE HEPTAHYDRATE 2 G: 40 INJECTION, SOLUTION INTRAVENOUS at 12:07

## 2019-09-22 RX ADMIN — ENOXAPARIN SODIUM 40 MG: 40 INJECTION SUBCUTANEOUS at 09:17

## 2019-09-22 RX ADMIN — POTASSIUM CHLORIDE 20 MEQ: 1500 TABLET, EXTENDED RELEASE ORAL at 09:16

## 2019-09-22 RX ADMIN — ALBUTEROL SULFATE 2.5 MG: 2.5 SOLUTION RESPIRATORY (INHALATION) at 09:20

## 2019-09-22 RX ADMIN — Medication 1 TABLET: at 17:34

## 2019-09-22 RX ADMIN — POTASSIUM CHLORIDE 40 MEQ: 1500 TABLET, EXTENDED RELEASE ORAL at 12:07

## 2019-09-22 RX ADMIN — ALBUTEROL SULFATE 2.5 MG: 2.5 SOLUTION RESPIRATORY (INHALATION) at 04:54

## 2019-09-22 NOTE — CONSULTS
[address] Cardiology Consult                Date of  Admission: 9/21/2019  8:48 PM     Primary Care Physician: Dr. Toshia Moreno  Primary Cardiologist: DULCE  Referring Physician: Hospitalist   Consulting Physician: Dr. Damien Patel     CC/Reason for consult: new onset heart failure       Nita Pedro is a 61 y.o. female with prior h/o DM, HTN, and breast cancer. Patient presented to ED at [facility] with c/o SOB, cough and chest tightness. She reports her chest tightness been noted intermittently for several weeks She reports her shortness of breath and chest tightness worse yesterday so came into ED for further care. She denies any weight gain or lower ext edema, actually some mild palpitations at times and weight loss d/t no appetite. She also has  breast cancer that was treated with radiation then lumpectomy and oral chemo couple years ago. In ED, labs showed , K+ 3.1, trop neg, CT chest showed Cardiomegaly, interstitial lung edema and small bilateral pleural effusions, suggestive of CHF or fluid overload. EKG showed ST with diffuse T wave changes. Hospitalist admitted to floor bed for Pul edema suspect new CHF. An echo was ordered and pending and IV lasix started. She is already taking BB and ACE. B/P in ED was 186/129. Cardiology was consulted for new onset HF. She is sitting in chair with no chest pain or SOB post lasix. She is on RA.            Diagnosis    Hyperglycemia due to type 2 diabetes mellitus ([address]    Essential hypertension    Malignant neoplasm of left female breast ([address]    Pulmonary edema    Controlled type 2 diabetes mellitus, with long-term current use of insulin (HCC)       Past Medical History:   Diagnosis Date    Cancer ([address]     breast- L    Diabetes ([address]     Hypertension     Intertrochanteric fracture of right femur ([address] 2/24/2018      Past Surgical History:   Procedure Laterality Date    HX BREAST LUMPECTOMY      HX HIP FRACTURE TX Right     HX HIP REPLACEMENT       No Known Allergies No family history on file. Current Facility-Administered Medications   Medication Dose Route Frequency    calcium-vitamin D (OS-MONICA) 500 mg-200 unit tablet  1 Tab Oral BID WITH MEALS    lisinopril (PRINIVIL, ZESTRIL) tablet 10 mg  10 mg Oral DAILY    metoprolol tartrate (LOPRESSOR) tablet 12.5 mg  12.5 mg Oral BID    potassium chloride (K-DUR, KLOR-CON) SR tablet 20 mEq  20 mEq Oral DAILY    sodium chloride (NS) flush 5-40 mL  5-40 mL IntraVENous Q8H    sodium chloride (NS) flush 5-40 mL  5-40 mL IntraVENous PRN    acetaminophen (TYLENOL) tablet 650 mg  650 mg Oral Q4H PRN    HYDROcodone-acetaminophen (NORCO)  mg tablet 1 Tab  1 Tab Oral Q4H PRN    naloxone (NARCAN) injection 0.4 mg  0.4 mg IntraVENous PRN    diphenhydrAMINE (BENADRYL) capsule 25 mg  25 mg Oral Q6H PRN    ondansetron (ZOFRAN) injection 4 mg  4 mg IntraVENous Q4H PRN    bisacodyl (DULCOLAX) tablet 5 mg  5 mg Oral DAILY PRN    LORazepam (ATIVAN) tablet 1 mg  1 mg Oral BID PRN    enoxaparin (LOVENOX) injection 40 mg  40 mg SubCUTAneous Q24H    insulin regular (NOVOLIN R, HUMULIN R) injection   SubCUTAneous AC&HS    dextrose 40% (GLUTOSE) oral gel 1 Tube  15 g Oral PRN    glucagon (GLUCAGEN) injection 1 mg  1 mg IntraMUSCular PRN    dextrose (D50W) injection syrg 12.5-25 g  25-50 mL IntraVENous PRN    influenza vaccine 2019-20 (6 mos+)(PF) (FLUARIX/FLULAVAL/FLUZONE QUAD) injection 0.5 mL  0.5 mL IntraMUSCular PRIOR TO DISCHARGE    albuterol (PROVENTIL VENTOLIN) nebulizer solution 2.5 mg  2.5 mg Nebulization Q6H PRN    hydroCHLOROthiazide (HYDRODIURIL) tablet 25 mg  25 mg Oral DAILY       Review of Systems   Constitution: Positive for decreased appetite, malaise/fatigue and weight loss. Negative for diaphoresis. HENT: Positive for congestion. Cardiovascular: Positive for chest pain, dyspnea on exertion and palpitations.  Negative for claudication, cyanosis, irregular heartbeat, leg swelling, near-syncope, orthopnea, paroxysmal nocturnal dyspnea and syncope. Respiratory: Positive for cough and shortness of breath. Negative for wheezing. Endocrine: Negative for cold intolerance and heat intolerance. Hematologic/Lymphatic: Does not bruise/bleed easily. Skin: Negative for nail changes. Gastrointestinal: Positive for nausea. Neurological: Negative for dizziness, headaches and weakness. Physical Exam  Vitals:    09/22/19 0616 09/22/19 0734 09/22/19 1110 09/22/19 1122   BP:  132/84  (!) 146/92   Pulse:  97  98   Resp:  20  21   Temp:  98.4 °F (36.9 °C)  97.7 °F (36.5 °C)   SpO2:  94% 95% 95%   Weight: 188 lb 9.6 oz (85.5 kg)      Height:           Physical Exam:  General: Well Developed, Well Nourished, No Acute Distress  HEENT: pupils equal and round, no abnormalities noted  Neck: supple, no JVD, no carotid bruits  Heart: S1S2 with RRR without murmurs or gallops  Lungs: Clear throughout auscultation bilaterally without adventitious sounds  Abd: soft, nontender, nondistended, with good bowel sounds  Ext: warm, no edema, calves supple/nontender, pulses 2+ bilaterally  Skin: warm and dry  Psychiatric: Normal mood and affect  Neurologic: Alert and oriented X 3    Cardiographics    Telemetry: ordered   ECG: ST with diffuse T wave changes  Echocardiogram: ordered     Labs:   Recent Labs     09/22/19  0609 09/21/19 2020    140   K 3.1* 3.7   MG  --  1.6*   BUN 9 11   CREA 0.82 0.94   * 317*   WBC 6.6 6.4   HGB 12.0 11.5*   HCT 34.8* 32.4*    230        Assessment/Plan:     Assessment:      Principal Problem:    Pulmonary edema (9/22/2019)- patient looks euvolemic; she reports increased UOP but no output recorded yet. Likely pulmonary edema secondary to uncontrolled HTN. Will continue ACE and HCTZ for now, change BB to approved BB for CHF. Further recommendations post Echo.        Overview: Suspect new onset CHF    Active Problems:    Hyperglycemia due to type 2 diabetes mellitus ([facility] [address] [facility] (2/24/2018) Essential hypertension (2/28/2018)- needs better control; see above with changes. Controlled type 2 diabetes mellitus, with long-term current use of insulin ([facility] [address] (9/22/2019)- per primary team       Hypokalemia- replaced in ED, labs in am.       Chest tightness- noted for weeks. Worse yesterday. Trop x 1 neg. Likely secondary to uncontrolled HTN. Further recommendations pending clinical course. Can consider outpatient w/u         Thank you very much for this referral. We appreciate the opportunity to participate in this patient's care. We will follow along with above stated plan.     Heather Jaimes NP  Consulting MD: Dr. Sami Danielle

## 2019-09-22 NOTE — ED PROVIDER NOTES
Patient is a 62 yo female who presents with chest pain and SOB and cough. States symptoms began suddenly about 3 days ago and gradually worsened. States no history of heart disease or heart failure, no history of lung problems. She is on chemotherapy for breast cancer. She has had cough productive of white/green sputum. Overall patient appears well. Palpitations    Associated symptoms include chest pain, cough and shortness of breath. Pertinent negatives include no fever, no abdominal pain, no nausea, no vomiting, no headaches, no back pain and no weakness. Past Medical History:   Diagnosis Date    Cancer ([facility] [address]     breast- L    [facility] ([facility]     Hypertension        Past Surgical History:   Procedure Laterality Date    HX BREAST LUMPECTOMY      HX HIP FRACTURE TX Right     HX HIP REPLACEMENT           No family history on file.     Social History     Socioeconomic History    Marital status: SINGLE     Spouse name: Not on file    Number of children: Not on file    Years of education: Not on file    Highest education level: Not on file   Occupational History    Not on file   Social Needs    Financial resource strain: Not on file    Food insecurity:     Worry: Not on file     Inability: Not on file    Transportation needs:     Medical: Not on file     Non-medical: Not on file   Tobacco Use    Smoking status: Never Smoker    Smokeless tobacco: Never Used   Substance and Sexual Activity    Alcohol use: Yes     Comment: social    Drug use: No    Sexual activity: Not on file   Lifestyle    Physical activity:     Days per week: Not on file     Minutes per session: Not on file    Stress: Not on file   Relationships    Social connections:     Talks on phone: Not on file     Gets together: Not on file     Attends Islam service: Not on file     Active member of club or organization: Not on file     Attends meetings of clubs or organizations: Not on file     Relationship status: Not on file    Intimate partner violence:     Fear of current or ex partner: Not on file     Emotionally abused: Not on file     Physically abused: Not on file     Forced sexual activity: Not on file   Other Topics Concern    Not on file   Social History Narrative    Not on file         ALLERGIES: Patient has no known allergies. Review of Systems   Constitutional: Negative for chills and fever. HENT: Negative for rhinorrhea and sore throat. Eyes: Negative for visual disturbance. Respiratory: Positive for cough and shortness of breath. Cardiovascular: Positive for chest pain and palpitations. Negative for leg swelling. Gastrointestinal: Negative for abdominal pain, diarrhea, nausea and vomiting. Genitourinary: Negative for dysuria. Musculoskeletal: Negative for back pain and neck pain. Skin: Negative for rash. Neurological: Negative for weakness and headaches. Psychiatric/Behavioral: The patient is not nervous/anxious. Vitals:    09/21/19 2009 09/21/19 2058 09/21/19 2059   BP: (!) 186/129 (!) 186/110    Pulse: (!) 113 (!) 114    Resp: 20 25    Temp: 98.7 °F (37.1 °C)     SpO2: 95% 93% 95%   Weight: 75.8 kg (167 lb)     Height: 5' 3\" (1.6 m)              Physical Exam   Constitutional: She is oriented to person, place, and time. She appears well-developed and well-nourished. HENT:   Head: Normocephalic. Right Ear: External ear normal.   Left Ear: External ear normal.   Eyes: Pupils are equal, round, and reactive to light. Conjunctivae and EOM are normal.   Neck: Normal range of motion. Neck supple. No tracheal deviation present. Cardiovascular: Regular rhythm, normal heart sounds and intact distal pulses. No murmur heard. Tachycardic, regular   Pulmonary/Chest: Effort normal and breath sounds normal. No respiratory distress. Abdominal: Soft. There is no tenderness. Musculoskeletal: Normal range of motion. Neurological: She is alert and oriented to person, place, and time.  No cranial nerve deficit. Skin: No rash noted. Nursing note and vitals reviewed. MDM  Number of Diagnoses or Management Options     Amount and/or Complexity of Data Reviewed  Clinical lab tests: ordered and reviewed  Tests in the radiology section of CPT®: ordered and reviewed  Tests in the medicine section of CPT®: ordered and reviewed  Review and summarize past medical records: yes    Risk of Complications, Morbidity, and/or Mortality  Presenting problems: high  Diagnostic procedures: high  Management options: high    Patient Progress  Patient progress: stable         Procedures    Recent Results (from the past 12 hour(s))   GLUCOSE, POC    Collection Time: 09/21/19  8:18 PM   Result Value Ref Range    Glucose (POC) 339 (H) 65 - 100 mg/dL   CBC WITH AUTOMATED DIFF    Collection Time: 09/21/19  8:20 PM   Result Value Ref Range    WBC 6.4 4.3 - 11.1 K/uL    RBC 3.97 (L) 4.05 - 5.2 M/uL    HGB 11.5 (L) 11.7 - 15.4 g/dL    HCT 32.4 (L) 35.8 - 46.3 %    MCV 81.6 79.6 - 97.8 FL    MCH 29.0 26.1 - 32.9 PG    MCHC 35.5 (H) 31.4 - 35.0 g/dL    RDW 12.4 11.9 - 14.6 %    PLATELET 127 796 - 504 K/uL    MPV 11.1 9.4 - 12.3 FL    ABSOLUTE NRBC 0.00 0.0 - 0.2 K/uL    DF AUTOMATED      NEUTROPHILS 56 43 - 78 %    LYMPHOCYTES 35 13 - 44 %    MONOCYTES 4 4.0 - 12.0 %    EOSINOPHILS 5 0.5 - 7.8 %    BASOPHILS 1 0.0 - 2.0 %    IMMATURE GRANULOCYTES 0 0.0 - 5.0 %    ABS. NEUTROPHILS 3.6 1.7 - 8.2 K/UL    ABS. LYMPHOCYTES 2.2 0.5 - 4.6 K/UL    ABS. MONOCYTES 0.3 0.1 - 1.3 K/UL    ABS. EOSINOPHILS 0.3 0.0 - 0.8 K/UL    ABS. BASOPHILS 0.0 0.0 - 0.2 K/UL    ABS. IMM.  GRANS. 0.0 0.0 - 0.5 K/UL   METABOLIC PANEL, COMPREHENSIVE    Collection Time: 09/21/19  8:20 PM   Result Value Ref Range    Sodium 140 136 - 145 mmol/L    Potassium 3.7 3.5 - 5.1 mmol/L    Chloride 107 98 - 107 mmol/L    CO2 26 21 - 32 mmol/L    Anion gap 7 7 - 16 mmol/L    Glucose 317 (H) 65 - 100 mg/dL    BUN 11 6 - 23 MG/DL    Creatinine 0.94 0.6 - 1.0 MG/DL    GFR est AA >60 >60 ml/min/1.73m2    GFR est non-AA >60 >60 ml/min/1.73m2    Calcium 9.0 8.3 - 10.4 MG/DL    Bilirubin, total 0.1 (L) 0.2 - 1.1 MG/DL    ALT (SGPT) 19 12 - 65 U/L    AST (SGOT) 17 15 - 37 U/L    Alk. phosphatase 72 50 - 136 U/L    Protein, total 7.8 6.3 - 8.2 g/dL    Albumin 2.8 (L) 3.5 - 5.0 g/dL    Globulin 5.0 (H) 2.3 - 3.5 g/dL    A-G Ratio 0.6 (L) 1.2 - 3.5     TROPONIN I    Collection Time: 09/21/19  8:20 PM   Result Value Ref Range    Troponin-I, Qt. 0.02 0.02 - 0.05 NG/ML   MAGNESIUM    Collection Time: 09/21/19  8:20 PM   Result Value Ref Range    Magnesium 1.6 (L) 1.8 - 2.4 mg/dL   BNP    Collection Time: 09/21/19  8:20 PM   Result Value Ref Range     (H) 0 pg/mL     Xr Chest Pa Lat    Result Date: 9/21/2019  CHEST X-RAY, 2 views. HISTORY:  Cough and shortness breath. TECHNIQUE: PA and lateral views. COMPARISON: February 2018. FINDINGS / IMPRESSION : Bilateral perihilar infiltrates and mild cardiomegaly, possibly failure. Upper lung zones are clear. Ct Chest W Cont    Result Date: 9/21/2019  EXAM: CT Chest with IV contrast - PE protocol. INDICATION: Dyspnea, pain. COMPARISON: None. TECHNIQUE: Axial CT images of the chest were obtained after the intravenous injection of 100 mL Isovue 370 CT contrast. Radiation dose reduction techniques were used for this study. Our CT scanners use one or all of the following: Automated exposure control, adjustment of the mA and/or kV according to patient size, iterative reconstruction. FINDINGS: - Pleura/pericardium: There are small bilateral pleural effusions. No pericardial effusion is identified. - Lungs: Prominent interstitial lung markings probably relate to interstitial edema. - Ama/Mediastinum: Within normal limits. - Tracheobronchial tree: Within normal limits. - Aorta/pulmonary arteries: Within normal limits. - Heart: The heart is mildly enlarged. - Coronary arteries: No coronary artery calcifications. - Chest wall: Within normal limits. - Spine/bones: No acute process. - Additional comments: None. IMPRESSION: 1. No evidence of pulmonary embolism. 2. Cardiomegaly, interstitial lung edema and small bilateral pleural effusions, suggestive of CHF or fluid overload. 62 yo female with SOB:     Patient with apparent new onset CHF. I discussed with cardiology and will admit hospitalist for Echo, lasix, and further workup and management.

## 2019-09-22 NOTE — PROGRESS NOTES
Subjective:  Symptoms:  Improved. No shortness of breath. Diet:  Adequate intake. Activity level: Normal.    Pain:  She reports pain is improving. Pt is a 61 y.o. F with PMH DM, HTN and breast cancer who was admitted for pulmonary edema with suspect new onset of HF. Pt is seen sitting up in chair on room air and reports she is feeling much better from yesterday with severe chest tightness. Denies any chest pain. Breast cancer was treated with radiation then lumpectomy and oral chemo couple years ago    Temp:  [98.2 °F (36.8 °C)-98.9 °F (37.2 °C)]   Pulse (Heart Rate):  []   BP: (132-195)/()   Resp Rate:  [19-43]   O2 Sat (%):  [88 %-97 %]   Weight:  [75.8 kg (167 lb)-85.5 kg (188 lb 9.6 oz)]   No intake/output data recorded. No intake/output data recorded. Objective:  General Appearance:  Comfortable, well-appearing and in no acute distress. Vital signs: (most recent): Blood pressure 132/84, pulse 97, temperature 98.4 °F (36.9 °C), resp. rate 20, height 5' 3\" (1.6 m), weight 85.5 kg (188 lb 9.6 oz), SpO2 94 %, not currently breastfeeding. Vital signs are normal.    Output: Producing urine. HEENT: Normal HEENT exam.    Lungs:  Normal effort and normal respiratory rate. Heart: Normal rate. Regular rhythm. Chest: Asymmetric chest wall expansion. Abdomen: Abdomen is soft. Bowel sounds are normal.   There is no abdominal tenderness. Extremities: Normal range of motion. Pulses: Distal pulses are intact. Neurological: Patient is alert and oriented to person, place and time. Pupils:  Pupils are equal, round, and reactive to light. Skin:  Warm and dry.         Principal Problem:    Pulmonary edema (9/22/2019)      Overview: Suspect new onset CHF    Active Problems:    Hyperglycemia due to type 2 diabetes mellitus ([address] (2/24/2018)      Essential hypertension (2/28/2018)      Controlled type 2 diabetes mellitus, with long-term current use of insulin ([address] (9/22/2019)      Assessment & Plan  New onset HF: elevated BNP. Cardiology consult. May consider Entresto after ACEI washout. Pulmonary edema  : Responded well on Lasix; Improving. Already taking BB and ACE and HCTZ. Hypokalemia : Will order 40 meq KCL now. Continue on KCL 20meq daily. CMP in AM.    Hypomagnesmia: Will order 2 g IV now. Recheck Magnesium at 5pm and AM.    DM: Controlled and continue with insulin regimen    HTN : Continue with Metoprolol, and HCTZ.     Plan of care discussed with Dr. Khadra Reeves,  Signed:  Amelia Caldera, NP-C

## 2019-09-22 NOTE — ED TRIAGE NOTES
C/o palpitations, chest tightness and shortness of breath. Onset approx 3 days pta. Reports as intermittent. Reports nausea with eating. Denies vomiting. Reports productive cough with white sputum. Denies fevers. Denies smoker. Currently taking daily oral chemo for breast cancer. Denies hx htn however noted in chart as history, denies meds.  bgl [phone] in triage

## 2019-09-22 NOTE — PROGRESS NOTES
Patient resting in bed quietly, family at the bedside. Respirations even and unlabored on room air. Bed low and locked. Bedside table, personal belongings and call light all within reach. Preparing to give bedside shift report to oncoming nurse.

## 2019-09-22 NOTE — PROGRESS NOTES
Patient sitting in recliner  Respirations present  No signs of distress  No needs expressed at this time  Safety measures in place

## 2019-09-22 NOTE — PROGRESS NOTES
Attempted visit    Per notes:  Patient has breast cancer taking oral chemo  Possible new CHF?   Spouse -  Chas  Lost job a few years ago because of illness causing financial strain  Follow up from disease has not been optimal    Will follow closely    Barbie Person, staff [address], [address] [facility]  /   [email or web address]

## 2019-09-22 NOTE — ED NOTES
TRANSFER - OUT REPORT:    Verbal report given to Gabi(name) on Gunjan Drain  being transferred to 812(unit) for routine progression of care       Report consisted of patients Situation, Background, Assessment and   Recommendations(SBAR). Information from the following report(s) SBAR was reviewed with the receiving nurse. Lines:   Peripheral IV 09/21/19 Right Hand (Active)   Site Assessment Clean, dry, & intact 9/21/2019  8:58 PM   Phlebitis Assessment 0 9/21/2019  8:58 PM   Infiltration Assessment 0 9/21/2019  8:58 PM   Dressing Status Clean, dry, & intact 9/21/2019  8:58 PM   Dressing Type Transparent 9/21/2019  8:21 PM   Hub Color/Line Status Red 9/21/2019  8:21 PM       Peripheral IV 09/22/19 Right Antecubital (Active)   Site Assessment Clean, dry, & intact 9/22/2019 12:08 AM   Phlebitis Assessment 0 9/22/2019 12:08 AM   Infiltration Assessment 0 9/22/2019 12:08 AM   Dressing Status Clean, dry, & intact 9/22/2019 12:08 AM        Opportunity for questions and clarification was provided.       Patient transported with:   [employer]

## 2019-09-22 NOTE — PROGRESS NOTES
Spoke with Dr. He Cespedes. Informed of patient's blood pressure of 160/114. Dr. He Cespedes ordered this RN to administer the ordered Hydralazine 10 mg. Read back and confirmed.

## 2019-09-22 NOTE — H&P
HOSPITALIST H&P  NAME:  Martín Sultana   Age:  61 y.o.  :   1960   MRN:   [id number]  PCP: Adolfo Mcintosh MD  Treatment Team: Attending Provider: Ruthie Mock MD; Primary Nurse: Darryl Bejarano RN    Prior     CC: Reason for admission is: Pulm edema, suspect new CHF    HPI:   Patient history was obtained from the ER provider prior to seeing the patient. Patient is a 61 y.o. female who presents to the ER due to shortness of breath, cough and chest tightness. She reports her symptoms began about 3 days ago\" gradually been getting worse. She reports her shortness of breath is worse with any kind of exertion. She denies any history of heart disease, heart failure, or lung problems. She has been treated in the past for breast cancer and was on oral chemotherapy until she lost her job and could no longer afford it. That was a couple of years ago. She has had no follow-up for her breast cancer since then. Work-up in the emergency room showed her to have pulmonary edema suspicious for congestive heart failure, as well as bilateral small pleural effusions. She did receive IV Lasix in the emergency room with good output. Patient reports she is feeling much better. She denies any fevers, chills, nausea, vomiting, or diarrhea. At this time she does not have any chest pain. ROS:  All systems have been reviewed and are negative except as stated in HPI or elsewhere. Past Medical History:   Diagnosis Date    Cancer ([facility] [address]     breast- L    Diabetes ([facility] [address]     Hypertension     Intertrochanteric fracture of right femur ([facility] [address] 2018      Past Surgical History:   Procedure Laterality Date    HX BREAST LUMPECTOMY      HX HIP FRACTURE TX Right     HX HIP REPLACEMENT        Social History     Tobacco Use    Smoking status: Never Smoker    Smokeless tobacco: Never Used   Substance Use Topics    Alcohol use: Yes     Comment: social      No family history on file.     Nikita Lozano Reviewed and non-contributory to admitting diagnosis    No Known Allergies   Prior to Admission Medications   Prescriptions Last Dose Informant Patient Reported? Taking?   amoxicillin-clavulanate (AUGMENTIN) 875-125 mg per tablet   No No   Sig: Take 1 Tab by mouth two (2) times a day. calcium-vitamin D (OYSTER SHELL) 500 mg(1,250mg) -200 unit per tablet   No No   Sig: Take 1 Tab by mouth two (2) times daily (with meals). insulin aspart U-100 (NOVOLOG U-100 INSULIN ASPART) 100 unit/mL injection   Yes No   Sig: by SubCUTAneous route. lisinopril (PRINIVIL, ZESTRIL) 10 mg tablet   Yes No   Sig: Take 10 mg by mouth daily. Indications: hypertension   metFORMIN (GLUCOPHAGE) 500 mg tablet   Yes No   Sig: Take 1,000 mg by mouth two (2) times daily (with meals). Indications: type 2 diabetes mellitus   metoprolol tartrate (LOPRESSOR) 25 mg tablet   No No   Sig: Take 0.5 Tabs by mouth two (2) times a day. potassium chloride (K-DUR, KLOR-CON) 20 mEq tablet   No No   Sig: Take 1 Tab by mouth daily. Facility-Administered Medications: None         Objective:     No intake or output data in the 24 hours ending 19 0017   Temp (24hrs), Av.7 °F (37.1 °C), Min:98.7 °F (37.1 °C), Max:98.7 °F (37.1 °C)    Oxygen Therapy  O2 Sat (%): 94 % (19)  Pulse via Oximetry: 110 beats per minute (19)  O2 Device: Nasal cannula (19)  O2 Flow Rate (L/min): 2 l/min (19)   Body mass index is 29.58 kg/m².   Patient Vitals for the past 24 hrs:   Temp Pulse Resp BP SpO2   19  (!) 109 23 (!) 179/94 94 %   19  (!) 116  (!) 179/113 92 %   19  (!) 114 (!) 43  (!) 88 %   198  (!) 118 25 (!) 190/111 (!) 88 %   19  (!) 108 30 (!) 153/93 92 %   19  (!) 108 26 (!) 169/117 92 %   19     95 %   19  (!) 114 25 (!) 186/110 93 %   19 98.7 °F (37.1 °C) (!) 113 20 (!) 186/129 95 %     Physical Exam:    General:    WD and WN, No apparent distress. Head:   Normocephalic, without obvious abnormality, atraumatic. Eyes:  PERRL; EOMI; sclera normal/non-icteric  ENT:  Hearing is normal.  Oropharynx is clear with tacky mucous membranes   Resp:    Clear to auscultation bilaterally. No Wheezing or Rhonchi. Resp are even and unlabored  Heart[de-identified]  Regular rate and rhythm,  no murmur,   No LE edema  Abdomen:   Soft, non-tender. Not distended. Bowel sounds normal.  hepato-splenomegaly -none  Musc/SK: Muscle strength is good and tone normal; No cyanosis. No clubbing  Skin:     Texture, turgor normal. No significant rashes or lesions. Capillary refill < 2 sec  Neurologic: CN II - XII are grossly intact - Eye exam as noted above  Psych: Alert and oriented x 4;  Judgement and insight are normal     Data Review:   Recent Results (from the past 24 hour(s))   GLUCOSE, POC    Collection Time: 09/21/19  8:18 PM   Result Value Ref Range    Glucose (POC) 339 (H) 65 - 100 mg/dL   CBC WITH AUTOMATED DIFF    Collection Time: 09/21/19  8:20 PM   Result Value Ref Range    WBC 6.4 4.3 - 11.1 K/uL    RBC 3.97 (L) 4.05 - 5.2 M/uL    HGB 11.5 (L) 11.7 - 15.4 g/dL    HCT 32.4 (L) 35.8 - 46.3 %    MCV 81.6 79.6 - 97.8 FL    MCH 29.0 26.1 - 32.9 PG    MCHC 35.5 (H) 31.4 - 35.0 g/dL    RDW 12.4 11.9 - 14.6 %    PLATELET 999 886 - 723 K/uL    MPV 11.1 9.4 - 12.3 FL    ABSOLUTE NRBC 0.00 0.0 - 0.2 K/uL    DF AUTOMATED      NEUTROPHILS 56 43 - 78 %    LYMPHOCYTES 35 13 - 44 %    MONOCYTES 4 4.0 - 12.0 %    EOSINOPHILS 5 0.5 - 7.8 %    BASOPHILS 1 0.0 - 2.0 %    IMMATURE GRANULOCYTES 0 0.0 - 5.0 %    ABS. NEUTROPHILS 3.6 1.7 - 8.2 K/UL    ABS. LYMPHOCYTES 2.2 0.5 - 4.6 K/UL    ABS. MONOCYTES 0.3 0.1 - 1.3 K/UL    ABS. EOSINOPHILS 0.3 0.0 - 0.8 K/UL    ABS. BASOPHILS 0.0 0.0 - 0.2 K/UL    ABS. IMM.  GRANS. 0.0 0.0 - 0.5 K/UL   METABOLIC PANEL, COMPREHENSIVE    Collection Time: 09/21/19  8:20 PM   Result Value Ref Range    Sodium 140 136 - 145 mmol/L    Potassium 3.7 3.5 - 5.1 mmol/L    Chloride 107 98 - 107 mmol/L    CO2 26 21 - 32 mmol/L    Anion gap 7 7 - 16 mmol/L    Glucose 317 (H) 65 - 100 mg/dL    BUN 11 6 - 23 MG/DL    Creatinine 0.94 0.6 - 1.0 MG/DL    GFR est AA >60 >60 ml/min/1.73m2    GFR est non-AA >60 >60 ml/min/1.73m2    Calcium 9.0 8.3 - 10.4 MG/DL    Bilirubin, total 0.1 (L) 0.2 - 1.1 MG/DL    ALT (SGPT) 19 12 - 65 U/L    AST (SGOT) 17 15 - 37 U/L    Alk. phosphatase 72 50 - 136 U/L    Protein, total 7.8 6.3 - 8.2 g/dL    Albumin 2.8 (L) 3.5 - 5.0 g/dL    Globulin 5.0 (H) 2.3 - 3.5 g/dL    A-G Ratio 0.6 (L) 1.2 - 3.5     TROPONIN I    Collection Time: 09/21/19  8:20 PM   Result Value Ref Range    Troponin-I, Qt. 0.02 0.02 - 0.05 NG/ML   MAGNESIUM    Collection Time: 09/21/19  8:20 PM   Result Value Ref Range    Magnesium 1.6 (L) 1.8 - 2.4 mg/dL   BNP    Collection Time: 09/21/19  8:20 PM   Result Value Ref Range     (H) 0 pg/mL     CXR Results  (Last 48 hours)               09/21/19 2024  XR CHEST PA LAT Final result    Impression:  FINDINGS / IMPRESSION : Bilateral perihilar infiltrates and mild cardiomegaly,   possibly failure. Upper lung zones are clear. Narrative:  CHEST X-RAY, 2 views. HISTORY:  Cough and shortness breath. TECHNIQUE: PA and lateral views. COMPARISON: February 2018. CT Results  (Last 48 hours)               09/21/19 2342  CT CHEST W CONT Final result    Impression:  IMPRESSION:    1. No evidence of pulmonary embolism. 2. Cardiomegaly, interstitial lung edema and small bilateral pleural effusions,   suggestive of CHF or fluid overload. Narrative:  EXAM: CT Chest with IV contrast - PE protocol. INDICATION: Dyspnea, pain. COMPARISON: None. TECHNIQUE: Axial CT images of the chest were obtained after the intravenous   injection of 100 mL Isovue 370 CT contrast. Radiation dose reduction techniques   were used for this study.   Our CT scanners use one or all of the following:    Automated exposure control, adjustment of the mA and/or kV according to patient   size, iterative reconstruction. FINDINGS:   - Pleura/pericardium: There are small bilateral pleural effusions. No   pericardial effusion is identified.   - Lungs: Prominent interstitial lung markings probably relate to interstitial   edema. - Ama/Mediastinum: Within normal limits. - Tracheobronchial tree: Within normal limits. - Aorta/pulmonary arteries: Within normal limits. - Heart: The heart is mildly enlarged. - Coronary arteries: No coronary artery calcifications. - Chest wall: Within normal limits. - Spine/bones: No acute process. - Additional comments: None. Assessment and Plan: Active Hospital Problems    Diagnosis Date Noted    Pulmonary edema 09/22/2019     Suspect new onset CHF      Controlled type 2 diabetes mellitus, with long-term current use of insulin ([address] 09/22/2019    Essential hypertension 02/28/2018    Hyperglycemia due to type 2 diabetes mellitus ([address] 02/24/2018     Principal Problem:    Pulmonary edema (9/22/2019)    Suspect new CHF; ECHO in am; responded well to IV lasix; is already taking BB and ACE; will add HCTZ, but may need lasix    Active Problems:    Hyperglycemia due to type 2 diabetes mellitus ([address] (2/24/2018)    Continue home medications, with following changes: will hold metformin(if taking) due to acute illness; start SSI protocol; hold oral meds if NPO and reduce long acting insulins; will monitor and adjust treatments daily prn. Essential hypertension (2/28/2018)    Continue home meds and add prn hydralazine, if needed.       Controlled type 2 diabetes mellitus, with long-term current use of insulin ([address] (9/22/2019)    As above      · PLAN General  ·   · Cont appropriate home meds (see MAR)  · Control symptoms (pain, n/v, fever, etc)  · Monitor appropriate labs   · DVT prophylaxis: Lovenox  · Code status: Full;  HCPOA:   · Risk: high  · Anticipated DC needs:  · Estimated LOS:  Greater than 2 midnights  · Plans discussed with patient and/or caregiver; questions answered.       Med records reviewed if applicable; findings:     Critical care time if applicable:      Signed By: Magdalena Watters MD     September 22, 2019

## 2019-09-22 NOTE — PROGRESS NOTES
Beside shift report received from [facility]  Patient sitting in recliner  Respirations present  No signs of distress  No needs expressed at this time  Safety measures in place  Patient encouraged to call with needs

## 2019-09-22 NOTE — PROGRESS NOTES
TRANSFER - IN REPORT:    Verbal report received from [facility] on Roderick Campbell  being received from ER for routine progression of care      Report consisted of patients Situation, Background, Assessment and   Recommendations(SBAR). Information from the following report(s) SBAR, Kardex and MAR was reviewed with the receiving nurse. Opportunity for questions and clarification was provided. Assessment completed upon patients arrival to unit and care assumed.

## 2019-09-22 NOTE — PROGRESS NOTES
Problem: Breathing Pattern - Ineffective  Goal: *Absence of hypoxia  Outcome: Progressing Towards Goal     Problem: Falls - Risk of  Goal: *Absence of Falls  Description  Document Skylar Huston Fall Risk and appropriate interventions in the flowsheet.   Outcome: Progressing Towards Goal  Note:   Fall Risk Interventions:                                Problem: Nutrition Deficit  Goal: *Optimize nutritional status  Outcome: Progressing Towards Goal

## 2019-09-23 LAB
ANION GAP SERPL CALC-SCNC: 7 MMOL/L (ref 7–16)
BASOPHILS # BLD: 0 K/UL (ref 0–0.2)
BASOPHILS NFR BLD: 1 % (ref 0–2)
BNP SERPL-MCNC: 374 PG/ML
BUN SERPL-MCNC: 13 MG/DL (ref 6–23)
CALCIUM SERPL-MCNC: 8.9 MG/DL (ref 8.3–10.4)
CHLORIDE SERPL-SCNC: 106 MMOL/L (ref 98–107)
CO2 SERPL-SCNC: 28 MMOL/L (ref 21–32)
CREAT SERPL-MCNC: 0.93 MG/DL (ref 0.6–1)
DIFFERENTIAL METHOD BLD: NORMAL
EOSINOPHIL # BLD: 0.3 K/UL (ref 0–0.8)
EOSINOPHIL NFR BLD: 5 % (ref 0.5–7.8)
ERYTHROCYTE [DISTWIDTH] IN BLOOD BY AUTOMATED COUNT: 12.9 % (ref 11.9–14.6)
EST. AVERAGE GLUCOSE BLD GHB EST-MCNC: 263 MG/DL
GLUCOSE BLD STRIP.AUTO-MCNC: 158 MG/DL (ref 65–100)
GLUCOSE BLD STRIP.AUTO-MCNC: 158 MG/DL (ref 65–100)
GLUCOSE BLD STRIP.AUTO-MCNC: 175 MG/DL (ref 65–100)
GLUCOSE BLD STRIP.AUTO-MCNC: 187 MG/DL (ref 65–100)
GLUCOSE SERPL-MCNC: 147 MG/DL (ref 65–100)
HBA1C MFR BLD: 10.8 % (ref 4.8–6)
HCT VFR BLD AUTO: 39.1 % (ref 35.8–46.3)
HGB BLD-MCNC: 12.8 G/DL (ref 11.7–15.4)
IMM GRANULOCYTES # BLD AUTO: 0 K/UL (ref 0–0.5)
IMM GRANULOCYTES NFR BLD AUTO: 0 % (ref 0–5)
LYMPHOCYTES # BLD: 1.8 K/UL (ref 0.5–4.6)
LYMPHOCYTES NFR BLD: 32 % (ref 13–44)
MAGNESIUM SERPL-MCNC: 2.1 MG/DL (ref 1.8–2.4)
MCH RBC QN AUTO: 29.3 PG (ref 26.1–32.9)
MCHC RBC AUTO-ENTMCNC: 32.7 G/DL (ref 31.4–35)
MCV RBC AUTO: 89.5 FL (ref 79.6–97.8)
MONOCYTES # BLD: 0.4 K/UL (ref 0.1–1.3)
MONOCYTES NFR BLD: 6 % (ref 4–12)
NEUTS SEG # BLD: 3.1 K/UL (ref 1.7–8.2)
NEUTS SEG NFR BLD: 56 % (ref 43–78)
NRBC # BLD: 0 K/UL (ref 0–0.2)
PLATELET # BLD AUTO: 245 K/UL (ref 150–450)
PMV BLD AUTO: 11.5 FL (ref 9.4–12.3)
POTASSIUM SERPL-SCNC: 3.3 MMOL/L (ref 3.5–5.1)
RBC # BLD AUTO: 4.37 M/UL (ref 4.05–5.2)
SODIUM SERPL-SCNC: 141 MMOL/L (ref 136–145)
WBC # BLD AUTO: 5.6 K/UL (ref 4.3–11.1)

## 2019-09-23 PROCEDURE — 85025 COMPLETE CBC W/AUTO DIFF WBC: CPT

## 2019-09-23 PROCEDURE — 74011250637 HC RX REV CODE- 250/637: Performed by: INTERNAL MEDICINE

## 2019-09-23 PROCEDURE — 36415 COLL VENOUS BLD VENIPUNCTURE: CPT

## 2019-09-23 PROCEDURE — 83735 ASSAY OF MAGNESIUM: CPT

## 2019-09-23 PROCEDURE — 74011250636 HC RX REV CODE- 250/636: Performed by: FAMILY MEDICINE

## 2019-09-23 PROCEDURE — 74011250637 HC RX REV CODE- 250/637: Performed by: NURSE PRACTITIONER

## 2019-09-23 PROCEDURE — 74011250636 HC RX REV CODE- 250/636: Performed by: NURSE PRACTITIONER

## 2019-09-23 PROCEDURE — 82962 GLUCOSE BLOOD TEST: CPT

## 2019-09-23 PROCEDURE — 65660000000 HC RM CCU STEPDOWN

## 2019-09-23 PROCEDURE — 74011250637 HC RX REV CODE- 250/637: Performed by: FAMILY MEDICINE

## 2019-09-23 PROCEDURE — 83880 ASSAY OF NATRIURETIC PEPTIDE: CPT

## 2019-09-23 PROCEDURE — 74011636637 HC RX REV CODE- 636/637: Performed by: FAMILY MEDICINE

## 2019-09-23 PROCEDURE — 80048 BASIC METABOLIC PNL TOTAL CA: CPT

## 2019-09-23 PROCEDURE — C8929 TTE W OR WO FOL WCON,DOPPLER: HCPCS

## 2019-09-23 PROCEDURE — 83036 HEMOGLOBIN GLYCOSYLATED A1C: CPT

## 2019-09-23 PROCEDURE — 77030020256 HC SOL INJ NACL 0.9%  500ML

## 2019-09-23 RX ORDER — POTASSIUM CHLORIDE 20 MEQ/1
40 TABLET, EXTENDED RELEASE ORAL DAILY
Status: DISCONTINUED | OUTPATIENT
Start: 2019-09-23 | End: 2019-09-26

## 2019-09-23 RX ORDER — POTASSIUM CHLORIDE 14.9 MG/ML
20 INJECTION INTRAVENOUS
Status: COMPLETED | OUTPATIENT
Start: 2019-09-23 | End: 2019-09-23

## 2019-09-23 RX ORDER — CARVEDILOL 12.5 MG/1
12.5 TABLET ORAL 2 TIMES DAILY WITH MEALS
Status: DISCONTINUED | OUTPATIENT
Start: 2019-09-23 | End: 2019-09-26 | Stop reason: HOSPADM

## 2019-09-23 RX ADMIN — CARVEDILOL 12.5 MG: 12.5 TABLET, FILM COATED ORAL at 09:12

## 2019-09-23 RX ADMIN — LISINOPRIL 10 MG: 5 TABLET ORAL at 09:12

## 2019-09-23 RX ADMIN — Medication 1 TABLET: at 09:12

## 2019-09-23 RX ADMIN — PERFLUTREN 1 ML: 6.52 INJECTION, SUSPENSION INTRAVENOUS at 11:23

## 2019-09-23 RX ADMIN — POTASSIUM CHLORIDE 40 MEQ: 20 TABLET, EXTENDED RELEASE ORAL at 09:12

## 2019-09-23 RX ADMIN — ENOXAPARIN SODIUM 40 MG: 40 INJECTION SUBCUTANEOUS at 09:12

## 2019-09-23 RX ADMIN — POTASSIUM CHLORIDE 20 MEQ: 200 INJECTION, SOLUTION INTRAVENOUS at 11:26

## 2019-09-23 RX ADMIN — Medication 1 TABLET: at 17:31

## 2019-09-23 RX ADMIN — Medication 10 ML: at 06:27

## 2019-09-23 RX ADMIN — INSULIN HUMAN 2 UNITS: 100 INJECTION, SOLUTION PARENTERAL at 12:18

## 2019-09-23 RX ADMIN — LISINOPRIL 10 MG: 5 TABLET ORAL at 17:31

## 2019-09-23 RX ADMIN — INSULIN HUMAN 2 UNITS: 100 INJECTION, SOLUTION PARENTERAL at 17:31

## 2019-09-23 RX ADMIN — INSULIN HUMAN 2 UNITS: 100 INJECTION, SOLUTION PARENTERAL at 06:26

## 2019-09-23 RX ADMIN — Medication 5 ML: at 22:00

## 2019-09-23 RX ADMIN — INSULIN HUMAN 2 UNITS: 100 INJECTION, SOLUTION PARENTERAL at 22:12

## 2019-09-23 RX ADMIN — POTASSIUM CHLORIDE 20 MEQ: 200 INJECTION, SOLUTION INTRAVENOUS at 09:12

## 2019-09-23 RX ADMIN — CARVEDILOL 12.5 MG: 12.5 TABLET, FILM COATED ORAL at 17:31

## 2019-09-23 RX ADMIN — HYDROCHLOROTHIAZIDE 25 MG: 25 TABLET ORAL at 09:12

## 2019-09-23 NOTE — PROGRESS NOTES
Progress Note    2019  Admit Date: 2019  8:48 PM   NAME: Carlos Cano   :  1960   MRN:  [id number]   Attending: Amol Walker MD  PCP:  Mark Swenson MD  Treatment Team: Attending Provider: Amol Walker MD; Consulting Provider: Brock Flores DO; Staff Nurse: Samir Cummings, RN; Care Manager: Valentin Blair, RN; Utilization Review: Reynold Brown RN    Full Code   SUBJECTIVE:   Mr. Stefanie Franklin is a 62 yo female with PMH of left sided breast cancer tx with radiation/lumpectomy/oral chemo a few years ago, DM, HTN who presented with c/o SOB, cough and chest tightness intermittently for several weeks, worse the day before admission. , CT chest showed cardiomegaly, interstitial lung edema, small bilateral pleural effusions suggestive of CHF or fluid overload. EKG showed ST with diffuse T wave changes. Pt started on lasix. Was on BB and ACE-I PTA. Cardiology consulted. I/O show +fluid balance however pt reports urinating much more frequently, ?accuaracy of I/Os, also with 1 LB weight loss since admission. Echo pending. Hypokalemic today. On RA. Up in chair denies CP, SOB today.         Past Medical History:   Diagnosis Date    Cancer ([facility] [address]     breast- L    Diabetes ([facility] [address]     Hypertension     Intertrochanteric fracture of right femur ([facility] [address] 2018     Recent Results (from the past 24 hour(s))   GLUCOSE, POC    Collection Time: 19  3:59 PM   Result Value Ref Range    Glucose (POC) 168 (H) 65 - 100 mg/dL   MAGNESIUM    Collection Time: 19  4:47 PM   Result Value Ref Range    Magnesium 2.3 1.8 - 2.4 mg/dL   GLUCOSE, POC    Collection Time: 19  8:30 PM   Result Value Ref Range    Glucose (POC) 222 (H) 65 - 100 mg/dL   GLUCOSE, POC    Collection Time: 19  5:40 AM   Result Value Ref Range    Glucose (POC) 158 (H) 65 - 262 mg/dL   METABOLIC PANEL, BASIC    Collection Time: 19  7:27 AM   Result Value Ref Range    Sodium 141 136 - 145 mmol/L Potassium 3.3 (L) 3.5 - 5.1 mmol/L    Chloride 106 98 - 107 mmol/L    CO2 28 21 - 32 mmol/L    Anion gap 7 7 - 16 mmol/L    Glucose 147 (H) 65 - 100 mg/dL    BUN 13 6 - 23 MG/DL    Creatinine 0.93 0.6 - 1.0 MG/DL    GFR est AA >60 >60 ml/min/1.73m2    GFR est non-AA >60 >60 ml/min/1.73m2    Calcium 8.9 8.3 - 10.4 MG/DL   CBC WITH AUTOMATED DIFF    Collection Time: 09/23/19  7:27 AM   Result Value Ref Range    WBC 5.6 4.3 - 11.1 K/uL    RBC 4.37 4.05 - 5.2 M/uL    HGB 12.8 11.7 - 15.4 g/dL    HCT 39.1 35.8 - 46.3 %    MCV 89.5 79.6 - 97.8 FL    MCH 29.3 26.1 - 32.9 PG    MCHC 32.7 31.4 - 35.0 g/dL    RDW 12.9 11.9 - 14.6 %    PLATELET 243 208 - 158 K/uL    MPV 11.5 9.4 - 12.3 FL    ABSOLUTE NRBC 0.00 0.0 - 0.2 K/uL    DF AUTOMATED      NEUTROPHILS 56 43 - 78 %    LYMPHOCYTES 32 13 - 44 %    MONOCYTES 6 4.0 - 12.0 %    EOSINOPHILS 5 0.5 - 7.8 %    BASOPHILS 1 0.0 - 2.0 %    IMMATURE GRANULOCYTES 0 0.0 - 5.0 %    ABS. NEUTROPHILS 3.1 1.7 - 8.2 K/UL    ABS. LYMPHOCYTES 1.8 0.5 - 4.6 K/UL    ABS. MONOCYTES 0.4 0.1 - 1.3 K/UL    ABS. EOSINOPHILS 0.3 0.0 - 0.8 K/UL    ABS. BASOPHILS 0.0 0.0 - 0.2 K/UL    ABS. IMM.  GRANS. 0.0 0.0 - 0.5 K/UL   HEMOGLOBIN A1C WITH EAG    Collection Time: 09/23/19  7:27 AM   Result Value Ref Range    Hemoglobin A1c 10.8 (H) 4.8 - 6.0 %    Est. average glucose 263 mg/dL   BNP    Collection Time: 09/23/19  7:27 AM   Result Value Ref Range     (H) 0 pg/mL   MAGNESIUM    Collection Time: 09/23/19  7:27 AM   Result Value Ref Range    Magnesium 2.1 1.8 - 2.4 mg/dL   GLUCOSE, POC    Collection Time: 09/23/19 11:26 AM   Result Value Ref Range    Glucose (POC) 175 (H) 65 - 100 mg/dL     No Known Allergies  Current Facility-Administered Medications   Medication Dose Route Frequency Provider Last Rate Last Dose    carvedilol (COREG) tablet 12.5 mg  12.5 mg Oral BID WITH MEALS Ginny Bunch MD   12.5 mg at 09/23/19 0912    potassium chloride 20 mEq in 100 ml IVPB  20 mEq IntraVENous Q2H Cam Sullivan, NP 50 mL/hr at 09/23/19 1126 20 mEq at 09/23/19 1126    potassium chloride (K-DUR, KLOR-CON) SR tablet 40 mEq  40 mEq Oral DAILY Mahad WAGNER, NP   40 mEq at 09/23/19 0912    calcium-vitamin D (OS-MONICA) 500 mg-200 unit tablet  1 Tab Oral BID WITH MEALS Jade Baldwin Mitchel Hale MD   1 Tab at 09/23/19 0912    sodium chloride (NS) flush 5-40 mL  5-40 mL IntraVENous Luther Devante, Mitchel Hale MD   10 mL at 09/23/19 6004    sodium chloride (NS) flush 5-40 mL  5-40 mL IntraVENous PRN Vanessa Messer MD        acetaminophen (TYLENOL) tablet 650 mg  650 mg Oral Q4H PRN Vanessa Messer MD        HYDROcodone-acetaminophen [facility])  mg tablet 1 Tab  1 Tab Oral Q4H PRN Vanessa Messer MD        naloxone [facility]) injection 0.4 mg  0.4 mg IntraVENous PRN Vanessa Messer MD        diphenhydrAMINE (BENADRYL) capsule 25 mg  25 mg Oral Q6H PRN Vanessa Messer MD        ondansetron [facility]) injection 4 mg  4 mg IntraVENous Q4H PRN Vanessa Messer MD        bisacodyl (DULCOLAX) tablet 5 mg  5 mg Oral DAILY PRN Vanessa Messer MD        LORazepam (ATIVAN) tablet 1 mg  1 mg Oral BID PRN Vanessa Messer MD        enoxaparin (LOVENOX) injection 40 mg  40 mg SubCUTAneous Q24H Vanessa Messer MD   40 mg at 09/23/19 0912    insulin regular (Torres Sakshi R, HUMULIN R) injection   SubCUTAneous AC&HS Vanessa Messer MD   2 Units at 09/23/19 1218    dextrose 40% (GLUTOSE) oral gel 1 Tube  15 g Oral PRN Vanessa Messer MD        glucagon [facility]) injection 1 mg  1 mg IntraMUSCular PRN Vanessa Messer MD        dextrose (D50W) injection syrg 12.5-25 g  25-50 mL IntraVENous PRN Vanessa Messer MD        influenza vaccine 2019-20 (6 mos+)(PF) (FLUARIX/FLULAVAL/FLUZONE QUAD) injection 0.5 mL  0.5 mL IntraMUSCular PRIOR TO DISCHARGE Mitchel Muse MD        albuterol (PROVENTIL VENTOLIN) nebulizer solution 2.5 mg  2.5 mg Nebulization Q6H PRN Annel Woodruff MD   2.5 mg at 19 0920    hydroCHLOROthiazide (HYDRODIURIL) tablet 25 mg  25 mg Oral DAILY Annel Woodruff MD   25 mg at 19 0912    lisinopril (PRINIVIL, ZESTRIL) tablet 10 mg  10 mg Oral BID Tom NUNN DO   10 mg at 19 5186       Review of Systems negative with exception of pertinent positives noted above  PHYSICAL EXAM     Visit Vitals  /89   Pulse 94   Temp 97.6 °F (36.4 °C)   Resp 17   Ht 5' 3\" (1.6 m)   Wt 85 kg (187 lb 4.8 oz)   SpO2 95%   Breastfeeding? No   BMI 33.18 kg/m²      Temp (24hrs), Av.8 °F (36.6 °C), Min:97.3 °F (36.3 °C), Max:98.3 °F (36.8 °C)    Oxygen Therapy  O2 Sat (%): 95 % (19 1126)  Pulse via Oximetry: 111 beats per minute (19 0454)  O2 Device: Room air (19 0757)  O2 Flow Rate (L/min): 2 l/min (19 0454)    Intake/Output Summary (Last 24 hours) at 2019 1236  Last data filed at 2019 0918  Gross per 24 hour   Intake 480 ml   Output    Net 480 ml      General: No acute distress    Lungs: CTA bilaterally. Resp even and non labored  Heart:  S1S2 present without murmurs rubs gallops. RRR. No LE edema  Abdomen: Soft, non tender, non distended. BS present. Extremities: Moves ext spontaneously. No cyanosis  Neurologic:  A/O X3.  No focal deficits     Results summary of Diagnostic Studies/Procedures copied from within [facility] EMR:        Jakub Penart [address] Problems    Diagnosis Date Noted    Pulmonary edema 2019     Suspect new onset CHF      Controlled type 2 diabetes mellitus, with long-term current use of insulin ([facility] [address] 2019    Essential hypertension 2018    Hyperglycemia due to type 2 diabetes mellitus ([facility] [address] 2018     Plan:    Acute CHF/Pulmonary edema  Cardiology following  Continue lasix, BB, ACE-I  Echo pending    Chest pain  Resolved  May need cath while inpatient   Cardiology following    DM II, uncontrolled  A1C 10.8  BGL more controlled currently  On SSI    Hypokalemia  Replace  BMP in AM  Mag 2.1      Notes, labs, VS, diagnostic testing reviewed  Time spent with pt 20 min      DVT Prophylaxis: lovenox  Plan of Care Discussed with: Supervising MD Dr. Ángel Umana, care team, pt      Joseph Briggs NP

## 2019-09-23 NOTE — PROGRESS NOTES
Pt sitting up in the recliner with family/friends present in the room. No needs expressed. SBAR report given to the oncoming nurse.

## 2019-09-23 NOTE — PROGRESS NOTES
[facility] CARDIOLOGY PROGRESS NOTE           9/23/2019 7:36 AM    Admit Date: 9/21/2019      Subjective:   Patient feeling much better. She had been on antihypertensive therapy which she admittedly stopped taking prior to admission. I/O information is of questionable accuracy as she states she urinated all day day yesterday, lost at least 1 lb but shows net positive. Her prolonged chest pressure has resolved with diuresis. ROS:  Cardiovascular:  As noted above    Objective:      Vitals:    09/22/19 1930 09/22/19 2304 09/23/19 0402 09/23/19 0553   BP: 115/73 106/69 112/68    Pulse: (!) 102 86 89    Resp: 18 17 16    Temp: 98.1 °F (36.7 °C) 98 °F (36.7 °C) 98.3 °F (36.8 °C)    SpO2: 95% 94% 93%    Weight:    187 lb 4.8 oz (85 kg)   Height:           Physical Exam:  General-No Acute Distress  Neck- supple, no JVD  CV- regular rate and rhythm + intermittent S3  Lung- clear bilaterally  Abd- soft, nontender, nondistended  Ext- no edema bilaterally. Skin- warm and dry    Data Review:   Recent Labs     09/22/19  1647 09/22/19  0609 09/21/19 2020   NA  --  140 140   K  --  3.1* 3.7   MG 2.3  --  1.6*   BUN  --  9 11   CREA  --  0.82 0.94   GLU  --  236* 317*   WBC  --  6.6 6.4   HGB  --  12.0 11.5*   HCT  --  34.8* 32.4*   PLT  --  247 230   TROIQ  --   --  0.02     CXR, EKG reviewed -  EKG on admission with diffuse ST/T abnormality new from prior tracing and consistent with subendocardial ischemia    CXR with pulmonary edema and cardiomegaly    Assessment/Plan:     Principal Problem:    Pulmonary edema (9/22/2019)        Active Problems:    Hyperglycemia due to type 2 diabetes mellitus ([address] (2/24/2018)          Essential hypertension (2/28/2018)          Controlled type 2 diabetes mellitus, with long-term current use of insulin ([facility] [address] (9/22/2019)      CHF, as yet undefined if systolic or diastolic. Likely d/t uncontrolled hypertension.   Chest discomfort has resolved with diuresis, troponin negative. Awaiting echo. Based on her exam, I expect she may have systolic dysfunction. If so, and given her EKG changes, would have her remain for cath and have discussed that with her. If EF is preserved, she may go home for outpatient NST and early cardiology follow up. She expresses understanding and agreement. She had breast cancer 3 years ago, received only XRT NO chemotherapy. At risk for constriction/restriction but typically would present later. Just resumed meds yesterday, heart rate remains 90's-100's, will carefully titrate carvedilol today.            Marie Gordon MD  9/23/2019 7:36 AM

## 2019-09-23 NOTE — PROGRESS NOTES
Pt sitting up in chair with family present in the room. Pt is stable. Pt is axo. Respirations are even and unlabored on room air. Pt denies SOB, cough or chest tightness. No new complaints. No needs are expressed. Call light is within reach. Pt instructed to ask for assistance if needed. Will continue to monitor.

## 2019-09-23 NOTE — PROGRESS NOTES
Received bedside shift report from off going nurse, [address]. Patient resting in bed quietly. Respirations even and unlabored on room air. Bed low and locked. Bedside table, personal belongings and call light all within reach.

## 2019-09-24 LAB
ANION GAP SERPL CALC-SCNC: 9 MMOL/L (ref 7–16)
BASOPHILS # BLD: 0 K/UL (ref 0–0.2)
BASOPHILS NFR BLD: 1 % (ref 0–2)
BUN SERPL-MCNC: 15 MG/DL (ref 6–23)
CALCIUM SERPL-MCNC: 8.8 MG/DL (ref 8.3–10.4)
CHLORIDE SERPL-SCNC: 111 MMOL/L (ref 98–107)
CO2 SERPL-SCNC: 21 MMOL/L (ref 21–32)
CREAT SERPL-MCNC: 0.9 MG/DL (ref 0.6–1)
DIFFERENTIAL METHOD BLD: NORMAL
EOSINOPHIL # BLD: 0.3 K/UL (ref 0–0.8)
EOSINOPHIL NFR BLD: 4 % (ref 0.5–7.8)
ERYTHROCYTE [DISTWIDTH] IN BLOOD BY AUTOMATED COUNT: 12.8 % (ref 11.9–14.6)
GLUCOSE BLD STRIP.AUTO-MCNC: 133 MG/DL (ref 65–100)
GLUCOSE BLD STRIP.AUTO-MCNC: 175 MG/DL (ref 65–100)
GLUCOSE BLD STRIP.AUTO-MCNC: 193 MG/DL (ref 65–100)
GLUCOSE BLD STRIP.AUTO-MCNC: 204 MG/DL (ref 65–100)
GLUCOSE SERPL-MCNC: 159 MG/DL (ref 65–100)
HCT VFR BLD AUTO: 37.2 % (ref 35.8–46.3)
HGB BLD-MCNC: 12 G/DL (ref 11.7–15.4)
IMM GRANULOCYTES # BLD AUTO: 0 K/UL (ref 0–0.5)
IMM GRANULOCYTES NFR BLD AUTO: 0 % (ref 0–5)
LYMPHOCYTES # BLD: 1.9 K/UL (ref 0.5–4.6)
LYMPHOCYTES NFR BLD: 33 % (ref 13–44)
MCH RBC QN AUTO: 29.1 PG (ref 26.1–32.9)
MCHC RBC AUTO-ENTMCNC: 32.3 G/DL (ref 31.4–35)
MCV RBC AUTO: 90.3 FL (ref 79.6–97.8)
MONOCYTES # BLD: 0.4 K/UL (ref 0.1–1.3)
MONOCYTES NFR BLD: 6 % (ref 4–12)
NEUTS SEG # BLD: 3.3 K/UL (ref 1.7–8.2)
NEUTS SEG NFR BLD: 56 % (ref 43–78)
NRBC # BLD: 0 K/UL (ref 0–0.2)
PLATELET # BLD AUTO: 245 K/UL (ref 150–450)
PMV BLD AUTO: 12 FL (ref 9.4–12.3)
POTASSIUM SERPL-SCNC: 3.8 MMOL/L (ref 3.5–5.1)
RBC # BLD AUTO: 4.12 M/UL (ref 4.05–5.2)
SODIUM SERPL-SCNC: 141 MMOL/L (ref 136–145)
WBC # BLD AUTO: 5.9 K/UL (ref 4.3–11.1)

## 2019-09-24 PROCEDURE — C1894 INTRO/SHEATH, NON-LASER: HCPCS

## 2019-09-24 PROCEDURE — 74011250637 HC RX REV CODE- 250/637: Performed by: INTERNAL MEDICINE

## 2019-09-24 PROCEDURE — 74011000250 HC RX REV CODE- 250: Performed by: INTERNAL MEDICINE

## 2019-09-24 PROCEDURE — 4A023N7 MEASUREMENT OF CARDIAC SAMPLING AND PRESSURE, LEFT HEART, PERCUTANEOUS APPROACH: ICD-10-PCS | Performed by: INTERNAL MEDICINE

## 2019-09-24 PROCEDURE — B2151ZZ FLUOROSCOPY OF LEFT HEART USING LOW OSMOLAR CONTRAST: ICD-10-PCS | Performed by: INTERNAL MEDICINE

## 2019-09-24 PROCEDURE — C1769 GUIDE WIRE: HCPCS

## 2019-09-24 PROCEDURE — B2111ZZ FLUOROSCOPY OF MULTIPLE CORONARY ARTERIES USING LOW OSMOLAR CONTRAST: ICD-10-PCS | Performed by: INTERNAL MEDICINE

## 2019-09-24 PROCEDURE — 74011250637 HC RX REV CODE- 250/637: Performed by: FAMILY MEDICINE

## 2019-09-24 PROCEDURE — 74011636637 HC RX REV CODE- 636/637: Performed by: FAMILY MEDICINE

## 2019-09-24 PROCEDURE — 80048 BASIC METABOLIC PNL TOTAL CA: CPT

## 2019-09-24 PROCEDURE — 36415 COLL VENOUS BLD VENIPUNCTURE: CPT

## 2019-09-24 PROCEDURE — 77030004534 HC CATH ANGI DX INFN CARD -A

## 2019-09-24 PROCEDURE — 74011250636 HC RX REV CODE- 250/636: Performed by: INTERNAL MEDICINE

## 2019-09-24 PROCEDURE — 85025 COMPLETE CBC W/AUTO DIFF WBC: CPT

## 2019-09-24 PROCEDURE — 74011250636 HC RX REV CODE- 250/636: Performed by: FAMILY MEDICINE

## 2019-09-24 PROCEDURE — 74011636637 HC RX REV CODE- 636/637: Performed by: NURSE PRACTITIONER

## 2019-09-24 PROCEDURE — 65660000000 HC RM CCU STEPDOWN

## 2019-09-24 PROCEDURE — 93312 ECHO TRANSESOPHAGEAL: CPT

## 2019-09-24 PROCEDURE — 99152 MOD SED SAME PHYS/QHP 5/>YRS: CPT

## 2019-09-24 PROCEDURE — 77030029997 HC DEV COM RDL R BND TELE -B

## 2019-09-24 PROCEDURE — 97161 PT EVAL LOW COMPLEX 20 MIN: CPT

## 2019-09-24 PROCEDURE — 74011250637 HC RX REV CODE- 250/637: Performed by: NURSE PRACTITIONER

## 2019-09-24 PROCEDURE — 74011636320 HC RX REV CODE- 636/320: Performed by: INTERNAL MEDICINE

## 2019-09-24 PROCEDURE — 93458 L HRT ARTERY/VENTRICLE ANGIO: CPT

## 2019-09-24 PROCEDURE — 99153 MOD SED SAME PHYS/QHP EA: CPT

## 2019-09-24 PROCEDURE — 82962 GLUCOSE BLOOD TEST: CPT

## 2019-09-24 PROCEDURE — 74011250636 HC RX REV CODE- 250/636

## 2019-09-24 PROCEDURE — 74011000250 HC RX REV CODE- 250: Performed by: FAMILY MEDICINE

## 2019-09-24 RX ORDER — MIDAZOLAM HYDROCHLORIDE 1 MG/ML
1-2 INJECTION, SOLUTION INTRAMUSCULAR; INTRAVENOUS
Status: DISCONTINUED | OUTPATIENT
Start: 2019-09-24 | End: 2019-09-25 | Stop reason: HOSPADM

## 2019-09-24 RX ORDER — HEPARIN SODIUM 10000 [USP'U]/ML
3000 INJECTION, SOLUTION INTRAVENOUS; SUBCUTANEOUS ONCE
Status: COMPLETED | OUTPATIENT
Start: 2019-09-24 | End: 2019-09-24

## 2019-09-24 RX ORDER — HYDROCODONE BITARTRATE AND ACETAMINOPHEN 5; 325 MG/1; MG/1
1 TABLET ORAL
Status: DISCONTINUED | OUTPATIENT
Start: 2019-09-24 | End: 2019-09-25 | Stop reason: HOSPADM

## 2019-09-24 RX ORDER — INSULIN LISPRO 100 [IU]/ML
INJECTION, SOLUTION INTRAVENOUS; SUBCUTANEOUS
Status: DISCONTINUED | OUTPATIENT
Start: 2019-09-24 | End: 2019-09-26 | Stop reason: HOSPADM

## 2019-09-24 RX ORDER — FENTANYL CITRATE 50 UG/ML
25-50 INJECTION, SOLUTION INTRAMUSCULAR; INTRAVENOUS
Status: DISCONTINUED | OUTPATIENT
Start: 2019-09-24 | End: 2019-09-25 | Stop reason: HOSPADM

## 2019-09-24 RX ORDER — SODIUM CHLORIDE 9 MG/ML
75 INJECTION, SOLUTION INTRAVENOUS CONTINUOUS
Status: DISCONTINUED | OUTPATIENT
Start: 2019-09-24 | End: 2019-09-26 | Stop reason: HOSPADM

## 2019-09-24 RX ORDER — LIDOCAINE HYDROCHLORIDE 10 MG/ML
6 INJECTION INFILTRATION; PERINEURAL ONCE
Status: COMPLETED | OUTPATIENT
Start: 2019-09-24 | End: 2019-09-24

## 2019-09-24 RX ORDER — HEPARIN SODIUM 200 [USP'U]/100ML
3 INJECTION, SOLUTION INTRAVENOUS CONTINUOUS
Status: DISCONTINUED | OUTPATIENT
Start: 2019-09-24 | End: 2019-09-25 | Stop reason: HOSPADM

## 2019-09-24 RX ORDER — INSULIN GLARGINE 100 [IU]/ML
10 INJECTION, SOLUTION SUBCUTANEOUS DAILY
Status: DISCONTINUED | OUTPATIENT
Start: 2019-09-24 | End: 2019-09-25

## 2019-09-24 RX ORDER — NALOXONE HYDROCHLORIDE 0.4 MG/ML
0.4 INJECTION, SOLUTION INTRAMUSCULAR; INTRAVENOUS; SUBCUTANEOUS AS NEEDED
Status: DISCONTINUED | OUTPATIENT
Start: 2019-09-24 | End: 2019-09-25 | Stop reason: HOSPADM

## 2019-09-24 RX ORDER — MORPHINE SULFATE 2 MG/ML
1 INJECTION, SOLUTION INTRAMUSCULAR; INTRAVENOUS
Status: DISCONTINUED | OUTPATIENT
Start: 2019-09-24 | End: 2019-09-25 | Stop reason: HOSPADM

## 2019-09-24 RX ORDER — ACETAMINOPHEN 325 MG/1
650 TABLET ORAL
Status: DISCONTINUED | OUTPATIENT
Start: 2019-09-24 | End: 2019-09-25 | Stop reason: HOSPADM

## 2019-09-24 RX ORDER — SODIUM CHLORIDE 0.9 % (FLUSH) 0.9 %
5-40 SYRINGE (ML) INJECTION AS NEEDED
Status: DISCONTINUED | OUTPATIENT
Start: 2019-09-24 | End: 2019-09-25 | Stop reason: HOSPADM

## 2019-09-24 RX ORDER — GUAIFENESIN 100 MG/5ML
81 LIQUID (ML) ORAL DAILY
Status: DISCONTINUED | OUTPATIENT
Start: 2019-09-24 | End: 2019-09-26 | Stop reason: HOSPADM

## 2019-09-24 RX ORDER — LIDOCAINE HYDROCHLORIDE 20 MG/ML
15 SOLUTION OROPHARYNGEAL AS NEEDED
Status: DISCONTINUED | OUTPATIENT
Start: 2019-09-24 | End: 2019-09-26 | Stop reason: HOSPADM

## 2019-09-24 RX ORDER — SODIUM CHLORIDE 0.9 % (FLUSH) 0.9 %
5-40 SYRINGE (ML) INJECTION EVERY 8 HOURS
Status: DISCONTINUED | OUTPATIENT
Start: 2019-09-24 | End: 2019-09-26 | Stop reason: HOSPADM

## 2019-09-24 RX ORDER — ATORVASTATIN CALCIUM 40 MG/1
40 TABLET, FILM COATED ORAL
Status: DISCONTINUED | OUTPATIENT
Start: 2019-09-24 | End: 2019-09-26 | Stop reason: HOSPADM

## 2019-09-24 RX ORDER — MIDAZOLAM HYDROCHLORIDE 1 MG/ML
.5-2 INJECTION, SOLUTION INTRAMUSCULAR; INTRAVENOUS
Status: DISCONTINUED | OUTPATIENT
Start: 2019-09-24 | End: 2019-09-25 | Stop reason: HOSPADM

## 2019-09-24 RX ORDER — ONDANSETRON 2 MG/ML
4 INJECTION INTRAMUSCULAR; INTRAVENOUS
Status: DISCONTINUED | OUTPATIENT
Start: 2019-09-24 | End: 2019-09-25 | Stop reason: HOSPADM

## 2019-09-24 RX ADMIN — ENOXAPARIN SODIUM 40 MG: 40 INJECTION SUBCUTANEOUS at 09:48

## 2019-09-24 RX ADMIN — IOPAMIDOL 60 ML: 755 INJECTION, SOLUTION INTRAVENOUS at 13:50

## 2019-09-24 RX ADMIN — Medication 10 ML: at 21:48

## 2019-09-24 RX ADMIN — FENTANYL CITRATE 25 MCG: 50 INJECTION, SOLUTION INTRAMUSCULAR; INTRAVENOUS at 15:15

## 2019-09-24 RX ADMIN — FENTANYL CITRATE 50 MCG: 50 INJECTION, SOLUTION INTRAMUSCULAR; INTRAVENOUS at 13:28

## 2019-09-24 RX ADMIN — LISINOPRIL 10 MG: 5 TABLET ORAL at 17:48

## 2019-09-24 RX ADMIN — MIDAZOLAM HYDROCHLORIDE 2 MG: 1 INJECTION, SOLUTION INTRAMUSCULAR; INTRAVENOUS at 15:15

## 2019-09-24 RX ADMIN — HEPARIN SODIUM 3000 UNITS: 10000 INJECTION INTRAVENOUS; SUBCUTANEOUS at 13:38

## 2019-09-24 RX ADMIN — FENTANYL CITRATE 50 MCG: 50 INJECTION, SOLUTION INTRAMUSCULAR; INTRAVENOUS at 15:20

## 2019-09-24 RX ADMIN — HEPARIN SODIUM 3 ML/HR: 5000 INJECTION, SOLUTION INTRAVENOUS; SUBCUTANEOUS at 13:38

## 2019-09-24 RX ADMIN — MIDAZOLAM HYDROCHLORIDE 2 MG: 1 INJECTION, SOLUTION INTRAMUSCULAR; INTRAVENOUS at 13:28

## 2019-09-24 RX ADMIN — INSULIN LISPRO 4 UNITS: 100 INJECTION, SOLUTION INTRAVENOUS; SUBCUTANEOUS at 21:48

## 2019-09-24 RX ADMIN — Medication 10 ML: at 17:51

## 2019-09-24 RX ADMIN — ASPIRIN 81 MG 81 MG: 81 TABLET ORAL at 13:20

## 2019-09-24 RX ADMIN — INSULIN GLARGINE 10 UNITS: 100 INJECTION, SOLUTION SUBCUTANEOUS at 11:21

## 2019-09-24 RX ADMIN — Medication 1 TABLET: at 17:49

## 2019-09-24 RX ADMIN — HEPARIN SODIUM 2 ML: 10000 INJECTION INTRAVENOUS; SUBCUTANEOUS at 13:38

## 2019-09-24 RX ADMIN — ATORVASTATIN CALCIUM 40 MG: 40 TABLET, FILM COATED ORAL at 21:48

## 2019-09-24 RX ADMIN — MIDAZOLAM HYDROCHLORIDE 2 MG: 1 INJECTION, SOLUTION INTRAMUSCULAR; INTRAVENOUS at 15:18

## 2019-09-24 RX ADMIN — POTASSIUM CHLORIDE 40 MEQ: 20 TABLET, EXTENDED RELEASE ORAL at 09:48

## 2019-09-24 RX ADMIN — CARVEDILOL 12.5 MG: 12.5 TABLET, FILM COATED ORAL at 17:49

## 2019-09-24 RX ADMIN — FENTANYL CITRATE 50 MCG: 50 INJECTION, SOLUTION INTRAMUSCULAR; INTRAVENOUS at 13:35

## 2019-09-24 RX ADMIN — MIDAZOLAM HYDROCHLORIDE 2 MG: 1 INJECTION, SOLUTION INTRAMUSCULAR; INTRAVENOUS at 13:35

## 2019-09-24 RX ADMIN — CARVEDILOL 12.5 MG: 12.5 TABLET, FILM COATED ORAL at 09:48

## 2019-09-24 RX ADMIN — MIDAZOLAM HYDROCHLORIDE 1 MG: 1 INJECTION, SOLUTION INTRAMUSCULAR; INTRAVENOUS at 15:27

## 2019-09-24 RX ADMIN — MIDAZOLAM HYDROCHLORIDE 1 MG: 1 INJECTION, SOLUTION INTRAMUSCULAR; INTRAVENOUS at 15:24

## 2019-09-24 RX ADMIN — Medication 5 ML: at 06:00

## 2019-09-24 RX ADMIN — LIDOCAINE HYDROCHLORIDE 3 ML: 10 INJECTION, SOLUTION INFILTRATION; PERINEURAL at 13:34

## 2019-09-24 RX ADMIN — INSULIN HUMAN 2 UNITS: 100 INJECTION, SOLUTION PARENTERAL at 06:15

## 2019-09-24 RX ADMIN — FENTANYL CITRATE 25 MCG: 50 INJECTION, SOLUTION INTRAMUSCULAR; INTRAVENOUS at 15:18

## 2019-09-24 RX ADMIN — MIDAZOLAM HYDROCHLORIDE 1 MG: 1 INJECTION, SOLUTION INTRAMUSCULAR; INTRAVENOUS at 15:23

## 2019-09-24 RX ADMIN — Medication 1 TABLET: at 09:48

## 2019-09-24 RX ADMIN — LIDOCAINE HYDROCHLORIDE 15 ML: 20 SOLUTION ORAL; TOPICAL at 14:00

## 2019-09-24 RX ADMIN — HYDROCHLOROTHIAZIDE 25 MG: 25 TABLET ORAL at 09:48

## 2019-09-24 RX ADMIN — LISINOPRIL 10 MG: 5 TABLET ORAL at 09:48

## 2019-09-24 NOTE — CONSULTS
Chucho Montgomery. MD Mikhail Montes. MD Tiffany Williamson         9/21/2019 1960    REFERRING PHYSICIAN:  Dr. Richardson Key:  The patient is a 61 y.o. female who presented to the ER with 3 day history of chest pain, dyspnea and cough. Symptoms had gradually worsened. Troponin was negative. BNP was elevated. Chest xray showed bilateral perihilar infiltrates and mild cardiomegaly. Chest CT was negative for PE but confirmed cardiomegaly, interstitial lung edema and small bilateral pleural effusions. She had reportedly stopped taking anti-hypertensive meds. She was admitted and continued on lasix for diuresis. Echocardiogram showed reduced LV EF with WMA as well as moderate MR. She underwent cardiac catheterization today that showed severe multivessel disease. She has FH of premature CAD in her brother and father. She has history of breast cancer in the left breast s/p lumpectomy and radiation. She did not require chemotherapy. She had a closed displaced subtrochanteric fracture of right femur s/p ORIF with IMN in 2/18. Risk factors include DM, HTN    **No history of stroke, TIA, prior cardiac surgery, prior vascular surgery, anesthetic complication, lung disease, DVT or PE  **She states she had gastric ulcers in her 25s or 35s    Past Medical History:   Diagnosis Date    Cancer ([address]     breast- L    Diabetes ([facility]     Hypertension     Intertrochanteric fracture of right femur ([facility] [address] 2/24/2018       Past Surgical History:   Procedure Laterality Date    HX BREAST LUMPECTOMY      HX HIP FRACTURE TX Right     HX HIP REPLACEMENT         No family history on file.     Social History     Socioeconomic History    Marital status: SINGLE     Spouse name: Not on file    Number of children: Not on file    Years of education: Not on file    Highest education level: Not on file   Occupational History    Not on file   Social Needs    Financial resource strain: Not on file    Food insecurity:     Worry: Not on file     Inability: Not on file    Transportation needs:     Medical: Not on file     Non-medical: Not on file   Tobacco Use    Smoking status: Never Smoker    Smokeless tobacco: Never Used   Substance and Sexual Activity    Alcohol use: Yes     Comment: social    Drug use: No    Sexual activity: Not on file   Lifestyle    Physical activity:     Days per week: Not on file     Minutes per session: Not on file    Stress: Not on file   Relationships    Social connections:     Talks on phone: Not on file     Gets together: Not on file     Attends Buddhist service: Not on file     Active member of club or organization: Not on file     Attends meetings of clubs or organizations: Not on file     Relationship status: Not on file    Intimate partner violence:     Fear of current or ex partner: Not on file     Emotionally abused: Not on file     Physically abused: Not on file     Forced sexual activity: Not on file   Other Topics Concern    Not on file   Social History Narrative    Not on file       No Known Allergies    No current facility-administered medications on file prior to encounter. Current Outpatient Medications on File Prior to Encounter   Medication Sig Dispense Refill    amoxicillin-clavulanate (AUGMENTIN) 875-125 mg per tablet Take 1 Tab by mouth two (2) times a day. 10 Tab 0    metoprolol tartrate (LOPRESSOR) 25 mg tablet Take 0.5 Tabs by mouth two (2) times a day. 60 Tab 1    calcium-vitamin D (OYSTER SHELL) 500 mg(1,250mg) -200 unit per tablet Take 1 Tab by mouth two (2) times daily (with meals). 60 Tab 2    potassium chloride (K-DUR, KLOR-CON) 20 mEq tablet Take 1 Tab by mouth daily. 30 Tab 0    lisinopril (PRINIVIL, ZESTRIL) 10 mg tablet Take 10 mg by mouth daily. Indications: hypertension         REVIEW OF SYSTEMS:  Review of Systems   Constitution: Negative for chills, fever, malaise/fatigue, weight gain and weight loss.    HENT: Negative for ear pain, hearing loss, nosebleeds, sore throat and tinnitus. Eyes: Negative for blurred vision, vision loss in left eye and vision loss in right eye. Cardiovascular: Positive for chest pain. Negative for dyspnea on exertion, leg swelling, near-syncope, orthopnea, palpitations, paroxysmal nocturnal dyspnea and syncope. Respiratory: Positive for cough and shortness of breath. Negative for hemoptysis, sputum production and wheezing. Endocrine: Negative for cold intolerance, heat intolerance and polydipsia. Hematologic/Lymphatic: Does not bruise/bleed easily. Skin: Negative for color change and rash. Musculoskeletal: Negative for back pain, joint pain, joint swelling and myalgias. Gastrointestinal: Negative for abdominal pain, constipation, diarrhea, dysphagia, heartburn, hematemesis, melena, nausea and vomiting. Genitourinary: Negative for dysuria, frequency, hematuria and urgency. Neurological: Negative for difficulty with concentration, dizziness, headaches, light-headedness, numbness, paresthesias, seizures, vertigo and weakness. Psychiatric/Behavioral: Negative for altered mental status and depression. Physical Exam  Vitals:    09/24/19 1748 09/24/19 2108 09/25/19 0107 09/25/19 0425   BP: 142/79 124/79 128/71 124/75   Pulse: 81 84 87 81   Resp:  16 17 17   Temp:  97.6 °F (36.4 °C) 98.7 °F (37.1 °C) 98.5 °F (36.9 °C)   SpO2:  91% 94% 90%   Weight:    177 lb 12.8 oz (80.6 kg)   Height:           Physical Exam:  General: Well Developed, Well Nourished, No Acute Distress  HEENT: Normocephalic, pupils equal and round, no scleral icterus  Neck: supple, no JVD  Chest wall: No deformity  Heart: S1S2 with RRR  Lungs: Clear throughout auscultation bilaterally  Vascular: Pulses are full and equal. There is no venous stasis disease.   Abd: soft, nontender, nondistended, with good bowel sounds, no pulsatile masses  Ext: warm, no edema, calves supple/nontender, pulses 2+ bilaterally  Skin: warm and dry, no rashes, cyanosis, jaundice, ecchymoses or evidence of skin breakdown  Psychiatric: Anxious mood and affect  Neurologic: Alert and oriented X 3, no focal deficit noted    Labs:   Recent Labs     09/25/19  0517 09/24/19  1114 09/24/19  0658 09/23/19  0727     --  141 141   K 3.8  --  3.8 3.3*   MG 2.0  --   --  2.1   BUN 16  --  15 13   CREA 0.86  --  0.90 0.93   *  --  159* 147*   WBC 5.6 5.9  --  5.6   HGB 11.1* 12.0  --  12.8   HCT 33.8* 37.2  --  39.1    245  --  245   CHOL 231*  --   --   --    HDL 34*  --   --   --    CHHD 6.8  --   --   --    LDLC 163.2*  --   --   --    VLDL 33.8*  --   --   --        Recent Labs     09/21/19 2020   TROIQ 0.02       Assessment:     Principal Problem:    Acute systolic CHF (0/64/9991)    Active Problems:    Hyperglycemia due to type 2 diabetes mellitus ([facility] [address] (2/24/2018)    Essential hypertension (2/28/2018)    Type 2 diabetes mellitus, with long-term current use of insulin ([facility] [address] (9/22/2019)    CAD    Mitral regurgitation    History of breast cancer    History of femur fracture s/p ORIF    History of PUD        Plan:     Joon Marcos is to see preoperative teaching film that thoroughly discusses procedure, risks, and possible complications. Risks, benefits, and alternatives were discussed to include, but not limited to:     1. Bleeding  2. Arrhythmia   3. Infection including mediastinitis  4. Myocardial infarction  5. Need for reoperation  6. Renal failure  7. Respiratory failure  8. Stroke  9. Potential death      Dr. Claudette Jules has personally viewed the cardiac catheterization films and echocardiogram. JOSE was recommended for further evaluation of mitral valve which confirmed moderate MR.     STS Mortality risk for CABG/MV repair is 2.715%, and 4.605% for CABG/MV replacement.         Vicky De La Cruz PA-C

## 2019-09-24 NOTE — PROGRESS NOTES
Problem: Mobility Impaired (Adult and Pediatric)  Goal: *Acute Goals and Plan of Care (Insert Text)  Outcome: Resolved       PHYSICAL THERAPY: Initial Assessment, Discharge and AM 9/24/2019  INPATIENT:    Payor: MEDICAID PENDING / Plan: Jack Sharif PENDING / Product Type: Medicaid /       NAME/AGE/GENDER: Carlotta Montenegro is a 61 y.o. female   PRIMARY DIAGNOSIS: Pulmonary edema [J81.1] Pulmonary edema   Pulmonary edema          ICD-10: Treatment Diagnosis:    Generalized Muscle Weakness (M62.81)   Precaution/Allergies:  Patient has no known allergies. ASSESSMENT:     Ms. Sherryle Hoffman is sitting up in bedside chair upon contact and agreeable to PT evaluation this morning. Pt is A&O X 4 with reports of 0/10 pain prior to mobility. Pt lives with her  in 1 story home with 2 steps to enter. Pt is independent with gait and ADLs, 0 falls, and drives. Pt performed STS independently and ambulated 250 ft in hallway with slow pace and increased trunk sway but no unsteadiness or deficits noted. Pt returned to room and sitting up in chair independently. Pt appears to be functioning at her baseline and pt in agreement. No further skilled PT interventions indicated at this time. This section established at most recent assessment   PROBLEM LIST (Impairments causing functional limitations):  N/a    INTERVENTIONS PLANNED: (Benefits and precautions of physical therapy have been discussed with the patient.)  N/a      TREATMENT PLAN: Frequency/Duration: 1x visit  Rehabilitation Potential For Stated Goals: n/a      REHAB RECOMMENDATIONS (at time of discharge pending progress):    Placement: It is my opinion, based on this patient's performance to date, that Ms. Sherryle Hoffman may benefit from being discharged with NO further skilled therapy due to a proven ability to function at baseline.   Equipment:   None at this time              HISTORY:   History of Present Injury/Illness (Reason for Referral):  See H&P below     Patient is a 61 y.o. female who presents to the ER due to shortness of breath, cough and chest tightness. She reports her symptoms began about 3 days ago\" gradually been getting worse. She reports her shortness of breath is worse with any kind of exertion. She denies any history of heart disease, heart failure, or lung problems. She has been treated in the past for breast cancer and was on oral chemotherapy until she lost her job and could no longer afford it. That was a couple of years ago. She has had no follow-up for her breast cancer since then. Work-up in the emergency room showed her to have pulmonary edema suspicious for congestive heart failure, as well as bilateral small pleural effusions. She did receive IV Lasix in the emergency room with good output. Patient reports she is feeling much better. She denies any fevers, chills, nausea, vomiting, or diarrhea. At this time she does not have any chest pain. Past Medical History/Comorbidities:   Ms. Kwasi Breen  has a past medical history of Cancer ([facility] [address] Diabetes ([facility] [address] Hypertension, and Intertrochanteric fracture of right femur ([facility] [address] (2/24/2018). Ms. Kwasi Breen  has a past surgical history that includes hx hip fracture tx (Right); hx breast lumpectomy; and hx hip replacement.   Social History/Living Environment:   Home Environment: Private residence  # Steps to Enter: 2  One/Two Story Residence: One story  Living Alone: No  Support Systems: Spouse/Significant Other/Partner  Patient Expects to be Discharged to[de-identified] Private residence  Current DME Used/Available at Home: None  Prior Level of Function/Work/Activity:  Independent with all mobility   Number of Personal Factors/Comorbidities that affect the Plan of Care: 1-2: MODERATE COMPLEXITY   EXAMINATION:   Most Recent Physical Functioning:   Gross Assessment:                  Posture:     Balance:    Bed Mobility:     Wheelchair Mobility:     Transfers:  Sit to Stand: Independent  Stand to Sit: Independent  Gait:     Vinod Gordon of Support: Widened  Speed/Yadi: Slow  Gait Abnormalities: Trunk sway increased  Distance (ft): 250 Feet (ft)  Assistive Device: (none)  Ambulation - Level of Assistance: Supervision      Body Structures Involved:  Heart  Lungs Body Functions Affected:  Cardio  Respiratory Activities and Participation Affected:  General Tasks and Demands  Mobility  Self Care  Domestic Life  Interpersonal Interactions and Relationships  Community, Social and Civic Life   Number of elements that affect the Plan of Care: 4+: HIGH COMPLEXITY   CLINICAL PRESENTATION:   Presentation: Stable and uncomplicated: LOW COMPLEXITY   CLINICAL DECISION MAKIN [address] Form  How much difficulty does the patient currently have. .. Unable A Lot A Little None   1. Turning over in bed (including adjusting bedclothes, sheets and blankets)? ? 1   ? 2   ? 3   ? 4   2. Sitting down on and standing up from a chair with arms ( e.g., wheelchair, bedside commode, etc.)   ? 1   ? 2   ? 3   ? 4   3. Moving from lying on back to sitting on the side of the bed?   ? 1   ? 2   ? 3   ? 4   How much help from another person does the patient currently need. .. Total A Lot A Little None   4. Moving to and from a bed to a chair (including a wheelchair)? ? 1   ? 2   ? 3   ? 4   5. Need to walk in hospital room? ? 1   ? 2   ? 3   ? 4   6. Climbing 3-5 steps with a railing? ? 1   ? 2   ? 3   ? 4   © 2007, Trustees of [address] [place] [id number], under license to [employer]. All rights reserved      Score:  Initial: 24 Most Recent: X (Date: -- )    Interpretation of Tool:  Represents activities that are increasingly more difficult (i.e. Bed mobility, Transfers, Gait). Medical Necessity:     N/a. Reason for Services/Other Comments:  N/a.    Use of outcome tool(s) and clinical judgement create a POC that gives a: Clear prediction of patient's progress: LOW COMPLEXITY            TREATMENT:   (In addition to Assessment/Re-Assessment sessions the following treatments were rendered)   Pre-treatment Symptoms/Complaints:  \"I feel much better\"  Pain: Initial:   Pain Intensity 1: 0  Post Session:  0/10     Assessment/Reassessment only, no treatment provided today    Braces/Orthotics/Lines/Etc:   O2 Device: Room air  Treatment/Session Assessment:    Response to Treatment:  amb 250 ft with supervision  Interdisciplinary Collaboration:   Physical Therapist  Registered Nurse  After treatment position/precautions:   Up in chair  Bed/Chair-wheels locked  Bed in low position  Call light within reach  Family at bedside   Compliance with Program/Exercises: n/a   Recommendations/Intent for next treatment session: \"Next visit will focus on n/a \".   Total Treatment Duration:  PT Patient Time In/Time Out  Time In: 1052  Time Out: ANGELIQUE Rushing

## 2019-09-24 NOTE — DIABETES MGMT
Patient admitted with CHF, seen by diabetes educator. Patient is well known to the diabetes management team as she was seen in 2018 during a previous hospitalization. Alysha Jacobson has chronically uncontrolled diabetes due to noncompliance. Patient has a history of breast CA, DM, and HTN. Patient diagnosed with CHF this admission. Patient states she lost her job when she broke her hip in 2018 and hasn't had insurance or taken any medications since then. Patient has a positive family history of diabetes. Patient states she was diagnosed 4-5 years ago. A1c ranged 12.2-11 since . Current A1c 10.8 (eA). Educated patient about Reli-on insulins and glucometer at [facility]. Patient has never attended formal diabetes classes and has poor insight into her disease process. When educator attempted to explain this and long term complications of uncontrolled diabetes to patient she nodded her head and said \"I know, I know. \" briskly. Visitor then entered the room and it was apparent patient did not want to discuss in from of visitor. Educational materials left at bedside. Blood glucose 339 on admission. Blood glucose ranged 147-187 yesterday with patient receiving Humalog 8 units. Blood glucose 159 this morning. Provider has initiated Lantus 10 units daily. Current regimen: Lantus 10 units daily, and Humalog SSI. Spoke with  who will plan on trying to connect patient to [facility] program. Plan to switch patient to more affordable NPH and regular at discharge and has patient establish and follow up with a PCP. Patient states she can use vials and syringes. Will provide box of syringes to  to give to patient at discharge. Patient will need prescriptions for diabetic medications, and glucometer and supplies at discharge. Patient would benefit from outpatient diabetes education if she would attend.

## 2019-09-24 NOTE — PROGRESS NOTES
[facility] CARDIOLOGY PROGRESS NOTE           9/24/2019 11:37 AM    Admit Date: 9/21/2019      Subjective:     No o/e; on RA. Presented with dyspnea/chest pressure; also elevated BPs    ROS:  Cardiovascular:  As noted above    Objective:      Vitals:    09/24/19 0326 09/24/19 0506 09/24/19 0739 09/24/19 1131   BP: 107/60  137/85 152/77   Pulse: 89  89 94   Resp: 17  18 18   Temp: 98.4 °F (36.9 °C)  98.7 °F (37.1 °C) 98.3 °F (36.8 °C)   SpO2: 95%  97% 97%   Weight:  85.5 kg (188 lb 7.9 oz)     Height:           Physical Exam:  General-No Acute Distress  Neck- supple, no JVD  CV- regular rate and rhythm no MRG  Lung- clear bilaterally  Abd- soft, nontender, nondistended  Ext- no edema bilaterally. Skin- warm and dry    Data Review:   Recent Labs     09/24/19  0658 09/23/19  0727 09/22/19  1647 09/22/19  0609 09/21/19 2020    141  --  140 140   K 3.8 3.3*  --  3.1* 3.7   MG  --  2.1 2.3  --  1.6*   BUN 15 13  --  9 11   CREA 0.90 0.93  --  0.82 0.94   * 147*  --  236* 317*   WBC  --  5.6  --  6.6 6.4   HGB  --  12.8  --  12.0 11.5*   HCT  --  39.1  --  34.8* 32.4*   PLT  --  245  --  247 230   TROIQ  --   --   --   --  0.02       Assessment/Plan:     Principal Problem:    Pulmonary edema (9/22/2019)    Active Problems:    Hyperglycemia due to type 2 diabetes mellitus ([facility] [address] (2/24/2018)      Essential hypertension (2/28/2018)      Controlled type 2 diabetes mellitus, with long-term current use of insulin ([facility] [address] (9/22/2019)    Exam euvolemic. Needs [facility] with noted WMA/EKG changes (inf/ant lat TWI); also uncontrolled DM (last A1C >10; 9/19)/FHx of pCAD. EF 40-45%; on coreg/lisinopril and reassess LV dysfn meds post cath.   Start high intensity statin      Omid Snyder MD  9/24/2019 11:37 AM

## 2019-09-24 NOTE — DIABETES MGMT
Patient admitted with pulmonary edema. HbA1c 10.8 (eAG 263). Admitting blood glucose 339. Blood glucose range yesterday 147-187 with pt receiving Regular insulin 8 units. This AM blood glucose 175. Creatinine 0.90. GFR >60. Spoke with provider discussed pt glycemic control. New orders received to initiate basal insulin Lantus 10 units as fasting blood glucose is not at goal. New orders also received to change SSI from Regular insulin to Humalog.

## 2019-09-24 NOTE — PROGRESS NOTES
Hospitalist Progress Note    Subjective:   Daily Progress Note: 9/24/2019 1925    Patient presented to ER 9/21 with 3 day history of progressively worsening SOB, chest tightness, cough with white sputum and palpitations, nausea with eating. No history of CAD, pulmonary issues or CHF. Blood pressure on arrival: 186/129, , oxygen sat: 87% on room air. BNP: 542. CXR with cardiomegaly, IL edema and bilateral pleural effusions suggestive of fluid overload. Admitted with new onset CHF, lasix, echocardiogram< ACE, BB for CHF and HCTZ  9/22:  Cardiology in for consult. 9/24:  DM educator in, patient well known to her. Initiating basal Lantus 10 units. CHF teaching begun. To echo, then cardiac cath this pm with findings of severe multivessel disease. TTE and JOSE resulted as below. Currently drowsy after cardiac cath. Family at bedside     ADDITIONAL HISTORY:  Hypertension on no meds due to non compliance, IDDM with poor control and compliance, left breast cancer with XRT only; did not take po chemo. High intensity statin begun per Cards. CM in with multiple needs. Does not work or have any type of health insurance. Patient lost job with hip fx in 2/18, had no insurance after, has not taken any meds since then.    Current Facility-Administered Medications   Medication Dose Route Frequency    insulin glargine (LANTUS) injection 10 Units  10 Units SubCUTAneous DAILY    insulin lispro (HUMALOG) injection   SubCUTAneous AC&HS    atorvastatin (LIPITOR) tablet 40 mg  40 mg Oral QHS    aspirin chewable tablet 81 mg  81 mg Oral DAILY    carvedilol (COREG) tablet 12.5 mg  12.5 mg Oral BID WITH MEALS    potassium chloride (K-DUR, KLOR-CON) SR tablet 40 mEq  40 mEq Oral DAILY    calcium-vitamin D (OS-MONICA) 500 mg-200 unit tablet  1 Tab Oral BID WITH MEALS    sodium chloride (NS) flush 5-40 mL  5-40 mL IntraVENous Q8H    sodium chloride (NS) flush 5-40 mL  5-40 mL IntraVENous PRN    acetaminophen (TYLENOL) tablet 650 mg  650 mg Oral Q4H PRN    HYDROcodone-acetaminophen (NORCO)  mg tablet 1 Tab  1 Tab Oral Q4H PRN    naloxone (NARCAN) injection 0.4 mg  0.4 mg IntraVENous PRN    diphenhydrAMINE (BENADRYL) capsule 25 mg  25 mg Oral Q6H PRN    ondansetron (ZOFRAN) injection 4 mg  4 mg IntraVENous Q4H PRN    bisacodyl (DULCOLAX) tablet 5 mg  5 mg Oral DAILY PRN    LORazepam (ATIVAN) tablet 1 mg  1 mg Oral BID PRN    enoxaparin (LOVENOX) injection 40 mg  40 mg SubCUTAneous Q24H    dextrose 40% (GLUTOSE) oral gel 1 Tube  15 g Oral PRN    glucagon (GLUCAGEN) injection 1 mg  1 mg IntraMUSCular PRN    dextrose (D50W) injection syrg 12.5-25 g  25-50 mL IntraVENous PRN    influenza vaccine 2019- (6 mos+)(PF) (FLUARIX/FLULAVAL/FLUZONE QUAD) injection 0.5 mL  0.5 mL IntraMUSCular PRIOR TO DISCHARGE    albuterol (PROVENTIL VENTOLIN) nebulizer solution 2.5 mg  2.5 mg Nebulization Q6H PRN    hydroCHLOROthiazide (HYDRODIURIL) tablet 25 mg  25 mg Oral DAILY    lisinopril (PRINIVIL, ZESTRIL) tablet 10 mg  10 mg Oral BID        Review of Systems  Patient unable. Objective:     Visit Vitals  /77   Pulse 94   Temp 98.3 °F (36.8 °C)   Resp 18   Ht 5' 3\" (1.6 m)   Wt 85.5 kg (188 lb 7.9 oz)   SpO2 97%   Breastfeeding? No   BMI 33.39 kg/m²    O2 Flow Rate (L/min): 2 l/min O2 Device: Room air    Temp (24hrs), Av.3 °F (36.8 °C), Min:97.9 °F (36.6 °C), Max:98.7 °F (37.1 °C)      701 - 1900  In: 240 [P.O.:240]  Out: -   1901 -  0700  In: 720 [P.O.:720]  Out: -     General appearance: Drowsy post op. Family at bedside. Denies need. Head: Normocephalic, without obvious abnormality, atraumatic  Eyes: conjunctivae/corneas clear. PERRL  Throat: Lips, mucosa, and tongue normal. Teeth and gums normal  Neck: supple, symmetrical, trachea midline, and no JVD  Lungs: clear to auscultation bilaterally, diminished in bases.   Heart: regular rate and rhythm, S1, S2 normal, no murmur, click, rub or gallop  Abdomen: soft, non-tender. Bowel sounds normal. No masses,  no organomegaly  Extremities: extremities normal, atraumatic, no cyanosis or edema  Skin: Skin color, texture, turgor normal. No rashes or lesions  Neurologic: Grossly normal    Additional comments: Notes,orders, test results, vitals reviewed    Data Review  Recent Results (from the past 24 hour(s))   GLUCOSE, POC    Collection Time: 09/23/19  3:54 PM   Result Value Ref Range    Glucose (POC) 158 (H) 65 - 100 mg/dL   GLUCOSE, POC    Collection Time: 09/23/19  8:17 PM   Result Value Ref Range    Glucose (POC) 187 (H) 65 - 100 mg/dL   GLUCOSE, POC    Collection Time: 09/24/19  5:24 AM   Result Value Ref Range    Glucose (POC) 175 (H) 65 - 521 mg/dL   METABOLIC PANEL, BASIC    Collection Time: 09/24/19  6:58 AM   Result Value Ref Range    Sodium 141 136 - 145 mmol/L    Potassium 3.8 3.5 - 5.1 mmol/L    Chloride 111 (H) 98 - 107 mmol/L    CO2 21 21 - 32 mmol/L    Anion gap 9 7 - 16 mmol/L    Glucose 159 (H) 65 - 100 mg/dL    BUN 15 6 - 23 MG/DL    Creatinine 0.90 0.6 - 1.0 MG/DL    GFR est AA >60 >60 ml/min/1.73m2    GFR est non-AA >60 >60 ml/min/1.73m2    Calcium 8.8 8.3 - 10.4 MG/DL   CBC WITH AUTOMATED DIFF    Collection Time: 09/24/19 11:14 AM   Result Value Ref Range    WBC 5.9 4.3 - 11.1 K/uL    RBC 4.12 4.05 - 5.2 M/uL    HGB 12.0 11.7 - 15.4 g/dL    HCT 37.2 35.8 - 46.3 %    MCV 90.3 79.6 - 97.8 FL    MCH 29.1 26.1 - 32.9 PG    MCHC 32.3 31.4 - 35.0 g/dL    RDW 12.8 11.9 - 14.6 %    PLATELET 534 866 - 626 K/uL    MPV 12.0 9.4 - 12.3 FL    ABSOLUTE NRBC 0.00 0.0 - 0.2 K/uL    DF AUTOMATED      NEUTROPHILS 56 43 - 78 %    LYMPHOCYTES 33 13 - 44 %    MONOCYTES 6 4.0 - 12.0 %    EOSINOPHILS 4 0.5 - 7.8 %    BASOPHILS 1 0.0 - 2.0 %    IMMATURE GRANULOCYTES 0 0.0 - 5.0 %    ABS. NEUTROPHILS 3.3 1.7 - 8.2 K/UL    ABS. LYMPHOCYTES 1.9 0.5 - 4.6 K/UL    ABS. MONOCYTES 0.4 0.1 - 1.3 K/UL    ABS. EOSINOPHILS 0.3 0.0 - 0.8 K/UL    ABS.  BASOPHILS 0.0 0.0 - 0.2 K/UL    ABS. IMM. GRANS. 0.0 0.0 - 0.5 K/UL   GLUCOSE, POC    Collection Time: 09/24/19 11:18 AM   Result Value Ref Range    Glucose (POC) 193 (H) 65 - 100 mg/dL     JOSE:    -  Left ventricle: Systolic function was mildly reduced. Ejection fraction was estimated in the range of 45 % to 50 %. -  Mitral valve: There was moderate regurgitation with 2 central regurgitant jets. Jet 1: ERO-(13mm2), MRvol: (27mL). Jet 2: ERO- (6mm2), MRvol- (11mL). ECHOCARDIOGRAM:    -  Left ventricle: Systolic function was mildly reduced. Ejection fraction was estimated in the range of 40 % to 45 %. There was severe hypokinesis of the basal inferior, basal-mid inferolateral, and basal-mid anterolateral wall(s). -  Left atrium: The atrium was markedly dilated. -  Mitral valve: There was moderate regurgitation. Vena contracta: 0.1cm2. Assessment/Plan:   Chest pressure and dyspnea:  resolved   Cardiac cath 9/24 with plans for CABG soon   Per cards to send patient home with plans  for readmission, unclear dates   CT surgery NP in   Cardiology on board  Pulmonary edema:  New onset acute systolic failure   Continue HCTZ   Cards on board  IDDM with Hyperglycemia, A1C: 10.8:  Noncompliant, reports due to finances   Change insulin to NPH on discharge for cost  Reasons   Adjusting insulins, adding premeal   DM management/edu in, know patient well  Hypertension on meds  History of left breast cancer     Note:   plans to furnish 30 day supply of meds with referral to [facility] for ongoing care, will confer with them prior to discharge.      Care Plan discussed with: Patient/Family and Nurse    Signed By: Papi Villatoro NP     September 24, 2019

## 2019-09-24 NOTE — PROGRESS NOTES
End of shift report given to [employer] , pt is stable resting in bed, no c/o pain , call light within reach, bed locked and low position, pt respirations even and unlabored, no distress noted.

## 2019-09-24 NOTE — PROGRESS NOTES
Pt bedside report received from [facility], pt resting in bed  watching Tv, assessment completed ,pt AxOx4 bed on low and locked position with floor free of objects,  call light within reach, respirations even and non labored, pt verbalized understanding to call for needs,  no c/o pain, no distress noted.

## 2019-09-24 NOTE — PROGRESS NOTES
TRANSFER - IN REPORT:    Verbal report received from Evette Villalpando (name) on Jhonatan Rosales  being received from 8th Floor (unit) for routine progression of care      Report consisted of patients Situation, Background, Assessment and   Recommendations(SBAR). Information from the following report(s) SBAR, Kardex, Intake/Output, MAR, Recent Results and Cardiac Rhythm NSR was reviewed with the receiving nurse. Opportunity for questions and clarification was provided. Awaiting patient to arrive to floor.

## 2019-09-24 NOTE — PROGRESS NOTES
Patient screened for discharge planning needs. She is unemployed and uninsured, with no reported monthly income. She lives in her own home and family reportedly assists with her finances. Patient has been noncompliant with her diabetes care due to lack of insurance. She is independent in all ADLs at baseline. Patient has met with [facility] and they have filed SSI forms on her behalf.  has referred her to the [facility]) for possible assistance and medical follow-up at discharge. We will anticipate furnishing a 30 day supply of her medications at discharge with referral to [facility] for ongoing care. Case Management will continue to follow. Care Management Interventions  PCP Verified by CM:  Yes  Transition of Care Consult (CM Consult): Discharge Planning  Discharge Durable Medical Equipment: No  Physical Therapy Consult: Yes  Occupational Therapy Consult: No  Speech Therapy Consult: No  Current Support Network: Own Home  Confirm Follow Up Transport: Self  Plan discussed with Pt/Family/Caregiver: Yes  Freedom of Choice Offered: Yes  Discharge Location  Discharge Placement: Home

## 2019-09-24 NOTE — PROGRESS NOTES
TRANSFER - IN REPORT:    Verbal report received from [facility] (name) on Roedrick Ledbetter  being received from [address] [place] [address] (unit) for routine progression of care      Report consisted of patients Situation, Background, Assessment and   Recommendations(SBAR). Information from the following report(s) SBAR, Kardex, [facility] and Recent Results was reviewed with the receiving nurse. Opportunity for questions and clarification was provided. Awaiting patient arrival on floor.

## 2019-09-24 NOTE — INTERDISCIPLINARY ROUNDS
Interdisciplinary team rounds were held 9/24/2019 with the following team members:Care Management, Physical Therapy, Physician and Clinical Coordinator and the patient. Plan of care discussed. See clinical pathway and/or care plan for interventions and desired outcomes.

## 2019-09-24 NOTE — PROGRESS NOTES
Shift change report received from Nell Jane, [address] (off going nurse). Plan of care reviewed with patient at bedside. Opportunity to ask questions provided. Denies needs at this time. Patient verbalized understanding about intake and output documentation and daily weight. Patient verbalized understand to use the hat and to be mindful of PO intake. Pumps cleared and documented. Bed low and locked with call light within reach. Patient instructed to call for assistance. Patient verbalized understanding. Right radial site visualized. BP cycling. Pt has not ambulated since procedure. Pt remains drowsy. Off-going RN states this is baseline.

## 2019-09-24 NOTE — ROUTINE PROCESS
Had lengthy conversation with family and patient in regards to CHF. Patient echo with EF 40-45%. We reviewed disease state and POC during admission along with tests to identify. WE reviewed HF educational information and discussed s/s associated and when to report- as listed below. Patient did not follow with cardiology prior to admit and will be set with follow up appt upon d/c. CHF teaching completed, verbalize emphasis on monitoring self and report to MD: 
? If you gain 2 lbs in one day or 5 lbs in a week, and short of breath. ? If you can not lay flat without developing short of breath or rapid breathing at night; or if it wakes you up. Develop a cough or wheezing. ? If you notice swollen hands/feet/ankles or stomach with a bloated/ full feeling. ? If you are  more confused or mentally fuzzy or dizzy. ? If you notice a rapid or change in your heart rate. ? If you become more exhausted all the time and unable to do the same level of activity without stopping to catch your breath. Drink no more than 8 cups a day in 8 oz. cups. Limit Cola Drinks. Your Heart can not handle any more. Stay away from salt (limit anything with salt or sodium in it). Limit to 250mg per serving. Exercise needs to be started with your Doctors approval. 
Reduce stress; Call myself or Provider if assistance is needed. Pass post test via teach back, will make self available post DC ,if an questions arise. Diabetic teaching completed.  Planner/scale @ BS:  60 mins total

## 2019-09-24 NOTE — PROGRESS NOTES
Bedside shift change report given to [address] Nisha Rico (oncoming nurse) by Nieves Moctezuma (offgoing nurse). Report included the following information SBAR, Kardex, Intake/Output, MAR and Recent Results.

## 2019-09-24 NOTE — PROCEDURES
Brief Cardiac Procedure Note    Patient: Jennifer Garcia MRN: [id number]  SSN: [id number]    YOB: 1960  Age: 61 y.o. Sex: female      Date of Procedure: 9/24/2019     Pre-procedure Diagnosis: Typical Angina    Post-procedure Diagnosis: Coronary Artery Disease    Reason for Procedure: LV Dysfunction    Procedure: Left Heart Catheterization    Brief Description of Procedure: LHC via R radial artery. Performed By: Severiano Boggs MD     Assistants: None    Anesthesia: Moderate Sedation    Estimated Blood Loss: Less than 10 mL      Specimens: None    Implants: None    Findings: LM normal, LAD 95% distal, 90% prox D1, Circ 80% prox, 90% distal. 100% RCA, R to L collateral flow. LVEDP 14 mmHg    Complications: None    Recommendations: Refer for CABG, xfer to telemetry floor, xfer to cardiology service.     Signed By: Severiano Boggs MD     September 24, 2019

## 2019-09-24 NOTE — PROGRESS NOTES
TRANSFER - OUT REPORT:    Verbal report given to Erna(name) on Deanna Alexander  being transferred to cpru(unit) for routine progression of care       Report consisted of patients Situation, Background, Assessment and   Recommendations(SBAR). Information from the following report(s) SBAR was reviewed with the receiving nurse. Opportunity for questions and clarification was provided. Procedure: [facility]   Finding Summary: consult for cabg(cath/pci/pacer settings)  Location: rwrist    Closure Device: trband 12 ml(yes/no/description)  Post Site Assessment: no bleeding no hematoma         Intra Procedure Meds:    Versed: 4mg  Fentanyl: 100mcg             Peripheral IV 09/22/19 Right Antecubital (Active)   Site Assessment Clean, dry, & intact 9/24/2019  7:37 AM   Phlebitis Assessment 0 9/24/2019  7:37 AM   Infiltration Assessment 0 9/24/2019  7:37 AM   Dressing Status Clean, dry, & intact 9/24/2019  7:37 AM   Dressing Type Transparent;Tape 9/24/2019  7:37 AM   Hub Color/Line Status Patent 9/24/2019  7:37 AM   Alcohol Cap Used No 9/22/2019 11:04 PM        Post-Procedure Site Assessment (1)  Wound Type: Catheter entry/exit  Location: Wrist  Orientation : Right  Hemostasis : TR Band(12ml)  Site Assessment: No bleeding, No hematoma                       has No Known Allergies. Past Medical History:   Diagnosis Date    Cancer ([facility]     breast- L    Diabetes ([facility]     Hypertension     Intertrochanteric fracture of right femur ([facility] 2/24/2018     Visit Vitals  /68   Pulse 90   Temp 98.3 °F (36.8 °C)   Resp 16   Ht 5' 3\" (1.6 m)   Wt 85.5 kg (188 lb 7.9 oz)   SpO2 94%   Breastfeeding?  No   BMI 33.39 kg/m²

## 2019-09-24 NOTE — PROGRESS NOTES
TRANSFER - OUT REPORT:    Verbal report given to [id number] on Anshul Laura  being transferred to [facility](unit) for routine progression of care       Report consisted of patients Situation, Background, Assessment and   Recommendations(SBAR). Information from the following report(s) Procedure Summary was reviewed with the receiving nurse. Lines:   Peripheral IV 09/22/19 Right Antecubital (Active)   Site Assessment Clean, dry, & intact 9/24/2019  7:37 AM   Phlebitis Assessment 0 9/24/2019  7:37 AM   Infiltration Assessment 0 9/24/2019  7:37 AM   Dressing Status Clean, dry, & intact 9/24/2019  7:37 AM   Dressing Type Transparent;Tape 9/24/2019  7:37 AM   Hub Color/Line Status Patent 9/24/2019  7:37 AM   Alcohol Cap Used No 9/22/2019 11:04 PM        Opportunity for questions and clarification was provided.

## 2019-09-24 NOTE — ROUTINE PROCESS
CHF teaching started post introduction to pt/family; aware of diagnosis. Planner/scale @ BS and will follow. Smoking/ ETOH/Illicit drug use cessation and maintain a healthy weight covered. Pt/family aware that I can not prescribe nor adjust  medications: 15mins Palliative Care score:  RATT 10, none Refused ACP on admission Start 2L/D Fluid restriction/ cardiac diet CHF teaching continues to pt. . Emphasis on taking prescription meds as ordered, to keep F/U appts and to call MD STAT if any of the following occur: ? If you gain 2 lbs in one day or 5 lbs in a week, and short of breath. ? If you can not lay flat without developing short of breath or rapid breathing at night; or if it wakes you up. Develop a cough or wheezing. ? If you notice swollen hands/feet/ankles or stomach with a bloated/ full feeling. Pt. verbalizes understanding, will follow to reinforce teaching skills: 20 mins Reinforce with family @ 6 Possible Cath

## 2019-09-24 NOTE — PROGRESS NOTES
TRANSFER - IN REPORT:    Verbal report received from [facility] (name) on Zoe Lefort  being received from [facility] (unit) for routine progression of care      Report consisted of patients Situation, Background, Assessment and   Recommendations(SBAR). Information from the following report(s) SBAR, Kardex, Intake/Output, MAR and Recent Results was reviewed with the receiving nurse. Opportunity for questions and clarification was provided. Assessment completed upon patients arrival to unit and care assumed. Skin assessment: Sacrum and heels intact. Right radial cath site. Dressing dry and intact, with no bleeding or hematoma.

## 2019-09-24 NOTE — PROGRESS NOTES
Pt up ambulating in room. Family present in the room. No new complaints. Respirations are even and unlabored on room air. No needs expressed. Bed is in low and locked position. Call light is within reach. Pt instructed to ask for assistance if needed. Will continue to monitor.

## 2019-09-24 NOTE — PROGRESS NOTES
Report received from [facility] RN. Procedural findings communicated. Intra procedural  medication administration reviewed. Progression of care discussed.      Patient received into [id number] [facility] 7 post sheath removal.     Access site without bleeding or swelling yes    Dressing dry and intact yes    Patient instructed to limit movement to right upper extremity    Routine post procedural vital signs and site assessment initiated yes

## 2019-09-25 LAB
ANION GAP SERPL CALC-SCNC: 6 MMOL/L (ref 7–16)
BASOPHILS # BLD: 0.1 K/UL (ref 0–0.2)
BASOPHILS NFR BLD: 1 % (ref 0–2)
BUN SERPL-MCNC: 16 MG/DL (ref 6–23)
CALCIUM SERPL-MCNC: 8.8 MG/DL (ref 8.3–10.4)
CHLORIDE SERPL-SCNC: 109 MMOL/L (ref 98–107)
CHOLEST SERPL-MCNC: 231 MG/DL
CO2 SERPL-SCNC: 26 MMOL/L (ref 21–32)
CREAT SERPL-MCNC: 0.86 MG/DL (ref 0.6–1)
DIFFERENTIAL METHOD BLD: ABNORMAL
EOSINOPHIL # BLD: 0.3 K/UL (ref 0–0.8)
EOSINOPHIL NFR BLD: 5 % (ref 0.5–7.8)
ERYTHROCYTE [DISTWIDTH] IN BLOOD BY AUTOMATED COUNT: 12.7 % (ref 11.9–14.6)
GLUCOSE BLD STRIP.AUTO-MCNC: 100 MG/DL (ref 65–100)
GLUCOSE BLD STRIP.AUTO-MCNC: 142 MG/DL (ref 65–100)
GLUCOSE BLD STRIP.AUTO-MCNC: 153 MG/DL (ref 65–100)
GLUCOSE BLD STRIP.AUTO-MCNC: 167 MG/DL (ref 65–100)
GLUCOSE SERPL-MCNC: 131 MG/DL (ref 65–100)
HCT VFR BLD AUTO: 33.8 % (ref 35.8–46.3)
HDLC SERPL-MCNC: 34 MG/DL (ref 40–60)
HDLC SERPL: 6.8 {RATIO}
HGB BLD-MCNC: 11.1 G/DL (ref 11.7–15.4)
IMM GRANULOCYTES # BLD AUTO: 0 K/UL (ref 0–0.5)
IMM GRANULOCYTES NFR BLD AUTO: 0 % (ref 0–5)
LDLC SERPL CALC-MCNC: 163.2 MG/DL
LIPID PROFILE,FLP: ABNORMAL
LYMPHOCYTES # BLD: 1.9 K/UL (ref 0.5–4.6)
LYMPHOCYTES NFR BLD: 34 % (ref 13–44)
MAGNESIUM SERPL-MCNC: 2 MG/DL (ref 1.8–2.4)
MCH RBC QN AUTO: 29.4 PG (ref 26.1–32.9)
MCHC RBC AUTO-ENTMCNC: 32.8 G/DL (ref 31.4–35)
MCV RBC AUTO: 89.7 FL (ref 79.6–97.8)
MONOCYTES # BLD: 0.4 K/UL (ref 0.1–1.3)
MONOCYTES NFR BLD: 7 % (ref 4–12)
NEUTS SEG # BLD: 3 K/UL (ref 1.7–8.2)
NEUTS SEG NFR BLD: 54 % (ref 43–78)
NRBC # BLD: 0 K/UL (ref 0–0.2)
PLATELET # BLD AUTO: 220 K/UL (ref 150–450)
PMV BLD AUTO: 11.8 FL (ref 9.4–12.3)
POTASSIUM SERPL-SCNC: 3.8 MMOL/L (ref 3.5–5.1)
RBC # BLD AUTO: 3.77 M/UL (ref 4.05–5.2)
SODIUM SERPL-SCNC: 141 MMOL/L (ref 136–145)
TRIGL SERPL-MCNC: 169 MG/DL (ref 35–150)
VLDLC SERPL CALC-MCNC: 33.8 MG/DL (ref 6–23)
WBC # BLD AUTO: 5.6 K/UL (ref 4.3–11.1)

## 2019-09-25 PROCEDURE — 80048 BASIC METABOLIC PNL TOTAL CA: CPT

## 2019-09-25 PROCEDURE — 85025 COMPLETE CBC W/AUTO DIFF WBC: CPT

## 2019-09-25 PROCEDURE — 74011636320 HC RX REV CODE- 636/320: Performed by: INTERNAL MEDICINE

## 2019-09-25 PROCEDURE — 36415 COLL VENOUS BLD VENIPUNCTURE: CPT

## 2019-09-25 PROCEDURE — 74011250637 HC RX REV CODE- 250/637: Performed by: INTERNAL MEDICINE

## 2019-09-25 PROCEDURE — 76937 US GUIDE VASCULAR ACCESS: CPT

## 2019-09-25 PROCEDURE — 74011000250 HC RX REV CODE- 250: Performed by: INTERNAL MEDICINE

## 2019-09-25 PROCEDURE — 99152 MOD SED SAME PHYS/QHP 5/>YRS: CPT

## 2019-09-25 PROCEDURE — 74011636637 HC RX REV CODE- 636/637: Performed by: NURSE PRACTITIONER

## 2019-09-25 PROCEDURE — 74011250637 HC RX REV CODE- 250/637: Performed by: NURSE PRACTITIONER

## 2019-09-25 PROCEDURE — 65660000000 HC RM CCU STEPDOWN

## 2019-09-25 PROCEDURE — 82962 GLUCOSE BLOOD TEST: CPT

## 2019-09-25 PROCEDURE — C1769 GUIDE WIRE: HCPCS

## 2019-09-25 PROCEDURE — 75756 ARTERY X-RAYS CHEST: CPT

## 2019-09-25 PROCEDURE — 74011250637 HC RX REV CODE- 250/637: Performed by: FAMILY MEDICINE

## 2019-09-25 PROCEDURE — 74011250636 HC RX REV CODE- 250/636

## 2019-09-25 PROCEDURE — C1894 INTRO/SHEATH, NON-LASER: HCPCS

## 2019-09-25 PROCEDURE — 99153 MOD SED SAME PHYS/QHP EA: CPT

## 2019-09-25 PROCEDURE — 80061 LIPID PANEL: CPT

## 2019-09-25 PROCEDURE — 77030037759

## 2019-09-25 PROCEDURE — 83735 ASSAY OF MAGNESIUM: CPT

## 2019-09-25 PROCEDURE — 77030004532 HC CATH ANGI DX IMP BSC -A

## 2019-09-25 PROCEDURE — 74011250636 HC RX REV CODE- 250/636: Performed by: INTERNAL MEDICINE

## 2019-09-25 RX ORDER — HEPARIN SODIUM 200 [USP'U]/100ML
2 INJECTION, SOLUTION INTRAVENOUS CONTINUOUS
Status: DISCONTINUED | OUTPATIENT
Start: 2019-09-25 | End: 2019-09-25 | Stop reason: HOSPADM

## 2019-09-25 RX ORDER — LIDOCAINE HYDROCHLORIDE 10 MG/ML
3-10 INJECTION INFILTRATION; PERINEURAL
Status: DISCONTINUED | OUTPATIENT
Start: 2019-09-25 | End: 2019-09-25 | Stop reason: HOSPADM

## 2019-09-25 RX ORDER — NALOXONE HYDROCHLORIDE 0.4 MG/ML
0.4 INJECTION, SOLUTION INTRAMUSCULAR; INTRAVENOUS; SUBCUTANEOUS AS NEEDED
Status: DISCONTINUED | OUTPATIENT
Start: 2019-09-25 | End: 2019-09-26 | Stop reason: HOSPADM

## 2019-09-25 RX ORDER — SODIUM CHLORIDE 0.9 % (FLUSH) 0.9 %
5-40 SYRINGE (ML) INJECTION AS NEEDED
Status: DISCONTINUED | OUTPATIENT
Start: 2019-09-25 | End: 2019-09-26 | Stop reason: HOSPADM

## 2019-09-25 RX ORDER — SODIUM CHLORIDE 0.9 % (FLUSH) 0.9 %
5-40 SYRINGE (ML) INJECTION EVERY 8 HOURS
Status: DISCONTINUED | OUTPATIENT
Start: 2019-09-25 | End: 2019-09-25 | Stop reason: HOSPADM

## 2019-09-25 RX ORDER — ONDANSETRON 2 MG/ML
4 INJECTION INTRAMUSCULAR; INTRAVENOUS
Status: DISCONTINUED | OUTPATIENT
Start: 2019-09-25 | End: 2019-09-25 | Stop reason: HOSPADM

## 2019-09-25 RX ORDER — SODIUM CHLORIDE 9 MG/ML
75 INJECTION, SOLUTION INTRAVENOUS CONTINUOUS
Status: DISCONTINUED | OUTPATIENT
Start: 2019-09-25 | End: 2019-09-26 | Stop reason: HOSPADM

## 2019-09-25 RX ORDER — ACETAMINOPHEN 325 MG/1
650 TABLET ORAL
Status: DISCONTINUED | OUTPATIENT
Start: 2019-09-25 | End: 2019-09-26 | Stop reason: HOSPADM

## 2019-09-25 RX ORDER — HYDROCODONE BITARTRATE AND ACETAMINOPHEN 5; 325 MG/1; MG/1
1 TABLET ORAL
Status: DISCONTINUED | OUTPATIENT
Start: 2019-09-25 | End: 2019-09-26 | Stop reason: HOSPADM

## 2019-09-25 RX ORDER — FENTANYL CITRATE 50 UG/ML
25-100 INJECTION, SOLUTION INTRAMUSCULAR; INTRAVENOUS
Status: DISCONTINUED | OUTPATIENT
Start: 2019-09-25 | End: 2019-09-25 | Stop reason: HOSPADM

## 2019-09-25 RX ORDER — MIDAZOLAM HYDROCHLORIDE 1 MG/ML
.5-2 INJECTION, SOLUTION INTRAMUSCULAR; INTRAVENOUS
Status: DISCONTINUED | OUTPATIENT
Start: 2019-09-25 | End: 2019-09-25 | Stop reason: HOSPADM

## 2019-09-25 RX ORDER — INSULIN GLARGINE 100 [IU]/ML
13 INJECTION, SOLUTION SUBCUTANEOUS DAILY
Status: DISCONTINUED | OUTPATIENT
Start: 2019-09-25 | End: 2019-09-26 | Stop reason: HOSPADM

## 2019-09-25 RX ORDER — MORPHINE SULFATE 2 MG/ML
1 INJECTION, SOLUTION INTRAMUSCULAR; INTRAVENOUS
Status: DISCONTINUED | OUTPATIENT
Start: 2019-09-25 | End: 2019-09-26 | Stop reason: HOSPADM

## 2019-09-25 RX ADMIN — ACETAMINOPHEN 650 MG: 325 TABLET, FILM COATED ORAL at 18:38

## 2019-09-25 RX ADMIN — HYDROCODONE BITARTRATE AND ACETAMINOPHEN 1 TABLET: 5; 325 TABLET ORAL at 19:58

## 2019-09-25 RX ADMIN — Medication 5 ML: at 21:45

## 2019-09-25 RX ADMIN — HYDROCHLOROTHIAZIDE 25 MG: 25 TABLET ORAL at 08:45

## 2019-09-25 RX ADMIN — HEPARIN SODIUM 3 ML/HR: 5000 INJECTION, SOLUTION INTRAVENOUS; SUBCUTANEOUS at 15:17

## 2019-09-25 RX ADMIN — Medication 5 ML: at 06:06

## 2019-09-25 RX ADMIN — IOPAMIDOL 20 ML: 755 INJECTION, SOLUTION INTRAVENOUS at 15:47

## 2019-09-25 RX ADMIN — LISINOPRIL 10 MG: 5 TABLET ORAL at 08:45

## 2019-09-25 RX ADMIN — CARVEDILOL 12.5 MG: 12.5 TABLET, FILM COATED ORAL at 08:45

## 2019-09-25 RX ADMIN — INSULIN GLARGINE 13 UNITS: 100 INJECTION, SOLUTION SUBCUTANEOUS at 08:47

## 2019-09-25 RX ADMIN — MIDAZOLAM HYDROCHLORIDE 2 MG: 1 INJECTION, SOLUTION INTRAMUSCULAR; INTRAVENOUS at 15:28

## 2019-09-25 RX ADMIN — LIDOCAINE HYDROCHLORIDE 10 ML: 10 INJECTION, SOLUTION INFILTRATION; PERINEURAL at 15:31

## 2019-09-25 RX ADMIN — ATORVASTATIN CALCIUM 40 MG: 40 TABLET, FILM COATED ORAL at 21:44

## 2019-09-25 RX ADMIN — LISINOPRIL 10 MG: 5 TABLET ORAL at 18:13

## 2019-09-25 RX ADMIN — CARVEDILOL 12.5 MG: 12.5 TABLET, FILM COATED ORAL at 18:14

## 2019-09-25 RX ADMIN — Medication 1 TABLET: at 08:45

## 2019-09-25 RX ADMIN — Medication 10 ML: at 13:28

## 2019-09-25 RX ADMIN — INSULIN LISPRO 2 UNITS: 100 INJECTION, SOLUTION INTRAVENOUS; SUBCUTANEOUS at 21:44

## 2019-09-25 RX ADMIN — FENTANYL CITRATE 50 MCG: 50 INJECTION, SOLUTION INTRAMUSCULAR; INTRAVENOUS at 15:28

## 2019-09-25 RX ADMIN — POTASSIUM CHLORIDE 40 MEQ: 20 TABLET, EXTENDED RELEASE ORAL at 08:45

## 2019-09-25 RX ADMIN — ASPIRIN 81 MG 81 MG: 81 TABLET ORAL at 08:45

## 2019-09-25 RX ADMIN — Medication 1 TABLET: at 18:14

## 2019-09-25 NOTE — PROGRESS NOTES
Hospitalist Progress Note     Admit Date:  2019  8:48 PM   Name:  Martín Sultana   Age:  61 y.o.  :  1960   MRN:  [id number]   PCP:  Adolfo Mcintosh MD  Treatment Team: Attending Provider: Isaura Marley MD; Consulting Provider: Silvana Ritchie DO; Care Manager: Lisa Ortiz, RN; Utilization Review: Theo Vazquez RN; Consulting Provider: Vladislav Elias MD; Care Manager: Terence Mosqueda    HPI:   CC: Worsening SOB x 3 days    Patient is a 31-year-old female with past medical history significant for left-sided breast cancer treated with radiation lumpectomy and oral chemo few years ago, diabetes, hypertension. Patient presented to the ED with complaints of shortness of breath cough and chest tightness intermittently for several weeks, worse on the day of admit. In the ED her BNP was 542 and her CT of the chest showed cardiomegaly, interstitial lung edema, small bilateral pleural effusions that were suggestive of CHF or fluid overload. Patient's EKG was abnormal with ST and diffuse T wave changes. She was given Lasix. Prior to admission patient was taking a beta-blocker and an ACE inhibitor. Cardiology was consulted and an echo is pending. Patient was on I&O but recorded output may not be correct. Patient on room air with good saturations    Subjective: Today on exam patient was in bed eating lunch. She denies any shortness of breath or chest pain today. Echocardiogram showed decreased ejection fraction of 40 to 45%. Also noted severe hypokinesis of the basal inferior, basal mid inferolateral basilar, basal mid anterolateral walls. Patient did verbalize that she is nervous about upcoming heart surgery. Discussed briefly some of the lifestyle changes that may need to happen including dietary changes and activity levels.       Patient has limited options at discharge since she is self-pay, discussed that home health might be able to come out to see her after discharge. Objective:     Patient Vitals for the past 24 hrs:   Temp Pulse Resp BP SpO2   09/25/19 0921 98.6 °F (37 °C) 86 18 127/66 93 %   09/25/19 0425 98.5 °F (36.9 °C) 81 17 124/75 90 %   09/25/19 0107 98.7 °F (37.1 °C) 87 17 128/71 94 %   09/24/19 2108 97.6 °F (36.4 °C) 84 16 124/79 91 %   09/24/19 1748  81  142/79    09/24/19 1745 97.8 °F (36.6 °C) 85 16 145/76 94 %   09/24/19 1643  78 15 112/59 99 %   09/24/19 1630  81 17 127/60 100 %   09/24/19 1615  77 14 113/57 97 %   09/24/19 1601  78 15 119/63 96 %   09/24/19 1553  79 15 123/60 95 %   09/24/19 1535  87 9 167/84 99 %   09/24/19 1532  89 9 152/87 96 %   09/24/19 1530  90 (!) 6 (!) 180/99 94 %   09/24/19 1527  95 16 (!) 193/102 92 %   09/24/19 1524  97 19 (!) 217/114 96 %   09/24/19 1522  100 19 (!) 205/99 96 %   09/24/19 1520  98 24 (!) 200/116 97 %   09/24/19 1515  95 19 (!) 171/92 97 %   09/24/19 1510  89 22 (!) 185/106 97 %   09/24/19 1458  87 21 155/76 95 %   09/24/19 1448  87 16 165/78    09/24/19 1439  88 24 164/76 95 %   09/24/19 1419  90 13 166/77 95 %   09/24/19 1408  91 15 167/77 95 %   09/24/19 1401  90 (!) 5 165/79 93 %   09/24/19 1351  90 16 151/68 94 %     Oxygen Therapy  O2 Sat (%): 93 % (09/25/19 0921)  Pulse via Oximetry: 79 beats per minute (09/24/19 1643)  O2 Device: Room air (09/25/19 1213)  O2 Flow Rate (L/min): 2 l/min (09/22/19 4024)    Intake/Output Summary (Last 24 hours) at 9/25/2019 1252  Last data filed at 9/25/2019 0430  Gross per 24 hour   Intake 300 ml   Output    Net 300 ml         REVIEW OF SYSTEMS: Comprehensive ROS performed and negative except as stated in HPI. Physical Examination:  General:    Obese. Awake and alert. Head:  Normocephalic, atraumatic  Eyes:  Normal sclera  CV:   RRR. No  Murmurs, clicks, or gallops  Lungs:   Unlabored, no cyanosis  Abdomen:   Soft, nondistended, nontender. Extremities: Warm and dry. No cyanosis or edema. Skin:     No rashes or jaundice. Neuro:  No gross focal deficits  Psych:  Mood and affect appropriate    Data Review:  I have reviewed all labs, meds, telemetry events, and studies from the last 24 hours. Recent Results (from the past 24 hour(s))   GLUCOSE, POC    Collection Time: 09/24/19  5:35 PM   Result Value Ref Range    Glucose (POC) 133 (H) 65 - 100 mg/dL   GLUCOSE, POC    Collection Time: 09/24/19  9:12 PM   Result Value Ref Range    Glucose (POC) 204 (H) 65 - 100 mg/dL   CBC WITH AUTOMATED DIFF    Collection Time: 09/25/19  5:17 AM   Result Value Ref Range    WBC 5.6 4.3 - 11.1 K/uL    RBC 3.77 (L) 4.05 - 5.2 M/uL    HGB 11.1 (L) 11.7 - 15.4 g/dL    HCT 33.8 (L) 35.8 - 46.3 %    MCV 89.7 79.6 - 97.8 FL    MCH 29.4 26.1 - 32.9 PG    MCHC 32.8 31.4 - 35.0 g/dL    RDW 12.7 11.9 - 14.6 %    PLATELET 695 563 - 917 K/uL    MPV 11.8 9.4 - 12.3 FL    ABSOLUTE NRBC 0.00 0.0 - 0.2 K/uL    DF AUTOMATED      NEUTROPHILS 54 43 - 78 %    LYMPHOCYTES 34 13 - 44 %    MONOCYTES 7 4.0 - 12.0 %    EOSINOPHILS 5 0.5 - 7.8 %    BASOPHILS 1 0.0 - 2.0 %    IMMATURE GRANULOCYTES 0 0.0 - 5.0 %    ABS. NEUTROPHILS 3.0 1.7 - 8.2 K/UL    ABS. LYMPHOCYTES 1.9 0.5 - 4.6 K/UL    ABS. MONOCYTES 0.4 0.1 - 1.3 K/UL    ABS. EOSINOPHILS 0.3 0.0 - 0.8 K/UL    ABS. BASOPHILS 0.1 0.0 - 0.2 K/UL    ABS. IMM.  GRANS. 0.0 0.0 - 0.5 K/UL   METABOLIC PANEL, BASIC    Collection Time: 09/25/19  5:17 AM   Result Value Ref Range    Sodium 141 136 - 145 mmol/L    Potassium 3.8 3.5 - 5.1 mmol/L    Chloride 109 (H) 98 - 107 mmol/L    CO2 26 21 - 32 mmol/L    Anion gap 6 (L) 7 - 16 mmol/L    Glucose 131 (H) 65 - 100 mg/dL    BUN 16 6 - 23 MG/DL    Creatinine 0.86 0.6 - 1.0 MG/DL    GFR est AA >60 >60 ml/min/1.73m2    GFR est non-AA >60 >60 ml/min/1.73m2    Calcium 8.8 8.3 - 10.4 MG/DL   LIPID PANEL    Collection Time: 09/25/19  5:17 AM   Result Value Ref Range    LIPID PROFILE          Cholesterol, total 231 (H) <200 MG/DL    Triglyceride 169 (H) 35 - 150 MG/DL    HDL Cholesterol 34 (L) 40 - 60 MG/DL    LDL, calculated 163.2 (H) <100 MG/DL    VLDL, calculated 33.8 (H) 6.0 - 23.0 MG/DL    CHOL/HDL Ratio 6.8     MAGNESIUM    Collection Time: 09/25/19  5:17 AM   Result Value Ref Range    Magnesium 2.0 1.8 - 2.4 mg/dL   GLUCOSE, POC    Collection Time: 09/25/19  6:15 AM   Result Value Ref Range    Glucose (POC) 153 (H) 65 - 100 mg/dL   GLUCOSE, POC    Collection Time: 09/25/19 11:17 AM   Result Value Ref Range    Glucose (POC) 142 (H) 65 - 100 mg/dL        All Micro Results     None          Current Meds:  Current Facility-Administered Medications   Medication Dose Route Frequency    insulin glargine (LANTUS) injection 13 Units  13 Units SubCUTAneous DAILY    insulin lispro (HUMALOG) injection   SubCUTAneous AC&HS    atorvastatin (LIPITOR) tablet 40 mg  40 mg Oral QHS    aspirin chewable tablet 81 mg  81 mg Oral DAILY    fentaNYL citrate (PF) injection 25-50 mcg  25-50 mcg IntraVENous Multiple    heparin (PF) 2 units/ml in NS infusion  3 mL/hr IntraVENous CONTINUOUS    midazolam (VERSED) injection 0.5-2 mg  0.5-2 mg IntraVENous Multiple    0.9% sodium chloride infusion  75 mL/hr IntraVENous CONTINUOUS    sodium chloride (NS) flush 5-40 mL  5-40 mL IntraVENous Q8H    sodium chloride (NS) flush 5-40 mL  5-40 mL IntraVENous PRN    acetaminophen (TYLENOL) tablet 650 mg  650 mg Oral Q4H PRN    HYDROcodone-acetaminophen (NORCO) 5-325 mg per tablet 1 Tab  1 Tab Oral Q4H PRN    morphine injection 1 mg  1 mg IntraVENous Q4H PRN    naloxone (NARCAN) injection 0.4 mg  0.4 mg IntraVENous PRN    ondansetron (ZOFRAN) injection 4 mg  4 mg IntraVENous ONCE PRN    midazolam (VERSED) injection 1-2 mg  1-2 mg IntraVENous Multiple    fentaNYL citrate (PF) injection 25-50 mcg  25-50 mcg IntraVENous Multiple    lidocaine (XYLOCAINE) 2 % viscous solution 15 mL  15 mL Mouth/Throat PRN    carvedilol (COREG) tablet 12.5 mg  12.5 mg Oral BID WITH MEALS    potassium chloride (K-DUR, KLOR-CON) SR tablet 40 mEq 40 mEq Oral DAILY    calcium-vitamin D (OS-MONICA) 500 mg-200 unit tablet  1 Tab Oral BID WITH MEALS    sodium chloride (NS) flush 5-40 mL  5-40 mL IntraVENous Q8H    sodium chloride (NS) flush 5-40 mL  5-40 mL IntraVENous PRN    diphenhydrAMINE (BENADRYL) capsule 25 mg  25 mg Oral Q6H PRN    ondansetron (ZOFRAN) injection 4 mg  4 mg IntraVENous Q4H PRN    bisacodyl (DULCOLAX) tablet 5 mg  5 mg Oral DAILY PRN    LORazepam (ATIVAN) tablet 1 mg  1 mg Oral BID PRN    enoxaparin (LOVENOX) injection 40 mg  40 mg SubCUTAneous Q24H    dextrose 40% (GLUTOSE) oral gel 1 Tube  15 g Oral PRN    glucagon (GLUCAGEN) injection 1 mg  1 mg IntraMUSCular PRN    dextrose (D50W) injection syrg 12.5-25 g  25-50 mL IntraVENous PRN    influenza vaccine 2019-20 (6 mos+)(PF) (FLUARIX/FLULAVAL/FLUZONE QUAD) injection 0.5 mL  0.5 mL IntraMUSCular PRIOR TO DISCHARGE    albuterol (PROVENTIL VENTOLIN) nebulizer solution 2.5 mg  2.5 mg Nebulization Q6H PRN    hydroCHLOROthiazide (HYDRODIURIL) tablet 25 mg  25 mg Oral DAILY    lisinopril (PRINIVIL, ZESTRIL) tablet 10 mg  10 mg Oral BID       Diet:  DIET NUTRITIONAL SUPPLEMENTS  DIET NPO    Other Studies (last 24 hours):  No results found.     Assessment and Plan:     Hospital Problems as of 9/25/2019 Never Reviewed          Codes Class Noted - Resolved POA    * (Principal) Pulmonary edema ICD-10-CM: J81.1  ICD-9-CM: 191  9/22/2019 - Present Yes    Overview Signed 9/22/2019 12:05 AM by Shayla Gong MD     Suspect new onset CHF             Controlled type 2 diabetes mellitus, with long-term current use of insulin ([facility] (Chronic) ICD-10-CM: E11.9, Z79.4  ICD-9-CM: 250.00, V58.67  9/22/2019 - Present Yes        Essential hypertension (Chronic) ICD-10-CM: I10  ICD-9-CM: 401.9  2/28/2018 - Present Yes        Hyperglycemia due to type 2 diabetes mellitus (HCC) (Chronic) ICD-10-CM: E11.65  ICD-9-CM: 250.00  2/24/2018 - Present Yes              A/P:    Acute CHF/Pulmonary edema  Cardiology following  Continue lasix, BB, ACE-I  Echo with decrease EF and hypokinesis     Chest pain  Resolved  Cardiology following  [facility] 09/24 - recommending CABG  Tele     DM II, uncontrolled  A1C 10.8  BGL more controlled currently  On SSI  Diabetic educator following     Hypokalemia  Replace  BMP in AM  Mag 2.1    DC planning/Dispo:  Homr  DVT ppx:  Lovenox    Code status:  Full  Medical decision maker: None on file    Case reviewed with supervising physician - ART Zazueta MD    Signed:  BEBO Lopez

## 2019-09-25 NOTE — PROCEDURES
Brief Cardiac Procedure Note    Patient: Alma Hernandez MRN: [id number]  SSN: [id number]    YOB: 1960  Age: 61 y.o.   Sex: female      Date of Procedure: 9/25/2019     Pre-procedure Diagnosis: CAD    Post-procedure Diagnosis: CAD    Reason for Procedure: Stable Known CAD    Procedure: Selective coronary angiography of the LIMA and JAG    Brief Description of Procedure: See above, via R fem    Performed By: Shanice Tucker MD     Assistants: None    Anesthesia: Moderate Sedation    Estimated Blood Loss: Less than 10 mL      Specimens: None    Implants: None    Findings: Patent LIMA and JAG    Complications: None    Recommendations: Continue with planned CABG    Signed By: Shanice Tucker MD     September 25, 2019

## 2019-09-25 NOTE — PROGRESS NOTES
TRANSFER - IN REPORT:    Verbal report received from Goldy Parham RN on Roderick Campbell being received from cath lab for routine post - op      Report consisted of patients Situation, Background, Assessment and Recommendations(SBAR). Information from the following report(s) SBAR, Kardex, Intake/Output, MAR and Recent Results was reviewed with the receiving nurse. Opportunity for questions and clarification was provided. Assessment completed upon patients arrival to unit and care assumed. Patient on tele and eagle monitor, blood pressures cycling, right groin site assessed. Educated on bed rest. Bed low and locked.

## 2019-09-25 NOTE — PROGRESS NOTES
TRANSFER - OUT REPORT:    Verbal report given to francisca tinsley(name) on Nita Pedro  being transferred to tele(unit) for routine post - op       Report consisted of patients Situation, Background, Assessment and   Recommendations(SBAR). Information from the following report(s) SBAR was reviewed with the receiving nurse. Lines:   Peripheral IV 09/25/19 Right Forearm (Active)   Site Assessment Clean, dry, & intact 9/25/2019 12:13 PM   Phlebitis Assessment 0 9/25/2019 12:13 PM   Infiltration Assessment 0 9/25/2019 12:13 PM   Dressing Status Clean, dry, & intact 9/25/2019 12:13 PM   Dressing Type Tape;Transparent 9/25/2019 12:13 PM   Hub Color/Line Status Patent 9/25/2019 12:13 PM        Opportunity for questions and clarification was provided.       Patient transported with:   Registered Nurse   Angiogram by Dr Hermes Krause  Versed 2mg  Fentanyl 25mcg  5 fr sheath manual pull no bleeding or hematoma

## 2019-09-25 NOTE — PROGRESS NOTES
Patient is in fact NPO with cath lab procedure planned for today. Will contact diabetes management to update them.   Lovenox held per [place], [place].

## 2019-09-25 NOTE — DIABETES MGMT
Patient admitted with pulmonary edema. Blood glucose range yesterday 133-204 with pt receiving Lantus 10 units and Humalog 6 units. This AM blood glucose 153. Noted NPO order in chart spoke with primary RN, pt is eating today. Reviewed pt current regimen: Lantus 10 units daily and Humalog SSI. Spoke with provider discussed pt glycemic control, hx of noncompliance, and consult to CT surgery.  New orders received to increase pt basal insulin to Lantus 13 units daily as fasting blood glucose is not at goal.

## 2019-09-25 NOTE — PROGRESS NOTES
TRANSFER - OUT REPORT:    IM Dr Jaycee Mortimer  RFA 5 fr sheath capped  Versed 2 mg  Fentanyl 50 mcg  No bleeding/hematoma  Pt is a/o denies complaints    Verbal report given to Nikki/Matthew(name) on Alma Hernandez  being transferred to holding/cpru(unit) for routine progression of care       Report consisted of patients Situation, Background, Assessment and   Recommendations(SBAR). Information from the following report(s) SBAR and Procedure Summary was reviewed with the receiving nurse. Lines:   Peripheral IV 09/25/19 Right Forearm (Active)   Site Assessment Clean, dry, & intact 9/25/2019 12:13 PM   Phlebitis Assessment 0 9/25/2019 12:13 PM   Infiltration Assessment 0 9/25/2019 12:13 PM   Dressing Status Clean, dry, & intact 9/25/2019 12:13 PM   Dressing Type Tape;Transparent 9/25/2019 12:13 PM   Hub Color/Line Status Patent 9/25/2019 12:13 PM        Opportunity for questions and clarification was provided.

## 2019-09-25 NOTE — PROCEDURES
[facility]  CARDIAC CATH    Name:  Leon Cervantes  MR#:  [id number]  :  1960  ACCOUNT #:  [de-identified]  DATE OF SERVICE:  2019    PROCEDURES PERFORMED:  Left heart cath with selective coronary angiography. PREOPERATIVE DIAGNOSES:  Angina pectoris and left ventricular dysfunction. POSTOPERATIVE DIAGNOSIS:  Multivessel coronary artery disease. SURGEON:  Zoe Miller MD    ASSISTANT:  None. ESTIMATED BLOOD LOSS:  5 mL    SPECIMENS REMOVED:  None. COMPLICATIONS:  None. IMPLANTS:  None. ANESTHESIA:  The patient received 4 mg of Versed and 100 mcg of fentanyl, and was monitored for conscious sedation beginning at 1325 and ending at 1351 by nurse, Kalee Whitley. PROCEDURE:  After informed consent, the patient was prepped and draped in the usual sterile fashion. The right wrist was infiltrated with lidocaine. The right radial artery was accessed via the modified Seldinger technique with a 6-Martiniquais sheath. A total of 60 mL of Isovue contrast were used for the entire procedure. Terumo band was used for hemostasis. CATHETERS USED:  Included a 5-Martiniquais JL3.5 and a 5-Martiniquais JR5. FINDINGS:  Left ventricle:  Left ventricular ejection fraction was not assessed as the patient recently underwent an echocardiogram.    LVEDP:  14 mmHg. Left main:  Normal.    Left anterior descending artery had luminal irregularities throughout with a focal stenosis of 90% in the distal and 95% in the far distal artery. The first diagonal had a 90% proximal occlusion and was a good-sized vessel that would be a potential revascularization target. Circumflex artery had a proximal 80% stenosis and then a distal 90% stenosis in a large dominant obtuse marginal.    The right coronary artery was 100% occluded proximally and it filled via left-to-right collateral flow.     CONCLUSIONS:  This is a 57-year-old female who presents with multivessel coronary artery disease and will need evaluation for coronary artery bypass grafting.       MD EUGENE [id number]  D:  09/24/2019 14:04  T:  09/25/2019 9:27  JOB #:  [id number]

## 2019-09-25 NOTE — PROGRESS NOTES
Bedside and Verbal shift change report given to Hamilton Rios RN (oncoming nurse) by self (offgoing nurse). Report included the following information SBAR, Kardex, MAR and Recent Results.

## 2019-09-25 NOTE — PROGRESS NOTES
Problem: Breathing Pattern - Ineffective  Goal: *Absence of hypoxia  Outcome: Progressing Towards Goal     Problem: Falls - Risk of  Goal: *Absence of Falls  Description  Document Abby Monroeable Fall Risk and appropriate interventions in the flowsheet. Outcome: Progressing Towards Goal  Note:   Fall Risk Interventions:            Medication Interventions: Teach patient to arise slowly, Patient to call before getting OOB                   Problem: Diabetes Self-Management  Goal: *Disease process and treatment process  Description  Define diabetes and identify own type of diabetes; list 3 options for treating diabetes. Outcome: Progressing Towards Goal  Goal: *Incorporating nutritional management into lifestyle  Description  Describe effect of type, amount and timing of food on blood glucose; list 3 methods for planning meals. Outcome: Progressing Towards Goal  Goal: *Incorporating physical activity into lifestyle  Description  State effect of exercise on blood glucose levels. Outcome: Progressing Towards Goal  Goal: *Developing strategies to promote health/change behavior  Description  Define the ABC's of diabetes; identify appropriate screenings, schedule and personal plan for screenings. Outcome: Progressing Towards Goal  Goal: *Using medications safely  Description  State effect of diabetes medications on diabetes; name diabetes medication taking, action and side effects. Outcome: Progressing Towards Goal  Goal: *Monitoring blood glucose, interpreting and using results  Description  Identify recommended blood glucose targets  and personal targets. Outcome: Progressing Towards Goal  Goal: *Prevention, detection, treatment of acute complications  Description  List symptoms of hyper- and hypoglycemia; describe how to treat low blood sugar and actions for lowering  high blood glucose level.   Outcome: Progressing Towards Goal  Goal: *Prevention, detection and treatment of chronic complications  Description  Define the natural course of diabetes and describe the relationship of blood glucose levels to long term complications of diabetes.   Outcome: Progressing Towards Goal  Goal: *Insulin pump training  Outcome: Progressing Towards Goal

## 2019-09-25 NOTE — PROGRESS NOTES
TRANSFER - OUT REPORT:    Verbal report given to Álvaro De La Torre RN on Michele Maldonado  being transferred to cath lab for ordered procedure       Report consisted of patients Situation, Background, Assessment and Recommendations(SBAR). Information from the following report(s) SBAR, Kardex, Intake/Output, MAR and Recent Results was reviewed with the receiving nurse. Opportunity for questions and clarification was provided.

## 2019-09-26 ENCOUNTER — APPOINTMENT (OUTPATIENT)
Dept: ULTRASOUND IMAGING | Age: 59
DRG: 191 | End: 2019-09-26
Attending: PHYSICIAN ASSISTANT
Payer: MEDICAID

## 2019-09-26 VITALS
HEART RATE: 84 BPM | SYSTOLIC BLOOD PRESSURE: 129 MMHG | BODY MASS INDEX: 32.89 KG/M2 | TEMPERATURE: 98.1 F | DIASTOLIC BLOOD PRESSURE: 83 MMHG | RESPIRATION RATE: 16 BRPM | HEIGHT: 63 IN | WEIGHT: 185.6 LBS | OXYGEN SATURATION: 97 %

## 2019-09-26 PROBLEM — I25.10 CAD (CORONARY ARTERY DISEASE): Status: ACTIVE | Noted: 2019-09-26

## 2019-09-26 LAB
ANION GAP SERPL CALC-SCNC: 8 MMOL/L (ref 7–16)
APPEARANCE UR: ABNORMAL
BACTERIA URNS QL MICRO: ABNORMAL /HPF
BASOPHILS # BLD: 0 K/UL (ref 0–0.2)
BASOPHILS NFR BLD: 1 % (ref 0–2)
BILIRUB UR QL: NEGATIVE
BUN SERPL-MCNC: 14 MG/DL (ref 6–23)
CALCIUM SERPL-MCNC: 9.2 MG/DL (ref 8.3–10.4)
CASTS URNS QL MICRO: ABNORMAL /LPF
CHLORIDE SERPL-SCNC: 109 MMOL/L (ref 98–107)
CO2 SERPL-SCNC: 24 MMOL/L (ref 21–32)
COLOR UR: YELLOW
CREAT SERPL-MCNC: 0.89 MG/DL (ref 0.6–1)
DIFFERENTIAL METHOD BLD: ABNORMAL
EOSINOPHIL # BLD: 0.3 K/UL (ref 0–0.8)
EOSINOPHIL NFR BLD: 5 % (ref 0.5–7.8)
EPI CELLS #/AREA URNS HPF: ABNORMAL /HPF
ERYTHROCYTE [DISTWIDTH] IN BLOOD BY AUTOMATED COUNT: 12.6 % (ref 11.9–14.6)
GLUCOSE BLD STRIP.AUTO-MCNC: 118 MG/DL (ref 65–100)
GLUCOSE BLD STRIP.AUTO-MCNC: 182 MG/DL (ref 65–100)
GLUCOSE SERPL-MCNC: 122 MG/DL (ref 65–100)
GLUCOSE UR STRIP.AUTO-MCNC: NEGATIVE MG/DL
HCT VFR BLD AUTO: 35.8 % (ref 35.8–46.3)
HGB BLD-MCNC: 11.5 G/DL (ref 11.7–15.4)
HGB UR QL STRIP: NEGATIVE
IMM GRANULOCYTES # BLD AUTO: 0 K/UL (ref 0–0.5)
IMM GRANULOCYTES NFR BLD AUTO: 0 % (ref 0–5)
KETONES UR QL STRIP.AUTO: NEGATIVE MG/DL
LEUKOCYTE ESTERASE UR QL STRIP.AUTO: ABNORMAL
LYMPHOCYTES # BLD: 2.4 K/UL (ref 0.5–4.6)
LYMPHOCYTES NFR BLD: 39 % (ref 13–44)
MCH RBC QN AUTO: 28.9 PG (ref 26.1–32.9)
MCHC RBC AUTO-ENTMCNC: 32.1 G/DL (ref 31.4–35)
MCV RBC AUTO: 89.9 FL (ref 79.6–97.8)
MONOCYTES # BLD: 0.4 K/UL (ref 0.1–1.3)
MONOCYTES NFR BLD: 6 % (ref 4–12)
NEUTS SEG # BLD: 3.1 K/UL (ref 1.7–8.2)
NEUTS SEG NFR BLD: 50 % (ref 43–78)
NITRITE UR QL STRIP.AUTO: NEGATIVE
NRBC # BLD: 0 K/UL (ref 0–0.2)
PH UR STRIP: 5.5 [PH] (ref 5–9)
PLATELET # BLD AUTO: 218 K/UL (ref 150–450)
PMV BLD AUTO: 11.5 FL (ref 9.4–12.3)
POTASSIUM SERPL-SCNC: 4.1 MMOL/L (ref 3.5–5.1)
PROT UR STRIP-MCNC: 30 MG/DL
RBC # BLD AUTO: 3.98 M/UL (ref 4.05–5.2)
RBC #/AREA URNS HPF: ABNORMAL /HPF
SODIUM SERPL-SCNC: 141 MMOL/L (ref 136–145)
SP GR UR REFRACTOMETRY: 1.02 (ref 1–1.02)
UROBILINOGEN UR QL STRIP.AUTO: 1 EU/DL (ref 0.2–1)
WBC # BLD AUTO: 6.3 K/UL (ref 4.3–11.1)
WBC URNS QL MICRO: ABNORMAL /HPF

## 2019-09-26 PROCEDURE — 82962 GLUCOSE BLOOD TEST: CPT

## 2019-09-26 PROCEDURE — 81001 URINALYSIS AUTO W/SCOPE: CPT

## 2019-09-26 PROCEDURE — 36415 COLL VENOUS BLD VENIPUNCTURE: CPT

## 2019-09-26 PROCEDURE — 74011250637 HC RX REV CODE- 250/637: Performed by: INTERNAL MEDICINE

## 2019-09-26 PROCEDURE — 74011250636 HC RX REV CODE- 250/636: Performed by: FAMILY MEDICINE

## 2019-09-26 PROCEDURE — 74011636637 HC RX REV CODE- 636/637: Performed by: NURSE PRACTITIONER

## 2019-09-26 PROCEDURE — 87086 URINE CULTURE/COLONY COUNT: CPT

## 2019-09-26 PROCEDURE — 80048 BASIC METABOLIC PNL TOTAL CA: CPT

## 2019-09-26 PROCEDURE — 85025 COMPLETE CBC W/AUTO DIFF WBC: CPT

## 2019-09-26 PROCEDURE — 74011250637 HC RX REV CODE- 250/637: Performed by: NURSE PRACTITIONER

## 2019-09-26 PROCEDURE — 74011250637 HC RX REV CODE- 250/637: Performed by: FAMILY MEDICINE

## 2019-09-26 PROCEDURE — 87088 URINE BACTERIA CULTURE: CPT

## 2019-09-26 PROCEDURE — 90686 IIV4 VACC NO PRSV 0.5 ML IM: CPT | Performed by: FAMILY MEDICINE

## 2019-09-26 PROCEDURE — 87186 SC STD MICRODIL/AGAR DIL: CPT

## 2019-09-26 PROCEDURE — 90471 IMMUNIZATION ADMIN: CPT

## 2019-09-26 RX ORDER — FERROUS SULFATE, DRIED 160(50) MG
1 TABLET, EXTENDED RELEASE ORAL 2 TIMES DAILY WITH MEALS
Qty: 60 TAB | Refills: 0 | Status: SHIPPED | OUTPATIENT
Start: 2019-09-26

## 2019-09-26 RX ORDER — SPIRONOLACTONE 25 MG/1
12.5 TABLET ORAL DAILY
Qty: 30 TAB | Refills: 0 | Status: SHIPPED | OUTPATIENT
Start: 2019-09-27 | End: 2021-10-06

## 2019-09-26 RX ORDER — CARVEDILOL 12.5 MG/1
12.5 TABLET ORAL 2 TIMES DAILY WITH MEALS
Qty: 60 TAB | Refills: 0 | Status: SHIPPED | OUTPATIENT
Start: 2019-09-26 | End: 2019-11-22

## 2019-09-26 RX ORDER — INSULIN GLARGINE 100 [IU]/ML
INJECTION, SOLUTION SUBCUTANEOUS
Qty: 1 PEN | Refills: 2 | Status: SHIPPED | OUTPATIENT
Start: 2019-09-26

## 2019-09-26 RX ORDER — ATORVASTATIN CALCIUM 40 MG/1
40 TABLET, FILM COATED ORAL
Qty: 30 TAB | Refills: 0 | Status: ON HOLD | OUTPATIENT
Start: 2019-09-26 | End: 2019-10-09 | Stop reason: SDUPTHER

## 2019-09-26 RX ORDER — LISINOPRIL 5 MG/1
10 TABLET ORAL DAILY
Status: DISCONTINUED | OUTPATIENT
Start: 2019-09-27 | End: 2019-09-26 | Stop reason: HOSPADM

## 2019-09-26 RX ORDER — INSULIN LISPRO 100 [IU]/ML
INJECTION, SOLUTION INTRAVENOUS; SUBCUTANEOUS
Qty: 1 PACKAGE | Refills: 2 | Status: SHIPPED | OUTPATIENT
Start: 2019-09-26

## 2019-09-26 RX ORDER — LISINOPRIL 10 MG/1
10 TABLET ORAL DAILY
Qty: 30 TAB | Refills: 0 | Status: ON HOLD | OUTPATIENT
Start: 2019-09-26 | End: 2019-10-09 | Stop reason: SDUPTHER

## 2019-09-26 RX ORDER — SPIRONOLACTONE 25 MG/1
12.5 TABLET ORAL DAILY
Status: DISCONTINUED | OUTPATIENT
Start: 2019-09-27 | End: 2019-09-26 | Stop reason: HOSPADM

## 2019-09-26 RX ORDER — GUAIFENESIN 100 MG/5ML
81 LIQUID (ML) ORAL DAILY
Qty: 30 TAB | Refills: 0 | Status: SHIPPED | OUTPATIENT
Start: 2019-09-27

## 2019-09-26 RX ADMIN — ENOXAPARIN SODIUM 40 MG: 40 INJECTION SUBCUTANEOUS at 09:04

## 2019-09-26 RX ADMIN — HYDROCHLOROTHIAZIDE 25 MG: 25 TABLET ORAL at 09:05

## 2019-09-26 RX ADMIN — INSULIN GLARGINE 13 UNITS: 100 INJECTION, SOLUTION SUBCUTANEOUS at 09:04

## 2019-09-26 RX ADMIN — LISINOPRIL 10 MG: 5 TABLET ORAL at 09:04

## 2019-09-26 RX ADMIN — POTASSIUM CHLORIDE 40 MEQ: 20 TABLET, EXTENDED RELEASE ORAL at 09:05

## 2019-09-26 RX ADMIN — Medication 5 ML: at 05:51

## 2019-09-26 RX ADMIN — Medication 1 TABLET: at 09:05

## 2019-09-26 RX ADMIN — INSULIN LISPRO 2 UNITS: 100 INJECTION, SOLUTION INTRAVENOUS; SUBCUTANEOUS at 12:19

## 2019-09-26 RX ADMIN — INFLUENZA VIRUS VACCINE 0.5 ML: 15; 15; 15; 15 SUSPENSION INTRAMUSCULAR at 14:47

## 2019-09-26 RX ADMIN — CARVEDILOL 12.5 MG: 12.5 TABLET, FILM COATED ORAL at 09:05

## 2019-09-26 RX ADMIN — ASPIRIN 81 MG 81 MG: 81 TABLET ORAL at 09:05

## 2019-09-26 NOTE — PROGRESS NOTES
Bedside and Verbal shift change report given to LORE Forbes (oncoming nurse) by self (offgoing nurse). Report included the following information SBAR, Kardex, MAR and Recent Results.

## 2019-09-26 NOTE — PROGRESS NOTES
Discharge instructions reviewed with pt and family. Prescriptions given for new medications and med info sheets provided for all new medications. Opportunity for questions provided. pt voiced understanding of all discharge instructions. Medication vouchers given.

## 2019-09-26 NOTE — PROGRESS NOTES
Problem: Breathing Pattern - Ineffective  Goal: *Absence of hypoxia  Outcome: Progressing Towards Goal     Problem: Falls - Risk of  Goal: *Absence of Falls  Description  Document Abby Bean Fall Risk and appropriate interventions in the flowsheet. Outcome: Progressing Towards Goal  Note:   Fall Risk Interventions:            Medication Interventions: Teach patient to arise slowly, Patient to call before getting OOB                   Problem: Diabetes Self-Management  Goal: *Disease process and treatment process  Description  Define diabetes and identify own type of diabetes; list 3 options for treating diabetes. Outcome: Progressing Towards Goal  Goal: *Incorporating nutritional management into lifestyle  Description  Describe effect of type, amount and timing of food on blood glucose; list 3 methods for planning meals. Outcome: Progressing Towards Goal  Goal: *Developing strategies to promote health/change behavior  Description  Define the ABC's of diabetes; identify appropriate screenings, schedule and personal plan for screenings. Outcome: Progressing Towards Goal  Goal: *Using medications safely  Description  State effect of diabetes medications on diabetes; name diabetes medication taking, action and side effects.   Outcome: Progressing Towards Goal

## 2019-09-26 NOTE — PROCEDURES
[facility]  CARDIAC CATH    Name:  Galvin Meigs  MR#:  [id number]  :  1960  ACCOUNT #:  [de-identified]  DATE OF SERVICE:  2019    PROCEDURES PERFORMED:  Selective angiography of the left internal mammary, selective angiography of the right internal mammary. PREOPERATIVE DIAGNOSIS:  Multivessel coronary artery disease. POSTOPERATIVE DIAGNOSIS:  Multivessel coronary artery disease. SURGEON:  Antonio Brooks MD    ASSISTANT:  None. ESTIMATED BLOOD LOSS:  Less than 5 mL. SPECIMENS REMOVED:  None. COMPLICATIONS:  None. IMPLANTS:  None. ANESTHESIA:  The patient received 2 mg of Versed and 50 mcg of fentanyl, and was monitored for conscious sedation beginning at 1320 and ending at 1349 by nurse, Susie Barrios. PROCEDURE:  After informed consent, the patient was prepped and draped in the usual sterile fashion. The right groin was infiltrated with lidocaine. The right femoral artery was accessed via the modified Seldinger technique with a 5-Iranian sheath. A total of 20 mL of Isovue contrast were used for the entire procedure. Terumo band was used for hemostasis. CATHETERS USED:  Included a 5-Iranian IM catheter. FINDINGS:  There is a patent right internal mammary and a patent left internal mammary. CONCLUSIONS:  The patient underwent angiography of her right and left internal mammary as she has undergone chest radiation for previous breast cancer in order to ensure patency prior to coronary artery bypass grafting. We will plan on proceeding with coronary artery bypass grafting as planned.       MD EUGENE [id number]  D:  2019 16:01  T:  2019 12:46  JOB #:  [id number]

## 2019-09-26 NOTE — PROGRESS NOTES
Care Management Interventions  PCP Verified by CM: Yes  Transition of Care Consult (CM Consult): Discharge Planning  Discharge Durable Medical Equipment: No  Physical Therapy Consult: Yes  Occupational Therapy Consult: No  Speech Therapy Consult: No  Current Support Network: Own Home  Confirm Follow Up Transport: Self  Plan discussed with Pt/Family/Caregiver: Yes  Freedom of Choice Offered: Yes  Discharge Location  Discharge Placement: Home      Pt dc home, provided with 1 month of medications, voucher completed for the following medications: Lantus, Humalog, lisinopril, glucometer kit, lipitor, coreg, aldactone. Total $420. Pt has appt at the [facility] program. Pt will return to [facility] for CABG next week. No further CM needs.

## 2019-09-26 NOTE — DISCHARGE INSTRUCTIONS
Cardiac Catheterization/Angiography Discharge Instructions    *Check the puncture site frequently for swelling or bleeding. If you see any bleeding, lie down and apply pressure over the area with a clean town or washcloth. Notify your doctor for any redness, swelling, drainage or oozing from the puncture site. Notify your doctor for any fever or chills. *If the leg or arm with the puncture becomes cold, numb or painful, call [facility]  at  [phone]    *Activity should be limited for the next 48 hours. Climb stairs as little as possible and avoid any stooping, bending or strenuous activity for 48 hours. No heavy lifting (anything over 10 pounds) for three days. *Do not drive for 48 hours. *You may resume your usual diet. Drink more fluids than usual.    *Have a responsible person drive you home and stay with you for at least 24 hours after your heart catheterization/angiography. *You may remove the bandage from your Right Groin in 24 hours. You may shower in 24 hours. No tub baths, hot tubs or swimming for one week. Do not place any lotions, creams, powders, ointments over the puncture site for one week. You may place a clean band-aid over the puncture site each day for 5 days. Change this daily.

## 2019-09-26 NOTE — PROGRESS NOTES
[facility] CARDIOLOGY PROGRESS NOTE           9/26/2019 9:37 AM    Admit Date: 9/21/2019      Subjective:     No o/e; on RA. Improved sx. Pending surgery plans per CTS    ROS:  Cardiovascular:  As noted above    Objective:      Vitals:    09/25/19 2104 09/26/19 0019 09/26/19 0410 09/26/19 0840   BP: 137/84 109/70 120/74 143/72   Pulse: 85 82 79 82   Resp: 18 18 16 18   Temp: 98.5 °F (36.9 °C) 98 °F (36.7 °C) 97.7 °F (36.5 °C) 98.1 °F (36.7 °C)   SpO2: 92% 93% 95% 96%   Weight:   84.2 kg (185 lb 9.6 oz)    Height:           Physical Exam:  General-No Acute Distress  Neck- supple, no JVD  CV- regular rate and rhythm no MRG  Lung- clear bilaterally  Abd- soft, nontender, nondistended  Ext- no edema bilaterally. Skin- warm and dry    Data Review:   Recent Labs     09/26/19  0421 09/25/19  0517    141   K 4.1 3.8   MG  --  2.0   BUN 14 16   CREA 0.89 0.86   * 131*   WBC 6.3 5.6   HGB 11.5* 11.1*   HCT 35.8 33.8*    220   CHOL  --  231*   LDLC  --  163.2*   HDL  --  34*       Assessment/Plan:     Principal Problem:    Pulmonary edema (9/22/2019)    Active Problems:    Hyperglycemia due to type 2 diabetes mellitus ([address] (2/24/2018)      Essential hypertension (2/28/2018)      Controlled type 2 diabetes mellitus, with long-term current use of insulin ([address] (9/22/2019)    Exam euvolemic. Cath with multivessel CAD; also mod MR. Plans for CABG/MV repair with likely ring (JOSE with noted 2 central jets; RV ~40-50cc). Also uncontrolled DM (last A1C >10; 9/19) and needs aggressive control and defer to hospital med. EF 40-45%; on coreg/lisinopril; on HCTZ and d/c and plan aldactone instead. Titrate as tolerated.   Continue high intensity statin  Pending plans per CT surgery    Karlo Shane MD  9/26/2019 9:37 AM

## 2019-09-26 NOTE — PROGRESS NOTES
Shift change report received from Manuela Nicolas RN (off going nurse). Plan of care reviewed with patient at bedside. Opportunity to ask questions provided. Denies needs at this time. Patient verbalized understanding about intake and output documentation and daily weight. Patient verbalized understand to use the hat and to be mindful of PO intake. Pumps cleared and documented. Bed low and locked with call light within reach. Patient instructed to call for assistance. Patient verbalized understanding.      R groin site visualized

## 2019-09-26 NOTE — DIABETES MGMT
Blood glucose ranged 100-167 yesterday with patient receiving Lantus 13 units and Humalog 2 units. Reviewed with patient. Patient reports she was NPO for much of the day for a procedure. Blood glucose 118 this morning. Patient states she reviewed the educational materials provided to her and voices no questions regarding diabetes management at this time. Encouraged compliance with regimen. Per case management note patient to be referred to [facility] and will provide 30 day supply of medications at discharge. Patient can convert to NPH and regular at discharge to reduce cost for patient moving forward.

## 2019-09-26 NOTE — DISCHARGE SUMMARY
Hospitalist Discharge Summary     Patient ID:  Judson Schulz  [id number]  89 y.o.  1960  Admit date: 9/21/2019  8:48 PM  Discharge date and time: 9/26/2019  Attending: Anila Murillo MD  PCP:  Hollie Brown MD  Treatment Team: Attending Provider: Anila Murillo MD; Consulting Provider: Eric Cooper DO; Care Manager: Edna Conklin, RN; Utilization Review: Angie Decker RN; Consulting Provider: Cipriano Matos MD; Care Manager: Jerrica Hodgson    Principal Diagnosis CAD (coronary artery disease)   Principal Problem:    CAD (coronary artery disease) (9/26/2019)    Active Problems:    Hyperglycemia due to type 2 diabetes mellitus ([address] (2/24/2018)      Essential hypertension (2/28/2018)      Pulmonary edema (9/22/2019)      Overview: Suspect new onset CHF      Controlled type 2 diabetes mellitus, with long-term current use of insulin ([address] (9/22/2019)     Hospital Course:\"61year-old female with past medical history significant for left-sided breast cancer treated with radiation lumpectomy and oral chemo few years ago, diabetes, hypertension. Patient presented to the ED with complaints of shortness of breath cough and chest tightness intermittently for several weeks, worse on the day of admit. In the ED her BNP was 542 and her CT of the chest showed cardiomegaly, interstitial lung edema, small bilateral pleural effusions that were suggestive of CHF or fluid overload. Patient's EKG was abnormal with ST and diffuse T wave changes. She was given Lasix. \"     Echocardiogram showed decreased ejection fraction of 40 to 45%. Also noted severe hypokinesis of the basal inferior, basal mid inferolateral basilar, basal mid anterolateral walls. Patient underwent cardiac cath on 9/25 that showed severe multivessel disease. Patient admits to being noncompliant with medications. Plan for CABG next week.  Cardiology and cardiothoracic team okay for discharge with follow up for CABG next week. I have hand written for glucometer, lancet, and testing strips. Printed out all medications since she is out. Case management working on 30 day voucher. If worsening SOB, chest pain, or AMS, please come back to the ED. Please refer to the admission H&P for details of presentation. In summary, the patient is stable for discharge. Significant Diagnostic Studies:       Labs: Results:       Chemistry Recent Labs     09/26/19 0421 09/25/19 0517 09/24/19  0658   * 131* 159*    141 141   K 4.1 3.8 3.8   * 109* 111*   CO2 24 26 21   BUN 14 16 15   CREA 0.89 0.86 0.90   CA 9.2 8.8 8.8   AGAP 8 6* 9      CBC w/Diff Recent Labs     09/26/19 0421 09/25/19 0517 09/24/19  1114   WBC 6.3 5.6 5.9   RBC 3.98* 3.77* 4.12   HGB 11.5* 11.1* 12.0   HCT 35.8 33.8* 37.2    220 245   GRANS 50 54 56   LYMPH 39 34 33   EOS 5 5 4      Cardiac Enzymes No results for input(s): CPK, CKND1, EMILY in the last 72 hours. No lab exists for component: CKRMB, TROIP   Coagulation No results for input(s): PTP, INR, APTT, INREXT, INREXT in the last 72 hours. Lipid Panel Lab Results   Component Value Date/Time    Cholesterol, total 231 (H) 09/25/2019 05:17 AM    HDL Cholesterol 34 (L) 09/25/2019 05:17 AM    LDL, calculated 163.2 (H) 09/25/2019 05:17 AM    VLDL, calculated 33.8 (H) 09/25/2019 05:17 AM    Triglyceride 169 (H) 09/25/2019 05:17 AM    CHOL/HDL Ratio 6.8 09/25/2019 05:17 AM      BNP No results for input(s): BNPP in the last 72 hours. Liver Enzymes No results for input(s): TP, ALB, TBIL, AP, SGOT, GPT in the last 72 hours. No lab exists for component: DBIL   Thyroid Studies No results found for: T4, T3U, TSH, TSHEXT, TSHEXT         Discharge Exam:  Visit Vitals  /83 (BP 1 Location: Right arm, BP Patient Position: At rest)   Pulse 84   Temp 98.1 °F (36.7 °C)   Resp 16   Ht 5' 3\" (1.6 m)   Wt 84.2 kg (185 lb 9.6 oz)   SpO2 97%   Breastfeeding?  No   BMI 32.88 kg/m²     General appearance: alert, cooperative, no distress, appears stated age  Lungs: clear to auscultation bilaterally  Heart: regular rate and rhythm, S1, S2 normal, no murmur, click, rub or gallop  Abdomen: soft, non-tender. Bowel sounds normal. No masses,  no organomegaly  Extremities: no cyanosis or edema. Good wound healing to right groin. Neurologic: Grossly normal    Disposition:Home   Discharge Condition: stable  Patient Instructions: As above   Current Discharge Medication List      START taking these medications    Details   aspirin 81 mg chewable tablet Take 1 Tab by mouth daily. Qty: 30 Tab, Refills: 0      atorvastatin (LIPITOR) 40 mg tablet Take 1 Tab by mouth nightly. Qty: 30 Tab, Refills: 0      carvedilol (COREG) 12.5 mg tablet Take 1 Tab by mouth two (2) times daily (with meals). Qty: 60 Tab, Refills: 0      spironolactone (ALDACTONE) 25 mg tablet Take 0.5 Tabs by mouth daily. Qty: 30 Tab, Refills: 0      insulin glargine (LANTUS,BASAGLAR) 100 unit/mL (3 mL) inpn 13 units at night daily  Qty: 1 Pen, Refills: 2      insulin lispro (HUMALOG) 100 unit/mL kwikpen Less than 150 =   0 units           150 -199 =   2 units  200 -249 =   4 units  250 -299 =   6 units  300 -349 =   8 units  Before meals and at night  Qty: 1 Package, Refills: 2         CONTINUE these medications which have CHANGED    Details   calcium-vitamin D (OYSTER SHELL) 500 mg(1,250mg) -200 unit per tablet Take 1 Tab by mouth two (2) times daily (with meals). Qty: 60 Tab, Refills: 0      lisinopril (PRINIVIL, ZESTRIL) 10 mg tablet Take 1 Tab by mouth daily.  Indications: high blood pressure  Qty: 30 Tab, Refills: 0         STOP taking these medications       amoxicillin-clavulanate (AUGMENTIN) 875-125 mg per tablet Comments:   Reason for Stopping:         metoprolol tartrate (LOPRESSOR) 25 mg tablet Comments:   Reason for Stopping:         potassium chloride (K-DUR, KLOR-CON) 20 mEq tablet Comments:   Reason for Stopping: Activity: Up and lucrecia   Diet:ADA  Wound Care: None     Follow-up PCP in one week. Cardiothoracic surgery for them to perform CABG next week.  Cardiology in two weeks   ·     Time spent to discharge patient 35 minutes  Signed:  Abbi Barrientos DO  9/26/2019  2:03 PM

## 2019-09-28 LAB
BACTERIA SPEC CULT: ABNORMAL
SERVICE CMNT-IMP: ABNORMAL

## 2019-09-30 ENCOUNTER — HOSPITAL ENCOUNTER (OUTPATIENT)
Dept: ULTRASOUND IMAGING | Age: 59
DRG: 163 | End: 2019-09-30
Attending: PHYSICIAN ASSISTANT
Payer: MEDICAID

## 2019-09-30 ENCOUNTER — ANESTHESIA EVENT (OUTPATIENT)
Dept: SURGERY | Age: 59
DRG: 163 | End: 2019-09-30
Payer: MEDICAID

## 2019-09-30 ENCOUNTER — HOSPITAL ENCOUNTER (OUTPATIENT)
Dept: ULTRASOUND IMAGING | Age: 59
Discharge: HOME | DRG: 163 | End: 2019-09-30
Attending: PHYSICIAN ASSISTANT
Payer: MEDICAID

## 2019-09-30 ENCOUNTER — HOSPITAL ENCOUNTER (OUTPATIENT)
Dept: SURGERY | Age: 59
Discharge: HOME | DRG: 163 | End: 2019-09-30
Payer: MEDICAID

## 2019-09-30 VITALS
RESPIRATION RATE: 16 BRPM | SYSTOLIC BLOOD PRESSURE: 153 MMHG | DIASTOLIC BLOOD PRESSURE: 87 MMHG | TEMPERATURE: 98.6 F | OXYGEN SATURATION: 98 % | HEART RATE: 86 BPM | WEIGHT: 185 LBS | BODY MASS INDEX: 32.78 KG/M2 | HEIGHT: 63 IN

## 2019-09-30 PROBLEM — N39.0 UTI (URINARY TRACT INFECTION): Status: ACTIVE | Noted: 2019-09-30

## 2019-09-30 LAB
APTT PPP: 29.9 SEC (ref 24.7–39.8)
BACTERIA SPEC CULT: NORMAL
GLUCOSE BLD STRIP.AUTO-MCNC: 172 MG/DL (ref 65–100)
INR PPP: 1
MAGNESIUM SERPL-MCNC: 1.7 MG/DL (ref 1.8–2.4)
PROTHROMBIN TIME: 12.9 SEC (ref 11.7–14.5)
SERVICE CMNT-IMP: NORMAL

## 2019-09-30 PROCEDURE — 93880 EXTRACRANIAL BILAT STUDY: CPT

## 2019-09-30 PROCEDURE — 86900 BLOOD TYPING SEROLOGIC ABO: CPT

## 2019-09-30 PROCEDURE — 83735 ASSAY OF MAGNESIUM: CPT

## 2019-09-30 PROCEDURE — 94010 BREATHING CAPACITY TEST: CPT

## 2019-09-30 PROCEDURE — 82962 GLUCOSE BLOOD TEST: CPT

## 2019-09-30 PROCEDURE — 36415 COLL VENOUS BLD VENIPUNCTURE: CPT

## 2019-09-30 PROCEDURE — 93970 EXTREMITY STUDY: CPT

## 2019-09-30 PROCEDURE — 85730 THROMBOPLASTIN TIME PARTIAL: CPT

## 2019-09-30 PROCEDURE — 87641 MR-STAPH DNA AMP PROBE: CPT

## 2019-09-30 PROCEDURE — 86923 COMPATIBILITY TEST ELECTRIC: CPT

## 2019-09-30 PROCEDURE — 85610 PROTHROMBIN TIME: CPT

## 2019-09-30 RX ORDER — AMIODARONE HYDROCHLORIDE 200 MG/1
600 TABLET ORAL
COMMUNITY
End: 2019-10-09

## 2019-09-30 RX ORDER — CEFPODOXIME PROXETIL 100 MG/1
200 TABLET, FILM COATED ORAL 2 TIMES DAILY
COMMUNITY
Start: 2019-09-30 | End: 2019-10-09

## 2019-09-30 RX ORDER — MUPIROCIN 20 MG/G
OINTMENT TOPICAL DAILY
COMMUNITY
End: 2019-10-09

## 2019-09-30 RX ORDER — CHLORHEXIDINE GLUCONATE 4 G/100ML
SOLUTION TOPICAL DAILY PRN
COMMUNITY
End: 2019-10-09

## 2019-09-30 NOTE — PERIOP NOTES
Abnormal urine called to Lizzeth Gallardo she asked me to call Vantin 200 mg to take bid today , done per request.    POC glucose 172. Instructed  Patient that if blood sugar 300 or > , surgery may be cancelled.    Recent Results (from the past 12 hour(s))   PROTHROMBIN TIME + INR    Collection Time: 09/30/19  8:15 AM   Result Value Ref Range    Prothrombin time 12.9 11.7 - 14.5 sec    INR 1.0     PTT    Collection Time: 09/30/19  8:15 AM   Result Value Ref Range    aPTT 29.9 24.7 - 39.8 SEC   MAGNESIUM    Collection Time: 09/30/19  8:15 AM   Result Value Ref Range    Magnesium 1.7 (L) 1.8 - 2.4 mg/dL   GLUCOSE, POC    Collection Time: 09/30/19  8:34 AM   Result Value Ref Range    Glucose (POC) 172 (H) 65 - 100 mg/dL

## 2019-09-30 NOTE — ANESTHESIA PREPROCEDURE EVALUATION
Anesthetic History   No history of anesthetic complications            Review of Systems / Medical History  Patient summary reviewed and pertinent labs reviewed    Pulmonary  Within defined limits                 Neuro/Psych   Within defined limits           Cardiovascular    Hypertension  Valvular problems/murmurs: mitral insufficiency        CAD    Exercise tolerance: >4 METS  Comments: Denies CP, SOB, Palps, Syncope, Fatigue. JOSE reveals 45% EF   RCA occluded L<R filling  90%+ lesions in LAD and LCx. Awaiting carotid studies.    GI/Hepatic/Renal  Within defined limits              Endo/Other    Diabetes (A1c 11): poorly controlled, type 2    Anemia     Other Findings              Physical Exam    Airway  Mallampati: II  TM Distance: 4 - 6 cm  Neck ROM: normal range of motion   Mouth opening: Normal     Cardiovascular    Rhythm: regular  Rate: normal      Pertinent negatives: No murmur and peripheral edema   Dental  No notable dental hx       Pulmonary  Breath sounds clear to auscultation               Abdominal  GI exam deferred       Other Findings            Anesthetic Plan    ASA: 4  Anesthesia type: general    Monitoring Plan: Arterial line, BIS, CVP, Hatch-Yoanna and JOSE      Induction: Intravenous  Anesthetic plan and risks discussed with: Patient and Family

## 2019-09-30 NOTE — PERIOP NOTES
Patient verified name and . Patient provided medical/health information and PTA medications to the best of their ability. TYPE  CASE: lll  Order for consent were found in EHR and matches case posting. Labs per surgeon: T&C, carotid ultrasound, magnesium, PT, PTT, MRSA and PFT,   Labs per anesthesia protocol: glucose on arrival  EKG:  ekg and ech and stress test all done within the last week, reviewed and cleared per anesthesia    Patient provided with and instructed on educaitonal handouts including Heart Guide to Surgery, blood transfusions, pain management, central line infection prevention, being on a ventilator and hand hygiene for the family and community, and [place] [id number] [facility] brochure. Instructed that family must be present in building at all times. Incentive spirometry completed with return demonstration. FEV1 completed as ordered. Patient viewed pre-operative DVD. Instructed on chest-xray procedure and carotid ultrasound. Instructed on type and cross match procedure and not to remove green blood bank bracelet. Instructed on medications- Amiodarone,   and Mupirocin with Rx called into  [facility] at [phone]. Mupirocin ointment to be used the night before surgery and the am of surgery. Hibiclens and instructions given per hospital policy. Instructed patient to continue previous medications as prescribed prior to surgery unless otherwise directed and to take the following medications the day of surgery according to anesthesia guidelines : asa 81 mg, carvedilol, and 10 units of lantus the night prior to surgery which is 80% of the routine dose mupirocin and vantin that I called 2 doses in per [facility] request  .  Fracisco Benitez patient to hold  the following medications: vitamins and supplements and NSAIDS. Original medication prescription bottles were not visualized during patient appointment.      Patient teach back successful and patient demonstrates knowledge of instruction.

## 2019-10-01 ENCOUNTER — HOSPITAL ENCOUNTER (INPATIENT)
Age: 59
LOS: 8 days | Discharge: HOME HEALTH | DRG: 163 | End: 2019-10-09
Attending: THORACIC SURGERY (CARDIOTHORACIC VASCULAR SURGERY) | Admitting: THORACIC SURGERY (CARDIOTHORACIC VASCULAR SURGERY)
Payer: MEDICAID

## 2019-10-01 ENCOUNTER — ANESTHESIA (OUTPATIENT)
Dept: SURGERY | Age: 59
DRG: 163 | End: 2019-10-01
Payer: MEDICAID

## 2019-10-01 ENCOUNTER — APPOINTMENT (OUTPATIENT)
Dept: GENERAL RADIOLOGY | Age: 59
DRG: 163 | End: 2019-10-01
Attending: THORACIC SURGERY (CARDIOTHORACIC VASCULAR SURGERY)
Payer: MEDICAID

## 2019-10-01 DIAGNOSIS — Z95.1 S/P CABG X 3: ICD-10-CM

## 2019-10-01 DIAGNOSIS — Z98.890 S/P MVR (MITRAL VALVE REPAIR): ICD-10-CM

## 2019-10-01 DIAGNOSIS — I50.23 SYSTOLIC CHF, ACUTE ON CHRONIC (HCC): ICD-10-CM

## 2019-10-01 DIAGNOSIS — I34.0 MITRAL VALVE INSUFFICIENCY, UNSPECIFIED ETIOLOGY: ICD-10-CM

## 2019-10-01 DIAGNOSIS — Z99.11 ENCOUNTER FOR WEANING FROM VENTILATOR (HCC): ICD-10-CM

## 2019-10-01 DIAGNOSIS — R29.818 SUSPECTED SLEEP APNEA: ICD-10-CM

## 2019-10-01 DIAGNOSIS — D69.6 THROMBOCYTOPENIA (HCC): ICD-10-CM

## 2019-10-01 DIAGNOSIS — E83.51 HYPOCALCEMIA: ICD-10-CM

## 2019-10-01 DIAGNOSIS — R09.02 HYPOXIA: ICD-10-CM

## 2019-10-01 DIAGNOSIS — I10 ESSENTIAL HYPERTENSION: Chronic | ICD-10-CM

## 2019-10-01 PROBLEM — J81.1 PULMONARY EDEMA: Status: RESOLVED | Noted: 2019-09-22 | Resolved: 2019-10-01

## 2019-10-01 LAB
ANION GAP SERPL CALC-SCNC: 8 MMOL/L (ref 7–16)
APTT PPP: 36.5 SEC (ref 24.7–39.8)
ARTERIAL PATENCY WRIST A: ABNORMAL
ARTERIAL PATENCY WRIST A: ABNORMAL
ATRIAL RATE: 90 BPM
BASE DEFICIT BLD-SCNC: 1 MMOL/L
BASE DEFICIT BLD-SCNC: 2 MMOL/L
BASE DEFICIT BLD-SCNC: 3 MMOL/L
BASE DEFICIT BLD-SCNC: 4 MMOL/L
BASE DEFICIT BLD-SCNC: 5 MMOL/L
BASE DEFICIT BLD-SCNC: 6 MMOL/L
BDY SITE: ABNORMAL
BDY SITE: ABNORMAL
BODY TEMPERATURE: 98.6
BODY TEMPERATURE: 98.6
BUN SERPL-MCNC: 13 MG/DL (ref 6–23)
CA-I BLD-MCNC: 1.1 MMOL/L (ref 1.12–1.32)
CA-I BLD-MCNC: 1.1 MMOL/L (ref 1.12–1.32)
CA-I BLD-MCNC: 1.16 MMOL/L (ref 1.12–1.32)
CA-I BLD-MCNC: 1.16 MMOL/L (ref 1.12–1.32)
CA-I BLD-MCNC: 1.17 MMOL/L (ref 1.12–1.32)
CA-I BLD-MCNC: 1.17 MMOL/L (ref 1.12–1.32)
CA-I BLD-MCNC: 1.2 MMOL/L (ref 1.12–1.32)
CA-I BLD-MCNC: 1.22 MMOL/L (ref 1.12–1.32)
CA-I BLD-MCNC: 1.24 MMOL/L (ref 1.12–1.32)
CALCIUM SERPL-MCNC: 7.1 MG/DL (ref 8.3–10.4)
CALCULATED P AXIS, ECG09: 47 DEGREES
CALCULATED R AXIS, ECG10: 24 DEGREES
CALCULATED T AXIS, ECG11: -38 DEGREES
CHLORIDE SERPL-SCNC: 118 MMOL/L (ref 98–107)
CO2 BLD-SCNC: 22 MMOL/L
CO2 SERPL-SCNC: 21 MMOL/L (ref 21–32)
COLLECT TIME,HTIME: 1429
COLLECT TIME,HTIME: 2010
CREAT SERPL-MCNC: 0.79 MG/DL (ref 0.6–1)
DIAGNOSIS, 93000: NORMAL
ERYTHROCYTE [DISTWIDTH] IN BLOOD BY AUTOMATED COUNT: 13.3 % (ref 11.9–14.6)
EXHALED MINUTE VOLUME, VE: 6.5 L/MIN
EXHALED MINUTE VOLUME, VE: 7.9 L/MIN
FIBRINOGEN PPP-MCNC: 250 MG/DL (ref 190–501)
GAS FLOW.O2 O2 DELIVERY SYS: ABNORMAL L/MIN
GAS FLOW.O2 O2 DELIVERY SYS: ABNORMAL L/MIN
GAS FLOW.O2 SETTING OXYMISER: 16 BPM
GLUCOSE BLD STRIP.AUTO-MCNC: 104 MG/DL (ref 65–100)
GLUCOSE BLD STRIP.AUTO-MCNC: 108 MG/DL (ref 65–100)
GLUCOSE BLD STRIP.AUTO-MCNC: 111 MG/DL (ref 65–100)
GLUCOSE BLD STRIP.AUTO-MCNC: 116 MG/DL (ref 65–100)
GLUCOSE BLD STRIP.AUTO-MCNC: 117 MG/DL (ref 65–100)
GLUCOSE BLD STRIP.AUTO-MCNC: 119 MG/DL (ref 65–100)
GLUCOSE BLD STRIP.AUTO-MCNC: 120 MG/DL (ref 65–100)
GLUCOSE BLD STRIP.AUTO-MCNC: 122 MG/DL (ref 65–100)
GLUCOSE BLD STRIP.AUTO-MCNC: 126 MG/DL (ref 65–100)
GLUCOSE BLD STRIP.AUTO-MCNC: 130 MG/DL (ref 65–100)
GLUCOSE BLD STRIP.AUTO-MCNC: 131 MG/DL (ref 65–100)
GLUCOSE BLD STRIP.AUTO-MCNC: 131 MG/DL (ref 65–100)
GLUCOSE BLD STRIP.AUTO-MCNC: 142 MG/DL (ref 65–100)
GLUCOSE BLD STRIP.AUTO-MCNC: 142 MG/DL (ref 65–100)
GLUCOSE BLD STRIP.AUTO-MCNC: 146 MG/DL (ref 65–100)
GLUCOSE BLD STRIP.AUTO-MCNC: 147 MG/DL (ref 65–100)
GLUCOSE BLD STRIP.AUTO-MCNC: 168 MG/DL (ref 65–100)
GLUCOSE BLD STRIP.AUTO-MCNC: 199 MG/DL (ref 65–100)
GLUCOSE BLD STRIP.AUTO-MCNC: 203 MG/DL (ref 65–100)
GLUCOSE SERPL-MCNC: 146 MG/DL (ref 65–100)
HCO3 BLD-SCNC: 19.3 MMOL/L (ref 22–26)
HCO3 BLD-SCNC: 20.6 MMOL/L (ref 22–26)
HCO3 BLD-SCNC: 20.8 MMOL/L (ref 22–26)
HCO3 BLD-SCNC: 23.3 MMOL/L (ref 22–26)
HCO3 BLD-SCNC: 23.6 MMOL/L (ref 22–26)
HCO3 BLD-SCNC: 23.8 MMOL/L (ref 22–26)
HCO3 BLD-SCNC: 24.4 MMOL/L (ref 22–26)
HCO3 BLD-SCNC: 25.2 MMOL/L (ref 22–26)
HCO3 BLD-SCNC: 25.2 MMOL/L (ref 22–26)
HCO3 BLD-SCNC: 25.6 MMOL/L (ref 22–26)
HCT VFR BLD AUTO: 30.4 % (ref 35.8–46.3)
HCT VFR BLD AUTO: 30.7 % (ref 35.8–46.3)
HCT VFR BLD AUTO: 32.4 % (ref 35.8–46.3)
HGB BLD-MCNC: 10.6 G/DL (ref 11.7–15.4)
HGB BLD-MCNC: 9.8 G/DL (ref 11.7–15.4)
HGB BLD-MCNC: 9.8 G/DL (ref 11.7–15.4)
INR PPP: 1.5
MAGNESIUM SERPL-MCNC: 2.3 MG/DL (ref 1.8–2.4)
MAGNESIUM SERPL-MCNC: 2.4 MG/DL (ref 1.8–2.4)
MAGNESIUM SERPL-MCNC: 2.8 MG/DL (ref 1.8–2.4)
MCH RBC QN AUTO: 28.8 PG (ref 26.1–32.9)
MCHC RBC AUTO-ENTMCNC: 32.7 G/DL (ref 31.4–35)
MCV RBC AUTO: 88 FL (ref 79.6–97.8)
NRBC # BLD: 0 K/UL (ref 0–0.2)
O2/TOTAL GAS SETTING VFR VENT: 35 %
O2/TOTAL GAS SETTING VFR VENT: 40 %
P-R INTERVAL, ECG05: 220 MS
PCO2 BLD: 34 MMHG (ref 35–45)
PCO2 BLD: 37 MMHG (ref 35–45)
PCO2 BLD: 37.2 MMHG (ref 35–45)
PCO2 BLD: 39.4 MMHG (ref 35–45)
PCO2 BLD: 44.1 MMHG (ref 35–45)
PCO2 BLD: 44.2 MMHG (ref 35–45)
PCO2 BLD: 45.8 MMHG (ref 35–45)
PCO2 BLD: 46 MMHG (ref 35–45)
PCO2 BLD: 53.6 MMHG (ref 35–45)
PCO2 BLD: 53.8 MMHG (ref 35–45)
PEEP RESPIRATORY: 8 CMH2O
PEEP RESPIRATORY: 8 CMH2O
PH BLD: 7.28 [PH] (ref 7.35–7.45)
PH BLD: 7.28 [PH] (ref 7.35–7.45)
PH BLD: 7.29 [PH] (ref 7.35–7.45)
PH BLD: 7.31 [PH] (ref 7.35–7.45)
PH BLD: 7.35 [PH] (ref 7.35–7.45)
PH BLD: 7.35 [PH] (ref 7.35–7.45)
PH BLD: 7.36 [PH] (ref 7.35–7.45)
PH BLD: 7.36 [PH] (ref 7.35–7.45)
PH BLD: 7.39 [PH] (ref 7.35–7.45)
PH BLD: 7.42 [PH] (ref 7.35–7.45)
PLATELET # BLD AUTO: 99 K/UL (ref 150–450)
PMV BLD AUTO: 11.5 FL (ref 9.4–12.3)
PO2 BLD: 104 MMHG (ref 75–100)
PO2 BLD: 176 MMHG (ref 75–100)
PO2 BLD: 235 MMHG (ref 75–100)
PO2 BLD: 253 MMHG (ref 75–100)
PO2 BLD: 260 MMHG (ref 75–100)
PO2 BLD: 274 MMHG (ref 75–100)
PO2 BLD: 294 MMHG (ref 75–100)
PO2 BLD: 306 MMHG (ref 75–100)
PO2 BLD: 57 MMHG (ref 75–100)
PO2 BLD: 70 MMHG (ref 75–100)
POTASSIUM BLD-SCNC: 3.2 MMOL/L (ref 3.5–5.1)
POTASSIUM BLD-SCNC: 3.6 MMOL/L (ref 3.5–5.1)
POTASSIUM BLD-SCNC: 3.6 MMOL/L (ref 3.5–5.1)
POTASSIUM BLD-SCNC: 3.7 MMOL/L (ref 3.5–5.1)
POTASSIUM BLD-SCNC: 3.7 MMOL/L (ref 3.5–5.1)
POTASSIUM BLD-SCNC: 3.9 MMOL/L (ref 3.5–5.1)
POTASSIUM BLD-SCNC: 4 MMOL/L (ref 3.5–5.1)
POTASSIUM BLD-SCNC: 4.5 MMOL/L (ref 3.5–5.1)
POTASSIUM BLD-SCNC: 5.2 MMOL/L (ref 3.5–5.1)
POTASSIUM SERPL-SCNC: 3.8 MMOL/L (ref 3.5–5.1)
POTASSIUM SERPL-SCNC: 4 MMOL/L (ref 3.5–5.1)
POTASSIUM SERPL-SCNC: 4.7 MMOL/L (ref 3.5–5.1)
PROTHROMBIN TIME: 18.5 SEC (ref 11.7–14.5)
Q-T INTERVAL, ECG07: 436 MS
QRS DURATION, ECG06: 96 MS
QTC CALCULATION (BEZET), ECG08: 533 MS
RBC # BLD AUTO: 3.68 M/UL (ref 4.05–5.2)
SAO2 % BLD: 100 % (ref 95–98)
SAO2 % BLD: 86 % (ref 95–98)
SAO2 % BLD: 93 % (ref 95–98)
SAO2 % BLD: 98 % (ref 95–98)
SERVICE CMNT-IMP: ABNORMAL
SODIUM BLD-SCNC: 138 MMOL/L (ref 136–145)
SODIUM BLD-SCNC: 139 MMOL/L (ref 136–145)
SODIUM BLD-SCNC: 139 MMOL/L (ref 136–145)
SODIUM BLD-SCNC: 141 MMOL/L (ref 136–145)
SODIUM BLD-SCNC: 142 MMOL/L (ref 136–145)
SODIUM BLD-SCNC: 143 MMOL/L (ref 136–145)
SODIUM BLD-SCNC: 144 MMOL/L (ref 136–145)
SODIUM SERPL-SCNC: 147 MMOL/L (ref 136–145)
SPECIMEN TYPE: ABNORMAL
SPECIMEN TYPE: ABNORMAL
TOTAL RESP. RATE, ITRR: 18
VENTILATION MODE VENT: ABNORMAL
VENTILATION MODE VENT: ABNORMAL
VENTRICULAR RATE, ECG03: 90 BPM
VT SETTING VENT: 500 ML
WBC # BLD AUTO: 10 K/UL (ref 4.3–11.1)

## 2019-10-01 PROCEDURE — 74011000258 HC RX REV CODE- 258

## 2019-10-01 PROCEDURE — 76060000044 HC ANESTHESIA 6.5 TO 7 HR: Performed by: THORACIC SURGERY (CARDIOTHORACIC VASCULAR SURGERY)

## 2019-10-01 PROCEDURE — 77030014007 HC SPNG HEMSTAT J&J -B: Performed by: THORACIC SURGERY (CARDIOTHORACIC VASCULAR SURGERY)

## 2019-10-01 PROCEDURE — 02100Z9 BYPASS CORONARY ARTERY, ONE ARTERY FROM LEFT INTERNAL MAMMARY, OPEN APPROACH: ICD-10-PCS | Performed by: THORACIC SURGERY (CARDIOTHORACIC VASCULAR SURGERY)

## 2019-10-01 PROCEDURE — 77030020751 HC FLTR TBNG TRNSFUS HAEM -A: Performed by: NURSE ANESTHETIST, CERTIFIED REGISTERED

## 2019-10-01 PROCEDURE — 77030008477 HC STYL SATN SLP COVD -A: Performed by: ANESTHESIOLOGY

## 2019-10-01 PROCEDURE — 86580 TB INTRADERMAL TEST: CPT | Performed by: THORACIC SURGERY (CARDIOTHORACIC VASCULAR SURGERY)

## 2019-10-01 PROCEDURE — 99223 1ST HOSP IP/OBS HIGH 75: CPT | Performed by: INTERNAL MEDICINE

## 2019-10-01 PROCEDURE — 74011250636 HC RX REV CODE- 250/636: Performed by: THORACIC SURGERY (CARDIOTHORACIC VASCULAR SURGERY)

## 2019-10-01 PROCEDURE — 85018 HEMOGLOBIN: CPT

## 2019-10-01 PROCEDURE — 77030018729 HC ELECTRD DEFIB PAD CARD -B: Performed by: THORACIC SURGERY (CARDIOTHORACIC VASCULAR SURGERY)

## 2019-10-01 PROCEDURE — 77030010938 HC CLP LIG TELE -A: Performed by: THORACIC SURGERY (CARDIOTHORACIC VASCULAR SURGERY)

## 2019-10-01 PROCEDURE — 77030002970 HC SUT PLEDG TELE -A: Performed by: THORACIC SURGERY (CARDIOTHORACIC VASCULAR SURGERY)

## 2019-10-01 PROCEDURE — 36600 WITHDRAWAL OF ARTERIAL BLOOD: CPT

## 2019-10-01 PROCEDURE — 77030002987 HC SUT PROL J&J -B: Performed by: THORACIC SURGERY (CARDIOTHORACIC VASCULAR SURGERY)

## 2019-10-01 PROCEDURE — 85730 THROMBOPLASTIN TIME PARTIAL: CPT

## 2019-10-01 PROCEDURE — P9047 ALBUMIN (HUMAN), 25%, 50ML: HCPCS

## 2019-10-01 PROCEDURE — 77030005518 HC CATH URETH FOL 2W BARD -B: Performed by: THORACIC SURGERY (CARDIOTHORACIC VASCULAR SURGERY)

## 2019-10-01 PROCEDURE — 74011250636 HC RX REV CODE- 250/636

## 2019-10-01 PROCEDURE — 77030018793 HC ORG SUT GAB FRAT TELE -B: Performed by: THORACIC SURGERY (CARDIOTHORACIC VASCULAR SURGERY)

## 2019-10-01 PROCEDURE — P9045 ALBUMIN (HUMAN), 5%, 250 ML: HCPCS | Performed by: THORACIC SURGERY (CARDIOTHORACIC VASCULAR SURGERY)

## 2019-10-01 PROCEDURE — 74011250637 HC RX REV CODE- 250/637: Performed by: PHYSICIAN ASSISTANT

## 2019-10-01 PROCEDURE — 77030009355 HC CRDPLG DEL SET QUES -C: Performed by: THORACIC SURGERY (CARDIOTHORACIC VASCULAR SURGERY)

## 2019-10-01 PROCEDURE — 74011250636 HC RX REV CODE- 250/636: Performed by: PHYSICIAN ASSISTANT

## 2019-10-01 PROCEDURE — 77030003010 HC SUT SURG STL J&J -B: Performed by: THORACIC SURGERY (CARDIOTHORACIC VASCULAR SURGERY)

## 2019-10-01 PROCEDURE — 77030039425 HC BLD LARYNG TRULITE DISP TELE -A: Performed by: ANESTHESIOLOGY

## 2019-10-01 PROCEDURE — 77030008771 HC TU NG SALEM SUMP -A: Performed by: ANESTHESIOLOGY

## 2019-10-01 PROCEDURE — 85384 FIBRINOGEN ACTIVITY: CPT

## 2019-10-01 PROCEDURE — 77030003037 HC SUT WRE STRNOTMY AEMC -B: Performed by: THORACIC SURGERY (CARDIOTHORACIC VASCULAR SURGERY)

## 2019-10-01 PROCEDURE — C1769 GUIDE WIRE: HCPCS | Performed by: THORACIC SURGERY (CARDIOTHORACIC VASCULAR SURGERY)

## 2019-10-01 PROCEDURE — 77030025646 HC AUTOTRNSFUS KT TERU -C: Performed by: THORACIC SURGERY (CARDIOTHORACIC VASCULAR SURGERY)

## 2019-10-01 PROCEDURE — 77030006689 HC BLD OPHTH BVR BD -A: Performed by: THORACIC SURGERY (CARDIOTHORACIC VASCULAR SURGERY)

## 2019-10-01 PROCEDURE — 77030020782 HC GWN BAIR PAWS FLX 3M -B: Performed by: NURSE ANESTHETIST, CERTIFIED REGISTERED

## 2019-10-01 PROCEDURE — 77030029004 HC CLP ATRI LAA EXCL ATRC -G1: Performed by: THORACIC SURGERY (CARDIOTHORACIC VASCULAR SURGERY)

## 2019-10-01 PROCEDURE — 71045 X-RAY EXAM CHEST 1 VIEW: CPT

## 2019-10-01 PROCEDURE — 93005 ELECTROCARDIOGRAM TRACING: CPT | Performed by: THORACIC SURGERY (CARDIOTHORACIC VASCULAR SURGERY)

## 2019-10-01 PROCEDURE — 82947 ASSAY GLUCOSE BLOOD QUANT: CPT

## 2019-10-01 PROCEDURE — 77030019908 HC STETH ESOPH SIMS -A: Performed by: NURSE ANESTHETIST, CERTIFIED REGISTERED

## 2019-10-01 PROCEDURE — 65610000006 HC RM INTENSIVE CARE

## 2019-10-01 PROCEDURE — 5A1221Z PERFORMANCE OF CARDIAC OUTPUT, CONTINUOUS: ICD-10-PCS | Performed by: THORACIC SURGERY (CARDIOTHORACIC VASCULAR SURGERY)

## 2019-10-01 PROCEDURE — 77030033070 HC RLD QLC FPCH COR-KNOT LSIS -C: Performed by: THORACIC SURGERY (CARDIOTHORACIC VASCULAR SURGERY)

## 2019-10-01 PROCEDURE — 77030006994: Performed by: THORACIC SURGERY (CARDIOTHORACIC VASCULAR SURGERY)

## 2019-10-01 PROCEDURE — 74011250637 HC RX REV CODE- 250/637: Performed by: THORACIC SURGERY (CARDIOTHORACIC VASCULAR SURGERY)

## 2019-10-01 PROCEDURE — 74011000250 HC RX REV CODE- 250: Performed by: THORACIC SURGERY (CARDIOTHORACIC VASCULAR SURGERY)

## 2019-10-01 PROCEDURE — 77030013797 HC KT TRNSDUC PRSSR EDWD -A: Performed by: THORACIC SURGERY (CARDIOTHORACIC VASCULAR SURGERY)

## 2019-10-01 PROCEDURE — 77030026882 HC RNG ANUPLST MTRL PHY EDWD -I1: Performed by: THORACIC SURGERY (CARDIOTHORACIC VASCULAR SURGERY)

## 2019-10-01 PROCEDURE — 77030020407 HC IV BLD WRMR ST 3M -A: Performed by: NURSE ANESTHETIST, CERTIFIED REGISTERED

## 2019-10-01 PROCEDURE — 30233N1 TRANSFUSION OF NONAUTOLOGOUS RED BLOOD CELLS INTO PERIPHERAL VEIN, PERCUTANEOUS APPROACH: ICD-10-PCS | Performed by: THORACIC SURGERY (CARDIOTHORACIC VASCULAR SURGERY)

## 2019-10-01 PROCEDURE — 77030011235 HC DRN PERCRD SUMP MEDT -B: Performed by: THORACIC SURGERY (CARDIOTHORACIC VASCULAR SURGERY)

## 2019-10-01 PROCEDURE — P9045 ALBUMIN (HUMAN), 5%, 250 ML: HCPCS

## 2019-10-01 PROCEDURE — 94002 VENT MGMT INPAT INIT DAY: CPT

## 2019-10-01 PROCEDURE — 74011250636 HC RX REV CODE- 250/636: Performed by: ANESTHESIOLOGY

## 2019-10-01 PROCEDURE — 76010000155 HC AUTO TRANSFUSION/CELL SAVER: Performed by: THORACIC SURGERY (CARDIOTHORACIC VASCULAR SURGERY)

## 2019-10-01 PROCEDURE — 02QG0ZZ REPAIR MITRAL VALVE, OPEN APPROACH: ICD-10-PCS | Performed by: THORACIC SURGERY (CARDIOTHORACIC VASCULAR SURGERY)

## 2019-10-01 PROCEDURE — 77030021177: Performed by: THORACIC SURGERY (CARDIOTHORACIC VASCULAR SURGERY)

## 2019-10-01 PROCEDURE — 77030002520 HC INSRT CLMP LATIS STLTH AMR -B: Performed by: THORACIC SURGERY (CARDIOTHORACIC VASCULAR SURGERY)

## 2019-10-01 PROCEDURE — 77030018548 HC SUT ETHBND2 J&J -B: Performed by: THORACIC SURGERY (CARDIOTHORACIC VASCULAR SURGERY)

## 2019-10-01 PROCEDURE — 77030025827 HC BG BLD DNR AUTLG MEDT -A: Performed by: THORACIC SURGERY (CARDIOTHORACIC VASCULAR SURGERY)

## 2019-10-01 PROCEDURE — 77030003422 HC NDL ASPIR NOVO -A: Performed by: THORACIC SURGERY (CARDIOTHORACIC VASCULAR SURGERY)

## 2019-10-01 PROCEDURE — 74011000250 HC RX REV CODE- 250

## 2019-10-01 PROCEDURE — 77030013794 HC KT TRNSDUC BLD EDWD -B: Performed by: NURSE ANESTHETIST, CERTIFIED REGISTERED

## 2019-10-01 PROCEDURE — 77030034927 HC PK PROC CPB INSPIRE PERF LIVA -F: Performed by: THORACIC SURGERY (CARDIOTHORACIC VASCULAR SURGERY)

## 2019-10-01 PROCEDURE — C1751 CATH, INF, PER/CENT/MIDLINE: HCPCS | Performed by: ANESTHESIOLOGY

## 2019-10-01 PROCEDURE — 83735 ASSAY OF MAGNESIUM: CPT

## 2019-10-01 PROCEDURE — 77030020751 HC FLTR TBNG TRNSFUS HAEM -A: Performed by: THORACIC SURGERY (CARDIOTHORACIC VASCULAR SURGERY)

## 2019-10-01 PROCEDURE — 77030005401 HC CATH RAD ARRO -A: Performed by: ANESTHESIOLOGY

## 2019-10-01 PROCEDURE — 77030034936 HC DEV MIN COR-KNOT KT LSIS -F: Performed by: THORACIC SURGERY (CARDIOTHORACIC VASCULAR SURGERY)

## 2019-10-01 PROCEDURE — 77030016688: Performed by: THORACIC SURGERY (CARDIOTHORACIC VASCULAR SURGERY)

## 2019-10-01 PROCEDURE — 77030002986 HC SUT PROL J&J -A: Performed by: THORACIC SURGERY (CARDIOTHORACIC VASCULAR SURGERY)

## 2019-10-01 PROCEDURE — 77030031139 HC SUT VCRL2 J&J -A: Performed by: THORACIC SURGERY (CARDIOTHORACIC VASCULAR SURGERY)

## 2019-10-01 PROCEDURE — 82962 GLUCOSE BLOOD TEST: CPT

## 2019-10-01 PROCEDURE — 76010000208 HC CV SURG 6.5 TO 7 HR INTENSV-TIER 1: Performed by: THORACIC SURGERY (CARDIOTHORACIC VASCULAR SURGERY)

## 2019-10-01 PROCEDURE — 5A1223Z PERFORMANCE OF CARDIAC PACING, CONTINUOUS: ICD-10-PCS | Performed by: THORACIC SURGERY (CARDIOTHORACIC VASCULAR SURGERY)

## 2019-10-01 PROCEDURE — 77030016564 HC BLD STRNL SAW4 CNMD -B: Performed by: THORACIC SURGERY (CARDIOTHORACIC VASCULAR SURGERY)

## 2019-10-01 PROCEDURE — 77030002996 HC SUT SLK J&J -A: Performed by: THORACIC SURGERY (CARDIOTHORACIC VASCULAR SURGERY)

## 2019-10-01 PROCEDURE — 77030037088 HC TUBE ENDOTRACH ORAL NSL COVD-A: Performed by: ANESTHESIOLOGY

## 2019-10-01 PROCEDURE — 77030018547 HC SUT ETHBND1 J&J -B: Performed by: THORACIC SURGERY (CARDIOTHORACIC VASCULAR SURGERY)

## 2019-10-01 PROCEDURE — 77030002966 HC SUT PDS J&J -A: Performed by: THORACIC SURGERY (CARDIOTHORACIC VASCULAR SURGERY)

## 2019-10-01 PROCEDURE — 77030013292 HC BOWL MX PRSM J&J -A: Performed by: NURSE ANESTHETIST, CERTIFIED REGISTERED

## 2019-10-01 PROCEDURE — 77030020268 HC MISC GENERAL SUPPLY: Performed by: THORACIC SURGERY (CARDIOTHORACIC VASCULAR SURGERY)

## 2019-10-01 PROCEDURE — 85027 COMPLETE CBC AUTOMATED: CPT

## 2019-10-01 PROCEDURE — 77030002888 HC SUT CHRMC J&J -A: Performed by: THORACIC SURGERY (CARDIOTHORACIC VASCULAR SURGERY)

## 2019-10-01 PROCEDURE — 77030012390 HC DRN CHST BTL GTNG -B: Performed by: THORACIC SURGERY (CARDIOTHORACIC VASCULAR SURGERY)

## 2019-10-01 PROCEDURE — 77030010823: Performed by: THORACIC SURGERY (CARDIOTHORACIC VASCULAR SURGERY)

## 2019-10-01 PROCEDURE — 77030009995: Performed by: THORACIC SURGERY (CARDIOTHORACIC VASCULAR SURGERY)

## 2019-10-01 PROCEDURE — 77030018571 HC SUT PROL1 J&J -B: Performed by: THORACIC SURGERY (CARDIOTHORACIC VASCULAR SURGERY)

## 2019-10-01 PROCEDURE — 77030003034 HC SUT WRE CRD J&J -B: Performed by: THORACIC SURGERY (CARDIOTHORACIC VASCULAR SURGERY)

## 2019-10-01 PROCEDURE — 77030002913 HC SUT ETHBND J&J -B: Performed by: THORACIC SURGERY (CARDIOTHORACIC VASCULAR SURGERY)

## 2019-10-01 PROCEDURE — 74011636637 HC RX REV CODE- 636/637

## 2019-10-01 PROCEDURE — 77030010512 HC APPL CLP LIG J&J -C: Performed by: THORACIC SURGERY (CARDIOTHORACIC VASCULAR SURGERY)

## 2019-10-01 PROCEDURE — 77030005537 HC CATH URETH BARD -A: Performed by: THORACIC SURGERY (CARDIOTHORACIC VASCULAR SURGERY)

## 2019-10-01 PROCEDURE — 77030034888 HC SUT PROL 2 J&J -B: Performed by: THORACIC SURGERY (CARDIOTHORACIC VASCULAR SURGERY)

## 2019-10-01 PROCEDURE — 77030013861 HC PNCH AORT CLNCUT QUES -B: Performed by: THORACIC SURGERY (CARDIOTHORACIC VASCULAR SURGERY)

## 2019-10-01 PROCEDURE — P9016 RBC LEUKOCYTES REDUCED: HCPCS

## 2019-10-01 PROCEDURE — 85610 PROTHROMBIN TIME: CPT

## 2019-10-01 PROCEDURE — 74011000302 HC RX REV CODE- 302: Performed by: THORACIC SURGERY (CARDIOTHORACIC VASCULAR SURGERY)

## 2019-10-01 PROCEDURE — C1729 CATH, DRAINAGE: HCPCS | Performed by: THORACIC SURGERY (CARDIOTHORACIC VASCULAR SURGERY)

## 2019-10-01 PROCEDURE — 02L70CK OCCLUSION OF LEFT ATRIAL APPENDAGE WITH EXTRALUMINAL DEVICE, OPEN APPROACH: ICD-10-PCS | Performed by: THORACIC SURGERY (CARDIOTHORACIC VASCULAR SURGERY)

## 2019-10-01 PROCEDURE — 06BQ0ZZ EXCISION OF LEFT SAPHENOUS VEIN, OPEN APPROACH: ICD-10-PCS | Performed by: THORACIC SURGERY (CARDIOTHORACIC VASCULAR SURGERY)

## 2019-10-01 PROCEDURE — 80048 BASIC METABOLIC PNL TOTAL CA: CPT

## 2019-10-01 PROCEDURE — 82803 BLOOD GASES ANY COMBINATION: CPT

## 2019-10-01 PROCEDURE — 84132 ASSAY OF SERUM POTASSIUM: CPT

## 2019-10-01 PROCEDURE — 77030010813: Performed by: THORACIC SURGERY (CARDIOTHORACIC VASCULAR SURGERY)

## 2019-10-01 PROCEDURE — 77030018836 HC SOL IRR NACL ICUM -A: Performed by: THORACIC SURGERY (CARDIOTHORACIC VASCULAR SURGERY)

## 2019-10-01 PROCEDURE — 021109W BYPASS CORONARY ARTERY, TWO ARTERIES FROM AORTA WITH AUTOLOGOUS VENOUS TISSUE, OPEN APPROACH: ICD-10-PCS | Performed by: THORACIC SURGERY (CARDIOTHORACIC VASCULAR SURGERY)

## 2019-10-01 DEVICE — CARPENTIER-EDWARDS PHYSIO II ANNULOPLASTY RING
Type: IMPLANTABLE DEVICE | Site: MITRAL VALVE | Status: FUNCTIONAL
Brand: CARPENTIER-EDWARDS PHYSIO II ANNULOPLASTY RING

## 2019-10-01 DEVICE — ATRICLIP® GILINOV-COSGROVE LAA EXCLUSION SYSTEM 40
Type: IMPLANTABLE DEVICE | Site: HEART | Status: FUNCTIONAL
Brand: ATRICLIP™ LAA EXCLUSION SYSTEM

## 2019-10-01 RX ORDER — SODIUM CHLORIDE 0.9 % (FLUSH) 0.9 %
5-40 SYRINGE (ML) INJECTION EVERY 8 HOURS
Status: DISCONTINUED | OUTPATIENT
Start: 2019-10-01 | End: 2019-10-04

## 2019-10-01 RX ORDER — MIDAZOLAM HYDROCHLORIDE 1 MG/ML
INJECTION, SOLUTION INTRAMUSCULAR; INTRAVENOUS AS NEEDED
Status: DISCONTINUED | OUTPATIENT
Start: 2019-10-01 | End: 2019-10-01 | Stop reason: HOSPADM

## 2019-10-01 RX ORDER — MAGNESIUM SULFATE 1 G/100ML
1 INJECTION INTRAVENOUS AS NEEDED
Status: DISCONTINUED | OUTPATIENT
Start: 2019-10-01 | End: 2019-10-03 | Stop reason: SDUPTHER

## 2019-10-01 RX ORDER — MUPIROCIN 20 MG/G
OINTMENT TOPICAL 2 TIMES DAILY
Status: DISCONTINUED | OUTPATIENT
Start: 2019-10-01 | End: 2019-10-03 | Stop reason: SDUPTHER

## 2019-10-01 RX ORDER — DEXTROSE, SODIUM CHLORIDE, AND POTASSIUM CHLORIDE 5; .45; .15 G/100ML; G/100ML; G/100ML
25 INJECTION INTRAVENOUS CONTINUOUS
Status: DISCONTINUED | OUTPATIENT
Start: 2019-10-01 | End: 2019-10-04

## 2019-10-01 RX ORDER — MIDAZOLAM HYDROCHLORIDE 1 MG/ML
2 INJECTION, SOLUTION INTRAMUSCULAR; INTRAVENOUS ONCE
Status: DISCONTINUED | OUTPATIENT
Start: 2019-10-01 | End: 2019-10-01 | Stop reason: HOSPADM

## 2019-10-01 RX ORDER — PAPAVERINE HYDROCHLORIDE 30 MG/ML
INJECTION INTRAMUSCULAR; INTRAVENOUS AS NEEDED
Status: DISCONTINUED | OUTPATIENT
Start: 2019-10-01 | End: 2019-10-01 | Stop reason: HOSPADM

## 2019-10-01 RX ORDER — ALBUMIN HUMAN 50 G/1000ML
25 SOLUTION INTRAVENOUS ONCE
Status: COMPLETED | OUTPATIENT
Start: 2019-10-01 | End: 2019-10-01

## 2019-10-01 RX ORDER — ROCURONIUM BROMIDE 10 MG/ML
INJECTION, SOLUTION INTRAVENOUS AS NEEDED
Status: DISCONTINUED | OUTPATIENT
Start: 2019-10-01 | End: 2019-10-01 | Stop reason: HOSPADM

## 2019-10-01 RX ORDER — CEFPODOXIME PROXETIL 200 MG/1
200 TABLET, FILM COATED ORAL EVERY 12 HOURS
Status: DISCONTINUED | OUTPATIENT
Start: 2019-10-01 | End: 2019-10-05

## 2019-10-01 RX ORDER — SODIUM CHLORIDE, SODIUM LACTATE, POTASSIUM CHLORIDE, CALCIUM CHLORIDE 600; 310; 30; 20 MG/100ML; MG/100ML; MG/100ML; MG/100ML
INJECTION, SOLUTION INTRAVENOUS
Status: DISCONTINUED | OUTPATIENT
Start: 2019-10-01 | End: 2019-10-01 | Stop reason: HOSPADM

## 2019-10-01 RX ORDER — SODIUM CHLORIDE 9 MG/ML
INJECTION, SOLUTION INTRAVENOUS
Status: DISCONTINUED | OUTPATIENT
Start: 2019-10-01 | End: 2019-10-01 | Stop reason: HOSPADM

## 2019-10-01 RX ORDER — HEPARIN SODIUM 1000 [USP'U]/ML
INJECTION, SOLUTION INTRAVENOUS; SUBCUTANEOUS AS NEEDED
Status: DISCONTINUED | OUTPATIENT
Start: 2019-10-01 | End: 2019-10-01 | Stop reason: HOSPADM

## 2019-10-01 RX ORDER — CHLORHEXIDINE GLUCONATE 1.2 MG/ML
10 RINSE ORAL 2 TIMES DAILY
Status: DISCONTINUED | OUTPATIENT
Start: 2019-10-01 | End: 2019-10-03 | Stop reason: ALTCHOICE

## 2019-10-01 RX ORDER — AMIODARONE HYDROCHLORIDE 200 MG/1
600 TABLET ORAL ONCE
Status: COMPLETED | OUTPATIENT
Start: 2019-10-01 | End: 2019-10-01

## 2019-10-01 RX ORDER — CEFAZOLIN SODIUM/WATER 2 G/20 ML
2 SYRINGE (ML) INTRAVENOUS ONCE
Status: COMPLETED | OUTPATIENT
Start: 2019-10-01 | End: 2019-10-01

## 2019-10-01 RX ORDER — METOPROLOL TARTRATE 25 MG/1
25 TABLET, FILM COATED ORAL EVERY 12 HOURS
Status: DISCONTINUED | OUTPATIENT
Start: 2019-10-02 | End: 2019-10-03

## 2019-10-01 RX ORDER — PROTAMINE SULFATE 10 MG/ML
INJECTION, SOLUTION INTRAVENOUS AS NEEDED
Status: DISCONTINUED | OUTPATIENT
Start: 2019-10-01 | End: 2019-10-01 | Stop reason: HOSPADM

## 2019-10-01 RX ORDER — PROPOFOL 10 MG/ML
0-50 VIAL (ML) INTRAVENOUS
Status: DISCONTINUED | OUTPATIENT
Start: 2019-10-01 | End: 2019-10-04

## 2019-10-01 RX ORDER — MIDAZOLAM HYDROCHLORIDE 1 MG/ML
1 INJECTION, SOLUTION INTRAMUSCULAR; INTRAVENOUS
Status: DISCONTINUED | OUTPATIENT
Start: 2019-10-01 | End: 2019-10-04

## 2019-10-01 RX ORDER — FENTANYL CITRATE 50 UG/ML
100 INJECTION, SOLUTION INTRAMUSCULAR; INTRAVENOUS ONCE
Status: DISCONTINUED | OUTPATIENT
Start: 2019-10-01 | End: 2019-10-01 | Stop reason: HOSPADM

## 2019-10-01 RX ORDER — DOBUTAMINE HYDROCHLORIDE 200 MG/100ML
0-10 INJECTION INTRAVENOUS
Status: DISCONTINUED | OUTPATIENT
Start: 2019-10-01 | End: 2019-10-04

## 2019-10-01 RX ORDER — SODIUM CHLORIDE 9 MG/ML
250 INJECTION, SOLUTION INTRAVENOUS AS NEEDED
Status: DISCONTINUED | OUTPATIENT
Start: 2019-10-01 | End: 2019-10-01 | Stop reason: HOSPADM

## 2019-10-01 RX ORDER — MIDAZOLAM HYDROCHLORIDE 1 MG/ML
2 INJECTION, SOLUTION INTRAMUSCULAR; INTRAVENOUS
Status: COMPLETED | OUTPATIENT
Start: 2019-10-01 | End: 2019-10-01

## 2019-10-01 RX ORDER — EPHEDRINE SULFATE 50 MG/ML
INJECTION, SOLUTION INTRAVENOUS AS NEEDED
Status: DISCONTINUED | OUTPATIENT
Start: 2019-10-01 | End: 2019-10-01 | Stop reason: HOSPADM

## 2019-10-01 RX ORDER — SUFENTANIL CITRATE 50 UG/ML
INJECTION EPIDURAL; INTRAVENOUS AS NEEDED
Status: DISCONTINUED | OUTPATIENT
Start: 2019-10-01 | End: 2019-10-01 | Stop reason: HOSPADM

## 2019-10-01 RX ORDER — LIDOCAINE HCL/PF 100 MG/5ML
50-100 SYRINGE (ML) INTRAVENOUS
Status: ACTIVE | OUTPATIENT
Start: 2019-10-01 | End: 2019-10-02

## 2019-10-01 RX ORDER — SODIUM CHLORIDE 0.9 % (FLUSH) 0.9 %
5-40 SYRINGE (ML) INJECTION AS NEEDED
Status: DISCONTINUED | OUTPATIENT
Start: 2019-10-01 | End: 2019-10-04

## 2019-10-01 RX ORDER — NALOXONE HYDROCHLORIDE 0.4 MG/ML
0.4 INJECTION, SOLUTION INTRAMUSCULAR; INTRAVENOUS; SUBCUTANEOUS AS NEEDED
Status: DISCONTINUED | OUTPATIENT
Start: 2019-10-01 | End: 2019-10-09 | Stop reason: HOSPADM

## 2019-10-01 RX ORDER — MUPIROCIN 20 MG/G
OINTMENT TOPICAL 2 TIMES DAILY
Status: DISCONTINUED | OUTPATIENT
Start: 2019-10-01 | End: 2019-10-01 | Stop reason: SDUPTHER

## 2019-10-01 RX ORDER — HEPARIN SODIUM 10000 [USP'U]/ML
INJECTION, SOLUTION INTRAVENOUS; SUBCUTANEOUS AS NEEDED
Status: DISCONTINUED | OUTPATIENT
Start: 2019-10-01 | End: 2019-10-01 | Stop reason: HOSPADM

## 2019-10-01 RX ORDER — SODIUM CHLORIDE 9 MG/ML
250 INJECTION, SOLUTION INTRAVENOUS AS NEEDED
Status: DISCONTINUED | OUTPATIENT
Start: 2019-10-01 | End: 2019-10-04

## 2019-10-01 RX ORDER — SODIUM CHLORIDE, SODIUM LACTATE, POTASSIUM CHLORIDE, CALCIUM CHLORIDE 600; 310; 30; 20 MG/100ML; MG/100ML; MG/100ML; MG/100ML
100 INJECTION, SOLUTION INTRAVENOUS CONTINUOUS
Status: DISCONTINUED | OUTPATIENT
Start: 2019-10-01 | End: 2019-10-01 | Stop reason: HOSPADM

## 2019-10-01 RX ORDER — AMIODARONE HYDROCHLORIDE 200 MG/1
200 TABLET ORAL EVERY 12 HOURS
Status: DISCONTINUED | OUTPATIENT
Start: 2019-10-01 | End: 2019-10-03 | Stop reason: SDUPTHER

## 2019-10-01 RX ORDER — ETOMIDATE 2 MG/ML
INJECTION INTRAVENOUS AS NEEDED
Status: DISCONTINUED | OUTPATIENT
Start: 2019-10-01 | End: 2019-10-01 | Stop reason: HOSPADM

## 2019-10-01 RX ORDER — SODIUM CHLORIDE 9 MG/ML
25 INJECTION, SOLUTION INTRAVENOUS CONTINUOUS
Status: DISCONTINUED | OUTPATIENT
Start: 2019-10-01 | End: 2019-10-04

## 2019-10-01 RX ORDER — PHENYLEPHRINE HCL IN 0.9% NACL 30MG/250ML
10-100 PLASTIC BAG, INJECTION (ML) INTRAVENOUS
Status: DISCONTINUED | OUTPATIENT
Start: 2019-10-01 | End: 2019-10-04

## 2019-10-01 RX ORDER — VECURONIUM BROMIDE FOR INJECTION 1 MG/ML
INJECTION, POWDER, LYOPHILIZED, FOR SOLUTION INTRAVENOUS AS NEEDED
Status: DISCONTINUED | OUTPATIENT
Start: 2019-10-01 | End: 2019-10-01 | Stop reason: HOSPADM

## 2019-10-01 RX ORDER — MORPHINE SULFATE 10 MG/ML
3-5 INJECTION, SOLUTION INTRAMUSCULAR; INTRAVENOUS
Status: DISCONTINUED | OUTPATIENT
Start: 2019-10-01 | End: 2019-10-04

## 2019-10-01 RX ORDER — ATORVASTATIN CALCIUM 40 MG/1
80 TABLET, FILM COATED ORAL
Status: DISCONTINUED | OUTPATIENT
Start: 2019-10-01 | End: 2019-10-03 | Stop reason: SDUPTHER

## 2019-10-01 RX ORDER — DEXTROSE 50 % IN WATER (D50W) INTRAVENOUS SYRINGE
25 AS NEEDED
Status: DISCONTINUED | OUTPATIENT
Start: 2019-10-01 | End: 2019-10-06

## 2019-10-01 RX ORDER — POTASSIUM CHLORIDE 14.9 MG/ML
10 INJECTION INTRAVENOUS AS NEEDED
Status: DISPENSED | OUTPATIENT
Start: 2019-10-01 | End: 2019-10-02

## 2019-10-01 RX ORDER — LIDOCAINE HYDROCHLORIDE 10 MG/ML
0.1 INJECTION INFILTRATION; PERINEURAL AS NEEDED
Status: DISCONTINUED | OUTPATIENT
Start: 2019-10-01 | End: 2019-10-01 | Stop reason: HOSPADM

## 2019-10-01 RX ORDER — ALBUMIN HUMAN 50 G/1000ML
SOLUTION INTRAVENOUS AS NEEDED
Status: DISCONTINUED | OUTPATIENT
Start: 2019-10-01 | End: 2019-10-01 | Stop reason: HOSPADM

## 2019-10-01 RX ORDER — NITROGLYCERIN 20 MG/100ML
10-100 INJECTION INTRAVENOUS
Status: DISCONTINUED | OUTPATIENT
Start: 2019-10-01 | End: 2019-10-04

## 2019-10-01 RX ORDER — NITROGLYCERIN 20 MG/100ML
INJECTION INTRAVENOUS
Status: DISCONTINUED | OUTPATIENT
Start: 2019-10-01 | End: 2019-10-01 | Stop reason: HOSPADM

## 2019-10-01 RX ORDER — ALBUMIN HUMAN 50 G/1000ML
SOLUTION INTRAVENOUS
Status: COMPLETED | OUTPATIENT
Start: 2019-10-01 | End: 2019-10-01

## 2019-10-01 RX ORDER — OXYCODONE AND ACETAMINOPHEN 5; 325 MG/1; MG/1
1 TABLET ORAL
Status: DISCONTINUED | OUTPATIENT
Start: 2019-10-01 | End: 2019-10-04

## 2019-10-01 RX ORDER — ONDANSETRON 2 MG/ML
4 INJECTION INTRAMUSCULAR; INTRAVENOUS
Status: DISCONTINUED | OUTPATIENT
Start: 2019-10-01 | End: 2019-10-04

## 2019-10-01 RX ORDER — ONDANSETRON 2 MG/ML
INJECTION INTRAMUSCULAR; INTRAVENOUS
Status: COMPLETED
Start: 2019-10-01 | End: 2019-10-01

## 2019-10-01 RX ORDER — GUAIFENESIN 100 MG/5ML
81 LIQUID (ML) ORAL DAILY
Status: DISCONTINUED | OUTPATIENT
Start: 2019-10-02 | End: 2019-10-03 | Stop reason: SDUPTHER

## 2019-10-01 RX ORDER — CEFAZOLIN SODIUM/WATER 2 G/20 ML
2 SYRINGE (ML) INTRAVENOUS EVERY 8 HOURS
Status: COMPLETED | OUTPATIENT
Start: 2019-10-01 | End: 2019-10-02

## 2019-10-01 RX ORDER — POTASSIUM CHLORIDE 29.8 MG/ML
INJECTION INTRAVENOUS AS NEEDED
Status: DISCONTINUED | OUTPATIENT
Start: 2019-10-01 | End: 2019-10-01 | Stop reason: HOSPADM

## 2019-10-01 RX ORDER — SODIUM BICARBONATE 84 MG/ML
50 INJECTION, SOLUTION INTRAVENOUS AS NEEDED
Status: DISCONTINUED | OUTPATIENT
Start: 2019-10-01 | End: 2019-10-04

## 2019-10-01 RX ORDER — CEFAZOLIN SODIUM 1 G/3ML
INJECTION, POWDER, FOR SOLUTION INTRAMUSCULAR; INTRAVENOUS AS NEEDED
Status: DISCONTINUED | OUTPATIENT
Start: 2019-10-01 | End: 2019-10-01 | Stop reason: HOSPADM

## 2019-10-01 RX ADMIN — SODIUM CHLORIDE 25 ML/HR: 900 INJECTION, SOLUTION INTRAVENOUS at 14:30

## 2019-10-01 RX ADMIN — ETOMIDATE 20 MG: 2 INJECTION INTRAVENOUS at 07:50

## 2019-10-01 RX ADMIN — NITROGLYCERIN 10 MCG/MIN: 20 INJECTION INTRAVENOUS at 08:23

## 2019-10-01 RX ADMIN — Medication 2 G: at 08:29

## 2019-10-01 RX ADMIN — CEFPODOXIME PROXETIL 200 MG: 200 TABLET, FILM COATED ORAL at 21:52

## 2019-10-01 RX ADMIN — ONDANSETRON 4 MG: 2 INJECTION INTRAMUSCULAR; INTRAVENOUS at 20:16

## 2019-10-01 RX ADMIN — VECURONIUM BROMIDE FOR INJECTION 2 MG: 1 INJECTION, POWDER, LYOPHILIZED, FOR SOLUTION INTRAVENOUS at 08:30

## 2019-10-01 RX ADMIN — ATORVASTATIN CALCIUM 80 MG: 40 TABLET, FILM COATED ORAL at 21:53

## 2019-10-01 RX ADMIN — MUPIROCIN: 20 OINTMENT TOPICAL at 21:20

## 2019-10-01 RX ADMIN — Medication 2 G: at 12:00

## 2019-10-01 RX ADMIN — Medication 10 ML: at 21:43

## 2019-10-01 RX ADMIN — EPHEDRINE SULFATE 5 MG: 50 INJECTION, SOLUTION INTRAVENOUS at 09:23

## 2019-10-01 RX ADMIN — VECURONIUM BROMIDE FOR INJECTION 2 MG: 1 INJECTION, POWDER, LYOPHILIZED, FOR SOLUTION INTRAVENOUS at 09:32

## 2019-10-01 RX ADMIN — PROTAMINE SULFATE 240 MG: 10 INJECTION, SOLUTION INTRAVENOUS at 13:15

## 2019-10-01 RX ADMIN — Medication 40 ML: at 14:30

## 2019-10-01 RX ADMIN — EPHEDRINE SULFATE 5 MG: 50 INJECTION, SOLUTION INTRAVENOUS at 10:01

## 2019-10-01 RX ADMIN — Medication 2 G: at 21:24

## 2019-10-01 RX ADMIN — SODIUM CHLORIDE: 9 INJECTION, SOLUTION INTRAVENOUS at 08:10

## 2019-10-01 RX ADMIN — AMIODARONE HYDROCHLORIDE 200 MG: 200 TABLET ORAL at 21:50

## 2019-10-01 RX ADMIN — ROCURONIUM BROMIDE 30 MG: 10 INJECTION, SOLUTION INTRAVENOUS at 12:22

## 2019-10-01 RX ADMIN — OXYCODONE HYDROCHLORIDE AND ACETAMINOPHEN 1 TABLET: 5; 325 TABLET ORAL at 21:51

## 2019-10-01 RX ADMIN — ALBUMIN (HUMAN) 25 G: 12.5 INJECTION, SOLUTION INTRAVENOUS at 17:20

## 2019-10-01 RX ADMIN — AMIODARONE HYDROCHLORIDE 600 MG: 200 TABLET ORAL at 06:19

## 2019-10-01 RX ADMIN — SUFENTANIL CITRATE 50 MCG: 50 INJECTION EPIDURAL; INTRAVENOUS at 07:50

## 2019-10-01 RX ADMIN — SUFENTANIL CITRATE 25 MCG: 50 INJECTION EPIDURAL; INTRAVENOUS at 09:38

## 2019-10-01 RX ADMIN — CHLORHEXIDINE GLUCONATE 10 ML: 1.2 RINSE ORAL at 20:10

## 2019-10-01 RX ADMIN — MIDAZOLAM HYDROCHLORIDE 1 MG: 1 INJECTION, SOLUTION INTRAMUSCULAR; INTRAVENOUS at 10:30

## 2019-10-01 RX ADMIN — EPHEDRINE SULFATE 5 MG: 50 INJECTION, SOLUTION INTRAVENOUS at 09:49

## 2019-10-01 RX ADMIN — CHLORHEXIDINE GLUCONATE 10 ML: 1.2 RINSE ORAL at 15:39

## 2019-10-01 RX ADMIN — TUBERCULIN PURIFIED PROTEIN DERIVATIVE 5 UNITS: 5 INJECTION, SOLUTION INTRADERMAL at 21:21

## 2019-10-01 RX ADMIN — SODIUM CHLORIDE: 9 INJECTION, SOLUTION INTRAVENOUS at 14:01

## 2019-10-01 RX ADMIN — MUPIROCIN: 20 OINTMENT TOPICAL at 15:44

## 2019-10-01 RX ADMIN — POTASSIUM CHLORIDE, DEXTROSE MONOHYDRATE AND SODIUM CHLORIDE 25 ML/HR: 150; 5; 450 INJECTION, SOLUTION INTRAVENOUS at 15:00

## 2019-10-01 RX ADMIN — DOBUTAMINE HYDROCHLORIDE 2.5 MCG/KG/MIN: 200 INJECTION INTRAVENOUS at 17:26

## 2019-10-01 RX ADMIN — EPHEDRINE SULFATE 5 MG: 50 INJECTION, SOLUTION INTRAVENOUS at 08:17

## 2019-10-01 RX ADMIN — SODIUM CHLORIDE, SODIUM LACTATE, POTASSIUM CHLORIDE, CALCIUM CHLORIDE: 600; 310; 30; 20 INJECTION, SOLUTION INTRAVENOUS at 07:30

## 2019-10-01 RX ADMIN — ROCURONIUM BROMIDE 50 MG: 10 INJECTION, SOLUTION INTRAVENOUS at 07:50

## 2019-10-01 RX ADMIN — SUFENTANIL CITRATE 25 MCG: 50 INJECTION EPIDURAL; INTRAVENOUS at 09:16

## 2019-10-01 RX ADMIN — MORPHINE SULFATE 3 MG: 10 INJECTION INTRAVENOUS at 15:18

## 2019-10-01 RX ADMIN — MIDAZOLAM HYDROCHLORIDE 4 MG: 1 INJECTION, SOLUTION INTRAMUSCULAR; INTRAVENOUS at 12:22

## 2019-10-01 RX ADMIN — POTASSIUM CHLORIDE 20 MEQ: 29.8 INJECTION INTRAVENOUS at 13:46

## 2019-10-01 RX ADMIN — HEPARIN SODIUM 30000 UNITS: 1000 INJECTION, SOLUTION INTRAVENOUS; SUBCUTANEOUS at 10:18

## 2019-10-01 RX ADMIN — MIDAZOLAM HYDROCHLORIDE 2 MG: 2 INJECTION, SOLUTION INTRAMUSCULAR; INTRAVENOUS at 07:17

## 2019-10-01 RX ADMIN — VECURONIUM BROMIDE FOR INJECTION 2 MG: 1 INJECTION, POWDER, LYOPHILIZED, FOR SOLUTION INTRAVENOUS at 12:15

## 2019-10-01 RX ADMIN — POTASSIUM CHLORIDE 10 MEQ: 200 INJECTION, SOLUTION INTRAVENOUS at 20:26

## 2019-10-01 RX ADMIN — SODIUM CHLORIDE, SODIUM LACTATE, POTASSIUM CHLORIDE, AND CALCIUM CHLORIDE 100 ML/HR: 600; 310; 30; 20 INJECTION, SOLUTION INTRAVENOUS at 06:19

## 2019-10-01 RX ADMIN — ALBUMIN HUMAN 250 ML: 50 SOLUTION INTRAVENOUS at 13:32

## 2019-10-01 NOTE — ANESTHESIA PROCEDURE NOTES
JOSE  Date/Time: 10/1/2019 8:56 AM      Procedure Details: probe placement, image aquisition & interpretation    08:45  Risks and benefits discussed with the patient and plans are to proceed    Procedure Note    Performed by: Emeka Norton MD  Authorized by: Emeka Norton MD       Indications: assessment of ascending aorta and assessment of surgical repair  Modalities: CF, 2D  Probe Type: biplane  Insertion: atraumatic  Patient Status: intubated and sedated    Echocardiographic and Doppler Measurements   Aorta  Size  Diam(cm)  Dissection PlaqueThick(mm)  Plaque Mobile    Ascending normal  No      Arch normal  No      Descending normal  No            Valves  Annulus  Stenosis  Area/Grad  Regurg  Leaflet   Morph  Leaflet   Motion    Aortic normal none  0 normal normal    Mitral normal none  3+ calcified prolapse    Tricuspid normal none   normal normal          Atria  Size  SEC (smoke)  Thrombus  Tumor  Device    Rt Atrium normal        Lt Atrium normal         Interatrial Septum Morphology: normal    Interventricular Septum Morphology: normal    Ventricle  Cavity Size  Cavity Dimension Hypertrophy  Thrombus  Gloal FXN  EF    RV normal    normal     LV normal    normal        Regional Function  (1 = normal, 2 = mildly hypokinetic, 3 = severely hypokinetic, 4 = akinetic, 5 = dyskinetic) LAV - [place]   ME LAV = 0  ME LAV = 90  ME LAV = 130                                     Pericardium: normal    Post Intervention Follow-up Study  Ventricular Global Function: improved  Ventricular Regional Function: improved     Valve  Function  Regurgitation  Area    Aortic no change      Mitral improved      Tricuspid no change      Prosthetic        Complications: None  Comments: No air in LV by echo.  Good repair

## 2019-10-01 NOTE — PROCEDURES
[facility]  PROCEDURE NOTE    Name:  Florencia Chen  MR#:  [id number]  :  1960  ACCOUNT #:  [de-identified]  DATE OF SERVICE:  2019    PROCEDURE PERFORMED:  Spirometry. INTERPRETATION:  The flow volume loop is consistent with restriction. Spirometry suggests restriction.       Jennifer Kirby MD      [id number]  D:  2019 17:06  T:  10/01/2019 4:25  JOB #:  [id number]

## 2019-10-01 NOTE — ANESTHESIA PROCEDURE NOTES
Arterial Line Placement    Start time: 10/1/2019 7:35 AM  End time: 10/1/2019 7:46 AM  Performed by: Robbie Marie CRNA  Authorized by: Nemo Ellis MD     Pre-Procedure  Indications:  Arterial pressure monitoring and blood sampling  Preanesthetic Checklist: patient identified, risks and benefits discussed, anesthesia consent, site marked, patient being monitored, timeout performed and patient being monitored    Timeout Time: 07:35        Procedure:   Prep:  ChloraPrep  Seldinger Technique?: Yes    Orientation:  Right  Location:  Radial artery  Catheter size:  20 G  Number of attempts:  3 or more  Cont Cardiac Output Sensor: No      Assessment:   Post-procedure:  Line secured and sterile dressing applied  Patient Tolerance:  Patient tolerated the procedure well with no immediate complications  Comment:   Left arm prepped with ChloraPrep, 0.8ml of 1% lidocaine infiltrated at skin, Seldinger technique unable to thread wire manual pressure held until hemostasis achieved, site 4cm proximal chosen 0.8ml of 1% lidocaine infiltrated at skin, ultrasound guided Seldinger technique unable to thread catheter over wire,  manual pressure held until hemostasis achieved. Right arm prepped with ChloraPrep, 0.8ml of 1% lidocaine infiltrated at skin, ultrasound guided Seldinger technique unable to thread catheter over wire,  manual pressure held until hemostasis achieved. site 7cm proximal chosen 0.4ml of 1% lidocaine infiltrated at skin, ultrasound guided Seldinger technique, good blood return, good waveform.

## 2019-10-01 NOTE — ANESTHESIA PROCEDURE NOTES
Central Line Placement    Start time: 10/1/2019 7:45 AM  End time: 10/1/2019 8:00 AM  Performed by: Tom Ceballos MD  Authorized by: Tom Ceballos MD     Indications: vascular access and central pressure monitoring  Preanesthetic Checklist: patient identified, risks and benefits discussed, anesthesia consent, site marked, patient being monitored and timeout performed    Timeout Time: 07:45       Pre-procedure: All elements of maximal sterile barrier technique followed? Yes    2% Chlorhexidine for cutaneous antisepsis, Hand hygiene performed prior to catheter insertion and Ultrasound guidance    Sterile Ultrasound Technique followed?: Yes            Procedure:   Prep:  Chlorhexidine  Location:  Internal jugular  Orientation:  Right  Patient position:  Trendelenburg    Catheter size:  9 Fr    Number of attempts:  1  Successful placement: Yes      Assessment:   Post-procedure:  Catheter secured and sterile dressing applied  Assessment:  Blood return through all ports, free fluid flow and guidewire removal verified  Insertion:  Uncomplicated  Patient tolerance:  Patient tolerated the procedure well with no immediate complications  Anesthesiology Procedure Note    Risks, benefits, and alternatives of central line placement discussed. Patient understands and elects to proceed. right internal jugular Venous approach prepped with chlorhexidine. Ultrasound used to define anatomy and verify vessel patency. 7-Step Sterility Protocol followed. Seldinger technique with CVP identification. Pulmonary artery catheter placed without complications.

## 2019-10-01 NOTE — PROGRESS NOTES
Dual skin assessment completed with Bayron Mejia RN. Patient log rolled left and right, no obvious skin breakdown or bruising noted, Allevyn pad present but removed and reapplied for maximum efficiency.

## 2019-10-01 NOTE — PROGRESS NOTES
Patient out from operating room and placed on the ventilator on documented settings. Patient is orally intubated with a # 7.0 ET Tube secured at the 21 cm ilda at the lip. Breath sounds are clear. Trachea is midline. Negative for subcutaneous air, chest excursion is symmetric. Positive for pitting edema. Patient is also Negative for cyanosis. Patient has a Right Radialarterial line. Patient has 2 Chest tubes. All alarms are set and audible. Resuscitation bag is at the head of the bed. Ventilator Settings  Mode FIO2 Rate Tidal Volume Pressure PEEP I:E Ratio   PRVC  45 %   15 500 ml     5 cm H20  1:2.08      Peak airway pressure: 25.5 cm H2O   Minute ventilation: 7.4 l/min     ABG: No results for input(s): PH, PCO2, PO2, HCO3 in the last 72 hours.       Alfredito Sanchez, RT

## 2019-10-01 NOTE — PROGRESS NOTES
Care Management Interventions  Transition of Care Consult (CM Consult): Discharge Planning    Chart screened by  for discharge planning. No needs identified at this time. Please consult  if any new issues arise.

## 2019-10-01 NOTE — PROGRESS NOTES
Initial visit to assess pt's spiritual needs. Pt  Sleeping, no family member present. Left a card.       Chaplain Radha Iniguez MDiv,ThM,PhD

## 2019-10-01 NOTE — PROGRESS NOTES
TRANSFER - IN REPORT:    Verbal report received from [facility] RN to Cecilia Martins RN prior to leaving OR and Ta Sharma CRNA to Cecilia Martins at bedside on Benjy Eisenberg  being received from OR(unit) for routine post - op      Report consisted of patients Situation, Background, Assessment and   Recommendations(SBAR). Information from the following report(s) SBAR, OR Summary, Procedure Summary, Intake/Output, MAR, Recent Results and Cardiac Rhythm NSR was reviewed with the receiving nurse. Per anesthesia on admission patient intubation Normal, but difficult to bag. Collaborative team agrees of surgeon, anesthesia, RT, and RN agrees to proceed with Intubation until order obtained        Opportunity for questions and clarification was provided. Assessment completed upon patients arrival to unit and care assumed.

## 2019-10-01 NOTE — PROGRESS NOTES
Dr. Frances Bass called and made aware of patient's cardiac index of 1.5 and hemodynamic calculations, orders for 1 bottle of albumin and dobutamine received at this time.

## 2019-10-01 NOTE — CONSULTS
Cardiovascular ICU Consult Note: 10/1/2019  Cristino Samples  Admission Date: 10/1/2019     The patient's chart is reviewed and the patient is discussed with the staff. Subjective:        Patient is seen at the request of Dr. Laron Dandy for respiratory management status post cardiac surgery. Patient had CABG and mitral valve repair. She is currently is sedated in CV-ICU and orally intubated receiving mechanical ventilation. She was recently admitted for heart failure and found to have MVCAD with an EF of 40 to 45% in addition to mitral valve regurgitation. She is a lifelong nonsmoker. Her preop PFTs just showed restriction. Prior to Admission Medications   Prescriptions Last Dose Informant Patient Reported? Taking?   amiodarone (CORDARONE) 200 mg tablet 2019 at 1600  Yes Yes   Sig: Take 600 mg by mouth. Today at 4 pm   aspirin 81 mg chewable tablet 10/1/2019 at 0400  No Yes   Sig: Take 1 Tab by mouth daily. Patient taking differently: Take 81 mg by mouth every morning. atorvastatin (LIPITOR) 40 mg tablet 2019 at Unknown time  No Yes   Sig: Take 1 Tab by mouth nightly. calcium-vitamin D (OYSTER SHELL) 500 mg(1,250mg) -200 unit per tablet 2019 at Unknown time  No Yes   Sig: Take 1 Tab by mouth two (2) times daily (with meals). carvedilol (COREG) 12.5 mg tablet 10/1/2019 at 0400  No Yes   Sig: Take 1 Tab by mouth two (2) times daily (with meals). cefpodoxime (VANTIN) 100 mg tablet 10/1/2019 at Unknown time  Yes Yes   Sig: Take 200 mg by mouth two (2) times a day. chlorhexidine (HIBICLENS) 4 % liquid 10/1/2019 at Unknown time  Yes Yes   Sig: Apply  to affected area daily as needed.  To take shower tonight and in am   insulin glargine (LANTUS,BASAGLAR) 100 unit/mL (3 mL) inpn 2019 at Unknown time  No Yes   Si units at night daily   insulin lispro (HUMALOG) 100 unit/mL kwikpen 2019 at Unknown time  No Yes   Sig: Less than 150 =   0 units         150 -199 =   2 units  200 -249 =   4 units  250 -299 =   6 units  300 -349 =   8 units  Before meals and at night   lisinopril (PRINIVIL, ZESTRIL) 10 mg tablet 9/30/2019 at Unknown time  No Yes   Sig: Take 1 Tab by mouth daily. Indications: high blood pressure   Patient taking differently: Take 10 mg by mouth every morning. Indications: high blood pressure   mupirocin (BACTROBAN) 2 % ointment 10/1/2019 at Unknown time  Yes Yes   Sig: Apply  to affected area daily. Tonight and in am and bring it to hospital with you   spironolactone (ALDACTONE) 25 mg tablet 9/30/2019 at Unknown time  No Yes   Sig: Take 0.5 Tabs by mouth daily. Patient taking differently: Take 12.5 mg by mouth every morning.       Facility-Administered Medications: None       Review of Systems - obtained from family  A comprehensive review of systems was negative except for: Constitutional: positive for none  Eyes: positive for none  Ears, nose, mouth, throat, and face: positive for none  Respiratory: positive for none  Cardiovascular: positive for chest pain, dyspnea on exertion  Gastrointestinal: positive for none  Genitourinary: positive for none  Integument/breast: positive for none  Hematologic/lymphatic: positive for history of breast cancer - treated with lumpectomy and radiation  Musculoskeletal: positive for none  Neurological: positive for none  Behvioral/Psych: positive for history of depression but none recently  Endocrine: positive for insulin dependent diabetes  Allergic/Immunologic: positive for none    Past Medical History:   Diagnosis Date    Breast cancer ([facility] [address] 2015    left, radiation , and lumpectomy only    CAD (coronary artery disease)     multivessel, awaiting CABG and mitral valve repair/replacement    Diabetes ([facility] [address] typelauren, dx 2016    typelauren, ID, avfbs 160, last A1C was 10.8. denies lows    Heart failure ([address]     EF 40-45%    Hypercholesteremia     Hypertension     Intertrochanteric fracture of right femur ([address] 2/24/2018    Mitral valve regurgitation     PUD (peptic ulcer disease)     age 89 [place], no treatment since     Past Surgical History:   Procedure Laterality Date    HX BREAST LUMPECTOMY  2015    HX HIP FRACTURE TX Right     HX ORTHOPAEDIC Right     hip fracture repair with hardware , not replacent     Social History     Socioeconomic History    Marital status: LEGALLY      Spouse name: Not on file    Number of children: Not on file    Years of education: Not on file    Highest education level: Not on file   Occupational History    Occupation: previously worked as a hospice nurse   Social Needs    Financial resource strain: Not on file    Food insecurity:     Worry: Not on file     Inability: Not on file   Muufri needs:     Medical: Not on file     Non-medical: Not on file   Tobacco Use    Smoking status: Never Smoker    Smokeless tobacco: Never Used   Substance and Sexual Activity    Alcohol use: Yes     Comment: social    Drug use: No    Sexual activity: Not on file   Lifestyle    Physical activity:     Days per week: Not on file     Minutes per session: Not on file    Stress: Not on file   Relationships    Social connections:     Talks on phone: Not on file     Gets together: Not on file     Attends Cheondo[employer] service: Not on file     Active member of club or organization: Not on file     Attends meetings of clubs or organizations: Not on file     Relationship status: Not on file    Intimate partner violence:     Fear of current or ex partner: Not on file     Emotionally abused: Not on file     Physically abused: Not on file     Forced sexual activity: Not on file   Other Topics Concern    Not on file   Social History Narrative    .  Lives with her      Family History   Problem Relation Age of Onset    Kidney Disease Mother     Heart Disease Father     Heart Disease Brother      No Known Allergies    Current Facility-Administered Medications   Medication Dose Route Frequency    lidocaine (XYLOCAINE) 10 mg/mL (1 %) injection 0.1 mL  0.1 mL SubCUTAneous PRN    lactated Ringers infusion  100 mL/hr IntraVENous CONTINUOUS    fentaNYL citrate (PF) injection 100 mcg  100 mcg IntraVENous ONCE    midazolam (VERSED) injection 2 mg  2 mg IntraVENous ONCE    albumin human 5% (BUMINATE) solution    CONTINUOUS    ceFAZolin (ANCEF) injection    PRN    heparin (porcine) 10,000 unit/mL injection    PRN    papaverine injection    PRN    0.9% sodium chloride infusion 250 mL  250 mL IntraVENous PRN    aminocaproic acid (AMICAR) 10 g in 0.9% sodium chloride 250 mL infusion  10 g IntraVENous ONCE    aminocaproic acid (AMICAR) 5 g in 0.9% sodium chloride 250 mL infusion  1 g/hr IntraVENous CONTINUOUS    EPINEPHrine (ADRENALIN) 4 mg in 0.9% sodium chloride 250 mL infusion  1-10 mcg/min IntraVENous TITRATE    heparin 10,000 units in NS 1000 ml irrigation  1,000 mL Irrigation ONCE    insulin regular (NOVOLIN R, HUMULIN R) 100 Units in 0.9% sodium chloride 100 mL infusion  1-10 Units/hr IntraVENous TITRATE     Facility-Administered Medications Ordered in Other Encounters   Medication Dose Route Frequency    SUFentanil (SUFENTA) injection   IntraVENous PRN    etomidate (AMIDATE) 2 mg/mL injection    PRN    rocuronium injection   IntraVENous PRN    lactated Ringers infusion    CONTINUOUS    ePHEDrine (MISTOLE) 50 mg/mL injection   IntraVENous PRN    insulin regular (NOVOLIN R, HUMULIN R) 100 Units in 0.9% sodium chloride 100 mL infusion    CONTINUOUS    nitroglycerin (Tridil) 200 mcg/ml infusion    CONTINUOUS    aminocaproic acid (AMICAR) 5 g in dextrose 5% 250 mL infusion   IntraVENous CONTINUOUS    PHENYLephrine (BERNADETTE-SYNEPHRINE) 30,000 mcg in 0.9% sodium chloride 250 mL infusion   IntraVENous CONTINUOUS    0.9% sodium chloride infusion    CONTINUOUS    aminocaproic acid (AMICAR) 5 g in 0.9% sodium chloride 250 mL infusion   IntraVENous CONTINUOUS    vecuronium (NORCURON) injection   IntraVENous PRN    heparin (porcine) 1,000 unit/mL injection    PRN    midazolam (VERSED) injection    PRN    EPINEPHrine (ADRENALIN) 4,000 mcg in 0.9% sodium chloride 250 mL infusion   IntraVENous CONTINUOUS    protamine injection    PRN    albumin human 5% (BUMINATE) solution    PRN    potassium chloride 20 mEq in 50 ml IVPB   IntraVENous PRN         Objective:     Vitals:    10/01/19 0616   BP: 132/75   Pulse: 75   Resp: 14   Temp: 98.6 °F (37 °C)   SpO2: 98%   Height: 5' 3\" (1.6 m)       Intake and Output:   No intake/output data recorded. 10/01 0701 - 10/01 1900  In: 1060 [I.V.:400]  Out: 800 [Urine:800]    Physical Exam:          Constitutional:  Sedated, intubated and mechanically ventilated. HEENT:  Sclera clear, pupils equal, oral mucosa moist and orally intubated  RESPIRATORY: clear bilaterally. CARDIOVASCULAR:  RRR with no M,G,R;  ABDOMEN:  soft; some bowel sounds present  EXTREMITIES:  warm with no cyanosis, no lower leg edema. Emery site left leg with ace wrap. SKIN:  no jaundice or ecchymosis   NEURO:  sedated. Musculoskeletal: cannot assess - sedated    CHEST XRAY:  pending    LINES:  Corey, swan kody, arterial line.  Oral ETT, oral gastric tube, chest tubes (2)    DRIPS:  Lee, Ntg, Insulin, Epi    CI: 2.0  CVP: 14  PA: 29/18    Ventilator Settings  Mode FIO2 Rate Tidal Volume Pressure PEEP   prvc 40% 15 500 15  5     Peak airway pressure: 26  Minute ventilation: 7.4L    ABG: pending     LAB  Recent Labs     09/30/19  0815   INR 1.0     Recent Labs     09/30/19  0815   MG 1.7*       Assessment and Plan :  (Medical Decision Making)     Hospital Problems  Never Reviewed          Codes Class Noted POA    Mitral valve regurgitation ICD-10-CM: I34.0  ICD-9-CM: 424.0  10/1/2019 Yes        Hypoxia ICD-10-CM: R09.02  ICD-9-CM: 799.02  10/1/2019 No        Encounter for weaning from ventilator [facility]) ICD-10-CM: Z99.11  ICD-9-CM: V46.13  10/1/2019 No        UTI (urinary tract infection) ICD-10-CM: N39.0  ICD-9-CM: 599.0  9/30/2019 Yes        * (Principal) CAD (coronary artery disease) ICD-10-CM: I25.10  ICD-9-CM: 414.00  9/26/2019 Yes              Plan:   1. Review CXR, ABG  2. Wean ventilator when stable/as tolerated    Kanwal Lovelace NP    More than 50% of time documented was spent face-to-face contact with the patient and in the care of the patient on the floor/unit where the patient is located    Lungs:  clear  Heart:  RRR with no Murmur/Rubs/Gallops    Additional Comments:  CXR pending, no history of lung disease, extubate per protocol    I have spoken with and examined the patient. I agree with the above assessment and plan as documented.     Matthew Ovalle MD

## 2019-10-01 NOTE — BRIEF OP NOTE
BRIEF OPERATIVE NOTE    Date of Procedure: 10/1/2019   Preoperative Diagnosis: Atherosclerosis of native coronary artery of native heart with unstable angina pectoris ([facility] [address] [I25.110]  Mitral valve insufficiency, unspecified etiology [I34.0]  Postoperative Diagnosis: Atherosclerosis of native coronary artery of native heart with unstable angina pectoris ([facility] [address] [I25.110]  Mitral valve insufficiency, unspecified etiology [I34.0]    Procedure(s):  CORONARY ARTERY BYPASS GRAFT x  3, LIMA  MITRAL VALVE REPAIR  VEIN HARVEST GREATER SAPHENOUS  ESOPHAGEAL TRANS ECHOCARDIOGRAM  LEFT ATRIAL APPENDAGE CLOSURE  WIH CLIP  Surgeon(s) and Role:     * Bhumika Restrepo MD - Primary         Surgical Assistant:     Surgical Staff:  Circ-1: Peter Garcia RN  Circ-Relief: Randy Chapa RN  Perfusionist: Estephanie Trevino  Scrub Tech-Relief: Abe Osborne  Scrub RN-1: Gene Santos RN  Scrub RN-2: Mel Vincent RN  Event Time In Time Out   Incision Start 1181    Incision Close 1357      Anesthesia: General   Estimated Blood Loss: minimal  Specimens: * No specimens in log *   Findings: mr,cad,paf   Complications: none  Implants:   Implant Name Type Inv.  Item Serial No.  Lot No. LRB No. Used Action   CLIP ATRIAL SYS EXCL 40MM STD --  - [id number]  CLIP ATRIAL SYS EXCL 40MM STD --   [employer] [id number] Left 1 Implanted   physio II   [id number] [employer]  N/A 1 Implanted

## 2019-10-01 NOTE — PERIOP NOTES
TRANSFER - OUT REPORT:    Verbal report given to [address] on Ailin Cantrell  being transferred to CVICU for routine post - op       Report consisted of patients Situation, Background, Assessment and   Recommendations(SBAR). Information from the following report(s) OR Summary was reviewed with the receiving nurse. Lines:   Double Lumen 10/01/19 Right Internal jugular (Active)       Kate Gurney Dual 10/01/19 Right Neck (Active)       Peripheral IV 10/01/19 Right Foot (Active)   Site Assessment Clean, dry, & intact 10/1/2019  7:00 AM   Phlebitis Assessment 0 10/1/2019  7:00 AM   Infiltration Assessment 0 10/1/2019  7:00 AM   Dressing Status Clean, dry, & intact 10/1/2019  7:00 AM   Dressing Type Tape;Transparent 10/1/2019  7:00 AM   Hub Color/Line Status Pink; Infusing 10/1/2019  7:00 AM       Arterial Line 10/01/19 Right Radial artery (Active)        Opportunity for questions and clarification was provided.       Patient transported with:   Monitor

## 2019-10-01 NOTE — ANESTHESIA POSTPROCEDURE EVALUATION
Procedure(s):  CORONARY ARTERY BYPASS GRAFT x  3, LIMA  MITRAL VALVE REPAIR  VEIN HARVEST GREATER SAPHENOUS  ESOPHAGEAL TRANS ECHOCARDIOGRAM  LEFT ATRIAL APPENDAGE CLOSURE  WIH CLIP. general    Anesthesia Post Evaluation      Multimodal analgesia: multimodal analgesia used between 6 hours prior to anesthesia start to PACU discharge  Patient location during evaluation: PACU  Patient participation: complete - patient participated  Level of consciousness: awake and awake and alert  Pain management: adequate  Airway patency: patent  Anesthetic complications: no  Cardiovascular status: acceptable  Respiratory status: acceptable  Hydration status: acceptable  Post anesthesia nausea and vomiting:  controlled      Vitals Value Taken Time   BP     Temp     Pulse 87 10/1/2019  4:24 PM   Resp 15 10/1/2019  4:24 PM   SpO2 100 % 10/1/2019  4:24 PM   Vitals shown include unvalidated device data.

## 2019-10-01 NOTE — PERIOP NOTES
Unable to reach family at 56. Family updated at 10:30 ,1159 AM and 1328 by Jovita Coffman RN.  Code obtained

## 2019-10-01 NOTE — PROGRESS NOTES
Guideline     Guideline Number: -[id number]     Title: Management of the Patient with Mechanical Ventilation (including weaning) and ABCDE Bundle    Effective Date:  03/00    Revised Date: 02/09, 03/10, 7/12, 5/13,                                  10/13, 8/14  Reviewed Date: 07/2015, 04/2016, 06/2017       I. Policy:  Management of the patient requiring mechanical ventilation, including readiness to wean and weaning protocol. The information provided serves as a guideline for patient management. Included in this guidelines is the ABCDE Bundle to provide guidance to staff for evidence based management of pain, agitation/anxiety and delirium in the intensive care unit. The goals of critical care analgesia and sedation are to facilitate mechanical ventilation, to prevent patient and caregiver injury, and to avoid the psychological and physiologic consequences of inadequate treatment of pain, anxiety, agitation, and delirium by maintaining a light level of sedation. Pain occurs commonly in adult ICU patients, regardless of admitting diagnosis. Therefore, pain should be frequently assessed and analgesic medications titrated to prevent adverse effects associated with either inadequate or excessive analgesia. Once pain has been addressed, anxiolytic and/or antipsychotic medications can be utilized to treat unresolved agitation/anxiety and delirium, with the goal to prevent over- or under sedation by using the Osorio Agitation Sedation Scale (RASS). Assertive management of these issues has been shown to reduce costs, improve ICU outcomes such as successful extubation and ICU length of stay, and allow for patients to participate in their own care. II. Purpose: The respiratory care practitioner and the critical care RN will utilize the following guideline to provide the most efficient and effective management of mechanical ventilators and weaning and extubation processes.     Goals of Treatment:  1. Adequate management of patients pain and discomfort while maintaining a light level of                   sedation (RASS score of 0 to -1)  2. Both chronic and acute sources of pain should be identified and treated. 3. Sedative agents should be considered if patient still expresses discomfort and/or is not at RASS         goal of 0 to -1 despite adequate management of pain. 4. Patients requiring neuromuscular blockage must have continuous infusions of analgesic and              sedative agents. III. Responsibility: Director Respiratory Care Services and all Respiratory Care Practitioners  with documented competency as well as Critical Care RN staff. General Guidelines    1. Introduction to Ventilator Plan Phase  a. Ventilator Management Phase, General Statement  -  The plan should be initiated in patients who have a secure airway/require invasive mechanical ventilation (endotracheal or tracheostomy) only  -   The provider determines the appropriate medications used for analgesia and agitation/anxiety along with the clinical pharmacist    2. Monitoring Levels of Comfort  a. Pain Assessment  - Pain is monitored using the numerical scales  - A pain assessment should be conducted, at a minimum, every 4 hours and as needed and per guidelines. - The level of pain should be determined as satisfactory by the patient based on patients baseline level of pain , considering any chronic pain that the patient may have. - If the patient is unable to communicate pain level, the nurse can assess for nonverbal indicators including facial grimacing, moaning, tachypnea, tachycardia, hypertension, diaphoresis, etc as a cue to begin further pain assessment.             b.  Sedation Assessment  - Sedation is monitored using the Osorio Agitation Sedation Scale (RASS)  Target RASS RASS Description   +4 Combative, violent, danger to staff     +3 Pulls or removes tubes(s) or catheters; aggressive   +2 Frequent nonpurposeful movement, fights ventilator   +1 Anxious, apprehensive, but not aggressive   0 Alert and calm   -1 Awakens to voice (eye opening/contact) > 10 sec   -2 Light sedation, briefly awakens to voice (eye opening/contact) < 10 sec   -3 Moderate sedation, movement or eye opening. No eye contact   -4 Deep sedation, no response to voice, but movement or eye opening to physical stimulation   -5 Unarousable, no response to voice or physical stimulation     -  Goal RASS is 0 to -1, unless otherwise specified by providers order.  - Nursing staff should conduct the RASS every 4 hours and as needed to maintain goal RASS of 0 to -1.  - If RASS is outside of goal range, discuss treatment options with provider.  - A RASS score of +2 to +4 requires further assessment by the nurse. Causes of agitation/anxiety that should be considered include:  a. Pulmonary -   endotracheal tube malposition or patency, mode of ventilation, pneumothorax, hypoxemia, hypercarbia  b. Metabolic  hypoglycemia, hyponatremia, acute renal or hepatic failure  c. Emotional upset  with information and awareness of critical condition, prognosis, need for surgical or invasive procedures, other interventions or complications, family or personal stressors  - C. Sedation Assessment while using Neuromusclar Blocking Agents  - D. Delirium Assessment  a. the ICU ([facility]  ICU)   3. Analgesia  The incidence of significant pain has been reported to be 50% or higher in both medical and surgical ICU patients. These patients also experience discomfort during routine/procedural ICU care and at rest.  However, patients may be unable to self-report their pain (either verbally or with other signs) because of an altered level of consciousness, the use of mechanical ventilation, or high doses of sedative agents or neuromuscular blocking agents.   The short and long term consequences of unrelieved or inadequately treated pain are significant and include patient discomfort, decreased satisfaction with care by family and patient, delirium, agitation/anxiety, post traumatic stress disorder and depression. Therefore, routine assessment and treatment of pain should occur in all ICU patients. Causes and Treatment of Pain in the ICU  a. Acute pain (post-operative, procedural pain, discomfort with usual ICU care or other acute episodes of pain-related to underlying disease)  1. Consider use of PCA for alert and oriented patients with pain needs not met by PRN dosing or opoids. 2. Preemptive analgesia should occur prior to chest tube removal, and should be considered for other procedural pain such as turning and repositioning, would drain removal, wound dressing change, tracheal suctioning, femoral catheter removal or place of central venous catheter. 3. Appropriate analgesic medications for preemptive analgesia are short acting intravenous (IV) agents (i.e. fentanyl, morphine, hydromorphone)  a. Administration of analgesia before patient experiences noxious stimuli prevents amplification and hyperexcitability of the central nervous system. b. Analgesia for Mechanically Ventilated Patients:  1. The approach to sedation and analgesia management for mechanically ventilated patients favors use of analgesia first sedation. The primary goal of this strategy is to address pain and discomfort first, and then if necessary, add anxiolytic agent. 2. Analgesia first sedation reduces dose escalation of medications, decreases the duration of mechanical ventilation and the incidence of VAP, improves the probability of successful extubation, and ultimately shortens ICU length of stay. 3. For pain management, analgesic medications are determined by the provider. Intermittent dosing of the analgesic should be attempted first.    If the patient requires more than 3 doses within 1 hour then provider should be contacted to consider continuous infusion.   4.  Analgesic options for mechanically ventilated patients include:  a. Fentanyl which is considered the drug of choice for patients requiring continuous infusion. b.Morphine may be considered for those patients without renal dysfunction who are hemodynamically stable and require intermittent pain medication. Continuous infusions of morphine may be used for patientl who are receiving comfort care as part of end of life care. c.Hydromorphone is reserved for patients who are refractory to fentanyl or morphine and is typically admininstered by intermittent dosing. 4.  Agitation/Anxiety    Background  Agitation and anxiety frequently occur in ICU patients. Anxiolytic/sedation agents may be indicated to help relieve discomfort, improve synchrony with mechanical ventilation, and decrease the overall work of breathing. Pain control alone may be sufficient to make patients comfortable enough to require no anxiolytic/sedative agent. In addition, non-pharmacologic interventions such as repositioning or verbal assurance may be helpful to comfort or redirect an agitated patient. If these methods are unsuccessful, then anxiolytic/sedative medications such as propofol, dexmedetomidine, or benzodiazepines can be used. Selection of an anxiolytic should be based on the pharmacokinetic properties of the medication, patient specific characteristics, and sedation goal.   However, nonbenzodiazepine sedatives (ie propofol or dexmedetomidine) may be preferable over benzodiazepines (ie midazolam or larazepam) due to more favorable outcomes such as delirum. Causes and Treatment of Agitation/Anxiety  a. Possible underlying causes of agitation and anxiety include pain, delirium, hypoxemia, hypoglycemia, hypotension, or withdrawal from alcohol  and other drugs. b. Analgesia first sedation should be attempted initially to manage pain and provide sedation in appropriate patients. Analgesia alone may be adequate to reach RASS goal of 0 to -1.   If patient remains agitated or anxious despite adequate analgesia (ie RASS +2 to +4) then anxiolytic/sedative should be considered. c. The choice of anxiolytic should be based on desired levels of sedation (ie light sedation or deep sedation) with preference for the use of nonbenzodiazepines such as propofol or dexmedetomidine if appropriate. While light sedation (ie RASS 0 to -1) is preferred for most patients, there are instances when deep sedation (ie RASS -4 to -5) is desired. For example, in the setting of ventilator dysynchrony due to ARDS or for patients receiving NMB agents. d. Medications to maintain light sedation (ie RASS 0 to -1) include  1. Propofol continuous infusion can be considered for hemodynamically stable (ie SBP = 100, MAP = 65 and/or not requiring vasopressor support) patients requiring light sedation. Propofol has a quick onset (1-2 minutes) and offset of action, making it a good agent to assess neurological status and facilitate liberation from the mechanical ventilator. 2.Dexmedetomidine continuous infusion is a good option for hemodynamically stable patients requiring light sedation as it allows for a more awake, interactive patient is associated with less delirium. It has an intermediate onset of action (5-10 min). Therefore, abrupt titrations should be avoided, but use of prn haloperidol or benzodiazepine may be useful to manage agitation until the medication takes effect. 3. Antipsychotics are another option. In particular, haloperidol intermittently dosed may be useful for patients with symptoms of agitation/anxiety and delirium. 4.Benzodiazapines can also be considered for light sedation, but should be intermittently doses. Midazolam is an option for patients without renal dysfunction. It has a short onset of action (2-5 minutes) making it a good agent for acute agitation/anxiety, but short duration of action resulting in frequent dosing. Lorazepam is another option.   It has a longer onset of action (15-20 minutes) in comparison to midazolam, but longer duration of action. e. Medications to maintain deep sedation (RASS -4 to -5) include:  1) Propofol continuous infusion should be considered as a first line option for hemodynamically stable patients. 2)Benzodiazepines can be considered as second line options for deep sedation. Studies comparing these agents to other sedatives have shown that they lead to worse outcomes including delirium, oversedation, delayed extubation, and longer time to discharge. Midazolam is one option for patients without renal dysfunction and lorazepam is another options. If patient requires more than 3 doses within 1 hour then contact provider  to consider initiation of continuous infusion. 5.  Daily Sedation Awakening Trial (SAT) from IV Continuous Analgesia/Sedation  a. Patients are to have daily awakening from sedation while on continuous IV analgesia and/or sedation in the ICU. Continuous analgesia infusions may be maintained only if needed for active pain and RASS is at goal 0 - -1. Unit guideline is for the SAT to occur following ICP rounds each morning.    b. The sedation awakening trial (SAT) is done regardless if the patient meets criteria for spontaneous breathing trial (SBT). c. SAT safety screen is assessed and SAT should not be performed if sedation is being used for active seizures, alcohol withdrawal, hemodynamically unstable or requiring support of vasoactive medications , in conjunction with NMB agents, if ICP is greater than  20mmHg or if sedation is being used to control ICP, patients RASS is +3 or +4 (very agitated or combative). Other exclusion criteria are:  if there is documentation of myocardial ischemia in the past 24 hours; or patient is receiving high frequency oscillator ventilation (HFOV) , if the patient has an open chest /abdomen or is receiving comfort care.   d. Criteria for passing the SAT are the patient opened their eyes to verbal stimuli or tolerated sedative interruption without exhibiting failure criteria.  e. Patients fail the SAT if the develop sustained anxiety, agitation, or pain; a respiratory rate of 35 per minutes for 5 minutes or longer, an SpO2 less than 88% for 5 minutes or longer; an acute cardiac dysrhythmia; two or more signs of respiratory distress including tachycardia, bradycardia; use of accessory muscles; diaphoresis; marked dyspnea; or myocardial ischemia. f. Respiratory therapy staff must verify with the nurse that continuous IV analgesia (unless being use  for active pain) and sedation (unless patient is receiving dexmedetomidine) is off prior to placing patient on SBT. g. DO NOT interrupt infusion of analgesia and sedation medications if patient is receiving neuromuscular blockade.  h. Monitor level of wakefulness unless patient is awake and follows commands (RASS 0 to -1) or patient becomes uncomfortable or agitated (RASS +3 to +4)  i. If agitation prevents successful awakening , administer bolus of analgesia and/or sedation then resume infusion of the medication at ½ previous dose and titrate as needed.  j. If oversedation prevents successful awakening, hole infusion until at goal and resume ½ of prior infusion rate/dose if clinically indicated. 6. Delirium  Background  Delirium is characterized by the acute onset of cerebral dysfunction with a change or fluctuation baseline  mental status, inattention, and either disorganized thinking or an altered level of consciousness. It affects up to 80% of mechanically ventilated adult ICU patients, and is associated with increased mortality,   and treatment is important and may in turn allow for a patient to be conscious yet cooperative enough to participate   in ventilator weaning trials and early mobilization efforts.     Delirium can only be assessed in patients who are able to sufficiently interact and communicate with bedside  clinicians (ie RASS -3 to +4). IV. Procedure:  A. Assessment: The following criteria must be assessed prior to the initiation of weaning from mechanical ventilation. Note: The criteria are general guidelines and must be individualized for each patient. The patients primary nurse will be responsible, in coordination with the RT, the Spontaneous Awakening Trial). The RT will perform the SBT. B. Spontaneous Awakening Trials (SATs  also referred to as Sedation Vacation) and Spontaneous Breathing Trials (SBTs) performed to determine readiness to wean. 1. For patients who meet established criteria, such as those without active seizures, alcohol withdrawal and agitation, myocardial ischemia or those requiring cardiac support devices, without increased intracranial pressure and those not receiving neuromuscular blockade, the nurse will reduce the infusions of sedative by 50% of current used for sedation that was used to achieve a level of light sedation (Pearson Score 2 or RASS score of 0 to -1) and evaluate patient response to reduction of sedation. Analgesics required for pain control are continued during the test.  Obtain MD order to cover no SAT for that time period if patient has any exclusion criteria as described above. 2. Failure of the spontaneous awakening trial occurs when the patient shows symptoms such as increased agitation, anxiety, pain or signs of respiratory distress including respiratory rate >35/min or oxygen saturation <88% as well as development of acute cardiac arrhythmias. If these symptoms develop during the SAT, the nurse then restarts sedation at 75% of the previous dose and titrates the medications until the patient is comfortable and/or symptoms have abated. 3.  If the patient passes the SAT then the patient moves on to the Spontaneous Breathing Trials as performed by the RT.    The SBT Safety Screen included the following:  No agitation, oxygen saturation > 88%, FIO2 < 50%, PEEP < 7.5 cm H20, no myocardial ischemia, no vasopressor use, and with inspiratory efforts. 4. Patients who pass the spontaneous awakening trial but fail the spontaneous breathing trial are placed back on full ventilator support and reassessed the next day. 5. Failure of the SBT (spontaneous breathing trail) includes any of the following:  Respiratory rate > 35/min, respiratory rate < 8/min, oxygen saturation < 88%, respiratory distress, mental status change, acute cardiac arrhythmia. 6. Extubation is considered for patients who tolerate the spontaneous awakening trial and pass the spontaneous breathing trial.    C. Can the cause of respiratory failure be reversed (i.e. absence of high spinal cord injury or advanced ALS)? D. Is gas exchange adequate? 1. PaO2/FIO2 ratio > 150  200,  2. PEEP < 8 cm H20  3. FIO2 < 50  4. pH > 7.30  5. Rapid shallow breathing index (f/VT) < 105  E. Is patient hemodynamically stable? 1. Absence of clinically significant hypotension (minimal vasopressors such as Dopamine < 5mcg/kg/minute)? F. Is there evidence of intact respiratory drive (NIP/NIF >-44 ULD49, stable VC02)? G. Does patient have an adequate cough, airway clearance ability? H. Is there absence of excessive secretions? V. Initiation:     A. The therapist shall consult with RN to determine if sedation can be discontinued or significantly decreased. If this can be achieved, the therapist shall implement the                     followin. Identify patient and verify name and account number via ID [email or web address]. 2. Perform hand hygiene per hospital policy utilizing Standard Precautions for all patients and following transmission-based isolation as indicated per hospital policy. 3. Perform a ONE-MINUTE SPONTANEOUS TRIAL AND ASSESSMENT. 4. Measure Rapid Shallow Breathing Index (RSBI) and monitor SpO2 and cardiovascular parameters during the spontaneous breathing assessment.   5. If SpO2 and cardiovascular parameters are stable, continue spontaneous breathing trial for at least 30 minutes and up to 120 minutes, as patient tolerates. 6. Monitor ventilatory status, SpO2, and cardiovascular status during spontaneous breathing trial.  7. If patient has a successful trial, consider patient as a candidate for extubation and obtain order. 8. If patient fails the weaning trial, place back on ventilator and adjust settings to provide a non-fatiguing form of ventilatory support for the remainder of the day and night. 9. One attempt at weaning shall be performed each day until successful weaning occurs. The RCP will make every attempt to begin the spontaneous breathing trials between 0500 and 0600 to provide documentation of the trial when the pulmonologist makes rounds. B.  Assessment of SBT or PST:  1. Is gas exchange acceptable? 2. PaO2 > 60 mm Hg. 3. PH > 7.30  4. Increase in PaCO2  < 10 mm Hg  C. Is patient hemodynamically stable? 1. HR < 120 beats/minute  2. HR  < 20%   3. Systolic BP < 923 and > 90 mmHg  4. BP  < 20%, no vasopressors required  D. Does patient have stable ventilatory pattern? 1. Sustained RR < 30 breaths per minute  2. Normal and stable VCO2  3. Patient is not demonstrating any signs of increased work of breathing, such as increased use of accessory muscles. E. Mental status stable throughout trial?  1. Absence of changes such as somnolence, excessive agitation or anxiety  2. Absence of diaphoresis during trial?  IV. Safety:    A. The RCP shall monitor patient according to the above guidelines. If at any time during the weaning process, the respiratory therapist or nurse feels that the patient is not tolerating weaning, the therapist shall place patient back on previous ventilator settings. B. The patient shall be reassessed and the weaning process should be continued the following day. V. Reportable Conditions:    A.  The therapist shall notify the physician, as appropriate, for any of the following conditions:  1. FIO2 increase (sustained) at 10% or greater  2. Poor patient/ventilator interface in spite of adjustments  3. Need for increased sedation for respiratory distress  4. Need for increasing ventilating pressures (i.e. PEEP, PIP, MAP)  5. ABG results meeting panic value criteria or other clinical signs indicating deterioration of patients condition. 6. Unplanned extubation. 7. Unexplained sustained increase in PIP greater than 10 cm H2O.  8. Assessment results regarding ventilator discontinuance process. VI. Ventilator protocol management   A. The following items should be maintained for patients who are being mechanically           ventilated. 1. Obtain STAT Chest X-Ray for ET tube placement after insertion. 2. Chest X-Ray q a.m. while on ventilator. 3. ABGs 30 - 60 minutes after being stable on the ventilator. 4. ABG's q a.m. while on ventilator and prn.  5. Do spontaneous breathing trials when patient is hemodynamically stable, responsive, and without fever. 6. Terminate trials if patient exhibits signs of respiratory distress. 7. Therapists should maintain ABG s as follows:      a. pH -  7.30 - 7.50                   b. PaO2 -   60  100        8. Racemic Epinephrine (0.5cc) for post extubation stridor (2 UA treatments max.)    VII.   Early Mobilization    Mobility Level Criteria Start at Level 1 if:   PaO2/FIO2 <250   Positive end-expiratory Pressure (PEEP) >=10 cm H2O   O2 saturation <90%   Respiratory Rate (RR) Not within 10-30 per min   Cardiac arrhythmias or ischemia New onset   Heart Rate  (HR) <60 or >120 beats per min   Mean arterial pressure (MAP) <55 or >641 mmHg   Systolic blood pressure (SBP) <90 or > 180 mmHg   Vasopressor infusion New or increasing   Osorio Agitation Scale (RASS) < - 3       Level I:   Breathe  (Rass -5 to -3)  HOB Angle  improve VAP protocol compliance   Visually confirm the [facility] is elevated >= 30 degrees to comply with VAP prevention protocols   The Centers for Disease Control and Prevention recommends an HOB angle of 30-45 degrees , unless contraindicated  Additional activities to be implemented   Every 2 hour turning   Passive range of motion   Up to 20 degrees reverse trendelenburg with lower extremity exercise/retracting footboard   Continuous lateral rotation therapy can be considered part of early mobility therapy in patients who are at high risk for pulmonary complications  Move to Level 2 when the patient  - Has acceptable oxygenation/hemodynamics  - Tolerates q 2 turning  - Tolerates HOB > 30 degrees or up to 20 degrees reverse trendelenburg    Level 2 :Tilt  Patient Assessment Rass > -3  (eg, opens eyes, may have profound weakness)  Up to 20 degrees Reverse Trendelenburg position and 10 degrees minimum HOB  - Reverse Trendelenburg positioning allows for orthostatic position in fragile patients  - If available , use in conjunction with retracting foot section to allow for partial weight bearing prior to sitting up in the bed or getting out of bed  Additional activities to be implemented  -  Maintain HOB >/= 30 degrees  - Q 2 hour turning  - Passive/active range of motion  - Legs dependent  - PT consultation       Move to Level 3 when the patient . .    -Tolerates active- assistance exercises 2 times per day    -Tolerates lower extremity exercises against footboard/Up to 20 degrees Reverse Trendelenburg    -Tolerates legs dependent /HOB 45 degrees  Level 3 :  SIT  (Rass >- 1 (eg , weak but may move arms/legs independently)   Full chair position (footboard on)   Full upright positioning allows for diaphragmatic excursion and lung expansion   Sitting with legs in a dependent position facilitates gas exchange  Additional activities to be implemented  - Maintain HOB >= 30 degrees  - Q 2 hour turning (assisted)  - Active range of motion  PT/OT actively involved  - Encourage activities of daily living  - Dangling, if patient can move arm against gravity  Move to Level 4 when the patient . .  - Tolerates increasing active exercise in bed  - Actively assists with every- 2- hour turning or turns independently  - Tolerates full chair position 3 times/day  Level 4:  Stand ( RASS >0 (eg, weak but may tolerate increased activity)      Stand Attempts   Full chair position (footboard off/feet on the floor)   Consider using a sit-to-stand lift   Pivot to chair, it tolerates partial weight bearing  Additional activities to be implemented  - Maintain head of bed >= 30 degrees  - Q 2 hr turning (self/assisted)  - Active range of motion  - Encourage activities of daily living  - PT/OT actively involved      Move to Level 5 when the patient .  - Can successfully comply with all activities  - Tolerates trial periods of full chair position (footboard off/feet on floor) 3 times per day  - Tolerates partial weight-bearing stand and pivots to chair    Level 5 :  Move  (RASS > 0    (eg, weak but may tolerate increased activity)   Achieve out of bed   Utilize mobile floor life to ambulate to bedside chair  Additional activities to be implemented  - Maintain HOB > = 30 degrees  - Q 2 hour turning (self/assisted)  - Active range of motion  - Patient stands/bears weight > 1 minute  - Patient marches in place  - PT/OT actively involved    Patient continues to ambulate progressively longer distances as tolerated until they consistently participate and move independently.         E Approved by [address] 2-[birth year]09   N [facility] Clinical Guidelines             PARIS ROW[facility] Flowsheet Content Variables to select when addressing section Comments   PARIS Initiated  Yes/No  RN to address minimum q 24 hours (day shift)   Target RASS  0 = alert and oriented   -1 = drowsy   -2 = light sedation   -3= moderate sedation   -4= deep sedation Target on standard ventilator setting should be -2; -4 with oscillator   CAM -ICU  Positive   Negative   Unable to assess Delirium assessment   SAT Safety Screen Passed  Yes   No Select yes if proceeding on to the sedation vacation (reduction of continuous sedative drip by directed by MD)  Select no if your patient has any of the below reasons for not proceeding on to the daily awakening sedation vacation trial   SAT Screen for Failure  Active seizures   Acute delirium tremors   Agitation that threatens accidental line/tube removal   On paralytics   MI (24-48hr)   Abnormal ICP   Open abdomen Select one of the options when the patient will not undergo the sedation vacation  ALSO MUST OBTAIN AN ORDER FOR no [address] written under nursing miscellaneous for now by either the NP or Intensivist   Daily sedation Vacation/assessment of   Yes   No   Not applicable If yes, MUST see the reduction in sedation on the [facility] and please place in the comment section of the sedative sedation vacation started   SBT Safety Screen Passed  Yes   No Select Yes if the patient has none of the below listed reasons for not proceeding on to the SBT following reduction of sedation   SBT Screen Reason for Failure  Agitation   O2 Sat < or = 88%   FIO2 > 50%   PEEP >7   MI   Vasopressor Use   Bilevel setting on Vent   Oscillator in use   Increased resp effort Select reason as appropriate for NOT proceeding on to the SBT                                               Wake Up and Breathe Protocol

## 2019-10-02 ENCOUNTER — APPOINTMENT (OUTPATIENT)
Dept: GENERAL RADIOLOGY | Age: 59
DRG: 163 | End: 2019-10-02
Attending: THORACIC SURGERY (CARDIOTHORACIC VASCULAR SURGERY)
Payer: MEDICAID

## 2019-10-02 PROBLEM — Z98.890 S/P MVR (MITRAL VALVE REPAIR): Status: ACTIVE | Noted: 2019-10-02

## 2019-10-02 PROBLEM — Z99.11 ENCOUNTER FOR WEANING FROM VENTILATOR (HCC): Status: RESOLVED | Noted: 2019-10-01 | Resolved: 2019-10-02

## 2019-10-02 PROBLEM — D69.6 THROMBOCYTOPENIA (HCC): Status: ACTIVE | Noted: 2019-10-02

## 2019-10-02 PROBLEM — Z95.1 S/P CABG X 3: Status: ACTIVE | Noted: 2019-10-02

## 2019-10-02 PROBLEM — R29.818 SUSPECTED SLEEP APNEA: Status: ACTIVE | Noted: 2019-10-02

## 2019-10-02 PROBLEM — E83.51 HYPOCALCEMIA: Status: ACTIVE | Noted: 2019-10-02

## 2019-10-02 LAB
ALBUMIN SERPL-MCNC: 2.6 G/DL (ref 3.5–5)
ANION GAP SERPL CALC-SCNC: 8 MMOL/L (ref 7–16)
ATRIAL RATE: 100 BPM
BUN SERPL-MCNC: 10 MG/DL (ref 6–23)
CALCIUM SERPL-MCNC: 6.2 MG/DL (ref 8.3–10.4)
CALCULATED R AXIS, ECG10: 5 DEGREES
CALCULATED T AXIS, ECG11: -21 DEGREES
CHLORIDE SERPL-SCNC: 117 MMOL/L (ref 98–107)
CO2 SERPL-SCNC: 20 MMOL/L (ref 21–32)
CREAT SERPL-MCNC: 1.02 MG/DL (ref 0.6–1)
DIAGNOSIS, 93000: NORMAL
ERYTHROCYTE [DISTWIDTH] IN BLOOD BY AUTOMATED COUNT: 13.9 % (ref 11.9–14.6)
GLUCOSE BLD STRIP.AUTO-MCNC: 111 MG/DL (ref 65–100)
GLUCOSE BLD STRIP.AUTO-MCNC: 114 MG/DL (ref 65–100)
GLUCOSE BLD STRIP.AUTO-MCNC: 115 MG/DL (ref 65–100)
GLUCOSE BLD STRIP.AUTO-MCNC: 119 MG/DL (ref 65–100)
GLUCOSE BLD STRIP.AUTO-MCNC: 129 MG/DL (ref 65–100)
GLUCOSE BLD STRIP.AUTO-MCNC: 129 MG/DL (ref 65–100)
GLUCOSE BLD STRIP.AUTO-MCNC: 173 MG/DL (ref 65–100)
GLUCOSE BLD STRIP.AUTO-MCNC: 58 MG/DL (ref 65–100)
GLUCOSE SERPL-MCNC: 226 MG/DL (ref 65–100)
HCT VFR BLD AUTO: 26.1 % (ref 35.8–46.3)
HGB BLD-MCNC: 8.3 G/DL (ref 11.7–15.4)
MAGNESIUM SERPL-MCNC: 2.1 MG/DL (ref 1.8–2.4)
MCH RBC QN AUTO: 28.7 PG (ref 26.1–32.9)
MCHC RBC AUTO-ENTMCNC: 31.8 G/DL (ref 31.4–35)
MCV RBC AUTO: 90.3 FL (ref 79.6–97.8)
MM INDURATION POC: 0 MM (ref 0–5)
NRBC # BLD: 0 K/UL (ref 0–0.2)
P-R INTERVAL, ECG05: 160 MS
PLATELET # BLD AUTO: 82 K/UL (ref 150–450)
PMV BLD AUTO: 11.2 FL (ref 9.4–12.3)
POTASSIUM SERPL-SCNC: 3.8 MMOL/L (ref 3.5–5.1)
PPD POC: NEGATIVE NEGATIVE
Q-T INTERVAL, ECG07: 414 MS
QRS DURATION, ECG06: 96 MS
QTC CALCULATION (BEZET), ECG08: 534 MS
RBC # BLD AUTO: 2.89 M/UL (ref 4.05–5.2)
SODIUM SERPL-SCNC: 145 MMOL/L (ref 136–145)
VENTRICULAR RATE, ECG03: 100 BPM
WBC # BLD AUTO: 7.1 K/UL (ref 4.3–11.1)

## 2019-10-02 PROCEDURE — P9016 RBC LEUKOCYTES REDUCED: HCPCS

## 2019-10-02 PROCEDURE — 74011636637 HC RX REV CODE- 636/637: Performed by: THORACIC SURGERY (CARDIOTHORACIC VASCULAR SURGERY)

## 2019-10-02 PROCEDURE — 36430 TRANSFUSION BLD/BLD COMPNT: CPT

## 2019-10-02 PROCEDURE — 74011000250 HC RX REV CODE- 250: Performed by: PHYSICIAN ASSISTANT

## 2019-10-02 PROCEDURE — 77010033678 HC OXYGEN DAILY

## 2019-10-02 PROCEDURE — 77030013064 HC TRNSF BLD FENW -A

## 2019-10-02 PROCEDURE — 83735 ASSAY OF MAGNESIUM: CPT

## 2019-10-02 PROCEDURE — 99233 SBSQ HOSP IP/OBS HIGH 50: CPT | Performed by: INTERNAL MEDICINE

## 2019-10-02 PROCEDURE — 94640 AIRWAY INHALATION TREATMENT: CPT

## 2019-10-02 PROCEDURE — 77030032994 HC SOL INJ SOD CL 0.9% LFCR 500ML

## 2019-10-02 PROCEDURE — 74011250636 HC RX REV CODE- 250/636: Performed by: THORACIC SURGERY (CARDIOTHORACIC VASCULAR SURGERY)

## 2019-10-02 PROCEDURE — 74011250637 HC RX REV CODE- 250/637: Performed by: THORACIC SURGERY (CARDIOTHORACIC VASCULAR SURGERY)

## 2019-10-02 PROCEDURE — 71045 X-RAY EXAM CHEST 1 VIEW: CPT

## 2019-10-02 PROCEDURE — 74011250636 HC RX REV CODE- 250/636: Performed by: PHYSICIAN ASSISTANT

## 2019-10-02 PROCEDURE — 74011000250 HC RX REV CODE- 250: Performed by: INTERNAL MEDICINE

## 2019-10-02 PROCEDURE — 93005 ELECTROCARDIOGRAM TRACING: CPT | Performed by: THORACIC SURGERY (CARDIOTHORACIC VASCULAR SURGERY)

## 2019-10-02 PROCEDURE — 85027 COMPLETE CBC AUTOMATED: CPT

## 2019-10-02 PROCEDURE — 74011250637 HC RX REV CODE- 250/637: Performed by: PHYSICIAN ASSISTANT

## 2019-10-02 PROCEDURE — 80048 BASIC METABOLIC PNL TOTAL CA: CPT

## 2019-10-02 PROCEDURE — 36592 COLLECT BLOOD FROM PICC: CPT

## 2019-10-02 PROCEDURE — 65610000006 HC RM INTENSIVE CARE

## 2019-10-02 PROCEDURE — 82962 GLUCOSE BLOOD TEST: CPT

## 2019-10-02 PROCEDURE — P9045 ALBUMIN (HUMAN), 5%, 250 ML: HCPCS | Performed by: THORACIC SURGERY (CARDIOTHORACIC VASCULAR SURGERY)

## 2019-10-02 PROCEDURE — 82040 ASSAY OF SERUM ALBUMIN: CPT

## 2019-10-02 RX ORDER — ALBUMIN HUMAN 50 G/1000ML
25 SOLUTION INTRAVENOUS ONCE
Status: COMPLETED | OUTPATIENT
Start: 2019-10-02 | End: 2019-10-02

## 2019-10-02 RX ORDER — FUROSEMIDE 10 MG/ML
20 INJECTION INTRAMUSCULAR; INTRAVENOUS ONCE
Status: COMPLETED | OUTPATIENT
Start: 2019-10-02 | End: 2019-10-02

## 2019-10-02 RX ORDER — SODIUM CHLORIDE 9 MG/ML
250 INJECTION, SOLUTION INTRAVENOUS AS NEEDED
Status: DISCONTINUED | OUTPATIENT
Start: 2019-10-02 | End: 2019-10-08

## 2019-10-02 RX ORDER — ALBUTEROL SULFATE 0.83 MG/ML
2.5 SOLUTION RESPIRATORY (INHALATION)
Status: DISCONTINUED | OUTPATIENT
Start: 2019-10-02 | End: 2019-10-05

## 2019-10-02 RX ADMIN — MUPIROCIN: 20 OINTMENT TOPICAL at 09:30

## 2019-10-02 RX ADMIN — ONDANSETRON 4 MG: 2 INJECTION INTRAMUSCULAR; INTRAVENOUS at 20:56

## 2019-10-02 RX ADMIN — Medication 10 ML: at 13:21

## 2019-10-02 RX ADMIN — Medication 10 ML: at 21:10

## 2019-10-02 RX ADMIN — PROMETHAZINE HYDROCHLORIDE 12.5 MG: 25 INJECTION INTRAMUSCULAR; INTRAVENOUS at 09:56

## 2019-10-02 RX ADMIN — ATORVASTATIN CALCIUM 80 MG: 40 TABLET, FILM COATED ORAL at 21:12

## 2019-10-02 RX ADMIN — OXYCODONE HYDROCHLORIDE AND ACETAMINOPHEN 1 TABLET: 5; 325 TABLET ORAL at 09:35

## 2019-10-02 RX ADMIN — ALBUTEROL SULFATE 2.5 MG: 2.5 SOLUTION RESPIRATORY (INHALATION) at 19:31

## 2019-10-02 RX ADMIN — FUROSEMIDE 20 MG: 10 INJECTION, SOLUTION INTRAMUSCULAR; INTRAVENOUS at 14:50

## 2019-10-02 RX ADMIN — AMIODARONE HYDROCHLORIDE 200 MG: 200 TABLET ORAL at 09:29

## 2019-10-02 RX ADMIN — ALBUTEROL SULFATE 2.5 MG: 2.5 SOLUTION RESPIRATORY (INHALATION) at 11:23

## 2019-10-02 RX ADMIN — OXYCODONE HYDROCHLORIDE AND ACETAMINOPHEN 1 TABLET: 5; 325 TABLET ORAL at 01:56

## 2019-10-02 RX ADMIN — ONDANSETRON 4 MG: 2 INJECTION INTRAMUSCULAR; INTRAVENOUS at 07:50

## 2019-10-02 RX ADMIN — OXYCODONE HYDROCHLORIDE AND ACETAMINOPHEN 1 TABLET: 5; 325 TABLET ORAL at 17:52

## 2019-10-02 RX ADMIN — INSULIN HUMAN 5 UNITS: 100 INJECTION, SOLUTION PARENTERAL at 21:20

## 2019-10-02 RX ADMIN — CEFPODOXIME PROXETIL 200 MG: 200 TABLET, FILM COATED ORAL at 09:29

## 2019-10-02 RX ADMIN — AMIODARONE HYDROCHLORIDE 200 MG: 200 TABLET ORAL at 20:56

## 2019-10-02 RX ADMIN — Medication 2 G: at 04:32

## 2019-10-02 RX ADMIN — FAMOTIDINE 20 MG: 10 INJECTION, SOLUTION INTRAVENOUS at 20:56

## 2019-10-02 RX ADMIN — ALBUTEROL SULFATE 2.5 MG: 2.5 SOLUTION RESPIRATORY (INHALATION) at 16:16

## 2019-10-02 RX ADMIN — ALBUTEROL SULFATE 2.5 MG: 2.5 SOLUTION RESPIRATORY (INHALATION) at 08:30

## 2019-10-02 RX ADMIN — ALBUMIN (HUMAN) 25 G: 12.5 INJECTION, SOLUTION INTRAVENOUS at 01:48

## 2019-10-02 RX ADMIN — FAMOTIDINE 20 MG: 10 INJECTION, SOLUTION INTRAVENOUS at 09:57

## 2019-10-02 RX ADMIN — MUPIROCIN: 20 OINTMENT TOPICAL at 21:10

## 2019-10-02 RX ADMIN — OXYCODONE HYDROCHLORIDE AND ACETAMINOPHEN 1 TABLET: 5; 325 TABLET ORAL at 20:56

## 2019-10-02 RX ADMIN — ONDANSETRON 4 MG: 2 INJECTION INTRAMUSCULAR; INTRAVENOUS at 03:19

## 2019-10-02 RX ADMIN — CEFPODOXIME PROXETIL 200 MG: 200 TABLET, FILM COATED ORAL at 20:56

## 2019-10-02 RX ADMIN — Medication 10 ML: at 04:42

## 2019-10-02 NOTE — PROGRESS NOTES
Respiratory Mechanics completed and are as follows:  Weaning Parameters  Spontaneous Breathing Trial Complete: Yes  Resp Rate Observed: 14  Ve: 7  VT: 499  RSBI: 28  NIF: -20  VC: 602  Osorio Agitation Sedation Scale (RASS): Alert and calm  Patient extubated to Airvo with 40L flow and 40% FiO2. Patient is able to communicate and is negative for stridor. Breath sounds are clear. No complications with extubation.      Pepe Richardson, RT

## 2019-10-02 NOTE — PROGRESS NOTES
Critical Care Daily Progress Note: 10/2/2019    Sergey Montez   Admission Date: 10/1/2019         The patient's chart is reviewed and the patient is discussed with the staff. Patient is seen at the request of Dr. Milvia Vaughn for respiratory management status post cardiac surgery. Patient had CABG and mitral valve repair. She is currently is sedated in CV-ICU and orally intubated receiving mechanical ventilation. She was recently admitted for heart failure and found to have MVCAD with an EF of 40 to 45% in addition to mitral valve regurgitation. She is a lifelong nonsmoker. Her preop PFTs just showed restriction. Subjective:     Extubated overnight. Remains on Lee and DBU this AM. CI now 3.0. Wan anuric earlier but now making urine. Lethargic. She naps frequently. No witnessed apneas overnight per nurse.      Current Facility-Administered Medications   Medication Dose Route Frequency    0.9% sodium chloride infusion 250 mL  250 mL IntraVENous PRN    0.9% sodium chloride infusion  25 mL/hr IntraVENous CONTINUOUS    dextrose 5% - 0.45% NaCl with KCl 20 mEq/L infusion  25 mL/hr IntraVENous CONTINUOUS    sodium chloride (NS) flush 5-40 mL  5-40 mL IntraVENous Q8H    sodium chloride (NS) flush 5-40 mL  5-40 mL IntraVENous PRN    oxyCODONE-acetaminophen (PERCOCET) 5-325 mg per tablet 1 Tab  1 Tab Oral Q4H PRN    morphine 10 mg/ml injection 3-5 mg  3-5 mg IntraVENous Q1H PRN    naloxone (NARCAN) injection 0.4 mg  0.4 mg IntraVENous PRN    sodium bicarbonate (8.4%) injection 50 mEq  50 mEq IntraVENous PRN    EPINEPHrine (ADRENALIN) 4 mg in 0.9% sodium chloride 250 mL infusion  0.05-0.1 mcg/kg/min IntraVENous TITRATE    nitroglycerin (Tridil) 200 mcg/ml infusion   mcg/min IntraVENous TITRATE    lidocaine (PF) (XYLOCAINE) 100 mg/5 mL (2 %) injection syringe  mg   mg IntraVENous ONCE PRN    amiodarone (CORDARONE) tablet 200 mg  200 mg Oral Q12H    metoprolol tartrate (LOPRESSOR) tablet 25 mg  25 mg Oral Q12H    atorvastatin (LIPITOR) tablet 80 mg  80 mg Oral QHS    insulin regular (NOVOLIN R, HUMULIN R) 100 Units in 0.9% sodium chloride 100 mL infusion  1 Units/hr IntraVENous TITRATE    dextrose (D50W) injection syrg 12.5 g  25 mL IntraVENous PRN    aspirin chewable tablet 81 mg  81 mg Oral DAILY    magnesium sulfate 1 g/100 ml IVPB (premix or compounded)  1 g IntraVENous PRN    potassium chloride 10 mEq in 50 ml IVPB  10 mEq IntraVENous PRN    midazolam (VERSED) injection 1 mg  1 mg IntraVENous Q1H PRN    propofol (DIPRIVAN) infusion  0-50 mcg/kg/min IntraVENous TITRATE    meperidine (DEMEROL) injection 25 mg  25 mg IntraVENous Q1H PRN    chlorhexidine (PERIDEX) 0.12 % mouthwash 10 mL  10 mL Oral BID    tuberculin injection 5 Units  5 Units IntraDERMal ONCE    PHENYLephrine (PF)(BERNADETTE-SYNEPHRINE) 30 mg in 0.9% sodium chloride 250 mL infusion   mcg/min IntraVENous TITRATE    mupirocin (BACTROBAN) 2 % ointment   Both Nostrils BID    cefpodoxime (VANTIN) tablet 200 mg  200 mg Oral Q12H    DOBUTamine (DOBUTREX) 500 mg/250 mL (2,000 mcg/mL) infusion  0-10 mcg/kg/min IntraVENous TITRATE    ondansetron (ZOFRAN) injection 4 mg  4 mg IntraVENous Q4H PRN       Review of Systems    Constitutional:  negative for fever, chills, sweats  Cardiovascular:  negative for chest pain, palpitations, syncope, edema  Gastrointestinal:  negative for dysphagia, reflux, vomiting, diarrhea, abdominal pain, or melena  Neurologic:  negative for focal weakness, numbness, headache      Objective:     Vitals:    10/02/19 0443 10/02/19 0458 10/02/19 0514 10/02/19 0630   BP: 139/66 112/59 109/59 125/61   Pulse: (!) 103 (!) 101 (!) 103 100   Resp: 19 16 16    Temp:       SpO2: 98% 97% 97%    Height:             Intake/Output Summary (Last 24 hours) at 10/2/2019 0651  Last data filed at 10/2/2019 0159  Gross per 24 hour   Intake 3839.8 ml   Output 2315 ml   Net 1524.8 ml Physical Exam:          Constitutional:  the patient is overweight and in no acute distress  EENMT:  Sclera clear, pupils equal, oral mucosa moist; OP very narrow. Respiratory: decreased BS   Cardiovascular:  RRR without M,G; very trivial rub  Gastrointestinal: soft and non-tender; with positive bowel sounds. Musculoskeletal: warm without cyanosis. There is no lower extremity edema. Skin:  no jaundice or rashes  Neurologic: no gross neuro deficits     Psychiatric:  alert and oriented x 3    LINES:  RIJ cordis/SG, hinojosa, CT    DRIPS:  Lee, DBU      CXR:           LAB  Recent Labs     10/02/19  0545 10/02/19  0429 10/02/19  0320 10/02/19  0122 10/02/19  0000   GLUCPOC 115* 111* 58* 119* 129*      Recent Labs     10/02/19  0334 10/01/19  2240 10/01/19  1838 10/01/19  1444  09/30/19  0815   WBC 7.1  --   --  10.0  --   --    HGB 8.3* 9.8* 9.8* 10.6*   < >  --    HCT 26.1* 30.7* 30.4* 32.4*   < >  --    PLT 82*  --   --  99*  --   --    INR  --   --   --  1.5  --  1.0    < > = values in this interval not displayed. Recent Labs     10/02/19  0335 10/01/19  2240 10/01/19  1838 10/01/19  1444     --   --  147*   K 3.8 4.0 3.8 4.7   *  --   --  118*   CO2 20*  --   --  21   *  --   --  146*   BUN 10  --   --  13   CREA 1.02*  --   --  0.79   MG 2.1 2.3 2.4 2.8*   CA 6.2*  --   --  7.1*     Recent Labs     10/01/19  2013 10/01/19  1432 10/01/19  1341   PHI 7.355 7.362 7.279*   PCO2I 37.2 34.0* 44.1   PO2I 104* 70* 235*   HCO3I 20.8* 19.3* 20.6*     No results for input(s): LCAD, LAC in the last 72 hours. No results for input(s): PH, PCO2, PO2, HCO3 in the last 72 hours.      Assessment:  (Medical Decision Making)     Hospital Problems  Never Reviewed          Codes Class Noted POA    S/P CABG x 3 ICD-10-CM: Z95.1  ICD-9-CM: V45.81  10/2/2019 Unknown    Still on drips    S/P MVR (mitral valve repair) ICD-10-CM: H74.270  ICD-9-CM: V45.89  10/2/2019 Unknown    Stable    Hypocalcemia ICD-10-CM: E83.51  ICD-9-CM: 275.41  10/2/2019 Unknown    Check albumin and iCa    Suspected sleep apnea ICD-10-CM: R29.818  ICD-9-CM: 781.99  10/2/2019 Unknown    Likely will need PSG    Thrombocytopenia (HCC) ICD-10-CM: D69.6  ICD-9-CM: 287.5  10/2/2019 Unknown    Monitor; no bleeding. Mitral valve regurgitation ICD-10-CM: I34.0  ICD-9-CM: 424.0  10/1/2019 Yes        Hypoxia ICD-10-CM: R09.02  ICD-9-CM: 799.02  10/1/2019 No    Wean oxygen as tolerated    UTI (urinary tract infection) ICD-10-CM: N39.0  ICD-9-CM: 599.0  9/30/2019 Yes    Recent urine cx + for Klebsiella; was on vantin. * (Principal) CAD (coronary artery disease) ICD-10-CM: I25.10  ICD-9-CM: 414.00  9/26/2019 Yes              Plan:  (Medical Decision Making)     Monitor for PHILIP with sleep. Limit narcotics. Wean drips as able. Wean oxygen as tolerated. Recheck urine cx. Mobilize once off drips. Check iCa and albumin. Follow renal function since UOP has been marginal.   Albuterol with EZ PAP to improve lung inflation and sats. --    More than 50% of the time documented was spent in face-to-face contact with the patient and in the care of the patient on the floor/unit where the patient is located.     Adelia Vegas MD

## 2019-10-02 NOTE — PROGRESS NOTES
Today's Date: 10/2/2019  Date of Admission: 10/1/2019    Chart Reviewed. Subjective:     Patient feels nauseated. She denies any dyspnea. Medications Reviewed. Objective:     Vitals:    10/02/19 0843 10/02/19 0858 10/02/19 0914 10/02/19 0928   BP: 128/59 123/59 121/60 124/62   Pulse: 100 100 99 98   Resp: 15 14 15 14   Temp:       SpO2: 98% 98% 98% 98%   Weight:       Height:           Intake and Output  Current Shift: No intake/output data recorded. Last 3 Shifts: 09/30 1901 - 10/02 0700  In: 4936.1 [I.V.:3966.1]  Out: 2575 [Urine:2115]    Physical Exam:  General: Well Developed, Well Nourished, No Acute Distress, Alert & Oriented x 3, Appropriate mood  Neck: supple, no JVD  Heart: S1S2 with RRR without murmurs or gallops  Lungs: mostly clear anteriorly   Abd: soft, nontender, nondistended, with good bowel sounds  Ext: +edema bilaterally  Skin: warm and dry    LABS  Data Review:   Recent Labs     10/02/19  0335 10/02/19  0334 10/01/19  2240  10/01/19  1444 09/30/19  0815     --   --   --  147*  --    K 3.8  --  4.0   < > 4.7  --    MG 2.1  --  2.3   < > 2.8* 1.7*   BUN 10  --   --   --  13  --    CREA 1.02*  --   --   --  0.79  --    *  --   --   --  146*  --    WBC  --  7.1  --   --  10.0  --    HGB  --  8.3* 9.8*   < > 10.6*  --    HCT  --  26.1* 30.7*   < > 32.4*  --    PLT  --  82*  --   --  99*  --    INR  --   --   --   --  1.5 1.0    < > = values in this interval not displayed. Estimated Creatinine Clearance: 64.1 mL/min (A) (based on SCr of 1.02 mg/dL (H)).       Assessment/Plan:     Principal Problem:    S/P CABG x 3 (10/2/2019)  Hold ASA for now, no BB with low BP, requiring pressor support, on Optiflow, remains nauseated after Zofran, will try Phenergan    Active Problems:    CAD (coronary artery disease) (9/26/2019)  S/p CABG       UTI (urinary tract infection) (9/30/2019)  Continue antibiotic      Mitral valve regurgitation (10/1/2019)  S/p MVR      Hypoxia (10/1/2019)  On Optiflow      S/P MVR (mitral valve repair) (10/2/2019)  As above       Hypocalcemia (10/2/2019)        Suspected sleep apnea (10/2/2019)        Thrombocytopenia ([address] (10/2/2019)  Hold ASA, monitor           Tamera Raygoza PA-C

## 2019-10-02 NOTE — PROGRESS NOTES
Labs reviewed, potassium held at this time for minimal urine output. No acute distress noted at this time.

## 2019-10-02 NOTE — PROGRESS NOTES
Ventilator check complete; patient has a #7. 0 ET tube secured at the 21 at the lip. Patient is sedated. Patient is able to follow commands. Breath sounds are clear. Trachea is midline, Negative for subcutaneous air, and chest excursion is symmetric. Patient is also Negative for cyanosis and is Negative for pitting edema. All alarms are set and audible. Resuscitation bag is at the head of the bed. Ventilator Settings  Mode FIO2 Rate Tidal Volume Pressure PEEP I:E Ratio   Pressure support  36 %     13 cm H2O  8 cm H20  1:3.45      Peak airway pressure: 21.1 cm H2O   Minute ventilation: 8.4 l/min     ABG: No results for input(s): PH, PCO2, PO2, HCO3 in the last 72 hours.       Lara Claude, RT

## 2019-10-02 NOTE — OP NOTES
[facility]  OPERATIVE REPORT    Name:  Yelena Hauser  MR#:  [id number]  :  1960  ACCOUNT #:  [de-identified]  DATE OF SERVICE:  10/01/2019    PREOPERATIVE DIAGNOSES:  1.  Multivessel atherosclerotic coronary artery disease. 2.  Moderate mitral regurgitation. POSTOPERATIVE DIAGNOSES:  1.  Multivessel atherosclerotic coronary artery disease. 2.  Moderate mitral regurgitation. PROCEDURE PERFORMED:  1. Coronary artery bypass grafting x3, grafting consisting of:  A. LIMA to LAD. B.  Reverse saphenous vein graft to OM. C.  Reverse saphenous vein graft to the PDA. 2.  Mitral valve ring anuloplasty with a 28-mm Physio II ring. 3.  Clipping of left atrial appendage with a 40-mm AtriClip. SURGEON:  Tristan Cleveland. Michelle Velasquez MD    ASSISTANT:      ANESTHESIA:  General endotracheal.    COMPLICATIONS:  None. SPECIMENS REMOVED:  .    IMPLANTS:  .    ESTIMATED BLOOD LOSS:  Minimal.    OPERATIVE FINDINGS:  Saphenous vein 3 to 4 mm, LIMA 3 mm, aorta was soft, no palpable plaque. The patient was noted to have moderate MR from a dilated annulus confirmed by preoperative JOSE. LAD was 1.4 mm, severe distal disease. OM was 1.3 mm, severe distal disease. PDA was 1.3 mm, severe distal disease. Preoperative CHADs score was 4. INDICATION:  The patient is a very pleasant 51-year-old black female who presented to the emergency room with a three-day history of chest pain, dyspnea, and cough. She was noted to have then congestive heart failure. Echo showed reduced left ventricular function with moderate MR. Left heart cath showed severe multivessel disease. She did have a preoperative CHADs score of 4. DESCRIPTION OF PROCEDURE:  After informed consent was obtained, the patient was brought to the operating suite, and placed in the supine position. General endotracheal anesthesia was induced without difficulty. Appropriate monitoring lines were placed by Anesthesia.   She was prepped and draped in the usual sterile fashion. JOSE was performed by Anesthesia. First, two segments of greater saphenous vein were then taken from her left thigh. These incisions were closed in double layer fashion with 3-0 and 4-0 Vicryl. Next, a standard median sternotomy incision was then made. Sternum was carefully divided. Rultract was placed and the left internal mammary artery was taken down in pedicle fashion. The pedicle was injected with heparin solution. Systemic heparinization was given. A left pleural tube was placed through a separate stab incision and affixed to the skin. The pericardium was opened and tacked into a pericardial well. Aorta was palpated and noted to be soft. Pursestrings were then placed into the aorta, the supra and inferior vena cavas, and the right atrium. After adequate ACT was obtained, she was cannulated through these pursestrings, connected to the cardiopulmonary bypass which was then instituted without difficulty. A retrograde cannula was placed in the coronary sinus. At this point, the aorta was crossclamped. She received both antegrade and retrograde cold blood cardioplegia. She did receive intermittent doses of cold blood cardioplegia throughout the remainder of the case. The heart positioned for grafting of the PDA. This was a very heavily calcified 1.3-mm vessel. Using a running 7-0 Prolene, an end-to-side anastomosis was completed to the reverse saphenous vein. This was measured back to the aorta and a proximal anastomosis was completed with a running 6-0 Prolene. Next, the heart was positioned for grafting of the OM. This too was a very heavily calcified 1.3-mm vessel. Using a running 7-0 Prolene, an end-to-side anastomosis was completed. This was then measured back to the aorta and proximal anastomosis was completed with running 6-0 Prolene.   Because of her elevated CHADs score and risk of postoperative AFib, we elected to place a 40-mm AtriClip across the base of her left atrial appendage. It was done without difficulty under direct visualization. Next, the heart was positioned for grafting of the LAD. This too was a 1.4-mm severely diseased and calcified blood vessel. Using a running 8-0 Prolene, an end-to-side anastomosis was completed to the left internal mammary artery and the pedicle was tacked to the epicardium with 6-0 Prolene. At this point, the cavas were snared. The right atrium was opened. The fossa ovalis was identified and incised with incisions extending over to the left atrium. Retraction sutures were placed getting excellent visualization of the mitral valve. It was thoroughly explored and noted to have no deficiencies. It was injected with saline and noted to have a central jet. At this point, I measured the 28-mm Physio II ring. Sutures were placed around the annulus. The ring was brought up into the field. Sutures passed through a sewing ring and ring seated without difficulty. Saline test showed completely competent valve. At this point, the atriotomies were closed in double layered fashion with pledgeted 4-0 Prolene. She was placed in a steep Trendelenburg position, warmed to 37 degrees centigrade and given a hot shot dose of cardioplegia. Meticulous deairing maneuvers were undertaken, which were checked by transesophageal echo and noted to be adequate. Cross-clamp was removed. Ventricular pacing wires were placed upon the heart and brought out through a separate stab and affixed to the skin. She was initially VVI paced, but soon resumed to normal sinus rhythm. She was weaned from cardiopulmonary bypass without difficulty and decannulated. All pursestrings were tied and noted to be hemostatic. Protamine sulfate was given. Meticulous hemostasis was achieved. The pericardium was copiously irrigated with warm saline. A single 32-Tamazight mediastinal tube was placed through a separate stab and affixed to the skin.   The sternum was reapproximated with stainless steel wire, fascia was closed with #1 PDS, subcutaneous was closed with 3-0 Vicryl, and skin was closed with 4-0 Vicryl subcuticular. All instrument and sponge count was correct at the end of the case.         Aftab Velasco MD      [id number]  D:  10/01/2019 14:42  T:  10/02/2019 0:47  JOB #:  [id number]

## 2019-10-02 NOTE — PROGRESS NOTES
TIMEOUT performed prior to extubation on Derrick Anthony with RT, primary RN, and charge RN present on 10/1/2019 at 8:25 PM    Per anesthesia team on admission, patient's intubation was Normal    ABG results as follows:    Lab Results   Component Value Date/Time    pH (POC) 7.355 10/01/2019 08:13 PM    pCO2 (POC) 37.2 10/01/2019 08:13 PM    pO2 (POC) 104 (H) 10/01/2019 08:13 PM    HCO3 (POC) 20.8 (L) 10/01/2019 08:13 PM    sO2 (POC) 98 10/01/2019 08:13 PM    Base deficit (POC) 4 10/01/2019 08:13 PM         The patient is hemodynamically stable and can demonstrate the following on a consistent basis:  follow commands , elevate head off the pillow, nods appropriately to questions. Dr. Wilda Stinson notified of weaning parameters and order to extubate obtained. Patient extubated by (RT name) Herbie Howard. to (Type of O2 Device) airvo 40l at (Amount of of O2) 05% without complication.

## 2019-10-02 NOTE — PROGRESS NOTES
Spoke with [facility] Pharmacist about pt being on both Vantin and Ancef. Instructed to give both the Vantin has better coverage for a UTI then Ancef.

## 2019-10-03 ENCOUNTER — APPOINTMENT (OUTPATIENT)
Dept: GENERAL RADIOLOGY | Age: 59
DRG: 163 | End: 2019-10-03
Attending: THORACIC SURGERY (CARDIOTHORACIC VASCULAR SURGERY)
Payer: MEDICAID

## 2019-10-03 PROBLEM — I50.23 SYSTOLIC CHF, ACUTE ON CHRONIC (HCC): Status: ACTIVE | Noted: 2019-10-03

## 2019-10-03 LAB
ANION GAP SERPL CALC-SCNC: 10 MMOL/L (ref 7–16)
BUN SERPL-MCNC: 21 MG/DL (ref 6–23)
CALCIUM SERPL-MCNC: 7.7 MG/DL (ref 8.3–10.4)
CHLORIDE SERPL-SCNC: 114 MMOL/L (ref 98–107)
CO2 SERPL-SCNC: 20 MMOL/L (ref 21–32)
CREAT SERPL-MCNC: 1.31 MG/DL (ref 0.6–1)
ERYTHROCYTE [DISTWIDTH] IN BLOOD BY AUTOMATED COUNT: 14.8 % (ref 11.9–14.6)
GLUCOSE BLD STRIP.AUTO-MCNC: 139 MG/DL (ref 65–100)
GLUCOSE BLD STRIP.AUTO-MCNC: 177 MG/DL (ref 65–100)
GLUCOSE BLD STRIP.AUTO-MCNC: 209 MG/DL (ref 65–100)
GLUCOSE SERPL-MCNC: 213 MG/DL (ref 65–100)
HCT VFR BLD AUTO: 35.8 % (ref 35.8–46.3)
HGB BLD-MCNC: 11.2 G/DL (ref 11.7–15.4)
MAGNESIUM SERPL-MCNC: 2.4 MG/DL (ref 1.8–2.4)
MCH RBC QN AUTO: 28.4 PG (ref 26.1–32.9)
MCHC RBC AUTO-ENTMCNC: 31.3 G/DL (ref 31.4–35)
MCV RBC AUTO: 90.9 FL (ref 79.6–97.8)
MM INDURATION POC: 0 MM (ref 0–5)
NRBC # BLD: 0 K/UL (ref 0–0.2)
PLATELET # BLD AUTO: 96 K/UL (ref 150–450)
PMV BLD AUTO: 12.6 FL (ref 9.4–12.3)
POTASSIUM SERPL-SCNC: 4.4 MMOL/L (ref 3.5–5.1)
PPD POC: NEGATIVE NEGATIVE
RBC # BLD AUTO: 3.94 M/UL (ref 4.05–5.2)
SODIUM SERPL-SCNC: 144 MMOL/L (ref 136–145)
WBC # BLD AUTO: 11.7 K/UL (ref 4.3–11.1)

## 2019-10-03 PROCEDURE — 74011636637 HC RX REV CODE- 636/637: Performed by: THORACIC SURGERY (CARDIOTHORACIC VASCULAR SURGERY)

## 2019-10-03 PROCEDURE — 51798 US URINE CAPACITY MEASURE: CPT

## 2019-10-03 PROCEDURE — 74011000250 HC RX REV CODE- 250: Performed by: INTERNAL MEDICINE

## 2019-10-03 PROCEDURE — 74011250637 HC RX REV CODE- 250/637: Performed by: PHYSICIAN ASSISTANT

## 2019-10-03 PROCEDURE — 74011000250 HC RX REV CODE- 250: Performed by: PHYSICIAN ASSISTANT

## 2019-10-03 PROCEDURE — 71045 X-RAY EXAM CHEST 1 VIEW: CPT

## 2019-10-03 PROCEDURE — 97530 THERAPEUTIC ACTIVITIES: CPT

## 2019-10-03 PROCEDURE — 80048 BASIC METABOLIC PNL TOTAL CA: CPT

## 2019-10-03 PROCEDURE — 94640 AIRWAY INHALATION TREATMENT: CPT

## 2019-10-03 PROCEDURE — 65660000004 HC RM CVT STEPDOWN

## 2019-10-03 PROCEDURE — 82962 GLUCOSE BLOOD TEST: CPT

## 2019-10-03 PROCEDURE — 99232 SBSQ HOSP IP/OBS MODERATE 35: CPT | Performed by: INTERNAL MEDICINE

## 2019-10-03 PROCEDURE — 74011250636 HC RX REV CODE- 250/636: Performed by: PHYSICIAN ASSISTANT

## 2019-10-03 PROCEDURE — 74011250637 HC RX REV CODE- 250/637: Performed by: THORACIC SURGERY (CARDIOTHORACIC VASCULAR SURGERY)

## 2019-10-03 PROCEDURE — 83735 ASSAY OF MAGNESIUM: CPT

## 2019-10-03 PROCEDURE — 97161 PT EVAL LOW COMPLEX 20 MIN: CPT

## 2019-10-03 PROCEDURE — 74011250636 HC RX REV CODE- 250/636: Performed by: THORACIC SURGERY (CARDIOTHORACIC VASCULAR SURGERY)

## 2019-10-03 PROCEDURE — 76937 US GUIDE VASCULAR ACCESS: CPT

## 2019-10-03 PROCEDURE — 85027 COMPLETE CBC AUTOMATED: CPT

## 2019-10-03 PROCEDURE — 36592 COLLECT BLOOD FROM PICC: CPT

## 2019-10-03 RX ORDER — ASPIRIN 81 MG/1
81 TABLET ORAL DAILY
Status: DISCONTINUED | OUTPATIENT
Start: 2019-10-03 | End: 2019-10-09 | Stop reason: HOSPADM

## 2019-10-03 RX ORDER — ATORVASTATIN CALCIUM 40 MG/1
80 TABLET, FILM COATED ORAL
Status: DISCONTINUED | OUTPATIENT
Start: 2019-10-03 | End: 2019-10-04 | Stop reason: DRUGHIGH

## 2019-10-03 RX ORDER — MUPIROCIN 20 MG/G
OINTMENT TOPICAL 2 TIMES DAILY
Status: DISCONTINUED | OUTPATIENT
Start: 2019-10-03 | End: 2019-10-04 | Stop reason: SDUPTHER

## 2019-10-03 RX ORDER — ONDANSETRON 2 MG/ML
4 INJECTION INTRAMUSCULAR; INTRAVENOUS
Status: DISCONTINUED | OUTPATIENT
Start: 2019-10-03 | End: 2019-10-09 | Stop reason: HOSPADM

## 2019-10-03 RX ORDER — POTASSIUM CHLORIDE 20 MEQ/1
40 TABLET, EXTENDED RELEASE ORAL
Status: DISCONTINUED | OUTPATIENT
Start: 2019-10-03 | End: 2019-10-04

## 2019-10-03 RX ORDER — SODIUM CHLORIDE 0.9 % (FLUSH) 0.9 %
5-40 SYRINGE (ML) INJECTION EVERY 8 HOURS
Status: DISCONTINUED | OUTPATIENT
Start: 2019-10-03 | End: 2019-10-04

## 2019-10-03 RX ORDER — LANOLIN ALCOHOL/MO/W.PET/CERES
400 CREAM (GRAM) TOPICAL
Status: DISCONTINUED | OUTPATIENT
Start: 2019-10-03 | End: 2019-10-04

## 2019-10-03 RX ORDER — FAMOTIDINE 20 MG/1
20 TABLET, FILM COATED ORAL 2 TIMES DAILY
Status: DISCONTINUED | OUTPATIENT
Start: 2019-10-03 | End: 2019-10-04 | Stop reason: SDUPTHER

## 2019-10-03 RX ORDER — LISINOPRIL 5 MG/1
5 TABLET ORAL DAILY
Status: DISCONTINUED | OUTPATIENT
Start: 2019-10-03 | End: 2019-10-05

## 2019-10-03 RX ORDER — MAG HYDROX/ALUMINUM HYD/SIMETH 200-200-20
30 SUSPENSION, ORAL (FINAL DOSE FORM) ORAL
Status: DISCONTINUED | OUTPATIENT
Start: 2019-10-03 | End: 2019-10-04

## 2019-10-03 RX ORDER — NITROGLYCERIN 400 UG/1
1 SPRAY ORAL
Status: DISCONTINUED | OUTPATIENT
Start: 2019-10-03 | End: 2019-10-09 | Stop reason: HOSPADM

## 2019-10-03 RX ORDER — ACETAMINOPHEN 325 MG/1
650 TABLET ORAL
Status: DISCONTINUED | OUTPATIENT
Start: 2019-10-03 | End: 2019-10-04

## 2019-10-03 RX ORDER — SODIUM CHLORIDE 0.9 % (FLUSH) 0.9 %
5-40 SYRINGE (ML) INJECTION AS NEEDED
Status: DISCONTINUED | OUTPATIENT
Start: 2019-10-03 | End: 2019-10-04

## 2019-10-03 RX ORDER — INSULIN GLARGINE 100 [IU]/ML
10 INJECTION, SOLUTION SUBCUTANEOUS
Status: DISCONTINUED | OUTPATIENT
Start: 2019-10-03 | End: 2019-10-09 | Stop reason: HOSPADM

## 2019-10-03 RX ORDER — CARVEDILOL 3.12 MG/1
3.12 TABLET ORAL EVERY 12 HOURS
Status: DISCONTINUED | OUTPATIENT
Start: 2019-10-03 | End: 2019-10-05

## 2019-10-03 RX ORDER — FUROSEMIDE 40 MG/1
40 TABLET ORAL DAILY
Status: DISPENSED | OUTPATIENT
Start: 2019-10-03 | End: 2019-10-06

## 2019-10-03 RX ORDER — AMIODARONE HYDROCHLORIDE 200 MG/1
200 TABLET ORAL EVERY 12 HOURS
Status: DISCONTINUED | OUTPATIENT
Start: 2019-10-03 | End: 2019-10-04 | Stop reason: SDUPTHER

## 2019-10-03 RX ORDER — POTASSIUM CHLORIDE 750 MG/1
10 TABLET, EXTENDED RELEASE ORAL DAILY
Status: DISCONTINUED | OUTPATIENT
Start: 2019-10-03 | End: 2019-10-04

## 2019-10-03 RX ORDER — DOCUSATE SODIUM 100 MG/1
100 CAPSULE, LIQUID FILLED ORAL DAILY
Status: DISCONTINUED | OUTPATIENT
Start: 2019-10-03 | End: 2019-10-04

## 2019-10-03 RX ORDER — POTASSIUM CHLORIDE 20 MEQ/1
20 TABLET, EXTENDED RELEASE ORAL
Status: DISCONTINUED | OUTPATIENT
Start: 2019-10-03 | End: 2019-10-04

## 2019-10-03 RX ADMIN — Medication 10 ML: at 22:30

## 2019-10-03 RX ADMIN — INSULIN HUMAN 10 UNITS: 100 INJECTION, SOLUTION PARENTERAL at 06:00

## 2019-10-03 RX ADMIN — FAMOTIDINE 20 MG: 10 INJECTION, SOLUTION INTRAVENOUS at 09:33

## 2019-10-03 RX ADMIN — OXYCODONE HYDROCHLORIDE AND ACETAMINOPHEN 1 TABLET: 5; 325 TABLET ORAL at 17:24

## 2019-10-03 RX ADMIN — ONDANSETRON 4 MG: 2 INJECTION INTRAMUSCULAR; INTRAVENOUS at 20:05

## 2019-10-03 RX ADMIN — MUPIROCIN: 20 OINTMENT TOPICAL at 09:34

## 2019-10-03 RX ADMIN — MORPHINE SULFATE 5 MG: 10 INJECTION INTRAVENOUS at 23:18

## 2019-10-03 RX ADMIN — POTASSIUM CHLORIDE 10 MEQ: 10 TABLET, EXTENDED RELEASE ORAL at 17:25

## 2019-10-03 RX ADMIN — Medication 10 ML: at 05:58

## 2019-10-03 RX ADMIN — ATORVASTATIN CALCIUM 80 MG: 40 TABLET, FILM COATED ORAL at 22:30

## 2019-10-03 RX ADMIN — ALBUTEROL SULFATE 2.5 MG: 2.5 SOLUTION RESPIRATORY (INHALATION) at 12:48

## 2019-10-03 RX ADMIN — INSULIN HUMAN 5 UNITS: 100 INJECTION, SOLUTION PARENTERAL at 17:25

## 2019-10-03 RX ADMIN — METOPROLOL TARTRATE 25 MG: 25 TABLET ORAL at 09:33

## 2019-10-03 RX ADMIN — Medication 10 ML: at 14:00

## 2019-10-03 RX ADMIN — FUROSEMIDE 40 MG: 40 TABLET ORAL at 17:27

## 2019-10-03 RX ADMIN — CEFPODOXIME PROXETIL 200 MG: 200 TABLET, FILM COATED ORAL at 09:33

## 2019-10-03 RX ADMIN — ALBUTEROL SULFATE 2.5 MG: 2.5 SOLUTION RESPIRATORY (INHALATION) at 19:36

## 2019-10-03 RX ADMIN — CEFPODOXIME PROXETIL 200 MG: 200 TABLET, FILM COATED ORAL at 20:04

## 2019-10-03 RX ADMIN — ONDANSETRON 4 MG: 2 INJECTION INTRAMUSCULAR; INTRAVENOUS at 23:25

## 2019-10-03 RX ADMIN — CARVEDILOL 3.12 MG: 3.12 TABLET, FILM COATED ORAL at 20:05

## 2019-10-03 RX ADMIN — ALBUTEROL SULFATE 2.5 MG: 2.5 SOLUTION RESPIRATORY (INHALATION) at 16:07

## 2019-10-03 RX ADMIN — OXYCODONE HYDROCHLORIDE AND ACETAMINOPHEN 1 TABLET: 5; 325 TABLET ORAL at 09:33

## 2019-10-03 RX ADMIN — AMIODARONE HYDROCHLORIDE 200 MG: 200 TABLET ORAL at 20:04

## 2019-10-03 RX ADMIN — FAMOTIDINE 20 MG: 20 TABLET ORAL at 17:24

## 2019-10-03 RX ADMIN — INSULIN HUMAN 10 UNITS: 100 INJECTION, SOLUTION PARENTERAL at 07:50

## 2019-10-03 RX ADMIN — MUPIROCIN: 20 OINTMENT TOPICAL at 17:31

## 2019-10-03 RX ADMIN — DOCUSATE SODIUM 100 MG: 100 CAPSULE, LIQUID FILLED ORAL at 17:27

## 2019-10-03 RX ADMIN — ONDANSETRON 4 MG: 2 INJECTION INTRAMUSCULAR; INTRAVENOUS at 14:37

## 2019-10-03 RX ADMIN — ALBUTEROL SULFATE 2.5 MG: 2.5 SOLUTION RESPIRATORY (INHALATION) at 07:10

## 2019-10-03 RX ADMIN — AMIODARONE HYDROCHLORIDE 200 MG: 200 TABLET ORAL at 09:33

## 2019-10-03 RX ADMIN — INSULIN GLARGINE 10 UNITS: 100 INJECTION, SOLUTION SUBCUTANEOUS at 22:30

## 2019-10-03 NOTE — PROGRESS NOTES
Problem: Mobility Impaired (Adult and Pediatric)  Goal: *Acute Goals and Plan of Care (Insert Text)  Description  STG:  (1.)Ms. Chalino Toth will move from supine to sit and sit to supine  with CONTACT GUARD ASSIST within 3 treatment day(s). (2.)Ms. Chalino Toth will transfer from bed to chair and chair to bed with CONTACT GUARD ASSIST using the least restrictive device within 3 treatment day(s). (3.)Ms. Chalino Toth will ambulate with CONTACT GUARD ASSIST for 250+ feet with the least restrictive device within 3 treatment day(s). LTG:  (1.)Ms. Chalino Toth will move from supine to sit and sit to supine  in bed with INDEPENDENT within 7 treatment day(s). (2.)Ms. Chalino Toth will transfer from bed to chair and chair to bed with INDEPENDENT using the least restrictive device within 7 treatment day(s). (3.)Ms. Chalino Toth will ambulate with SUPERVISION for 500+ feet with the least restrictive device within 7 treatment day(s).   ________________________________________________________________________________________________     Outcome: Progressing Towards Goal      PHYSICAL THERAPY: Initial Assessment and PM 10/3/2019  INPATIENT: PT Visit Days : 1  Payor: MEDICAID PENDING / Plan: Ron Ganser PENDING / Product Type: Medicaid /       NAME/AGE/GENDER: Ira Nickerson is a 61 y.o. female   PRIMARY DIAGNOSIS: Atherosclerosis of native coronary artery of native heart with unstable angina pectoris ([facility] [address] [I25.110]  Mitral valve insufficiency, unspecified etiology [I34.0]  CAD (coronary artery disease) [I25.10]  Mitral valve regurgitation [I34.0] S/P CABG x 3   S/P CABG x 3    Procedure(s) (LRB):  CORONARY ARTERY BYPASS GRAFT x  3, LIMA (N/A)  MITRAL VALVE REPAIR (N/A)  VEIN HARVEST GREATER SAPHENOUS (Left)  ESOPHAGEAL TRANS ECHOCARDIOGRAM (N/A)  LEFT ATRIAL APPENDAGE CLOSURE  WIH CLIP (Left)  2 Days Post-Op  ICD-10: Treatment Diagnosis:    Generalized Muscle Weakness (M62.81)  Difficulty in walking, Not elsewhere classified (R26.2)   Precaution/Allergies:  Patient has no known allergies. Sternal precautions   ASSESSMENT:     Ms. Fatuma Gibbons is sitting up in bedside chair upon contact and agreeable to PT evaluation and treatment this afternoon. Pt is A&O X 4 with reports of 8/10 pain prior to mobility. Pt lives alone in 1 story home with 0 steps to enter. Pt is independent with all mobility, 0 falls, and drives. Pt does not require supplemental O2 at baseline and is currently on 4L O2 NC. Reviewed sternal precautions prior to mobility. Pt performed STS to standing frame walker with Tyler requiring cues for safety of transfer and technique. Pt demonstrates good static standing balance at walker. Pt ambulated 100 ft X 2 with walker and CGA. Pt ambulates with narrowed ERLIN and shuffling gait pattern with increased trunk sway noted. Pt returned to chair and sitting position requiring Tyler for controlled descent and cues for safety of transfer. Pt with complaints of increased nausea. Pt left sitting up with all needs met and within reach. Nursing notified and at bedside. Mu Morris will benefit from skilled PT (medically necessary) to address decreased strength, decreased balance, decreased functional tolerance, decreased cardiopulmonary endurance affecting participation in basic ADLs and functional tasks.          This section established at most recent assessment   PROBLEM LIST (Impairments causing functional limitations):  Decreased Strength  Decreased ADL/Functional Activities  Decreased Transfer Abilities  Decreased Ambulation Ability/Technique  Decreased Balance  Increased Pain  Decreased Activity Tolerance  Decreased Pacing Skills  Increased Fatigue  Increased Shortness of Breath  Decreased Flexibility/Joint Mobility  Decreased Knowledge of Precautions  Decreased [place] with Home Exercise Program   INTERVENTIONS PLANNED: (Benefits and precautions of physical therapy have been discussed with the patient.)  Balance Exercise  Bed Mobility  Electrical Stimulation  Family Education  Home Exercise Program (HEP)  Neuromuscular Re-education/Strengthening  Therapeutic Activites  Therapeutic Exercise/Strengthening  Transfer Training     TREATMENT PLAN: Frequency/Duration: twice daily for duration of hospital stay  Rehabilitation Potential For Stated Goals: Good     REHAB RECOMMENDATIONS (at time of discharge pending progress):    Placement: It is my opinion, based on this patient's performance to date, that Ms. Pablo Mancini may benefit from [address] after discharge due to the functional deficits listed above that are likely to improve with skilled rehabilitation because he/she has multiple medical issues that affect his/her functional mobility in the community. Equipment:   None at this time              HISTORY:   History of Present Injury/Illness (Reason for Referral):  S/p Procedure(s) (LRB):  CORONARY ARTERY BYPASS GRAFT x  3, LIMA (N/A)  MITRAL VALVE REPAIR (N/A)  VEIN HARVEST GREATER SAPHENOUS (Left)  ESOPHAGEAL TRANS ECHOCARDIOGRAM (N/A)  LEFT ATRIAL APPENDAGE CLOSURE  WIH CLIP (Left)  Past Medical History/Comorbidities:   Ms. Pablo Mancini  has a past medical history of Breast cancer ([address] (2015), CAD (coronary artery disease), Diabetes ([address] ([place], dx 2016), Heart failure ([address] Hypercholesteremia, Hypertension, Intertrochanteric fracture of right femur ([address] (2/24/2018), Mitral valve regurgitation, and PUD (peptic ulcer disease). Ms. Pablo Mancini  has a past surgical history that includes hx hip fracture tx (Right); hx breast lumpectomy (2015); and hx orthopaedic (Right).   Social History/Living Environment:   Home Environment: Private residence  # Steps to Enter: 0  One/Two Story Residence: One story  Living Alone: Yes  Support Systems: Spouse/Significant Other/Partner  Patient Expects to be Discharged to[de-identified] Private residence  Current DME Used/Available at Home: None  Prior Level of Function/Work/Activity:  Independent with gait and ADLs, 0 falls, drives   Number of Personal Factors/Comorbidities that affect the Plan of Care: 3+: HIGH COMPLEXITY   EXAMINATION:   Most Recent Physical Functioning:   Gross Assessment:  Strength: Generally decreased, functional  Coordination: Generally decreased, functional               Posture:     Balance:  Sitting: Intact; Without support  Standing: Impaired;Pull to stand; With support  Standing - Static: Fair;Constant support  Standing - Dynamic : Fair;Constant support Bed Mobility:     Wheelchair Mobility:     Transfers:  Sit to Stand: Minimum assistance; Moderate assistance  Stand to Sit: Minimum assistance  Gait:     Base of Support: Narrowed  Speed/Yadi: Shuffled; Slow  Step Length: Left shortened;Right shortened  Gait Abnormalities: Decreased step clearance;Trunk sway increased; Shuffling gait  Distance (ft): 100 Feet (ft)(x2)  Assistive Device: (standing frame walker)  Ambulation - Level of Assistance: Contact guard assistance  Interventions: Safety awareness training; Tactile cues; Verbal cues      Body Structures Involved:  Heart  Lungs  Thoracic Cage  Bones  Joints  Muscles  Ligaments Body Functions Affected:  Sensory/Pain  Cardio  Respiratory  Neuromusculoskeletal  Movement Related Activities and Participation Affected:  General Tasks and Demands  Mobility  Self Care  Domestic Life  Interpersonal Interactions and Relationships  Community, Social and [place]   Number of elements that affect the Plan of Care: 4+: HIGH COMPLEXITY   CLINICAL PRESENTATION:   Presentation: Evolving clinical presentation with changing clinical characteristics: MODERATE COMPLEXITY   CLINICAL DECISION MAKIN [facility] Mobility Inpatient Short Form  How much difficulty does the patient currently have. .. Unable A Lot A Little None   1. Turning over in bed (including adjusting bedclothes, sheets and blankets)? ? 1   ? 2   ? 3   ? 4   2.   Sitting down on and standing up from a chair with arms ( e.g., wheelchair, bedside commode, etc.)   ? 1   ? 2   ? 3   ? 4   3. Moving from lying on back to sitting on the side of the bed?   ? 1   ? 2   ? 3   ? 4   How much help from another person does the patient currently need. .. Total A Lot A Little None   4. Moving to and from a bed to a chair (including a wheelchair)? ? 1   ? 2   ? 3   ? 4   5. Need to walk in hospital room? ? 1   ? 2   ? 3   ? 4   6. Climbing 3-5 steps with a railing? ? 1   ? 2   ? 3   ? 4   © 2007, Trustees of [address] [place] [address] [address] [id number], under license to [employer]. All rights reserved      Score:  Initial: 17 Most Recent: X (Date: -- )    Interpretation of Tool:  Represents activities that are increasingly more difficult (i.e. Bed mobility, Transfers, Gait). Medical Necessity:     Patient is expected to demonstrate progress in strength, balance, coordination and functional technique   to decrease assistance required with gait, transfers, and functional mobility   . Reason for Services/Other Comments:  Patient continues to require skilled intervention due to decreased strength, decreased balance, decreased functional tolerance, decreased cardiopulmonary endurance affecting participation in basic ADLs and functional tasks. .   Use of outcome tool(s) and clinical judgement create a POC that gives a: Clear prediction of patient's progress: LOW COMPLEXITY            TREATMENT:   (In addition to Assessment/Re-Assessment sessions the following treatments were rendered)   Pre-treatment Symptoms/Complaints:  post op pain  Pain: Initial:   Pain Intensity 1: 8  Post Session:  Increased with mobility 9/10     Therapeutic Activity: (    8 minutes): Therapeutic activities including Chair transfers, Ambulation on level ground and cues for ease and safety of transfers, education on sternal precautions  to improve mobility, strength, balance and coordination. Required minimal Safety awareness training; Tactile cues; Verbal cues to promote static and dynamic balance in standing and promote coordination of bilateral, upper extremity(s), lower extremity(s). Braces/Orthotics/Lines/Etc:   O2 Device: Nasal cannula  Treatment/Session Assessment:    Response to Treatment:  amb 100 ft X 2 with RW and CGA  Interdisciplinary Collaboration:   Physical Therapist  Registered Nurse  After treatment position/precautions:   Up in chair  Bed/Chair-wheels locked  Bed in low position  Call light within reach  RN notified  Nurse at bedside   Compliance with Program/Exercises: Will assess as treatment progresses  Recommendations/Intent for next treatment session: \"Next visit will focus on advancements to more challenging activities and reduction in assistance provided\".   Total Treatment Duration:  PT Patient Time In/Time Out  Time In: 1420  Time Out: [facility]

## 2019-10-03 NOTE — PROGRESS NOTES
POD 2 Days Post-Op    Procedure:  Procedure(s):  CORONARY ARTERY BYPASS GRAFT x  3, LIMA  MITRAL VALVE REPAIR  VEIN HARVEST GREATER SAPHENOUS  ESOPHAGEAL TRANS ECHOCARDIOGRAM  LEFT ATRIAL APPENDAGE CLOSURE  [facility] CLIP      Subjective:     Patient has No significant medical complaints      Objective:     Patient Vitals for the past 8 hrs:   BP Pulse Resp SpO2   10/03/19 0712    98 %   10/03/19 0621  90  100 %   10/03/19 0600  89 20 100 %   10/03/19 0558  90     10/03/19 0545  87 18 100 %   10/03/19 0530  87 15 99 %   10/03/19 0515  86 13 98 %   10/03/19 0500  85 11 98 %   10/03/19 0458 115/66 85 11 98 %   10/03/19 0445  85 11 99 %   10/03/19 0430  85 14 97 %   10/03/19 0415  86 17 99 %   10/03/19 0400  87 18 99 %   10/03/19 0359 126/76 87 14 99 %   10/03/19 0345  85 13 98 %   10/03/19 0330  85 11 98 %   10/03/19 0315  86 19 99 %   10/03/19 0300  87 14 100 %   10/03/19 0258 111/63 86 23 100 %   10/03/19 0245  85 11 98 %   10/03/19 0230  85 10 98 %   10/03/19 0215  85 10 99 %   10/03/19 0200  85 10 98 %   10/03/19 0158 107/75 85 9 98 %   10/03/19 0145  86 11 98 %   10/03/19 0130  85 21 98 %   10/03/19 0115  89 26 100 %   10/03/19 0100  86 9 98 %   10/03/19 0058 114/67 86 9 98 %   10/03/19 0045  86 11 97 %   10/03/19 0030  86 9 97 %   10/03/19 0015  86 10 94 %   10/02/19 2358 115/66 90 13 98 %     Temp (24hrs), Av.7 °F (37.6 °C), Min:99.3 °F (37.4 °C), Max:99.9 °F (37.7 °C)        Hemodynamics  PAP Systolic: 35 PAP  CO (l/min): 4.6 l/min CO  CI (l/min/m2): 2.5 l/min/m2 CI    No intake/output data recorded.   10/01 1901 - 10/03 0700  In: 1406.3 [I.V.:1096.3]  Out: 1660 [Urine:1280]    CT Drainage              total of all CT's    Heart:  regular rate and rhythm, S1, S2 normal, no murmur, click, rub or gallop  Lung:  clear to auscultation bilaterally  Neuro: Grossly non focal  Incisions: Clean, dry, and intact    Labs:  Recent Results (from the past 24 hour(s))   GLUCOSE, POC    Collection Time: 10/02/19  9:39 AM   Result Value Ref Range    Glucose (POC) 129 (H) 65 - 100 mg/dL   PLEASE READ & DOCUMENT PPD TEST IN 24 HRS    Collection Time: 10/02/19  9:07 PM   Result Value Ref Range    PPD Negative Negative    mm Induration 0 0 - 5 mm   GLUCOSE, POC    Collection Time: 10/02/19  9:18 PM   Result Value Ref Range    Glucose (POC) 173 (H) 65 - 100 mg/dL   CBC W/O DIFF    Collection Time: 10/03/19  3:59 AM   Result Value Ref Range    WBC 11.7 (H) 4.3 - 11.1 K/uL    RBC 3.94 (L) 4.05 - 5.2 M/uL    HGB 11.2 (L) 11.7 - 15.4 g/dL    HCT 35.8 35.8 - 46.3 %    MCV 90.9 79.6 - 97.8 FL    MCH 28.4 26.1 - 32.9 PG    MCHC 31.3 (L) 31.4 - 35.0 g/dL    RDW 14.8 (H) 11.9 - 14.6 %    PLATELET 96 (L) 406 - 450 K/uL    MPV 12.6 (H) 9.4 - 12.3 FL    ABSOLUTE NRBC 0.00 0.0 - 0.2 K/uL   METABOLIC PANEL, BASIC    Collection Time: 10/03/19  3:59 AM   Result Value Ref Range    Sodium 144 136 - 145 mmol/L    Potassium 4.4 3.5 - 5.1 mmol/L    Chloride 114 (H) 98 - 107 mmol/L    CO2 20 (L) 21 - 32 mmol/L    Anion gap 10 7 - 16 mmol/L    Glucose 213 (H) 65 - 100 mg/dL    BUN 21 6 - 23 MG/DL    Creatinine 1.31 (H) 0.6 - 1.0 MG/DL    GFR est AA 53 (L) >60 ml/min/1.73m2    GFR est non-AA 44 (L) >60 ml/min/1.73m2    Calcium 7.7 (L) 8.3 - 10.4 MG/DL   MAGNESIUM    Collection Time: 10/03/19  3:59 AM   Result Value Ref Range    Magnesium 2.4 1.8 - 2.4 mg/dL   GLUCOSE, POC    Collection Time: 10/03/19  5:55 AM   Result Value Ref Range    Glucose (POC) 209 (H) 65 - 100 mg/dL       Assessment:     Principal Problem:    S/P CABG x 3 (10/2/2019)    Active Problems:    CAD (coronary artery disease) (9/26/2019)      UTI (urinary tract infection) (9/30/2019)      Mitral valve regurgitation (10/1/2019)      Hypoxia (10/1/2019)      S/P MVR (mitral valve repair) (10/2/2019)      Hypocalcemia (10/2/2019)      Suspected sleep apnea (10/2/2019)      Thrombocytopenia ([facility] [address] (10/2/2019)        Plan/Recommendations/Medical Decision Making: Discontinue:  chest tubes    See orders    Off drips, to floor, HF protocol

## 2019-10-03 NOTE — PROGRESS NOTES
Critical Care Daily Progress Note: 10/3/2019    Kelsea Cain   Admission Date: 10/1/2019         The patient's chart is reviewed and the patient is discussed with the staff. Patient is seen at the request of Dr. Tinoco Monday for respiratory management status post cardiac surgery. Patient had CABG and mitral valve repair. She is currently is sedated in CV-ICU and orally intubated receiving mechanical ventilation. She was recently admitted for heart failure and found to have MVCAD with an EF of 40 to 45% in addition to mitral valve regurgitation. She is a lifelong nonsmoker. Her preop PFTs just showed restriction. Subjective:     Extubated 2 days ago, remains on Airvo, off drips. Slept all night and then woke up this morning wide awake. Walked, stated felt good. Denies any issues, feels better. No complaints. Hoping to get chest tube out today. No CXR yet.      Current Facility-Administered Medications   Medication Dose Route Frequency    albuterol (PROVENTIL VENTOLIN) nebulizer solution 2.5 mg  2.5 mg Nebulization QID RT    famotidine (PF) (PEPCID) 20 mg in sodium chloride 0.9% 10 mL injection  20 mg IntraVENous Q12H    0.9% sodium chloride infusion 250 mL  250 mL IntraVENous PRN    insulin regular (NOVOLIN R, HUMULIN R) injection 0-15 Units  0-15 Units SubCUTAneous AC&HS    0.9% sodium chloride infusion 250 mL  250 mL IntraVENous PRN    0.9% sodium chloride infusion  25 mL/hr IntraVENous CONTINUOUS    dextrose 5% - 0.45% NaCl with KCl 20 mEq/L infusion  25 mL/hr IntraVENous CONTINUOUS    sodium chloride (NS) flush 5-40 mL  5-40 mL IntraVENous Q8H    sodium chloride (NS) flush 5-40 mL  5-40 mL IntraVENous PRN    oxyCODONE-acetaminophen (PERCOCET) 5-325 mg per tablet 1 Tab  1 Tab Oral Q4H PRN    morphine 10 mg/ml injection 3-5 mg  3-5 mg IntraVENous Q1H PRN    naloxone (NARCAN) injection 0.4 mg  0.4 mg IntraVENous PRN    sodium bicarbonate (8.4%) injection 50 mEq 50 mEq IntraVENous PRN    EPINEPHrine (ADRENALIN) 4 mg in 0.9% sodium chloride 250 mL infusion  0.05-0.1 mcg/kg/min IntraVENous TITRATE    nitroglycerin (Tridil) 200 mcg/ml infusion   mcg/min IntraVENous TITRATE    amiodarone (CORDARONE) tablet 200 mg  200 mg Oral Q12H    metoprolol tartrate (LOPRESSOR) tablet 25 mg  25 mg Oral Q12H    atorvastatin (LIPITOR) tablet 80 mg  80 mg Oral QHS    dextrose (D50W) injection syrg 12.5 g  25 mL IntraVENous PRN    aspirin chewable tablet 81 mg  81 mg Oral DAILY    magnesium sulfate 1 g/100 ml IVPB (premix or compounded)  1 g IntraVENous PRN    midazolam (VERSED) injection 1 mg  1 mg IntraVENous Q1H PRN    propofol (DIPRIVAN) infusion  0-50 mcg/kg/min IntraVENous TITRATE    chlorhexidine (PERIDEX) 0.12 % mouthwash 10 mL  10 mL Oral BID    PHENYLephrine (PF)(BERNADETTE-SYNEPHRINE) 30 mg in 0.9% sodium chloride 250 mL infusion   mcg/min IntraVENous TITRATE    mupirocin (BACTROBAN) 2 % ointment   Both Nostrils BID    cefpodoxime (VANTIN) tablet 200 mg  200 mg Oral Q12H    DOBUTamine (DOBUTREX) 500 mg/250 mL (2,000 mcg/mL) infusion  0-10 mcg/kg/min IntraVENous TITRATE    ondansetron (ZOFRAN) injection 4 mg  4 mg IntraVENous Q4H PRN     Review of Systems  Constitutional:  negative for fever, chills, sweats  Cardiovascular:  negative for chest pain, palpitations, syncope, edema  Gastrointestinal:  negative for dysphagia, reflux, vomiting, diarrhea, abdominal pain, or melena  Neurologic:  negative for focal weakness, numbness, headache    Objective:     Vitals:    10/03/19 0545 10/03/19 0558 10/03/19 0600 10/03/19 0621   BP:       Pulse: 87 90 89 90   Resp: 18  20    Temp:       SpO2: 100%  100% 100%   Weight:       Height:           Intake/Output Summary (Last 24 hours) at 10/3/2019 0642  Last data filed at 10/3/2019 0600  Gross per 24 hour   Intake 1406.3 ml   Output 1155 ml   Net 251.3 ml     Physical Exam:          Constitutional:  the patient is overweight and in no acute distress  EENMT:  Sclera clear, pupils equal, oral mucosa moist; OP very narrow. Respiratory: decreased, but improved BS   Cardiovascular:  RRR without M,G; very trivial rub  Gastrointestinal: soft and non-tender; with positive bowel sounds. Musculoskeletal: warm without cyanosis. There is no lower extremity edema. Skin:  no jaundice or rashes  Neurologic: no gross neuro deficits     Psychiatric:  alert and oriented x 3    LINES:  RIJ cordis/SG, hinojosa, CT    CXR: none today      LAB  Recent Labs     10/03/19  0555 10/02/19  2118 10/02/19  0939 10/02/19  0646 10/02/19  0545   GLUCPOC 209* 173* 129* 114* 115*      Recent Labs     10/03/19  0359 10/02/19  0334 10/01/19  2240  10/01/19  1444 09/30/19  0815   WBC 11.7* 7.1  --   --  10.0  --    HGB 11.2* 8.3* 9.8*   < > 10.6*  --    HCT 35.8 26.1* 30.7*   < > 32.4*  --    PLT 96* 82*  --   --  99*  --    INR  --   --   --   --  1.5 1.0    < > = values in this interval not displayed. Recent Labs     10/03/19  0359 10/02/19  0335 10/01/19  2240  10/01/19  1444    145  --   --  147*   K 4.4 3.8 4.0   < > 4.7   * 117*  --   --  118*   CO2 20* 20*  --   --  21   * 226*  --   --  146*   BUN 21 10  --   --  13   CREA 1.31* 1.02*  --   --  0.79   MG 2.4 2.1 2.3   < > 2.8*   CA 7.7* 6.2*  --   --  7.1*   ALB  --  2.6*  --   --   --     < > = values in this interval not displayed. Recent Labs     10/01/19  2013 10/01/19  1432 10/01/19  1341   PHI 7.355 7.362 7.279*   PCO2I 37.2 34.0* 44.1   PO2I 104* 70* 235*   HCO3I 20.8* 19.3* 20.6*     No results for input(s): LCAD, LAC in the last 72 hours. No results for input(s): PH, PCO2, PO2, HCO3 in the last 72 hours.      Assessment:  (Medical Decision Making)     Hospital Problems  Never Reviewed          Codes Class Noted POA    S/P CABG x 3 ICD-10-CM: Z95.1  ICD-9-CM: V45.81  10/2/2019 Unknown    Still on drips    S/P MVR (mitral valve repair) ICD-10-CM: N22.048  ICD-9-CM: V45.89  10/2/2019 Unknown    Stable    Hypocalcemia ICD-10-CM: E83.51  ICD-9-CM: 275.41  10/2/2019 Unknown    Check albumin and iCa    Suspected sleep apnea ICD-10-CM: R29.818  ICD-9-CM: 781.99  10/2/2019 Unknown    Likely will need PSG    Thrombocytopenia (HCC) ICD-10-CM: D69.6  ICD-9-CM: 287.5  10/2/2019 Unknown    Monitor; no bleeding. Mitral valve regurgitation ICD-10-CM: I34.0  ICD-9-CM: 424.0  10/1/2019 Yes        Hypoxia ICD-10-CM: R09.02  ICD-9-CM: 799.02  10/1/2019 No    Wean oxygen as tolerated    UTI (urinary tract infection) ICD-10-CM: N39.0  ICD-9-CM: 599.0  9/30/2019 Yes    Recent urine cx + for Klebsiella; was on vantin. * (Principal) CAD (coronary artery disease) ICD-10-CM: I25.10  ICD-9-CM: 414.00  9/26/2019 Yes            Plan:  (Medical Decision Making)     CXR ordered, chest tube per CV Surgery  Improved alertness, increase activity  Wean oxygen as tolerated. Monitor mild AMELIA, hopeful to improve now more awake and vitals better  Mobility, IS  Albuterol with EZ PAP continue for now  --    More than 50% of the time documented was spent in face-to-face contact with the patient and in the care of the patient on the floor/unit where the patient is located.     Ruth Kevin MD

## 2019-10-03 NOTE — DIABETES MGMT
Patient s/p CABG x3. Admitting blood glucose 203. Blood glucose range yesterday  with pt on insulin gtt and transitioned off receiving Regular 5 units. This AM blood glucose 209. Reviewed pt current regimen: Regular SSI. Pt last seen by diabetes management team during previous hospitalization on 9/26 at that time pt was discharged on Lantus 13 units nightly and Humalog SSI. Spoke with RN pt would likely benefit from initiation of low dose basal insulin to help improve glycemic control as fasting blood glucose was not at goal. Primary RN to clarify insulin regimen with provider.

## 2019-10-04 LAB
ABO + RH BLD: NORMAL
ANION GAP SERPL CALC-SCNC: 6 MMOL/L (ref 7–16)
BLD PROD TYP BPU: NORMAL
BLOOD GROUP ANTIBODIES SERPL: NORMAL
BPU ID: NORMAL
BUN SERPL-MCNC: 31 MG/DL (ref 6–23)
CALCIUM SERPL-MCNC: 7.9 MG/DL (ref 8.3–10.4)
CHLORIDE SERPL-SCNC: 114 MMOL/L (ref 98–107)
CO2 SERPL-SCNC: 23 MMOL/L (ref 21–32)
CREAT SERPL-MCNC: 1.43 MG/DL (ref 0.6–1)
CROSSMATCH RESULT,%XM: NORMAL
ERYTHROCYTE [DISTWIDTH] IN BLOOD BY AUTOMATED COUNT: 14.7 % (ref 11.9–14.6)
GLUCOSE BLD STRIP.AUTO-MCNC: 113 MG/DL (ref 65–100)
GLUCOSE BLD STRIP.AUTO-MCNC: 117 MG/DL (ref 65–100)
GLUCOSE BLD STRIP.AUTO-MCNC: 126 MG/DL (ref 65–100)
GLUCOSE BLD STRIP.AUTO-MCNC: 128 MG/DL (ref 65–100)
GLUCOSE SERPL-MCNC: 140 MG/DL (ref 65–100)
HCT VFR BLD AUTO: 34.2 % (ref 35.8–46.3)
HGB BLD-MCNC: 10.7 G/DL (ref 11.7–15.4)
MAGNESIUM SERPL-MCNC: 2.7 MG/DL (ref 1.8–2.4)
MCH RBC QN AUTO: 28.7 PG (ref 26.1–32.9)
MCHC RBC AUTO-ENTMCNC: 31.3 G/DL (ref 31.4–35)
MCV RBC AUTO: 91.7 FL (ref 79.6–97.8)
MM INDURATION POC: 0 MM (ref 0–5)
NRBC # BLD: 0 K/UL (ref 0–0.2)
PLATELET # BLD AUTO: 100 K/UL (ref 150–450)
PMV BLD AUTO: 12.6 FL (ref 9.4–12.3)
POTASSIUM SERPL-SCNC: 4.6 MMOL/L (ref 3.5–5.1)
PPD POC: NEGATIVE NEGATIVE
RBC # BLD AUTO: 3.73 M/UL (ref 4.05–5.2)
SODIUM SERPL-SCNC: 143 MMOL/L (ref 136–145)
SPECIMEN EXP DATE BLD: NORMAL
STATUS OF UNIT,%ST: NORMAL
UNIT DIVISION, %UDIV: 0
WBC # BLD AUTO: 12.2 K/UL (ref 4.3–11.1)

## 2019-10-04 PROCEDURE — 36573 INSJ PICC RS&I 5 YR+: CPT | Performed by: THORACIC SURGERY (CARDIOTHORACIC VASCULAR SURGERY)

## 2019-10-04 PROCEDURE — 74011250637 HC RX REV CODE- 250/637: Performed by: THORACIC SURGERY (CARDIOTHORACIC VASCULAR SURGERY)

## 2019-10-04 PROCEDURE — 74011636637 HC RX REV CODE- 636/637: Performed by: THORACIC SURGERY (CARDIOTHORACIC VASCULAR SURGERY)

## 2019-10-04 PROCEDURE — 36592 COLLECT BLOOD FROM PICC: CPT

## 2019-10-04 PROCEDURE — 77030032994 HC SOL INJ SOD CL 0.9% LFCR 500ML

## 2019-10-04 PROCEDURE — B548ZZA ULTRASONOGRAPHY OF SUPERIOR VENA CAVA, GUIDANCE: ICD-10-PCS | Performed by: THORACIC SURGERY (CARDIOTHORACIC VASCULAR SURGERY)

## 2019-10-04 PROCEDURE — 83735 ASSAY OF MAGNESIUM: CPT

## 2019-10-04 PROCEDURE — C1751 CATH, INF, PER/CENT/MIDLINE: HCPCS

## 2019-10-04 PROCEDURE — 74011250636 HC RX REV CODE- 250/636: Performed by: PHYSICIAN ASSISTANT

## 2019-10-04 PROCEDURE — 99232 SBSQ HOSP IP/OBS MODERATE 35: CPT | Performed by: INTERNAL MEDICINE

## 2019-10-04 PROCEDURE — 97530 THERAPEUTIC ACTIVITIES: CPT

## 2019-10-04 PROCEDURE — 80048 BASIC METABOLIC PNL TOTAL CA: CPT

## 2019-10-04 PROCEDURE — 77030011943

## 2019-10-04 PROCEDURE — 94760 N-INVAS EAR/PLS OXIMETRY 1: CPT

## 2019-10-04 PROCEDURE — BT40ZZZ ULTRASONOGRAPHY OF BLADDER: ICD-10-PCS | Performed by: THORACIC SURGERY (CARDIOTHORACIC VASCULAR SURGERY)

## 2019-10-04 PROCEDURE — 74011250636 HC RX REV CODE- 250/636: Performed by: THORACIC SURGERY (CARDIOTHORACIC VASCULAR SURGERY)

## 2019-10-04 PROCEDURE — 77030020263 HC SOL INJ SOD CL0.9% LFCR 1000ML

## 2019-10-04 PROCEDURE — 94640 AIRWAY INHALATION TREATMENT: CPT

## 2019-10-04 PROCEDURE — 74011000250 HC RX REV CODE- 250: Performed by: INTERNAL MEDICINE

## 2019-10-04 PROCEDURE — 77010033678 HC OXYGEN DAILY

## 2019-10-04 PROCEDURE — 85027 COMPLETE CBC AUTOMATED: CPT

## 2019-10-04 PROCEDURE — 51798 US URINE CAPACITY MEASURE: CPT

## 2019-10-04 PROCEDURE — 82962 GLUCOSE BLOOD TEST: CPT

## 2019-10-04 PROCEDURE — 02HV33Z INSERTION OF INFUSION DEVICE INTO SUPERIOR VENA CAVA, PERCUTANEOUS APPROACH: ICD-10-PCS | Performed by: THORACIC SURGERY (CARDIOTHORACIC VASCULAR SURGERY)

## 2019-10-04 PROCEDURE — 65660000004 HC RM CVT STEPDOWN

## 2019-10-04 RX ORDER — HEPARIN 100 UNIT/ML
600 SYRINGE INTRAVENOUS EVERY 8 HOURS
Status: DISCONTINUED | OUTPATIENT
Start: 2019-10-04 | End: 2019-10-09 | Stop reason: HOSPADM

## 2019-10-04 RX ORDER — DOCUSATE SODIUM 100 MG/1
100 CAPSULE, LIQUID FILLED ORAL 2 TIMES DAILY
Status: DISCONTINUED | OUTPATIENT
Start: 2019-10-04 | End: 2019-10-06

## 2019-10-04 RX ORDER — ASPIRIN 81 MG/1
81 TABLET ORAL DAILY
Status: DISCONTINUED | OUTPATIENT
Start: 2019-10-05 | End: 2019-10-04

## 2019-10-04 RX ORDER — METOPROLOL TARTRATE 25 MG/1
25 TABLET, FILM COATED ORAL EVERY 12 HOURS
Status: DISCONTINUED | OUTPATIENT
Start: 2019-10-04 | End: 2019-10-04

## 2019-10-04 RX ORDER — FAMOTIDINE 20 MG/1
20 TABLET, FILM COATED ORAL 2 TIMES DAILY
Status: DISCONTINUED | OUTPATIENT
Start: 2019-10-04 | End: 2019-10-05

## 2019-10-04 RX ORDER — METOCLOPRAMIDE HYDROCHLORIDE 5 MG/ML
10 INJECTION INTRAMUSCULAR; INTRAVENOUS ONCE
Status: COMPLETED | OUTPATIENT
Start: 2019-10-04 | End: 2019-10-04

## 2019-10-04 RX ORDER — ATORVASTATIN CALCIUM 40 MG/1
40 TABLET, FILM COATED ORAL
Status: DISCONTINUED | OUTPATIENT
Start: 2019-10-04 | End: 2019-10-04

## 2019-10-04 RX ORDER — MUPIROCIN 20 MG/G
OINTMENT TOPICAL 2 TIMES DAILY
Status: ACTIVE | OUTPATIENT
Start: 2019-10-04 | End: 2019-10-08

## 2019-10-04 RX ORDER — OXYCODONE AND ACETAMINOPHEN 5; 325 MG/1; MG/1
1 TABLET ORAL
Status: DISCONTINUED | OUTPATIENT
Start: 2019-10-04 | End: 2019-10-09 | Stop reason: HOSPADM

## 2019-10-04 RX ORDER — SODIUM CHLORIDE 9 MG/ML
50 INJECTION, SOLUTION INTRAVENOUS CONTINUOUS
Status: DISCONTINUED | OUTPATIENT
Start: 2019-10-04 | End: 2019-10-06

## 2019-10-04 RX ORDER — INSULIN LISPRO 100 [IU]/ML
INJECTION, SOLUTION INTRAVENOUS; SUBCUTANEOUS
Status: DISCONTINUED | OUTPATIENT
Start: 2019-10-04 | End: 2019-10-09 | Stop reason: HOSPADM

## 2019-10-04 RX ORDER — POTASSIUM CHLORIDE 20 MEQ/1
40 TABLET, EXTENDED RELEASE ORAL
Status: DISCONTINUED | OUTPATIENT
Start: 2019-10-04 | End: 2019-10-09 | Stop reason: HOSPADM

## 2019-10-04 RX ORDER — AMIODARONE HYDROCHLORIDE 200 MG/1
200 TABLET ORAL EVERY 12 HOURS
Status: DISCONTINUED | OUTPATIENT
Start: 2019-10-04 | End: 2019-10-05

## 2019-10-04 RX ORDER — LANOLIN ALCOHOL/MO/W.PET/CERES
400 CREAM (GRAM) TOPICAL
Status: DISCONTINUED | OUTPATIENT
Start: 2019-10-04 | End: 2019-10-09 | Stop reason: HOSPADM

## 2019-10-04 RX ORDER — POTASSIUM CHLORIDE 750 MG/1
10 TABLET, EXTENDED RELEASE ORAL DAILY
Status: DISPENSED | OUTPATIENT
Start: 2019-10-05 | End: 2019-10-08

## 2019-10-04 RX ORDER — SODIUM CHLORIDE 0.9 % (FLUSH) 0.9 %
5-40 SYRINGE (ML) INJECTION EVERY 8 HOURS
Status: DISCONTINUED | OUTPATIENT
Start: 2019-10-04 | End: 2019-10-04

## 2019-10-04 RX ORDER — POTASSIUM CHLORIDE 20 MEQ/1
20 TABLET, EXTENDED RELEASE ORAL
Status: DISCONTINUED | OUTPATIENT
Start: 2019-10-04 | End: 2019-10-09 | Stop reason: HOSPADM

## 2019-10-04 RX ORDER — ACETAMINOPHEN 325 MG/1
650 TABLET ORAL
Status: DISCONTINUED | OUTPATIENT
Start: 2019-10-04 | End: 2019-10-09 | Stop reason: HOSPADM

## 2019-10-04 RX ORDER — SODIUM CHLORIDE 0.9 % (FLUSH) 0.9 %
20 SYRINGE (ML) INJECTION EVERY 8 HOURS
Status: DISCONTINUED | OUTPATIENT
Start: 2019-10-04 | End: 2019-10-09 | Stop reason: HOSPADM

## 2019-10-04 RX ORDER — FUROSEMIDE 40 MG/1
40 TABLET ORAL DAILY
Status: DISCONTINUED | OUTPATIENT
Start: 2019-10-05 | End: 2019-10-04

## 2019-10-04 RX ORDER — SODIUM CHLORIDE 0.9 % (FLUSH) 0.9 %
20 SYRINGE (ML) INJECTION AS NEEDED
Status: DISCONTINUED | OUTPATIENT
Start: 2019-10-04 | End: 2019-10-09 | Stop reason: HOSPADM

## 2019-10-04 RX ORDER — MAG HYDROX/ALUMINUM HYD/SIMETH 200-200-20
30 SUSPENSION, ORAL (FINAL DOSE FORM) ORAL
Status: DISCONTINUED | OUTPATIENT
Start: 2019-10-04 | End: 2019-10-09 | Stop reason: HOSPADM

## 2019-10-04 RX ORDER — SODIUM CHLORIDE 0.9 % (FLUSH) 0.9 %
5-40 SYRINGE (ML) INJECTION AS NEEDED
Status: DISCONTINUED | OUTPATIENT
Start: 2019-10-04 | End: 2019-10-04

## 2019-10-04 RX ORDER — NITROGLYCERIN 0.4 MG/1
TABLET SUBLINGUAL
Status: DISCONTINUED
Start: 2019-10-04 | End: 2019-10-04 | Stop reason: WASHOUT

## 2019-10-04 RX ORDER — HEPARIN 100 UNIT/ML
600 SYRINGE INTRAVENOUS AS NEEDED
Status: DISCONTINUED | OUTPATIENT
Start: 2019-10-04 | End: 2019-10-09 | Stop reason: HOSPADM

## 2019-10-04 RX ADMIN — FAMOTIDINE 20 MG: 20 TABLET ORAL at 09:22

## 2019-10-04 RX ADMIN — ASPIRIN 81 MG: 81 TABLET ORAL at 09:22

## 2019-10-04 RX ADMIN — SODIUM CHLORIDE 500 ML: 900 INJECTION, SOLUTION INTRAVENOUS at 10:47

## 2019-10-04 RX ADMIN — METOCLOPRAMIDE 10 MG: 5 INJECTION, SOLUTION INTRAMUSCULAR; INTRAVENOUS at 10:25

## 2019-10-04 RX ADMIN — ONDANSETRON 4 MG: 2 INJECTION INTRAMUSCULAR; INTRAVENOUS at 07:06

## 2019-10-04 RX ADMIN — Medication 20 ML: at 20:25

## 2019-10-04 RX ADMIN — DOCUSATE SODIUM 100 MG: 100 CAPSULE, LIQUID FILLED ORAL at 10:49

## 2019-10-04 RX ADMIN — MORPHINE SULFATE 3 MG: 10 INJECTION INTRAVENOUS at 07:12

## 2019-10-04 RX ADMIN — Medication 10 ML: at 17:13

## 2019-10-04 RX ADMIN — ONDANSETRON 4 MG: 2 INJECTION INTRAMUSCULAR; INTRAVENOUS at 03:22

## 2019-10-04 RX ADMIN — AMIODARONE HYDROCHLORIDE 200 MG: 200 TABLET ORAL at 09:23

## 2019-10-04 RX ADMIN — MUPIROCIN: 20 OINTMENT TOPICAL at 10:48

## 2019-10-04 RX ADMIN — ALBUTEROL SULFATE 2.5 MG: 2.5 SOLUTION RESPIRATORY (INHALATION) at 08:41

## 2019-10-04 RX ADMIN — Medication 10 ML: at 05:39

## 2019-10-04 RX ADMIN — Medication 20 ML: at 13:30

## 2019-10-04 RX ADMIN — INSULIN GLARGINE 10 UNITS: 100 INJECTION, SOLUTION SUBCUTANEOUS at 20:32

## 2019-10-04 RX ADMIN — OXYCODONE HYDROCHLORIDE AND ACETAMINOPHEN 1 TABLET: 5; 325 TABLET ORAL at 07:30

## 2019-10-04 RX ADMIN — CEFPODOXIME PROXETIL 200 MG: 200 TABLET, FILM COATED ORAL at 20:37

## 2019-10-04 RX ADMIN — FAMOTIDINE 20 MG: 20 TABLET ORAL at 17:13

## 2019-10-04 RX ADMIN — ALBUTEROL SULFATE 2.5 MG: 2.5 SOLUTION RESPIRATORY (INHALATION) at 20:13

## 2019-10-04 RX ADMIN — SODIUM CHLORIDE, PRESERVATIVE FREE 600 UNITS: 5 INJECTION INTRAVENOUS at 13:30

## 2019-10-04 RX ADMIN — DOCUSATE SODIUM 100 MG: 100 CAPSULE, LIQUID FILLED ORAL at 20:37

## 2019-10-04 RX ADMIN — LISINOPRIL 5 MG: 5 TABLET ORAL at 09:29

## 2019-10-04 RX ADMIN — SODIUM CHLORIDE, PRESERVATIVE FREE 300 UNITS: 5 INJECTION INTRAVENOUS at 20:27

## 2019-10-04 RX ADMIN — CEFPODOXIME PROXETIL 200 MG: 200 TABLET, FILM COATED ORAL at 09:30

## 2019-10-04 RX ADMIN — ALBUTEROL SULFATE 2.5 MG: 2.5 SOLUTION RESPIRATORY (INHALATION) at 11:49

## 2019-10-04 RX ADMIN — ALBUTEROL SULFATE 2.5 MG: 2.5 SOLUTION RESPIRATORY (INHALATION) at 15:34

## 2019-10-04 RX ADMIN — MUPIROCIN: 20 OINTMENT TOPICAL at 17:13

## 2019-10-04 RX ADMIN — CARVEDILOL 3.12 MG: 3.12 TABLET, FILM COATED ORAL at 20:38

## 2019-10-04 RX ADMIN — SODIUM CHLORIDE 50 ML/HR: 900 INJECTION, SOLUTION INTRAVENOUS at 13:32

## 2019-10-04 RX ADMIN — AMIODARONE HYDROCHLORIDE 200 MG: 200 TABLET ORAL at 20:37

## 2019-10-04 RX ADMIN — CARVEDILOL 3.12 MG: 3.12 TABLET, FILM COATED ORAL at 10:49

## 2019-10-04 NOTE — PROGRESS NOTES
TRANSFER - OUT REPORT:    Verbal report given to [facility] RN(name) on Tacho Pica  [facility] for routine progression of care       Report consisted of patients Situation, Background, Assessment and   Recommendations(SBAR). Information from the following report(s) SBAR, Intake/Output, MAR, Recent Results and Cardiac Rhythm 1st degree avb was reviewed with the receiving nurse. Lines:   PICC Double Lumen 38/45/97 Right;Basilic (Active)   Central Line Being Utilized Yes 10/4/2019 10:51 AM   Criteria for Appropriate Use Limited/no vessel suitable for conventional peripheral access 10/4/2019 10:51 AM   Site Assessment Clean, dry, & intact 10/4/2019 10:51 AM   Phlebitis Assessment 0 10/4/2019 10:51 AM   Infiltration Assessment 0 10/4/2019 10:51 AM   Arm Circumference (cm) 29 cm 10/4/2019 10:51 AM   Date of Last Dressing Change 10/04/19 10/4/2019 10:51 AM   Dressing Status Clean, dry, & intact 10/4/2019 10:51 AM   Dressing Type Disk with Chlorhexadine gluconate (CHG); Transparent 10/4/2019 10:51 AM   Hub Color/Line Status Purple 10/4/2019 10:51 AM   Positive Blood Return (Site #1) Yes 10/4/2019 10:51 AM   Hub Color/Line Status Red 10/4/2019 10:51 AM   Positive Blood Return (Site #2) Yes 10/4/2019 10:51 AM   Alcohol Cap Used No 10/4/2019 10:51 AM       Peripheral IV 10/03/19 Right;Upper Basilic (Active)   Site Assessment Clean, dry, & intact 10/4/2019  7:15 AM   Phlebitis Assessment 0 10/4/2019  7:15 AM   Infiltration Assessment 0 10/4/2019  3:00 AM   Dressing Status Clean, dry, & intact 10/4/2019  7:15 AM   Dressing Type Tape;Transparent 10/4/2019  7:15 AM   Hub Color/Line Status Pink;Flushed;Capped; Other (comment); Patent 10/4/2019  7:15 AM   Alcohol Cap Used No 10/4/2019  3:00 AM        Opportunity for questions and clarification was provided.       Patient transported with:   Monitor  O2 @ 2 liters   RN

## 2019-10-04 NOTE — DIABETES MGMT
Patient s/p CABG x3. Blood glucose range yesterday 139-213 with pt receiving Lantus 10 units and Humalog 25 units. This AM blood glucose 128. Most recent FSBS 126. Pt in bed, visitor at bedside. Pt does not verbally acknowledge educator, briefly opens eyes and only nods for questions. Pt has been seen multiple times during previous admissions by diabetes management team, last seen on 9/26. During that admission pt stated she was diagnosed with diabetes \"about 5 years ago\" and that she had a positive family history of diabetes. Pt was discharged on Lantus 13 units and Humalog SSI. Pt nods that she had been taking insulin at home since her discharge on 9/26. Educated pt on the relationship between hyperglycemia and infection. Emphasized the importance of good glycemic control on wound healing. Pt nods in understanding and voices no further questions regarding diabetes management.

## 2019-10-04 NOTE — PROGRESS NOTES
Removed dressing slightly moist from blood and examined central line site. No suture noted just inflammation and the hole where the cordis punctureed the skin. Cleaned wityh NS and Neosporin applied with 2x2 folded and tegaderm in place. boston well. Aware about to transfer to [phone]. RT treatment in progress. Pt remains groggy but will open eyes, follow commands and oriented.

## 2019-10-04 NOTE — PROGRESS NOTES
PT NOTE:    Checked on pt twice this afternoon, RN informed that pt is too lethargic at this time for therapy. RN states that she will assist pt later in the evening on mobility. Will check back tomorrow.     Ann Loera, PTA

## 2019-10-04 NOTE — PROGRESS NOTES
Pt attempted to void unsuccessful bladder scanned 300 ml noted on scan. Pt encouraged to try jello for nausea. After pt attempts to eat will I and O cath.

## 2019-10-04 NOTE — PROGRESS NOTES
O2 sats range from 87-90% on RA, Pt snoring softly, 2 L NC reapplied O2 sats range from 92-94%. Juanis Yoo

## 2019-10-04 NOTE — PROGRESS NOTES
Bedside shift report given to Vip[facility]. Aware of urinary output. Per Josie Kauffman RN, follow nurse driven protocol for urinary catheter.

## 2019-10-04 NOTE — PROGRESS NOTES
Dr Aponte Search to chairside. Aware of pt c/o pressure and heaviness.  Continued nausea, labs and ok to order picc placement

## 2019-10-04 NOTE — PROGRESS NOTES
Physical Exam   Skin:             No noticeable skin breakdown or redness noted. MS, LLE incisions are clean, dry, and intact, open to air. Pacing wires isolated and tape to chest.    MD aware that patient has not voided. Was straight cathed at 500am with 300ml output. Patient got bolus of saline and currently has 50ml/hr going. TRANSFER - IN REPORT:    Verbal report received from [place], RN(name) on [facility]  being received from [facility](unit) for routine post - op      Report consisted of patients Situation, Background, Assessment and   Recommendations(SBAR). Information from the following report(s) SBAR, Kardex, Procedure Summary, Intake/Output, MAR, Recent Results, Med Rec Status and Cardiac Rhythm NSR was reviewed with the receiving nurse. Opportunity for questions and clarification was provided. Assessment completed upon patients arrival to unit and care assumed.

## 2019-10-04 NOTE — PROGRESS NOTES
PICC Placement Note    PRE-PROCEDURE VERIFICATION  Correct Procedure: yes. Time out completed with assistant Corinne Platt RN and all persons present in agreement with time out. Correct Site:  yes  Temperature: Temp: 98.2 °F (36.8 °C), Temperature Source: Temp Source: Oral  Recent Labs     10/04/19  0416  10/01/19  1444   BUN 31*   < > 13   CREA 1.43*   < > 0.79   *   < > 99*   INR  --   --  1.5   WBC 12.2*   < > 10.0    < > = values in this interval not displayed. Allergies: Patient has no known allergies. Education materials for [facility] given to patient or family. PROCEDURE DETAIL  A double lumen PICC line was started for vascular access and desire for reliable access. The following documentation is in addition to the PICC properties in the lines/airways flowsheet :  Lot #: [id number]  xylocaine used: yes  Mid-Arm Circumference: 29 (cm)  Internal Catheter Length: 39 (cm)  Internal Catheter Total Length: 39 (cm)  Vein Selection for PICC:right basilic  Central Line Bundle followed yes  Complication Related to Insertion: none  Both the insertion guidewire and ECG guidewire were removed intact all ports have positive blood return and were flush well with normal saline. The location of the tip of the PICC is verified using ECG technology. The tip is in the SVC per ECG reading. See image below.          Line is okay to use: yes

## 2019-10-04 NOTE — PROGRESS NOTES
Pt called and stated that her chest felt heavy and pressure and \"I'm about to throw up\" at 0710  Zofran 4mg via INT then Morphine 3 mg for co/ of chest pain. INT flushes fairly but noted some swelling above IV site. PICC team notified for new line placement because she is a very difficult stick.

## 2019-10-04 NOTE — PROGRESS NOTES
Problem: Mobility Impaired (Adult and Pediatric)  Goal: *Acute Goals and Plan of Care (Insert Text)  Description  STG:  (1.)Ms. Moffett Courser will move from supine to sit and sit to supine  with CONTACT GUARD ASSIST within 3 treatment day(s). (2.)Ms. Moffett Courser will transfer from bed to chair and chair to bed with CONTACT GUARD ASSIST using the least restrictive device within 3 treatment day(s). (3.)Ms. Moffett Courser will ambulate with CONTACT GUARD ASSIST for 250+ feet with the least restrictive device within 3 treatment day(s). LTG:  (1.)Ms. Moffett Courser will move from supine to sit and sit to supine  in bed with INDEPENDENT within 7 treatment day(s). (2.)Ms. Moffett Courser will transfer from bed to chair and chair to bed with INDEPENDENT using the least restrictive device within 7 treatment day(s). (3.)Ms. Moffett Courser will ambulate with SUPERVISION for 500+ feet with the least restrictive device within 7 treatment day(s).   ________________________________________________________________________________________________     Outcome: Progressing Towards Goal      PHYSICAL THERAPY: Daily Note and AM 10/4/2019  INPATIENT: PT Visit Days : 2  Payor: MEDICAID PENDING / Plan: [place] PENDING / Product Type: Medicaid /       NAME/AGE/GENDER: Brian Chery is a 61 y.o. female   PRIMARY DIAGNOSIS: Atherosclerosis of native coronary artery of native heart with unstable angina pectoris ([facility] [address] [I25.110]  Mitral valve insufficiency, unspecified etiology [I34.0]  CAD (coronary artery disease) [I25.10]  Mitral valve regurgitation [I34.0] S/P CABG x 3   S/P CABG x 3    Procedure(s) (LRB):  CORONARY ARTERY BYPASS GRAFT x  3, LIMA (N/A)  MITRAL VALVE REPAIR (N/A)  VEIN HARVEST GREATER SAPHENOUS (Left)  ESOPHAGEAL TRANS ECHOCARDIOGRAM (N/A)  LEFT ATRIAL APPENDAGE CLOSURE  WIH CLIP (Left)  3 Days Post-Op  ICD-10: Treatment Diagnosis:    · Generalized Muscle Weakness (M62.81)  · Difficulty in walking, Not elsewhere classified (R26.2) Precaution/Allergies:  Patient has no known allergies. Sternal precautions   ASSESSMENT:     Ms. Chase Carbajal is supine in bed on contact and has not been feeling well this morning with increased nausea. RN in with nausea meds prior to therapy. Bed placed in sitting position and when pt started to stand first time she became very nauseous. After a few minutes, pt stood with min/mod assist.  Pt was able to ambulate about 150' with standing frame walker and CGA, O2 at 2L/min. Pt ambulates at slow pace with shuffling steps and required cues to keep eyes open. Worked on increasing step length during ambulation. Pt returned to chair in room and was left up with RN attending. Pt is making progress towards goals with ambulation. Will continue with POC. This section established at most recent assessment   PROBLEM LIST (Impairments causing functional limitations):  1. Decreased Strength  2. Decreased ADL/Functional Activities  3. Decreased Transfer Abilities  4. Decreased Ambulation Ability/Technique  5. Decreased Balance  6. Increased Pain  7. Decreased Activity Tolerance  8. Decreased Pacing Skills  9. Increased Fatigue  10. Increased Shortness of Breath  11. Decreased Flexibility/Joint Mobility  12. Decreased Knowledge of Precautions  13. Decreased [place] with Home Exercise Program   INTERVENTIONS PLANNED: (Benefits and precautions of physical therapy have been discussed with the patient.)  1. Balance Exercise  2. Bed Mobility  3. Electrical Stimulation  4. Family Education  5. Home Exercise Program (HEP)  6. Neuromuscular Re-education/Strengthening  7. Therapeutic Activites  8. Therapeutic Exercise/Strengthening  9. Transfer Training     TREATMENT PLAN: Frequency/Duration: twice daily for duration of hospital stay  Rehabilitation Potential For Stated Goals: Good     REHAB RECOMMENDATIONS (at time of discharge pending progress):    Placement:   It is my opinion, based on this patient's performance to date, that Ms. Maribell Ballesteros may benefit from [address] after discharge due to the functional deficits listed above that are likely to improve with skilled rehabilitation because he/she has multiple medical issues that affect his/her functional mobility in the community. Equipment:    None at this time              HISTORY:   History of Present Injury/Illness (Reason for Referral):  S/p Procedure(s) (LRB):  CORONARY ARTERY BYPASS GRAFT x  3, LIMA (N/A)  MITRAL VALVE REPAIR (N/A)  VEIN HARVEST GREATER SAPHENOUS (Left)  ESOPHAGEAL TRANS ECHOCARDIOGRAM (N/A)  LEFT ATRIAL APPENDAGE CLOSURE  WIH CLIP (Left)  Past Medical History/Comorbidities:   Ms. Maribell Ballesteros  has a past medical history of Breast cancer ([facility] [address] (2015), CAD (coronary artery disease), Diabetes ([facility] [address] (typell, dx 2016), Heart failure ([facility] [address] Hypercholesteremia, Hypertension, Intertrochanteric fracture of right femur ([facility] [address] (2/24/2018), Mitral valve regurgitation, and PUD (peptic ulcer disease). Ms. Maribell Ballesteros  has a past surgical history that includes hx hip fracture tx (Right); hx breast lumpectomy (2015); and hx orthopaedic (Right). Social History/Living Environment:   Home Environment: Private residence  # Steps to Enter: 0  One/Two Story Residence: One story  Living Alone: Yes  Support Systems: Spouse/Significant Other/Partner  Patient Expects to be Discharged to[de-identified] Private residence  Current DME Used/Available at Home: None  Prior Level of Function/Work/Activity:  Independent with gait and ADLs, 0 falls, drives   Number of Personal Factors/Comorbidities that affect the Plan of Care: 3+: HIGH COMPLEXITY   EXAMINATION:   Most Recent Physical Functioning:   Gross Assessment:                  Posture:     Balance:  Sitting: Intact  Standing - Static: Fair;Constant support  Standing - Dynamic : Fair;Constant support Bed Mobility:     Wheelchair Mobility:     Transfers:  Sit to Stand: Minimum assistance; Moderate assistance  Stand to Sit: Minimum assistance  Gait:     Base of Support: Narrowed  Speed/Yadi: Shuffled; Slow  Step Length: Left shortened;Right shortened  Gait Abnormalities: Decreased step clearance;Trunk sway increased; Shuffling gait  Distance (ft): 150 Feet (ft)  Assistive Device: (standing frame walker)  Ambulation - Level of Assistance: Contact guard assistance  Interventions: Safety awareness training;Verbal cues; Tactile cues      Body Structures Involved:  1. Heart  2. Lungs  3. Thoracic Cage  4. Bones  5. Joints  6. Muscles  7. Ligaments Body Functions Affected:  1. Sensory/Pain  2. Cardio  3. Respiratory  4. Neuromusculoskeletal  5. Movement Related Activities and Participation Affected:  1. General Tasks and Demands  2. Mobility  3. Self Care  4. Domestic Life  5. Interpersonal Interactions and Relationships  6. Community, Social and [place]   Number of elements that affect the Plan of Care: 4+: HIGH COMPLEXITY   CLINICAL PRESENTATION:   Presentation: Evolving clinical presentation with changing clinical characteristics: MODERATE COMPLEXITY   CLINICAL DECISION MAKIN [facility] Mobility Inpatient Short Form  How much difficulty does the patient currently have. .. Unable A Lot A Little None   1. Turning over in bed (including adjusting bedclothes, sheets and blankets)? ? 1   ? 2   ? 3   ? 4   2. Sitting down on and standing up from a chair with arms ( e.g., wheelchair, bedside commode, etc.)   ? 1   ? 2   ? 3   ? 4   3. Moving from lying on back to sitting on the side of the bed?   ? 1   ? 2   ? 3   ? 4   How much help from another person does the patient currently need. .. Total A Lot A Little None   4. Moving to and from a bed to a chair (including a wheelchair)? ? 1   ? 2   ? 3   ? 4   5. Need to walk in hospital room? ? 1   ? 2   ? 3   ? 4   6. Climbing 3-5 steps with a railing? ? 1   ? 2   ? 3   ? 4   © , Trustees of [facility] [employer], under license to [employer]  All rights reserved      Score:  Initial: 17 Most Recent: X (Date: -- )    Interpretation of Tool:  Represents activities that are increasingly more difficult (i.e. Bed mobility, Transfers, Gait). Medical Necessity:     · Patient is expected to demonstrate progress in strength, balance, coordination and functional technique  ·  to decrease assistance required with gait, transfers, and functional mobility   · . Reason for Services/Other Comments:  · Patient continues to require skilled intervention due to decreased strength, decreased balance, decreased functional tolerance, decreased cardiopulmonary endurance affecting participation in basic ADLs and functional tasks. · .   Use of outcome tool(s) and clinical judgement create a POC that gives a: Clear prediction of patient's progress: LOW COMPLEXITY            TREATMENT:      Pre-treatment Symptoms/Complaints:  nausea  Pain: Initial:      Post Session:  Increased with mobility 9/10     Therapeutic Activity: (    30 minutes): Therapeutic activities including Chair transfers, Ambulation on level ground and cues for ease and safety of transfers, education on sternal precautions  to improve mobility, strength, balance and coordination. Required minimal Safety awareness training;Verbal cues; Tactile cues to promote static and dynamic balance in standing and promote coordination of bilateral, upper extremity(s), lower extremity(s). Braces/Orthotics/Lines/Etc:   · O2 Device: Nasal cannula  Treatment/Session Assessment:    · Response to Treatment:  amb 150 ft with RW and CGA  · Interdisciplinary Collaboration:   o Physical Therapy Assistant  o Registered Nurse  · After treatment position/precautions:   o Up in chair  o Bed/Chair-wheels locked  o Bed in low position  o Call light within reach  o RN notified  o Nurse at bedside   · Compliance with Program/Exercises: Will assess as treatment progresses  · Recommendations/Intent for next treatment session:   \"Next visit will focus on advancements to more challenging activities and reduction in assistance provided\".   Total Treatment Duration:  PT Patient Time In/Time Out  Time In: 1015  Time Out: [address], PTA

## 2019-10-04 NOTE — PROGRESS NOTES
Critical Care Daily Progress Note: 10/4/2019    Ankita Frazier   Admission Date: 10/1/2019         The patient's chart is reviewed and the patient is discussed with the staff. Patient is seen at the request of Dr. Laura Molina for respiratory management status post cardiac surgery. Patient had CABG and mitral valve repair. She is currently is sedated in CV-ICU and orally intubated receiving mechanical ventilation. She was recently admitted for heart failure and found to have MVCAD with an EF of 40 to 45% in addition to mitral valve regurgitation. She is a lifelong nonsmoker. Her preop PFTs just showed restriction. Subjective: Weaned to RA, walked, edema improved. Moved to step down.      Current Facility-Administered Medications   Medication Dose Route Frequency    sodium chloride (NS) flush 20 mL  20 mL InterCATHeter Q8H    heparin (porcine) pf 600 Units  600 Units InterCATHeter Q8H    sodium chloride (NS) flush 20 mL  20 mL InterCATHeter PRN    heparin (porcine) pf 600 Units  600 Units InterCATHeter PRN    alum-mag hydroxide-simeth (MYLANTA) oral suspension 30 mL  30 mL Oral Q4H PRN    famotidine (PEPCID) tablet 20 mg  20 mg Oral BID    acetaminophen (TYLENOL) tablet 650 mg  650 mg Oral Q4H PRN    amiodarone (CORDARONE) tablet 200 mg  200 mg Oral Q12H    mupirocin (BACTROBAN) 2 % ointment   Both Nostrils BID    magnesium oxide (MAG-OX) tablet 400 mg  400 mg Oral TID PRN    magnesium oxide (MAG-OX) tablet 400 mg  400 mg Oral QID PRN    [START ON 10/5/2019] potassium chloride (KLOR-CON) tablet 10 mEq  10 mEq Oral DAILY    potassium chloride (K-DUR, KLOR-CON) SR tablet 20 mEq  20 mEq Oral BID PRN    potassium chloride (K-DUR, KLOR-CON) SR tablet 40 mEq  40 mEq Oral BID PRN    oxyCODONE-acetaminophen (PERCOCET) 5-325 mg per tablet 1 Tab  1 Tab Oral Q4H PRN    docusate sodium (COLACE) capsule 100 mg  100 mg Oral BID    furosemide (LASIX) tablet 40 mg  40 mg Oral DAILY    ondansetron (ZOFRAN) injection 4 mg  4 mg IntraVENous Q6H PRN    aspirin delayed-release tablet 81 mg  81 mg Oral DAILY    carvedilol (COREG) tablet 3.125 mg  3.125 mg Oral Q12H    lisinopril (PRINIVIL, ZESTRIL) tablet 5 mg  5 mg Oral DAILY    nitroglycerin (NITROLINGUAL) sublingual 0.4 mg/spray  1 Spray SubLINGual Q5MIN PRN    insulin glargine (LANTUS) injection 10 Units  10 Units SubCUTAneous QHS    albuterol (PROVENTIL VENTOLIN) nebulizer solution 2.5 mg  2.5 mg Nebulization QID RT    0.9% sodium chloride infusion 250 mL  250 mL IntraVENous PRN    naloxone (NARCAN) injection 0.4 mg  0.4 mg IntraVENous PRN    dextrose (D50W) injection syrg 12.5 g  25 mL IntraVENous PRN    cefpodoxime (VANTIN) tablet 200 mg  200 mg Oral Q12H     Review of Systems  Constitutional:  negative for fever, chills, sweats  Cardiovascular:  negative for chest pain, palpitations, syncope, edema  Gastrointestinal:  negative for dysphagia, reflux, vomiting, diarrhea, abdominal pain, or melena  Neurologic:  negative for focal weakness, numbness, headache    Objective:     Vitals:    10/04/19 1149 10/04/19 1200 10/04/19 1240 10/04/19 1245   BP:  151/83 141/83    Pulse:  83 86    Resp:  12 14    Temp:  98.4 °F (36.9 °C) 98.3 °F (36.8 °C)    SpO2: 97% 95% 100% 95%   Weight:       Height:           Intake/Output Summary (Last 24 hours) at 10/4/2019 1325  Last data filed at 10/4/2019 0537  Gross per 24 hour   Intake 120 ml   Output 300 ml   Net -180 ml     Physical Exam:          Constitutional:  the patient is overweight and in no acute distress  EENMT:  Sclera clear, pupils equal, oral mucosa moist; OP very narrow. Respiratory: decreased, but improved BS   Cardiovascular:  RRR without M,G; very trivial rub  Gastrointestinal: soft and non-tender; with positive bowel sounds. Musculoskeletal: warm without cyanosis. There is no lower extremity edema.   Skin:  no jaundice or rashes  Neurologic: no gross neuro deficits Psychiatric:  alert and oriented x 3    LINES:  RIJ cordis/SG, hinojosa, CT    CXR: none today      LAB  Recent Labs     10/04/19  1133 10/04/19  0901 10/03/19  2235 10/03/19  1720 10/03/19  0555   GLUCPOC 126* 128* 139* 177* 209*      Recent Labs     10/04/19  0416 10/03/19  0359 10/02/19  0334  10/01/19  1444   WBC 12.2* 11.7* 7.1  --  10.0   HGB 10.7* 11.2* 8.3*   < > 10.6*   HCT 34.2* 35.8 26.1*   < > 32.4*   * 96* 82*  --  99*   INR  --   --   --   --  1.5    < > = values in this interval not displayed. Recent Labs     10/04/19  0416 10/03/19  0359 10/02/19  0335    144 145   K 4.6 4.4 3.8   * 114* 117*   CO2 23 20* 20*   * 213* 226*   BUN 31* 21 10   CREA 1.43* 1.31* 1.02*   MG 2.7* 2.4 2.1   CA 7.9* 7.7* 6.2*   ALB  --   --  2.6*     Recent Labs     10/01/19  2013 10/01/19  1432 10/01/19  1341   PHI 7.355 7.362 7.279*   PCO2I 37.2 34.0* 44.1   PO2I 104* 70* 235*   HCO3I 20.8* 19.3* 20.6*     No results for input(s): LCAD, LAC in the last 72 hours. No results for input(s): PH, PCO2, PO2, HCO3 in the last 72 hours. Assessment:  (Medical Decision Making)     Hospital Problems  Never Reviewed          Codes Class Noted POA    S/P CABG x 3 ICD-10-CM: Z95.1  ICD-9-CM: V45.81  10/2/2019 Unknown    Stable    S/P MVR (mitral valve repair) ICD-10-CM: Z42.179  ICD-9-CM: V45.89  10/2/2019 Unknown    Stable    Hypocalcemia ICD-10-CM: E83.51  ICD-9-CM: 275.41  10/2/2019 Unknown    replaced    Suspected sleep apnea ICD-10-CM: R29.818  ICD-9-CM: 781.99  10/2/2019 Unknown    Likely will need PSG as outpatient, [place] 23, OHS unlikely    Thrombocytopenia ([facility] [address] ICD-10-CM: D69.6  ICD-9-CM: 287.5  10/2/2019 Unknown    Monitor; no bleeding.     Mitral valve regurgitation ICD-10-CM: I34.0  ICD-9-CM: 424.0  10/1/2019 Yes        Hypoxia ICD-10-CM: R09.02  ICD-9-CM: 799.02  10/1/2019 No    Weaned to RA    UTI (urinary tract infection) ICD-10-CM: N39.0  ICD-9-CM: 599.0  9/30/2019 Yes    Recent urine cx + for Klebsiella; was on vantin. * (Principal) CAD (coronary artery disease) ICD-10-CM: I25.10  ICD-9-CM: 414.00  9/26/2019 Yes            Plan:  (Medical Decision Making)     Weaned to RA  Wean oxygen as tolerated. AMELIA slightly worse today  Platelets slightly improved  Oral diet and rehydration  Mobility, IS    Will see on Monday if still admitted, call with concerns    More than 50% of the time documented was spent in face-to-face contact with the patient and in the care of the patient on the floor/unit where the patient is located.     Reshma Worthington MD

## 2019-10-05 ENCOUNTER — APPOINTMENT (OUTPATIENT)
Dept: GENERAL RADIOLOGY | Age: 59
DRG: 163 | End: 2019-10-05
Attending: THORACIC SURGERY (CARDIOTHORACIC VASCULAR SURGERY)
Payer: MEDICAID

## 2019-10-05 LAB
ERYTHROCYTE [DISTWIDTH] IN BLOOD BY AUTOMATED COUNT: 14.6 % (ref 11.9–14.6)
GLUCOSE BLD STRIP.AUTO-MCNC: 127 MG/DL (ref 65–100)
GLUCOSE BLD STRIP.AUTO-MCNC: 129 MG/DL (ref 65–100)
GLUCOSE BLD STRIP.AUTO-MCNC: 144 MG/DL (ref 65–100)
GLUCOSE BLD STRIP.AUTO-MCNC: 154 MG/DL (ref 65–100)
HCT VFR BLD AUTO: 34 % (ref 35.8–46.3)
HGB BLD-MCNC: 10.6 G/DL (ref 11.7–15.4)
MAGNESIUM SERPL-MCNC: 2.7 MG/DL (ref 1.8–2.4)
MCH RBC QN AUTO: 28.3 PG (ref 26.1–32.9)
MCHC RBC AUTO-ENTMCNC: 31.2 G/DL (ref 31.4–35)
MCV RBC AUTO: 90.9 FL (ref 79.6–97.8)
NRBC # BLD: 0 K/UL (ref 0–0.2)
PLATELET # BLD AUTO: 125 K/UL (ref 150–450)
PMV BLD AUTO: 12.2 FL (ref 9.4–12.3)
POTASSIUM SERPL-SCNC: 4.4 MMOL/L (ref 3.5–5.1)
RBC # BLD AUTO: 3.74 M/UL (ref 4.05–5.2)
WBC # BLD AUTO: 11.3 K/UL (ref 4.3–11.1)

## 2019-10-05 PROCEDURE — 74011250637 HC RX REV CODE- 250/637: Performed by: THORACIC SURGERY (CARDIOTHORACIC VASCULAR SURGERY)

## 2019-10-05 PROCEDURE — 74011636637 HC RX REV CODE- 636/637: Performed by: THORACIC SURGERY (CARDIOTHORACIC VASCULAR SURGERY)

## 2019-10-05 PROCEDURE — 74011250636 HC RX REV CODE- 250/636: Performed by: THORACIC SURGERY (CARDIOTHORACIC VASCULAR SURGERY)

## 2019-10-05 PROCEDURE — 82962 GLUCOSE BLOOD TEST: CPT

## 2019-10-05 PROCEDURE — 71045 X-RAY EXAM CHEST 1 VIEW: CPT

## 2019-10-05 PROCEDURE — 85027 COMPLETE CBC AUTOMATED: CPT

## 2019-10-05 PROCEDURE — 83735 ASSAY OF MAGNESIUM: CPT

## 2019-10-05 PROCEDURE — 36592 COLLECT BLOOD FROM PICC: CPT

## 2019-10-05 PROCEDURE — 77030020263 HC SOL INJ SOD CL0.9% LFCR 1000ML

## 2019-10-05 PROCEDURE — 77030012890

## 2019-10-05 PROCEDURE — 84132 ASSAY OF SERUM POTASSIUM: CPT

## 2019-10-05 PROCEDURE — 65660000004 HC RM CVT STEPDOWN

## 2019-10-05 RX ORDER — HYDRALAZINE HYDROCHLORIDE 20 MG/ML
10 INJECTION INTRAMUSCULAR; INTRAVENOUS
Status: DISCONTINUED | OUTPATIENT
Start: 2019-10-05 | End: 2019-10-09 | Stop reason: HOSPADM

## 2019-10-05 RX ORDER — BISACODYL 5 MG
10 TABLET, DELAYED RELEASE (ENTERIC COATED) ORAL DAILY PRN
Status: DISCONTINUED | OUTPATIENT
Start: 2019-10-05 | End: 2019-10-09 | Stop reason: HOSPADM

## 2019-10-05 RX ORDER — LISINOPRIL 5 MG/1
10 TABLET ORAL DAILY
Status: DISCONTINUED | OUTPATIENT
Start: 2019-10-06 | End: 2019-10-07

## 2019-10-05 RX ORDER — FAMOTIDINE 20 MG/1
20 TABLET, FILM COATED ORAL DAILY
Status: DISCONTINUED | OUTPATIENT
Start: 2019-10-06 | End: 2019-10-09 | Stop reason: HOSPADM

## 2019-10-05 RX ORDER — CARVEDILOL 6.25 MG/1
6.25 TABLET ORAL EVERY 12 HOURS
Status: DISCONTINUED | OUTPATIENT
Start: 2019-10-05 | End: 2019-10-07

## 2019-10-05 RX ORDER — METOCLOPRAMIDE HYDROCHLORIDE 5 MG/ML
10 INJECTION INTRAMUSCULAR; INTRAVENOUS ONCE
Status: COMPLETED | OUTPATIENT
Start: 2019-10-05 | End: 2019-10-05

## 2019-10-05 RX ORDER — LISINOPRIL 5 MG/1
5 TABLET ORAL ONCE
Status: COMPLETED | OUTPATIENT
Start: 2019-10-05 | End: 2019-10-05

## 2019-10-05 RX ORDER — CARVEDILOL 3.12 MG/1
3.12 TABLET ORAL ONCE
Status: COMPLETED | OUTPATIENT
Start: 2019-10-05 | End: 2019-10-05

## 2019-10-05 RX ADMIN — SODIUM CHLORIDE, PRESERVATIVE FREE 600 UNITS: 5 INJECTION INTRAVENOUS at 20:55

## 2019-10-05 RX ADMIN — FAMOTIDINE 20 MG: 20 TABLET ORAL at 08:53

## 2019-10-05 RX ADMIN — TAPENTADOL HYDROCHLORIDE 50 MG: 50 TABLET, FILM COATED ORAL at 09:56

## 2019-10-05 RX ADMIN — SODIUM CHLORIDE, PRESERVATIVE FREE 600 UNITS: 5 INJECTION INTRAVENOUS at 15:01

## 2019-10-05 RX ADMIN — ONDANSETRON 4 MG: 2 INJECTION INTRAMUSCULAR; INTRAVENOUS at 07:38

## 2019-10-05 RX ADMIN — INSULIN GLARGINE 10 UNITS: 100 INJECTION, SOLUTION SUBCUTANEOUS at 21:00

## 2019-10-05 RX ADMIN — OXYCODONE HYDROCHLORIDE AND ACETAMINOPHEN 1 TABLET: 5; 325 TABLET ORAL at 01:35

## 2019-10-05 RX ADMIN — LISINOPRIL 5 MG: 5 TABLET ORAL at 09:57

## 2019-10-05 RX ADMIN — Medication 20 ML: at 20:52

## 2019-10-05 RX ADMIN — TAPENTADOL HYDROCHLORIDE 50 MG: 50 TABLET, FILM COATED ORAL at 21:34

## 2019-10-05 RX ADMIN — ASPIRIN 81 MG: 81 TABLET ORAL at 08:53

## 2019-10-05 RX ADMIN — CARVEDILOL 3.12 MG: 3.12 TABLET, FILM COATED ORAL at 07:39

## 2019-10-05 RX ADMIN — CARVEDILOL 3.12 MG: 3.12 TABLET, FILM COATED ORAL at 09:57

## 2019-10-05 RX ADMIN — Medication 20 ML: at 05:58

## 2019-10-05 RX ADMIN — MUPIROCIN: 20 OINTMENT TOPICAL at 18:00

## 2019-10-05 RX ADMIN — HYDRALAZINE HYDROCHLORIDE 10 MG: 20 INJECTION, SOLUTION INTRAMUSCULAR; INTRAVENOUS at 21:45

## 2019-10-05 RX ADMIN — ONDANSETRON 4 MG: 2 INJECTION INTRAMUSCULAR; INTRAVENOUS at 21:34

## 2019-10-05 RX ADMIN — CARVEDILOL 6.25 MG: 6.25 TABLET, FILM COATED ORAL at 20:49

## 2019-10-05 RX ADMIN — Medication 20 ML: at 15:01

## 2019-10-05 RX ADMIN — SODIUM CHLORIDE, PRESERVATIVE FREE 300 UNITS: 5 INJECTION INTRAVENOUS at 05:58

## 2019-10-05 RX ADMIN — INSULIN LISPRO 2 UNITS: 100 INJECTION, SOLUTION INTRAVENOUS; SUBCUTANEOUS at 21:00

## 2019-10-05 RX ADMIN — ONDANSETRON 4 MG: 2 INJECTION INTRAMUSCULAR; INTRAVENOUS at 15:49

## 2019-10-05 RX ADMIN — DOCUSATE SODIUM 100 MG: 100 CAPSULE, LIQUID FILLED ORAL at 20:49

## 2019-10-05 RX ADMIN — SODIUM CHLORIDE 50 ML/HR: 900 INJECTION, SOLUTION INTRAVENOUS at 16:14

## 2019-10-05 RX ADMIN — HYDRALAZINE HYDROCHLORIDE 10 MG: 20 INJECTION, SOLUTION INTRAMUSCULAR; INTRAVENOUS at 15:32

## 2019-10-05 RX ADMIN — LISINOPRIL 5 MG: 5 TABLET ORAL at 08:53

## 2019-10-05 RX ADMIN — METOCLOPRAMIDE 10 MG: 5 INJECTION, SOLUTION INTRAMUSCULAR; INTRAVENOUS at 09:45

## 2019-10-05 RX ADMIN — TAPENTADOL HYDROCHLORIDE 50 MG: 50 TABLET, FILM COATED ORAL at 15:48

## 2019-10-05 NOTE — PROGRESS NOTES
Dr. Vee Zhou to assess pt. Notified patient extremely nauseas, bp 201/93. MD ordered to discontinue amiodarone and vantin due to nausea. Increase coreg to 6.25mg and lisinopril to 10mg. A one time dose of 10mg reglan ordered for nausea and use nucynta for pain.  Patient escorted back to bed and given bp meds and nucynta

## 2019-10-05 NOTE — PROGRESS NOTES
CV Progress Note    Subjective: Incisional pain:  moderate  Appetite:  poor  Activity: Ambulating with assistance      Objective:     Vitals:  Blood pressure (!) 190/95, pulse 89, temperature 97.3 °F (36.3 °C), resp. rate 12, height 5' 3\" (1.6 m), weight 198 lb 8 oz (90 kg), SpO2 94 %, not currently breastfeeding. Temp (24hrs), Av.1 °F (36.7 °C), Min:97.3 °F (36.3 °C), Max:98.6 °F (37 °C)        Plan/Recommendations/Medical Decision Making:     Principal Problem:    S/P CABG x 3 (10/2/2019)    Active Problems:    CAD (coronary artery disease) (2019)      UTI (urinary tract infection) (2019)      Mitral valve regurgitation (10/1/2019)      Hypoxia (10/1/2019)      S/P MVR (mitral valve repair) (10/2/2019)      Hypocalcemia (10/2/2019)      Suspected sleep apnea (10/2/2019)      Thrombocytopenia ([address] ()      Systolic CHF, acute on chronic ([address] (10/3/2019)        Continue present treatment  Having a lot of nausea,  altering some meds  See orders for details. Elfego Chen.  Micaela Guy MD

## 2019-10-05 NOTE — PROGRESS NOTES
PT note:  Treatment deferred at this time per the RN as the patient is nausea. Will check back later.   Enoc Van, PTA

## 2019-10-06 LAB
ANION GAP SERPL CALC-SCNC: 13 MMOL/L (ref 7–16)
BUN SERPL-MCNC: 32 MG/DL (ref 6–23)
CALCIUM SERPL-MCNC: 6.9 MG/DL (ref 8.3–10.4)
CHLORIDE SERPL-SCNC: 117 MMOL/L (ref 98–107)
CO2 SERPL-SCNC: 17 MMOL/L (ref 21–32)
CREAT SERPL-MCNC: 1.13 MG/DL (ref 0.6–1)
GLUCOSE BLD STRIP.AUTO-MCNC: 168 MG/DL (ref 65–100)
GLUCOSE BLD STRIP.AUTO-MCNC: 173 MG/DL (ref 65–100)
GLUCOSE BLD STRIP.AUTO-MCNC: 209 MG/DL (ref 65–100)
GLUCOSE SERPL-MCNC: 169 MG/DL (ref 65–100)
MAGNESIUM SERPL-MCNC: 2.3 MG/DL (ref 1.8–2.4)
POTASSIUM SERPL-SCNC: 4.1 MMOL/L (ref 3.5–5.1)
SODIUM SERPL-SCNC: 147 MMOL/L (ref 136–145)

## 2019-10-06 PROCEDURE — 74011250636 HC RX REV CODE- 250/636: Performed by: THORACIC SURGERY (CARDIOTHORACIC VASCULAR SURGERY)

## 2019-10-06 PROCEDURE — 97530 THERAPEUTIC ACTIVITIES: CPT

## 2019-10-06 PROCEDURE — 83735 ASSAY OF MAGNESIUM: CPT

## 2019-10-06 PROCEDURE — 65660000004 HC RM CVT STEPDOWN

## 2019-10-06 PROCEDURE — 74011250637 HC RX REV CODE- 250/637: Performed by: THORACIC SURGERY (CARDIOTHORACIC VASCULAR SURGERY)

## 2019-10-06 PROCEDURE — 77030012890

## 2019-10-06 PROCEDURE — 74011000250 HC RX REV CODE- 250: Performed by: THORACIC SURGERY (CARDIOTHORACIC VASCULAR SURGERY)

## 2019-10-06 PROCEDURE — 94760 N-INVAS EAR/PLS OXIMETRY 1: CPT

## 2019-10-06 PROCEDURE — 80048 BASIC METABOLIC PNL TOTAL CA: CPT

## 2019-10-06 PROCEDURE — 77030027138 HC INCENT SPIROMETER -A

## 2019-10-06 PROCEDURE — 77010033678 HC OXYGEN DAILY

## 2019-10-06 PROCEDURE — 36592 COLLECT BLOOD FROM PICC: CPT

## 2019-10-06 PROCEDURE — 82962 GLUCOSE BLOOD TEST: CPT

## 2019-10-06 PROCEDURE — 74011636637 HC RX REV CODE- 636/637: Performed by: THORACIC SURGERY (CARDIOTHORACIC VASCULAR SURGERY)

## 2019-10-06 RX ORDER — AMOXICILLIN 250 MG
2 CAPSULE ORAL 2 TIMES DAILY
Status: DISCONTINUED | OUTPATIENT
Start: 2019-10-06 | End: 2019-10-09 | Stop reason: HOSPADM

## 2019-10-06 RX ADMIN — MUPIROCIN: 20 OINTMENT TOPICAL at 09:59

## 2019-10-06 RX ADMIN — INSULIN LISPRO 2 UNITS: 100 INJECTION, SOLUTION INTRAVENOUS; SUBCUTANEOUS at 11:53

## 2019-10-06 RX ADMIN — CARVEDILOL 6.25 MG: 6.25 TABLET, FILM COATED ORAL at 20:50

## 2019-10-06 RX ADMIN — Medication 20 ML: at 05:38

## 2019-10-06 RX ADMIN — SENNOSIDES, DOCUSATE SODIUM 2 TABLET: 50; 8.6 TABLET, FILM COATED ORAL at 20:50

## 2019-10-06 RX ADMIN — PROMETHAZINE HYDROCHLORIDE 12.5 MG: 25 INJECTION INTRAMUSCULAR; INTRAVENOUS at 20:51

## 2019-10-06 RX ADMIN — INSULIN LISPRO 4 UNITS: 100 INJECTION, SOLUTION INTRAVENOUS; SUBCUTANEOUS at 17:16

## 2019-10-06 RX ADMIN — SODIUM CHLORIDE, PRESERVATIVE FREE 600 UNITS: 5 INJECTION INTRAVENOUS at 05:41

## 2019-10-06 RX ADMIN — INSULIN LISPRO 2 UNITS: 100 INJECTION, SOLUTION INTRAVENOUS; SUBCUTANEOUS at 07:42

## 2019-10-06 RX ADMIN — CARVEDILOL 6.25 MG: 6.25 TABLET, FILM COATED ORAL at 09:57

## 2019-10-06 RX ADMIN — TAPENTADOL HYDROCHLORIDE 50 MG: 50 TABLET, FILM COATED ORAL at 10:06

## 2019-10-06 RX ADMIN — FAMOTIDINE 20 MG: 20 TABLET ORAL at 09:57

## 2019-10-06 RX ADMIN — Medication 20 ML: at 15:29

## 2019-10-06 RX ADMIN — SENNOSIDES, DOCUSATE SODIUM 2 TABLET: 50; 8.6 TABLET, FILM COATED ORAL at 09:56

## 2019-10-06 RX ADMIN — ASPIRIN 81 MG: 81 TABLET ORAL at 09:57

## 2019-10-06 RX ADMIN — TAPENTADOL HYDROCHLORIDE 50 MG: 50 TABLET, FILM COATED ORAL at 17:15

## 2019-10-06 RX ADMIN — POTASSIUM CHLORIDE 10 MEQ: 10 TABLET, EXTENDED RELEASE ORAL at 09:57

## 2019-10-06 RX ADMIN — SODIUM CHLORIDE, PRESERVATIVE FREE 600 UNITS: 5 INJECTION INTRAVENOUS at 14:00

## 2019-10-06 RX ADMIN — INSULIN LISPRO 2 UNITS: 100 INJECTION, SOLUTION INTRAVENOUS; SUBCUTANEOUS at 21:00

## 2019-10-06 RX ADMIN — INSULIN GLARGINE 10 UNITS: 100 INJECTION, SOLUTION SUBCUTANEOUS at 21:00

## 2019-10-06 RX ADMIN — ONDANSETRON 4 MG: 2 INJECTION INTRAMUSCULAR; INTRAVENOUS at 10:18

## 2019-10-06 RX ADMIN — LISINOPRIL 10 MG: 5 TABLET ORAL at 09:56

## 2019-10-06 RX ADMIN — SODIUM CHLORIDE, PRESERVATIVE FREE 600 UNITS: 5 INJECTION INTRAVENOUS at 20:54

## 2019-10-06 RX ADMIN — Medication 20 ML: at 20:55

## 2019-10-06 NOTE — PROGRESS NOTES
Problem: Mobility Impaired (Adult and Pediatric)  Goal: *Acute Goals and Plan of Care (Insert Text)  Description  STG:  (1.)Ms. Lizzeth Santana will move from supine to sit and sit to supine  with CONTACT GUARD ASSIST within 3 treatment day(s). (2.)Ms. Lizzeth Santana will transfer from bed to chair and chair to bed with CONTACT GUARD ASSIST using the least restrictive device within 3 treatment day(s). (3.)Ms. Lizzeth Santana will ambulate with CONTACT GUARD ASSIST for 250+ feet with the least restrictive device within 3 treatment day(s). LTG:  (1.)Ms. Lizzeth Santana will move from supine to sit and sit to supine  in bed with INDEPENDENT within 7 treatment day(s). (2.)Ms. Lizzeth Santana will transfer from bed to chair and chair to bed with INDEPENDENT using the least restrictive device within 7 treatment day(s). (3.)Ms. Lizzeth Santana will ambulate with SUPERVISION for 500+ feet with the least restrictive device within 7 treatment day(s).   ________________________________________________________________________________________________     Outcome: Progressing Towards Goal      PHYSICAL THERAPY: Daily Note and PM 10/6/2019  INPATIENT: PT Visit Days : 2  Payor: MEDICAID PENDING / Plan: Ed [place] PENDING / Product Type: Medicaid /       NAME/AGE/GENDER: Ailin Cantrell is a 61 y.o. female   PRIMARY DIAGNOSIS: Atherosclerosis of native coronary artery of native heart with unstable angina pectoris ([address] [I25.110]  Mitral valve insufficiency, unspecified etiology [I34.0]  CAD (coronary artery disease) [I25.10]  Mitral valve regurgitation [I34.0] S/P CABG x 3   S/P CABG x 3    Procedure(s) (LRB):  CORONARY ARTERY BYPASS GRAFT x  3, LIMA (N/A)  MITRAL VALVE REPAIR (N/A)  VEIN HARVEST GREATER SAPHENOUS (Left)  ESOPHAGEAL TRANS ECHOCARDIOGRAM (N/A)  LEFT ATRIAL APPENDAGE CLOSURE  WIH CLIP (Left)  5 Days Post-Op  ICD-10: Treatment Diagnosis:    · Generalized Muscle Weakness (M62.81)  · Difficulty in walking, Not elsewhere classified (R26.2) Precaution/Allergies:  Patient has no known allergies. Sternal precautions   ASSESSMENT:     Ms. Ruiz Staff is sitting in the recliner upon contact. Visitor present. Patient agreeable to therapy. MD visits. Sit to stand with min assist using the momentum method. Pt was able to ambulate about 150' with rolling  walker and CGA, O2 at 2L/min. Pt ambulates at slow pace with shuffling steps. Pt returned to chair in room and was left up with RN attending. Pt is making limited progress towards goals due to feeling very nausea. Will continue with POC. Patient is supine in bed with  present. Patient requiring a little encouragement from this writer and her , but does agree to get up.  assist with bed mobility. Patient sitting balance is good however the patient has decreased upright posture. Sit to stand with contact guard assist to the walker. Gait training x 100 feet with rolling walker. Patient is returned to sitting edge of bed then supine in bed with supervision. Patient is noted to needing a little more work on log rolling technique to protect her chest.  Patient is left supine in bed with needs within reach. Slow progress demonstrated due to patient stating that she does not feel well. No c/o nausea this pm.  Will continue PT efforts. This section established at most recent assessment   PROBLEM LIST (Impairments causing functional limitations):  1. Decreased Strength  2. Decreased ADL/Functional Activities  3. Decreased Transfer Abilities  4. Decreased Ambulation Ability/Technique  5. Decreased Balance  6. Increased Pain  7. Decreased Activity Tolerance  8. Decreased Pacing Skills  9. Increased Fatigue  10. Increased Shortness of Breath  11. Decreased Flexibility/Joint Mobility  12. Decreased Knowledge of Precautions  13.  Decreased [place] with Home Exercise Program   INTERVENTIONS PLANNED: (Benefits and precautions of physical therapy have been discussed with the patient.)  1. Balance Exercise  2. Bed Mobility  3. Electrical Stimulation  4. Family Education  5. Home Exercise Program (HEP)  6. Neuromuscular Re-education/Strengthening  7. Therapeutic Activites  8. Therapeutic Exercise/Strengthening  9. Transfer Training     TREATMENT PLAN: Frequency/Duration: twice daily for duration of hospital stay  Rehabilitation Potential For Stated Goals: Good     REHAB RECOMMENDATIONS (at time of discharge pending progress):    Placement: It is my opinion, based on this patient's performance to date, that Ms. Danielle Dudley may benefit from [address] after discharge due to the functional deficits listed above that are likely to improve with skilled rehabilitation because he/she has multiple medical issues that affect his/her functional mobility in the community. Equipment:    None at this time              HISTORY:   History of Present Injury/Illness (Reason for Referral):  S/p Procedure(s) (LRB):  CORONARY ARTERY BYPASS GRAFT x  3, LIMA (N/A)  MITRAL VALVE REPAIR (N/A)  VEIN HARVEST GREATER SAPHENOUS (Left)  ESOPHAGEAL TRANS ECHOCARDIOGRAM (N/A)  LEFT ATRIAL APPENDAGE CLOSURE  WIH CLIP (Left)  Past Medical History/Comorbidities:   Ms. Danielle Dudley  has a past medical history of Breast cancer ([address] (2015), CAD (coronary artery disease), Diabetes ([address] (typell, dx 2016), Heart failure ([address] Hypercholesteremia, Hypertension, Intertrochanteric fracture of right femur ([address] (2/24/2018), Mitral valve regurgitation, and PUD (peptic ulcer disease). Ms. Danielle Dudley  has a past surgical history that includes hx hip fracture tx (Right); hx breast lumpectomy (2015); and hx orthopaedic (Right).   Social History/Living Environment:   Home Environment: Private residence  # Steps to Enter: 0  One/Two Story Residence: One story  Living Alone: Yes  Support Systems: Spouse/Significant Other/Partner  Patient Expects to be Discharged to[de-identified] Private residence  Current DME Used/Available at Home: None  Prior Level of Function/Work/Activity:  Independent with gait and ADLs, 0 falls, drives   Number of Personal Factors/Comorbidities that affect the Plan of Care: 3+: HIGH COMPLEXITY   EXAMINATION:   Most Recent Physical Functioning:   Gross Assessment:                  Posture:     Balance:  Sitting: Intact  Sitting - Static: Good (unsupported)  Sitting - Dynamic: Fair (occasional)  Standing: Impaired  Standing - Static: Fair  Standing - Dynamic : Fair Bed Mobility:  Rolling: Supervision  Supine to Sit: Minimum assistance  Sit to Supine: Supervision  Scooting: Minimum assistance  Wheelchair Mobility:     Transfers:  Sit to Stand: Contact guard assistance  Stand to Sit: Contact guard assistance  Gait:     Base of Support: Narrowed  Speed/Yadi: Shuffled; Slow  Step Length: Left shortened;Right shortened  Gait Abnormalities: Decreased step clearance  Distance (ft): 100 Feet (ft)  Assistive Device: Walker, rolling  Ambulation - Level of Assistance: Contact guard assistance  Interventions: Safety awareness training      Body Structures Involved:  1. Heart  2. Lungs  3. Thoracic Cage  4. Bones  5. Joints  6. Muscles  7. Ligaments Body Functions Affected:  1. Sensory/Pain  2. Cardio  3. Respiratory  4. Neuromusculoskeletal  5. Movement Related Activities and Participation Affected:  1. General Tasks and Demands  2. Mobility  3. Self Care  4. Domestic Life  5. Interpersonal Interactions and Relationships  6. Community, Social and [address] [place] [address]   Number of elements that affect the Plan of Care: 4+: HIGH COMPLEXITY   CLINICAL PRESENTATION:   Presentation: Evolving clinical presentation with changing clinical characteristics: MODERATE COMPLEXITY   CLINICAL DECISION MAKIN [facility] Mobility Inpatient Short Form  How much difficulty does the patient currently have. .. Unable A Lot A Little None   1. Turning over in bed (including adjusting bedclothes, sheets and blankets)?    ? 1   ? 2   ? 3   ? 4 2.  Sitting down on and standing up from a chair with arms ( e.g., wheelchair, bedside commode, etc.)   ? 1   ? 2   ? 3   ? 4   3. Moving from lying on back to sitting on the side of the bed?   ? 1   ? 2   ? 3   ? 4   How much help from another person does the patient currently need. .. Total A Lot A Little None   4. Moving to and from a bed to a chair (including a wheelchair)? ? 1   ? 2   ? 3   ? 4   5. Need to walk in hospital room? ? 1   ? 2   ? 3   ? 4   6. Climbing 3-5 steps with a railing? ? 1   ? 2   ? 3   ? 4   © 2007, Trustees of [address] [place] [address] [address] [address], under license to [employer]. All rights reserved      Score:  Initial: 17 Most Recent: X (Date: -- )    Interpretation of Tool:  Represents activities that are increasingly more difficult (i.e. Bed mobility, Transfers, Gait). Medical Necessity:     · Patient is expected to demonstrate progress in strength, balance, coordination and functional technique  ·  to decrease assistance required with gait, transfers, and functional mobility   · . Reason for Services/Other Comments:  · Patient continues to require skilled intervention due to decreased strength, decreased balance, decreased functional tolerance, decreased cardiopulmonary endurance affecting participation in basic ADLs and functional tasks. · .   Use of outcome tool(s) and clinical judgement create a POC that gives a: Clear prediction of patient's progress: LOW COMPLEXITY            TREATMENT:      Pre-treatment Symptoms/Complaints: \" OK\"  Pain: Initial:   Pain Intensity 1: (no number given)  Post Session:  0/10     Therapeutic Activity: (    8 minutes): Therapeutic activities including Chair transfers, Ambulation on level ground and cues for ease and safety of transfers, education on sternal precautions  to improve mobility, strength, balance and coordination.   Required minimal Safety awareness training to promote static and dynamic balance in standing and promote coordination of bilateral, upper extremity(s), lower extremity(s). Braces/Orthotics/Lines/Etc:   · O2 Device: Nasal cannula 2L    Treatment/Session Assessment:    · Response to Treatment:  amb 100 ft with RW and CGA  · Interdisciplinary Collaboration:   o Physical Therapy Assistant  o Registered Nurse  · After treatment position/precautions:   o Supine in bed  o Bed/Chair-wheels locked  o Bed in low position  o Call light within reach  o RN notified  o Family at bedside   · Compliance with Program/Exercises: Will assess as treatment progresses  · Recommendations/Intent for next treatment session: \"Next visit will focus on advancements to more challenging activities and reduction in assistance provided\".   Total Treatment Duration:  PT Patient Time In/Time Out  Time In: 1515  Time Out: 1523    Brina Venegas, PTA

## 2019-10-06 NOTE — PROGRESS NOTES
Bedside report received from [facility]. Opportunity for questions, concerns. Patient involved.

## 2019-10-06 NOTE — PROGRESS NOTES
Pt complained of nausea from the beginning of shift. Spoke with Dr. Ayanna Phelps about the possibility of a scopolamine patch. Dr. Ayanna Phelps wanted to wait. Non-medical remedies tried - ginger ale, popsicles, chicken broth, ice chips, saltine crackers. Pt still complained of nausea. No emesis observed at anytime today. Pt is expelling mucus. Pt's family expressed concern over consistent nausea. Called my charge nurse - LORE Davila and explained the situation. I was instructed to call Dr. Ayanna Phelps. Called Dr. Ayanna Phelps back about possible Reglan. Phenergan ordered. Will continue to monitor.

## 2019-10-06 NOTE — PROGRESS NOTES
CV Progress Note    Subjective: Incisional pain:  moderate  Appetite:  poor  Activity: Ambulating with assistance      Objective:     Vitals:  Blood pressure (!) 175/119, pulse 92, temperature 98.2 °F (36.8 °C), resp. rate 20, height 5' 3\" (1.6 m), weight 199 lb 11.2 oz (90.6 kg), SpO2 99 %, not currently breastfeeding. Temp (24hrs), Av.8 °F (36.6 °C), Min:97.3 °F (36.3 °C), Max:98.2 °F (36.8 °C)        Plan/Recommendations/Medical Decision Making:     Principal Problem:    S/P CABG x 3 (10/2/2019)    Active Problems:    CAD (coronary artery disease) (2019)      UTI (urinary tract infection) (2019)      Mitral valve regurgitation (10/1/2019)      Hypoxia (10/1/2019)      S/P MVR (mitral valve repair) (10/2/2019)      Hypocalcemia (10/2/2019)      Suspected sleep apnea (10/2/2019)      Thrombocytopenia ([facility] (6240)      Systolic CHF, acute on chronic ([address] (10/3/2019)        Continue present treatment  nausea only slightly improved  See orders for details. Tr Nielson.  Maximiliano Vázquez MD

## 2019-10-06 NOTE — PROGRESS NOTES
Problem: Mobility Impaired (Adult and Pediatric)  Goal: *Acute Goals and Plan of Care (Insert Text)  Description  STG:  (1.)Ms. Ward Crystal will move from supine to sit and sit to supine  with CONTACT GUARD ASSIST within 3 treatment day(s). (2.)Ms. Ward Crystal will transfer from bed to chair and chair to bed with CONTACT GUARD ASSIST using the least restrictive device within 3 treatment day(s). (3.)Ms. Ward Crystal will ambulate with CONTACT GUARD ASSIST for 250+ feet with the least restrictive device within 3 treatment day(s). LTG:  (1.)Ms. Ward Crystal will move from supine to sit and sit to supine  in bed with INDEPENDENT within 7 treatment day(s). (2.)Ms. Ward Crystal will transfer from bed to chair and chair to bed with INDEPENDENT using the least restrictive device within 7 treatment day(s). (3.)Ms. Ward Crystal will ambulate with SUPERVISION for 500+ feet with the least restrictive device within 7 treatment day(s).   ________________________________________________________________________________________________     Outcome: Progressing Towards Goal      PHYSICAL THERAPY: Daily Note and AM 10/6/2019  INPATIENT: PT Visit Days : 2  Payor: MEDICAID PENDING / Plan: Josue Soria PENDING / Product Type: Medicaid /       NAME/AGE/GENDER: Mago Lay is a 61 y.o. female   PRIMARY DIAGNOSIS: Atherosclerosis of native coronary artery of native heart with unstable angina pectoris ([facility] [I25.110]  Mitral valve insufficiency, unspecified etiology [I34.0]  CAD (coronary artery disease) [I25.10]  Mitral valve regurgitation [I34.0] S/P CABG x 3   S/P CABG x 3    Procedure(s) (LRB):  CORONARY ARTERY BYPASS GRAFT x  3, LIMA (N/A)  MITRAL VALVE REPAIR (N/A)  VEIN HARVEST GREATER SAPHENOUS (Left)  ESOPHAGEAL TRANS ECHOCARDIOGRAM (N/A)  LEFT ATRIAL APPENDAGE CLOSURE  WIH CLIP (Left)  5 Days Post-Op  ICD-10: Treatment Diagnosis:    · Generalized Muscle Weakness (M62.81)  · Difficulty in walking, Not elsewhere classified (R26.2) Precaution/Allergies:  Patient has no known allergies. Sternal precautions   ASSESSMENT:     Ms. Lawrence Hopson is sitting in the recliner upon contact. Visitor present. Patient agreeable to therapy. MD visits. Sit to stand with min assist using the momentum method. Pt was able to ambulate about 150' with rolling  walker and CGA, O2 at 2L/min. Pt ambulates at slow pace with shuffling steps. Pt returned to chair in room and was left up with RN attending. Pt is making limited progress towards goals due to feeling very nausea. Will continue with POC. This section established at most recent assessment   PROBLEM LIST (Impairments causing functional limitations):  1. Decreased Strength  2. Decreased ADL/Functional Activities  3. Decreased Transfer Abilities  4. Decreased Ambulation Ability/Technique  5. Decreased Balance  6. Increased Pain  7. Decreased Activity Tolerance  8. Decreased Pacing Skills  9. Increased Fatigue  10. Increased Shortness of Breath  11. Decreased Flexibility/Joint Mobility  12. Decreased Knowledge of Precautions  13. Decreased [place] with Home Exercise Program   INTERVENTIONS PLANNED: (Benefits and precautions of physical therapy have been discussed with the patient.)  1. Balance Exercise  2. Bed Mobility  3. Electrical Stimulation  4. Family Education  5. Home Exercise Program (HEP)  6. Neuromuscular Re-education/Strengthening  7. Therapeutic Activites  8. Therapeutic Exercise/Strengthening  9. Transfer Training     TREATMENT PLAN: Frequency/Duration: twice daily for duration of hospital stay  Rehabilitation Potential For Stated Goals: Good     REHAB RECOMMENDATIONS (at time of discharge pending progress):    Placement: It is my opinion, based on this patient's performance to date, that Ms. Lawrence Hopson may benefit from [address] after discharge due to the functional deficits listed above that are likely to improve with skilled rehabilitation because he/she has multiple medical issues that affect his/her functional mobility in the community. Equipment:    None at this time              HISTORY:   History of Present Injury/Illness (Reason for Referral):  S/p Procedure(s) (LRB):  CORONARY ARTERY BYPASS GRAFT x  3, LIMA (N/A)  MITRAL VALVE REPAIR (N/A)  VEIN HARVEST GREATER SAPHENOUS (Left)  ESOPHAGEAL TRANS ECHOCARDIOGRAM (N/A)  LEFT ATRIAL APPENDAGE CLOSURE  WIH CLIP (Left)  Past Medical History/Comorbidities:   Ms. Madie Weiss  has a past medical history of Breast cancer ([facility] (2015), CAD (coronary artery disease), Diabetes ([facility] (typell, dx 2016), Heart failure ([facility] Hypercholesteremia, Hypertension, Intertrochanteric fracture of right femur ([facility] (2/24/2018), Mitral valve regurgitation, and PUD (peptic ulcer disease). Ms. Madie Weiss  has a past surgical history that includes hx hip fracture tx (Right); hx breast lumpectomy (2015); and hx orthopaedic (Right). Social History/Living Environment:   Home Environment: Private residence  # Steps to Enter: 0  One/Two Story Residence: One story  Living Alone: Yes  Support Systems: Spouse/Significant Other/Partner  Patient Expects to be Discharged to[de-identified] Private residence  Current DME Used/Available at Home: None  Prior Level of Function/Work/Activity:  Independent with gait and ADLs, 0 falls, drives   Number of Personal Factors/Comorbidities that affect the Plan of Care: 3+: HIGH COMPLEXITY   EXAMINATION:   Most Recent Physical Functioning:   Gross Assessment:                  Posture:     Balance:  Sitting: Impaired  Sitting - Static: Fair (occasional)  Sitting - Dynamic: Fair (occasional)  Standing: Impaired  Standing - Static: Fair  Standing - Dynamic : Fair Bed Mobility:     Wheelchair Mobility:     Transfers:  Sit to Stand: Minimum assistance  Stand to Sit: Minimum assistance  Gait:     Base of Support: Narrowed  Speed/Yadi: Shuffled; Slow  Step Length: Left shortened;Right shortened  Gait Abnormalities: Decreased step clearance  Distance (ft): 150 Feet (ft)  Assistive Device: Walker, rolling  Ambulation - Level of Assistance: Contact guard assistance  Interventions: Safety awareness training      Body Structures Involved:  1. Heart  2. Lungs  3. Thoracic Cage  4. Bones  5. Joints  6. Muscles  7. Ligaments Body Functions Affected:  1. Sensory/Pain  2. Cardio  3. Respiratory  4. Neuromusculoskeletal  5. Movement Related Activities and Participation Affected:  1. General Tasks and Demands  2. Mobility  3. Self Care  4. Domestic Life  5. Interpersonal Interactions and Relationships  6. Community, Social and [facility]   Number of elements that affect the Plan of Care: 4+: HIGH COMPLEXITY   CLINICAL PRESENTATION:   Presentation: Evolving clinical presentation with changing clinical characteristics: MODERATE COMPLEXITY   CLINICAL DECISION MAKIN [facility] Mobility Inpatient Short Form  How much difficulty does the patient currently have. .. Unable A Lot A Little None   1. Turning over in bed (including adjusting bedclothes, sheets and blankets)? ? 1   ? 2   ? 3   ? 4   2. Sitting down on and standing up from a chair with arms ( e.g., wheelchair, bedside commode, etc.)   ? 1   ? 2   ? 3   ? 4   3. Moving from lying on back to sitting on the side of the bed?   ? 1   ? 2   ? 3   ? 4   How much help from another person does the patient currently need. .. Total A Lot A Little None   4. Moving to and from a bed to a chair (including a wheelchair)? ? 1   ? 2   ? 3   ? 4   5. Need to walk in hospital room? ? 1   ? 2   ? 3   ? 4   6. Climbing 3-5 steps with a railing? ? 1   ? 2   ? 3   ? 4   © , Trustees of [address] [place] [address] [address] [address], under license to [facility]. All rights reserved      Score:  Initial: 17 Most Recent: X (Date: -- )    Interpretation of Tool:  Represents activities that are increasingly more difficult (i.e. Bed mobility, Transfers, Gait).     Medical Necessity:     · Patient is expected to demonstrate progress in strength, balance, coordination and functional technique  ·  to decrease assistance required with gait, transfers, and functional mobility   · . Reason for Services/Other Comments:  · Patient continues to require skilled intervention due to decreased strength, decreased balance, decreased functional tolerance, decreased cardiopulmonary endurance affecting participation in basic ADLs and functional tasks. · .   Use of outcome tool(s) and clinical judgement create a POC that gives a: Clear prediction of patient's progress: LOW COMPLEXITY            TREATMENT:      Pre-treatment Symptoms/Complaints: \" I'm  Nausea\"  Pain: Initial:   Pain Intensity 1: 8  Post Session:  8/10     Therapeutic Activity: (    12 minutes): Therapeutic activities including Chair transfers, Ambulation on level ground and cues for ease and safety of transfers, education on sternal precautions  to improve mobility, strength, balance and coordination. Required minimal Safety awareness training to promote static and dynamic balance in standing and promote coordination of bilateral, upper extremity(s), lower extremity(s). Braces/Orthotics/Lines/Etc:   · O2 Device: Nasal cannula 2L  99%   Treatment/Session Assessment:    · Response to Treatment:  amb 150 ft with RW and CGA  · Interdisciplinary Collaboration:   o Physical Therapy Assistant  o Registered Nurse  · After treatment position/precautions:   o Up in chair  o Bed/Chair-wheels locked  o Call light within reach  o RN notified  o Family at bedside  o Nurse at bedside   · Compliance with Program/Exercises: Will assess as treatment progresses  · Recommendations/Intent for next treatment session: \"Next visit will focus on advancements to more challenging activities and reduction in assistance provided\".   Total Treatment Duration:  PT Patient Time In/Time Out  Time In: 1000  Time Out: 1012    Iliana Diaz, PTA

## 2019-10-06 NOTE — PROGRESS NOTES
Bedside report given to Julianna Perry [facility]. Opportunity for questions, concerns. Patient involved.

## 2019-10-07 PROBLEM — I10 ACCELERATED HYPERTENSION: Status: ACTIVE | Noted: 2019-10-07

## 2019-10-07 LAB
AMYLASE SERPL-CCNC: 12 U/L (ref 25–115)
ANION GAP SERPL CALC-SCNC: 4 MMOL/L (ref 7–16)
BUN SERPL-MCNC: 29 MG/DL (ref 6–23)
CALCIUM SERPL-MCNC: 8.5 MG/DL (ref 8.3–10.4)
CHLORIDE SERPL-SCNC: 115 MMOL/L (ref 98–107)
CO2 SERPL-SCNC: 25 MMOL/L (ref 21–32)
CREAT SERPL-MCNC: 0.93 MG/DL (ref 0.6–1)
ERYTHROCYTE [DISTWIDTH] IN BLOOD BY AUTOMATED COUNT: 13.8 % (ref 11.9–14.6)
GLUCOSE BLD STRIP.AUTO-MCNC: 157 MG/DL (ref 65–100)
GLUCOSE BLD STRIP.AUTO-MCNC: 163 MG/DL (ref 65–100)
GLUCOSE BLD STRIP.AUTO-MCNC: 170 MG/DL (ref 65–100)
GLUCOSE BLD STRIP.AUTO-MCNC: 200 MG/DL (ref 65–100)
GLUCOSE SERPL-MCNC: 176 MG/DL (ref 65–100)
HCT VFR BLD AUTO: 35.3 % (ref 35.8–46.3)
HGB BLD-MCNC: 11.3 G/DL (ref 11.7–15.4)
LIPASE SERPL-CCNC: 28 U/L (ref 73–393)
MAGNESIUM SERPL-MCNC: 2.8 MG/DL (ref 1.8–2.4)
MCH RBC QN AUTO: 28.5 PG (ref 26.1–32.9)
MCHC RBC AUTO-ENTMCNC: 32 G/DL (ref 31.4–35)
MCV RBC AUTO: 89.1 FL (ref 79.6–97.8)
NRBC # BLD: 0 K/UL (ref 0–0.2)
PLATELET # BLD AUTO: 174 K/UL (ref 150–450)
PMV BLD AUTO: 11.9 FL (ref 9.4–12.3)
POTASSIUM SERPL-SCNC: 3.9 MMOL/L (ref 3.5–5.1)
RBC # BLD AUTO: 3.96 M/UL (ref 4.05–5.2)
SODIUM SERPL-SCNC: 144 MMOL/L (ref 136–145)
WBC # BLD AUTO: 10.1 K/UL (ref 4.3–11.1)

## 2019-10-07 PROCEDURE — 74011250636 HC RX REV CODE- 250/636: Performed by: THORACIC SURGERY (CARDIOTHORACIC VASCULAR SURGERY)

## 2019-10-07 PROCEDURE — 74011250636 HC RX REV CODE- 250/636: Performed by: NURSE PRACTITIONER

## 2019-10-07 PROCEDURE — 82962 GLUCOSE BLOOD TEST: CPT

## 2019-10-07 PROCEDURE — 77030012890

## 2019-10-07 PROCEDURE — 94760 N-INVAS EAR/PLS OXIMETRY 1: CPT

## 2019-10-07 PROCEDURE — 85027 COMPLETE CBC AUTOMATED: CPT

## 2019-10-07 PROCEDURE — 83690 ASSAY OF LIPASE: CPT

## 2019-10-07 PROCEDURE — 74011250637 HC RX REV CODE- 250/637: Performed by: NURSE PRACTITIONER

## 2019-10-07 PROCEDURE — 97110 THERAPEUTIC EXERCISES: CPT

## 2019-10-07 PROCEDURE — 74011250637 HC RX REV CODE- 250/637: Performed by: PHYSICIAN ASSISTANT

## 2019-10-07 PROCEDURE — 74011250637 HC RX REV CODE- 250/637: Performed by: THORACIC SURGERY (CARDIOTHORACIC VASCULAR SURGERY)

## 2019-10-07 PROCEDURE — 97530 THERAPEUTIC ACTIVITIES: CPT

## 2019-10-07 PROCEDURE — 80048 BASIC METABOLIC PNL TOTAL CA: CPT

## 2019-10-07 PROCEDURE — 99232 SBSQ HOSP IP/OBS MODERATE 35: CPT | Performed by: INTERNAL MEDICINE

## 2019-10-07 PROCEDURE — 74011636637 HC RX REV CODE- 636/637: Performed by: THORACIC SURGERY (CARDIOTHORACIC VASCULAR SURGERY)

## 2019-10-07 PROCEDURE — 83735 ASSAY OF MAGNESIUM: CPT

## 2019-10-07 PROCEDURE — 77010033678 HC OXYGEN DAILY

## 2019-10-07 PROCEDURE — 82150 ASSAY OF AMYLASE: CPT

## 2019-10-07 PROCEDURE — 74011000250 HC RX REV CODE- 250: Performed by: INTERNAL MEDICINE

## 2019-10-07 PROCEDURE — 94640 AIRWAY INHALATION TREATMENT: CPT

## 2019-10-07 PROCEDURE — 65660000004 HC RM CVT STEPDOWN

## 2019-10-07 PROCEDURE — 36592 COLLECT BLOOD FROM PICC: CPT

## 2019-10-07 RX ORDER — LISINOPRIL 20 MG/1
20 TABLET ORAL DAILY
Status: DISCONTINUED | OUTPATIENT
Start: 2019-10-08 | End: 2019-10-08

## 2019-10-07 RX ORDER — CARVEDILOL 12.5 MG/1
12.5 TABLET ORAL EVERY 12 HOURS
Status: DISCONTINUED | OUTPATIENT
Start: 2019-10-07 | End: 2019-10-09 | Stop reason: HOSPADM

## 2019-10-07 RX ORDER — ALBUTEROL SULFATE 0.83 MG/ML
2.5 SOLUTION RESPIRATORY (INHALATION)
Status: DISCONTINUED | OUTPATIENT
Start: 2019-10-07 | End: 2019-10-09 | Stop reason: HOSPADM

## 2019-10-07 RX ORDER — POTASSIUM CHLORIDE 14.9 MG/ML
20 INJECTION INTRAVENOUS ONCE
Status: COMPLETED | OUTPATIENT
Start: 2019-10-07 | End: 2019-10-07

## 2019-10-07 RX ORDER — SCOLOPAMINE TRANSDERMAL SYSTEM 1 MG/1
1 PATCH, EXTENDED RELEASE TRANSDERMAL
Status: DISCONTINUED | OUTPATIENT
Start: 2019-10-07 | End: 2019-10-09

## 2019-10-07 RX ORDER — METOCLOPRAMIDE HYDROCHLORIDE 5 MG/ML
5 INJECTION INTRAMUSCULAR; INTRAVENOUS EVERY 6 HOURS
Status: DISCONTINUED | OUTPATIENT
Start: 2019-10-07 | End: 2019-10-08

## 2019-10-07 RX ADMIN — Medication 20 ML: at 22:00

## 2019-10-07 RX ADMIN — TAPENTADOL HYDROCHLORIDE 50 MG: 50 TABLET, FILM COATED ORAL at 00:29

## 2019-10-07 RX ADMIN — LACTULOSE 15 ML: 20 SOLUTION ORAL at 11:20

## 2019-10-07 RX ADMIN — SENNOSIDES, DOCUSATE SODIUM 2 TABLET: 50; 8.6 TABLET, FILM COATED ORAL at 21:52

## 2019-10-07 RX ADMIN — SODIUM CHLORIDE, PRESERVATIVE FREE 600 UNITS: 5 INJECTION INTRAVENOUS at 06:11

## 2019-10-07 RX ADMIN — POTASSIUM CHLORIDE 20 MEQ: 20 TABLET, EXTENDED RELEASE ORAL at 06:22

## 2019-10-07 RX ADMIN — SENNOSIDES, DOCUSATE SODIUM 2 TABLET: 50; 8.6 TABLET, FILM COATED ORAL at 10:21

## 2019-10-07 RX ADMIN — Medication 20 ML: at 06:11

## 2019-10-07 RX ADMIN — METOCLOPRAMIDE 5 MG: 5 INJECTION, SOLUTION INTRAMUSCULAR; INTRAVENOUS at 11:23

## 2019-10-07 RX ADMIN — SODIUM CHLORIDE, PRESERVATIVE FREE 600 UNITS: 5 INJECTION INTRAVENOUS at 22:00

## 2019-10-07 RX ADMIN — ASPIRIN 81 MG: 81 TABLET ORAL at 15:48

## 2019-10-07 RX ADMIN — INSULIN LISPRO 4 UNITS: 100 INJECTION, SOLUTION INTRAVENOUS; SUBCUTANEOUS at 21:55

## 2019-10-07 RX ADMIN — INSULIN LISPRO 2 UNITS: 100 INJECTION, SOLUTION INTRAVENOUS; SUBCUTANEOUS at 18:17

## 2019-10-07 RX ADMIN — CARVEDILOL 12.5 MG: 12.5 TABLET, FILM COATED ORAL at 21:54

## 2019-10-07 RX ADMIN — SODIUM CHLORIDE, PRESERVATIVE FREE 600 UNITS: 5 INJECTION INTRAVENOUS at 15:46

## 2019-10-07 RX ADMIN — HYDRALAZINE HYDROCHLORIDE 10 MG: 20 INJECTION, SOLUTION INTRAMUSCULAR; INTRAVENOUS at 03:20

## 2019-10-07 RX ADMIN — LISINOPRIL 10 MG: 5 TABLET ORAL at 07:43

## 2019-10-07 RX ADMIN — ALBUTEROL SULFATE 2.5 MG: 2.5 SOLUTION RESPIRATORY (INHALATION) at 20:55

## 2019-10-07 RX ADMIN — Medication 10 ML: at 15:54

## 2019-10-07 RX ADMIN — TAPENTADOL HYDROCHLORIDE 50 MG: 50 TABLET, FILM COATED ORAL at 05:01

## 2019-10-07 RX ADMIN — FAMOTIDINE 20 MG: 20 TABLET ORAL at 10:20

## 2019-10-07 RX ADMIN — HYDRALAZINE HYDROCHLORIDE 10 MG: 20 INJECTION, SOLUTION INTRAMUSCULAR; INTRAVENOUS at 10:50

## 2019-10-07 RX ADMIN — INSULIN GLARGINE 10 UNITS: 100 INJECTION, SOLUTION SUBCUTANEOUS at 22:00

## 2019-10-07 RX ADMIN — ALBUTEROL SULFATE 2.5 MG: 2.5 SOLUTION RESPIRATORY (INHALATION) at 16:32

## 2019-10-07 RX ADMIN — MUPIROCIN: 20 OINTMENT TOPICAL at 10:25

## 2019-10-07 RX ADMIN — POTASSIUM CHLORIDE 20 MEQ: 200 INJECTION, SOLUTION INTRAVENOUS at 20:26

## 2019-10-07 RX ADMIN — ACETAMINOPHEN 650 MG: 325 TABLET, FILM COATED ORAL at 15:48

## 2019-10-07 RX ADMIN — MUPIROCIN: 20 OINTMENT TOPICAL at 21:59

## 2019-10-07 RX ADMIN — CARVEDILOL 6.25 MG: 6.25 TABLET, FILM COATED ORAL at 07:44

## 2019-10-07 NOTE — PROGRESS NOTES
Pt showing signs of anxiety. Pt stated she can't breathe Oxygen level had just been O2 sat 93%. Pt has expressed through out the am that she wanted to lay down. Per her plan of care we encourage pt to sit up in recliner until after lunch. Pt is \"flooping her body parts around\" stating how awful she feels. I asked the patient if she got back in the bed if that would help her feel better? She nodded her head \"yes\". Pt moved without difficulty from recliner to bed pulled feet up in bed and turned to side without any issue.

## 2019-10-07 NOTE — PROGRESS NOTES
Today's Date: 10/7/2019  Date of Admission: 10/1/2019    Chart Reviewed. Subjective:     Patient states she feels terrible. She has multiple complaints including abdominal pain and nausea. Medications Reviewed. Objective:     Vitals:    10/07/19 0743 10/07/19 1044 10/07/19 1127 10/07/19 1158   BP: 172/86 171/82 143/68 138/72   Pulse: 85 84 88 92   Resp:   16    Temp:   98.5 °F (36.9 °C)    SpO2:  96% 93%    Weight:       Height:           Intake and Output  Current Shift: No intake/output data recorded. Last 3 Shifts: 10/05 1901 - 10/07 0700  In: 840 [P.O.:840]  Out: 1300 [Urine:1300]    Physical Exam:  General: Well Developed, Well Nourished, No Acute Distress, Alert & Oriented x 3, Appropriate mood  Neck: supple, no JVD  Heart: S1S2 with RRR without murmurs or gallops  Lungs: Clear throughout auscultation bilaterally without adventitious sounds  Abd: soft, nontender, nondistended, with good bowel sounds  Ext: no edema bilaterally  Sternal incision: clean, dry, and intact  Skin: warm and dry    LABS  Data Review:   Recent Labs     10/07/19  0322 10/06/19  0340 10/05/19  0316    147*  --    K 3.9 4.1 4.4   MG 2.8* 2.3 2.7*   BUN 29* 32*  --    CREA 0.93 1.13*  --    * 169*  --    WBC 10.1  --  11.3*   HGB 11.3*  --  10.6*   HCT 35.3*  --  34.0*     --  125*     Estimated Creatinine Clearance: 69.7 mL/min (based on SCr of 0.93 mg/dL).       Assessment/Plan:     Principal Problem:    S/P CABG x 3 (10/2/2019)  On ASA, BB, ACE-I, holding statin for now, pacing wires removed, no BM yet and abdominal pain/nausea/poor appetite, starting Reglan, scopolamine, amylase/lipase ok     Active Problems:    CAD (coronary artery disease) (9/26/2019)  S/p CABG, continue medical therapy       UTI (urinary tract infection) (9/30/2019)  Completed treatment      Mitral valve regurgitation (10/1/2019)  S/p MVR      Hypoxia (10/1/2019)  Resolved, stable on room air      S/P MVR (mitral valve repair) (10/2/2019)  As above       Hypocalcemia (10/2/2019)        Suspected sleep apnea (10/2/2019)        Thrombocytopenia ([facility] [address] (10/2/2019)    Resolved       Systolic CHF, acute on chronic ([address] (10/3/2019)  On BB, ACE-I         Selin Bennett PA-C

## 2019-10-07 NOTE — PROGRESS NOTES
Pt states she is nauseous and dizzy, c/o LUQ abd pain. Discussed with patient this could be a gas pocket in the large intestine causing pain. Heat pack reheated and applied to LUQ. Explained that opioid pain medication will only work against her stomach.

## 2019-10-07 NOTE — PROGRESS NOTES
Bedside report given to [facility] oncoming nurse. Opportunity for questions, concerns. Patient involved.

## 2019-10-07 NOTE — PROGRESS NOTES
Jamie Lawson  Admission Date: 10/1/2019             Daily Progress Note: 10/7/2019    The patient's chart is reviewed and the patient is discussed with the staff. Patient had CABG and mitral valve repair.   She was recently admitted for heart failure and found to have MVCAD with an EF of 40 to 45% in addition to mitral valve regurgitation.      She is a lifelong nonsmoker. Her preop PFTs just showed restriction. Subjective:    Complaining of nausea and abdominal pain. No BM for about a week before admission. States that she had nausea with all three of her pregnancies and had to be hospitalized.      Current Facility-Administered Medications   Medication Dose Route Frequency    senna-docusate (PERICOLACE) 8.6-50 mg per tablet 2 Tab  2 Tab Oral BID    promethazine (PHENERGAN) with saline injection 12.5 mg  12.5 mg IntraVENous Q6H PRN    carvedilol (COREG) tablet 6.25 mg  6.25 mg Oral Q12H    lisinopril (PRINIVIL, ZESTRIL) tablet 10 mg  10 mg Oral DAILY    hydrALAZINE (APRESOLINE) 20 mg/mL injection 10 mg  10 mg IntraVENous Q6H PRN    famotidine (PEPCID) tablet 20 mg  20 mg Oral DAILY    bisacodyl (DULCOLAX) tablet 10 mg  10 mg Oral DAILY PRN    sodium chloride (NS) flush 20 mL  20 mL InterCATHeter Q8H    heparin (porcine) pf 600 Units  600 Units InterCATHeter Q8H    sodium chloride (NS) flush 20 mL  20 mL InterCATHeter PRN    heparin (porcine) pf 600 Units  600 Units InterCATHeter PRN    alum-mag hydroxide-simeth (MYLANTA) oral suspension 30 mL  30 mL Oral Q4H PRN    acetaminophen (TYLENOL) tablet 650 mg  650 mg Oral Q4H PRN    mupirocin (BACTROBAN) 2 % ointment   Both Nostrils BID    magnesium oxide (MAG-OX) tablet 400 mg  400 mg Oral TID PRN    magnesium oxide (MAG-OX) tablet 400 mg  400 mg Oral QID PRN    potassium chloride (KLOR-CON) tablet 10 mEq  10 mEq Oral DAILY    potassium chloride (K-DUR, KLOR-CON) SR tablet 20 mEq  20 mEq Oral BID PRN    potassium chloride (K-DUR, KLOR-CON) SR tablet 40 mEq  40 mEq Oral BID PRN    oxyCODONE-acetaminophen (PERCOCET) 5-325 mg per tablet 1 Tab  1 Tab Oral Q4H PRN    insulin lispro (HUMALOG) injection   SubCUTAneous AC&HS    ondansetron (ZOFRAN) injection 4 mg  4 mg IntraVENous Q6H PRN    aspirin delayed-release tablet 81 mg  81 mg Oral DAILY    nitroglycerin (NITROLINGUAL) sublingual 0.4 mg/spray  1 Spray SubLINGual Q5MIN PRN    insulin glargine (LANTUS) injection 10 Units  10 Units SubCUTAneous QHS    0.9% sodium chloride infusion 250 mL  250 mL IntraVENous PRN    naloxone (NARCAN) injection 0.4 mg  0.4 mg IntraVENous PRN         Objective:     Vitals:    10/07/19 0320 10/07/19 0332 10/07/19 0731 10/07/19 0743   BP: (!) 196/106 129/68 172/80 172/86   Pulse: 88  84 85   Resp: 16  14    Temp: 98.6 °F (37 °C)  97.2 °F (36.2 °C)    SpO2: 93%  95%    Weight: 200 lb 3.2 oz (90.8 kg)      Height:         Intake and Output:   10/05 1901 - 10/07 0700  In: 840 [P.O.:840]  Out: 1300 [Urine:1300]  No intake/output data recorded. Physical Exam:   Constitutional:  the patient is well developed and in no acute distress  HEENT:  Sclera clear, pupils equal, oral mucosa moist  Lungs: clear bilaterally. On room air  Cardiovascular:  RRR without M,G,R. Sinus per telemetry  Abd/GI: soft and tender across lower quadrants; with positive bowel sounds. Ext: warm without cyanosis. There is some edema in her feet. Musculoskeletal: moves all four extremities with equal strength  Skin:  no jaundice or rashes, sternal wound  Neuro: no gross neuro deficits   Musculoskeletal: can ambulate. No deformity  Psychiatric: Calm.      ROS: nauseated, having abdominal and chest pain  Lines: peripheral IV    CHEST XRAY:        LAB  Recent Labs     10/07/19  0322 10/05/19  0316   WBC 10.1 11.3*   HGB 11.3* 10.6*   HCT 35.3* 34.0*    125*     Recent Labs     10/07/19  0322 10/06/19  0340 10/05/19  0316    147*  --    K 3.9 4.1 4.4   * 117* --    CO2 25 17*  --    * 169*  --    BUN 29* 32*  --    CREA 0.93 1.13*  --    MG 2.8* 2.3 2.7*     Lab Results   Component Value Date/Time    Calcium 8.5 10/07/2019 03:22 AM         Assessment:  (Medical Decision Making)     Hospital Problems  Never Reviewed          Codes Class Noted POA    Systolic CHF, acute on chronic (HCC) ICD-10-CM: I50.23  ICD-9-CM: 428.23, 428.0  10/3/2019 Unknown    EF 40 to 45%. No diuretics with nausea    * (Principal) S/P CABG x 3 ICD-10-CM: Z95.1  ICD-9-CM: V45.81  10/2/2019 Unknown    Rhythm stable. Moderate pain    S/P MVR (mitral valve repair) ICD-10-CM: Z01.082  ICD-9-CM: V45.89  10/2/2019 Unknown        Hypocalcemia ICD-10-CM: E83.51  ICD-9-CM: 275.41  10/2/2019 Unknown    corrected    Suspected sleep apnea ICD-10-CM: R29.818  ICD-9-CM: 781.99  10/2/2019 Unknown    No current Rx    Thrombocytopenia ([facility] [address] ICD-10-CM: D69.6  ICD-9-CM: 287.5  10/2/2019 Unknown    correcting    Mitral valve regurgitation ICD-10-CM: I34.0  ICD-9-CM: 424.0  10/1/2019 Yes        Hypoxia ICD-10-CM: R09.02  ICD-9-CM: 799.02  10/1/2019 No    Now on room air    UTI (urinary tract infection) ICD-10-CM: N39.0  ICD-9-CM: 599.0  9/30/2019 Yes    Klebsiella per culture - s/p Rx    CAD (coronary artery disease) ICD-10-CM: I25.10  ICD-9-CM: 414.00  9/26/2019 Yes    S/p CABG          Plan:  (Medical Decision Making)   1. Amylase/lipase ordered by surgery. Will start scheduled Reglan for nausea. She has not had a BM for over a week - order lactulose as well  2. On room air but poor effort with IS due to abdominal/chest pain and nausea. Try to control symptoms and encourage use  3. Have not addressed suspected PHILIP due to her nausea - doubt she would comply with CPAP order right now. Will need office follow up with PSG  4. PRN Apresoline for hypertension - on home meds but currently not well controlled.  Adjust home meds if needed    Kanwal Lovelace NP    More than 50% of time documented was spent face-to-face contact with the patient and in the care of the patient on the floor/unit where the patient is located. Lungs:  Decreased BS with a few trace crackles  Heart:  RRR with no Murmur/Rubs/Gallops    Additional Comments:  Nausea persists. Got lactulose and now going to the BR. Not sleeping well. Hold on KULDEEP since likely not to be meaningful until she is resting better. Try on albuterol/EZ PAP to improve ventilation. I have spoken with and examined the patient. I agree with the above assessment and plan as documented.     Narendra Ascencio MD

## 2019-10-07 NOTE — PROGRESS NOTES
Pacing wires pulled per [facility]. Patient instructed to one hour bedrest with every 15 minute blood pressure checks for one hour. Pt voices understanding.

## 2019-10-07 NOTE — PROGRESS NOTES
Cardiac Rehab: Spoke with patient regarding referral to cardiac rehab. Patient meets admission criteria based on CABG x3 with MVR (10/01/19). Written information about Cardiac Rehab given and reviewed with patient. Discussed lifestyle modifications to promote cardiac wellness. Patient indicated that she will need to be approved for Medicaid to be able to  participate in the cardiac rehab program. We will follow up with her after discharge regarding Cardiac Rehab also. Her Cardiologist is Dr. Austen Mendez.       Thank you,  BETO GarrisonN, RN  Cardiopulmonary Rehabilitation Nurse Liaison  [facility]

## 2019-10-07 NOTE — DIABETES MGMT
Patient s/p CABG x3. Blood glucose range yesterday 168-209 with pt receiving Lantus 10 units and Humalog 10 units. This AM blood glucose 157. Per chart review pt has had poor appetite and complaints of nausea. Pt in recliner this AM. Reviewed pt current regimen: Lantus 10 units nightly and Humalog SSI. Pt talks very quietly to educator with mostly nodding instead of verbal interaction. Pt states she was using insulin pens at home. Pt is comfortable with checking her FSBS. Again reviewed the importance of good glycemic control on wound healing. Encouraged compliance with discharge regimen. Pt voices no further questions regarding diabetes management at this time.

## 2019-10-07 NOTE — PROGRESS NOTES
Pt stated, \" I feel awful. \" pt has not eaten in several days. I explained to patient that not eating  can make you feel drained and weak. I encouraged pt to eat chicken broth and figure out something else she make like to eat today. Pt has been receiving Wynema Mustard which has a side effect of nausea and vomiting will transition patient to Tylenol for pain control.

## 2019-10-07 NOTE — PROGRESS NOTES
Discussed with Bessy Sommers and Tank Munguia NP about continuous nausea minimal PO intake. Aware of LUQ abd pain.

## 2019-10-07 NOTE — PROGRESS NOTES
Problem: Mobility Impaired (Adult and Pediatric)  Goal: *Acute Goals and Plan of Care (Insert Text)  Description  STG:  (1.)Ms. Ward Crystal will move from supine to sit and sit to supine  with CONTACT GUARD ASSIST within 3 treatment day(s). (2.)Ms. Ward Crystal will transfer from bed to chair and chair to bed with CONTACT GUARD ASSIST using the least restrictive device within 3 treatment day(s). (3.)Ms. Ward Crystal will ambulate with CONTACT GUARD ASSIST for 250+ feet with the least restrictive device within 3 treatment day(s). LTG:  (1.)Ms. Ward Crystal will move from supine to sit and sit to supine  in bed with INDEPENDENT within 7 treatment day(s). (2.)Ms. Ward Crystal will transfer from bed to chair and chair to bed with INDEPENDENT using the least restrictive device within 7 treatment day(s). (3.)Ms. Ward Crystal will ambulate with SUPERVISION for 500+ feet with the least restrictive device within 7 treatment day(s).   ________________________________________________________________________________________________     Outcome: Progressing Towards Goal      PHYSICAL THERAPY: Daily Note and AM 10/7/2019  INPATIENT: PT Visit Days : 4  Payor: MEDICAID PENDING / Plan: Josue Soria PENDING / Product Type: Medicaid /       NAME/AGE/GENDER: Mago Lay is a 61 y.o. female   PRIMARY DIAGNOSIS: Atherosclerosis of native coronary artery of native heart with unstable angina pectoris ([facility] [address] [I25.110]  Mitral valve insufficiency, unspecified etiology [I34.0]  CAD (coronary artery disease) [I25.10]  Mitral valve regurgitation [I34.0] S/P CABG x 3   S/P CABG x 3    Procedure(s) (LRB):  CORONARY ARTERY BYPASS GRAFT x  3, LIMA (N/A)  MITRAL VALVE REPAIR (N/A)  VEIN HARVEST GREATER SAPHENOUS (Left)  ESOPHAGEAL TRANS ECHOCARDIOGRAM (N/A)  LEFT ATRIAL APPENDAGE CLOSURE  WIH CLIP (Left)  6 Days Post-Op  ICD-10: Treatment Diagnosis:    · Generalized Muscle Weakness (M62.81)  · Difficulty in walking, Not elsewhere classified (R26.2) Precaution/Allergies:  Patient has no known allergies. Sternal precautions   ASSESSMENT:     Ms. Pratik Mortensen is sitting in the recliner upon contact.  present. Patient agreeable to therapy. Sit to stand with min assist using the momentum method. Pt was able to ambulate about 100' with rolling  walker and CGA. Pt ambulates at slow pace with shuffling steps. Pt returned to chair and after a short rest break the patient participates in therapeutic exercises. Tolerated fairly well. Patient  left up in the recliner with needs within reach and alarm intact. Pt is making slow  progress towards goals due to feeling very nausea. Will continue with POC. This section established at most recent assessment   PROBLEM LIST (Impairments causing functional limitations):  1. Decreased Strength  2. Decreased ADL/Functional Activities  3. Decreased Transfer Abilities  4. Decreased Ambulation Ability/Technique  5. Decreased Balance  6. Increased Pain  7. Decreased Activity Tolerance  8. Decreased Pacing Skills  9. Increased Fatigue  10. Increased Shortness of Breath  11. Decreased Flexibility/Joint Mobility  12. Decreased Knowledge of Precautions  13. Decreased [place] with Home Exercise Program   INTERVENTIONS PLANNED: (Benefits and precautions of physical therapy have been discussed with the patient.)  1. Balance Exercise  2. Bed Mobility  3. Electrical Stimulation  4. Family Education  5. Home Exercise Program (HEP)  6. Neuromuscular Re-education/Strengthening  7. Therapeutic Activites  8. Therapeutic Exercise/Strengthening  9. Transfer Training     TREATMENT PLAN: Frequency/Duration: twice daily for duration of hospital stay  Rehabilitation Potential For Stated Goals: Good     REHAB RECOMMENDATIONS (at time of discharge pending progress):    Placement: It is my opinion, based on this patient's performance to date, that Ms. Pratik Mortensen may benefit from [address] after discharge due to the functional deficits listed above that are likely to improve with skilled rehabilitation because he/she has multiple medical issues that affect his/her functional mobility in the community. Equipment:    None at this time              HISTORY:   History of Present Injury/Illness (Reason for Referral):  S/p Procedure(s) (LRB):  CORONARY ARTERY BYPASS GRAFT x  3, LIMA (N/A)  MITRAL VALVE REPAIR (N/A)  VEIN HARVEST GREATER SAPHENOUS (Left)  ESOPHAGEAL TRANS ECHOCARDIOGRAM (N/A)  LEFT ATRIAL APPENDAGE CLOSURE  WIH CLIP (Left)  Past Medical History/Comorbidities:   Ms. Josi Connelly  has a past medical history of Breast cancer ([facility] [address] (2015), CAD (coronary artery disease), Diabetes ([facility] [address] (typ[place], dx 2016), Heart failure ([facility] [address] Hypercholesteremia, Hypertension, Intertrochanteric fracture of right femur ([facility] [address] (2/24/2018), Mitral valve regurgitation, and PUD (peptic ulcer disease). Ms. Josi Connelly  has a past surgical history that includes hx hip fracture tx (Right); hx breast lumpectomy (2015); and hx orthopaedic (Right).   Social History/Living Environment:   Home Environment: Private residence  # Steps to Enter: 0  One/Two Story Residence: One story  Living Alone: Yes  Support Systems: Spouse/Significant Other/Partner  Patient Expects to be Discharged to[de-identified] Private residence  Current DME Used/Available at Home: None  Prior Level of Function/Work/Activity:  Independent with gait and ADLs, 0 falls, drives   Number of Personal Factors/Comorbidities that affect the Plan of Care: 3+: HIGH COMPLEXITY   EXAMINATION:   Most Recent Physical Functioning:   Gross Assessment:                  Posture:     Balance:  Sitting: Intact  Sitting - Static: Good (unsupported)  Sitting - Dynamic: Fair (occasional)  Standing: Impaired  Standing - Static: Fair  Standing - Dynamic : Fair Bed Mobility:     Wheelchair Mobility:     Transfers:  Sit to Stand: Contact guard assistance  Stand to Sit: Contact guard assistance  Gait:     Base of Support: Center of gravity altered;Narrowed  Speed/Yadi: Shuffled; Slow  Step Length: Left shortened;Right shortened  Gait Abnormalities: Decreased step clearance  Distance (ft): 100 Feet (ft)  Assistive Device: Walker, rolling  Ambulation - Level of Assistance: Contact guard assistance  Interventions: Safety awareness training      Body Structures Involved:  1. Heart  2. Lungs  3. Thoracic Cage  4. Bones  5. Joints  6. Muscles  7. Ligaments Body Functions Affected:  1. Sensory/Pain  2. Cardio  3. Respiratory  4. Neuromusculoskeletal  5. Movement Related Activities and Participation Affected:  1. General Tasks and Demands  2. Mobility  3. Self Care  4. Domestic Life  5. Interpersonal Interactions and Relationships  6. Community, Social and [place]   Number of elements that affect the Plan of Care: 4+: HIGH COMPLEXITY   CLINICAL PRESENTATION:   Presentation: Evolving clinical presentation with changing clinical characteristics: MODERATE COMPLEXITY   CLINICAL DECISION MAKIN [facility] Mobility Inpatient Short Form  How much difficulty does the patient currently have. .. Unable A Lot A Little None   1. Turning over in bed (including adjusting bedclothes, sheets and blankets)? ? 1   ? 2   ? 3   ? 4   2. Sitting down on and standing up from a chair with arms ( e.g., wheelchair, bedside commode, etc.)   ? 1   ? 2   ? 3   ? 4   3. Moving from lying on back to sitting on the side of the bed?   ? 1   ? 2   ? 3   ? 4   How much help from another person does the patient currently need. .. Total A Lot A Little None   4. Moving to and from a bed to a chair (including a wheelchair)? ? 1   ? 2   ? 3   ? 4   5. Need to walk in hospital room? ? 1   ? 2   ? 3   ? 4   6. Climbing 3-5 steps with a railing? ? 1   ? 2   ? 3   ? 4   © 2007, Trustees of [address] [place] [address] [address] [id number], under license to [employer].  All rights reserved      Score:  Initial: 17 Most Recent: X (Date: -- )    Interpretation of Tool: Represents activities that are increasingly more difficult (i.e. Bed mobility, Transfers, Gait). Medical Necessity:     · Patient is expected to demonstrate progress in strength, balance, coordination and functional technique  ·  to decrease assistance required with gait, transfers, and functional mobility   · . Reason for Services/Other Comments:  · Patient continues to require skilled intervention due to decreased strength, decreased balance, decreased functional tolerance, decreased cardiopulmonary endurance affecting participation in basic ADLs and functional tasks. · .   Use of outcome tool(s) and clinical judgement create a POC that gives a: Clear prediction of patient's progress: LOW COMPLEXITY            TREATMENT:      Pre-treatment Symptoms/Complaints: \"OK\"  Pain: Initial:     0/10 Post Session:  0/10     Therapeutic Activity: (    12 minutes): Therapeutic activities including Chair transfers, Ambulation on level ground and cues for ease and safety of transfers, education on sternal precautions  to improve mobility, strength, balance and coordination. Required minimal Safety awareness training to promote static and dynamic balance in standing and promote coordination of bilateral, upper extremity(s), lower extremity(s). Therapeutic Exercise: ( 12 minutes):  Exercises per grid below to improve mobility, strength, balance and coordination. Required minimum  verbal cues to perform exercises correctly. Progressed repetitions and complexity of movement as indicated.        Date:  10/07/19 Date:   Date:     ACTIVITY/EXERCISE AM PM AM PM AM PM   LAQ 10        Shoulder shrugs 10        Gluteal sets 10        marching         Ankle pumps 10        abduction                  B = bilateral; AA = active assistive; A = active; P = passive  Braces/Orthotics/Lines/Etc:   ·   Treatment/Session Assessment:    · Response to Treatment:  100 feet w/RW slow ziggy  · Interdisciplinary Collaboration: o Physical Therapy Assistant  o Registered Nurse  · After treatment position/precautions:   o Up in chair  o Bed alarm/tab alert on  o Bed/Chair-wheels locked  o Call light within reach  o RN notified  o Family at bedside   · Compliance with Program/Exercises: Will assess as treatment progresses  · Recommendations/Intent for next treatment session: \"Next visit will focus on advancements to more challenging activities and reduction in assistance provided\".   Total Treatment Duration:  PT Patient Time In/Time Out  Time In: 0939  Time Out: 1003    Iliana Mcdaniels-Michael, PTA

## 2019-10-07 NOTE — PROGRESS NOTES
Problem: Mobility Impaired (Adult and Pediatric)  Goal: *Acute Goals and Plan of Care (Insert Text)  Description  STG:  (1.)Ms. Lawrence Hopson will move from supine to sit and sit to supine  with CONTACT GUARD ASSIST within 3 treatment day(s). (2.)Ms. Lawrence Hopson will transfer from bed to chair and chair to bed with CONTACT GUARD ASSIST using the least restrictive device within 3 treatment day(s). (3.)Ms. Lawrence Hopson will ambulate with CONTACT GUARD ASSIST for 250+ feet with the least restrictive device within 3 treatment day(s). LTG:  (1.)Ms. Lawrence Hopson will move from supine to sit and sit to supine  in bed with INDEPENDENT within 7 treatment day(s). (2.)Ms. Lawrence Hopson will transfer from bed to chair and chair to bed with INDEPENDENT using the least restrictive device within 7 treatment day(s). (3.)Ms. Lawrence Hopson will ambulate with SUPERVISION for 500+ feet with the least restrictive device within 7 treatment day(s).   ________________________________________________________________________________________________     Outcome: Progressing Towards Goal      PHYSICAL THERAPY: Daily Note and PM 10/7/2019  INPATIENT: PT Visit Days : 4  Payor: MEDICAID PENDING / Plan: [place] PENDING / Product Type: Medicaid /       NAME/AGE/GENDER: Bandar Rodriguez is a 61 y.o. female   PRIMARY DIAGNOSIS: Atherosclerosis of native coronary artery of native heart with unstable angina pectoris ([address] [I25.110]  Mitral valve insufficiency, unspecified etiology [I34.0]  CAD (coronary artery disease) [I25.10]  Mitral valve regurgitation [I34.0] S/P CABG x 3   S/P CABG x 3    Procedure(s) (LRB):  CORONARY ARTERY BYPASS GRAFT x  3, LIMA (N/A)  MITRAL VALVE REPAIR (N/A)  VEIN HARVEST GREATER SAPHENOUS (Left)  ESOPHAGEAL TRANS ECHOCARDIOGRAM (N/A)  LEFT ATRIAL APPENDAGE CLOSURE  WIH CLIP (Left)  6 Days Post-Op  ICD-10: Treatment Diagnosis:    · Generalized Muscle Weakness (M62.81)  · Difficulty in walking, Not elsewhere classified (R26.2) Precaution/Allergies:  Patient has no known allergies. Sternal precautions   ASSESSMENT:     Ms. Fatuma Gibbons is sitting in the recliner upon contact.  present. Patient agreeable to therapy. Sit to stand with min assist using the momentum method. Pt was able to ambulate about 100' with rolling  walker and CGA. Pt ambulates at slow pace with shuffling steps. Pt returned to chair and after a short rest break the patient participates in therapeutic exercises. Tolerated fairly well. Patient  left up in the recliner with needs within reach and alarm intact. Pt is making slow  progress towards goals due to feeling very nausea. Will continue with POC. PM note:  Patient is agreeable. Gait training with rolling walker continued with the patient maintaining am gait distance. Patient is returned tot he recliner and after a short rest break the patient participates in therapeutic exercises. Patient requires constant supervision for completion and proper performance of the exercises. Patient is left sitting in the recliner with needs within reach and alarm intact.  at bedside. Will continue PT efforts. This section established at most recent assessment   PROBLEM LIST (Impairments causing functional limitations):  1. Decreased Strength  2. Decreased ADL/Functional Activities  3. Decreased Transfer Abilities  4. Decreased Ambulation Ability/Technique  5. Decreased Balance  6. Increased Pain  7. Decreased Activity Tolerance  8. Decreased Pacing Skills  9. Increased Fatigue  10. Increased Shortness of Breath  11. Decreased Flexibility/Joint Mobility  12. Decreased Knowledge of Precautions  13. Decreased [place] with Home Exercise Program   INTERVENTIONS PLANNED: (Benefits and precautions of physical therapy have been discussed with the patient.)  1. Balance Exercise  2. Bed Mobility  3. Electrical Stimulation  4. Family Education  5.  Home Exercise Program (HEP)  6. Neuromuscular Re-education/Strengthening  7. Therapeutic Activites  8. Therapeutic Exercise/Strengthening  9. Transfer Training     TREATMENT PLAN: Frequency/Duration: twice daily for duration of hospital stay  Rehabilitation Potential For Stated Goals: Good     REHAB RECOMMENDATIONS (at time of discharge pending progress):    Placement: It is my opinion, based on this patient's performance to date, that Ms. Claudia Mir may benefit from [address] after discharge due to the functional deficits listed above that are likely to improve with skilled rehabilitation because he/she has multiple medical issues that affect his/her functional mobility in the community. Equipment:    None at this time              HISTORY:   History of Present Injury/Illness (Reason for Referral):  S/p Procedure(s) (LRB):  CORONARY ARTERY BYPASS GRAFT x  3, LIMA (N/A)  MITRAL VALVE REPAIR (N/A)  VEIN HARVEST GREATER SAPHENOUS (Left)  ESOPHAGEAL TRANS ECHOCARDIOGRAM (N/A)  LEFT ATRIAL APPENDAGE CLOSURE  WIH CLIP (Left)  Past Medical History/Comorbidities:   Ms. Claudia Mir  has a past medical history of Breast cancer ([facility] [address] (2015), CAD (coronary artery disease), Diabetes ([facility] [address] ([place], dx 2016), Heart failure ([facility] [address] Hypercholesteremia, Hypertension, Intertrochanteric fracture of right femur ([facility] [address] (2/24/2018), Mitral valve regurgitation, and PUD (peptic ulcer disease). Ms. Claudia Mir  has a past surgical history that includes hx hip fracture tx (Right); hx breast lumpectomy (2015); and hx orthopaedic (Right).   Social History/Living Environment:   Home Environment: Private residence  # Steps to Enter: 0  One/Two Story Residence: One story  Living Alone: Yes  Support Systems: Spouse/Significant Other/Partner  Patient Expects to be Discharged to[de-identified] Private residence  Current DME Used/Available at Home: None  Prior Level of Function/Work/Activity:  Independent with gait and ADLs, 0 falls, drives   Number of Personal Factors/Comorbidities that affect the Plan of Care: 3+: HIGH COMPLEXITY   EXAMINATION:   Most Recent Physical Functioning:   Gross Assessment:                  Posture:     Balance:  Sitting: Intact  Sitting - Static: Good (unsupported)  Sitting - Dynamic: Fair (occasional)  Standing: Impaired  Standing - Static: Fair  Standing - Dynamic : Fair Bed Mobility:     Wheelchair Mobility:     Transfers:  Sit to Stand: Contact guard assistance  Stand to Sit: Contact guard assistance  Gait:     Base of Support: Center of gravity altered;Narrowed  Speed/Yadi: Shuffled; Slow  Step Length: Left shortened;Right shortened  Gait Abnormalities: Decreased step clearance  Distance (ft): (100)  Assistive Device: Walker, rolling  Ambulation - Level of Assistance: Contact guard assistance  Interventions: Safety awareness training      Body Structures Involved:  1. Heart  2. Lungs  3. Thoracic Cage  4. Bones  5. Joints  6. Muscles  7. Ligaments Body Functions Affected:  1. Sensory/Pain  2. Cardio  3. Respiratory  4. Neuromusculoskeletal  5. Movement Related Activities and Participation Affected:  1. General Tasks and Demands  2. Mobility  3. Self Care  4. Domestic Life  5. Interpersonal Interactions and Relationships  6. Community, Social and [place]   Number of elements that affect the Plan of Care: 4+: HIGH COMPLEXITY   CLINICAL PRESENTATION:   Presentation: Evolving clinical presentation with changing clinical characteristics: MODERATE COMPLEXITY   CLINICAL DECISION MAKIN [facility] Short Form  How much difficulty does the patient currently have. .. Unable A Lot A Little None   1. Turning over in bed (including adjusting bedclothes, sheets and blankets)? ? 1   ? 2   ? 3   ? 4   2. Sitting down on and standing up from a chair with arms ( e.g., wheelchair, bedside commode, etc.)   ? 1   ? 2   ? 3   ? 4   3.   Moving from lying on back to sitting on the side of the bed?   ? 1   ? 2   ? 3   ? 4   How much help from another person does the patient currently need. .. Total A Lot A Little None   4. Moving to and from a bed to a chair (including a wheelchair)? ? 1   ? 2   ? 3   ? 4   5. Need to walk in hospital room? ? 1   ? 2   ? 3   ? 4   6. Climbing 3-5 steps with a railing? ? 1   ? 2   ? 3   ? 4   © 2007, Trustees of [address] [place] [address] [address] [address], under license to [employer]. All rights reserved      Score:  Initial: 17 Most Recent: X (Date: -- )    Interpretation of Tool:  Represents activities that are increasingly more difficult (i.e. Bed mobility, Transfers, Gait). Medical Necessity:     · Patient is expected to demonstrate progress in strength, balance, coordination and functional technique  ·  to decrease assistance required with gait, transfers, and functional mobility   · . Reason for Services/Other Comments:  · Patient continues to require skilled intervention due to decreased strength, decreased balance, decreased functional tolerance, decreased cardiopulmonary endurance affecting participation in basic ADLs and functional tasks. · .   Use of outcome tool(s) and clinical judgement create a POC that gives a: Clear prediction of patient's progress: LOW COMPLEXITY            TREATMENT:      Pre-treatment Symptoms/Complaints: \"OK\"  Pain: Initial:   Pain Intensity 1: (no number given) 0/10 Post Session:  0/10     Therapeutic Activity: (    12 minutes): Therapeutic activities including Chair transfers, Ambulation on level ground and cues for ease and safety of transfers, education on sternal precautions  to improve mobility, strength, balance and coordination. Required minimal Safety awareness training to promote static and dynamic balance in standing and promote coordination of bilateral, upper extremity(s), lower extremity(s). Therapeutic Exercise: ( 11 minutes):  Exercises per grid below to improve mobility, strength, balance and coordination. Required minimum  verbal cues to perform exercises correctly. Progressed repetitions and complexity of movement as indicated. Date:  10/07/19 Date:   Date:     ACTIVITY/EXERCISE AM PM AM PM AM PM   LAQ 10 10       Shoulder shrugs 10 10       Gluteal sets 10 10       marching  10       Ankle pumps 10 10       abduction                  B = bilateral; AA = active assistive; A = active; P = passive  Braces/Orthotics/Lines/Etc:   ·   Treatment/Session Assessment:    · Response to Treatment:  100 feet w/RW slow ziggy  · Interdisciplinary Collaboration:   o Physical Therapy Assistant  o Registered Nurse  · After treatment position/precautions:   o Up in chair  o Bed alarm/tab alert on  o Bed/Chair-wheels locked  o Call light within reach  o RN notified  o Family at bedside   · Compliance with Program/Exercises: Will assess as treatment progresses  · Recommendations/Intent for next treatment session: \"Next visit will focus on advancements to more challenging activities and reduction in assistance provided\".   Total Treatment Duration:  PT Patient Time In/Time Out  Time In: 1420  Time Out: 1443    Iliana Mcdaniels-Michael, PTA

## 2019-10-07 NOTE — PROGRESS NOTES
Pt c/o chest, abd pain given Lactulose and Reglan to assist with BM. I informed patient that she has broken bone in her chest and pain is to be expected. Waiting for Reglan and lactulose to take work before giving Tylenol.

## 2019-10-07 NOTE — PROGRESS NOTES
Pt expressed desire to get back in bed. I explained to pt that this is not a part of plan of care. Lying in the bed puts her at risk for pneumonia and ileus. Instructed pt that she needs to be awake during the day and use her IS along with walking and ambulating to progress with her recovery. Positioned pt in recliner for comfort. Current recliner backrest will not stay reclined. I will find patient different recliner for comfort. Warm pack provided for abd discomfort. I attempted to explain to patient and spouse about why pt is having pain in chest related to surgical site not pacing wires. Spouse also requested that \"stomach xray be done\". I stated I would discuss that with [facility]. I explained to pt and spouse that some patients have difficulty tolerating opioid type pain medications which this is what she has been receiving and the plan is to transition to Tylenol for pain control and allow the opioids to filter out of her system. Patient and spouse still appear to be having difficulty grasping goal and plan of care.

## 2019-10-07 NOTE — PROGRESS NOTES
Care Management Interventions  PCP Verified by CM: Yes(Pt does NOT have a PCP)  Mode of Transport at Discharge: Other (see comment)(TBD based on need)  Transition of Care Consult (CM Consult): Discharge Planning, [address]: Yes  Discharge Durable Medical Equipment: No  Physical Therapy Consult: Yes  Occupational Therapy Consult: No  Speech Therapy Consult: No  Current Support Network: Own Home, Lives Alone  Confirm Follow Up Transport: Other (see comment)(TBD based on need)  Plan discussed with Pt/Family/Caregiver: Yes  Freedom of Choice Offered: Yes  Discharge Location  Discharge Placement: Home with home health      This CM met with pt at bedside this day to complete assessment. Pt verified that she does NOT have a PCP, does NOT have insurance, confirms her emergency contact, and home address. Per chart, Hannah Angela has met with pt and started Medicaid paperwork. We discussed having referral to sent to [facility] - pt agreeable. She lives at home alone with no steps to enter. She confirms she has a rolling walker at home. She confirms that at baseline prior to admission she was independent with her ADLs including bathing, dressing, cooking, and driving. We discussed discharge planning and at this time pt plans to return home but will be having a friend stay with her. We discussed HH and she is agreeable to referral.  No additional discharge needs voiced by pt at this time. This CM to make referral to [facility] and anticipate pt will need medications voucher at discharge. Will continue to monitor.

## 2019-10-07 NOTE — PROGRESS NOTES
Bedside shift change report given to myself (oncoming nurse) by Leonor Cramer RN (offgoing nurse). Report included the following information SBAR, Kardex, Intake/Output, MAR, Recent Results and Cardiac Rhythm NSR.

## 2019-10-08 ENCOUNTER — APPOINTMENT (OUTPATIENT)
Dept: GENERAL RADIOLOGY | Age: 59
DRG: 163 | End: 2019-10-08
Attending: PHYSICIAN ASSISTANT
Payer: MEDICAID

## 2019-10-08 LAB
ALBUMIN SERPL-MCNC: 2.6 G/DL (ref 3.5–5)
ALBUMIN/GLOB SERPL: 0.7 {RATIO} (ref 1.2–3.5)
ALP SERPL-CCNC: 82 U/L (ref 50–136)
ALT SERPL-CCNC: 34 U/L (ref 12–65)
AST SERPL-CCNC: 23 U/L (ref 15–37)
BILIRUB DIRECT SERPL-MCNC: 0.1 MG/DL
BILIRUB SERPL-MCNC: 0.5 MG/DL (ref 0.2–1.1)
GLOBULIN SER CALC-MCNC: 3.6 G/DL (ref 2.3–3.5)
GLUCOSE BLD STRIP.AUTO-MCNC: 132 MG/DL (ref 65–100)
GLUCOSE BLD STRIP.AUTO-MCNC: 159 MG/DL (ref 65–100)
GLUCOSE BLD STRIP.AUTO-MCNC: 177 MG/DL (ref 65–100)
GLUCOSE BLD STRIP.AUTO-MCNC: 200 MG/DL (ref 65–100)
MAGNESIUM SERPL-MCNC: 2.9 MG/DL (ref 1.8–2.4)
POTASSIUM SERPL-SCNC: 3.9 MMOL/L (ref 3.5–5.1)
PROT SERPL-MCNC: 6.2 G/DL (ref 6.3–8.2)

## 2019-10-08 PROCEDURE — 80076 HEPATIC FUNCTION PANEL: CPT

## 2019-10-08 PROCEDURE — 99232 SBSQ HOSP IP/OBS MODERATE 35: CPT | Performed by: INTERNAL MEDICINE

## 2019-10-08 PROCEDURE — 74011250636 HC RX REV CODE- 250/636: Performed by: THORACIC SURGERY (CARDIOTHORACIC VASCULAR SURGERY)

## 2019-10-08 PROCEDURE — 84132 ASSAY OF SERUM POTASSIUM: CPT

## 2019-10-08 PROCEDURE — 94760 N-INVAS EAR/PLS OXIMETRY 1: CPT

## 2019-10-08 PROCEDURE — 74011636637 HC RX REV CODE- 636/637: Performed by: THORACIC SURGERY (CARDIOTHORACIC VASCULAR SURGERY)

## 2019-10-08 PROCEDURE — 74011250637 HC RX REV CODE- 250/637: Performed by: PHYSICIAN ASSISTANT

## 2019-10-08 PROCEDURE — 97530 THERAPEUTIC ACTIVITIES: CPT

## 2019-10-08 PROCEDURE — 74011250636 HC RX REV CODE- 250/636: Performed by: PHYSICIAN ASSISTANT

## 2019-10-08 PROCEDURE — 74011000250 HC RX REV CODE- 250: Performed by: INTERNAL MEDICINE

## 2019-10-08 PROCEDURE — 94640 AIRWAY INHALATION TREATMENT: CPT

## 2019-10-08 PROCEDURE — 65660000004 HC RM CVT STEPDOWN

## 2019-10-08 PROCEDURE — 74022 RADEX COMPL AQT ABD SERIES: CPT

## 2019-10-08 PROCEDURE — 77030020263 HC SOL INJ SOD CL0.9% LFCR 1000ML

## 2019-10-08 PROCEDURE — 77030012890

## 2019-10-08 PROCEDURE — 74011250636 HC RX REV CODE- 250/636: Performed by: NURSE PRACTITIONER

## 2019-10-08 PROCEDURE — 83735 ASSAY OF MAGNESIUM: CPT

## 2019-10-08 PROCEDURE — 36592 COLLECT BLOOD FROM PICC: CPT

## 2019-10-08 PROCEDURE — 82962 GLUCOSE BLOOD TEST: CPT

## 2019-10-08 PROCEDURE — 74011250637 HC RX REV CODE- 250/637: Performed by: THORACIC SURGERY (CARDIOTHORACIC VASCULAR SURGERY)

## 2019-10-08 RX ORDER — LISINOPRIL 20 MG/1
20 TABLET ORAL 2 TIMES DAILY
Status: DISCONTINUED | OUTPATIENT
Start: 2019-10-08 | End: 2019-10-09 | Stop reason: HOSPADM

## 2019-10-08 RX ORDER — METOCLOPRAMIDE HYDROCHLORIDE 5 MG/ML
10 INJECTION INTRAMUSCULAR; INTRAVENOUS EVERY 6 HOURS
Status: DISCONTINUED | OUTPATIENT
Start: 2019-10-08 | End: 2019-10-08

## 2019-10-08 RX ORDER — POTASSIUM CHLORIDE 29.8 MG/ML
40 INJECTION INTRAVENOUS ONCE
Status: COMPLETED | OUTPATIENT
Start: 2019-10-08 | End: 2019-10-08

## 2019-10-08 RX ORDER — SODIUM CHLORIDE 9 MG/ML
50 INJECTION, SOLUTION INTRAVENOUS CONTINUOUS
Status: DISCONTINUED | OUTPATIENT
Start: 2019-10-08 | End: 2019-10-09

## 2019-10-08 RX ORDER — METOCLOPRAMIDE HYDROCHLORIDE 5 MG/ML
10 INJECTION INTRAMUSCULAR; INTRAVENOUS
Status: DISCONTINUED | OUTPATIENT
Start: 2019-10-08 | End: 2019-10-09 | Stop reason: HOSPADM

## 2019-10-08 RX ORDER — FACIAL-BODY WIPES
10 EACH TOPICAL
Status: ACTIVE | OUTPATIENT
Start: 2019-10-08 | End: 2019-10-08

## 2019-10-08 RX ADMIN — LISINOPRIL 20 MG: 20 TABLET ORAL at 08:32

## 2019-10-08 RX ADMIN — ALBUTEROL SULFATE 2.5 MG: 2.5 SOLUTION RESPIRATORY (INHALATION) at 08:10

## 2019-10-08 RX ADMIN — INSULIN LISPRO 4 UNITS: 100 INJECTION, SOLUTION INTRAVENOUS; SUBCUTANEOUS at 12:29

## 2019-10-08 RX ADMIN — Medication 20 ML: at 05:00

## 2019-10-08 RX ADMIN — Medication 20 ML: at 21:16

## 2019-10-08 RX ADMIN — METOCLOPRAMIDE 5 MG: 5 INJECTION, SOLUTION INTRAMUSCULAR; INTRAVENOUS at 05:11

## 2019-10-08 RX ADMIN — TAPENTADOL HYDROCHLORIDE 50 MG: 50 TABLET, FILM COATED ORAL at 00:40

## 2019-10-08 RX ADMIN — LISINOPRIL 20 MG: 20 TABLET ORAL at 16:52

## 2019-10-08 RX ADMIN — Medication 20 ML: at 13:42

## 2019-10-08 RX ADMIN — SODIUM CHLORIDE, PRESERVATIVE FREE 600 UNITS: 5 INJECTION INTRAVENOUS at 21:17

## 2019-10-08 RX ADMIN — ALBUTEROL SULFATE 2.5 MG: 2.5 SOLUTION RESPIRATORY (INHALATION) at 16:49

## 2019-10-08 RX ADMIN — LACTULOSE 30 ML: 20 SOLUTION ORAL at 13:42

## 2019-10-08 RX ADMIN — ASPIRIN 81 MG: 81 TABLET ORAL at 16:53

## 2019-10-08 RX ADMIN — INSULIN LISPRO 2 UNITS: 100 INJECTION, SOLUTION INTRAVENOUS; SUBCUTANEOUS at 08:40

## 2019-10-08 RX ADMIN — HYDRALAZINE HYDROCHLORIDE 10 MG: 20 INJECTION, SOLUTION INTRAMUSCULAR; INTRAVENOUS at 05:19

## 2019-10-08 RX ADMIN — SODIUM CHLORIDE, PRESERVATIVE FREE 300 UNITS: 5 INJECTION INTRAVENOUS at 16:50

## 2019-10-08 RX ADMIN — CARVEDILOL 12.5 MG: 12.5 TABLET, FILM COATED ORAL at 21:17

## 2019-10-08 RX ADMIN — Medication 10 ML: at 05:15

## 2019-10-08 RX ADMIN — CARVEDILOL 12.5 MG: 12.5 TABLET, FILM COATED ORAL at 08:33

## 2019-10-08 RX ADMIN — MUPIROCIN: 20 OINTMENT TOPICAL at 08:34

## 2019-10-08 RX ADMIN — ALBUTEROL SULFATE 2.5 MG: 2.5 SOLUTION RESPIRATORY (INHALATION) at 19:38

## 2019-10-08 RX ADMIN — HYDRALAZINE HYDROCHLORIDE 10 MG: 20 INJECTION, SOLUTION INTRAMUSCULAR; INTRAVENOUS at 16:51

## 2019-10-08 RX ADMIN — ACETAMINOPHEN 650 MG: 325 TABLET, FILM COATED ORAL at 16:50

## 2019-10-08 RX ADMIN — POTASSIUM CHLORIDE 40 MEQ: 400 INJECTION, SOLUTION INTRAVENOUS at 12:19

## 2019-10-08 RX ADMIN — SODIUM CHLORIDE, PRESERVATIVE FREE 600 UNITS: 5 INJECTION INTRAVENOUS at 05:01

## 2019-10-08 RX ADMIN — SODIUM CHLORIDE 50 ML/HR: 900 INJECTION, SOLUTION INTRAVENOUS at 08:52

## 2019-10-08 RX ADMIN — INSULIN GLARGINE 10 UNITS: 100 INJECTION, SOLUTION SUBCUTANEOUS at 21:16

## 2019-10-08 NOTE — PROGRESS NOTES
Paged Dr. Marilyn Aguilar about patient complaining of L arm pain and restlessness. Neuro assessment completed-see complex assessment. VS stable. However, pt states, \"I can't breathe. \" O2 sat-93% Patient states pain 7/10, but does not want to take anything. Verbal orders placed for Nucynta Q4hrs PRN. Will continue to monitor.

## 2019-10-08 NOTE — PROGRESS NOTES
Bedside shift change report given to Viktor Montez RN (oncoming nurse) by myself (offgoing nurse). Report included the following information SBAR, Kardex, Intake/Output, MAR, Recent Results and Cardiac Rhythm NSR.

## 2019-10-08 NOTE — PROGRESS NOTES
Pt stated she needed help to bathroom. She was incontinent of loose yellow brown stool. Assisted to bathroom she finished BM on toilet loose to semi formed stool moderate amount. Pt then placed in shower assisted with hygiene.  Back to recliner

## 2019-10-08 NOTE — PROGRESS NOTES
Problem: Mobility Impaired (Adult and Pediatric)  Goal: *Acute Goals and Plan of Care (Insert Text)  Description  STG:  (1.)Ms. Maribell Ballesteros will move from supine to sit and sit to supine  with CONTACT GUARD ASSIST within 3 treatment day(s). (2.)Ms. Maribell Ballesteros will transfer from bed to chair and chair to bed with CONTACT GUARD ASSIST using the least restrictive device within 3 treatment day(s). (3.)Ms. Maribell Ballesteros will ambulate with CONTACT GUARD ASSIST for 250+ feet with the least restrictive device within 3 treatment day(s). LTG:  (1.)Ms. Maribell Ballesteros will move from supine to sit and sit to supine  in bed with INDEPENDENT within 7 treatment day(s). (2.)Ms. Maribell Ballesteros will transfer from bed to chair and chair to bed with INDEPENDENT using the least restrictive device within 7 treatment day(s). (3.)Ms. Maribell Ballesteros will ambulate with SUPERVISION for 500+ feet with the least restrictive device within 7 treatment day(s).   ________________________________________________________________________________________________     Outcome: Progressing Towards Goal      PHYSICAL THERAPY: Daily Note and AM 10/8/2019  INPATIENT: PT Visit Days : 5  Payor: MEDICAID PENDING / Plan: [place] PENDING / Product Type: Medicaid /       NAME/AGE/GENDER: Donna Henderson is a 61 y.o. female   PRIMARY DIAGNOSIS: Atherosclerosis of native coronary artery of native heart with unstable angina pectoris ([facility] [address] [I25.110]  Mitral valve insufficiency, unspecified etiology [I34.0]  CAD (coronary artery disease) [I25.10]  Mitral valve regurgitation [I34.0] S/P CABG x 3   S/P CABG x 3    Procedure(s) (LRB):  CORONARY ARTERY BYPASS GRAFT x  3, LIMA (N/A)  MITRAL VALVE REPAIR (N/A)  VEIN HARVEST GREATER SAPHENOUS (Left)  ESOPHAGEAL TRANS ECHOCARDIOGRAM (N/A)  LEFT ATRIAL APPENDAGE CLOSURE  WIH CLIP (Left)  7 Days Post-Op  ICD-10: Treatment Diagnosis:    · Generalized Muscle Weakness (M62.81)  · Difficulty in walking, Not elsewhere classified (R26.2) Precaution/Allergies:  Patient has no known allergies. Sternal precautions   ASSESSMENT:     Ms. Jaclyn Case is sitting in the recliner upon contact.  present. Patient agreeable to therapy with some encouragement from the RN. Sit to stand with min assist using the momentum method. Pt was able to ambulate about 100' with rolling  walker and CGA. Pt ambulates at slow pace with shuffling steps. Pt returned to chair and positioned for comfort. Patient is awaiting transport to X-ray. Tolerated fairly well. Pt is making slow  progress towards goals due to feeling very nausea. Will continue with POC. This section established at most recent assessment   PROBLEM LIST (Impairments causing functional limitations):  1. Decreased Strength  2. Decreased ADL/Functional Activities  3. Decreased Transfer Abilities  4. Decreased Ambulation Ability/Technique  5. Decreased Balance  6. Increased Pain  7. Decreased Activity Tolerance  8. Decreased Pacing Skills  9. Increased Fatigue  10. Increased Shortness of Breath  11. Decreased Flexibility/Joint Mobility  12. Decreased Knowledge of Precautions  13. Decreased [place] with Home Exercise Program   INTERVENTIONS PLANNED: (Benefits and precautions of physical therapy have been discussed with the patient.)  1. Balance Exercise  2. Bed Mobility  3. Electrical Stimulation  4. Family Education  5. Home Exercise Program (HEP)  6. Neuromuscular Re-education/Strengthening  7. Therapeutic Activites  8. Therapeutic Exercise/Strengthening  9. Transfer Training     TREATMENT PLAN: Frequency/Duration: twice daily for duration of hospital stay  Rehabilitation Potential For Stated Goals: Good     REHAB RECOMMENDATIONS (at time of discharge pending progress):    Placement: It is my opinion, based on this patient's performance to date, that Ms. Jaclyn Case may benefit from [address] after discharge due to the functional deficits listed above that are likely to improve with skilled rehabilitation because he/she has multiple medical issues that affect his/her functional mobility in the community. Equipment:    None at this time              HISTORY:   History of Present Injury/Illness (Reason for Referral):  S/p Procedure(s) (LRB):  CORONARY ARTERY BYPASS GRAFT x  3, LIMA (N/A)  MITRAL VALVE REPAIR (N/A)  VEIN HARVEST GREATER SAPHENOUS (Left)  ESOPHAGEAL TRANS ECHOCARDIOGRAM (N/A)  LEFT ATRIAL APPENDAGE CLOSURE  WIH CLIP (Left)  Past Medical History/Comorbidities:   Ms. Zuhair Gonzales  has a past medical history of Breast cancer ([facility] [address] (2015), CAD (coronary artery disease), Diabetes ([facility] [address] ([place], dx 2016), Heart failure ([facility] [address] Hypercholesteremia, Hypertension, Intertrochanteric fracture of right femur ([facility] [address] (2/24/2018), Mitral valve regurgitation, and PUD (peptic ulcer disease). Ms. Zuhair Gonzales  has a past surgical history that includes hx hip fracture tx (Right); hx breast lumpectomy (2015); and hx orthopaedic (Right).   Social History/Living Environment:   Home Environment: Private residence  # Steps to Enter: 0  One/Two Story Residence: One story  Living Alone: Yes  Support Systems: Spouse/Significant Other/Partner  Patient Expects to be Discharged to[de-identified] Private residence  Current DME Used/Available at Home: None  Prior Level of Function/Work/Activity:  Independent with gait and ADLs, 0 falls, drives   Number of Personal Factors/Comorbidities that affect the Plan of Care: 3+: HIGH COMPLEXITY   EXAMINATION:   Most Recent Physical Functioning:   Gross Assessment:                  Posture:     Balance:  Sitting: Intact  Sitting - Static: Good (unsupported)  Sitting - Dynamic: Fair (occasional)  Standing: Impaired  Standing - Static: Fair  Standing - Dynamic : Fair Bed Mobility:     Wheelchair Mobility:     Transfers:  Sit to Stand: Contact guard assistance  Stand to Sit: Contact guard assistance  Gait:     Base of Support: Center of gravity altered;Narrowed  Speed/Yadi: Shuffled; Slow  Step Length: Left shortened;Right shortened  Gait Abnormalities: Decreased step clearance  Distance (ft): 100 Feet (ft)  Assistive Device: Walker, rolling  Ambulation - Level of Assistance: Contact guard assistance  Interventions: Safety awareness training      Body Structures Involved:  1. Heart  2. Lungs  3. Thoracic Cage  4. Bones  5. Joints  6. Muscles  7. Ligaments Body Functions Affected:  1. Sensory/Pain  2. Cardio  3. Respiratory  4. Neuromusculoskeletal  5. Movement Related Activities and Participation Affected:  1. General Tasks and Demands  2. Mobility  3. Self Care  4. Domestic Life  5. Interpersonal Interactions and Relationships  6. Community, Social and [place]   Number of elements that affect the Plan of Care: 4+: HIGH COMPLEXITY   CLINICAL PRESENTATION:   Presentation: Evolving clinical presentation with changing clinical characteristics: MODERATE COMPLEXITY   CLINICAL DECISION MAKIN [facility] Mobility Inpatient Short Form  How much difficulty does the patient currently have. .. Unable A Lot A Little None   1. Turning over in bed (including adjusting bedclothes, sheets and blankets)? ? 1   ? 2   ? 3   ? 4   2. Sitting down on and standing up from a chair with arms ( e.g., wheelchair, bedside commode, etc.)   ? 1   ? 2   ? 3   ? 4   3. Moving from lying on back to sitting on the side of the bed?   ? 1   ? 2   ? 3   ? 4   How much help from another person does the patient currently need. .. Total A Lot A Little None   4. Moving to and from a bed to a chair (including a wheelchair)? ? 1   ? 2   ? 3   ? 4   5. Need to walk in hospital room? ? 1   ? 2   ? 3   ? 4   6. Climbing 3-5 steps with a railing? ? 1   ? 2   ? 3   ? 4   © 2007, Trustees of [address] [place] [address] [address] [id number], under license to [place].  All rights reserved      Score:  Initial: 17 Most Recent: X (Date: -- )    Interpretation of Tool:  Represents activities that are increasingly more difficult (i.e. Bed mobility, Transfers, Gait). Medical Necessity:     · Patient is expected to demonstrate progress in strength, balance, coordination and functional technique  ·  to decrease assistance required with gait, transfers, and functional mobility   · . Reason for Services/Other Comments:  · Patient continues to require skilled intervention due to decreased strength, decreased balance, decreased functional tolerance, decreased cardiopulmonary endurance affecting participation in basic ADLs and functional tasks. · .   Use of outcome tool(s) and clinical judgement create a POC that gives a: Clear prediction of patient's progress: LOW COMPLEXITY            TREATMENT:      Pre-treatment Symptoms/Complaints: \"OK\"  Pain: Initial:   Pain Intensity 1: 0 0/10 Post Session:  0/10     Therapeutic Activity: (    8 minutes): Therapeutic activities including Chair transfers, Ambulation on level ground and cues for ease and safety of transfers, education on sternal precautions  to improve mobility, strength, balance and coordination. Required minimal Safety awareness training to promote static and dynamic balance in standing and promote coordination of bilateral, upper extremity(s), lower extremity(s). Therapeutic Exercise: (  minutes):  Exercises per grid below to improve mobility, strength, balance and coordination. Required minimum  verbal cues to perform exercises correctly. Progressed repetitions and complexity of movement as indicated.        Date:  10/07/19 Date:   Date:     ACTIVITY/EXERCISE AM PM AM PM AM PM   LAQ 10        Shoulder shrugs 10        Gluteal sets 10        marching         Ankle pumps 10        abduction                  B = bilateral; AA = active assistive; A = active; P = passive  Braces/Orthotics/Lines/Etc:   ·   Treatment/Session Assessment:    · Response to Treatment:  100 feet w/RW slow ziggy  · Interdisciplinary Collaboration:   o Physical Therapy Assistant  o Registered Nurse  · After treatment position/precautions:   o Up in chair  o Bed alarm/tab alert on  o Bed/Chair-wheels locked  o Call light within reach  o RN notified  o Family at bedside   · Compliance with Program/Exercises: Will assess as treatment progresses  · Recommendations/Intent for next treatment session: \"Next visit will focus on advancements to more challenging activities and reduction in assistance provided\".   Total Treatment Duration:  PT Patient Time In/Time Out  Time In: 1043  Time Out: 1051    Iliana Mcdaniels-Michael, PTA

## 2019-10-08 NOTE — PROGRESS NOTES
Nutrition  Reason for assessment: LOS  Assessment:   Diet: DIET DIABETIC CONSISTENT CARB No options chosen  Food/Nutrition Patient History:  PMH: DM, CAD, CHF, HTN, breast cancer. Admitted S/P CABG and MVR on 10-1. Pt currently asleep. Pt seen in company of male \"just a friend\". He reports pt has had nausea and only eating bites with much encouragement at all meals. He knows that she likes chocolate milkshakes. Pt received Glucerna shakes during prior admission but noted to not like them during her February admission. With current minimal po intake, Ensure Enlive would be acceptable as even if pt consumed 100% it would not not exceed mealtime CHO limit and pt is also unlikely to consume 100%  Anthropometrics:Height: 5' 3\" (160 cm),  Pre-op weight 83.9 kg, BMI 32.8 class of obesity class I. Macronutrient needs:  EER:  8332-8504 kcal /day (18-23 kcal/kg pre-op BW)  EPR:  63-78 grams protein/day (1.2-1.5 grams/kg IBW)-anabolism for post-op wound healing  Intake/Comparative Standards:  Average intake for past 7 day(s)/9 recorded meal(s): 2%. This meets <10% of kcal and <10% of protein needs    Nutrition Diagnosis: Inadequate oral intake r/t intractable nausea with associated decreased ability to consume sufficient oral intake as evidenced by decreased intake as abpve    Intervention:  Meals and snacks: Continue current diet. Nutrition Supplement Therapy: Ensure Enlive tid. Once po intake improves, could change to lower CHO supplement (Ensure high protein or Glucerna shake if pt will accept)  Discharge nutrition plan: Too soon to determine.     Linden Gerard, [address] [place] [address], [address] [place] [address], [address] [place] [address]

## 2019-10-08 NOTE — PROGRESS NOTES
Problem: Falls - Risk of  Goal: *Absence of Falls  Description  Document Andrei Curry Fall Risk and appropriate interventions in the flowsheet. Outcome: Progressing Towards Goal  Note:   Fall Risk Interventions:  Mobility Interventions: Bed/chair exit alarm, Communicate number of staff needed for ambulation/transfer, Patient to call before getting OOB, PT Consult for mobility concerns, Strengthening exercises (ROM-active/passive)    Medication Interventions: Bed/chair exit alarm, Evaluate medications/consider consulting pharmacy, Patient to call before getting OOB, Teach patient to arise slowly    Elimination Interventions: Bed/chair exit alarm, Call light in reach, Patient to call for help with toileting needs    History of Falls Interventions: Bed/chair exit alarm, Evaluate medications/consider consulting pharmacy    Problem: Pressure Injury - Risk of  Goal: *Prevention of pressure injury  Description  Document Juan Pablo Scale and appropriate interventions in the flowsheet.   Outcome: Progressing Towards Goal  Note:   Pressure Injury Interventions:  Sensory Interventions: Assess changes in LOC, Minimize linen layers    Moisture Interventions: Minimize layers    Activity Interventions: Increase time out of bed, Pressure redistribution bed/mattress(bed type), PT/OT evaluation    Mobility Interventions: HOB 30 degrees or less, Pressure redistribution bed/mattress (bed type), PT/OT evaluation    Nutrition Interventions: Document food/fluid/supplement intake, Offer support with meals,snacks and hydration    Friction and Shear Interventions: Minimize layers

## 2019-10-08 NOTE — PROGRESS NOTES
Problem: Mobility Impaired (Adult and Pediatric)  Goal: *Acute Goals and Plan of Care (Insert Text)  Description  STG:  (1.)Ms. Erwin Eugene will move from supine to sit and sit to supine  with CONTACT GUARD ASSIST within 3 treatment day(s). (2.)Ms. Erwin Eugene will transfer from bed to chair and chair to bed with CONTACT GUARD ASSIST using the least restrictive device within 3 treatment day(s). (3.)Ms. Erwin Eugene will ambulate with CONTACT GUARD ASSIST for 250+ feet with the least restrictive device within 3 treatment day(s). LTG:  (1.)Ms. Erwin Eugene will move from supine to sit and sit to supine  in bed with INDEPENDENT within 7 treatment day(s). (2.)Ms. Erwin Eugene will transfer from bed to chair and chair to bed with INDEPENDENT using the least restrictive device within 7 treatment day(s). (3.)Ms. Erwin Eugene will ambulate with SUPERVISION for 500+ feet with the least restrictive device within 7 treatment day(s).   ________________________________________________________________________________________________     Outcome: Progressing Towards Goal      PHYSICAL THERAPY: Daily Note and PM 10/8/2019  INPATIENT: PT Visit Days : 5  Payor: MEDICAID PENDING / Plan: Asad Andino PENDING / Product Type: Medicaid /       NAME/AGE/GENDER: Chelsie Galvez is a 61 y.o. female   PRIMARY DIAGNOSIS: Atherosclerosis of native coronary artery of native heart with unstable angina pectoris ([facility] [address] [I25.110]  Mitral valve insufficiency, unspecified etiology [I34.0]  CAD (coronary artery disease) [I25.10]  Mitral valve regurgitation [I34.0] S/P CABG x 3   S/P CABG x 3    Procedure(s) (LRB):  CORONARY ARTERY BYPASS GRAFT x  3, LIMA (N/A)  MITRAL VALVE REPAIR (N/A)  VEIN HARVEST GREATER SAPHENOUS (Left)  ESOPHAGEAL TRANS ECHOCARDIOGRAM (N/A)  LEFT ATRIAL APPENDAGE CLOSURE  WIH CLIP (Left)  7 Days Post-Op  ICD-10: Treatment Diagnosis:    · Generalized Muscle Weakness (M62.81)  · Difficulty in walking, Not elsewhere classified (R26.2) Precaution/Allergies:  Patient has no known allergies. Sternal precautions   ASSESSMENT:     Ms. Jarrett Donahue is sitting in the recliner upon contact.  present. Patient agreeable to therapy with some encouragement from the RN. Sit to stand with min assist using the momentum method. Pt was able to ambulate about 100' with rolling  walker and CGA. Pt ambulates at slow pace with shuffling steps. Pt returned to chair and positioned for comfort. Patient is awaiting transport to X-ray. Tolerated fairly well. Pt is making slow  progress towards goals due to feeling very nausea. Will continue with POC. PM note:  Patient is supine in bed and needing to get up to the bathroom. Bed mobility with contact guard to modified independence. Sitting balance good. Patient is assisted to the bathroom with hand held assist and then back to supine in bed. Patient had some dry heaving once back to bed in addition to a crying spell. Comfort and support provided. Patient is left supine in bed with needs within reach,  No progress demonstrated this pm.  Will continue PT efforts. This section established at most recent assessment   PROBLEM LIST (Impairments causing functional limitations):  1. Decreased Strength  2. Decreased ADL/Functional Activities  3. Decreased Transfer Abilities  4. Decreased Ambulation Ability/Technique  5. Decreased Balance  6. Increased Pain  7. Decreased Activity Tolerance  8. Decreased Pacing Skills  9. Increased Fatigue  10. Increased Shortness of Breath  11. Decreased Flexibility/Joint Mobility  12. Decreased Knowledge of Precautions  13. Decreased [place] with Home Exercise Program   INTERVENTIONS PLANNED: (Benefits and precautions of physical therapy have been discussed with the patient.)  1. Balance Exercise  2. Bed Mobility  3. Electrical Stimulation  4. Family Education  5. Home Exercise Program (HEP)  6. Neuromuscular Re-education/Strengthening  7.  Therapeutic Activites  8. Therapeutic Exercise/Strengthening  9. Transfer Training     TREATMENT PLAN: Frequency/Duration: twice daily for duration of hospital stay  Rehabilitation Potential For Stated Goals: Good     REHAB RECOMMENDATIONS (at time of discharge pending progress):    Placement: It is my opinion, based on this patient's performance to date, that Ms. Zuhair Gonzales may benefit from [address] after discharge due to the functional deficits listed above that are likely to improve with skilled rehabilitation because he/she has multiple medical issues that affect his/her functional mobility in the community. Equipment:    None at this time              HISTORY:   History of Present Injury/Illness (Reason for Referral):  S/p Procedure(s) (LRB):  CORONARY ARTERY BYPASS GRAFT x  3, LIMA (N/A)  MITRAL VALVE REPAIR (N/A)  VEIN HARVEST GREATER SAPHENOUS (Left)  ESOPHAGEAL TRANS ECHOCARDIOGRAM (N/A)  LEFT ATRIAL APPENDAGE CLOSURE  WIH CLIP (Left)  Past Medical History/Comorbidities:   Ms. Zuhair Gonzales  has a past medical history of Breast cancer ([facility] [address] (2015), CAD (coronary artery disease), Diabetes ([address] (typell, dx 2016), Heart failure ([address] Hypercholesteremia, Hypertension, Intertrochanteric fracture of right femur ([address] (2/24/2018), Mitral valve regurgitation, and PUD (peptic ulcer disease). Ms. Zuhair Gonzales  has a past surgical history that includes hx hip fracture tx (Right); hx breast lumpectomy (2015); and hx orthopaedic (Right).   Social History/Living Environment:   Home Environment: Private residence  # Steps to Enter: 0  One/Two Story Residence: One story  Living Alone: Yes  Support Systems: Spouse/Significant Other/Partner  Patient Expects to be Discharged to[de-identified] Private residence  Current DME Used/Available at Home: None  Prior Level of Function/Work/Activity:  Independent with gait and ADLs, 0 falls, drives   Number of Personal Factors/Comorbidities that affect the Plan of Care: 3+: HIGH COMPLEXITY   EXAMINATION: Most Recent Physical Functioning:   Gross Assessment:                  Posture:     Balance:  Sitting: Intact  Sitting - Static: Good (unsupported)  Sitting - Dynamic: Fair (occasional)  Standing: Impaired  Standing - Static: Fair  Standing - Dynamic : Fair Bed Mobility:  Rolling: Modified independent  Supine to Sit: Contact guard assistance  Sit to Supine: Modified independent  Scooting: Modified independent  Wheelchair Mobility:     Transfers:  Sit to Stand: Contact guard assistance  Stand to Sit: Contact guard assistance  Gait:     Base of Support: Center of gravity altered;Narrowed  Speed/Yadi: Shuffled; Slow  Step Length: Left shortened;Right shortened  Gait Abnormalities: Decreased step clearance  Distance (ft): 10 Feet (ft)  Assistive Device: Other (comment)(hand held)  Ambulation - Level of Assistance: Contact guard assistance  Interventions: Safety awareness training      Body Structures Involved:  1. Heart  2. Lungs  3. Thoracic Cage  4. Bones  5. Joints  6. Muscles  7. Ligaments Body Functions Affected:  1. Sensory/Pain  2. Cardio  3. Respiratory  4. Neuromusculoskeletal  5. Movement Related Activities and Participation Affected:  1. General Tasks and Demands  2. Mobility  3. Self Care  4. Domestic Life  5. Interpersonal Interactions and Relationships  6. Community, Social and [place]   Number of elements that affect the Plan of Care: 4+: HIGH COMPLEXITY   CLINICAL PRESENTATION:   Presentation: Evolving clinical presentation with changing clinical characteristics: MODERATE COMPLEXITY   CLINICAL DECISION MAKIN [facility] Inpatient Short Form  How much difficulty does the patient currently have. .. Unable A Lot A Little None   1. Turning over in bed (including adjusting bedclothes, sheets and blankets)? ? 1   ? 2   ? 3   ? 4   2. Sitting down on and standing up from a chair with arms ( e.g., wheelchair, bedside commode, etc.)   ?  1   ? 2   ? 3   ? 4 3. Moving from lying on back to sitting on the side of the bed?   ? 1   ? 2   ? 3   ? 4   How much help from another person does the patient currently need. .. Total A Lot A Little None   4. Moving to and from a bed to a chair (including a wheelchair)? ? 1   ? 2   ? 3   ? 4   5. Need to walk in hospital room? ? 1   ? 2   ? 3   ? 4   6. Climbing 3-5 steps with a railing? ? 1   ? 2   ? 3   ? 4   © 2007, Trustees of [address] [place] [address] [address] [id number], under license to [employer]. All rights reserved      Score:  Initial: 17 Most Recent: X (Date: -- )    Interpretation of Tool:  Represents activities that are increasingly more difficult (i.e. Bed mobility, Transfers, Gait). Medical Necessity:     · Patient is expected to demonstrate progress in strength, balance, coordination and functional technique  ·  to decrease assistance required with gait, transfers, and functional mobility   · . Reason for Services/Other Comments:  · Patient continues to require skilled intervention due to decreased strength, decreased balance, decreased functional tolerance, decreased cardiopulmonary endurance affecting participation in basic ADLs and functional tasks. · .   Use of outcome tool(s) and clinical judgement create a POC that gives a: Clear prediction of patient's progress: LOW COMPLEXITY            TREATMENT:      Pre-treatment Symptoms/Complaints: \"OK\"  Pain: Initial:   Pain Intensity 1: 0 0/10 Post Session:  0/10     Therapeutic Activity: (    8 minutes): Therapeutic activities including Chair transfers, Ambulation on level ground and cues for ease and safety of transfers, education on sternal precautions  to improve mobility, strength, balance and coordination. Required minimal Safety awareness training to promote static and dynamic balance in standing and promote coordination of bilateral, upper extremity(s), lower extremity(s).      Therapeutic Exercise: (  minutes):  Exercises per grid below to improve mobility, strength, balance and coordination. Required minimum  verbal cues to perform exercises correctly. Progressed repetitions and complexity of movement as indicated. Date:  10/07/19 Date:   Date:     ACTIVITY/EXERCISE AM PM AM PM AM PM   LAQ 10        Shoulder shrugs 10        Gluteal sets 10        marching         Ankle pumps 10        abduction                  B = bilateral; AA = active assistive; A = active; P = passive  Braces/Orthotics/Lines/Etc:   ·   Treatment/Session Assessment:    · Response to Treatment:  100 feet w/RW slow ziggy  · Interdisciplinary Collaboration:   o Physical Therapy Assistant  o Registered Nurse  · After treatment position/precautions:   o Up in chair  o Bed alarm/tab alert on  o Bed/Chair-wheels locked  o Call light within reach  o RN notified  o Family at bedside   · Compliance with Program/Exercises: Will assess as treatment progresses  · Recommendations/Intent for next treatment session: \"Next visit will focus on advancements to more challenging activities and reduction in assistance provided\".   Total Treatment Duration:  PT Patient Time In/Time Out  Time In: 1401  Time Out: 1212    Duarte Diaz PTA

## 2019-10-08 NOTE — PROGRESS NOTES
Pt has not been transported for chest and ABD xray at present. When I checked transport schedule pt is not in the cue to be picked up. I spoke with Piter Pedro and he will send for her now.

## 2019-10-08 NOTE — PROGRESS NOTES
Pt given chicken broth. Pt did not want anything on breakfast tray. Requested peaches none on floor called dietary consult. They stated they would find some and communicated with pt they will bring peaches up.

## 2019-10-08 NOTE — PROGRESS NOTES
Today's Date: 10/8/2019  Date of Admission: 10/1/2019    Chart Reviewed. Subjective:     Patient initially would not speak until encouraged to do so. She complains of abdominal pain and nausea. Medications Reviewed. Objective:     Vitals:    10/08/19 0148 10/08/19 0519 10/08/19 0610 10/08/19 0716   BP: (!) 142/96 (!) 182/91 146/67 140/69   Pulse: 77 79 82 82   Resp: 18   19   Temp: 98.5 °F (36.9 °C)   97.1 °F (36.2 °C)   SpO2: 94%   96%   Weight: 201 lb 4.8 oz (91.3 kg)      Height:           Intake and Output  Current Shift: No intake/output data recorded. Last 3 Shifts: 10/06 1901 - 10/08 0700  In: 430 [P.O.:430]  Out: 1400 [Urine:1400]    Physical Exam:  General: Well Developed, Well Nourished, No Acute Distress, Alert & Oriented x 3, flat affect   Neck: supple, no JVD  Heart: S1S2 with RRR without murmurs or gallops  Lungs: Clear throughout auscultation bilaterally without adventitious sounds  Abd: soft, nontender, nondistended, with good bowel sounds  Ext: no edema bilaterally  Sternal incision: clean, dry, and intact  Skin: warm and dry    LABS  Data Review:   Recent Labs     10/08/19  0459 10/07/19  0322 10/06/19  0340   NA  --  144 147*   K 3.9 3.9 4.1   MG 2.9* 2.8* 2.3   BUN  --  29* 32*   CREA  --  0.93 1.13*   GLU  --  176* 169*   WBC  --  10.1  --    HGB  --  11.3*  --    HCT  --  35.3*  --    PLT  --  174  --        Estimated Creatinine Clearance: 69.9 mL/min (based on SCr of 0.93 mg/dL).       Assessment/Plan:     Principal Problem:    S/P CABG x 3 (10/2/2019)  On ASA, BB, ACE-I, holding statin for now given severe nausea, pacing wires removed, no BM yet and continued abdominal pain/nausea/poor appetite, will check KUB, amylase/lipase ok      Active Problems:    CAD (coronary artery disease) (9/26/2019)  S/p CABG, continue medical therapy        UTI (urinary tract infection) (9/30/2019)  Completed treatment       Mitral valve regurgitation (10/1/2019)  S/p MVR       Hypoxia (10/1/2019)  Resolved, stable on room air       S/P MVR (mitral valve repair) (10/2/2019)  As above        Hypocalcemia (10/2/2019)          Suspected sleep apnea (10/2/2019)          Thrombocytopenia ([facility] [address] (10/2/2019)    Resolved        Systolic CHF, acute on chronic ([facility] [address] (10/3/2019)  On BB, ACE-I         Accelerated hypertension (10/7/2019)    BP remains elevated at times, adjusting meds      Abdominal pain  Pt is tender in LUQ, no BM yet, will check KUB, start IV fluids given poor oral intake         Stef Prather PA-C

## 2019-10-08 NOTE — PROGRESS NOTES
Bedside report received from [facility]. Opportunity for questions, concerns. Patient involved. Pt dry heaving on side of bed. Pt assisted to bathroom did not void assisted to recliner. Pt having a mix of tremors and \"jerky muscle movement\" that appears to be purposeful. Very difficult to assess patient due to mix of possible manipulative behavior and actual symptoms.

## 2019-10-08 NOTE — PROGRESS NOTES
Oneal Topete  Admission Date: 10/1/2019             Daily Progress Note: 10/8/2019    The patient's chart is reviewed and the patient is discussed with the staff. Patient had CABG and mitral valve repair.   She was recently admitted for heart failure and found to have MVCAD with an EF of 40 to 45% in addition to mitral valve regurgitation.      She is a lifelong nonsmoker. Her preop PFTs just showed restriction. Subjective:       Miserable. Still nauseated. Vomited up some of what she ate for breakfast. Stomach hurts - still no bowel movement.      Current Facility-Administered Medications   Medication Dose Route Frequency    lisinopril (PRINIVIL, ZESTRIL) tablet 20 mg  20 mg Oral BID    0.9% sodium chloride infusion  50 mL/hr IntraVENous CONTINUOUS    potassium chloride 40 mEq IVPB  40 mEq IntraVENous ONCE    metoclopramide HCl (REGLAN) injection 5 mg  5 mg IntraVENous Q6H    scopolamine (TRANSDERM-SCOP) 1 mg over 3 days 1 Patch  1 Patch TransDERmal Q72H    carvedilol (COREG) tablet 12.5 mg  12.5 mg Oral Q12H    albuterol (PROVENTIL VENTOLIN) nebulizer solution 2.5 mg  2.5 mg Nebulization QID RT    lactulose (CHRONULAC) 10 gram/15 mL solution 30 mL  30 mL Oral Q8H PRN    tapentadol (NUCYNTA) tablet 50 mg  50 mg Oral Q4H PRN    senna-docusate (PERICOLACE) 8.6-50 mg per tablet 2 Tab  2 Tab Oral BID    promethazine (PHENERGAN) with saline injection 12.5 mg  12.5 mg IntraVENous Q6H PRN    hydrALAZINE (APRESOLINE) 20 mg/mL injection 10 mg  10 mg IntraVENous Q6H PRN    famotidine (PEPCID) tablet 20 mg  20 mg Oral DAILY    bisacodyl (DULCOLAX) tablet 10 mg  10 mg Oral DAILY PRN    sodium chloride (NS) flush 20 mL  20 mL InterCATHeter Q8H    heparin (porcine) pf 600 Units  600 Units InterCATHeter Q8H    sodium chloride (NS) flush 20 mL  20 mL InterCATHeter PRN    heparin (porcine) pf 600 Units  600 Units InterCATHeter PRN    alum-mag hydroxide-simeth (MYLANTA) oral suspension 30 mL  30 mL Oral Q4H PRN    acetaminophen (TYLENOL) tablet 650 mg  650 mg Oral Q4H PRN    magnesium oxide (MAG-OX) tablet 400 mg  400 mg Oral TID PRN    magnesium oxide (MAG-OX) tablet 400 mg  400 mg Oral QID PRN    potassium chloride (K-DUR, KLOR-CON) SR tablet 20 mEq  20 mEq Oral BID PRN    potassium chloride (K-DUR, KLOR-CON) SR tablet 40 mEq  40 mEq Oral BID PRN    oxyCODONE-acetaminophen (PERCOCET) 5-325 mg per tablet 1 Tab  1 Tab Oral Q4H PRN    insulin lispro (HUMALOG) injection   SubCUTAneous AC&HS    ondansetron (ZOFRAN) injection 4 mg  4 mg IntraVENous Q6H PRN    aspirin delayed-release tablet 81 mg  81 mg Oral DAILY    nitroglycerin (NITROLINGUAL) sublingual 0.4 mg/spray  1 Spray SubLINGual Q5MIN PRN    insulin glargine (LANTUS) injection 10 Units  10 Units SubCUTAneous QHS    0.9% sodium chloride infusion 250 mL  250 mL IntraVENous PRN    naloxone (NARCAN) injection 0.4 mg  0.4 mg IntraVENous PRN         Objective:     Vitals:    10/08/19 0519 10/08/19 0610 10/08/19 0716 10/08/19 0810   BP: (!) 182/91 146/67 140/69    Pulse: 79 82 82    Resp:   19    Temp:   97.1 °F (36.2 °C)    SpO2:   96% 95%   Weight:       Height:         Intake and Output:   10/06 1901 - 10/08 0700  In: 430 [P.O.:430]  Out: 1400 [Urine:1400]  10/08 0701 - 10/08 1900  In: 80 [P.O.:80]  Out: 300 [Urine:300]    Physical Exam:   Constitutional:  the patient is well developed and in no acute distress  HEENT:  Sclera clear, pupils equal, oral mucosa moist  Lungs: clear bilaterally. On room air  Cardiovascular:  RRR without M,G,R. Sinus per telemetry  Abd/GI: soft and tender today across the midline; with positive bowel sounds. Ext: warm without cyanosis. There is some edema in her feet. Musculoskeletal: moves all four extremities with equal strength  Skin:  no jaundice or rashes, sternal wound  Neuro: no gross neuro deficits   Musculoskeletal: can ambulate. No deformity  Psychiatric: Calm.      ROS: nauseated, having abdominal and chest pain  Lines: PICC    CHEST XRAY:        LAB  Recent Labs     10/07/19  0322   WBC 10.1   HGB 11.3*   HCT 35.3*        Recent Labs     10/08/19  0459 10/07/19  0322 10/06/19  0340   NA  --  144 147*   K 3.9 3.9 4.1   CL  --  115* 117*   CO2  --  25 17*   GLU  --  176* 169*   BUN  --  29* 32*   CREA  --  0.93 1.13*   MG 2.9* 2.8* 2.3     Lab Results   Component Value Date/Time    Calcium 8.5 10/07/2019 03:22 AM         Assessment:  (Medical Decision Making)     Hospital Problems  Never Reviewed          Codes Class Noted POA    Systolic CHF, acute on chronic ([facility] ICD-10-CM: I50.23  ICD-9-CM: 428.23, 428.0  10/3/2019 Unknown    EF 40 to 45%. No diuretics with nausea. IV fluids added today    * (Principal) S/P CABG x 3 ICD-10-CM: Z95.1  ICD-9-CM: V45.81  10/2/2019 Unknown    Rhythm stable. Moderate pain    S/P MVR (mitral valve repair) ICD-10-CM: Y19.105  ICD-9-CM: V45.89  10/2/2019 Unknown        Hypocalcemia ICD-10-CM: E83.51  ICD-9-CM: 275.41  10/2/2019 Unknown    corrected    Suspected sleep apnea ICD-10-CM: R29.818  ICD-9-CM: 781.99  10/2/2019 Unknown    No current Rx    Thrombocytopenia ([facility] ICD-10-CM: D69.6  ICD-9-CM: 287.5  10/2/2019 Unknown    correcting    Mitral valve regurgitation ICD-10-CM: I34.0  ICD-9-CM: 424.0  10/1/2019 Yes        Hypoxia ICD-10-CM: R09.02  ICD-9-CM: 799.02  10/1/2019 No    Now on room air    UTI (urinary tract infection) ICD-10-CM: N39.0  ICD-9-CM: 599.0  9/30/2019 Yes    Klebsiella per culture - s/p Rx    CAD (coronary artery disease) ICD-10-CM: I25.10  ICD-9-CM: 414.00  9/26/2019 Yes    S/p CABG          Plan:  (Medical Decision Making)   1. Amylase/lipase normal. Will check LFTs, KUB and increase scheduled Reglan. Order Dulcolax suppository for constipation if KUB OK - no BM for about 10 days now per her report  2. On room air but poor effort with IS due to abdominal/chest pain and nausea. Try to control symptoms and encourage use.  Will use EZPAP for now  3. Have not addressed suspected PHILIP due to her nausea - doubt she would comply with CPAP order right now. Will need office follow up with PSG  4. PRN Apresoline for hypertension - on home meds but currently not well controlled. Adjust home meds if needed    Mary Oquendo NP    More than 50% of time documented was spent face-to-face contact with the patient and in the care of the patient on the floor/unit where the patient is located. Lungs : CTA b/l. No wheezing. Heart S1 and S2 audible, no murmers or rubs appreciated  Abdomen: soft non-tender but having n/V today    Getting imagining of abdomen and chest. Reports no BM for 3 weeks. Normally every 2-3 weeks.  reports she does not eat much. In the future can assess for likely PHILIP, if agreeable to purse treatment. I have spoken with and examined the patient. I have reviewed the history, examination, assessment, and plan and agree with the above. Shubham Griffin MD      This note was signed electronically. Errors are unfortunately her likely due to dictation software.

## 2019-10-08 NOTE — DIABETES MGMT
Patient s/p CABG x3. Blood glucose range yesterday 157-200 with pt receiving Lantus 10 units and Humalog 6 units. This AM blood glucose 177. Pt in recliner this AM. Per chart review pt appetite remains poor. As pt appetite improves pt insulin needs will likely increase.

## 2019-10-08 NOTE — PROGRESS NOTES
Pt lying on side making a \"dry heaving sound\" nothing noted in emesis bag. Pt spit in bag but no emesis noted.

## 2019-10-09 ENCOUNTER — HOSPITAL ENCOUNTER (EMERGENCY)
Age: 59
Discharge: HOME | End: 2019-10-09
Attending: EMERGENCY MEDICINE
Payer: MEDICAID

## 2019-10-09 ENCOUNTER — HOME HEALTH ADMISSION (OUTPATIENT)
Dept: HOME HEALTH SERVICES | Facility: HOME HEALTH | Age: 59
End: 2019-10-09

## 2019-10-09 VITALS
WEIGHT: 192.2 LBS | BODY MASS INDEX: 34.05 KG/M2 | TEMPERATURE: 98.4 F | HEIGHT: 63 IN | HEART RATE: 74 BPM | SYSTOLIC BLOOD PRESSURE: 160 MMHG | DIASTOLIC BLOOD PRESSURE: 83 MMHG | OXYGEN SATURATION: 97 % | RESPIRATION RATE: 18 BRPM

## 2019-10-09 VITALS
RESPIRATION RATE: 16 BRPM | HEART RATE: 79 BPM | HEIGHT: 63 IN | OXYGEN SATURATION: 95 % | WEIGHT: 192 LBS | BODY MASS INDEX: 34.02 KG/M2 | SYSTOLIC BLOOD PRESSURE: 143 MMHG | TEMPERATURE: 98.9 F | DIASTOLIC BLOOD PRESSURE: 75 MMHG

## 2019-10-09 DIAGNOSIS — T82.838A BLEEDING FROM PERIPHERALLY INSERTED CENTRAL VENOUS CATHETER (PICC), INITIAL ENCOUNTER: Primary | ICD-10-CM

## 2019-10-09 LAB
ANION GAP SERPL CALC-SCNC: 5 MMOL/L (ref 7–16)
BUN SERPL-MCNC: 22 MG/DL (ref 6–23)
CALCIUM SERPL-MCNC: 8 MG/DL (ref 8.3–10.4)
CHLORIDE SERPL-SCNC: 119 MMOL/L (ref 98–107)
CO2 SERPL-SCNC: 25 MMOL/L (ref 21–32)
CREAT SERPL-MCNC: 0.85 MG/DL (ref 0.6–1)
GLUCOSE BLD STRIP.AUTO-MCNC: 117 MG/DL (ref 65–100)
GLUCOSE SERPL-MCNC: 119 MG/DL (ref 65–100)
MAGNESIUM SERPL-MCNC: 2.7 MG/DL (ref 1.8–2.4)
POTASSIUM SERPL-SCNC: 3.8 MMOL/L (ref 3.5–5.1)
SODIUM SERPL-SCNC: 149 MMOL/L (ref 136–145)

## 2019-10-09 PROCEDURE — 97110 THERAPEUTIC EXERCISES: CPT

## 2019-10-09 PROCEDURE — 74011250637 HC RX REV CODE- 250/637: Performed by: THORACIC SURGERY (CARDIOTHORACIC VASCULAR SURGERY)

## 2019-10-09 PROCEDURE — 82962 GLUCOSE BLOOD TEST: CPT

## 2019-10-09 PROCEDURE — 97530 THERAPEUTIC ACTIVITIES: CPT

## 2019-10-09 PROCEDURE — 74011250636 HC RX REV CODE- 250/636: Performed by: THORACIC SURGERY (CARDIOTHORACIC VASCULAR SURGERY)

## 2019-10-09 PROCEDURE — 74011000250 HC RX REV CODE- 250: Performed by: INTERNAL MEDICINE

## 2019-10-09 PROCEDURE — 83735 ASSAY OF MAGNESIUM: CPT

## 2019-10-09 PROCEDURE — 99283 EMERGENCY DEPT VISIT LOW MDM: CPT | Performed by: EMERGENCY MEDICINE

## 2019-10-09 PROCEDURE — 77030020263 HC SOL INJ SOD CL0.9% LFCR 1000ML

## 2019-10-09 PROCEDURE — 94640 AIRWAY INHALATION TREATMENT: CPT

## 2019-10-09 PROCEDURE — 80048 BASIC METABOLIC PNL TOTAL CA: CPT

## 2019-10-09 PROCEDURE — 74011250637 HC RX REV CODE- 250/637: Performed by: PHYSICIAN ASSISTANT

## 2019-10-09 PROCEDURE — 36592 COLLECT BLOOD FROM PICC: CPT

## 2019-10-09 PROCEDURE — 74011250636 HC RX REV CODE- 250/636: Performed by: PHYSICIAN ASSISTANT

## 2019-10-09 PROCEDURE — 94760 N-INVAS EAR/PLS OXIMETRY 1: CPT

## 2019-10-09 RX ORDER — ACETAMINOPHEN 500 MG
500 TABLET ORAL
Status: SHIPPED | COMMUNITY
Start: 2019-10-09

## 2019-10-09 RX ORDER — SODIUM CHLORIDE 450 MG/100ML
50 INJECTION, SOLUTION INTRAVENOUS CONTINUOUS
Status: DISCONTINUED | OUTPATIENT
Start: 2019-10-09 | End: 2019-10-09 | Stop reason: HOSPADM

## 2019-10-09 RX ORDER — ATORVASTATIN CALCIUM 80 MG/1
80 TABLET, FILM COATED ORAL
Qty: 90 TAB | Refills: 4 | Status: SHIPPED | OUTPATIENT
Start: 2019-10-09 | End: 2020-01-07 | Stop reason: DRUGHIGH

## 2019-10-09 RX ORDER — LISINOPRIL 20 MG/1
20 TABLET ORAL 2 TIMES DAILY
Qty: 60 TAB | Refills: 3 | Status: SHIPPED | OUTPATIENT
Start: 2019-10-09 | End: 2019-11-25 | Stop reason: SDUPTHER

## 2019-10-09 RX ORDER — DOCUSATE SODIUM 100 MG/1
100 CAPSULE, LIQUID FILLED ORAL 2 TIMES DAILY
Qty: 60 CAP | Refills: 2 | Status: SHIPPED | COMMUNITY
Start: 2019-10-09 | End: 2019-11-25

## 2019-10-09 RX ORDER — NITROGLYCERIN 0.4 MG/1
0.4 TABLET SUBLINGUAL
Qty: 2 BOTTLE | Refills: 4 | Status: SHIPPED | OUTPATIENT
Start: 2019-10-09

## 2019-10-09 RX ORDER — NITROGLYCERIN 400 UG/1
1 SPRAY ORAL
Qty: 2 BOTTLE | Refills: 4 | Status: SHIPPED | OUTPATIENT
Start: 2019-10-09 | End: 2019-10-09

## 2019-10-09 RX ADMIN — SODIUM CHLORIDE 50 ML/HR: 900 INJECTION, SOLUTION INTRAVENOUS at 06:05

## 2019-10-09 RX ADMIN — Medication 20 ML: at 05:35

## 2019-10-09 RX ADMIN — ASPIRIN 81 MG: 81 TABLET ORAL at 08:49

## 2019-10-09 RX ADMIN — CARVEDILOL 12.5 MG: 12.5 TABLET, FILM COATED ORAL at 08:51

## 2019-10-09 RX ADMIN — FAMOTIDINE 20 MG: 20 TABLET ORAL at 08:51

## 2019-10-09 RX ADMIN — LISINOPRIL 20 MG: 20 TABLET ORAL at 08:51

## 2019-10-09 RX ADMIN — ALBUTEROL SULFATE 2.5 MG: 2.5 SOLUTION RESPIRATORY (INHALATION) at 08:09

## 2019-10-09 RX ADMIN — SODIUM CHLORIDE, PRESERVATIVE FREE 600 UNITS: 5 INJECTION INTRAVENOUS at 05:35

## 2019-10-09 RX ADMIN — POTASSIUM CHLORIDE 20 MEQ: 20 TABLET, EXTENDED RELEASE ORAL at 09:03

## 2019-10-09 RX ADMIN — ACETAMINOPHEN 650 MG: 325 TABLET, FILM COATED ORAL at 08:49

## 2019-10-09 RX ADMIN — HYDRALAZINE HYDROCHLORIDE 10 MG: 20 INJECTION, SOLUTION INTRAMUSCULAR; INTRAVENOUS at 04:11

## 2019-10-09 NOTE — DISCHARGE SUMMARY
Discharge Summary     Patient ID:  Tram Astorga  [id number]  29 y.o.  1960    Admit date: 10/1/2019    Discharge date:  10/9/2019    Admitting Physician: Lakeisha Vallejo MD     Discharge Physician: BREEZY Cuenca/Dr. Kristen Morse    Admission Diagnoses: Atherosclerosis of native coronary artery of native heart with unstable angina pectoris ([address] [I25.110]  Mitral valve insufficiency, unspecified etiology [I34.0]  CAD (coronary artery disease) [I25.10]  Mitral valve regurgitation [I34.0]    Discharge Diagnoses:   Patient Active Problem List    Diagnosis Date Noted    Accelerated hypertension 87/44/6822    Systolic CHF, acute on chronic ([address] 10/03/2019    S/P CABG x 3 10/02/2019    S/P MVR (mitral valve repair) 10/02/2019    Hypocalcemia 10/02/2019    Suspected sleep apnea 10/02/2019    Thrombocytopenia ([address] 10/02/2019    Mitral valve regurgitation 10/01/2019    Hypoxia 10/01/2019    UTI (urinary tract infection) 09/30/2019    CAD (coronary artery disease) 09/26/2019    Malignant neoplasm of left female breast ([address] 09/22/2019    Controlled type 2 diabetes mellitus, with long-term current use of insulin ([address] 09/22/2019    Essential hypertension 02/28/2018    Hyperglycemia due to type 2 diabetes mellitus ([address] 02/24/2018       Procedures this admission:  Coronary Artery Bypass Graft Surgery, Mitral valve repair, Clipping of left atrial appendage   Consults: Pulmonary/Intensive Care    Hospital Course: Patient presented for elective CABG/MVR. She presented to the ER on 9/21 with 3 day history of chest pain, dyspnea and cough. Symptoms had gradually worsened. Troponin was negative. BNP was elevated. Chest xray showed bilateral perihilar infiltrates and mild cardiomegaly. Chest CT was negative for PE but confirmed cardiomegaly, interstitial lung edema and small bilateral pleural effusions. She had reportedly stopped taking anti-hypertensive meds.  She was admitted and continued on lasix for diuresis. Echocardiogram showed reduced LV EF with WMA as well as moderate MR. She underwent cardiac catheterization that showed severe multivessel disease. She has FH of premature CAD in her brother and father. She had history of breast cancer in the left breast s/p lumpectomy and radiation. She did not require chemotherapy. She had a closed displaced subtrochanteric fracture of right femur s/p ORIF with IMN in 2/18. Echocardiogram showed moderate MR. JOSE confirmed moderate MR. CABG/MVR was planned. She underwent CABG x 3 with LIMA to LAD, reverse SVG to the OM, and reverse SVG to the PDA as well as Mitral valve repair with a 28mm Physio II ring on 10/1/19. She had clipping of left atrial appendage with AtriClip. Post op, she required pressor support and was very nauseated. She was transferred to  stepdown. She was weaned off of O2. She continued to have nausea, vomiting and poor appetite. Multiple meds were held as possible culprits. BP was elevated and meds were adjusted. She complained of LUQ pain. KUB was unremarkable with no free air. The bowel gas pattern was nonspecific and there was no evidence of ileus or obstruction. Lactulose was continued. She had a BM and felt much better. Abdominal pain resolved. The morning of 10/9/19, patient was feeling well and ambulating without any symptoms. She denied any complaints and was tolerating her diet. Patient was determined stable and ready for discharge. Patient was instructed on the importance of medication compliance and outpatient follow up. For maximized medical therapy for CAD, patient will continue ASA, BB, ACE-I, and statin as well. For systolic CHF, she will continue BB, ACE-I, and aldactone. *She was noted to have a UTI pre-op. She was started on antibiotic prior to admission and completed course of antibiotic during admission.     The patient will have close transitional care follow up with [facility] Dr. Arthur Aquino on October 17th at 3:30pm (Hina Simmons [id number]). The patient will follow up with Dr. Richard Dotson on October 30th at 2.20pm and has been referred to cardiac rehab. DISPOSITION: The patient is being discharged home with home health services in stable condition on a low saturated fat, low cholesterol and low salt diet. The patient is instructed to advance activities as tolerated to the limit of fatigue or shortness of breath. The patient is instructed to avoid all heavy lifting or activities that strain the chest or upper arm muscles. Strenuous activity should be avoided. The patient is instructed to watch for signs of infection which include: increasing area of redness, fever or purulent drainage at the incision site. The patient is instructed to call the office or return to the ER for immediate evaluation for any shortness of breath or chest pain not relieved by NTG. Discharge Exam:   Visit Vitals  /76 (BP 1 Location: Right arm)   Pulse 82   Temp 98.5 °F (36.9 °C)   Resp 14   Ht 5' 3\" (1.6 m)   Wt 192 lb 3.2 oz (87.2 kg)   SpO2 94%   Breastfeeding?  No   BMI 34.05 kg/m²         Physical Exam:  General: Well Developed, Well Nourished, No Acute Distress, Alert & Oriented  Neck: supple, no JVD  Heart: S1S2 with RRR without murmurs or gallops  Lungs: Clear throughout auscultation bilaterally without adventitious sounds  Abd: soft, nontender, nondistended, with good bowel sounds  Ext: warm, no edema  Sternal incision: clean, dry, and intact  Skin: warm and dry      Recent Results (from the past 24 hour(s))   GLUCOSE, POC    Collection Time: 10/08/19 11:08 AM   Result Value Ref Range    Glucose (POC) 200 (H) 65 - 100 mg/dL   GLUCOSE, POC    Collection Time: 10/08/19  4:25 PM   Result Value Ref Range    Glucose (POC) 159 (H) 65 - 100 mg/dL   GLUCOSE, POC    Collection Time: 10/08/19  9:15 PM   Result Value Ref Range    Glucose (POC) 132 (H) 65 - 895 mg/dL   METABOLIC PANEL, BASIC    Collection Time: 10/09/19  4:12 AM   Result Value Ref Range    Sodium 149 (H) 136 - 145 mmol/L    Potassium 3.8 3.5 - 5.1 mmol/L    Chloride 119 (H) 98 - 107 mmol/L    CO2 25 21 - 32 mmol/L    Anion gap 5 (L) 7 - 16 mmol/L    Glucose 119 (H) 65 - 100 mg/dL    BUN 22 6 - 23 MG/DL    Creatinine 0.85 0.6 - 1.0 MG/DL    GFR est AA >60 >60 ml/min/1.73m2    GFR est non-AA >60 >60 ml/min/1.73m2    Calcium 8.0 (L) 8.3 - 10.4 MG/DL   MAGNESIUM    Collection Time: 10/09/19  4:12 AM   Result Value Ref Range    Magnesium 2.7 (H) 1.8 - 2.4 mg/dL   GLUCOSE, POC    Collection Time: 10/09/19  6:01 AM   Result Value Ref Range    Glucose (POC) 117 (H) 65 - 100 mg/dL         Patient Instructions:   Current Discharge Medication List      START taking these medications    Details   acetaminophen (TYLENOL) 325 mg tablet Take 2 Tabs by mouth every four (4) hours as needed for Pain. nitroglycerin (NITROSTAT) 0.4 mg SL tablet 1 Tab by SubLINGual route every five (5) minutes as needed for Chest Pain. Up to 3 doses. Qty: 2 Bottle, Refills: 4         CONTINUE these medications which have CHANGED    Details   atorvastatin (LIPITOR) 80 mg tablet Take 1 Tab by mouth nightly. Qty: 90 Tab, Refills: 4      lisinopril (PRINIVIL, ZESTRIL) 20 mg tablet Take 1 Tab by mouth two (2) times a day. Indications: high blood pressure  Qty: 60 Tab, Refills: 3         CONTINUE these medications which have NOT CHANGED    Details   docusate sodium (COLACE) 100 mg capsule Take 1 Cap by mouth two (2) times a day. Qty: 60 Cap, Refills: 2      calcium-vitamin D (OYSTER SHELL) 500 mg(1,250mg) -200 unit per tablet Take 1 Tab by mouth two (2) times daily (with meals). Qty: 60 Tab, Refills: 0      aspirin 81 mg chewable tablet Take 1 Tab by mouth daily. Qty: 30 Tab, Refills: 0      carvedilol (COREG) 12.5 mg tablet Take 1 Tab by mouth two (2) times daily (with meals). Qty: 60 Tab, Refills: 0      spironolactone (ALDACTONE) 25 mg tablet Take 0.5 Tabs by mouth daily.   Qty: 30 Tab, Refills: 0 insulin glargine (LANTUS,BASAGLAR) 100 unit/mL (3 mL) inpn 13 units at night daily  Qty: 1 Pen, Refills: 2      insulin lispro (HUMALOG) 100 unit/mL kwikpen Less than 150 =   0 units           150 -199 =   2 units  200 -249 =   4 units  250 -299 =   6 units  300 -349 =   8 units  Before meals and at night  Qty: 1 Package, Refills: 2         STOP taking these medications       chlorhexidine (HIBICLENS) 4 % liquid Comments:   Reason for Stopping:         amiodarone (CORDARONE) 200 mg tablet Comments:   Reason for Stopping:         mupirocin (BACTROBAN) 2 % ointment Comments:   Reason for Stopping:         cefpodoxime (VANTIN) 100 mg tablet Comments:   Reason for Stopping:                 Signed:  Charity Elise PA-C  10/9/2019

## 2019-10-09 NOTE — ED NOTES
I have reviewed discharge instructions with the patient. The patient verbalized understanding. Patient left ED via Discharge Method: wheelchair to Home with family    Opportunity for questions and clarification provided. Patient given 0 scripts. To continue your aftercare when you leave the hospital, you may receive an automated call from our care team to check in on how you are doing. This is a free service and part of our promise to provide the best care and service to meet your aftercare needs.  If you have questions, or wish to unsubscribe from this service please call [phone]. Thank you for Choosing our [facility] Emergency Department.

## 2019-10-09 NOTE — PROGRESS NOTES
Toña   Admission Date: 10/1/2019             Daily Progress Note: 10/9/2019    The patient's chart is reviewed and the patient is discussed with the staff. Patient had CABG and mitral valve repair.   She was recently admitted for heart failure and found to have MVCAD with an EF of 40 to 45% in addition to mitral valve regurgitation.      She is a lifelong nonsmoker. Her preop PFTs just showed restriction. Subjective:       Had BM yesterday and feels much better. Nausea resolved.  Ate maybe 50% of breakfast.      Current Facility-Administered Medications   Medication Dose Route Frequency    0.45% sodium chloride infusion  50 mL/hr IntraVENous CONTINUOUS    [START ON 10/10/2019] lactulose (CHRONULAC) 10 gram/15 mL solution 30 mL  30 mL Oral DAILY    lisinopril (PRINIVIL, ZESTRIL) tablet 20 mg  20 mg Oral BID    metoclopramide HCl (REGLAN) injection 10 mg  10 mg IntraVENous Q6H PRN    carvedilol (COREG) tablet 12.5 mg  12.5 mg Oral Q12H    albuterol (PROVENTIL VENTOLIN) nebulizer solution 2.5 mg  2.5 mg Nebulization QID RT    tapentadol (NUCYNTA) tablet 50 mg  50 mg Oral Q4H PRN    senna-docusate (PERICOLACE) 8.6-50 mg per tablet 2 Tab  2 Tab Oral BID    promethazine (PHENERGAN) with saline injection 12.5 mg  12.5 mg IntraVENous Q6H PRN    hydrALAZINE (APRESOLINE) 20 mg/mL injection 10 mg  10 mg IntraVENous Q6H PRN    famotidine (PEPCID) tablet 20 mg  20 mg Oral DAILY    bisacodyl (DULCOLAX) tablet 10 mg  10 mg Oral DAILY PRN    sodium chloride (NS) flush 20 mL  20 mL InterCATHeter Q8H    heparin (porcine) pf 600 Units  600 Units InterCATHeter Q8H    sodium chloride (NS) flush 20 mL  20 mL InterCATHeter PRN    heparin (porcine) pf 600 Units  600 Units InterCATHeter PRN    alum-mag hydroxide-simeth (MYLANTA) oral suspension 30 mL  30 mL Oral Q4H PRN    acetaminophen (TYLENOL) tablet 650 mg  650 mg Oral Q4H PRN    magnesium oxide (MAG-OX) tablet 400 mg  400 mg Oral TID PRN    magnesium oxide (MAG-OX) tablet 400 mg  400 mg Oral QID PRN    potassium chloride (K-DUR, KLOR-CON) SR tablet 20 mEq  20 mEq Oral BID PRN    potassium chloride (K-DUR, KLOR-CON) SR tablet 40 mEq  40 mEq Oral BID PRN    oxyCODONE-acetaminophen (PERCOCET) 5-325 mg per tablet 1 Tab  1 Tab Oral Q4H PRN    insulin lispro (HUMALOG) injection   SubCUTAneous AC&HS    ondansetron (ZOFRAN) injection 4 mg  4 mg IntraVENous Q6H PRN    aspirin delayed-release tablet 81 mg  81 mg Oral DAILY    nitroglycerin (NITROLINGUAL) sublingual 0.4 mg/spray  1 Spray SubLINGual Q5MIN PRN    insulin glargine (LANTUS) injection 10 Units  10 Units SubCUTAneous QHS    naloxone (NARCAN) injection 0.4 mg  0.4 mg IntraVENous PRN         Objective:     Vitals:    10/09/19 0400 10/09/19 0500 10/09/19 0720 10/09/19 0809   BP: (!) 186/109 164/80 161/76    Pulse: 82  82    Resp: 16  14    Temp: 98 °F (36.7 °C)  98.5 °F (36.9 °C)    SpO2: 94%  96% 94%   Weight: 192 lb 3.2 oz (87.2 kg)      Height:         Intake and Output:   10/07 1901 - 10/09 0700  In: 1147.5 [P.O.:590; I.V.:557.5]  Out: 1300 [Urine:1300]  10/09 0701 - 10/09 1900  In: 240 [P.O.:240]  Out: -     Physical Exam:   Constitutional:  the patient is well developed and in no acute distress  HEENT:  Sclera clear, pupils equal, oral mucosa moist  Lungs: clear bilaterally. On room air. Volumes on IS around 1 liter  Cardiovascular:  RRR without M,G,R. Sinus per telemetry  Abd/GI: soft and nontender today; with positive bowel sounds. Ext: warm without cyanosis. There is some edema in her feet. Musculoskeletal: moves all four extremities with equal strength  Skin:  no jaundice or rashes, sternal wound  Neuro: no gross neuro deficits   Musculoskeletal: can ambulate. No deformity  Psychiatric: Calm. ROS: nausea resolved with BM.  Still with poor po intake, sore, weak  Lines: PICC    CHEST XRAY:        LAB  Recent Labs     10/07/19  0322   WBC 10.1   HGB 11.3*   HCT 35.3*      Recent Labs     10/09/19  0412 10/08/19  0459 10/07/19  0322   *  --  144   K 3.8 3.9 3.9   *  --  115*   CO2 25  --  25   *  --  176*   BUN 22  --  29*   CREA 0.85  --  0.93   MG 2.7* 2.9* 2.8*   ALB  --  2.6*  --    SGOT  --  23  --      Lab Results   Component Value Date/Time    Calcium 8.0 (L) 10/09/2019 04:12 AM         Assessment:  (Medical Decision Making)     Hospital Problems  Never Reviewed          Codes Class Noted POA    Systolic CHF, acute on chronic ([facility] ICD-10-CM: I50.23  ICD-9-CM: 428.23, 428.0  10/3/2019 Unknown    EF 40 to 45%. No diuretics with nausea. IV fluids added yesterday    * (Principal) S/P CABG x 3 ICD-10-CM: Z95.1  ICD-9-CM: V45.81  10/2/2019 Unknown    Rhythm stable. Mild pain    S/P MVR (mitral valve repair) ICD-10-CM: Q35.145  ICD-9-CM: V45.89  10/2/2019 Unknown        Hypocalcemia ICD-10-CM: E83.51  ICD-9-CM: 275.41  10/2/2019 Unknown    corrected    Suspected sleep apnea ICD-10-CM: R29.818  ICD-9-CM: 781.99  10/2/2019 Unknown    No current Rx    Thrombocytopenia ([facility] ICD-10-CM: D69.6  ICD-9-CM: 287.5  10/2/2019 Unknown    correcting    Mitral valve regurgitation ICD-10-CM: I34.0  ICD-9-CM: 424.0  10/1/2019 Yes        Hypoxia ICD-10-CM: R09.02  ICD-9-CM: 799.02  10/1/2019 No    Now on room air    UTI (urinary tract infection) ICD-10-CM: N39.0  ICD-9-CM: 599.0  9/30/2019 Yes    Klebsiella per culture - s/p Rx    CAD (coronary artery disease) ICD-10-CM: I25.10  ICD-9-CM: 414.00  9/26/2019 Yes    S/p CABG          Plan:  (Medical Decision Making)   1. Feels much better today after BM yesterday. Requesting not to have pericolace but have encouraged her to use daily medications for now to avoid constipation. Can hold prn  2. Volumes on IS around 1 liter. No congestion. On room air  3. Change IV fluids to 1/2 NS with increase in Na. Intake still poor so will continue IV fluids for today. Fluid balane neg 365 mls  4.  Still hypertensive - ACE dose has been increased - monitor response. PRN Apresoline if needed    Juancarlos Flurry, NP    More than 50% of time documented was spent face-to-face contact with the patient and in the care of the patient on the floor/unit where the patient is located.

## 2019-10-09 NOTE — PROGRESS NOTES
Pt with discharge orders this day. Pt to return home and her sister is going to be staying with her. [facility] referral made. Appointment set up with [facility] on 10/28 at 8:45am.  Medication voucher for nitrostat, lisinopril, and lipitor (her lisinopril and lipitor strength were doubled and pt would NOT have enough pills to make it to her new Free clinic appointment without voucher) for [address]. No additional discharge needs voiced at this time. Milestones met. Care Management Interventions  PCP Verified by CM: Yes(Pt does NOT have a PCP)  Mode of Transport at Discharge:  Other (see comment)(TBD based on need)  Transition of Care Consult (CM Consult): Discharge Planning, [address]: Yes  Discharge Durable Medical Equipment: No  Physical Therapy Consult: Yes  Occupational Therapy Consult: No  Speech Therapy Consult: No  Current Support Network: Own Home, Lives Alone  Confirm Follow Up Transport: Other (see comment)(TBD based on need)  Plan discussed with Pt/Family/Caregiver: Yes  Freedom of Choice Offered: Yes  Discharge Location  Discharge Placement: Home with home health

## 2019-10-09 NOTE — DISCHARGE INSTRUCTIONS
Patient Education      Coronary Artery Bypass Graft: What to Expect at Home  Your Recovery    Coronary artery bypass graft (CABG) is surgery to treat coronary artery disease. The surgery helps blood make a detour, or bypass, around one or more narrowed or blocked coronary arteries. Coronary arteries are the blood vessels that bring blood to the heart. Your doctor did the surgery through a cut, called an incision, in your chest.  You will feel tired and sore for the first few weeks after surgery. You may have some brief, sharp pains on either side of your chest. Your chest, shoulders, and upper back may ache. The incision in your chest and the area where the healthy vein was taken may be sore or swollen. These symptoms usually get better after 4 to 6 weeks. You will probably be able to do many of your usual activities after 4 to 6 weeks. But for 2 to 3 months you will not be able to lift heavy objects or do activities that strain your chest or upper arm muscles. At first you may notice that you get tired easily and need to rest often. It may take 1 to 2 months to get your energy back. Some people find that they are more emotional after this surgery. You may cry easily or show emotion in ways that are unusual for you. This is common and may last for up to a year. Some people get depressed after CABG surgery. Talk with your doctor if you have sadness that continues or you are concerned about how you are feeling. Treatment and other support can help you feel better. Even though the surgery may improve your symptoms, you will still need to make changes in your lifestyle to lower your risk of a heart attack or stroke. It will be important to eat a heart-healthy diet, get regular exercise, not smoke, take your heart medicines, and reduce stress.   You will likely start a cardiac rehabilitation (rehab) program in the hospital. You will continue with this rehab program after you go home to help you recover and prevent problems with your heart. Talk to your doctor about whether rehab is right for you. This care sheet gives you a general idea about how long it will take for you to recover. But each person recovers at a different pace. Follow the steps below to get better as quickly as possible. How can you care for yourself at home? Activity    · Rest when you feel tired. Getting enough sleep will help you recover. Try to sleep on your back for 4 to 6 weeks while your breastbone (sternum) heals. This usually takes about 4 to 6 weeks.     · Try to walk each day. Start by walking a little more than you did the day before. Bit by bit, increase the amount you walk. Walking boosts blood flow and helps prevent pneumonia and constipation.     · Avoid strenuous activities, such as bicycle riding, jogging, weight lifting, or heavy aerobic exercise, until your doctor says it is okay.     · For 3 months, avoid activities that strain your chest or upper arm muscles. This includes pushing a  or vacuum, mopping floors, or swinging a golf club or tennis racquet.     · For 2 to 3 months, avoid lifting anything that would make you strain. This may include a child, heavy grocery bags and milk containers, a heavy briefcase or backpack, or cat litter or dog food bags.     · Hold a pillow firmly over your chest incision when you cough or take deep breaths. This will support your chest and reduce your pain.     · Do breathing exercises at home as instructed by your doctor. This will help prevent pneumonia.     · Ask your doctor when you can drive again.     · You will probably need to take 4 to 12 weeks off from work. It depends on the type of work you do and how you feel.     · You may shower as usual. Pat the incision dry.  Do not take a bath for the first 3 weeks, or until your doctor tells you it is okay.     · Do not swim or use a hot tub for at least 1 month, or until your doctor says it is okay.     · Ask your doctor when it is okay for you to have sex. Diet    · Eat a heart-healthy diet. If you have not been eating this way, talk to your doctor. You also may want to talk to a dietitian. A dietitian can help you learn about healthy foods.     · Drink plenty of fluids (unless your doctor tells you not to).     · You may notice that your bowel movements are not regular right after your surgery. This is common. Try to avoid constipation and straining with bowel movements. You may want to take a fiber supplement every day. If you have not had a bowel movement after a couple of days, ask your doctor about taking a mild laxative. Medicines    · Your doctor will tell you if and when you can restart your medicines. He or she will also give you instructions about taking any new medicines.     · If you take blood thinners, such as warfarin (Coumadin), clopidogrel (Plavix), or aspirin, be sure to talk to your doctor. He or she will tell you if and when to start taking those medicines again. Make sure that you understand exactly what your doctor wants you to do.     · Your doctor may give you medicines to prevent blood clots, keep your heartbeat steady, and lower your blood pressure and cholesterol. Take your medicines exactly as prescribed. Call your doctor if you think you are having a problem with your medicine.     · Be safe with medicines. Take pain medicines exactly as directed. ? If the doctor gave you a prescription medicine for pain, take it as prescribed. ? If you are not taking a prescription pain medicine, ask your doctor if you can take an over-the-counter medicine. ? Do not take aspirin, ibuprofen (Advil, Motrin), naproxen (Aleve), or other nonsteroidal anti-inflammatory drugs (NSAIDs) unless your doctor says it is okay.     · If you think your pain medicine is making you sick to your stomach:  ? Take your medicine after meals (unless your doctor has told you not to). ?  Ask your doctor for a different pain medicine.     · If your doctor prescribed antibiotics, take them as directed. Do not stop taking them just because you feel better. You need to take the full course of antibiotics. Incision care    · If you have strips of tape on the incisions the doctor made, leave the tape on for a week or until it falls off.     · Wash the area daily with warm, soapy water, and pat it dry. Don't use hydrogen peroxide or alcohol, which can slow healing. You may cover the area with a gauze bandage if it weeps or rubs against clothing. Change the bandage every day.     · Keep the area clean and dry.     · Do not use any creams, lotions, powders, ointments, or oils unless your doctor tells you it is okay.     · If you have an incision in your leg:  ? Wear support stockings on your legs during the day for the first 2 weeks. You can take the stockings off at night while you sleep. ? Raise your legs above the level of your heart whenever you lay down for the first 4 to 6 weeks. Other instructions    · Keep track of your weight. Weigh yourself every day at the same time of day, on the same scale, in the same amount of clothing. A sudden increase in weight can be a sign of a problem with your heart. Tell your doctor if you suddenly gain weight, such as 3 pounds or more in 2 to 3 days.     · Do not smoke. Smoking can make it harder for you to recover and it will raise the chances of your arteries narrowing again. If you need help quitting, talk to your doctor about stop-smoking programs and medicines. These can increase your chances of quitting for good. Follow-up care is a key part of your treatment and safety. Be sure to make and go to all appointments, and call your doctor if you are having problems. It's also a good idea to know your test results and keep a list of the medicines you take. When should you call for help? Call 911 anytime you think you may need emergency care.  For example, call if:    · You passed out (lost consciousness).     · You have severe trouble breathing.     · You have sudden chest pain and shortness of breath, or you cough up blood.     · You have severe pain in your chest.     · You have symptoms of a heart attack. These may include:  ? Chest pain or pressure, or a strange feeling in the chest.  ? Sweating. ? Shortness of breath. ? Nausea or vomiting. ? Pain, pressure, or a strange feeling in the back, neck, jaw, or upper belly or in one or both shoulders or arms. ? Lightheadedness or sudden weakness. ? A fast or irregular heartbeat. After you call 911, the  may tell you to chew 1 adult-strength or 2 to 4 low-dose aspirin. Wait for an ambulance. Do not try to drive yourself.     · You have angina symptoms (such as chest pain or pressure) that do not go away with rest or are not getting better within 5 minutes after you take a dose of nitroglycerin.    Call your doctor now or seek immediate medical care if:    · You have pain that does not get better after you take pain medicine.     · You have a fever over 100°F.     · You have loose stitches, or your incision comes open.     · Bright red blood has soaked through the bandage over your incision.     · You have signs of infection, such as:  ? Increased pain, swelling, warmth, or redness. ? Red streaks leading from the incision. ? Pus draining from the incision. ? Swollen lymph nodes in your neck, armpits, or groin. ? A fever.     · You have signs of a blood clot in a leg. If you had a vein removed from your leg, you may have tenderness and swelling while your leg heals. But signs of a blood clot may be in a different part of your leg and may include:  ? Pain in your calf, back of the knee, thigh, or groin. ?  Redness and swelling in your leg or groin.     · Your heartbeat feels very fast or slow, skips beats, or flutters.     · You are dizzy or lightheaded, or you feel like you may faint.     · You have new or increased shortness of breath.    Watch closely for changes in your health, and be sure to contact your doctor if:    · You gain weight suddenly, such as 3 pounds or more in 2 to 3 days.     · You have increased swelling in your legs, ankles, or feet.     · You have any concerns about your incision.     · You feel very sad or have other signs of depression, such as trouble sleeping or eating.     · You have questions about diet, exercise, quitting smoking, or stress reduction after surgery. Where can you learn more? Go to http://liliane-fox.info/. Enter F759 in the search box to learn more about \"Coronary Artery Bypass Graft: What to Expect at Home. \"  Current as of: April 9, 2019  Content Version: 12.2  © 1066-8744 [email or web address]. Care instructions adapted under license by [employer] (which disclaims liability or warranty for this information). If you have questions about a medical condition or this instruction, always ask your healthcare professional. [place] [address] any warranty or liability for your use of this information. Patient Education        Reducing Heart Attack Risk With Daily Medicine: Care Instructions  Your Care Instructions    If you are at risk for a heart attack, there are many medicines that can reduce your risk. These include:  · ACE inhibitors or ARBs. These are types of blood pressure medicines. They can reduce the risk of heart attacks and strokes if you are at high risk. · Statins and other cholesterol medicines. These lower cholesterol. They can also reduce the risk of a stroke. · Aspirin and other antiplatelets. If you are at high risk of a heart attack or stroke and at low risk of bleeding problems, your doctor might talk to you about taking an aspirin every day to lower your risk. Only take daily aspirin if your doctor recommends it because it's not right for everybody. · Beta-blocker medicines. These are a type of blood pressure and heart medicine.  They can reduce the chance of early death if you have had a heart attack. All medicines can cause side effects. So it is important to understand the pros and cons of any medicine you take. It is also important to take your medicines exactly as your doctor tells you to. Follow-up care is a key part of your treatment and safety. Be sure to make and go to all appointments, and call your doctor if you are having problems. It's also a good idea to know your test results and keep a list of the medicines you take. ACE inhibitors  ACE (angiotensin-converting enzyme) inhibitors are used for three main reasons. They lower blood pressure, protect the kidneys, and prevent heart attacks and strokes. Examples include benazepril (Lotensin), lisinopril (Prinivil, Zestril), and ramipril (Altace). An angiotensin II receptor blocker (ARB) may be used instead of an ACE inhibitor. ARBs help you in the same ways as ACE inhibitors. Examples include candesartan (Atacand), irbesartan (Avapro), and losartan (Cozaar). Before you start taking an ACE inhibitor or an ARB, make sure your doctor knows if:  · You are taking a water pill (diuretic). · You are taking potassium pills or using salt substitutes. · You are pregnant or breastfeeding. · You have had a kidney transplant or other kidney problems. ACE inhibitors and ARBs can cause side effects. Call your doctor right away if you have:  · Trouble breathing. · Swelling in your face, head, neck, or tongue. · Dizziness or lightheadedness. · A dry cough. Statins  Statins lower cholesterol. Examples include atorvastatin (Lipitor), lovastatin (Mevacor), pravastatin (Pravachol), and simvastatin (Zocor). Before you start taking a statin, make sure your doctor knows if:  · You have had a kidney transplant or other kidney problems. · You have liver disease. · You take any other prescription medicine, over-the-counter medicine, vitamins, supplements, or herbal remedies.   · You are pregnant or breastfeeding. Statins can cause side effects. Call your doctor right away if you have:  · New, severe muscle aches. · Brown urine. Aspirin  If you are at high risk of a heart attack, your doctor might talk to you about taking an aspirin every day to lower your risk. A heart attack occurs when a blood vessel in the heart gets blocked. When this happens, oxygen can't get to the heart muscle, and part of the heart dies. Aspirin can help prevent blood clots that can block the blood vessels. But talk to your doctor before you start taking aspirin every day. Daily aspirin isn't right for most people. This is because it can cause serious bleeding. You and your doctor can decide if aspirin is a good choice for you based on your risk of a heart attack and your risk of serious bleeding. You may not be able to use aspirin if you:  · Have asthma or certain other health conditions. · Have an ulcer or other stomach problem. · Take some other medicine (called a blood thinner) that prevents blood clots. · Are allergic to aspirin. Your doctor may recommend that you take one low-dose aspirin (81 mg) tablet each day, with a meal and a full glass of water. Before having a surgery or procedure, tell your doctor or dentist that you take aspirin. He or she will tell you if you should stop taking aspirin beforehand. Make sure that you understand exactly what your doctor wants you to do. Aspirin can cause side effects. Call your doctor right away if you have:  · Unusual bleeding or bruising. · Nausea, vomiting, or heartburn. · Black or bloody stools. Beta-blockers  Beta-blockers are used for three main reasons. They lower blood pressure, relieve angina symptoms (such as chest pain or pressure), and reduce the chances of a second heart attack. They include atenolol (Tenormin), carvedilol (Coreg), and metoprolol (Lopressor).   Before you start taking a beta-blocker, make sure your doctor knows if you have:  · Severe asthma or frequent asthma attacks. · A very slow pulse (less than 55 beats a minute). Beta-blockers can cause side effects. Call your doctor right away if you have:  · Wheezing or trouble breathing. · Dizziness or lightheadedness. · Asthma that gets worse. When should you call for help? Watch closely for changes in your health, and be sure to contact your doctor if you have any problems. Where can you learn more? Go to http://liliane-fox.info/. Enter R428 in the search box to learn more about \"Reducing Heart Attack Risk With Daily Medicine: Care Instructions. \"  Current as of: April 9, 2019  Content Version: 12.2  © 0403-5092 [email or web address]. Care instructions adapted under license by [employer] (which disclaims liability or warranty for this information). If you have questions about a medical condition or this instruction, always ask your healthcare professional. [place] [address] any warranty or liability for your use of this information. Heart-Healthy Diet: Care Instructions  Your Care Instructions    A heart-healthy diet has lots of vegetables, fruits, nuts, beans, and whole grains, and is low in salt. It limits foods that are high in saturated fat, such as meats, cheeses, and fried foods. It may be hard to change your diet, but even small changes can lower your risk of heart attack and heart disease. Follow-up care is a key part of your treatment and safety. Be sure to make and go to all appointments, and call your doctor if you are having problems. It's also a good idea to know your test results and keep a list of the medicines you take. How can you care for yourself at home? Watch your portions  · Learn what a serving is. A \"serving\" and a \"portion\" are not always the same thing. Make sure that you are not eating larger portions than are recommended. For example, a serving of pasta is ½ cup. A serving size of meat is 2 to 3 ounces.  A 3-ounce serving is about the size of a deck of cards. Measure serving sizes until you are good at [place]" them. Keep in mind that restaurants often serve portions that are 2 or 3 times the size of one serving. · To keep your energy level up and keep you from feeling hungry, eat often but in smaller portions. · Eat only the number of calories you need to stay at a healthy weight. If you need to lose weight, eat fewer calories than your body burns (through exercise and other physical activity). Eat more fruits and vegetables  · Eat a variety of fruit and vegetables every day. Dark green, deep orange, red, or yellow fruits and vegetables are especially good for you. Examples include spinach, carrots, peaches, and berries. · Keep carrots, celery, and other veggies handy for snacks. Buy fruit that is in season and store it where you can see it so that you will be tempted to eat it. · Cook dishes that have a lot of veggies in them, such as stir-fries and soups. Limit saturated and trans fat  · Read food labels, and try to avoid saturated and trans fats. They increase your risk of heart disease. Trans fat is found in many processed foods such as cookies and crackers. · Use olive or canola oil when you cook. Try cholesterol-lowering spreads, such as Benecol or Take Control. · Bake, broil, grill, or steam foods instead of frying them. · Choose lean meats instead of high-fat meats such as hot dogs and sausages. Cut off all visible fat when you prepare meat. · Eat fish, skinless poultry, and meat alternatives such as soy products instead of high-fat meats. Soy products, such as tofu, may be especially good for your heart. · Choose low-fat or fat-free milk and dairy products. Eat fish  · Eat at least two servings of fish a week. Certain fish, such as salmon and tuna, contain omega-3 fatty acids, which may help reduce your risk of heart attack. Eat foods high in fiber  · Eat a variety of grain products every day. Include whole-grain foods that have lots of fiber and nutrients. Examples of whole-grain foods include oats, whole wheat bread, and brown rice. · Buy whole-grain breads and cereals, instead of white bread or pastries. Limit salt and sodium  · Limit how much salt and sodium you eat to help lower your blood pressure. · Taste food before you salt it. Add only a little salt when you think you need it. With time, your taste buds will adjust to less salt. · Eat fewer snack items, fast foods, and other high-salt, processed foods. Check food labels for the amount of sodium in packaged foods. · Choose low-sodium versions of canned goods (such as soups, vegetables, and beans). Limit sugar  · Limit drinks and foods with added sugar. These include candy, desserts, and soda pop. Limit alcohol  · Limit alcohol to no more than 2 drinks a day for men and 1 drink a day for women. Too much alcohol can cause health problems. When should you call for help? Watch closely for changes in your health, and be sure to contact your doctor if:    · You would like help planning heart-healthy meals. Where can you learn more? Go to http://liilane-fox.info/. Enter V137 in the search box to learn more about \"Heart-Healthy Diet: Care Instructions. \"  Current as of: April 9, 2019  Content Version: 12.2  © 4321-4280 [employer]. Care instructions adapted under license by [employer] (which disclaims liability or warranty for this information). If you have questions about a medical condition or this instruction, always ask your healthcare professional. [place] [address] any warranty or liability for your use of this information.

## 2019-10-09 NOTE — PROGRESS NOTES
Problem: Mobility Impaired (Adult and Pediatric)  Goal: *Acute Goals and Plan of Care (Insert Text)  Description  STG:  (1.)Ms. Maribell Ballesteros will move from supine to sit and sit to supine  with CONTACT GUARD ASSIST within 3 treatment day(s). (2.)Ms. Maribell Ballesteros will transfer from bed to chair and chair to bed with CONTACT GUARD ASSIST using the least restrictive device within 3 treatment day(s). (3.)Ms. Maribell Ballesteros will ambulate with CONTACT GUARD ASSIST for 250+ feet with the least restrictive device within 3 treatment day(s). LTG:  (1.)Ms. Maribell Ballesteros will move from supine to sit and sit to supine  in bed with INDEPENDENT within 7 treatment day(s). (2.)Ms. Maribell Ballesteros will transfer from bed to chair and chair to bed with INDEPENDENT using the least restrictive device within 7 treatment day(s). (3.)Ms. Maribell Ballesteros will ambulate with SUPERVISION for 500+ feet with the least restrictive device within 7 treatment day(s).   ________________________________________________________________________________________________     Outcome: Progressing Towards Goal      PHYSICAL THERAPY: Daily Note and AM 10/9/2019  INPATIENT: PT Visit Days : 6  Payor: MEDICAID PENDING / Plan: [place] PENDING / Product Type: Medicaid /       NAME/AGE/GENDER: Donna Henderson is a 61 y.o. female   PRIMARY DIAGNOSIS: Atherosclerosis of native coronary artery of native heart with unstable angina pectoris ([facility] [address] [I25.110]  Mitral valve insufficiency, unspecified etiology [I34.0]  CAD (coronary artery disease) [I25.10]  Mitral valve regurgitation [I34.0] S/P CABG x 3   S/P CABG x 3    Procedure(s) (LRB):  CORONARY ARTERY BYPASS GRAFT x  3, LIMA (N/A)  MITRAL VALVE REPAIR (N/A)  VEIN HARVEST GREATER SAPHENOUS (Left)  ESOPHAGEAL TRANS ECHOCARDIOGRAM (N/A)  LEFT ATRIAL APPENDAGE CLOSURE  WIH CLIP (Left)  8 Days Post-Op  ICD-10: Treatment Diagnosis:    · Generalized Muscle Weakness (M62.81)  · Difficulty in walking, Not elsewhere classified (R26.2) Precaution/Allergies:  Patient has no known allergies. Sternal precautions   ASSESSMENT:     Ms. Josi Connelly is sitting in the recliner upon contact. Patient agreeable to therapy. Sit to stand with min assist using the momentum method. Pt was able to ambulate about 110' with rolling  walker and CGA requiring one sitting rest break. Pt ambulates at slow pace with shuffling steps. Pt returned to chair and after a short rest break the patient performs therapeutic exercises. Tolerated well. Good session today. Patient is making progress and states that she feels better.  arrives. Will continue with POC. This section established at most recent assessment   PROBLEM LIST (Impairments causing functional limitations):  1. Decreased Strength  2. Decreased ADL/Functional Activities  3. Decreased Transfer Abilities  4. Decreased Ambulation Ability/Technique  5. Decreased Balance  6. Increased Pain  7. Decreased Activity Tolerance  8. Decreased Pacing Skills  9. Increased Fatigue  10. Increased Shortness of Breath  11. Decreased Flexibility/Joint Mobility  12. Decreased Knowledge of Precautions  13. Decreased [place] with Home Exercise Program   INTERVENTIONS PLANNED: (Benefits and precautions of physical therapy have been discussed with the patient.)  1. Balance Exercise  2. Bed Mobility  3. Electrical Stimulation  4. Family Education  5. Home Exercise Program (HEP)  6. Neuromuscular Re-education/Strengthening  7. Therapeutic Activites  8. Therapeutic Exercise/Strengthening  9. Transfer Training     TREATMENT PLAN: Frequency/Duration: twice daily for duration of hospital stay  Rehabilitation Potential For Stated Goals: Good     REHAB RECOMMENDATIONS (at time of discharge pending progress):    Placement: It is my opinion, based on this patient's performance to date, that Ms. Josi Connelly may benefit from [address] after discharge due to the functional deficits listed above that are likely to improve with skilled rehabilitation because he/she has multiple medical issues that affect his/her functional mobility in the community. Equipment:    None at this time              HISTORY:   History of Present Injury/Illness (Reason for Referral):  S/p Procedure(s) (LRB):  CORONARY ARTERY BYPASS GRAFT x  3, LIMA (N/A)  MITRAL VALVE REPAIR (N/A)  VEIN HARVEST GREATER SAPHENOUS (Left)  ESOPHAGEAL TRANS ECHOCARDIOGRAM (N/A)  LEFT ATRIAL APPENDAGE CLOSURE  WIH CLIP (Left)  Past Medical History/Comorbidities:   Ms. Claudia Mir  has a past medical history of Breast cancer ([facility] [address] (2015), CAD (coronary artery disease), Diabetes ([facility] [address] (typell, dx 2016), Heart failure ([facility] [address] Hypercholesteremia, Hypertension, Intertrochanteric fracture of right femur ([facility] [address] (2/24/2018), Mitral valve regurgitation, and PUD (peptic ulcer disease). Ms. Claudia Mir  has a past surgical history that includes hx hip fracture tx (Right); hx breast lumpectomy (2015); and hx orthopaedic (Right). Social History/Living Environment:   Home Environment: Private residence  # Steps to Enter: 0  One/Two Story Residence: One story  Living Alone: Yes  Support Systems: Spouse/Significant Other/Partner  Patient Expects to be Discharged to[de-identified] Private residence  Current DME Used/Available at Home: None  Prior Level of Function/Work/Activity:  Independent with gait and ADLs, 0 falls, drives   Number of Personal Factors/Comorbidities that affect the Plan of Care: 3+: HIGH COMPLEXITY   EXAMINATION:   Most Recent Physical Functioning:   Gross Assessment:                  Posture:     Balance:  Sitting: Intact  Sitting - Static: Good (unsupported)  Sitting - Dynamic: Fair (occasional)  Standing: Impaired  Standing - Static: Good  Standing - Dynamic : Fair Bed Mobility:     Wheelchair Mobility:     Transfers:  Sit to Stand: Contact guard assistance  Stand to Sit: Contact guard assistance  Gait:     Speed/Yadi: Shuffled; Slow  Gait Abnormalities: Decreased step clearance  Distance (ft): 110 Feet (ft)  Assistive Device: Walker, rolling  Ambulation - Level of Assistance: Stand-by assistance;Contact guard assistance  Interventions: Safety awareness training      Body Structures Involved:  1. Heart  2. Lungs  3. Thoracic Cage  4. Bones  5. Joints  6. Muscles  7. Ligaments Body Functions Affected:  1. Sensory/Pain  2. Cardio  3. Respiratory  4. Neuromusculoskeletal  5. Movement Related Activities and Participation Affected:  1. General Tasks and Demands  2. Mobility  3. Self Care  4. Domestic Life  5. Interpersonal Interactions and Relationships  6. Community, Social and Izard Holcomb   Number of elements that affect the Plan of Care: 4+: HIGH COMPLEXITY   CLINICAL PRESENTATION:   Presentation: Evolving clinical presentation with changing clinical characteristics: MODERATE COMPLEXITY   CLINICAL DECISION MAKIN [facility] Mobility Inpatient Short Form  How much difficulty does the patient currently have. .. Unable A Lot A Little None   1. Turning over in bed (including adjusting bedclothes, sheets and blankets)? ? 1   ? 2   ? 3   ? 4   2. Sitting down on and standing up from a chair with arms ( e.g., wheelchair, bedside commode, etc.)   ? 1   ? 2   ? 3   ? 4   3. Moving from lying on back to sitting on the side of the bed?   ? 1   ? 2   ? 3   ? 4   How much help from another person does the patient currently need. .. Total A Lot A Little None   4. Moving to and from a bed to a chair (including a wheelchair)? ? 1   ? 2   ? 3   ? 4   5. Need to walk in hospital room? ? 1   ? 2   ? 3   ? 4   6. Climbing 3-5 steps with a railing? ? 1   ? 2   ? 3   ? 4   © , Trustees of [address] [place] [address] [address] [address], under license to [employer]. All rights reserved      Score:  Initial: 17 Most Recent: X (Date: -- )    Interpretation of Tool:  Represents activities that are increasingly more difficult (i.e. Bed mobility, Transfers, Gait).     Medical Necessity: · Patient is expected to demonstrate progress in strength, balance, coordination and functional technique  ·  to decrease assistance required with gait, transfers, and functional mobility   · . Reason for Services/Other Comments:  · Patient continues to require skilled intervention due to decreased strength, decreased balance, decreased functional tolerance, decreased cardiopulmonary endurance affecting participation in basic ADLs and functional tasks. · .   Use of outcome tool(s) and clinical judgement create a POC that gives a: Clear prediction of patient's progress: LOW COMPLEXITY            TREATMENT:      Pre-treatment Symptoms/Complaints: \"OK\"  Pain: Initial:   Pain Intensity 1: 0 0/10 Post Session:  0/10     Therapeutic Activity: (    14 minutes): Therapeutic activities including Chair transfers, Ambulation on level ground and cues for ease and safety of transfers, education on sternal precautions  to improve mobility, strength, balance and coordination. Required minimal assist with  Safety awareness training to promote static and dynamic balance in standing and promote coordination of bilateral, upper extremity(s), lower extremity(s). Therapeutic Exercise: ( 12 minutes):  Exercises per grid below to improve mobility, strength, balance and coordination. Required minimum  verbal cues to perform exercises correctly. Progressed repetitions and complexity of movement as indicated.        Date:  10/07/19 Date:  10/09/19 Date:     ACTIVITY/EXERCISE AM PM AM PM AM PM   LAQ 10  15      Shoulder shrugs 10  15      Gluteal sets 10  15      marching   15      Ankle pumps 10  15      abduction   15               B = bilateral; AA = active assistive; A = active; P = passive  Braces/Orthotics/Lines/Etc:   ·   Treatment/Session Assessment:    · Response to Treatment:  110 feet w/RW slow ziggy one sitting rest break  · Interdisciplinary Collaboration:   o Physical Therapy Assistant  o Registered Nurse  · After treatment position/precautions:   o Up in chair  o Bed alarm/tab alert on  o Bed/Chair-wheels locked  o Call light within reach  o RN notified  o Family at bedside   · Compliance with Program/Exercises: Compliant all of the time  · Recommendations/Intent for next treatment session: \"Next visit will focus on advancements to more challenging activities and reduction in assistance provided\".   Total Treatment Duration:  PT Patient Time In/Time Out  Time In: 0950  Time Out: [address] [place], PTA

## 2019-10-09 NOTE — PROGRESS NOTES
[address]  Face to Face Encounter    Patients Name: Jos Rapp    YOB: 1960    Ordering Physician: Dr. Terrie Tinajero    Primary Diagnosis: Atherosclerosis of native coronary artery of native heart with unstable angina pectoris ([facility] [address] [I25.110]  Mitral valve insufficiency, unspecified etiology [I34.0]  CAD (coronary artery disease) [I25.10]  Mitral valve regurgitation [I34.0]    Date of Face to Face:   10/9/2019                                  Face to Face Encounter findings are related to primary reason for home care:   yes. 1. I certify that the patient needs intermittent care as follows: skilled nursing care:  skilled observation/assessment, patient education  physical therapy: strengthening, transfer training and gait/stair training    2. I certify that this patient is homebound, that is: 1) patient requires the use of a N/A device, special transportation, or assistance of another to leave the home; or 2) patient's condition makes leaving the home medically contraindicated; and 3) patient has a normal inability to leave the home and leaving the home requires considerable and taxing effort. Patient may leave the home for infrequent and short duration for medical reasons, and occasional absences for non-medical reasons. Homebound status is due to the following functional limitations: Patient currently under activity restrictions secondary to recent surgical procedure, this hinders their ability to safely leave the home. 3. I certify that this patient is under my care and that I, or a nurse practitioner or [facility], or clinical nurse specialist, or certified nurse midwife, working with me, had a Face-to-Face Encounter that meets the physician Face-to-Face Encounter requirements.   The following are the clinical findings from the [address] [place] [address] encounter that support the need for skilled services and is a summary of the encounter: see progress notes    See attached progess note      [employer]  10/9/2019      THE FOLLOWING TO BE COMPLETED BY THE COMMUNITY PHYSICIAN:    I concur with the findings described above from the F2F encounter that this patient is homebound and in need of a skilled service.     Certifying Physician: _____________________________________      Printed Certifying Physician Name: _____________________________________    Date: _________________

## 2019-10-09 NOTE — ED TRIAGE NOTES
Hap PICC removed today. EMS estimates about 100 ml of blood loss. Bleeding is controlled at this time.

## 2019-10-09 NOTE — PROGRESS NOTES
Patient seen today by 2nd floor CM with a plan to return home and her sister to be staying with her. [facility] referral was made. Appointment set up with [facility] on 10/28 at 8:45am.  Medication voucher for nitrostat, lisinopril, and lipitor (her lisinopril and lipitor strength were doubled and pt would NOT have enough pills to make it to her new Free clinic appointment without voucher) for [address].  VERENICE updated charge nurse and primary ED MD.

## 2019-10-09 NOTE — ED PROVIDER NOTES
Patient presents from home after onset of bleeding from PICC line site. She was discharged from the hospital this morning and the PICC line had been removed prior to discharge. She had undergone coronary artery bypass graft surgery as well as mitral valve repair.  her EMS estimates proximally 100 mL's blood loss. This  states she applied direct pressure and a tight bandage. No other complaints. The history is provided by the patient and the spouse. Other   This is a new problem. The problem has been resolved. Nothing aggravates the symptoms. Relieved by: Pressure bandage.         Past Medical History:   Diagnosis Date    Breast cancer ([facility] [address] 2015    left, radiation , and lumpectomy only    CAD (coronary artery disease)     multivessel, awaiting CABG and mitral valve repair/replacement    Diabetes ([facility] [address] kelli, dx 2016    kelli, ID, avfbs 160, last A1C was 10.8. denies lows    Heart failure ([facility] [address]     EF 40-45%    Hypercholesteremia     Hypertension     Intertrochanteric fracture of right femur ([facility] [address] 2/24/2018    Mitral valve regurgitation     PUD (peptic ulcer disease)     age 12, no treatment since       Past Surgical History:   Procedure Laterality Date    HX BREAST LUMPECTOMY  2015    HX HIP FRACTURE TX Right     HX ORTHOPAEDIC Right     hip fracture repair with hardware , not replacent         Family History:   Problem Relation Age of Onset    Kidney Disease Mother     Heart Disease Father     Heart Disease Brother        Social History     Socioeconomic History    Marital status: LEGALLY      Spouse name: Not on file    Number of children: Not on file    Years of education: Not on file    Highest education level: Not on file   Occupational History    Occupation: previously worked as a hospice nurse   Social Needs    Financial resource strain: Not on file    Food insecurity:     Worry: Not on file     Inability: Not on file   [employer] needs: Medical: Not on file     Non-medical: Not on file   Tobacco Use    Smoking status: Never Smoker    Smokeless tobacco: Never Used   Substance and Sexual Activity    Alcohol use: Yes     Comment: social    Drug use: No    Sexual activity: Not on file   Lifestyle    Physical activity:     Days per week: Not on file     Minutes per session: Not on file    Stress: Not on file   Relationships    Social connections:     Talks on phone: Not on file     Gets together: Not on file     Attends Buddhist service: Not on file     Active member of club or organization: Not on file     Attends meetings of clubs or organizations: Not on file     Relationship status: Not on file    Intimate partner violence:     Fear of current or ex partner: Not on file     Emotionally abused: Not on file     Physically abused: Not on file     Forced sexual activity: Not on file   Other Topics Concern    Not on file   Social History Narrative    . Lives with her          ALLERGIES: Patient has no known allergies. Review of Systems   All other systems reviewed and are negative. Vitals:    10/09/19 1530   BP: 143/75   Pulse: 79   Resp: 16   Temp: 98.9 °F (37.2 °C)   SpO2: 95%   Weight: 87.1 kg (192 lb)   Height: 5' 3\" (1.6 m)            Physical Exam   Constitutional: She is oriented to person, place, and time. She appears well-developed and well-nourished. HENT:   Head: Normocephalic and atraumatic. Eyes: Pupils are equal, round, and reactive to light. EOM are normal.   Pale conjunctiva   Cardiovascular: Intact distal pulses. Musculoskeletal: Normal range of motion. Neurological: She is alert and oriented to person, place, and time. Skin: Skin is warm and dry. Capillary refill takes less than 2 seconds. Dressing was removed from the PICC line site in the right brachium. Gauze pad was soaked with blood and covered by OpSite dressing. No active bleeding was noted and the wound was redressed.   PMS intact distally. Psychiatric: She has a normal mood and affect. Her behavior is normal.   Nursing note and vitals reviewed.        MDM  Number of Diagnoses or Management Options  Bleeding from peripherally inserted central venous catheter (PICC), initial encounter [facility]):   Risk of Complications, Morbidity, and/or Mortality  Presenting problems: minimal  Diagnostic procedures: minimal  Management options: minimal    Patient Progress  Patient progress: stable         Procedures

## 2019-10-10 ENCOUNTER — HOME CARE VISIT (OUTPATIENT)
Dept: SCHEDULING | Facility: HOME HEALTH | Age: 59
End: 2019-10-10

## 2019-10-10 VITALS
SYSTOLIC BLOOD PRESSURE: 128 MMHG | DIASTOLIC BLOOD PRESSURE: 64 MMHG | RESPIRATION RATE: 14 BRPM | OXYGEN SATURATION: 96 % | HEART RATE: 82 BPM | TEMPERATURE: 97.6 F

## 2019-10-10 PROCEDURE — G0299 HHS/HOSPICE OF RN EA 15 MIN: HCPCS

## 2019-10-10 PROCEDURE — 400013 HH SOC

## 2019-10-10 RX ADMIN — ACETAMINOPHEN 2 TABLET: 325 TABLET ORAL at 10:24

## 2019-10-11 ENCOUNTER — TELEPHONE (OUTPATIENT)
Dept: HOME HEALTH SERVICES | Facility: HOME HEALTH | Age: 59
End: 2019-10-11

## 2019-10-11 ENCOUNTER — HOME CARE VISIT (OUTPATIENT)
Dept: SCHEDULING | Facility: HOME HEALTH | Age: 59
End: 2019-10-11

## 2019-10-11 VITALS
OXYGEN SATURATION: 98 % | HEART RATE: 78 BPM | DIASTOLIC BLOOD PRESSURE: 96 MMHG | SYSTOLIC BLOOD PRESSURE: 160 MMHG | TEMPERATURE: 97.6 F | RESPIRATION RATE: 18 BRPM

## 2019-10-11 PROCEDURE — G0151 HHCP-SERV OF PT,EA 15 MIN: HCPCS

## 2019-10-11 NOTE — TELEPHONE ENCOUNTER
I called back to talk to the CG. He said he had found the paperwork and had it all figured out. I offered to review again, but he said he was OK.

## 2019-10-11 NOTE — TELEPHONE ENCOUNTER
[address] [place] [address] Tab Olson Pharmacist consult. Mrs. Ruiz Staff was discharged from [facility] after having a CABG. I spoke with her male CG who said he was out getting her some lunch. He said her BP was a little high this morning. I reviewed her medications and she has 2 BP meds plus a diurectic. He asked me to call back after 1pm so he can see what medication list she has and compare with drug bottles. I will call back later.

## 2019-10-12 ENCOUNTER — HOME CARE VISIT (OUTPATIENT)
Dept: SCHEDULING | Facility: HOME HEALTH | Age: 59
End: 2019-10-12

## 2019-10-12 VITALS
RESPIRATION RATE: 18 BRPM | OXYGEN SATURATION: 94 % | HEART RATE: 82 BPM | WEIGHT: 203 LBS | SYSTOLIC BLOOD PRESSURE: 149 MMHG | BODY MASS INDEX: 35.96 KG/M2 | DIASTOLIC BLOOD PRESSURE: 83 MMHG

## 2019-10-12 PROCEDURE — G0299 HHS/HOSPICE OF RN EA 15 MIN: HCPCS

## 2019-10-13 ENCOUNTER — HOME CARE VISIT (OUTPATIENT)
Dept: HOME HEALTH SERVICES | Facility: HOME HEALTH | Age: 59
End: 2019-10-13

## 2019-10-14 ENCOUNTER — HOME CARE VISIT (OUTPATIENT)
Dept: SCHEDULING | Facility: HOME HEALTH | Age: 59
End: 2019-10-14

## 2019-10-14 VITALS
DIASTOLIC BLOOD PRESSURE: 80 MMHG | SYSTOLIC BLOOD PRESSURE: 115 MMHG | HEART RATE: 81 BPM | RESPIRATION RATE: 6 BRPM | TEMPERATURE: 98.2 F | OXYGEN SATURATION: 96 %

## 2019-10-14 VITALS
RESPIRATION RATE: 17 BRPM | HEART RATE: 80 BPM | TEMPERATURE: 98.1 F | SYSTOLIC BLOOD PRESSURE: 130 MMHG | DIASTOLIC BLOOD PRESSURE: 78 MMHG

## 2019-10-14 PROCEDURE — G0299 HHS/HOSPICE OF RN EA 15 MIN: HCPCS

## 2019-10-14 PROCEDURE — G0157 HHC PT ASSISTANT EA 15: HCPCS

## 2019-10-16 ENCOUNTER — HOME CARE VISIT (OUTPATIENT)
Dept: SCHEDULING | Facility: HOME HEALTH | Age: 59
End: 2019-10-16

## 2019-10-16 ENCOUNTER — APPOINTMENT (OUTPATIENT)
Dept: GENERAL RADIOLOGY | Age: 59
End: 2019-10-16
Attending: EMERGENCY MEDICINE
Payer: MEDICAID

## 2019-10-16 ENCOUNTER — HOME CARE VISIT (OUTPATIENT)
Dept: HOME HEALTH SERVICES | Facility: HOME HEALTH | Age: 59
End: 2019-10-16

## 2019-10-16 ENCOUNTER — HOSPITAL ENCOUNTER (EMERGENCY)
Age: 59
Discharge: HOME | End: 2019-10-16
Attending: EMERGENCY MEDICINE
Payer: MEDICAID

## 2019-10-16 VITALS — DIASTOLIC BLOOD PRESSURE: 78 MMHG | RESPIRATION RATE: 17 BRPM | TEMPERATURE: 98.7 F | SYSTOLIC BLOOD PRESSURE: 140 MMHG

## 2019-10-16 VITALS
SYSTOLIC BLOOD PRESSURE: 152 MMHG | RESPIRATION RATE: 22 BRPM | TEMPERATURE: 97.6 F | BODY MASS INDEX: 35.44 KG/M2 | HEIGHT: 63 IN | WEIGHT: 200 LBS | DIASTOLIC BLOOD PRESSURE: 75 MMHG | OXYGEN SATURATION: 92 % | HEART RATE: 87 BPM

## 2019-10-16 DIAGNOSIS — M54.12 CERVICAL RADICULOPATHY: Primary | ICD-10-CM

## 2019-10-16 LAB
ALBUMIN SERPL-MCNC: 2.9 G/DL (ref 3.5–5)
ALBUMIN/GLOB SERPL: 0.7 {RATIO} (ref 1.2–3.5)
ALP SERPL-CCNC: 120 U/L (ref 50–136)
ALT SERPL-CCNC: 45 U/L (ref 12–65)
ANION GAP SERPL CALC-SCNC: 5 MMOL/L (ref 7–16)
AST SERPL-CCNC: 24 U/L (ref 15–37)
ATRIAL RATE: 87 BPM
BASOPHILS # BLD: 0.1 K/UL (ref 0–0.2)
BASOPHILS NFR BLD: 1 % (ref 0–2)
BILIRUB SERPL-MCNC: 0.3 MG/DL (ref 0.2–1.1)
BNP SERPL-MCNC: 672 PG/ML
BUN SERPL-MCNC: 13 MG/DL (ref 6–23)
CALCIUM SERPL-MCNC: 9.6 MG/DL (ref 8.3–10.4)
CALCULATED P AXIS, ECG09: 16 DEGREES
CALCULATED R AXIS, ECG10: 84 DEGREES
CALCULATED T AXIS, ECG11: 93 DEGREES
CHLORIDE SERPL-SCNC: 104 MMOL/L (ref 98–107)
CO2 SERPL-SCNC: 30 MMOL/L (ref 21–32)
CREAT SERPL-MCNC: 0.94 MG/DL (ref 0.6–1)
DIAGNOSIS, 93000: NORMAL
DIFFERENTIAL METHOD BLD: ABNORMAL
EOSINOPHIL # BLD: 0.6 K/UL (ref 0–0.8)
EOSINOPHIL NFR BLD: 7 % (ref 0.5–7.8)
ERYTHROCYTE [DISTWIDTH] IN BLOOD BY AUTOMATED COUNT: 14.4 % (ref 11.9–14.6)
GLOBULIN SER CALC-MCNC: 4.3 G/DL (ref 2.3–3.5)
GLUCOSE SERPL-MCNC: 240 MG/DL (ref 65–100)
HCT VFR BLD AUTO: 33.3 % (ref 35.8–46.3)
HGB BLD-MCNC: 10.6 G/DL (ref 11.7–15.4)
IMM GRANULOCYTES # BLD AUTO: 0 K/UL (ref 0–0.5)
IMM GRANULOCYTES NFR BLD AUTO: 0 % (ref 0–5)
LYMPHOCYTES # BLD: 1.7 K/UL (ref 0.5–4.6)
LYMPHOCYTES NFR BLD: 19 % (ref 13–44)
MCH RBC QN AUTO: 29 PG (ref 26.1–32.9)
MCHC RBC AUTO-ENTMCNC: 31.8 G/DL (ref 31.4–35)
MCV RBC AUTO: 91 FL (ref 79.6–97.8)
MONOCYTES # BLD: 0.7 K/UL (ref 0.1–1.3)
MONOCYTES NFR BLD: 7 % (ref 4–12)
NEUTS SEG # BLD: 6.1 K/UL (ref 1.7–8.2)
NEUTS SEG NFR BLD: 66 % (ref 43–78)
NRBC # BLD: 0 K/UL (ref 0–0.2)
P-R INTERVAL, ECG05: 168 MS
PLATELET # BLD AUTO: 268 K/UL (ref 150–450)
PMV BLD AUTO: 11.4 FL (ref 9.4–12.3)
POTASSIUM SERPL-SCNC: 4.8 MMOL/L (ref 3.5–5.1)
PROT SERPL-MCNC: 7.2 G/DL (ref 6.3–8.2)
Q-T INTERVAL, ECG07: 390 MS
QRS DURATION, ECG06: 86 MS
QTC CALCULATION (BEZET), ECG08: 469 MS
RBC # BLD AUTO: 3.66 M/UL (ref 4.05–5.2)
SODIUM SERPL-SCNC: 139 MMOL/L (ref 136–145)
TROPONIN I BLD-MCNC: 0.15 NG/ML (ref 0.02–0.05)
TROPONIN I BLD-MCNC: 0.16 NG/ML (ref 0.02–0.05)
VENTRICULAR RATE, ECG03: 87 BPM
WBC # BLD AUTO: 9.2 K/UL (ref 4.3–11.1)

## 2019-10-16 PROCEDURE — 96375 TX/PRO/DX INJ NEW DRUG ADDON: CPT | Performed by: EMERGENCY MEDICINE

## 2019-10-16 PROCEDURE — G0299 HHS/HOSPICE OF RN EA 15 MIN: HCPCS

## 2019-10-16 PROCEDURE — 93005 ELECTROCARDIOGRAM TRACING: CPT | Performed by: EMERGENCY MEDICINE

## 2019-10-16 PROCEDURE — 96374 THER/PROPH/DIAG INJ IV PUSH: CPT | Performed by: EMERGENCY MEDICINE

## 2019-10-16 PROCEDURE — 84484 ASSAY OF TROPONIN QUANT: CPT

## 2019-10-16 PROCEDURE — 99284 EMERGENCY DEPT VISIT MOD MDM: CPT | Performed by: EMERGENCY MEDICINE

## 2019-10-16 PROCEDURE — 80053 COMPREHEN METABOLIC PANEL: CPT

## 2019-10-16 PROCEDURE — 74011250636 HC RX REV CODE- 250/636: Performed by: EMERGENCY MEDICINE

## 2019-10-16 PROCEDURE — 83880 ASSAY OF NATRIURETIC PEPTIDE: CPT

## 2019-10-16 PROCEDURE — G0157 HHC PT ASSISTANT EA 15: HCPCS

## 2019-10-16 PROCEDURE — 85025 COMPLETE CBC W/AUTO DIFF WBC: CPT

## 2019-10-16 PROCEDURE — 71045 X-RAY EXAM CHEST 1 VIEW: CPT

## 2019-10-16 RX ORDER — HYDROCODONE BITARTRATE AND ACETAMINOPHEN 7.5; 325 MG/1; MG/1
1 TABLET ORAL
Qty: 15 TAB | Refills: 0 | Status: SHIPPED | OUTPATIENT
Start: 2019-10-16 | End: 2019-10-21

## 2019-10-16 RX ORDER — MORPHINE SULFATE 2 MG/ML
2 INJECTION, SOLUTION INTRAMUSCULAR; INTRAVENOUS
Status: COMPLETED | OUTPATIENT
Start: 2019-10-16 | End: 2019-10-16

## 2019-10-16 RX ORDER — ONDANSETRON 2 MG/ML
4 INJECTION INTRAMUSCULAR; INTRAVENOUS
Status: COMPLETED | OUTPATIENT
Start: 2019-10-16 | End: 2019-10-16

## 2019-10-16 RX ADMIN — MORPHINE SULFATE 2 MG: 2 INJECTION, SOLUTION INTRAMUSCULAR; INTRAVENOUS at 02:06

## 2019-10-16 RX ADMIN — ONDANSETRON 4 MG: 2 INJECTION INTRAMUSCULAR; INTRAVENOUS at 02:06

## 2019-10-16 NOTE — DISCHARGE INSTRUCTIONS
Patient Education        Pinched Nerve in the Neck: Care Instructions  Your Care Instructions  A pinched nerve in the neck happens when a vertebra or disc in the upper part of your spine is damaged. This damage can happen because of an injury. Or it can just happen with age. The changes caused by the damage may put pressure on a nearby nerve root, pinching it. This causes symptoms such as sharp pain in your neck, shoulder, arm, hand, or back. You may also have tingling or numbness. Sometimes it makes your arm weaker. The symptoms are usually worse when you turn your head or strain your neck. For many people, the symptoms get better over time and finally go away. Early treatment usually includes medicines for pain and swelling. Sometimes physical therapy and special exercises may help. Follow-up care is a key part of your treatment and safety. Be sure to make and go to all appointments, and call your doctor if you are having problems. It's also a good idea to know your test results and keep a list of the medicines you take. How can you care for yourself at home? · Be safe with medicines. Read and follow all instructions on the label. ¨ If the doctor gave you a prescription medicine for pain, take it as prescribed. ¨ If you are not taking a prescription pain medicine, ask your doctor if you can take an over-the-counter medicine. · Try using a heating pad on a low or medium setting for 15 to 20 minutes every 2 or 3 hours. Try a warm shower in place of one session with the heating pad. You can also buy single-use heat wraps that last up to 8 hours. · You can also try an ice pack for 10 to 15 minutes every 2 to 3 hours. There isn't strong evidence that either heat or ice will help. But you can try them to see if they help you. · Don't spend too long in one position. Take short breaks to move around and change positions. · Wear a seat belt and shoulder harness when you are in a car.   · Sleep with a pillow under your head and neck that keeps your neck straight. · If you were given a neck brace (cervical collar) to limit neck motion, wear it as instructed for as many days as your doctor tells you to. Do not wear it longer than you were told to. Wearing a brace for too long can lead to neck stiffness and can weaken the neck muscles. · Follow your doctor's instructions for gentle neck-stretching exercises. · Do not smoke. Smoking can slow healing of your discs. If you need help quitting, talk to your doctor about stop-smoking programs and medicines. These can increase your chances of quitting for good. · Avoid strenuous work or exercise until your doctor says it is okay. When should you call for help? Call 911 anytime you think you may need emergency care. For example, call if:  ? · You are unable to move an arm or a leg at all. ?Call your doctor now or seek immediate medical care if:  ? · You have new or worse symptoms in your arms, legs, chest, belly, or buttocks. Symptoms may include:  ¨ Numbness or tingling. ¨ Weakness. ¨ Pain. ? · You lose bladder or bowel control. ? Watch closely for changes in your health, and be sure to contact your doctor if:  ? · You are not getting better as expected. Where can you learn more? Go to http://liliane-fox.info/. Enter N114 in the search box to learn more about \"Pinched Nerve in the Neck: Care Instructions. \"  Current as of: March 21, 2017  Content Version: 11.5  © 7322-9889 [employer]. Care instructions adapted under license by [employer] (which disclaims liability or warranty for this information). If you have questions about a medical condition or this instruction, always ask your healthcare professional. [address] any warranty or liability for your use of this information.

## 2019-10-16 NOTE — ED NOTES
I have reviewed discharge instructions with the patient. The patient verbalized understanding. Patient left ED via Discharge Method: wheelchair to Home with spouse    Opportunity for questions and clarification provided. Patient given 1 scripts. To continue your aftercare when you leave the hospital, you may receive an automated call from our care team to check in on how you are doing. This is a free service and part of our promise to provide the best care and service to meet your aftercare needs.  If you have questions, or wish to unsubscribe from this service please call [phone]. Thank you for Choosing our [facility] Emergency Department.

## 2019-10-16 NOTE — ED PROVIDER NOTES
Patient with a history of breast cancer on the left. Had radiation and lumpectomy only. 6 days ago had a CABG. While in the hospital and since discharge has had on and off left arm aching numbness and tingling. Also present in the back left shoulder. Seems to be getting worse so came in. No chest pain or shortness of breath. No nausea or diaphoresis. Has some relief with Tylenol. The history is provided by the patient. No  was used. Arm Pain    This is a new problem. The current episode started more than 2 days ago. The problem occurs constantly. The problem has been gradually worsening. The pain is present in the left arm. The quality of the pain is described as aching. The pain is mild. Associated symptoms include numbness. Pertinent negatives include full range of motion, no back pain and no neck pain. She has tried nothing for the symptoms. There has been no history of extremity trauma.         Past Medical History:   Diagnosis Date    Breast cancer ([facility] [address] 2015    left, radiation , and lumpectomy only    CAD (coronary artery disease)     multivessel, awaiting CABG and mitral valve repair/replacement    Diabetes ([facility] [address] typell, dx 2016    typell, ID, avfbs 160, last A1C was 10.8. denies lows    Heart failure ([address]     EF 40-45%    Hypercholesteremia     Hypertension     Intertrochanteric fracture of right femur ([address] 2/24/2018    Mitral valve regurgitation     PUD (peptic ulcer disease)     age 12, no treatment since       Past Surgical History:   Procedure Laterality Date    HX BREAST LUMPECTOMY  2015    HX HIP FRACTURE TX Right     HX ORTHOPAEDIC Right     hip fracture repair with hardware , not replacent         Family History:   Problem Relation Age of Onset    Kidney Disease Mother     Heart Disease Father     Heart Disease Brother        Social History     Socioeconomic History    Marital status: LEGALLY      Spouse name: Not on file    Number of children: Not on file    Years of education: Not on file    Highest education level: Not on file   Occupational History    Occupation: previously worked as a hospice nurse   Social Needs    Financial resource strain: Not on file    Food insecurity:     Worry: Not on file     Inability: Not on file   [employer] needs:     Medical: Not on file     Non-medical: Not on file   Tobacco Use    Smoking status: Never Smoker    Smokeless tobacco: Never Used   Substance and Sexual Activity    Alcohol use: Yes     Comment: social    Drug use: No    Sexual activity: Not on file   Lifestyle    Physical activity:     Days per week: Not on file     Minutes per session: Not on file    Stress: Not on file   Relationships    Social connections:     Talks on phone: Not on file     Gets together: Not on file     Attends Alevism service: Not on file     Active member of club or organization: Not on file     Attends meetings of clubs or organizations: Not on file     Relationship status: Not on file    Intimate partner violence:     Fear of current or ex partner: Not on file     Emotionally abused: Not on file     Physically abused: Not on file     Forced sexual activity: Not on file   Other Topics Concern    Not on file   Social History Narrative    . Lives with her          ALLERGIES: Patient has no known allergies. Review of Systems   Constitutional: Negative for chills and fever. HENT: Negative for rhinorrhea and sore throat. Eyes: Negative for pain and redness. Respiratory: Negative for chest tightness, shortness of breath and wheezing. Cardiovascular: Negative for chest pain and leg swelling. Gastrointestinal: Negative for abdominal pain, diarrhea, nausea and vomiting. Genitourinary: Negative for dysuria and hematuria. Musculoskeletal: Negative for back pain, gait problem, neck pain and neck stiffness. Skin: Negative for color change and rash.    Neurological: Positive for numbness. Negative for weakness and headaches. Vitals:    10/16/19 0138   BP: 161/84   Pulse: 89   Resp: 22   Temp: 97.1 °F (36.2 °C)   SpO2: 96%   Weight: 90.7 kg (200 lb)   Height: 5' 3\" (1.6 m)            Physical Exam   Constitutional: She is oriented to person, place, and time. She appears well-developed and well-nourished. HENT:   Head: Normocephalic and atraumatic. Neck: Normal range of motion. Neck supple. Cardiovascular: Normal rate and regular rhythm. Pulmonary/Chest: Effort normal and breath sounds normal. She exhibits tenderness (along the sternum incision. clean dry and intact. no erythema. ). Abdominal: Soft. Bowel sounds are normal. There is no tenderness. Musculoskeletal: Normal range of motion. She exhibits edema (LLE with incisions from venous harvesting. ). Left upper extremity with good pulse and sensation distally. Good  strength. No swelling. Full range of motion at shoulder and elbow and wrist.  Mild bruising to left forearm. Neurological: She is alert and oriented to person, place, and time. Skin: Skin is warm and dry. MDM  Number of Diagnoses or Management Options  Diagnosis management comments: Patient presents with possible cervical radiculopathy down the left arm. Pain numbness and tingling intermittent since discharge from the hospital.  Some relief with Tylenol. First troponin slightly elevated. Will get a second troponin and consult cardiology. Will have oncoming doc follow-up on results. Amount and/or Complexity of Data Reviewed  Clinical lab tests: ordered and reviewed  Tests in the radiology section of CPT®: ordered and reviewed  Tests in the medicine section of CPT®: ordered and reviewed    Patient Progress  Patient progress: stable         Procedures          EKG: normal sinus rhythm, nonspecific ST and T waves changes. Rate 87.          XR CHEST PORT (Final result)   Result time 10/16/19 02:07:55   Final result by Wang Isbell MD (10/16/19 02:07:55)                Impression:    IMPRESSION: Progressed small bilateral pleural effusions and bibasilar lung  opacities, which could relate atelectasis, edema or pneumonia. Narrative:    EXAM: Chest x-ray. INDICATION: Cough. COMPARISON: Prior chest x-ray on October 8, 2019. TECHNIQUE: Single frontal view chest x-ray. FINDINGS: Again noted is cardiomegaly, a prior sternotomy and mitral valve  repair.  There are progressed small bilateral pleural effusions and bibasilar  lung

## 2019-10-16 NOTE — ED TRIAGE NOTES
Patient states she had open heart surgery 1 week ago. States for 3 days she has had left arm pain and numbness that has progressively gotten worse. States she called cardiologist tonight and was told to come to the ER because it could be her heart. Denies chest pain, nausea, vomiting, or shortness of breath.

## 2019-10-17 ENCOUNTER — HOME CARE VISIT (OUTPATIENT)
Dept: SCHEDULING | Facility: HOME HEALTH | Age: 59
End: 2019-10-17

## 2019-10-17 VITALS
SYSTOLIC BLOOD PRESSURE: 122 MMHG | HEART RATE: 72 BPM | DIASTOLIC BLOOD PRESSURE: 70 MMHG | OXYGEN SATURATION: 98 % | RESPIRATION RATE: 17 BRPM | TEMPERATURE: 97.3 F

## 2019-10-17 PROCEDURE — G0155 HHCP-SVS OF CSW,EA 15 MIN: HCPCS

## 2019-10-18 ENCOUNTER — HOME CARE VISIT (OUTPATIENT)
Dept: HOME HEALTH SERVICES | Facility: HOME HEALTH | Age: 59
End: 2019-10-18

## 2019-10-18 ENCOUNTER — HOME CARE VISIT (OUTPATIENT)
Dept: SCHEDULING | Facility: HOME HEALTH | Age: 59
End: 2019-10-18

## 2019-10-18 VITALS
RESPIRATION RATE: 16 BRPM | OXYGEN SATURATION: 98 % | DIASTOLIC BLOOD PRESSURE: 68 MMHG | TEMPERATURE: 97.9 F | SYSTOLIC BLOOD PRESSURE: 133 MMHG | HEART RATE: 79 BPM

## 2019-10-18 PROCEDURE — G0299 HHS/HOSPICE OF RN EA 15 MIN: HCPCS

## 2019-10-21 ENCOUNTER — HOME CARE VISIT (OUTPATIENT)
Dept: SCHEDULING | Facility: HOME HEALTH | Age: 59
End: 2019-10-21

## 2019-10-21 VITALS
TEMPERATURE: 97.3 F | DIASTOLIC BLOOD PRESSURE: 80 MMHG | OXYGEN SATURATION: 98 % | HEART RATE: 76 BPM | SYSTOLIC BLOOD PRESSURE: 140 MMHG | RESPIRATION RATE: 20 BRPM

## 2019-10-21 PROCEDURE — G0299 HHS/HOSPICE OF RN EA 15 MIN: HCPCS

## 2019-10-22 ENCOUNTER — HOME CARE VISIT (OUTPATIENT)
Dept: SCHEDULING | Facility: HOME HEALTH | Age: 59
End: 2019-10-22

## 2019-10-22 ENCOUNTER — HOME CARE VISIT (OUTPATIENT)
Dept: HOME HEALTH SERVICES | Facility: HOME HEALTH | Age: 59
End: 2019-10-22

## 2019-10-22 VITALS
DIASTOLIC BLOOD PRESSURE: 80 MMHG | HEART RATE: 86 BPM | TEMPERATURE: 98 F | RESPIRATION RATE: 18 BRPM | SYSTOLIC BLOOD PRESSURE: 140 MMHG

## 2019-10-22 PROCEDURE — G0157 HHC PT ASSISTANT EA 15: HCPCS

## 2019-10-23 ENCOUNTER — HOME CARE VISIT (OUTPATIENT)
Dept: HOME HEALTH SERVICES | Facility: HOME HEALTH | Age: 59
End: 2019-10-23

## 2019-10-24 ENCOUNTER — HOME CARE VISIT (OUTPATIENT)
Dept: SCHEDULING | Facility: HOME HEALTH | Age: 59
End: 2019-10-24

## 2019-10-24 VITALS
WEIGHT: 195 LBS | HEART RATE: 86 BPM | OXYGEN SATURATION: 98 % | DIASTOLIC BLOOD PRESSURE: 80 MMHG | SYSTOLIC BLOOD PRESSURE: 148 MMHG | TEMPERATURE: 98 F | BODY MASS INDEX: 34.54 KG/M2 | RESPIRATION RATE: 18 BRPM

## 2019-10-24 PROCEDURE — G0299 HHS/HOSPICE OF RN EA 15 MIN: HCPCS

## 2019-10-25 ENCOUNTER — HOME CARE VISIT (OUTPATIENT)
Dept: SCHEDULING | Facility: HOME HEALTH | Age: 59
End: 2019-10-25

## 2019-10-25 VITALS
HEART RATE: 78 BPM | RESPIRATION RATE: 18 BRPM | DIASTOLIC BLOOD PRESSURE: 88 MMHG | SYSTOLIC BLOOD PRESSURE: 138 MMHG | TEMPERATURE: 97.8 F | OXYGEN SATURATION: 98 %

## 2019-10-25 PROCEDURE — G0151 HHCP-SERV OF PT,EA 15 MIN: HCPCS

## 2019-10-28 ENCOUNTER — HOME CARE VISIT (OUTPATIENT)
Dept: SCHEDULING | Facility: HOME HEALTH | Age: 59
End: 2019-10-28

## 2019-10-28 VITALS
TEMPERATURE: 98.4 F | DIASTOLIC BLOOD PRESSURE: 88 MMHG | RESPIRATION RATE: 16 BRPM | HEART RATE: 88 BPM | SYSTOLIC BLOOD PRESSURE: 138 MMHG | OXYGEN SATURATION: 98 %

## 2019-10-28 PROCEDURE — G0299 HHS/HOSPICE OF RN EA 15 MIN: HCPCS

## 2019-10-31 ENCOUNTER — HOME CARE VISIT (OUTPATIENT)
Dept: SCHEDULING | Facility: HOME HEALTH | Age: 59
End: 2019-10-31

## 2019-10-31 PROCEDURE — G0299 HHS/HOSPICE OF RN EA 15 MIN: HCPCS

## 2019-11-01 ENCOUNTER — HOME CARE VISIT (OUTPATIENT)
Dept: HOME HEALTH SERVICES | Facility: HOME HEALTH | Age: 59
End: 2019-11-01

## 2019-11-01 VITALS
HEART RATE: 87 BPM | OXYGEN SATURATION: 98 % | RESPIRATION RATE: 18 BRPM | TEMPERATURE: 97.9 F | DIASTOLIC BLOOD PRESSURE: 78 MMHG | SYSTOLIC BLOOD PRESSURE: 128 MMHG

## 2019-11-22 ENCOUNTER — HOME CARE VISIT (OUTPATIENT)
Dept: HOME HEALTH SERVICES | Facility: HOME HEALTH | Age: 59
End: 2019-11-22

## 2019-11-22 ENCOUNTER — APPOINTMENT (OUTPATIENT)
Dept: GENERAL RADIOLOGY | Age: 59
End: 2019-11-22
Attending: EMERGENCY MEDICINE
Payer: MEDICAID

## 2019-11-22 ENCOUNTER — HOSPITAL ENCOUNTER (EMERGENCY)
Age: 59
Discharge: HOME | End: 2019-11-22
Attending: EMERGENCY MEDICINE
Payer: MEDICAID

## 2019-11-22 VITALS
SYSTOLIC BLOOD PRESSURE: 179 MMHG | HEIGHT: 63 IN | HEART RATE: 104 BPM | DIASTOLIC BLOOD PRESSURE: 99 MMHG | BODY MASS INDEX: 33.49 KG/M2 | TEMPERATURE: 98.3 F | OXYGEN SATURATION: 98 % | RESPIRATION RATE: 20 BRPM | WEIGHT: 189 LBS

## 2019-11-22 DIAGNOSIS — I47.1 SVT (SUPRAVENTRICULAR TACHYCARDIA) (HCC): Primary | ICD-10-CM

## 2019-11-22 LAB
ALBUMIN SERPL-MCNC: 3 G/DL (ref 3.5–5)
ALBUMIN/GLOB SERPL: 0.6 {RATIO} (ref 1.2–3.5)
ALP SERPL-CCNC: 102 U/L (ref 50–136)
ALT SERPL-CCNC: 27 U/L (ref 12–65)
ANION GAP SERPL CALC-SCNC: 10 MMOL/L (ref 7–16)
AST SERPL-CCNC: 19 U/L (ref 15–37)
BASOPHILS # BLD: 0.1 K/UL (ref 0–0.2)
BASOPHILS NFR BLD: 1 % (ref 0–2)
BILIRUB SERPL-MCNC: 0.4 MG/DL (ref 0.2–1.1)
BNP SERPL-MCNC: 2285 PG/ML (ref 5–125)
BUN SERPL-MCNC: 17 MG/DL (ref 6–23)
CALCIUM SERPL-MCNC: 9.2 MG/DL (ref 8.3–10.4)
CHLORIDE SERPL-SCNC: 107 MMOL/L (ref 98–107)
CO2 SERPL-SCNC: 26 MMOL/L (ref 21–32)
CREAT SERPL-MCNC: 0.85 MG/DL (ref 0.6–1)
DIFFERENTIAL METHOD BLD: ABNORMAL
EOSINOPHIL # BLD: 0.5 K/UL (ref 0–0.8)
EOSINOPHIL NFR BLD: 5 % (ref 0.5–7.8)
ERYTHROCYTE [DISTWIDTH] IN BLOOD BY AUTOMATED COUNT: 14.3 % (ref 11.9–14.6)
GLOBULIN SER CALC-MCNC: 4.9 G/DL (ref 2.3–3.5)
GLUCOSE SERPL-MCNC: 161 MG/DL (ref 65–100)
HCT VFR BLD AUTO: 34.6 % (ref 35.8–46.3)
HGB BLD-MCNC: 11.3 G/DL (ref 11.7–15.4)
IMM GRANULOCYTES # BLD AUTO: 0 K/UL (ref 0–0.5)
IMM GRANULOCYTES NFR BLD AUTO: 0 % (ref 0–5)
LYMPHOCYTES # BLD: 2.1 K/UL (ref 0.5–4.6)
LYMPHOCYTES NFR BLD: 25 % (ref 13–44)
MAGNESIUM SERPL-MCNC: 1.8 MG/DL (ref 1.8–2.4)
MCH RBC QN AUTO: 28.8 PG (ref 26.1–32.9)
MCHC RBC AUTO-ENTMCNC: 32.7 G/DL (ref 31.4–35)
MCV RBC AUTO: 88.3 FL (ref 79.6–97.8)
MONOCYTES # BLD: 0.4 K/UL (ref 0.1–1.3)
MONOCYTES NFR BLD: 4 % (ref 4–12)
NEUTS SEG # BLD: 5.6 K/UL (ref 1.7–8.2)
NEUTS SEG NFR BLD: 65 % (ref 43–78)
NRBC # BLD: 0 K/UL (ref 0–0.2)
PLATELET # BLD AUTO: 202 K/UL (ref 150–450)
PMV BLD AUTO: 12.4 FL (ref 9.4–12.3)
POTASSIUM SERPL-SCNC: 3.6 MMOL/L (ref 3.5–5.1)
PROT SERPL-MCNC: 7.9 G/DL (ref 6.3–8.2)
RBC # BLD AUTO: 3.92 M/UL (ref 4.05–5.2)
SODIUM SERPL-SCNC: 143 MMOL/L (ref 136–145)
TROPONIN I SERPL-MCNC: <0.02 NG/ML (ref 0.02–0.05)
WBC # BLD AUTO: 8.7 K/UL (ref 4.3–11.1)

## 2019-11-22 PROCEDURE — 71045 X-RAY EXAM CHEST 1 VIEW: CPT

## 2019-11-22 PROCEDURE — 83735 ASSAY OF MAGNESIUM: CPT

## 2019-11-22 PROCEDURE — 83880 ASSAY OF NATRIURETIC PEPTIDE: CPT

## 2019-11-22 PROCEDURE — 99285 EMERGENCY DEPT VISIT HI MDM: CPT | Performed by: EMERGENCY MEDICINE

## 2019-11-22 PROCEDURE — 74011000250 HC RX REV CODE- 250

## 2019-11-22 PROCEDURE — 80053 COMPREHEN METABOLIC PANEL: CPT

## 2019-11-22 PROCEDURE — 93005 ELECTROCARDIOGRAM TRACING: CPT | Performed by: EMERGENCY MEDICINE

## 2019-11-22 PROCEDURE — 85025 COMPLETE CBC W/AUTO DIFF WBC: CPT

## 2019-11-22 PROCEDURE — 84484 ASSAY OF TROPONIN QUANT: CPT

## 2019-11-22 RX ORDER — DILTIAZEM HYDROCHLORIDE 5 MG/ML
20 INJECTION INTRAVENOUS
Status: DISCONTINUED | OUTPATIENT
Start: 2019-11-22 | End: 2019-11-23 | Stop reason: HOSPADM

## 2019-11-22 RX ORDER — DILTIAZEM HYDROCHLORIDE 5 MG/ML
INJECTION INTRAVENOUS
Status: DISCONTINUED
Start: 2019-11-22 | End: 2019-11-23 | Stop reason: HOSPADM

## 2019-11-22 RX ORDER — CARVEDILOL 25 MG/1
25 TABLET ORAL 2 TIMES DAILY WITH MEALS
Qty: 30 TAB | Refills: 0 | Status: SHIPPED | OUTPATIENT
Start: 2019-11-22 | End: 2019-12-09 | Stop reason: SDUPTHER

## 2019-11-22 NOTE — ED TRIAGE NOTES
Pt ambulatory to triage without complications. Pt states about 1800 today she felt her hear racing. Pt denies SOB or CP but states had triple bypass in March. Pt states she took lisinopril a bit ago because her BP was high. This was an extra pill for today. Attempted IV x 3. Lavender and green obtained.  No IV

## 2019-11-22 NOTE — ED PROVIDER NOTES
59-year-old black female status post CABG 4 weeks ago presents to the emergency department complaining of palpitations starting proximally half an hour prior to presentation. Patient denies history of similar in the past, states has been having some chest discomfort since the surgery but nothing new. She denies any shortness of breath. The history is provided by the patient and the spouse. Rapid Heart Rate   This is a new problem. The current episode started less than 1 hour ago. The problem occurs constantly. The problem has not changed since onset. Pertinent negatives include no chest pain, no abdominal pain, no headaches and no shortness of breath. Nothing aggravates the symptoms. Nothing relieves the symptoms. She has tried rest for the symptoms. The treatment provided no relief.         Past Medical History:   Diagnosis Date    Breast cancer ([facility] [address] 2015    left, radiation , and lumpectomy only    CAD (coronary artery disease)     multivessel, awaiting CABG and mitral valve repair/replacement    Diabetes ([facility] [address] typell, dx 2016    typ[place], ID, avfbs 160, last A1C was 10.8. denies lows    Heart failure ([address]     EF 40-45%    Hypercholesteremia     Hypertension     Intertrochanteric fracture of right femur ([facility] [address] 2/24/2018    Mitral valve regurgitation     PUD (peptic ulcer disease)     age 12, no treatment since       Past Surgical History:   Procedure Laterality Date    HX BREAST LUMPECTOMY  2015    HX HIP FRACTURE TX Right     HX ORTHOPAEDIC Right     hip fracture repair with hardware , not replacent         Family History:   Problem Relation Age of Onset    Kidney Disease Mother     Heart Disease Father     Heart Disease Brother        Social History     Socioeconomic History    Marital status: LEGALLY      Spouse name: Not on file    Number of children: Not on file    Years of education: Not on file    Highest education level: Not on file   Occupational History    Occupation: previously worked as a hospice nurse   Social Needs    Financial resource strain: Not on file    Food insecurity:     Worry: Not on file     Inability: Not on file   [employer] needs:     Medical: Not on file     Non-medical: Not on file   Tobacco Use    Smoking status: Never Smoker    Smokeless tobacco: Never Used   Substance and Sexual Activity    Alcohol use: Yes     Comment: social    Drug use: No    Sexual activity: Not on file   Lifestyle    Physical activity:     Days per week: Not on file     Minutes per session: Not on file    Stress: Not on file   Relationships    Social connections:     Talks on phone: Not on file     Gets together: Not on file     Attends Quaker service: Not on file     Active member of club or organization: Not on file     Attends meetings of clubs or organizations: Not on file     Relationship status: Not on file    Intimate partner violence:     Fear of current or ex partner: Not on file     Emotionally abused: Not on file     Physically abused: Not on file     Forced sexual activity: Not on file   Other Topics Concern    Not on file   Social History Narrative    . Lives with her          ALLERGIES: Patient has no known allergies. Review of Systems   Constitutional: Negative for chills and fever. Respiratory: Negative for shortness of breath. Cardiovascular: Positive for palpitations. Negative for chest pain and leg swelling. Gastrointestinal: Negative for abdominal pain. Neurological: Negative for headaches. All other systems reviewed and are negative. Vitals:    11/22/19 1817   BP: (!) 152/100   Pulse: (!) 151   Resp: 16   Temp: 98.3 °F (36.8 °C)   SpO2: 94%   Weight: 85.7 kg (189 lb)   Height: 5' 3\" (1.6 m)            Physical Exam  Vitals signs and nursing note reviewed. Constitutional:       General: She is in acute distress (mild). Appearance: She is well-developed. HENT:      Head: Normocephalic and atraumatic. Right Ear: External ear normal.      Left Ear: External ear normal.   Eyes:      Conjunctiva/sclera: Conjunctivae normal.      Pupils: Pupils are equal, round, and reactive to light. Neck:      Musculoskeletal: Normal range of motion and neck supple. Vascular: No carotid bruit. Cardiovascular:      Rate and Rhythm: Regular rhythm. Tachycardia present. Pulses: Normal pulses. Heart sounds: Normal heart sounds. No murmur. Pulmonary:      Effort: Pulmonary effort is normal.      Breath sounds: Normal breath sounds. No wheezing, rhonchi or rales. Abdominal:      General: Bowel sounds are normal.      Palpations: Abdomen is soft. Tenderness: There is no tenderness. Hernia: No hernia is present. Musculoskeletal: Normal range of motion. Right lower leg: No edema. Left lower leg: No edema. Skin:     General: Skin is warm and dry. Capillary Refill: Capillary refill takes less than 2 seconds. Neurological:      General: No focal deficit present. Mental Status: She is alert and oriented to person, place, and time.    Psychiatric:         Mood and Affect: Mood normal.         Behavior: Behavior normal.          MDM  Number of Diagnoses or Management Options  SVT (supraventricular tachycardia) ([address]): new and requires workup     Amount and/or Complexity of Data Reviewed  Clinical lab tests: ordered and reviewed  Tests in the radiology section of CPT®: ordered and reviewed  Obtain history from someone other than the patient: yes  Review and summarize past medical records: yes  Discuss the patient with other providers: yes (Discussed with Dr. Mahad Jo of cardiology, recommends increasing beta-blocker (Coreg) to 25 mg twice daily)  Independent visualization of images, tracings, or specimens: yes    Risk of Complications, Morbidity, and/or Mortality  Presenting problems: high  Diagnostic procedures: moderate  Management options: moderate    Patient Progress  Patient progress: improved    ED Course as of Nov 22 1900 Fri Nov 22, 2019   7996 Attempted 3 different Valsalva maneuvers without change in rhythm. Ordered Cardizem 20 mg IV, but for receiving Cardizem the patient was noted to go back into what appears to be a sinus rhythm. We will repeat EKG and hold Cardizem for now    [BB]      ED Course User Index  [BB] Luci Yao MD       Procedures    The patient was observed in the ED. patient was evaluated by cardiology here in the emergency department, felt to be fine to go home and get a monitor placed early next week in the office. They recommended increasing her beta-blocker to 25 mg p.o. twice daily. Patient had no further episodes of ectopy or SVT while in the department. Results Reviewed:      Recent Results (from the past 24 hour(s))   EKG, 12 LEAD, INITIAL    Collection Time: 11/22/19  6:12 PM   Result Value Ref Range    Ventricular Rate 158 BPM    Atrial Rate 163 BPM    QRS Duration 90 ms    Q-T Interval 302 ms    QTC Calculation (Bezet) 489 ms    Calculated R Axis 65 degrees    Calculated T Axis -118 degrees    Diagnosis       Supraventricular tachycardia  ST & T wave abnormality, consider inferolateral ischemia  Abnormal ECG  When compared with ECG of 16-OCT-2019 01:42,  Vent.  rate has increased BY  71 BPM  ST now depressed in Lateral leads  T wave inversion now evident in Inferior leads  T wave inversion now evident in Lateral leads     CBC WITH AUTOMATED DIFF    Collection Time: 11/22/19  6:26 PM   Result Value Ref Range    WBC 8.7 4.3 - 11.1 K/uL    RBC 3.92 (L) 4.05 - 5.2 M/uL    HGB 11.3 (L) 11.7 - 15.4 g/dL    HCT 34.6 (L) 35.8 - 46.3 %    MCV 88.3 79.6 - 97.8 FL    MCH 28.8 26.1 - 32.9 PG    MCHC 32.7 31.4 - 35.0 g/dL    RDW 14.3 11.9 - 14.6 %    PLATELET 115 138 - 313 K/uL    MPV 12.4 (H) 9.4 - 12.3 FL    ABSOLUTE NRBC 0.00 0.0 - 0.2 K/uL    DF AUTOMATED      NEUTROPHILS 65 43 - 78 %    LYMPHOCYTES 25 13 - 44 %    MONOCYTES 4 4.0 - 12.0 %    EOSINOPHILS 5 0.5 - 7.8 %    BASOPHILS 1 0.0 - 2.0 %    IMMATURE GRANULOCYTES 0 0.0 - 5.0 %    ABS. NEUTROPHILS 5.6 1.7 - 8.2 K/UL    ABS. LYMPHOCYTES 2.1 0.5 - 4.6 K/UL    ABS. MONOCYTES 0.4 0.1 - 1.3 K/UL    ABS. EOSINOPHILS 0.5 0.0 - 0.8 K/UL    ABS. BASOPHILS 0.1 0.0 - 0.2 K/UL    ABS. IMM. GRANS. 0.0 0.0 - 0.5 K/UL   EKG, 12 LEAD, SUBSEQUENT    Collection Time: 11/22/19  6:41 PM   Result Value Ref Range    Ventricular Rate 108 BPM    Atrial Rate 108 BPM    P-R Interval 156 ms    QRS Duration 94 ms    Q-T Interval 312 ms    QTC Calculation (Bezet) 418 ms    Calculated P Axis 41 degrees    Calculated R Axis 58 degrees    Calculated T Axis -112 degrees    Diagnosis       !! AGE AND GENDER SPECIFIC ECG ANALYSIS !! Sinus tachycardia  ST & T wave abnormality, consider inferior ischemia  Abnormal ECG  When compared with ECG of 22-NOV-2019 18:12,  No significant change was found     MAGNESIUM    Collection Time: 11/22/19  8:13 PM   Result Value Ref Range    Magnesium 1.8 1.8 - 2.4 mg/dL   METABOLIC PANEL, COMPREHENSIVE    Collection Time: 11/22/19  8:13 PM   Result Value Ref Range    Sodium 143 136 - 145 mmol/L    Potassium 3.6 3.5 - 5.1 mmol/L    Chloride 107 98 - 107 mmol/L    CO2 26 21 - 32 mmol/L    Anion gap 10 7 - 16 mmol/L    Glucose 161 (H) 65 - 100 mg/dL    BUN 17 6 - 23 MG/DL    Creatinine 0.85 0.6 - 1.0 MG/DL    GFR est AA >60 >60 ml/min/1.73m2    GFR est non-AA >60 >60 ml/min/1.73m2    Calcium 9.2 8.3 - 10.4 MG/DL    Bilirubin, total 0.4 0.2 - 1.1 MG/DL    ALT (SGPT) 27 12 - 65 U/L    AST (SGOT) 19 15 - 37 U/L    Alk.  phosphatase 102 50 - 136 U/L    Protein, total 7.9 6.3 - 8.2 g/dL    Albumin 3.0 (L) 3.5 - 5.0 g/dL    Globulin 4.9 (H) 2.3 - 3.5 g/dL    A-G Ratio 0.6 (L) 1.2 - 3.5     TROPONIN I    Collection Time: 11/22/19  8:13 PM   Result Value Ref Range    Troponin-I, Qt. <0.02 (L) 0.02 - 0.05 NG/ML   NT-PRO BNP    Collection Time: 11/22/19  8:13 PM   Result Value Ref Range    NT pro-BNP 2,285 (H) 5 - 125 PG/ML XR CHEST PORT   Final Result   Impression:  Probable improved mild pulmonary edema and bilateral pleural   effusions. I discussed the results of all labs, procedures, radiographs, and treatments with the patient and available family. Treatment plan is agreed upon and the patient is ready for discharge. All voiced understanding of the discharge plan and medication instructions or changes as appropriate. Questions about treatment in the ED were answered. All were encouraged to return should symptoms worsen or new problems develop.

## 2019-11-23 LAB
ATRIAL RATE: 108 BPM
ATRIAL RATE: 163 BPM
CALCULATED P AXIS, ECG09: 41 DEGREES
CALCULATED R AXIS, ECG10: 58 DEGREES
CALCULATED R AXIS, ECG10: 65 DEGREES
CALCULATED T AXIS, ECG11: -112 DEGREES
CALCULATED T AXIS, ECG11: -118 DEGREES
DIAGNOSIS, 93000: NORMAL
DIAGNOSIS, 93000: NORMAL
P-R INTERVAL, ECG05: 156 MS
Q-T INTERVAL, ECG07: 302 MS
Q-T INTERVAL, ECG07: 312 MS
QRS DURATION, ECG06: 90 MS
QRS DURATION, ECG06: 94 MS
QTC CALCULATION (BEZET), ECG08: 418 MS
QTC CALCULATION (BEZET), ECG08: 489 MS
VENTRICULAR RATE, ECG03: 108 BPM
VENTRICULAR RATE, ECG03: 158 BPM

## 2019-11-23 NOTE — DISCHARGE INSTRUCTIONS
Patient Education        Supraventricular Tachycardia: Care Instructions  Your Care Instructions    Having supraventricular tachycardia (SVT) means that from time to time your heart beats abnormally fast. This fast rhythm is caused by changes in the electrical system of your heart. You may feel a fluttering in your chest (palpitations) and have a fast pulse. When your heart is beating fast, you may feel anxious and lightheaded, be short of breath, and feel discomfort in the chest.  Your doctor may prescribe medicines to help slow down your heartbeat. Your doctor may also suggest you try vagal maneuvers when having an episode of SVT. These are things, like bearing down, that might help slow your heart rate. Bearing down means that you try to breathe out with your stomach muscles but you don't let air out of your nose or mouth. Your doctor can show you how to do vagal maneuvers. He or she may suggest you lie down on your back to do them. In some cases, either cardioversion treatment or a procedure called catheter ablation is done to correct SVT. Your doctor may ask you to wear a small electronic device for 1 or 2 days to monitor your heart. It is called a Holter monitor. Follow-up care is a key part of your treatment and safety. Be sure to make and go to all appointments, and call your doctor if you are having problems. It's also a good idea to know your test results and keep a list of the medicines you take. How can you care for yourself at home? · Be safe with medicines. Take your medicines exactly as prescribed. Call your doctor if you think you are having a problem with your medicine. You will get more details on the specific medicines your doctor prescribes. · If your doctor showed you how to do vagal maneuvers, try them when you have an episode. These maneuvers include bearing down or putting an ice-cold, wet towel on your face. · Monitor your condition by keeping a diary of your SVT episodes.  Bring this to your doctor appointments. ? Write down how fast or slow your heart was beating. To count your heart rate:  § Gently place 2 fingers of your hand on the inside of your other wrist, below your thumb. § Count the beats for 30 seconds. § Then, double the result to get the number of beats per minute. ? Write down if your heart rhythm was regular or irregular. ? Write down the symptoms you had.  ? Write down the time of day your symptoms occurred. ? Write down how long your symptoms lasted. ? Write down what you were doing when your symptoms started. ? Write down what may have helped your symptoms go away. · If they trigger episodes, limit or avoid alcohol or drinks with caffeine. · Do not use over-the-counter decongestants, herbal remedies, diet pills, or \"pep\" pills, which often contain stimulants. · Do not use illegal drugs, such as cocaine, ecstasy, or methamphetamine, which can speed up your heart's rhythm. · Do not smoke. Smoking can make this condition worse. If you need help quitting, talk to your doctor about stop-smoking programs and medicines. These can increase your chances of quitting for good. · Be alert for new or worsening symptoms, such as shortness of breath, pounding of your heart, or unusual tiredness. If new symptoms develop or your symptoms become worse, call your doctor. When should you call for help? Call 911 anytime you think you may need emergency care. For example, call if:    · You passed out (lost consciousness).     · You are short of breath.    Call your doctor now or seek immediate medical care if:    · You have a fast heartbeat.     · You are dizzy or lightheaded, or feel like you may faint.    Watch closely for changes in your health, and be sure to contact your doctor if:    · You do not get better as expected. Where can you learn more? Go to http://liliane-fox.info/.   Enter G244 in the search box to learn more about \"Supraventricular Tachycardia: Care Instructions. \"  Current as of: April 9, 2019  Content Version: 12.2  © 3289-8034 [employer]. Care instructions adapted under license by [employer] (which disclaims liability or warranty for this information). If you have questions about a medical condition or this instruction, always ask your healthcare professional. [place] [address] any warranty or liability for your use of this information.

## 2019-11-23 NOTE — ED NOTES
Report received from [facility]. Care assumed at this time.

## 2019-12-25 ENCOUNTER — APPOINTMENT (OUTPATIENT)
Dept: GENERAL RADIOLOGY | Age: 59
End: 2019-12-25
Attending: EMERGENCY MEDICINE
Payer: MEDICAID

## 2019-12-25 ENCOUNTER — HOSPITAL ENCOUNTER (EMERGENCY)
Age: 59
Discharge: HOME | End: 2019-12-25
Attending: EMERGENCY MEDICINE
Payer: MEDICAID

## 2019-12-25 VITALS
OXYGEN SATURATION: 99 % | HEIGHT: 63 IN | TEMPERATURE: 98.2 F | HEART RATE: 93 BPM | DIASTOLIC BLOOD PRESSURE: 72 MMHG | RESPIRATION RATE: 18 BRPM | SYSTOLIC BLOOD PRESSURE: 157 MMHG | WEIGHT: 190 LBS | BODY MASS INDEX: 33.66 KG/M2

## 2019-12-25 DIAGNOSIS — R42 DIZZINESS: Primary | ICD-10-CM

## 2019-12-25 LAB
ALBUMIN SERPL-MCNC: 3.1 G/DL (ref 3.5–5)
ALBUMIN/GLOB SERPL: 0.6 {RATIO} (ref 1.2–3.5)
ALP SERPL-CCNC: 69 U/L (ref 50–136)
ALT SERPL-CCNC: 30 U/L (ref 12–65)
ANION GAP SERPL CALC-SCNC: 4 MMOL/L (ref 7–16)
AST SERPL-CCNC: 22 U/L (ref 15–37)
ATRIAL RATE: 101 BPM
BASOPHILS # BLD: 0.1 K/UL (ref 0–0.2)
BASOPHILS NFR BLD: 1 % (ref 0–2)
BILIRUB SERPL-MCNC: 0.3 MG/DL (ref 0.2–1.1)
BNP SERPL-MCNC: 808 PG/ML (ref 5–125)
BUN SERPL-MCNC: 38 MG/DL (ref 6–23)
CALCIUM SERPL-MCNC: 9.6 MG/DL (ref 8.3–10.4)
CALCULATED P AXIS, ECG09: 38 DEGREES
CALCULATED R AXIS, ECG10: 29 DEGREES
CALCULATED T AXIS, ECG11: 103 DEGREES
CHLORIDE SERPL-SCNC: 110 MMOL/L (ref 98–107)
CO2 SERPL-SCNC: 27 MMOL/L (ref 21–32)
CREAT SERPL-MCNC: 1.16 MG/DL (ref 0.6–1)
DIAGNOSIS, 93000: NORMAL
DIFFERENTIAL METHOD BLD: ABNORMAL
EOSINOPHIL # BLD: 0.3 K/UL (ref 0–0.8)
EOSINOPHIL NFR BLD: 4 % (ref 0.5–7.8)
ERYTHROCYTE [DISTWIDTH] IN BLOOD BY AUTOMATED COUNT: 14.3 % (ref 11.9–14.6)
GLOBULIN SER CALC-MCNC: 4.8 G/DL (ref 2.3–3.5)
GLUCOSE SERPL-MCNC: 237 MG/DL (ref 65–100)
HCT VFR BLD AUTO: 36.1 % (ref 35.8–46.3)
HGB BLD-MCNC: 11.6 G/DL (ref 11.7–15.4)
IMM GRANULOCYTES # BLD AUTO: 0 K/UL (ref 0–0.5)
IMM GRANULOCYTES NFR BLD AUTO: 1 % (ref 0–5)
LYMPHOCYTES # BLD: 2.2 K/UL (ref 0.5–4.6)
LYMPHOCYTES NFR BLD: 36 % (ref 13–44)
MCH RBC QN AUTO: 28.4 PG (ref 26.1–32.9)
MCHC RBC AUTO-ENTMCNC: 32.1 G/DL (ref 31.4–35)
MCV RBC AUTO: 88.3 FL (ref 79.6–97.8)
MONOCYTES # BLD: 0.3 K/UL (ref 0.1–1.3)
MONOCYTES NFR BLD: 6 % (ref 4–12)
NEUTS SEG # BLD: 3.3 K/UL (ref 1.7–8.2)
NEUTS SEG NFR BLD: 53 % (ref 43–78)
NRBC # BLD: 0 K/UL (ref 0–0.2)
P-R INTERVAL, ECG05: 182 MS
PLATELET # BLD AUTO: 199 K/UL (ref 150–450)
PMV BLD AUTO: 12.1 FL (ref 9.4–12.3)
POTASSIUM SERPL-SCNC: 4.2 MMOL/L (ref 3.5–5.1)
PROT SERPL-MCNC: 7.9 G/DL (ref 6.3–8.2)
Q-T INTERVAL, ECG07: 344 MS
QRS DURATION, ECG06: 88 MS
QTC CALCULATION (BEZET), ECG08: 446 MS
RBC # BLD AUTO: 4.09 M/UL (ref 4.05–5.2)
SODIUM SERPL-SCNC: 141 MMOL/L (ref 136–145)
TROPONIN I SERPL-MCNC: <0.02 NG/ML (ref 0.02–0.05)
VENTRICULAR RATE, ECG03: 101 BPM
WBC # BLD AUTO: 6.2 K/UL (ref 4.3–11.1)

## 2019-12-25 PROCEDURE — 93005 ELECTROCARDIOGRAM TRACING: CPT | Performed by: EMERGENCY MEDICINE

## 2019-12-25 PROCEDURE — 99284 EMERGENCY DEPT VISIT MOD MDM: CPT | Performed by: EMERGENCY MEDICINE

## 2019-12-25 PROCEDURE — 74011250637 HC RX REV CODE- 250/637: Performed by: EMERGENCY MEDICINE

## 2019-12-25 PROCEDURE — 74011250636 HC RX REV CODE- 250/636: Performed by: EMERGENCY MEDICINE

## 2019-12-25 PROCEDURE — 83880 ASSAY OF NATRIURETIC PEPTIDE: CPT

## 2019-12-25 PROCEDURE — 96374 THER/PROPH/DIAG INJ IV PUSH: CPT | Performed by: EMERGENCY MEDICINE

## 2019-12-25 PROCEDURE — 96361 HYDRATE IV INFUSION ADD-ON: CPT | Performed by: EMERGENCY MEDICINE

## 2019-12-25 PROCEDURE — 71045 X-RAY EXAM CHEST 1 VIEW: CPT

## 2019-12-25 PROCEDURE — 80053 COMPREHEN METABOLIC PANEL: CPT

## 2019-12-25 PROCEDURE — 74011000250 HC RX REV CODE- 250: Performed by: EMERGENCY MEDICINE

## 2019-12-25 PROCEDURE — 85025 COMPLETE CBC W/AUTO DIFF WBC: CPT

## 2019-12-25 PROCEDURE — 84484 ASSAY OF TROPONIN QUANT: CPT

## 2019-12-25 RX ORDER — NITROGLYCERIN 0.4 MG/1
0.4 TABLET SUBLINGUAL
Status: DISCONTINUED | OUTPATIENT
Start: 2019-12-25 | End: 2019-12-25 | Stop reason: HOSPADM

## 2019-12-25 RX ORDER — LABETALOL HYDROCHLORIDE 5 MG/ML
20 INJECTION, SOLUTION INTRAVENOUS
Status: COMPLETED | OUTPATIENT
Start: 2019-12-25 | End: 2019-12-25

## 2019-12-25 RX ORDER — GUAIFENESIN 100 MG/5ML
324 LIQUID (ML) ORAL
Status: COMPLETED | OUTPATIENT
Start: 2019-12-25 | End: 2019-12-25

## 2019-12-25 RX ADMIN — ASPIRIN 81 MG 243 MG: 81 TABLET ORAL at 11:08

## 2019-12-25 RX ADMIN — SODIUM CHLORIDE 500 ML: 900 INJECTION, SOLUTION INTRAVENOUS at 11:08

## 2019-12-25 RX ADMIN — LABETALOL HYDROCHLORIDE 20 MG: 5 INJECTION INTRAVENOUS at 11:08

## 2019-12-25 NOTE — ED PROVIDER NOTES
Patient with high blood pressure heart disease diabetes and congestive heart failure. Had a CABG 6 weeks ago. Since then has had chest tightness heaviness and shortness of breath. Was told this was normal.  This morning developed some dizziness so came in. Has had left arm and hand numbness since the surgery. Mild shortness of breath. Mild nausea. No diaphoresis. The history is provided by the patient. No  was used. Dizziness   This is a new problem. The current episode started 1 to 2 hours ago. There was no focality noted. Pertinent negatives include no focal weakness, no loss of sensation, no loss of balance, no slurred speech, no speech difficulty, no movement disorder, no mental status change, no unresponsiveness and no disorientation. There has been no fever. Associated symptoms include shortness of breath, chest pain and nausea. Pertinent negatives include no vomiting, no altered mental status, no confusion, no headaches, no bowel incontinence and no bladder incontinence.         Past Medical History:   Diagnosis Date    Breast cancer ([facility] [address] 2015    left, radiation , and lumpectomy only    CAD (coronary artery disease)     multivessel, awaiting CABG and mitral valve repair/replacement    Diabetes ([facility] [address] typell, dx 2016    typelauren, ID, avfbs 160, last A1C was 10.8. denies lows    Heart failure ([address]     EF 40-45%    Hypercholesteremia     Hypertension     Intertrochanteric fracture of right femur ([address] 2/24/2018    Mitral valve regurgitation     PUD (peptic ulcer disease)     age 12, no treatment since       Past Surgical History:   Procedure Laterality Date    HX BREAST LUMPECTOMY  2015    HX HIP FRACTURE TX Right     HX ORTHOPAEDIC Right     hip fracture repair with hardware , not replacent         Family History:   Problem Relation Age of Onset    Kidney Disease Mother     Heart Disease Father     Heart Disease Brother        Social History     Socioeconomic History  Marital status: LEGALLY      Spouse name: Not on file    Number of children: Not on file    Years of education: Not on file    Highest education level: Not on file   Occupational History    Occupation: previously worked as a hospice nurse   Social Needs    Financial resource strain: Not on file    Food insecurity:     Worry: Not on file     Inability: Not on file   [employer] needs:     Medical: Not on file     Non-medical: Not on file   Tobacco Use    Smoking status: Never Smoker    Smokeless tobacco: Never Used   Substance and Sexual Activity    Alcohol use: Yes     Comment: social    Drug use: No    Sexual activity: Not on file   Lifestyle    Physical activity:     Days per week: Not on file     Minutes per session: Not on file    Stress: Not on file   Relationships    Social connections:     Talks on phone: Not on file     Gets together: Not on file     Attends Denominational service: Not on file     Active member of club or organization: Not on file     Attends meetings of clubs or organizations: Not on file     Relationship status: Not on file    Intimate partner violence:     Fear of current or ex partner: Not on file     Emotionally abused: Not on file     Physically abused: Not on file     Forced sexual activity: Not on file   Other Topics Concern    Not on file   Social History Narrative    . Lives with her          ALLERGIES: Patient has no known allergies. Review of Systems   Constitutional: Negative for chills and fever. HENT: Positive for sore throat. Negative for rhinorrhea. Eyes: Negative for pain and redness. Respiratory: Positive for chest tightness and shortness of breath. Negative for wheezing. Cardiovascular: Positive for chest pain and leg swelling (mild BLE). Gastrointestinal: Positive for nausea. Negative for abdominal pain, bowel incontinence, diarrhea and vomiting.    Genitourinary: Negative for bladder incontinence, dysuria and hematuria. Musculoskeletal: Negative for back pain, gait problem, neck pain and neck stiffness. Skin: Negative for color change and rash. Neurological: Positive for dizziness and light-headedness. Negative for focal weakness, speech difficulty, weakness, numbness, headaches and loss of balance. Psychiatric/Behavioral: Negative for confusion. Vitals:    12/25/19 1005   BP: (!) 200/95   Pulse: (!) 102   Resp: 18   SpO2: 98%   Weight: 86.2 kg (190 lb)   Height: 5' 3\" (1.6 m)            Physical Exam  Constitutional:       Appearance: Normal appearance. She is well-developed. HENT:      Head: Normocephalic and atraumatic. Eyes:      Extraocular Movements: Extraocular movements intact. Pupils: Pupils are equal, round, and reactive to light. Neck:      Musculoskeletal: Normal range of motion and neck supple. Cardiovascular:      Rate and Rhythm: Normal rate and regular rhythm. Pulmonary:      Effort: Pulmonary effort is normal. No respiratory distress. Breath sounds: Normal breath sounds. No wheezing. Abdominal:      General: Bowel sounds are normal.      Palpations: Abdomen is soft. Tenderness: There is no tenderness. Musculoskeletal: Normal range of motion. Right lower leg: Edema (mild) present. Left lower leg: Edema (mild with healing scars from graft site. ) present. Skin:     General: Skin is warm and dry. Neurological:      General: No focal deficit present. Mental Status: She is alert and oriented to person, place, and time. MDM  Number of Diagnoses or Management Options  Diagnosis management comments: Patient with continued chest tightness after CABG 6 weeks ago. New onset dizziness this morning. Improved with 500 cc of fluid. Will discharge with cardiology follow-up.        Amount and/or Complexity of Data Reviewed  Clinical lab tests: ordered and reviewed  Tests in the radiology section of CPT®: ordered and reviewed  Tests in the medicine section of CPT®: ordered and reviewed    Patient Progress  Patient progress: stable         Procedures        EKG: normal sinus rhythm, nonspecific ST and T waves changes. Rate 101. Results Include:    Recent Results (from the past 24 hour(s))   EKG, 12 LEAD, INITIAL    Collection Time: 12/25/19 10:10 AM   Result Value Ref Range    Ventricular Rate 101 BPM    Atrial Rate 101 BPM    P-R Interval 182 ms    QRS Duration 88 ms    Q-T Interval 344 ms    QTC Calculation (Bezet) 446 ms    Calculated P Axis 38 degrees    Calculated R Axis 29 degrees    Calculated T Axis 103 degrees    Diagnosis       !! AGE AND GENDER SPECIFIC ECG ANALYSIS !! Sinus tachycardia  Abnormal QRS-T angle, consider primary T wave abnormality  Abnormal ECG  When compared with ECG of 22-NOV-2019 18:41,  ST no longer depressed in Inferior leads  T wave inversion no longer evident in Inferior leads  T wave inversion no longer evident in Lateral leads     TROPONIN I    Collection Time: 12/25/19 10:20 AM   Result Value Ref Range    Troponin-I, Qt. <0.02 (L) 0.02 - 0.05 NG/ML   CBC WITH AUTOMATED DIFF    Collection Time: 12/25/19 10:20 AM   Result Value Ref Range    WBC 6.2 4.3 - 11.1 K/uL    RBC 4.09 4.05 - 5.2 M/uL    HGB 11.6 (L) 11.7 - 15.4 g/dL    HCT 36.1 35.8 - 46.3 %    MCV 88.3 79.6 - 97.8 FL    MCH 28.4 26.1 - 32.9 PG    MCHC 32.1 31.4 - 35.0 g/dL    RDW 14.3 11.9 - 14.6 %    PLATELET 063 869 - 709 K/uL    MPV 12.1 9.4 - 12.3 FL    ABSOLUTE NRBC 0.00 0.0 - 0.2 K/uL    DF AUTOMATED      NEUTROPHILS 53 43 - 78 %    LYMPHOCYTES 36 13 - 44 %    MONOCYTES 6 4.0 - 12.0 %    EOSINOPHILS 4 0.5 - 7.8 %    BASOPHILS 1 0.0 - 2.0 %    IMMATURE GRANULOCYTES 1 0.0 - 5.0 %    ABS. NEUTROPHILS 3.3 1.7 - 8.2 K/UL    ABS. LYMPHOCYTES 2.2 0.5 - 4.6 K/UL    ABS. MONOCYTES 0.3 0.1 - 1.3 K/UL    ABS. EOSINOPHILS 0.3 0.0 - 0.8 K/UL    ABS. BASOPHILS 0.1 0.0 - 0.2 K/UL    ABS. IMM.  GRANS. 0.0 0.0 - 0.5 K/UL   METABOLIC PANEL, COMPREHENSIVE    Collection Time: 12/25/19 10:20 AM   Result Value Ref Range    Sodium 141 136 - 145 mmol/L    Potassium 4.2 3.5 - 5.1 mmol/L    Chloride 110 (H) 98 - 107 mmol/L    CO2 27 21 - 32 mmol/L    Anion gap 4 (L) 7 - 16 mmol/L    Glucose 237 (H) 65 - 100 mg/dL    BUN 38 (H) 6 - 23 MG/DL    Creatinine 1.16 (H) 0.6 - 1.0 MG/DL    GFR est AA >60 >60 ml/min/1.73m2    GFR est non-AA 51 (L) >60 ml/min/1.73m2    Calcium 9.6 8.3 - 10.4 MG/DL    Bilirubin, total 0.3 0.2 - 1.1 MG/DL    ALT (SGPT) 30 12 - 65 U/L    AST (SGOT) 22 15 - 37 U/L    Alk. phosphatase 69 50 - 136 U/L    Protein, total 7.9 6.3 - 8.2 g/dL    Albumin 3.1 (L) 3.5 - 5.0 g/dL    Globulin 4.8 (H) 2.3 - 3.5 g/dL    A-G Ratio 0.6 (L) 1.2 - 3.5     NT-PRO BNP    Collection Time: 12/25/19 10:20 AM   Result Value Ref Range    NT pro- (H) 5 - 125 PG/ML         XR CHEST PORT (Final result)   Result time 12/25/19 11:19:43   Final result by Francois Gaffney MD (12/25/19 11:19:43)                Impression:    IMPRESSION:    1. Cardiac silhouette within normal limits. 2. No focal airspace disease. VOICE DICTATED BY: Dr. Paul Painter            Narrative:    EXAMINATION: CHEST RADIOGRAPH 12/25/2019 10:24 AM    ACCESSION NUMBER: [id number]    INDICATION: cough    COMPARISON: Chest x-ray 11/22/2019    TECHNIQUE: A single AP view of the chest was obtained. FINDINGS:     Support Lines and Tubes: None    Cardiac Silhouette: There is unchanged mild enlargement of the cardiac  silhouette. Prior median sternotomy and cardiac valve replacement. Lungs: No focal airspace disease. Pleura: No pleural effusion. No pneumothorax. Osseous Structures: Thoracic spine spondylosis.     Upper Abdomen: Unremarkable.

## 2019-12-25 NOTE — DISCHARGE INSTRUCTIONS

## 2019-12-25 NOTE — ED NOTES
I have reviewed discharge instructions with the patient. The patient verbalized understanding. Patient left ED via Discharge Method: ambulatory to Home with family. Opportunity for questions and clarification provided. Patient given 0 scripts. To continue your aftercare when you leave the hospital, you may receive an automated call from our care team to check in on how you are doing. This is a free service and part of our promise to provide the best care and service to meet your aftercare needs.  If you have questions, or wish to unsubscribe from this service please call [phone]. Thank you for Choosing our [address] [place] [address] Emergency Department.

## 2019-12-25 NOTE — ED TRIAGE NOTES
EMS called to home for dizziness. Pt had open heart surgery here 6 weeks ago. Hx of htn. Takes she is taking her bp meds. bp 190/110. Complaining chest tightness when walking. No chest pain in resting position. Hr 110 ST.  bgl 374.  20g RFA. Given 150mL normal saline. Pt is on fluid restriction but unsure how much she is supposed to have.

## 2020-03-18 NOTE — PROGRESS NOTES
Problem: Falls - Risk of  Goal: *Absence of Falls  Description  Document Andrei Taos Fall Risk and appropriate interventions in the flowsheet. Outcome: Progressing Towards Goal  Note:   Fall Risk Interventions:  Mobility Interventions: Communicate number of staff needed for ambulation/transfer, Patient to call before getting OOB         Medication Interventions: Bed/chair exit alarm, Patient to call before getting OOB    Elimination Interventions: Call light in reach    History of Falls Interventions: Bed/chair exit alarm         Problem: Pressure Injury - Risk of  Goal: *Prevention of pressure injury  Description  Document Juan Pablo Scale and appropriate interventions in the flowsheet.   Outcome: Progressing Towards Goal  Note:   Pressure Injury Interventions:  Sensory Interventions: Assess changes in LOC, Check visual cues for pain, Keep linens dry and wrinkle-free, Minimize linen layers    Moisture Interventions: Absorbent underpads    Activity Interventions: Increase time out of bed, Pressure redistribution bed/mattress(bed type)    Mobility Interventions: Pressure redistribution bed/mattress (bed type)    Nutrition Interventions: Document food/fluid/supplement intake    Friction and Shear Interventions: Lift sheet, HOB 30 degrees or less none

## 2020-09-11 ENCOUNTER — HOSPITAL ENCOUNTER (OUTPATIENT)
Dept: LAB | Age: 60
Discharge: HOME | End: 2020-09-11
Payer: MEDICARE

## 2020-09-11 DIAGNOSIS — E78.5 DYSLIPIDEMIA: ICD-10-CM

## 2020-09-11 LAB
CHOLEST SERPL-MCNC: 205 MG/DL
HDLC SERPL-MCNC: 33 MG/DL (ref 40–60)
HDLC SERPL: 6.2 {RATIO}
LDLC SERPL CALC-MCNC: 115.4 MG/DL
LIPID PROFILE,FLP: ABNORMAL
TRIGL SERPL-MCNC: 283 MG/DL (ref 35–150)
VLDLC SERPL CALC-MCNC: 56.6 MG/DL (ref 6–23)

## 2020-09-11 PROCEDURE — 80061 LIPID PANEL: CPT

## 2020-09-11 PROCEDURE — 36415 COLL VENOUS BLD VENIPUNCTURE: CPT

## 2021-03-26 ENCOUNTER — HOSPITAL ENCOUNTER (EMERGENCY)
Age: 61
Discharge: HOME | End: 2021-03-26
Attending: EMERGENCY MEDICINE
Payer: MEDICARE

## 2021-03-26 VITALS
SYSTOLIC BLOOD PRESSURE: 128 MMHG | BODY MASS INDEX: 31.18 KG/M2 | RESPIRATION RATE: 18 BRPM | OXYGEN SATURATION: 95 % | HEART RATE: 83 BPM | TEMPERATURE: 98.8 F | DIASTOLIC BLOOD PRESSURE: 75 MMHG | WEIGHT: 176 LBS | HEIGHT: 63 IN

## 2021-03-26 DIAGNOSIS — M26.609 TMJ (TEMPOROMANDIBULAR JOINT DISORDER): ICD-10-CM

## 2021-03-26 DIAGNOSIS — H65.01 RIGHT ACUTE SEROUS OTITIS MEDIA, RECURRENCE NOT SPECIFIED: Primary | ICD-10-CM

## 2021-03-26 PROCEDURE — 74011250637 HC RX REV CODE- 250/637: Performed by: EMERGENCY MEDICINE

## 2021-03-26 PROCEDURE — 99283 EMERGENCY DEPT VISIT LOW MDM: CPT

## 2021-03-26 RX ORDER — PREDNISOLONE ACETATE 10 MG/ML
SUSPENSION/ DROPS OPHTHALMIC
COMMUNITY
Start: 2021-03-03

## 2021-03-26 RX ORDER — PEN NEEDLE, DIABETIC 32GX 5/32"
NEEDLE, DISPOSABLE MISCELLANEOUS
COMMUNITY
Start: 2021-02-20

## 2021-03-26 RX ORDER — IBUPROFEN 400 MG/1
400 TABLET ORAL
Status: COMPLETED | OUTPATIENT
Start: 2021-03-26 | End: 2021-03-26

## 2021-03-26 RX ORDER — DIPHENHYDRAMINE HCL 25 MG
50 CAPSULE ORAL
Status: COMPLETED | OUTPATIENT
Start: 2021-03-26 | End: 2021-03-26

## 2021-03-26 RX ORDER — OXYCODONE HYDROCHLORIDE 5 MG/1
5 TABLET ORAL
COMMUNITY
Start: 2021-03-23 | End: 2021-03-29

## 2021-03-26 RX ORDER — PEN NEEDLE, DIABETIC 31 GX3/16"
NEEDLE, DISPOSABLE MISCELLANEOUS DAILY
COMMUNITY
Start: 2020-11-18

## 2021-03-26 RX ADMIN — DIPHENHYDRAMINE HYDROCHLORIDE 50 MG: 25 CAPSULE ORAL at 03:23

## 2021-03-26 RX ADMIN — IBUPROFEN 400 MG: 400 TABLET, FILM COATED ORAL at 03:23

## 2021-03-26 NOTE — DISCHARGE INSTRUCTIONS
12-hour Sudafed every morning for 4 to 5 days. 25 to 50 mg of Benadryl at night. Tylenol and Motrin for pain. Continue oxycodone for breakthrough pain. Make sure your oxycodone is a solo product and does not contain Tylenol/acetaminophen, and if it does do not take any extra Tylenol. Follow-up with your doctor in 5 to 7 days if not improving for ear recheck. Return if any new, worsening or concerning symptoms.

## 2021-03-26 NOTE — ED NOTES
I have reviewed discharge instructions with the patient. The patient verbalized understanding. Patient left ED via Discharge Method: ambulatory to Home with spouse    Opportunity for questions and clarification provided. Patient given 0 scripts. To continue your aftercare when you leave the hospital, you may receive an automated call from our care team to check in on how you are doing. This is a free service and part of our promise to provide the best care and service to meet your aftercare needs.  If you have questions, or wish to unsubscribe from this service please call [phone]. Thank you for Choosing our [facility] Emergency Department.

## 2021-03-26 NOTE — ED TRIAGE NOTES
Patient presents with c/o throbbing in her right ear that started 2 days ago.  Patient masked on arrival.

## 2021-03-26 NOTE — ED PROVIDER NOTES
The history is provided by the patient. Ear Pain   This is a new problem. The current episode started yesterday. The problem occurs constantly. The problem has not changed since onset. Patient complains that the right ear is affected. There has been no fever. The pain is moderate. Pertinent negatives include no ear discharge, no hearing loss, no rhinorrhea, no sore throat, no vomiting and no cough.         Past Medical History:   Diagnosis Date    Breast cancer ([facility] [address] 2015    left, radiation , and lumpectomy only    CAD (coronary artery disease)     multivessel, awaiting CABG and mitral valve repair/replacement    Diabetes ([facility] [address] typell, dx 2016    typell, ID, avfbs 160, last A1C was 10.8. denies lows    Heart failure ([facility] [address]     EF 40-45%    Hypercholesteremia     Hypertension     [facility] 2/24/2018    Mitral valve regurgitation     PUD (peptic ulcer disease)     age 12, no treatment since       Past Surgical History:   Procedure Laterality Date    HX BREAST LUMPECTOMY  2015    HX HIP FRACTURE TX Right     HX ORTHOPAEDIC Right     hip fracture repair with hardware , not replacent         Family History:   Problem Relation Age of Onset    Kidney Disease Mother     Heart Disease Father     Heart Disease Brother        Social History     Socioeconomic History    Marital status:      Spouse name: Not on file    Number of children: Not on file    Years of education: Not on file    Highest education level: Not on file   Occupational History    Occupation: previously worked as a hospice nurse   Social Needs    Financial resource strain: Not on file    Food insecurity     Worry: Not on file     Inability: Not on file   [employer] needs     Medical: Not on file     Non-medical: Not on file   Tobacco Use    Smoking status: Never Smoker    Smokeless tobacco: Never Used   Substance and Sexual Activity    Alcohol use: Yes     Comment: social    Drug use: No    Sexual activity: Not on file   Lifestyle    Physical activity     Days per week: Not on file     Minutes per session: Not on file    Stress: Not on file   Relationships    Social connections     Talks on phone: Not on file     Gets together: Not on file     Attends Advent service: Not on file     Active member of club or organization: Not on file     Attends meetings of clubs or organizations: Not on file     Relationship status: Not on file    Intimate partner violence     Fear of current or ex partner: Not on file     Emotionally abused: Not on file     Physically abused: Not on file     Forced sexual activity: Not on file   Other Topics Concern    Not on file   Social History Narrative    . Lives with her          ALLERGIES: Patient has no known allergies. Review of Systems   Constitutional: Negative for fever. HENT: Positive for ear pain. Negative for ear discharge, hearing loss, rhinorrhea, sore throat and tinnitus. Respiratory: Negative for cough and shortness of breath. Gastrointestinal: Negative for nausea and vomiting. Vitals:    03/26/21 0240   BP: 128/75   Pulse: 83   Resp: 18   Temp: 98.8 °F (37.1 °C)   SpO2: 95%   Weight: 79.8 kg (176 lb)   Height: 5' 3\" (1.6 m)            Physical Exam  Vitals signs and nursing note reviewed. Constitutional:       General: She is not in acute distress. Appearance: Normal appearance. HENT:      Head: Normocephalic and atraumatic. Left Ear: Tympanic membrane normal.      Ears:      Comments: Tympanic membrane is normal color but is bulging ever so slightly with some fluid behind it. No erythema or perforation. Your canal is normal although has a little bit of wax in it. Adelene Salle is nontender. Mastoid processes nontender. Nose: Nose normal.      Mouth/Throat:      Mouth: Mucous membranes are moist.      Pharynx: Oropharynx is clear. No oropharyngeal exudate.       Comments: Right TMJ tenderness with palpation and jaw opening  Eyes:      Pupils: Pupils are equal, round, and reactive to light. Neck:      Musculoskeletal: Neck supple. Cardiovascular:      Rate and Rhythm: Normal rate and regular rhythm. Heart sounds: Normal heart sounds. Pulmonary:      Effort: Pulmonary effort is normal.      Breath sounds: Normal breath sounds. Lymphadenopathy:      Cervical: No cervical adenopathy. Neurological:      Mental Status: She is alert. Comments: Alert and oriented          MDM  Number of Diagnoses or Management Options  Diagnosis management comments: Patient recently had mastectomy, thankfully with normal lymph nodes. Further plan for her breast cancer is unknown at this time and she is awaiting follow-up. TMJ likely aggravated from intubation and surgery. Serous otitis media will be treated with Sudafed during the day and Benadryl at night. Patient taking oxycodone for pain may supplement with Tylenol and Motrin for a few days. Ear recheck with PCP in 1 week consider antibiotics or steroids if not improved at that time.     Risk of Complications, Morbidity, and/or Mortality  Presenting problems: minimal  Diagnostic procedures: minimal  Management options: minimal           Procedures

## 2021-05-11 ENCOUNTER — HOSPITAL ENCOUNTER (EMERGENCY)
Age: 61
Discharge: HOME | End: 2021-05-11
Attending: EMERGENCY MEDICINE
Payer: MEDICARE

## 2021-05-11 ENCOUNTER — APPOINTMENT (OUTPATIENT)
Dept: GENERAL RADIOLOGY | Age: 61
End: 2021-05-11
Attending: EMERGENCY MEDICINE
Payer: MEDICARE

## 2021-05-11 ENCOUNTER — APPOINTMENT (OUTPATIENT)
Dept: CT IMAGING | Age: 61
End: 2021-05-11
Attending: EMERGENCY MEDICINE
Payer: MEDICARE

## 2021-05-11 VITALS
WEIGHT: 166 LBS | SYSTOLIC BLOOD PRESSURE: 166 MMHG | RESPIRATION RATE: 21 BRPM | HEART RATE: 100 BPM | OXYGEN SATURATION: 100 % | HEIGHT: 63 IN | TEMPERATURE: 98.2 F | BODY MASS INDEX: 29.41 KG/M2 | DIASTOLIC BLOOD PRESSURE: 84 MMHG

## 2021-05-11 DIAGNOSIS — N64.89 SEROMA OF BREAST: Primary | ICD-10-CM

## 2021-05-11 DIAGNOSIS — R00.2 PALPITATIONS: ICD-10-CM

## 2021-05-11 LAB
ALBUMIN SERPL-MCNC: 3 G/DL (ref 3.2–4.6)
ALBUMIN/GLOB SERPL: 0.6 {RATIO} (ref 1.2–3.5)
ALP SERPL-CCNC: 68 U/L (ref 50–136)
ALT SERPL-CCNC: 16 U/L (ref 12–65)
ANION GAP SERPL CALC-SCNC: 8 MMOL/L (ref 7–16)
AST SERPL-CCNC: 16 U/L (ref 15–37)
ATRIAL RATE: 99 BPM
BASOPHILS # BLD: 0.1 K/UL (ref 0–0.2)
BASOPHILS NFR BLD: 1 % (ref 0–2)
BILIRUB SERPL-MCNC: 0.2 MG/DL (ref 0.2–1.1)
BNP SERPL-MCNC: 1018 PG/ML (ref 5–125)
BUN SERPL-MCNC: 22 MG/DL (ref 8–23)
CALCIUM SERPL-MCNC: 9 MG/DL (ref 8.3–10.4)
CALCULATED P AXIS, ECG09: 61 DEGREES
CALCULATED R AXIS, ECG10: 58 DEGREES
CALCULATED T AXIS, ECG11: 77 DEGREES
CHLORIDE SERPL-SCNC: 104 MMOL/L (ref 98–107)
CO2 SERPL-SCNC: 29 MMOL/L (ref 21–32)
CREAT SERPL-MCNC: 1.21 MG/DL (ref 0.6–1)
D DIMER PPP FEU-MCNC: 2.81 UG/ML(FEU)
DIAGNOSIS, 93000: NORMAL
DIFFERENTIAL METHOD BLD: ABNORMAL
EOSINOPHIL # BLD: 0.5 K/UL (ref 0–0.8)
EOSINOPHIL NFR BLD: 6 % (ref 0.5–7.8)
ERYTHROCYTE [DISTWIDTH] IN BLOOD BY AUTOMATED COUNT: 13.3 % (ref 11.9–14.6)
GLOBULIN SER CALC-MCNC: 5.3 G/DL (ref 2.3–3.5)
GLUCOSE SERPL-MCNC: 117 MG/DL (ref 65–100)
HCT VFR BLD AUTO: 31.6 % (ref 35.8–46.3)
HGB BLD-MCNC: 10 G/DL (ref 11.7–15.4)
IMM GRANULOCYTES # BLD AUTO: 0 K/UL (ref 0–0.5)
IMM GRANULOCYTES NFR BLD AUTO: 0 % (ref 0–5)
LYMPHOCYTES # BLD: 3 K/UL (ref 0.5–4.6)
LYMPHOCYTES NFR BLD: 33 % (ref 13–44)
MCH RBC QN AUTO: 27.4 PG (ref 26.1–32.9)
MCHC RBC AUTO-ENTMCNC: 31.6 G/DL (ref 31.4–35)
MCV RBC AUTO: 86.6 FL (ref 79.6–97.8)
MONOCYTES # BLD: 0.5 K/UL (ref 0.1–1.3)
MONOCYTES NFR BLD: 6 % (ref 4–12)
NEUTS SEG # BLD: 4.8 K/UL (ref 1.7–8.2)
NEUTS SEG NFR BLD: 54 % (ref 43–78)
NRBC # BLD: 0 K/UL (ref 0–0.2)
P-R INTERVAL, ECG05: 190 MS
PLATELET # BLD AUTO: 319 K/UL (ref 150–450)
PMV BLD AUTO: 10.9 FL (ref 9.4–12.3)
POTASSIUM SERPL-SCNC: 3.1 MMOL/L (ref 3.5–5.1)
PROT SERPL-MCNC: 8.3 G/DL (ref 6.3–8.2)
Q-T INTERVAL, ECG07: 404 MS
QRS DURATION, ECG06: 88 MS
QTC CALCULATION (BEZET), ECG08: 518 MS
RBC # BLD AUTO: 3.65 M/UL (ref 4.05–5.2)
SODIUM SERPL-SCNC: 141 MMOL/L (ref 136–145)
TROPONIN-HIGH SENSITIVITY: 8.6 PG/ML (ref 0–14)
VENTRICULAR RATE, ECG03: 99 BPM
WBC # BLD AUTO: 8.8 K/UL (ref 4.3–11.1)

## 2021-05-11 PROCEDURE — 80053 COMPREHEN METABOLIC PANEL: CPT

## 2021-05-11 PROCEDURE — 74011000258 HC RX REV CODE- 258: Performed by: EMERGENCY MEDICINE

## 2021-05-11 PROCEDURE — 85379 FIBRIN DEGRADATION QUANT: CPT

## 2021-05-11 PROCEDURE — 99284 EMERGENCY DEPT VISIT MOD MDM: CPT

## 2021-05-11 PROCEDURE — 71046 X-RAY EXAM CHEST 2 VIEWS: CPT

## 2021-05-11 PROCEDURE — 71260 CT THORAX DX C+: CPT

## 2021-05-11 PROCEDURE — 83880 ASSAY OF NATRIURETIC PEPTIDE: CPT

## 2021-05-11 PROCEDURE — 93005 ELECTROCARDIOGRAM TRACING: CPT | Performed by: EMERGENCY MEDICINE

## 2021-05-11 PROCEDURE — 74011000636 HC RX REV CODE- 636: Performed by: EMERGENCY MEDICINE

## 2021-05-11 PROCEDURE — 84484 ASSAY OF TROPONIN QUANT: CPT

## 2021-05-11 PROCEDURE — 85025 COMPLETE CBC W/AUTO DIFF WBC: CPT

## 2021-05-11 RX ORDER — AMLODIPINE BESYLATE 10 MG/1
TABLET ORAL DAILY
COMMUNITY
End: 2021-10-06 | Stop reason: DRUGHIGH

## 2021-05-11 RX ORDER — LETROZOLE 2.5 MG/1
2.5 TABLET, FILM COATED ORAL DAILY
COMMUNITY

## 2021-05-11 RX ORDER — SODIUM CHLORIDE 0.9 % (FLUSH) 0.9 %
10 SYRINGE (ML) INJECTION
Status: COMPLETED | OUTPATIENT
Start: 2021-05-11 | End: 2021-05-11

## 2021-05-11 RX ORDER — METOCLOPRAMIDE 5 MG/1
5 TABLET ORAL
COMMUNITY

## 2021-05-11 RX ADMIN — Medication 10 ML: at 06:25

## 2021-05-11 RX ADMIN — SODIUM CHLORIDE 100 ML: 900 INJECTION, SOLUTION INTRAVENOUS at 06:25

## 2021-05-11 RX ADMIN — IOPAMIDOL 100 ML: 755 INJECTION, SOLUTION INTRAVENOUS at 06:25

## 2021-05-11 NOTE — ED NOTES
I have reviewed discharge instructions with the patient. The patient verbalized understanding. Patient left ED via Discharge Method: ambulatory to Home with . Opportunity for questions and clarification provided. Patient given 0 scripts. To continue your aftercare when you leave the hospital, you may receive an automated call from our care team to check in on how you are doing. This is a free service and part of our promise to provide the best care and service to meet your aftercare needs.  If you have questions, or wish to unsubscribe from this service please call [phone]. Thank you for Choosing our [facility] Emergency Department.

## 2021-05-11 NOTE — ED PROVIDER NOTES
57-year-old female with history of coronary artery disease and triple bypass with mitral valve repair, breast cancer and mastectomy 4 weeks ago, hypertension, high cholesterol, congestive heart failure, diabetes presents with shortness of breath and heart skipping since yesterday. She reports a dry cough. No fever. She reports slight chest heaviness. Denies history of DVT or PE. She has had left leg swelling since CABG. Chest Pain (Angina)   Associated symptoms include cough, palpitations and shortness of breath. Pertinent negatives include no abdominal pain, no back pain, no fever, no headaches, no nausea and no vomiting. Shortness of Breath  Associated symptoms include cough, chest pain and leg swelling. Pertinent negatives include no fever, no headaches, no vomiting, no abdominal pain and no rash.         Past Medical History:   Diagnosis Date    Breast cancer ([facility] [address] 2015    left, radiation , and lumpectomy only    CAD (coronary artery disease)     multivessel, awaiting CABG and mitral valve repair/replacement    Diabetes ([facility] [address] typell, dx 2016    typell, ID, avfbs 160, last A1C was 10.8. denies lows    Heart failure ([address]     EF 40-45%    Hypercholesteremia     Hypertension     Intertrochanteric fracture of right femur ([facility] [address] 2/24/2018    Mitral valve regurgitation     PUD (peptic ulcer disease)     age 12, no treatment since       Past Surgical History:   Procedure Laterality Date    HX BREAST LUMPECTOMY  2015    HX HIP FRACTURE TX Right     HX ORTHOPAEDIC Right     hip fracture repair with hardware , not replacent         Family History:   Problem Relation Age of Onset    Kidney Disease Mother     Heart Disease Father     Heart Disease Brother        Social History     Socioeconomic History    Marital status:      Spouse name: Not on file    Number of children: Not on file    Years of education: Not on file    Highest education level: Not on file   Occupational History    Occupation: previously worked as a hospice nurse   Social Needs    Financial resource strain: Not on file    Food insecurity     Worry: Not on file     Inability: Not on file   [employer] needs     Medical: Not on file     Non-medical: Not on file   Tobacco Use    Smoking status: Never Smoker    Smokeless tobacco: Never Used   Substance and Sexual Activity    Alcohol use: Yes     Comment: social    Drug use: No    Sexual activity: Not on file   Lifestyle    Physical activity     Days per week: Not on file     Minutes per session: Not on file    Stress: Not on file   Relationships    Social connections     Talks on phone: Not on file     Gets together: Not on file     Attends Yazidism service: Not on file     Active member of club or organization: Not on file     Attends meetings of clubs or organizations: Not on file     Relationship status: Not on file    Intimate partner violence     Fear of current or ex partner: Not on file     Emotionally abused: Not on file     Physically abused: Not on file     Forced sexual activity: Not on file   Other Topics Concern    Not on file   Social History Narrative    . Lives with her          ALLERGIES: Patient has no known allergies. Review of Systems   Constitutional: Negative for fever. HENT: Negative for hearing loss. Eyes: Negative for visual disturbance. Respiratory: Positive for cough and shortness of breath. Cardiovascular: Positive for chest pain, palpitations and leg swelling. Gastrointestinal: Negative for abdominal pain, diarrhea, nausea and vomiting. Musculoskeletal: Negative for back pain. Skin: Negative for rash. Neurological: Negative for headaches. Psychiatric/Behavioral: Negative for confusion. All other systems reviewed and are negative.       Vitals:    05/11/21 0457 05/11/21 0459 05/11/21 0514 05/11/21 0516   BP: (!) 171/89  (!) 166/92    Pulse: 98 99 96 97   Resp: 18 19 19 18   Temp: 98.2 °F (36.8 °C) SpO2: 100% 100% 100% 100%   Weight: 75.3 kg (166 lb)      Height: 5' 3\" (1.6 m)               Physical Exam  Vitals signs and nursing note reviewed. Constitutional:       Appearance: Normal appearance. She is well-developed. HENT:      Head: Normocephalic and atraumatic. Nose: Nose normal.      Mouth/Throat:      Mouth: Mucous membranes are moist.   Eyes:      Pupils: Pupils are equal, round, and reactive to light. Neck:      Musculoskeletal: Normal range of motion and neck supple. Cardiovascular:      Rate and Rhythm: Regular rhythm. Heart sounds: Normal heart sounds. Pulmonary:      Effort: Pulmonary effort is normal.      Breath sounds: Normal breath sounds. Chest:       Abdominal:      Palpations: Abdomen is soft. Tenderness: There is no abdominal tenderness. Musculoskeletal: Normal range of motion. General: No deformity. Left lower leg: Edema present. Skin:     General: Skin is warm and dry. Neurological:      General: No focal deficit present. Mental Status: She is alert. Mental status is at baseline. Psychiatric:         Mood and Affect: Mood normal.         Behavior: Behavior normal.          MDM  Number of Diagnoses or Management Options  Diagnosis management comments: Parts of this document were created using dragon voice recognition software. The chart has been reviewed but errors may still be present. I wore appropriate PPE throughout this patient's ED visit. Lobo Ceballos MD, 5:48 AM    /6:50 AM  Slightly elevated BNP from baseline. D-dimer elevated. CTA ordered. 7:00 AM  Likely postoperative seroma left chest wall is causing her some discomfort. No pulmonary embolism or cardiac abnormality. No pulmonary edema. Advised follow-up for seroma and possible drainage. I discussed the results of all labs, procedures, radiographs, and treatments with the patient and available family.   Treatment plan is agreed upon and the patient is ready for discharge. Questions about treatment in the ED and differential diagnosis of presenting condition were answered. Patient was given verbal discharge instructions including, but not limited to, importance of returning to the emergency department for any concern of worsening or continued symptoms. Instructions were given to follow up with a primary care provider or specialist within 1-2 days. Adverse effects of medications, if prescribed, were discussed and patient was advised to refrain from significant physical activity until followed up by primary care physician and to not drive or operate heavy machinery after taking any sedating substances.            Amount and/or Complexity of Data Reviewed  Clinical lab tests: ordered and reviewed (Results for orders placed or performed during the hospital encounter of 05/11/21  -CBC WITH AUTOMATED DIFF       Result                      Value             Ref Range           WBC                         8.8               4.3 - 11.1 K*       RBC                         3.65 (L)          4.05 - 5.2 M*       HGB                         10.0 (L)          11.7 - 15.4 *       HCT                         31.6 (L)          35.8 - 46.3 %       MCV                         86.6              79.6 - 97.8 *       MCH                         27.4              26.1 - 32.9 *       MCHC                        31.6              31.4 - 35.0 *       RDW                         13.3              11.9 - 14.6 %       PLATELET                    319               150 - 450 K/*       MPV                         10.9              9.4 - 12.3 FL       ABSOLUTE NRBC               0.00              0.0 - 0.2 K/*       DF                          AUTOMATED                             NEUTROPHILS                 54                43 - 78 %           LYMPHOCYTES                 33                13 - 44 %           MONOCYTES                   6                 4.0 - 12.0 %        EOSINOPHILS 6                 0.5 - 7.8 %         BASOPHILS                   1                 0.0 - 2.0 %         IMMATURE GRANULOCYTES       0                 0.0 - 5.0 %         ABS. NEUTROPHILS            4.8               1.7 - 8.2 K/*       ABS. LYMPHOCYTES            3.0               0.5 - 4.6 K/*       ABS. MONOCYTES              0.5               0.1 - 1.3 K/*       ABS. EOSINOPHILS            0.5               0.0 - 0.8 K/*       ABS. BASOPHILS              0.1               0.0 - 0.2 K/*       ABS. IMM. GRANS.            0.0               0.0 - 0.5 K/*  -METABOLIC PANEL, COMPREHENSIVE       Result                      Value             Ref Range           Sodium                      141               136 - 145 mm*       Potassium                   3.1 (L)           3.5 - 5.1 mm*       Chloride                    104               98 - 107 mmo*       CO2                         29                21 - 32 mmol*       Anion gap                   8                 7 - 16 mmol/L       Glucose                     117 (H)           65 - 100 mg/*       BUN                         22                8 - 23 MG/DL        Creatinine                  1.21 (H)          0.6 - 1.0 MG*       GFR est AA                  58 (L)            >60 ml/min/1*       GFR est non-AA              48 (L)            >60 ml/min/1*       Calcium                     9.0               8.3 - 10.4 M*       Bilirubin, total            0.2               0.2 - 1.1 MG*       ALT (SGPT)                  16                12 - 65 U/L         AST (SGOT)                  16                15 - 37 U/L         Alk.  phosphatase            68                50 - 136 U/L        Protein, total              8.3 (H)           6.3 - 8.2 g/*       Albumin                     3.0 (L)           3.2 - 4.6 g/*       Globulin                    5.3 (H)           2.3 - 3.5 g/*       A-G Ratio                   0.6 (L)           1.2 - 3.5      -TROPONIN-HIGH SENSITIVITY Result                      Value             Ref Range           Troponin-High Sensitiv*     8.6               0 - 14 pg/mL   -D DIMER       Result                      Value             Ref Range           D DIMER                     2.81 (H)          <0.56 ug/ml(*  -NT-PRO BNP       Result                      Value             Ref Range           NT pro-BNP                  1,018 (H)         5 - 125 PG/ML  -EKG, 12 LEAD, INITIAL       Result                      Value             Ref Range           Ventricular Rate            99                BPM                 Atrial Rate                 99                BPM                 P-R Interval                190               ms                  QRS Duration                88                ms                  Q-T Interval                404               ms                  QTC Calculation (Bezet)     518               ms                  Calculated P Axis           61                degrees             Calculated R Axis           58                degrees             Calculated T Axis           77                degrees             Diagnosis                                                     Normal sinus rhythm   Prolonged QT   Abnormal ECG   When compared with ECG of 25-DEC-2019 10:10,   QT has lengthened     )  Tests in the radiology section of CPT®: ordered and reviewed (Xr Chest Pa Lat    Result Date: 5/11/2021  Frontal and lateral views of the chest COMPARISON: December 2019 INDICATION: Chest pain FINDINGS: Stable postsurgical changes of sternotomy. Heart appears mildly enlarged. Mediastinal contour is within normal limits. There is no focal pulmonary consolidation, pleural effusion or pneumothorax. No pulmonary edema. Surrounding bones are intact. 1. No evidence of pneumonia or pulmonary edema. Ct Chest Pulmonary Embolism    Result Date: 5/11/2021  Clinical history: Difficulty breathing. Shortness of breath. Elevated d-dimer. Recent surgery. TECHNIQUE: Axial and coronal CT of the chest with 100 cc of IV contrast. CT dose reduction was achieved through use of a standardized protocol tailored for this examination and automatic exposure control for dose modulation. COMPARISON: Chest x-ray earlier today. FINDINGS: There is no pulmonary embolism. The heart is enlarged. There is no pericardial effusion. The thoracic aorta is normal in course and caliber. There are a few prominent lymph nodes in the right axilla. Surgical clips are noted in the right axilla. Trace bilateral pleural fluid. There is no endobronchial lesion. There is no focal pulmonary consolidation, pleural effusion or pneumothorax. No pulmonary edema. Included upper abdomen is unremarkable. There are degenerative changes of the spine. There are findings of recent heart surgery/sternotomy. There is anterior chest wall edema and left chest wall skin thickening. . There is fluid collection in the lateral left chest wall measuring up to 4 x 9 x 8.5 cm, extending to the left axillary region. No associated gas. 1. No evidence of pulmonary embolism. 2. Status post sternotomy. An approximately 4 x 9 x 8.5 cm fluid collection within the lateral left chest wall, likely postsurgical fluid. 3. Trace pleural fluid. No evidence of pneumonia or pulmonary edema.  4. Cardiomegaly.    )           EKG    Date/Time: 5/11/2021 5:48 AM  Performed by: Kalyn Estevez MD  Authorized by: Kalyn Estevez MD     ECG reviewed by ED Physician in the absence of a cardiologist: yes    Interpretation:     Interpretation: abnormal    Rate:     ECG rate:  99    ECG rate assessment: normal    Rhythm:     Rhythm: sinus rhythm    Ectopy:     Ectopy: none    ST segments:     ST segments:  Normal  Other findings:     Other findings: prolonged qTc interval

## 2021-05-11 NOTE — ED TRIAGE NOTES
Pt states that she has had fluttering in her heart and difficulty breathing for 2 days. Pt states that she had open heart surgery 8 months ago and left breast removed 4 weeks ago.

## 2021-05-11 NOTE — DISCHARGE INSTRUCTIONS
Follow-up with oncology and your primary care physician for further evaluation. Return for worsening or concerning symptoms.

## 2021-05-16 ENCOUNTER — HOSPITAL ENCOUNTER (EMERGENCY)
Age: 61
Discharge: HOME | End: 2021-05-17
Attending: EMERGENCY MEDICINE
Payer: MEDICARE

## 2021-05-16 DIAGNOSIS — E11.9 TYPE 2 DIABETES MELLITUS WITHOUT COMPLICATION, WITH LONG-TERM CURRENT USE OF INSULIN (HCC): ICD-10-CM

## 2021-05-16 DIAGNOSIS — I10 HYPERTENSION, UNSPECIFIED TYPE: ICD-10-CM

## 2021-05-16 DIAGNOSIS — M79.602 PAIN OF LEFT UPPER EXTREMITY: Primary | ICD-10-CM

## 2021-05-16 DIAGNOSIS — Z79.4 TYPE 2 DIABETES MELLITUS WITHOUT COMPLICATION, WITH LONG-TERM CURRENT USE OF INSULIN (HCC): ICD-10-CM

## 2021-05-16 LAB
ALBUMIN SERPL-MCNC: 2.8 G/DL (ref 3.2–4.6)
ALBUMIN/GLOB SERPL: 0.6 {RATIO} (ref 1.2–3.5)
ALP SERPL-CCNC: 62 U/L (ref 50–136)
ALT SERPL-CCNC: 13 U/L (ref 12–65)
ANION GAP SERPL CALC-SCNC: 9 MMOL/L (ref 7–16)
AST SERPL-CCNC: 22 U/L (ref 15–37)
BASOPHILS # BLD: 0 K/UL (ref 0–0.2)
BASOPHILS NFR BLD: 0 % (ref 0–2)
BILIRUB SERPL-MCNC: 0.2 MG/DL (ref 0.2–1.1)
BUN SERPL-MCNC: 24 MG/DL (ref 8–23)
CALCIUM SERPL-MCNC: 8.8 MG/DL (ref 8.3–10.4)
CHLORIDE SERPL-SCNC: 107 MMOL/L (ref 98–107)
CO2 SERPL-SCNC: 24 MMOL/L (ref 21–32)
CREAT SERPL-MCNC: 1.27 MG/DL (ref 0.6–1)
DIFFERENTIAL METHOD BLD: ABNORMAL
EOSINOPHIL # BLD: 0.4 K/UL (ref 0–0.8)
EOSINOPHIL NFR BLD: 5 % (ref 0.5–7.8)
ERYTHROCYTE [DISTWIDTH] IN BLOOD BY AUTOMATED COUNT: 13.5 % (ref 11.9–14.6)
GLOBULIN SER CALC-MCNC: 5 G/DL (ref 2.3–3.5)
GLUCOSE SERPL-MCNC: 122 MG/DL (ref 65–100)
HCT VFR BLD AUTO: 27.8 % (ref 35.8–46.3)
HGB BLD-MCNC: 8.9 G/DL (ref 11.7–15.4)
IMM GRANULOCYTES # BLD AUTO: 0 K/UL (ref 0–0.5)
IMM GRANULOCYTES NFR BLD AUTO: 0 % (ref 0–5)
LYMPHOCYTES # BLD: 2.4 K/UL (ref 0.5–4.6)
LYMPHOCYTES NFR BLD: 33 % (ref 13–44)
MCH RBC QN AUTO: 28.1 PG (ref 26.1–32.9)
MCHC RBC AUTO-ENTMCNC: 32 G/DL (ref 31.4–35)
MCV RBC AUTO: 87.7 FL (ref 79.6–97.8)
MONOCYTES # BLD: 0.6 K/UL (ref 0.1–1.3)
MONOCYTES NFR BLD: 8 % (ref 4–12)
NEUTS SEG # BLD: 4 K/UL (ref 1.7–8.2)
NEUTS SEG NFR BLD: 54 % (ref 43–78)
NRBC # BLD: 0 K/UL (ref 0–0.2)
PLATELET # BLD AUTO: 244 K/UL (ref 150–450)
PMV BLD AUTO: 11.1 FL (ref 9.4–12.3)
POTASSIUM SERPL-SCNC: 3.6 MMOL/L (ref 3.5–5.1)
PROT SERPL-MCNC: 7.8 G/DL (ref 6.3–8.2)
RBC # BLD AUTO: 3.17 M/UL (ref 4.05–5.2)
SODIUM SERPL-SCNC: 140 MMOL/L (ref 136–145)
WBC # BLD AUTO: 7.5 K/UL (ref 4.3–11.1)

## 2021-05-16 PROCEDURE — 85025 COMPLETE CBC W/AUTO DIFF WBC: CPT

## 2021-05-16 PROCEDURE — 99284 EMERGENCY DEPT VISIT MOD MDM: CPT

## 2021-05-16 PROCEDURE — 80053 COMPREHEN METABOLIC PANEL: CPT

## 2021-05-17 ENCOUNTER — APPOINTMENT (OUTPATIENT)
Dept: ULTRASOUND IMAGING | Age: 61
End: 2021-05-17
Attending: EMERGENCY MEDICINE
Payer: MEDICARE

## 2021-05-17 VITALS
TEMPERATURE: 98.7 F | HEIGHT: 63 IN | DIASTOLIC BLOOD PRESSURE: 70 MMHG | OXYGEN SATURATION: 95 % | WEIGHT: 166 LBS | SYSTOLIC BLOOD PRESSURE: 122 MMHG | BODY MASS INDEX: 29.41 KG/M2 | RESPIRATION RATE: 18 BRPM | HEART RATE: 81 BPM

## 2021-05-17 PROCEDURE — 93971 EXTREMITY STUDY: CPT

## 2021-05-17 NOTE — ED PROVIDER NOTES
Patient has a history of coronary artery disease, type 2 diabetes, CHF, hypertension, hyperlipidemia, peptic ulcer disease, mitral valve regurgitation and breast cancer status post left mastectomy 4 weeks ago complaining of left arm swelling for the past several days. She also has had some pain in her left shoulder. She states the swelling has improved though is still present in her left arm. She denies any shortness of breath. She denies similar symptoms in the past, has not taken any medicine for her symptoms. She got concerned about the persistence of the swelling and came here for evaluation.            Past Medical History:   Diagnosis Date    Breast cancer ([facility] [address] 2015    left, radiation , and lumpectomy only    CAD (coronary artery disease)     multivessel, awaiting CABG and mitral valve repair/replacement    Diabetes ([facility] [address] typ[place]auren, dx 2016    typelauren, ID, avfbs 160, last A1C was 10.8. denies lows    Heart failure ([facility] [address]     EF 40-45%    Hypercholesteremia     Hypertension     Intertrochanteric fracture of right femur ([facility] [address] 2/24/2018    Mitral valve regurgitation     PUD (peptic ulcer disease)     age 12, no treatment since       Past Surgical History:   Procedure Laterality Date    HX BREAST LUMPECTOMY  2015    HX HIP FRACTURE TX Right     HX ORTHOPAEDIC Right     hip fracture repair with hardware , not replacent         Family History:   Problem Relation Age of Onset    Kidney Disease Mother     Heart Disease Father     Heart Disease Brother        Social History     Socioeconomic History    Marital status:      Spouse name: Not on file    Number of children: Not on file    Years of education: Not on file    Highest education level: Not on file   Occupational History    Occupation: previously worked as a hospice nurse   Social Needs    Financial resource strain: Not on file    Food insecurity     Worry: Not on file     Inability: Not on file   [employer] needs     Medical: Not on file     Non-medical: Not on file   Tobacco Use    Smoking status: Never Smoker    Smokeless tobacco: Never Used   Substance and Sexual Activity    Alcohol use: Yes     Comment: social    Drug use: No    Sexual activity: Not on file   Lifestyle    Physical activity     Days per week: Not on file     Minutes per session: Not on file    Stress: Not on file   Relationships    Social connections     Talks on phone: Not on file     Gets together: Not on file     Attends Yazdanism service: Not on file     Active member of club or organization: Not on file     Attends meetings of clubs or organizations: Not on file     Relationship status: Not on file    Intimate partner violence     Fear of current or ex partner: Not on file     Emotionally abused: Not on file     Physically abused: Not on file     Forced sexual activity: Not on file   Other Topics Concern    Not on file   Social History Narrative    . Lives with her          ALLERGIES: Patient has no known allergies. Review of Systems   Constitutional: Negative for chills and fever. Gastrointestinal: Negative for nausea and vomiting. All other systems reviewed and are negative. Vitals:    05/16/21 2236   BP: 131/74   Pulse: 92   Resp: 16   Temp: 98.8 °F (37.1 °C)   SpO2: 97%   Weight: 75.3 kg (166 lb)   Height: 5' 3\" (1.6 m)            Physical Exam  Vitals signs and nursing note reviewed. Constitutional:       Appearance: She is well-developed. HENT:      Head: Normocephalic and atraumatic. Eyes:      Conjunctiva/sclera: Conjunctivae normal.      Pupils: Pupils are equal, round, and reactive to light. Neck:      Musculoskeletal: Normal range of motion and neck supple. Cardiovascular:      Rate and Rhythm: Normal rate and regular rhythm. Pulmonary:      Effort: Pulmonary effort is normal. No respiratory distress. Musculoskeletal:         General: No swelling or tenderness. Skin:     General: Skin is warm and dry. Neurological:      Mental Status: She is alert and oriented to person, place, and time. Psychiatric:         Behavior: Behavior normal.          MDM  Number of Diagnoses or Management Options  Hypertension, unspecified type  Pain of left upper extremity: new and does not require workup  Type 2 diabetes mellitus without complication, with long-term current use of insulin (HCC)  Diagnosis management comments: 3:21 AM discussed results with patient, unremarkable ultrasound findings. She has an appointment with her surgeon coming up.        Amount and/or Complexity of Data Reviewed  Clinical lab tests: ordered and reviewed  Tests in the radiology section of CPT®: ordered and reviewed    Risk of Complications, Morbidity, and/or Mortality  Presenting problems: moderate  Diagnostic procedures: moderate  Management options: moderate    Patient Progress  Patient progress: stable         Procedures

## 2021-05-17 NOTE — ED TRIAGE NOTES
Pt c/o left arm pain and swelling. Pt states she was here on 5/11 for same concern. Pt states she had her left breast removed four weeks ago. Pt states she had fluid drained, pt states she feels like the fluid has re accumulated.

## 2021-05-17 NOTE — ED NOTES
I have reviewed discharge instructions with the patient. The patient verbalized understanding. Patient left ED via Discharge Method: ambulatory to Home with . Opportunity for questions and clarification provided. Patient given 0 scripts. To continue your aftercare when you leave the hospital, you may receive an automated call from our care team to check in on how you are doing. This is a free service and part of our promise to provide the best care and service to meet your aftercare needs.  If you have questions, or wish to unsubscribe from this service please call [phone]. Thank you for Choosing our [facility].

## 2021-08-03 PROBLEM — I10 ESSENTIAL HYPERTENSION: Status: RESOLVED | Noted: 2018-02-28 | Resolved: 2021-08-03

## 2021-10-13 ENCOUNTER — HOSPITAL ENCOUNTER (OUTPATIENT)
Dept: PHYSICAL THERAPY | Age: 61
Discharge: HOME | End: 2021-10-13

## 2021-10-26 ENCOUNTER — APPOINTMENT (OUTPATIENT)
Dept: PHYSICAL THERAPY | Age: 61
End: 2021-10-26

## 2022-03-18 PROBLEM — D69.6 THROMBOCYTOPENIA (HCC): Status: ACTIVE | Noted: 2019-10-02

## 2022-03-19 PROBLEM — N39.0 UTI (URINARY TRACT INFECTION): Status: ACTIVE | Noted: 2019-09-30

## 2022-03-19 PROBLEM — E83.51 HYPOCALCEMIA: Status: ACTIVE | Noted: 2019-10-02

## 2022-03-19 PROBLEM — R29.818 SUSPECTED SLEEP APNEA: Status: ACTIVE | Noted: 2019-10-02

## 2022-03-19 PROBLEM — R09.02 HYPOXIA: Status: ACTIVE | Noted: 2019-10-01

## 2022-03-19 PROBLEM — Z79.4 CONTROLLED TYPE 2 DIABETES MELLITUS, WITH LONG-TERM CURRENT USE OF INSULIN (HCC): Status: ACTIVE | Noted: 2019-09-22

## 2022-03-19 PROBLEM — I34.0 MITRAL VALVE REGURGITATION: Status: ACTIVE | Noted: 2019-10-01

## 2022-03-19 PROBLEM — C50.912 MALIGNANT NEOPLASM OF LEFT FEMALE BREAST (HCC): Status: ACTIVE | Noted: 2019-09-22

## 2022-03-19 PROBLEM — E11.9 CONTROLLED TYPE 2 DIABETES MELLITUS, WITH LONG-TERM CURRENT USE OF INSULIN (HCC): Status: ACTIVE | Noted: 2019-09-22

## 2022-03-19 PROBLEM — I10 ACCELERATED HYPERTENSION: Status: ACTIVE | Noted: 2019-10-07

## 2022-03-19 PROBLEM — Z98.890 S/P MVR (MITRAL VALVE REPAIR): Status: ACTIVE | Noted: 2019-10-02

## 2022-03-19 PROBLEM — Z95.1 S/P CABG X 3: Status: ACTIVE | Noted: 2019-10-02

## 2022-03-20 PROBLEM — I50.23 SYSTOLIC CHF, ACUTE ON CHRONIC (HCC): Status: ACTIVE | Noted: 2019-10-03

## 2022-03-20 PROBLEM — I25.10 CAD (CORONARY ARTERY DISEASE): Status: ACTIVE | Noted: 2019-09-26

## 2022-03-20 PROBLEM — E11.65 HYPERGLYCEMIA DUE TO TYPE 2 DIABETES MELLITUS (HCC): Status: ACTIVE | Noted: 2018-02-24

## 2022-08-21 ENCOUNTER — HOSPITAL ENCOUNTER (EMERGENCY)
Dept: GENERAL RADIOLOGY | Age: 62
Discharge: HOME | End: 2022-08-24
Payer: MEDICARE

## 2022-08-21 ENCOUNTER — HOSPITAL ENCOUNTER (EMERGENCY)
Age: 62
Discharge: HOME | End: 2022-08-21
Attending: EMERGENCY MEDICINE
Payer: MEDICARE

## 2022-08-21 VITALS
OXYGEN SATURATION: 97 % | RESPIRATION RATE: 14 BRPM | SYSTOLIC BLOOD PRESSURE: 126 MMHG | DIASTOLIC BLOOD PRESSURE: 69 MMHG | WEIGHT: 160 LBS | BODY MASS INDEX: 28.35 KG/M2 | TEMPERATURE: 98.3 F | HEART RATE: 85 BPM | HEIGHT: 63 IN

## 2022-08-21 DIAGNOSIS — N18.30 CHRONIC RENAL IMPAIRMENT, STAGE 3 (MODERATE), UNSPECIFIED WHETHER STAGE 3A OR 3B CKD (HCC): ICD-10-CM

## 2022-08-21 DIAGNOSIS — R07.89 CHEST WALL PAIN: Primary | ICD-10-CM

## 2022-08-21 LAB
ALBUMIN SERPL-MCNC: 3.3 G/DL (ref 3.2–4.6)
ALBUMIN/GLOB SERPL: 0.7 {RATIO} (ref 1.2–3.5)
ALP SERPL-CCNC: 57 U/L (ref 50–136)
ALT SERPL-CCNC: 13 U/L (ref 12–65)
ANION GAP SERPL CALC-SCNC: 7 MMOL/L (ref 7–16)
AST SERPL-CCNC: 20 U/L (ref 15–37)
BASOPHILS # BLD: 0.1 K/UL (ref 0–0.2)
BASOPHILS NFR BLD: 1 % (ref 0–2)
BILIRUB SERPL-MCNC: 0.3 MG/DL (ref 0.2–1.1)
BUN SERPL-MCNC: 63 MG/DL (ref 8–23)
CALCIUM SERPL-MCNC: 8.7 MG/DL (ref 8.3–10.4)
CHLORIDE SERPL-SCNC: 105 MMOL/L (ref 98–107)
CO2 SERPL-SCNC: 20 MMOL/L (ref 21–32)
CREAT SERPL-MCNC: 2.83 MG/DL (ref 0.6–1)
CRP SERPL-MCNC: <0.3 MG/DL (ref 0–0.9)
D DIMER PPP FEU-MCNC: 0.78 UG/ML(FEU)
DIFFERENTIAL METHOD BLD: NORMAL
EKG ATRIAL RATE: 90 BPM
EKG DIAGNOSIS: NORMAL
EKG P AXIS: 41 DEGREES
EKG P-R INTERVAL: 178 MS
EKG Q-T INTERVAL: 372 MS
EKG QRS DURATION: 82 MS
EKG QTC CALCULATION (BAZETT): 455 MS
EKG R AXIS: 14 DEGREES
EKG T AXIS: 73 DEGREES
EKG VENTRICULAR RATE: 90 BPM
EOSINOPHIL # BLD: 0.2 K/UL (ref 0–0.8)
EOSINOPHIL NFR BLD: 2 % (ref 0.5–7.8)
ERYTHROCYTE [DISTWIDTH] IN BLOOD BY AUTOMATED COUNT: 13 % (ref 11.9–14.6)
GLOBULIN SER CALC-MCNC: 4.7 G/DL (ref 2.3–3.5)
GLUCOSE SERPL-MCNC: 108 MG/DL (ref 65–100)
HCT VFR BLD AUTO: 37.1 % (ref 35.8–46.3)
HGB BLD-MCNC: 12.4 G/DL (ref 11.7–15.4)
IMM GRANULOCYTES # BLD AUTO: 0 K/UL (ref 0–0.5)
IMM GRANULOCYTES NFR BLD AUTO: 0 % (ref 0–5)
LYMPHOCYTES # BLD: 2.9 K/UL (ref 0.5–4.6)
LYMPHOCYTES NFR BLD: 36 % (ref 13–44)
MCH RBC QN AUTO: 29.2 PG (ref 26.1–32.9)
MCHC RBC AUTO-ENTMCNC: 33.4 G/DL (ref 31.4–35)
MCV RBC AUTO: 87.3 FL (ref 79.6–97.8)
MONOCYTES # BLD: 0.5 K/UL (ref 0.1–1.3)
MONOCYTES NFR BLD: 6 % (ref 4–12)
NEUTS SEG # BLD: 4.4 K/UL (ref 1.7–8.2)
NEUTS SEG NFR BLD: 55 % (ref 43–78)
NRBC # BLD: 0 K/UL (ref 0–0.2)
PLATELET # BLD AUTO: 234 K/UL (ref 150–450)
PMV BLD AUTO: 11.1 FL (ref 9.4–12.3)
POTASSIUM SERPL-SCNC: 4.6 MMOL/L (ref 3.5–5.1)
PROT SERPL-MCNC: 8 G/DL (ref 6.3–8.2)
RBC # BLD AUTO: 4.25 M/UL (ref 4.05–5.2)
SARS-COV-2 RDRP RESP QL NAA+PROBE: NOT DETECTED
SODIUM SERPL-SCNC: 132 MMOL/L (ref 136–145)
SOURCE: NORMAL
TROPONIN I SERPL HS-MCNC: 13.9 PG/ML (ref 0–14)
WBC # BLD AUTO: 8.1 K/UL (ref 4.3–11.1)

## 2022-08-21 PROCEDURE — 86140 C-REACTIVE PROTEIN: CPT

## 2022-08-21 PROCEDURE — 85025 COMPLETE CBC W/AUTO DIFF WBC: CPT

## 2022-08-21 PROCEDURE — 85379 FIBRIN DEGRADATION QUANT: CPT

## 2022-08-21 PROCEDURE — 84484 ASSAY OF TROPONIN QUANT: CPT

## 2022-08-21 PROCEDURE — 71046 X-RAY EXAM CHEST 2 VIEWS: CPT

## 2022-08-21 PROCEDURE — 80053 COMPREHEN METABOLIC PANEL: CPT

## 2022-08-21 PROCEDURE — 99285 EMERGENCY DEPT VISIT HI MDM: CPT

## 2022-08-21 PROCEDURE — 87635 SARS-COV-2 COVID-19 AMP PRB: CPT

## 2022-08-21 PROCEDURE — 93005 ELECTROCARDIOGRAM TRACING: CPT | Performed by: EMERGENCY MEDICINE

## 2022-08-21 RX ORDER — TRAMADOL HYDROCHLORIDE 50 MG/1
50 TABLET ORAL EVERY 6 HOURS PRN
Qty: 12 TABLET | Refills: 0 | Status: SHIPPED | OUTPATIENT
Start: 2022-08-21 | End: 2022-08-24

## 2022-08-21 ASSESSMENT — ENCOUNTER SYMPTOMS
SHORTNESS OF BREATH: 0
CHOKING: 0
COUGH: 0
CHEST TIGHTNESS: 0

## 2022-08-21 ASSESSMENT — PAIN DESCRIPTION - PAIN TYPE: TYPE: ACUTE PAIN

## 2022-08-21 ASSESSMENT — PAIN DESCRIPTION - DESCRIPTORS: DESCRIPTORS: SHARP;TIGHTNESS

## 2022-08-21 ASSESSMENT — PAIN SCALES - GENERAL
PAINLEVEL_OUTOF10: 5
PAINLEVEL_OUTOF10: 7

## 2022-08-21 ASSESSMENT — PAIN DESCRIPTION - ORIENTATION: ORIENTATION: LEFT

## 2022-08-21 ASSESSMENT — LIFESTYLE VARIABLES
HOW MANY STANDARD DRINKS CONTAINING ALCOHOL DO YOU HAVE ON A TYPICAL DAY: PATIENT DOES NOT DRINK
HOW OFTEN DO YOU HAVE A DRINK CONTAINING ALCOHOL: NEVER

## 2022-08-21 ASSESSMENT — PAIN - FUNCTIONAL ASSESSMENT: PAIN_FUNCTIONAL_ASSESSMENT: 0-10

## 2022-08-21 ASSESSMENT — PAIN DESCRIPTION - LOCATION: LOCATION: CHEST

## 2022-08-21 ASSESSMENT — PAIN DESCRIPTION - ONSET: ONSET: PROGRESSIVE

## 2022-08-21 NOTE — ED NOTES
I have reviewed discharge instructions with the patient. The patient verbalized understanding. Patient left ED via Discharge Method: ambulatory to Home with spouse. Opportunity for questions and clarification provided. Patient given 1 scripts. To continue your aftercare when you leave the hospital, you may receive an automated call from our care team to check in on how you are doing. This is a free service and part of our promise to provide the best care and service to meet your aftercare needs.  If you have questions, or wish to unsubscribe from this service please call [phone]. Thank you for Choosing our [facility] Emergency Department.       Alisia Evans RN  08/21/22 [id number]

## 2022-08-21 NOTE — ED PROVIDER NOTES
[employer] Emergency Department Provider Note                   PCP:                No primary care provider on file. Age: 58 y.o. Sex: female     No diagnosis found. DISPOSITION          MDM     Amount and/or Complexity of Data Reviewed  Clinical lab tests: ordered and reviewed  Tests in the radiology section of CPT®: ordered and reviewed  Review and summarize past medical records: yes  Independent visualization of images, tracings, or specimens: yes (EKG at 12:34 AM: Is normal sinus rhythm, rate of 90, no acute ischemic changes.)    Risk of Complications, Morbidity, and/or Mortality  Presenting problems: moderate  Diagnostic procedures: moderate  Management options: moderate    Patient Progress  Patient progress: stable       Orders Placed This Encounter   Procedures    COVID-19, Rapid    XR CHEST (2 VW)    CBC with Auto Differential    Comprehensive Metabolic Panel    Troponin    C-Reactive Protein    Protime-INR    EKG 12 Lead        Daniel Mcdermott is a 58 y.o. female who presents to the Emergency Department with chief complaint of    Chief Complaint   Patient presents with    Chest Pain      Patient presents emerged from for evaluation with a chief complaint of chest pain. She reports a left-sided chest pain that is sharp in nature and seems to increase with activity it has been present constantly for the past 2 days. Patient has a history of mitral valve replacement but no history of coronary artery disease. She reports also history of breast cancer with left mastectomy. No other increasing or decreasing factors associated with the pain. There is no radiation of the pain she does report being ill for the past 2 days as well with decreased appetite possibly some myalgias as well as nausea and vomiting. She denies any known sick contacts. She denies fever or chills. The history is provided by the patient. Review of Systems   Constitutional:  Negative for chills and fever. Respiratory:  Negative for cough, choking, chest tightness and shortness of breath. Cardiovascular:  Positive for chest pain. All other systems reviewed and are negative. Past Medical History:   Diagnosis Date    Breast cancer ([facility] 2015    left, radiation , and lumpectomy only    CAD (coronary artery disease)     multivessel, awaiting CABG and mitral valve repair/replacement    Diabetes ([facility] [address] typell, dx 2016    typell, ID, avfbs 160, last A1C was 10.8. denies lows    Heart failure ([facility]     EF 40-45%    Hypercholesteremia     Hypertension     Intertrochanteric fracture of right femur ([facility] 2/24/2018    Mitral valve regurgitation     PUD (peptic ulcer disease)     age 12, no treatment since        Past Surgical History:   Procedure Laterality Date    BREAST LUMPECTOMY  2015    CARDIAC SURGERY      HIP FRACTURE SURGERY Right     ORTHOPEDIC SURGERY Right     hip fracture repair with hardware , not replacent        Family History   Problem Relation Age of Onset    Heart Disease Brother     Heart Disease Father     Kidney Disease Mother         Social History     Socioeconomic History    Marital status:      Spouse name: None    Number of children: None    Years of education: None    Highest education level: None   Tobacco Use    Smoking status: Never    Smokeless tobacco: Never   Substance and Sexual Activity    Alcohol use: Yes    Drug use: No   Social History Narrative    . Lives with her          Patient has no allergy information on record.      Previous Medications    ACETAMINOPHEN (TYLENOL) 500 MG TABLET    Take 500 mg by mouth every 4 hours as needed    ASPIRIN 81 MG CHEWABLE TABLET    Take 81 mg by mouth daily    CALCIUM CARBONATE-VITAMIN D (OYSTER SHELL CALCIUM/D) 500-200 MG-UNIT TABS    Take 1 tablet by mouth 2 times daily (with meals)    CARVEDILOL (COREG) 25 MG TABLET    Take 25 mg by mouth 2 times daily (with meals)    DULAGLUTIDE 3 MG/0.5ML SOPN    Inject into the skin every 7 days    ESOMEPRAZOLE (NEXIUM) 40 MG DELAYED RELEASE CAPSULE    Take 40 mg by mouth daily    FUROSEMIDE (LASIX) 20 MG TABLET    Take 20 mg by mouth 2 times daily    INSULIN GLARGINE (LANTUS;BASAGLAR) 100 UNIT/ML INJECTION PEN    The details of the medication are not available because there are pending changes by a home health clinician. INSULIN LISPRO, 1 UNIT DIAL, 100 UNIT/ML SOPN    The details of the medication are not available because there are pending changes by a home health clinician. LETROZOLE (FEMARA) 2.5 MG TABLET    Take 2.5 mg by mouth daily    LISINOPRIL (PRINIVIL;ZESTRIL) 40 MG TABLET    Take 40 mg by mouth daily    METOCLOPRAMIDE (REGLAN) 5 MG TABLET    Take 5 mg by mouth 3 times daily (before meals)    NITROGLYCERIN (NITROSTAT) 0.4 MG SL TABLET    Place 0.4 mg under the tongue    PREDNISOLONE ACETATE (PRED FORTE) 1 % OPHTHALMIC SUSPENSION    Instill 1 drop by ophthalmic route, left eye 3 times daily x1 week, 2 times daily x1 week and 1 time daily x1 week and stop    ROSUVASTATIN (CRESTOR) 10 MG TABLET    Take 10 mg by mouth        Vitals signs and nursing note reviewed. Patient Vitals for the past 4 hrs:   Temp Pulse Resp BP SpO2   08/21/22 0033 98.3 °F (36.8 °C) 89 16 (!) 184/98 99 %          Physical Exam  Vitals and nursing note reviewed. Constitutional:       General: She is not in acute distress. Appearance: Normal appearance. She is not ill-appearing, toxic-appearing or diaphoretic. HENT:      Head: Normocephalic and atraumatic. Mouth/Throat:      Mouth: Mucous membranes are moist.      Pharynx: Oropharynx is clear. Eyes:      Extraocular Movements: Extraocular movements intact. Conjunctiva/sclera: Conjunctivae normal.      Pupils: Pupils are equal, round, and reactive to light. Cardiovascular:      Rate and Rhythm: Normal rate and regular rhythm. Pulses: Normal pulses. Heart sounds: Murmur heard. Comments: Valvular click noted.   Pulmonary: Effort: Pulmonary effort is normal.      Breath sounds: Normal breath sounds. Abdominal:      General: There is no distension. Palpations: Abdomen is soft. Tenderness: There is no abdominal tenderness. Musculoskeletal:         General: Normal range of motion. Cervical back: Normal range of motion and neck supple. No rigidity or tenderness. Right lower leg: No edema. Left lower leg: No edema. Lymphadenopathy:      Cervical: No cervical adenopathy. Skin:     General: Skin is warm and dry. Capillary Refill: Capillary refill takes less than 2 seconds. Neurological:      General: No focal deficit present. Mental Status: She is alert and oriented to person, place, and time. Mental status is at baseline. Psychiatric:         Mood and Affect: Mood normal.         Behavior: Behavior normal.         Thought Content: Thought content normal.        Procedures      Labs Reviewed   COVID-19, RAPID   CBC WITH AUTO DIFFERENTIAL   COMPREHENSIVE METABOLIC PANEL   TROPONIN   C-REACTIVE PROTEIN   PROTIME-INR        XR CHEST (2 VW)    (Results Pending)                          Voice dictation software was used during the making of this note. This software is not perfect and grammatical and other typographical errors may be present. This note has not been completely proofread for errors.      Severo Ochoa, DO  08/21/22 9804

## 2022-08-21 NOTE — ED TRIAGE NOTES
Pt arrives A&Ox4 ambulatory to triage. Pt c/o chest pain, N/V, decreased appetite, fatigue, back pain, and lightheaded x2 days. Pt has cardiac hx.

## 2022-09-21 ENCOUNTER — OFFICE VISIT (OUTPATIENT)
Dept: INTERNAL MEDICINE CLINIC | Facility: CLINIC | Age: 62
End: 2022-09-21
Payer: MEDICARE

## 2022-09-21 VITALS
BODY MASS INDEX: 31.18 KG/M2 | DIASTOLIC BLOOD PRESSURE: 92 MMHG | WEIGHT: 176 LBS | SYSTOLIC BLOOD PRESSURE: 160 MMHG | HEIGHT: 63 IN

## 2022-09-21 DIAGNOSIS — E78.2 MIXED HYPERLIPIDEMIA: ICD-10-CM

## 2022-09-21 DIAGNOSIS — I10 RESISTANT HYPERTENSION: ICD-10-CM

## 2022-09-21 DIAGNOSIS — I25.10 CORONARY ARTERY DISEASE INVOLVING NATIVE CORONARY ARTERY OF NATIVE HEART WITHOUT ANGINA PECTORIS: ICD-10-CM

## 2022-09-21 DIAGNOSIS — R53.83 FATIGUE, UNSPECIFIED TYPE: Primary | ICD-10-CM

## 2022-09-21 DIAGNOSIS — Z17.0 MALIGNANT NEOPLASM OF LEFT BREAST IN FEMALE, ESTROGEN RECEPTOR POSITIVE, UNSPECIFIED SITE OF BREAST (HCC): ICD-10-CM

## 2022-09-21 DIAGNOSIS — Z95.1 S/P CABG X 3: ICD-10-CM

## 2022-09-21 DIAGNOSIS — E11.42 CONTROLLED TYPE 2 DIABETES MELLITUS WITH DIABETIC POLYNEUROPATHY, WITH LONG-TERM CURRENT USE OF INSULIN (HCC): ICD-10-CM

## 2022-09-21 DIAGNOSIS — M10.9 GOUT, UNSPECIFIED CAUSE, UNSPECIFIED CHRONICITY, UNSPECIFIED SITE: ICD-10-CM

## 2022-09-21 DIAGNOSIS — I50.22 CHRONIC SYSTOLIC CONGESTIVE HEART FAILURE (HCC): ICD-10-CM

## 2022-09-21 DIAGNOSIS — I50.32 CHRONIC DIASTOLIC CONGESTIVE HEART FAILURE (HCC): ICD-10-CM

## 2022-09-21 DIAGNOSIS — D64.9 ANEMIA, UNSPECIFIED TYPE: ICD-10-CM

## 2022-09-21 DIAGNOSIS — Z79.4 CONTROLLED TYPE 2 DIABETES MELLITUS WITH DIABETIC POLYNEUROPATHY, WITH LONG-TERM CURRENT USE OF INSULIN (HCC): ICD-10-CM

## 2022-09-21 DIAGNOSIS — C50.912 MALIGNANT NEOPLASM OF LEFT BREAST IN FEMALE, ESTROGEN RECEPTOR POSITIVE, UNSPECIFIED SITE OF BREAST (HCC): ICD-10-CM

## 2022-09-21 DIAGNOSIS — G62.9 NEUROPATHY: ICD-10-CM

## 2022-09-21 DIAGNOSIS — Z98.890 S/P MVR (MITRAL VALVE REPAIR): ICD-10-CM

## 2022-09-21 PROBLEM — I50.23 SYSTOLIC CHF, ACUTE ON CHRONIC (HCC): Status: ACTIVE | Noted: 2019-10-03

## 2022-09-21 PROBLEM — I50.23 SYSTOLIC CHF, ACUTE ON CHRONIC (HCC): Status: RESOLVED | Noted: 2019-10-03 | Resolved: 2022-09-21

## 2022-09-21 PROBLEM — I1A.0 RESISTANT HYPERTENSION: Status: RESOLVED | Noted: 2019-10-07 | Resolved: 2022-09-21

## 2022-09-21 PROBLEM — I1A.0 RESISTANT HYPERTENSION: Status: ACTIVE | Noted: 2019-10-07

## 2022-09-21 PROBLEM — N18.9 CKD (CHRONIC KIDNEY DISEASE): Status: ACTIVE | Noted: 2022-09-21

## 2022-09-21 PROCEDURE — 99204 OFFICE O/P NEW MOD 45 MIN: CPT | Performed by: STUDENT IN AN ORGANIZED HEALTH CARE EDUCATION/TRAINING PROGRAM

## 2022-09-21 RX ORDER — NEBIVOLOL 5 MG/1
TABLET ORAL
Status: ON HOLD | COMMUNITY
Start: 2022-07-05

## 2022-09-21 RX ORDER — SPIRONOLACTONE 25 MG/1
TABLET ORAL
COMMUNITY
Start: 2022-07-09 | End: 2022-10-31 | Stop reason: SDUPTHER

## 2022-09-21 RX ORDER — GABAPENTIN 100 MG/1
100 CAPSULE ORAL 3 TIMES DAILY
Qty: 90 CAPSULE | Refills: 0 | Status: SHIPPED | OUTPATIENT
Start: 2022-09-21 | End: 2022-09-21

## 2022-09-21 RX ORDER — FERROUS SULFATE 325(65) MG
325 TABLET ORAL
Status: ON HOLD | COMMUNITY
Start: 2021-10-20

## 2022-09-21 RX ORDER — GABAPENTIN 300 MG/1
300 CAPSULE ORAL NIGHTLY
Qty: 30 CAPSULE | Refills: 2 | Status: SHIPPED | OUTPATIENT
Start: 2022-09-21 | End: 2022-09-21

## 2022-09-21 RX ORDER — GABAPENTIN 100 MG/1
100 CAPSULE ORAL NIGHTLY
Qty: 30 CAPSULE | Refills: 2 | Status: ON HOLD | OUTPATIENT
Start: 2022-09-21 | End: 2022-12-20

## 2022-09-21 ASSESSMENT — PATIENT HEALTH QUESTIONNAIRE - PHQ9
SUM OF ALL RESPONSES TO PHQ QUESTIONS 1-9: 0
SUM OF ALL RESPONSES TO PHQ QUESTIONS 1-9: 0
2. FEELING DOWN, DEPRESSED OR HOPELESS: 0
SUM OF ALL RESPONSES TO PHQ QUESTIONS 1-9: 0
SUM OF ALL RESPONSES TO PHQ9 QUESTIONS 1 & 2: 0
1. LITTLE INTEREST OR PLEASURE IN DOING THINGS: 0
SUM OF ALL RESPONSES TO PHQ QUESTIONS 1-9: 0

## 2022-09-21 ASSESSMENT — ENCOUNTER SYMPTOMS
NAUSEA: 0
SHORTNESS OF BREATH: 0
ABDOMINAL PAIN: 0
VOMITING: 0

## 2022-09-21 NOTE — PROGRESS NOTES
SUBJECTIVE:   Brett Yates is a 58 y.o. female seen for a visit regarding   Chief Complaint   Patient presents with    New Patient     Establish care        HPI:     Her mother is on dialysis due to renal failure from DM. Pt is , on disability. Was seeing Dr. Carla Hilliard. Chronic systolic heart failure, MV regurgitation s/p repair: Last saw Dr. Marge Wasserman 1/7/20. Will refer back to [facility] cardiology. She denies CP on exertion. Had CABG in 2019, MV repair. Last echo 9/2021 showed EF of 50%, RVSP 35    YURY on CKD III: Latest Cr 2.83 on 8/21/22 and was 2.48 on 7/11/22, was 1.39 on 1/26/22. She saw nephrology Dr. Christal Hernandez on 8/22/22. She followed up on 9/8/2022 and tells me she is supposed to get labs next week. Stage 1A infiltrating ductal carcinoma of left breast: s/p L breast mastectomy 2016. She was advised for 5 years of adjuvant letrozole, however issues with insurance. Was started on letrozole 4 months ago with Dr. Eli Guadalupe and since then has been having bad night sweats. Has vaginal dryness. DM2: Dx 2016. Last a1c unknown. 20 lantus nightly, trulicity, goes to [facility] to get eyes checked, has cataracts. Hardly ever uses humalog sliding scale. Diabetic neuropathy: noted, currently not on gabapentin    HTN: BP is elevated here, usually runs 120s. She is on aldactone 25mg BID, lisinopril 40mg daily, lasix 20 BID, norvasc 10 daily. Gout: reportedly has history of gout. Anemia: History of low iron. Preventive: Colonoscopy coming up in 2 months. Unsure when her last mammogram was. Past Medical History, Past Surgical History, Family history, Social History, and Medications were all reviewed with the patient today and updated as necessary.        Current Outpatient Medications   Medication Sig Dispense Refill    spironolactone (ALDACTONE) 25 MG tablet TAKE 1 TABLET (25 MG) BY MOUTH 2 (TWO) TIMES A DAY      nebivolol (BYSTOLIC) 5 MG tablet TAKE 1 TABLET BY MOUTH EVERY DAY ferrous sulfate (IRON 325) 325 (65 Fe) MG tablet Take 325 mg by mouth      gabapentin (NEURONTIN) 100 MG capsule Take 1 capsule by mouth at bedtime for 90 days. Intended supply: 30 days 30 capsule 2    acetaminophen (TYLENOL) 500 MG tablet Take 500 mg by mouth every 4 hours as needed      aspirin 81 MG chewable tablet Take 81 mg by mouth daily      Calcium Carbonate-Vitamin D (OYSTER SHELL CALCIUM/D) 500-200 MG-UNIT TABS Take 1 tablet by mouth 2 times daily (with meals)      carvedilol (COREG) 25 MG tablet Take 25 mg by mouth 2 times daily (with meals)      Dulaglutide 3 MG/0.5ML SOPN Inject into the skin every 7 days      esomeprazole (NEXIUM) 40 MG delayed release capsule Take 40 mg by mouth daily      furosemide (LASIX) 20 MG tablet Take 20 mg by mouth 2 times daily      insulin glargine (LANTUS;BASAGLAR) 100 UNIT/ML injection pen Inject 20 Units into the skin nightly The details of the medication are not available because there are pending changes by a home health clinician. letrozole (FEMARA) 2.5 MG tablet Take 2.5 mg by mouth daily      lisinopril (PRINIVIL;ZESTRIL) 40 MG tablet Take 40 mg by mouth daily      rosuvastatin (CRESTOR) 10 MG tablet Take 10 mg by mouth       No current facility-administered medications for this visit.      No Known Allergies  Patient Active Problem List   Diagnosis    Thrombocytopenia (HCC)    S/P CABG x 3    Hypoxia    Malignant neoplasm of left female breast (HCC)    Controlled type 2 diabetes mellitus, with long-term current use of insulin (HCC)    S/P MVR (mitral valve repair)    UTI (urinary tract infection)    Mitral valve regurgitation    Suspected sleep apnea    Hypocalcemia    CAD (coronary artery disease)    Hyperglycemia due to type 2 diabetes mellitus (HCC)    Gout    CKD (chronic kidney disease)    Neuropathy    Anemia    Mixed hyperlipidemia    Chronic diastolic congestive heart failure (HCC)    Fatigue     Social History     Tobacco Use    Smoking status: Never    Smokeless tobacco: Never   Substance Use Topics    Alcohol use: Yes          Review of Systems   Constitutional:  Positive for fatigue. Negative for chills and fever. HENT:  Negative for congestion. Eyes:  Negative for visual disturbance. Respiratory:  Negative for shortness of breath. Cardiovascular:  Negative for chest pain. Gastrointestinal:  Negative for abdominal pain, nausea and vomiting. Musculoskeletal:  Negative for arthralgias. Neurological:  Negative for syncope and headaches. OBJECTIVE:    Vitals:    09/21/22 1604   BP: (!) 160/92   Weight: 176 lb (79.8 kg)   Height: 5' 3\" (1.6 m)        Physical Exam  Constitutional:       General: She is not in acute distress. Appearance: Normal appearance. HENT:      Head: Normocephalic and atraumatic. Eyes:      Extraocular Movements: Extraocular movements intact. Cardiovascular:      Rate and Rhythm: Normal rate and regular rhythm. Heart sounds: Normal heart sounds. Pulmonary:      Breath sounds: Normal breath sounds. Abdominal:      General: There is no distension. Palpations: Abdomen is soft. Tenderness: There is no abdominal tenderness. There is no guarding. Musculoskeletal:         General: No deformity. Skin:     General: Skin is warm and dry. Neurological:      Mental Status: She is alert and oriented to person, place, and time. Mental status is at baseline. Psychiatric:         Mood and Affect: Mood normal.          Medical problems and test results were reviewed with the patient today. No results found for any visits on 09/21/22. ASSESSMENT and PLAN     1. Fatigue, unspecified type  -     TSH; Future  2. Anemia, unspecified type  -     Ferritin; Future  -     Iron; Future  -     Total Iron Binding Capacity; Future  3. Controlled type 2 diabetes mellitus with diabetic polyneuropathy, with long-term current use of insulin (HCC)  -     Hemoglobin A1C; Future  4.  Resistant

## 2022-10-21 RX ORDER — GABAPENTIN 100 MG/1
CAPSULE ORAL
Qty: 90 CAPSULE | OUTPATIENT
Start: 2022-10-21

## 2022-10-27 ENCOUNTER — HOSPITAL ENCOUNTER (EMERGENCY)
Age: 62
Discharge: HOME | End: 2022-10-27
Attending: EMERGENCY MEDICINE | Admitting: EMERGENCY MEDICINE
Payer: MEDICARE

## 2022-10-27 ENCOUNTER — HOSPITAL ENCOUNTER (EMERGENCY)
Dept: GENERAL RADIOLOGY | Age: 62
Discharge: HOME | End: 2022-10-30
Payer: MEDICARE

## 2022-10-27 VITALS
WEIGHT: 160 LBS | HEIGHT: 63 IN | BODY MASS INDEX: 28.35 KG/M2 | HEART RATE: 92 BPM | SYSTOLIC BLOOD PRESSURE: 131 MMHG | TEMPERATURE: 99.1 F | DIASTOLIC BLOOD PRESSURE: 79 MMHG | RESPIRATION RATE: 20 BRPM | OXYGEN SATURATION: 98 %

## 2022-10-27 DIAGNOSIS — J10.1 INFLUENZA A: Primary | ICD-10-CM

## 2022-10-27 LAB
FLUAV AG NPH QL IA: POSITIVE
FLUBV AG NPH QL IA: NEGATIVE
SARS-COV-2 RDRP RESP QL NAA+PROBE: NOT DETECTED
SOURCE: NORMAL
SPECIMEN SOURCE: ABNORMAL

## 2022-10-27 PROCEDURE — 87804 INFLUENZA ASSAY W/OPTIC: CPT

## 2022-10-27 PROCEDURE — 87635 SARS-COV-2 COVID-19 AMP PRB: CPT

## 2022-10-27 PROCEDURE — 6370000000 HC RX 637 (ALT 250 FOR IP): Performed by: NURSE PRACTITIONER

## 2022-10-27 PROCEDURE — 71046 X-RAY EXAM CHEST 2 VIEWS: CPT

## 2022-10-27 PROCEDURE — 99284 EMERGENCY DEPT VISIT MOD MDM: CPT

## 2022-10-27 RX ORDER — IBUPROFEN 600 MG/1
600 TABLET ORAL
Status: COMPLETED | OUTPATIENT
Start: 2022-10-27 | End: 2022-10-27

## 2022-10-27 RX ORDER — OXYMETAZOLINE HYDROCHLORIDE 0.05 G/100ML
2 SPRAY NASAL
Status: COMPLETED | OUTPATIENT
Start: 2022-10-27 | End: 2022-10-27

## 2022-10-27 RX ORDER — DIPHENHYDRAMINE HCL 25 MG
25 CAPSULE ORAL
Status: COMPLETED | OUTPATIENT
Start: 2022-10-27 | End: 2022-10-27

## 2022-10-27 RX ADMIN — IBUPROFEN 600 MG: 600 TABLET, FILM COATED ORAL at 23:36

## 2022-10-27 RX ADMIN — DIPHENHYDRAMINE HYDROCHLORIDE 25 MG: 25 CAPSULE ORAL at 23:36

## 2022-10-27 RX ADMIN — OXYMETAZOLINE HCL 2 SPRAY: 0.05 SPRAY NASAL at 23:36

## 2022-10-27 ASSESSMENT — ENCOUNTER SYMPTOMS
NAUSEA: 0
SHORTNESS OF BREATH: 0
DIARRHEA: 0
ABDOMINAL PAIN: 0
BACK PAIN: 0
COUGH: 1
EYES NEGATIVE: 1

## 2022-10-27 ASSESSMENT — LIFESTYLE VARIABLES: HOW OFTEN DO YOU HAVE A DRINK CONTAINING ALCOHOL: NEVER

## 2022-10-27 ASSESSMENT — PAIN - FUNCTIONAL ASSESSMENT: PAIN_FUNCTIONAL_ASSESSMENT: NONE - DENIES PAIN

## 2022-10-28 NOTE — ED NOTES
I have reviewed discharge instructions with the patient. The patient verbalized understanding. Patient left ED via Discharge Method: ambulatory to Home with family/friend. Opportunity for questions and clarification provided. Patient given 0 scripts. To continue your aftercare when you leave the hospital, you may receive an automated call from our care team to check in on how you are doing. This is a free service and part of our promise to provide the best care and service to meet your aftercare needs.  If you have questions, or wish to unsubscribe from this service please call [phone]. Thank you for Choosing our [facility] Emergency Department. Jed May, [address] [place] [address]  10/27/22 2778

## 2022-10-28 NOTE — ED PROVIDER NOTES
Emergency Department Provider Note                   PCP:                Madelin Meeks MD               Age: 58 y.o. Sex: female       ICD-10-CM    1. Influenza A  J10.1           DISPOSITION Decision To Discharge 10/27/2022 11:30:50 PM       [facility]   Melida Perkins is a 58 y.o. female who presents to the Emergency Department with chief complaint of URI symptoms. Influenza and COVID swab ordered from triage. X-ray ordered from triage is negative for acute findings. Her flu test is positive. Patient is requesting something to help her sleep, something for her facial congestion and something for painful cough. We will give her Afrin nose spray, ibuprofen and Benadryl. Will recommend continued symptomatic management, increase fluids. She is day 3 of symptoms. Physical exam shows no bacterial source of infection at this time. Strict return precautions given. Safe for discharge at this time. [address], FNP-C, ENP-C  11:32 PM            Orders Placed This Encounter   Procedures    COVID-19, Rapid    Rapid influenza A/B antigens    XR CHEST (2 VW)        Medications   oxymetazoline (AFRIN) 0.05 % nasal spray 2 spray (has no administration in time range)   diphenhydrAMINE (BENADRYL) capsule 25 mg (has no administration in time range)   ibuprofen (ADVIL;MOTRIN) tablet 600 mg (has no administration in time range)       New Prescriptions    No medications on file        Melida Perkins is a 58 y.o. female who presents to the Emergency Department with chief complaint of URI symptoms. Chief Complaint   Patient presents with    Concern For COVID-19      HPI Shanna Hogan is a well-appearing 22-year-old female with numerous comorbidities including heart failure and breast cancer, presenting to the ED for evaluation of URI symptoms. Patient reports 3 days ago, onset of cough, congestion. She believes she was exposed at [employer] to a sick contact. She did get her flu shot 1 week ago.   She denies chest pain, shortness of breath, abdominal pain, N/V/D, headache or fever. She does have a decreased appetite but is continuing to drink fluids. Has taken Tylenol for symptoms. All other systems reviewed and are negative unless otherwise stated in the history of present illness section. Review of Systems   Constitutional:  Negative for fever. HENT:  Positive for congestion. Eyes: Negative. Respiratory:  Positive for cough. Negative for shortness of breath. Cardiovascular:  Negative for chest pain. Gastrointestinal:  Negative for abdominal pain, diarrhea and nausea. Musculoskeletal:  Negative for arthralgias and back pain. Neurological:  Negative for headaches. All other systems reviewed and are negative. Past Medical History:   Diagnosis Date    Breast cancer ([facility] 2015    left stage 1 infiltrating ductal, radiation, and lumpectomy, letrozole    CAD (coronary artery disease)     multivessel, awaiting CABG and mitral valve repair/replacement    CKD (chronic kidney disease)     Diabetes ([facility] typell, dx 2016    typell, ID, avfbs 160, last A1C was 10.8. denies lows    Heart failure (HCC)     EF 40-45%    Hypercholesteremia     Hypertension     Intertrochanteric fracture of right femur ([facility] 2/24/2018    Mitral valve regurgitation     PUD (peptic ulcer disease)     age 12, no treatment since        Past Surgical History:   Procedure Laterality Date    BREAST LUMPECTOMY  2015    CARDIAC SURGERY  2019    CABG    HIP FRACTURE SURGERY Right     ORTHOPEDIC SURGERY Right     hip fracture repair with hardware , not replacent        Family History   Problem Relation Age of Onset    Heart Disease Brother     Heart Disease Father     Kidney Disease Mother         Social History     Socioeconomic History    Marital status:    Tobacco Use    Smoking status: Never    Smokeless tobacco: Never   Substance and Sexual Activity    Alcohol use:  Yes    Drug use: No   Social History Narrative    . Lives with her         Allergies: Patient has no known allergies. Previous Medications    ACETAMINOPHEN (TYLENOL) 500 MG TABLET    Take 500 mg by mouth every 4 hours as needed    ASPIRIN 81 MG CHEWABLE TABLET    Take 81 mg by mouth daily    CALCIUM CARBONATE-VITAMIN D (OYSTER SHELL CALCIUM/D) 500-200 MG-UNIT TABS    Take 1 tablet by mouth 2 times daily (with meals)    CARVEDILOL (COREG) 25 MG TABLET    Take 25 mg by mouth 2 times daily (with meals)    DULAGLUTIDE 3 MG/0.5ML SOPN    Inject into the skin every 7 days    ESOMEPRAZOLE (NEXIUM) 40 MG DELAYED RELEASE CAPSULE    Take 40 mg by mouth daily    FERROUS SULFATE (IRON 325) 325 (65 FE) MG TABLET    Take 325 mg by mouth    FUROSEMIDE (LASIX) 20 MG TABLET    Take 20 mg by mouth 2 times daily    GABAPENTIN (NEURONTIN) 100 MG CAPSULE    Take 1 capsule by mouth at bedtime for 90 days. Intended supply: 30 days    INSULIN GLARGINE (LANTUS;BASAGLAR) 100 UNIT/ML INJECTION PEN    Inject 20 Units into the skin nightly The details of the medication are not available because there are pending changes by a home health clinician. LETROZOLE (FEMARA) 2.5 MG TABLET    Take 2.5 mg by mouth daily    LISINOPRIL (PRINIVIL;ZESTRIL) 40 MG TABLET    Take 40 mg by mouth daily    NEBIVOLOL (BYSTOLIC) 5 MG TABLET    TAKE 1 TABLET BY MOUTH EVERY DAY    ROSUVASTATIN (CRESTOR) 10 MG TABLET    Take 10 mg by mouth    SPIRONOLACTONE (ALDACTONE) 25 MG TABLET    TAKE 1 TABLET (25 MG) BY MOUTH 2 (TWO) TIMES A DAY        Vitals signs and nursing note reviewed. Patient Vitals for the past 4 hrs:   Temp Pulse Resp BP SpO2   10/27/22 2158 99.1 °F (37.3 °C) 92 20 131/79 98 %          Physical Exam  Vitals and nursing note reviewed. Constitutional:       Appearance: She is obese. She is ill-appearing. HENT:      Mouth/Throat:      Mouth: Mucous membranes are moist.      Pharynx: Oropharynx is clear. Cardiovascular:      Rate and Rhythm: Normal rate.    Pulmonary: Effort: Pulmonary effort is normal.      Breath sounds: Normal breath sounds. No decreased breath sounds. Comments: Respirations even and unlabored, decreased breath sounds  Abdominal:      General: Abdomen is flat. Palpations: Abdomen is soft. Skin:     General: Skin is warm and dry. Psychiatric:         Mood and Affect: Mood normal.        Procedures        XR CHEST (2 VW)   Final Result   No evidence of an acute intrathoracic process. Voice dictation software was used during the making of this note. This software is not perfect and grammatical and other typographical errors may be present. This note has not been completely proofread for errors.         June Ching, APRN - NP  10/27/22 [id number]

## 2022-10-28 NOTE — ED NOTES
Report from [facility]. Assumed care of pt at this time. Kailey Saini [facility]  10/27/22 [id number]

## 2022-10-28 NOTE — DISCHARGE INSTRUCTIONS
Use over-the-counter medications for treatment of your viral illness. For congestion, Mucinex. Afrin nose spray to help with facial congestion. Robitussin for cough. Ibuprofen for pain. Tylenol for fever. Return to the ED for new or worsening symptoms. Symptoms last 5 to 7 days for influenza.

## 2022-10-31 DIAGNOSIS — I10 RESISTANT HYPERTENSION: Primary | ICD-10-CM

## 2022-10-31 RX ORDER — CARVEDILOL 25 MG/1
25 TABLET ORAL 2 TIMES DAILY WITH MEALS
Qty: 60 TABLET | Refills: 2 | Status: SHIPPED | OUTPATIENT
Start: 2022-10-31 | End: 2023-01-29

## 2022-10-31 RX ORDER — CARVEDILOL 25 MG/1
25 TABLET ORAL 2 TIMES DAILY WITH MEALS
Qty: 30 TABLET | Refills: 0 | Status: SHIPPED | OUTPATIENT
Start: 2022-10-31 | End: 2022-10-31 | Stop reason: SDUPTHER

## 2022-10-31 RX ORDER — SPIRONOLACTONE 25 MG/1
TABLET ORAL
Qty: 60 TABLET | Refills: 2 | Status: SHIPPED | OUTPATIENT
Start: 2022-10-31

## 2022-10-31 NOTE — TELEPHONE ENCOUNTER
Needing:  - Carvedilol--25 mg/NO PILLS in bottle/90 days supply/[facility] [address]/[phone]    - Spironolactone--25 mg/NO PILLS in bottle/90 days supply/same pharmacy

## 2022-11-01 ENCOUNTER — HOSPITAL ENCOUNTER (EMERGENCY)
Age: 62
Discharge: HOME | End: 2022-11-01
Attending: EMERGENCY MEDICINE
Payer: MEDICARE

## 2022-11-01 VITALS
RESPIRATION RATE: 16 BRPM | DIASTOLIC BLOOD PRESSURE: 99 MMHG | OXYGEN SATURATION: 98 % | TEMPERATURE: 98.4 F | SYSTOLIC BLOOD PRESSURE: 151 MMHG | HEART RATE: 81 BPM | WEIGHT: 160 LBS | HEIGHT: 63 IN | BODY MASS INDEX: 28.35 KG/M2

## 2022-11-01 DIAGNOSIS — J11.1 INFLUENZA: Primary | ICD-10-CM

## 2022-11-01 PROCEDURE — 99283 EMERGENCY DEPT VISIT LOW MDM: CPT

## 2022-11-01 PROCEDURE — 6370000000 HC RX 637 (ALT 250 FOR IP): Performed by: PHYSICIAN ASSISTANT

## 2022-11-01 RX ORDER — ONDANSETRON 4 MG/1
4 TABLET, FILM COATED ORAL EVERY 8 HOURS PRN
Qty: 10 TABLET | Refills: 0 | Status: ON HOLD | OUTPATIENT
Start: 2022-11-01 | End: 2022-11-05 | Stop reason: HOSPADM

## 2022-11-01 RX ORDER — ONDANSETRON 4 MG/1
4 TABLET, ORALLY DISINTEGRATING ORAL ONCE
Status: COMPLETED | OUTPATIENT
Start: 2022-11-01 | End: 2022-11-01

## 2022-11-01 RX ADMIN — ONDANSETRON 4 MG: 4 TABLET, ORALLY DISINTEGRATING ORAL at 21:52

## 2022-11-01 ASSESSMENT — ENCOUNTER SYMPTOMS
COUGH: 1
ABDOMINAL PAIN: 0
BACK PAIN: 0
RHINORRHEA: 1
VOMITING: 1
SHORTNESS OF BREATH: 0
NAUSEA: 1
SORE THROAT: 0
DIARRHEA: 1

## 2022-11-01 ASSESSMENT — PAIN SCALES - GENERAL: PAINLEVEL_OUTOF10: 6

## 2022-11-01 ASSESSMENT — PAIN DESCRIPTION - LOCATION: LOCATION: CHEST

## 2022-11-01 ASSESSMENT — PAIN - FUNCTIONAL ASSESSMENT: PAIN_FUNCTIONAL_ASSESSMENT: 0-10

## 2022-11-01 ASSESSMENT — PAIN DESCRIPTION - DESCRIPTORS: DESCRIPTORS: TIGHTNESS

## 2022-11-01 NOTE — ED TRIAGE NOTES
EMS: Pt arriving from home via . [place] [address]. Presents for concerns of chest tightness, chills, nausea, vomiting, productive cough, diarrhea, abdominal pain. Pt was tested + for the flu 3 days ago at PCP. Onset of sx 3 days ago. Chest pain 7/10 radiating from left side to midline. Pt sts emesis looks like the last time she had a GI bleed. Afebrile. 160/90, 80HR, 96% room air, , 98.1, 12 lead SR. No interventions en route.

## 2022-11-02 ENCOUNTER — HOSPITAL ENCOUNTER (EMERGENCY)
Dept: GENERAL RADIOLOGY | Age: 62
Discharge: HOME | DRG: 683 | End: 2022-11-05
Payer: MEDICARE

## 2022-11-02 ENCOUNTER — HOSPITAL ENCOUNTER (INPATIENT)
Age: 62
LOS: 3 days | Discharge: HOME | DRG: 683 | End: 2022-11-05
Attending: EMERGENCY MEDICINE | Admitting: FAMILY MEDICINE
Payer: MEDICARE

## 2022-11-02 DIAGNOSIS — I10 ESSENTIAL HYPERTENSION: ICD-10-CM

## 2022-11-02 DIAGNOSIS — N17.9 AKI (ACUTE KIDNEY INJURY) (HCC): ICD-10-CM

## 2022-11-02 DIAGNOSIS — E86.0 DEHYDRATION: ICD-10-CM

## 2022-11-02 DIAGNOSIS — N18.32 ACUTE RENAL FAILURE SUPERIMPOSED ON STAGE 3B CHRONIC KIDNEY DISEASE, UNSPECIFIED ACUTE RENAL FAILURE TYPE (HCC): ICD-10-CM

## 2022-11-02 DIAGNOSIS — N17.9 ACUTE RENAL FAILURE SUPERIMPOSED ON STAGE 3B CHRONIC KIDNEY DISEASE, UNSPECIFIED ACUTE RENAL FAILURE TYPE (HCC): ICD-10-CM

## 2022-11-02 DIAGNOSIS — J10.1 INFLUENZA A: Primary | ICD-10-CM

## 2022-11-02 PROBLEM — Z95.1 S/P CABG X 3: Status: ACTIVE | Noted: 2019-10-02

## 2022-11-02 PROBLEM — Z79.4 CONTROLLED TYPE 2 DIABETES MELLITUS, WITH LONG-TERM CURRENT USE OF INSULIN (HCC): Status: ACTIVE | Noted: 2019-09-22

## 2022-11-02 PROBLEM — E11.65 HYPERGLYCEMIA DUE TO TYPE 2 DIABETES MELLITUS (HCC): Status: ACTIVE | Noted: 2018-02-24

## 2022-11-02 PROBLEM — N39.0 UTI (URINARY TRACT INFECTION): Status: RESOLVED | Noted: 2019-09-30 | Resolved: 2022-11-02

## 2022-11-02 PROBLEM — N18.30 ACUTE RENAL FAILURE SUPERIMPOSED ON STAGE 3 CHRONIC KIDNEY DISEASE, UNSPECIFIED ACUTE RENAL FAILURE TYPE, UNSPECIFIED WHETHER STAGE 3A OR 3B CKD (HCC): Status: ACTIVE | Noted: 2022-11-02

## 2022-11-02 PROBLEM — D69.6 THROMBOCYTOPENIA (HCC): Status: RESOLVED | Noted: 2019-10-02 | Resolved: 2022-11-02

## 2022-11-02 PROBLEM — R09.02 HYPOXIA: Status: RESOLVED | Noted: 2019-10-01 | Resolved: 2022-11-02

## 2022-11-02 PROBLEM — E11.9 CONTROLLED TYPE 2 DIABETES MELLITUS, WITH LONG-TERM CURRENT USE OF INSULIN (HCC): Status: ACTIVE | Noted: 2019-09-22

## 2022-11-02 LAB
ALBUMIN SERPL-MCNC: 2.9 G/DL (ref 3.2–4.6)
ALBUMIN/GLOB SERPL: 0.6 {RATIO} (ref 0.4–1.6)
ALP SERPL-CCNC: 57 U/L (ref 50–130)
ALT SERPL-CCNC: 15 U/L (ref 12–65)
ANION GAP SERPL CALC-SCNC: 9 MMOL/L (ref 2–11)
APPEARANCE UR: ABNORMAL
AST SERPL-CCNC: 23 U/L (ref 15–37)
BACTERIA URNS QL MICRO: ABNORMAL /HPF
BILIRUB SERPL-MCNC: 0.3 MG/DL (ref 0.2–1.1)
BILIRUB UR QL: NEGATIVE
BUN SERPL-MCNC: 62 MG/DL (ref 8–23)
CALCIUM SERPL-MCNC: 9.5 MG/DL (ref 8.3–10.4)
CASTS URNS QL MICRO: ABNORMAL /LPF
CHLORIDE SERPL-SCNC: 110 MMOL/L (ref 101–110)
CO2 SERPL-SCNC: 22 MMOL/L (ref 21–32)
COLOR UR: ABNORMAL
CREAT SERPL-MCNC: 2.75 MG/DL (ref 0.6–1)
EKG ATRIAL RATE: 85 BPM
EKG DIAGNOSIS: NORMAL
EKG P AXIS: 42 DEGREES
EKG P-R INTERVAL: 182 MS
EKG Q-T INTERVAL: 392 MS
EKG QRS DURATION: 86 MS
EKG QTC CALCULATION (BAZETT): 466 MS
EKG R AXIS: 4 DEGREES
EKG T AXIS: 101 DEGREES
EKG VENTRICULAR RATE: 85 BPM
EPI CELLS #/AREA URNS HPF: ABNORMAL /HPF
ERYTHROCYTE [DISTWIDTH] IN BLOOD BY AUTOMATED COUNT: 13.1 % (ref 11.9–14.6)
GLOBULIN SER CALC-MCNC: 5.1 G/DL (ref 2.8–4.5)
GLUCOSE BLD STRIP.AUTO-MCNC: 133 MG/DL (ref 65–100)
GLUCOSE SERPL-MCNC: 118 MG/DL (ref 65–100)
GLUCOSE UR STRIP.AUTO-MCNC: NEGATIVE MG/DL
HCT VFR BLD AUTO: 39.1 % (ref 35.8–46.3)
HGB BLD-MCNC: 13.2 G/DL (ref 11.7–15.4)
HGB UR QL STRIP: ABNORMAL
KETONES UR QL STRIP.AUTO: NEGATIVE MG/DL
LEUKOCYTE ESTERASE UR QL STRIP.AUTO: NEGATIVE
MCH RBC QN AUTO: 29.6 PG (ref 26.1–32.9)
MCHC RBC AUTO-ENTMCNC: 33.8 G/DL (ref 31.4–35)
MCV RBC AUTO: 87.7 FL (ref 82–102)
NITRITE UR QL STRIP.AUTO: NEGATIVE
NRBC # BLD: 0 K/UL (ref 0–0.2)
OTHER OBSERVATIONS: ABNORMAL
PH UR STRIP: 5 [PH] (ref 5–9)
PLATELET # BLD AUTO: 250 K/UL (ref 150–450)
PMV BLD AUTO: 11.8 FL (ref 9.4–12.3)
POTASSIUM SERPL-SCNC: 4 MMOL/L (ref 3.5–5.1)
PROT SERPL-MCNC: 8 G/DL (ref 6.3–8.2)
PROT UR STRIP-MCNC: >300 MG/DL
RBC # BLD AUTO: 4.46 M/UL (ref 4.05–5.2)
RBC #/AREA URNS HPF: ABNORMAL /HPF
SERVICE CMNT-IMP: ABNORMAL
SODIUM SERPL-SCNC: 141 MMOL/L (ref 133–143)
SP GR UR REFRACTOMETRY: 1.02 (ref 1–1.02)
TROPONIN I SERPL HS-MCNC: 19.5 PG/ML (ref 0–14)
TROPONIN I SERPL HS-MCNC: 23 PG/ML (ref 0–14)
UROBILINOGEN UR QL STRIP.AUTO: 1 EU/DL (ref 0.2–1)
WBC # BLD AUTO: 6.6 K/UL (ref 4.3–11.1)
WBC URNS QL MICRO: ABNORMAL /HPF
YEAST URNS QL MICRO: ABNORMAL

## 2022-11-02 PROCEDURE — 94760 N-INVAS EAR/PLS OXIMETRY 1: CPT

## 2022-11-02 PROCEDURE — 81001 URINALYSIS AUTO W/SCOPE: CPT

## 2022-11-02 PROCEDURE — 84484 ASSAY OF TROPONIN QUANT: CPT

## 2022-11-02 PROCEDURE — 94640 AIRWAY INHALATION TREATMENT: CPT

## 2022-11-02 PROCEDURE — 85027 COMPLETE CBC AUTOMATED: CPT

## 2022-11-02 PROCEDURE — 6360000002 HC RX W HCPCS: Performed by: FAMILY MEDICINE

## 2022-11-02 PROCEDURE — 2500000003 HC RX 250 WO HCPCS: Performed by: EMERGENCY MEDICINE

## 2022-11-02 PROCEDURE — 1100000000 HC RM PRIVATE

## 2022-11-02 PROCEDURE — 6370000000 HC RX 637 (ALT 250 FOR IP): Performed by: FAMILY MEDICINE

## 2022-11-02 PROCEDURE — 99285 EMERGENCY DEPT VISIT HI MDM: CPT

## 2022-11-02 PROCEDURE — 96375 TX/PRO/DX INJ NEW DRUG ADDON: CPT

## 2022-11-02 PROCEDURE — 80053 COMPREHEN METABOLIC PANEL: CPT

## 2022-11-02 PROCEDURE — 96374 THER/PROPH/DIAG INJ IV PUSH: CPT

## 2022-11-02 PROCEDURE — 82962 GLUCOSE BLOOD TEST: CPT

## 2022-11-02 PROCEDURE — 2580000003 HC RX 258: Performed by: EMERGENCY MEDICINE

## 2022-11-02 PROCEDURE — 2580000003 HC RX 258: Performed by: FAMILY MEDICINE

## 2022-11-02 PROCEDURE — 96361 HYDRATE IV INFUSION ADD-ON: CPT

## 2022-11-02 PROCEDURE — 6360000002 HC RX W HCPCS: Performed by: EMERGENCY MEDICINE

## 2022-11-02 PROCEDURE — 71045 X-RAY EXAM CHEST 1 VIEW: CPT

## 2022-11-02 PROCEDURE — 6370000000 HC RX 637 (ALT 250 FOR IP): Performed by: EMERGENCY MEDICINE

## 2022-11-02 PROCEDURE — 93005 ELECTROCARDIOGRAM TRACING: CPT | Performed by: EMERGENCY MEDICINE

## 2022-11-02 RX ORDER — ONDANSETRON 2 MG/ML
8 INJECTION INTRAMUSCULAR; INTRAVENOUS
Status: COMPLETED | OUTPATIENT
Start: 2022-11-02 | End: 2022-11-02

## 2022-11-02 RX ORDER — SODIUM CHLORIDE 0.9 % (FLUSH) 0.9 %
5-40 SYRINGE (ML) INJECTION PRN
Status: CANCELLED | OUTPATIENT
Start: 2022-11-02

## 2022-11-02 RX ORDER — POLYETHYLENE GLYCOL 3350 17 G/17G
17 POWDER, FOR SOLUTION ORAL DAILY PRN
Status: DISCONTINUED | OUTPATIENT
Start: 2022-11-02 | End: 2022-11-05 | Stop reason: HOSPADM

## 2022-11-02 RX ORDER — ACETAMINOPHEN 325 MG/1
650 TABLET ORAL EVERY 6 HOURS PRN
Status: DISCONTINUED | OUTPATIENT
Start: 2022-11-02 | End: 2022-11-05 | Stop reason: HOSPADM

## 2022-11-02 RX ORDER — POTASSIUM CHLORIDE 20 MEQ/1
40 TABLET, EXTENDED RELEASE ORAL PRN
Status: DISCONTINUED | OUTPATIENT
Start: 2022-11-02 | End: 2022-11-05 | Stop reason: HOSPADM

## 2022-11-02 RX ORDER — IPRATROPIUM BROMIDE AND ALBUTEROL SULFATE 2.5; .5 MG/3ML; MG/3ML
1 SOLUTION RESPIRATORY (INHALATION) ONCE
Status: COMPLETED | OUTPATIENT
Start: 2022-11-02 | End: 2022-11-02

## 2022-11-02 RX ORDER — 0.9 % SODIUM CHLORIDE 0.9 %
1000 INTRAVENOUS SOLUTION INTRAVENOUS ONCE
Status: COMPLETED | OUTPATIENT
Start: 2022-11-02 | End: 2022-11-02

## 2022-11-02 RX ORDER — FUROSEMIDE 20 MG/1
20 TABLET ORAL 2 TIMES DAILY
Status: DISCONTINUED | OUTPATIENT
Start: 2022-11-02 | End: 2022-11-05 | Stop reason: HOSPADM

## 2022-11-02 RX ORDER — SODIUM CHLORIDE, SODIUM LACTATE, POTASSIUM CHLORIDE, CALCIUM CHLORIDE 600; 310; 30; 20 MG/100ML; MG/100ML; MG/100ML; MG/100ML
INJECTION, SOLUTION INTRAVENOUS CONTINUOUS
Status: DISCONTINUED | OUTPATIENT
Start: 2022-11-02 | End: 2022-11-05 | Stop reason: HOSPADM

## 2022-11-02 RX ORDER — ROSUVASTATIN CALCIUM 5 MG/1
10 TABLET, COATED ORAL NIGHTLY
Status: DISCONTINUED | OUTPATIENT
Start: 2022-11-02 | End: 2022-11-05 | Stop reason: HOSPADM

## 2022-11-02 RX ORDER — PANTOPRAZOLE SODIUM 40 MG/1
40 TABLET, DELAYED RELEASE ORAL
Status: DISCONTINUED | OUTPATIENT
Start: 2022-11-03 | End: 2022-11-05 | Stop reason: HOSPADM

## 2022-11-02 RX ORDER — POTASSIUM CHLORIDE 7.45 MG/ML
10 INJECTION INTRAVENOUS PRN
Status: DISCONTINUED | OUTPATIENT
Start: 2022-11-02 | End: 2022-11-05 | Stop reason: HOSPADM

## 2022-11-02 RX ORDER — SODIUM CHLORIDE 9 MG/ML
INJECTION, SOLUTION INTRAVENOUS PRN
Status: DISCONTINUED | OUTPATIENT
Start: 2022-11-02 | End: 2022-11-05 | Stop reason: HOSPADM

## 2022-11-02 RX ORDER — SODIUM CHLORIDE 0.9 % (FLUSH) 0.9 %
5-40 SYRINGE (ML) INJECTION EVERY 12 HOURS SCHEDULED
Status: CANCELLED | OUTPATIENT
Start: 2022-11-02

## 2022-11-02 RX ORDER — ACETAMINOPHEN 650 MG/1
650 SUPPOSITORY RECTAL EVERY 6 HOURS PRN
Status: DISCONTINUED | OUTPATIENT
Start: 2022-11-02 | End: 2022-11-05 | Stop reason: HOSPADM

## 2022-11-02 RX ORDER — ASPIRIN 81 MG/1
81 TABLET, CHEWABLE ORAL DAILY
Status: DISCONTINUED | OUTPATIENT
Start: 2022-11-02 | End: 2022-11-05 | Stop reason: HOSPADM

## 2022-11-02 RX ORDER — MAGNESIUM SULFATE IN WATER 40 MG/ML
2000 INJECTION, SOLUTION INTRAVENOUS PRN
Status: DISCONTINUED | OUTPATIENT
Start: 2022-11-02 | End: 2022-11-05 | Stop reason: HOSPADM

## 2022-11-02 RX ORDER — INSULIN GLARGINE 100 [IU]/ML
20 INJECTION, SOLUTION SUBCUTANEOUS NIGHTLY
Status: DISCONTINUED | OUTPATIENT
Start: 2022-11-02 | End: 2022-11-05 | Stop reason: HOSPADM

## 2022-11-02 RX ORDER — ONDANSETRON 4 MG/1
4 TABLET, FILM COATED ORAL EVERY 8 HOURS PRN
Status: DISCONTINUED | OUTPATIENT
Start: 2022-11-02 | End: 2022-11-02 | Stop reason: SDUPTHER

## 2022-11-02 RX ORDER — ALLOPURINOL 100 MG/1
50 TABLET ORAL EVERY OTHER DAY
Status: DISCONTINUED | OUTPATIENT
Start: 2022-11-02 | End: 2022-11-04

## 2022-11-02 RX ORDER — HEPARIN SODIUM 5000 [USP'U]/ML
5000 INJECTION, SOLUTION INTRAVENOUS; SUBCUTANEOUS EVERY 8 HOURS SCHEDULED
Status: DISCONTINUED | OUTPATIENT
Start: 2022-11-02 | End: 2022-11-05 | Stop reason: HOSPADM

## 2022-11-02 RX ORDER — GABAPENTIN 100 MG/1
100 CAPSULE ORAL NIGHTLY
Status: DISCONTINUED | OUTPATIENT
Start: 2022-11-02 | End: 2022-11-05 | Stop reason: HOSPADM

## 2022-11-02 RX ORDER — ALLOPURINOL 100 MG/1
50 TABLET ORAL EVERY OTHER DAY
COMMUNITY
Start: 2022-09-30

## 2022-11-02 RX ORDER — DEXAMETHASONE SODIUM PHOSPHATE 10 MG/ML
10 INJECTION INTRAMUSCULAR; INTRAVENOUS ONCE
Status: COMPLETED | OUTPATIENT
Start: 2022-11-02 | End: 2022-11-02

## 2022-11-02 RX ORDER — LISINOPRIL 20 MG/1
40 TABLET ORAL DAILY
Status: DISCONTINUED | OUTPATIENT
Start: 2022-11-02 | End: 2022-11-05 | Stop reason: HOSPADM

## 2022-11-02 RX ORDER — LABETALOL HYDROCHLORIDE 5 MG/ML
20 INJECTION, SOLUTION INTRAVENOUS ONCE
Status: COMPLETED | OUTPATIENT
Start: 2022-11-02 | End: 2022-11-02

## 2022-11-02 RX ORDER — ONDANSETRON 4 MG/1
4 TABLET, ORALLY DISINTEGRATING ORAL EVERY 8 HOURS PRN
Status: DISCONTINUED | OUTPATIENT
Start: 2022-11-02 | End: 2022-11-05 | Stop reason: HOSPADM

## 2022-11-02 RX ORDER — ONDANSETRON 2 MG/ML
4 INJECTION INTRAMUSCULAR; INTRAVENOUS EVERY 6 HOURS PRN
Status: DISCONTINUED | OUTPATIENT
Start: 2022-11-02 | End: 2022-11-05 | Stop reason: HOSPADM

## 2022-11-02 RX ORDER — KETOROLAC TROMETHAMINE 30 MG/ML
15 INJECTION, SOLUTION INTRAMUSCULAR; INTRAVENOUS ONCE
Status: DISCONTINUED | OUTPATIENT
Start: 2022-11-02 | End: 2022-11-02

## 2022-11-02 RX ADMIN — ALLOPURINOL 50 MG: 100 TABLET ORAL at 21:34

## 2022-11-02 RX ADMIN — IPRATROPIUM BROMIDE AND ALBUTEROL SULFATE 1 AMPULE: .5; 3 SOLUTION RESPIRATORY (INHALATION) at 15:26

## 2022-11-02 RX ADMIN — SODIUM CHLORIDE, POTASSIUM CHLORIDE, SODIUM LACTATE AND CALCIUM CHLORIDE: 600; 310; 30; 20 INJECTION, SOLUTION INTRAVENOUS at 21:38

## 2022-11-02 RX ADMIN — METOPROLOL TARTRATE 25 MG: 25 TABLET, FILM COATED ORAL at 21:31

## 2022-11-02 RX ADMIN — ASPIRIN 81 MG: 81 TABLET, CHEWABLE ORAL at 21:35

## 2022-11-02 RX ADMIN — LABETALOL HYDROCHLORIDE 20 MG: 5 INJECTION, SOLUTION INTRAVENOUS at 16:07

## 2022-11-02 RX ADMIN — DEXAMETHASONE SODIUM PHOSPHATE 10 MG: 10 INJECTION INTRAMUSCULAR; INTRAVENOUS at 16:02

## 2022-11-02 RX ADMIN — ROSUVASTATIN CALCIUM 10 MG: 5 TABLET, COATED ORAL at 21:31

## 2022-11-02 RX ADMIN — HEPARIN SODIUM 5000 UNITS: 5000 INJECTION INTRAVENOUS; SUBCUTANEOUS at 21:31

## 2022-11-02 RX ADMIN — ONDANSETRON 8 MG: 2 INJECTION INTRAMUSCULAR; INTRAVENOUS at 14:21

## 2022-11-02 RX ADMIN — SODIUM CHLORIDE 1000 ML: 900 INJECTION, SOLUTION INTRAVENOUS at 14:21

## 2022-11-02 ASSESSMENT — PAIN DESCRIPTION - LOCATION: LOCATION: ABDOMEN;CHEST

## 2022-11-02 ASSESSMENT — PAIN DESCRIPTION - ORIENTATION: ORIENTATION: LEFT

## 2022-11-02 ASSESSMENT — ENCOUNTER SYMPTOMS
COUGH: 1
VOMITING: 1
NAUSEA: 1
SHORTNESS OF BREATH: 1
CHEST TIGHTNESS: 1

## 2022-11-02 ASSESSMENT — PAIN SCALES - GENERAL: PAINLEVEL_OUTOF10: 7

## 2022-11-02 ASSESSMENT — PAIN - FUNCTIONAL ASSESSMENT: PAIN_FUNCTIONAL_ASSESSMENT: 0-10

## 2022-11-02 NOTE — DISCHARGE INSTRUCTIONS
Make sure you are taking in plenty of fluids. Take the Zofran for nausea control to help you keep fluids down better. If you continue to not be able to keep any fluids down or symptoms continue worsening follow-up as needed. Encourage follow-up with your family doctor. Return to the ED as needed for new or worsening symptoms. Flulike symptoms can last up to 7 to 10 days so hopefully you are nearing the end.

## 2022-11-02 NOTE — ED PROVIDER NOTES
Emergency Department Provider Note                   PCP:                Madelyn Feldman MD               Age: 58 y.o. Sex: female       ICD-10-CM    1. Influenza  J11.1           DISPOSITION Decision To Discharge 11/01/2022 09:56:10 PM        MDM  Number of Diagnoses or Management Options  Influenza  Diagnosis management comments: Patient is a 60-year-old female who presents to facility today following a diagnosis of influenza 3 days ago. On exam patient is well-appearing in no acute distress. Vital signs are stable. Physical exam benign. Time was spent educating this patient on influenza and the duration of symptoms I can be expected as the patient reports she was not told anything following her diagnosis at her last visit. Patient reports she is having trouble keeping some fluids down due to some nausea and vomiting and as such I have given her a oral Zofran here in the department and will send her prescription for some Zofran to get filled in the morning to try and help keep the vomiting under control slight she can increase her fluid intake. No other acute change in symptoms from prior visit. Encouraged continued use of over-the-counter agents for symptom relief, plenty of fluids, and plenty of rest.  Encouraged follow-up with family doctor. Discussed return precautions to the ED which the patient reports understanding. Patient states she has no other concerns or questions at this time and is ready for discharge. Patient discharged in stable condition. Amount and/or Complexity of Data Reviewed  Review and summarize past medical records: yes  Independent visualization of images, tracings, or specimens: yes    Risk of Complications, Morbidity, and/or Mortality  Presenting problems: low  Diagnostic procedures: low  Management options: low    Patient Progress  Patient progress: stable             No orders of the defined types were placed in this encounter.        Medications   ondansetron (ZOFRAN-ODT) disintegrating tablet 4 mg (4 mg Oral Given 11/1/22 2152)       Discharge Medication List as of 11/1/2022  9:58 PM        START taking these medications    Details   ondansetron (ZOFRAN) 4 MG tablet Take 1 tablet by mouth every 8 hours as needed for Nausea or Vomiting, Disp-10 tablet, R-0Print              Meryl Echavarria is a 58 y.o. female who presents to the Emergency Department with chief complaint of    Chief Complaint   Patient presents with    Influenza      Patient is a 57-year-old female who presents today with a known diagnosis of influenza 3 days ago. She states her symptoms started the day of her initial visit. She states she recently got her flu vaccine just prior to starting symptoms. She states her symptoms have not changed. She states she just continues to feel poorly so she came back to be reevaluated. She reports she was not informed of the nature of the flu and how symptoms may last and what she should experience. She states she is not really eating and is trying to drink but seems to throw it up. She states her biggest complaint is that of the diarrhea, nausea and vomiting and cough. She states she has chest pain only when she coughs. She states this is no different than her last visit. Patient reports she just wanted to be seen again and discuss what her best plan of care is. The history is provided by the patient. Review of Systems   Constitutional:  Positive for chills. Negative for fever. HENT:  Positive for congestion and rhinorrhea. Negative for sore throat. Respiratory:  Positive for cough. Negative for shortness of breath. Cardiovascular:  Negative for chest pain. Gastrointestinal:  Positive for diarrhea, nausea and vomiting. Negative for abdominal pain. Genitourinary:  Negative for dysuria, frequency and urgency. Musculoskeletal:  Positive for arthralgias and myalgias. Negative for back pain and gait problem. Skin:  Negative for rash. Neurological:  Negative for dizziness, syncope and headaches. Psychiatric/Behavioral:  Negative for agitation and behavioral problems. All other systems reviewed and are negative. Past Medical History:   Diagnosis Date    Breast cancer ([facility] [address] 2015    left stage 1 infiltrating ductal, radiation, and lumpectomy, letrozole    CAD (coronary artery disease)     multivessel, awaiting CABG and mitral valve repair/replacement    CKD (chronic kidney disease)     Diabetes ([facility] [address] typell, dx 2016    typell, ID, avfbs 160, last A1C was 10.8. denies lows    Heart failure (HCC)     EF 40-45%    Hypercholesteremia     Hypertension     Intertrochanteric fracture of right femur (HCC) 2/24/2018    Mitral valve regurgitation     PUD (peptic ulcer disease)     age 12, no treatment since        Past Surgical History:   Procedure Laterality Date    BREAST LUMPECTOMY  2015    CARDIAC SURGERY  2019    CABG    HIP FRACTURE SURGERY Right     ORTHOPEDIC SURGERY Right     hip fracture repair with hardware , not replacent        Family History   Problem Relation Age of Onset    Heart Disease Brother     Heart Disease Father     Kidney Disease Mother         Social History     Socioeconomic History    Marital status:    Tobacco Use    Smoking status: Never    Smokeless tobacco: Never   Substance and Sexual Activity    Alcohol use: Yes    Drug use: No   Social History Narrative    . Lives with her          Patient has no known allergies.      Discharge Medication List as of 11/1/2022  9:58 PM        CONTINUE these medications which have NOT CHANGED    Details   spironolactone (ALDACTONE) 25 MG tablet TAKE 1 TABLET (25 MG) BY MOUTH 2 (TWO) TIMES A DAY, Disp-60 tablet, R-2Normal      carvedilol (COREG) 25 MG tablet Take 1 tablet by mouth 2 times daily (with meals), Disp-60 tablet, R-2Normal      nebivolol (BYSTOLIC) 5 MG tablet TAKE 1 TABLET BY MOUTH EVERY DAYHistorical Med      ferrous sulfate (IRON 325) 325 (65 Fe) MG tablet Take 325 mg by mouthHistorical Med      gabapentin (NEURONTIN) 100 MG capsule Take 1 capsule by mouth at bedtime for 90 days. Intended supply: 30 days, Disp-30 capsule, R-2Normal      acetaminophen (TYLENOL) 500 MG tablet Take 500 mg by mouth every 4 hours as neededHistorical Med      aspirin 81 MG chewable tablet Take 81 mg by mouth dailyHistorical Med      Calcium Carbonate-Vitamin D (OYSTER SHELL CALCIUM/D) 500-200 MG-UNIT TABS Take 1 tablet by mouth 2 times daily (with meals)Historical Med      Dulaglutide 3 MG/0.5ML SOPN Inject into the skin every 7 daysHistorical Med      esomeprazole (NEXIUM) 40 MG delayed release capsule Take 40 mg by mouth dailyHistorical Med      furosemide (LASIX) 20 MG tablet Take 20 mg by mouth 2 times dailyHistorical Med      insulin glargine (LANTUS;BASAGLAR) 100 UNIT/ML injection pen Inject 20 Units into the skin nightly The details of the medication are not available because there are pending changes by a home health clinician. Historical Med      letrozole (FEMARA) 2.5 MG tablet Take 2.5 mg by mouth dailyHistorical Med      lisinopril (PRINIVIL;ZESTRIL) 40 MG tablet Take 40 mg by mouth dailyHistorical Med      rosuvastatin (CRESTOR) 10 MG tablet Take 10 mg by mouthHistorical Med              Vitals signs and nursing note reviewed. No data found. Physical Exam  Vitals and nursing note reviewed. Constitutional:       General: She is not in acute distress. Appearance: Normal appearance. She is not ill-appearing or toxic-appearing. HENT:      Head: Normocephalic and atraumatic. Right Ear: External ear normal.      Left Ear: External ear normal.      Nose: Nose normal.      Mouth/Throat:      Mouth: Mucous membranes are moist.      Pharynx: Oropharynx is clear. Eyes:      Extraocular Movements: Extraocular movements intact. Conjunctiva/sclera: Conjunctivae normal.   Cardiovascular:      Rate and Rhythm: Normal rate and regular rhythm.       Pulses: Normal pulses. Heart sounds: Normal heart sounds. Pulmonary:      Effort: Pulmonary effort is normal.      Breath sounds: Normal breath sounds. Abdominal:      General: Abdomen is flat. Bowel sounds are normal.      Palpations: Abdomen is soft. Musculoskeletal:         General: Normal range of motion. Cervical back: Normal range of motion. Skin:     General: Skin is warm and dry. Capillary Refill: Capillary refill takes less than 2 seconds. Neurological:      General: No focal deficit present. Mental Status: She is alert and oriented to person, place, and time. Psychiatric:         Mood and Affect: Mood normal.         Behavior: Behavior normal.        Procedures    No results found for any visits on 11/01/22. No orders to display                       Voice dictation software was used during the making of this note. This software is not perfect and grammatical and other typographical errors may be present. This note has not been completely proofread for errors.      Imani Dye PA-C  11/02/22 [id number]

## 2022-11-02 NOTE — ED NOTES
Reviewed discharge instructions with patient and spouse including administration instructions for 1 prescription. Emphasized to patient the importance of pushing fluids over the next few days. Opportunity given for questions and clarifications. Patient verbalizes understanding. Patient from department in wheelchair, in no apparent distress, accompanied by .      Karma Johnson RN  11/01/22 9819

## 2022-11-02 NOTE — ED TRIAGE NOTES
Patient presents in wheelchair to triage accompanied by . Pt sts she was diagnosed with the flu three days ago and since that time she has \"felt like I've been hit by a truck\". Pt has multiple concerns including chills, vomiting, diarrhea, productive cough, and chest tightness. Pt sts the chest pain has been constant since she had her mastectomy and it is worse when she coughs.

## 2022-11-02 NOTE — ED NOTES
Pt reports she \"missed all my blood pressure medicines today\" d/t emesis     Lida North Crohn, CHELY  11/02/22 1400

## 2022-11-02 NOTE — ED NOTES
TRANSFER - OUT REPORT:    Verbal report given to Rosa Pena RN on Juan Jose Manzanares  being transferred to [phone] for routine progression of patient care       Report consisted of patient's Situation, Background, Assessment and   Recommendations(SBAR). Information from the following report(s) ED SBAR, Intake/Output, MAR and Recent Results was reviewed with the receiving nurse. Lines:   Peripheral IV 11/02/22 Right Hand (Active)        Opportunity for questions and clarification was provided.       Patient transported with:  Registered Nurse       Grace Tracey RN  11/02/22 8628

## 2022-11-02 NOTE — ED PROVIDER NOTES
Emergency Department Provider Note                   PCP:                Mukesh Cifuentes MD               Age: 58 y.o. Sex: female       ICD-10-CM    1. Influenza A  J10.1       2. Dehydration  E86.0       3. YURY (acute kidney injury) ([facility] [address]  N17.9       4. Essential hypertension  I10           DISPOSITION Admitted 11/02/2022 05:01:42 PM       MDM  Number of Diagnoses or Management Options  YURY (acute kidney injury) ([facility] [address]  Dehydration  Essential hypertension  Influenza A  Diagnosis management comments: 42-year-old -American female with history of CHF and CKD presented to the emergency department with known influenza A. Patient has been vomiting for the past 5 days and has been unable to keep any of her medications down. Patient clinically is dehydrated. Patient's kidney function increased from 1.9-2.75. Patient has a significant history of CHF and requires gentle fluid hydration. Discussed with the hospitalist and they will admit patient for further treatment evaluation.        Amount and/or Complexity of Data Reviewed  Clinical lab tests: ordered and reviewed  Tests in the radiology section of CPT®: ordered and reviewed  Decide to obtain previous medical records or to obtain history from someone other than the patient: yes  Review and summarize past medical records: yes  Discuss the patient with other providers: yes  Independent visualization of images, tracings, or specimens: yes    Patient Progress  Patient progress: stable       ED Course as of 11/02/22 1706   Wed Nov 02, 2022   1515 Review of patient's past medical records reveal that patient's creatinine is 1.9. [JL]      ED Course User Index  [JL] Kathy Gonzalez MD        Orders Placed This Encounter   Procedures    XR CHEST PORTABLE    CBC    Comprehensive Metabolic Panel    Troponin    Urinalysis w rflx microscopic    Cardiac Monitor    Pulse Oximetry    EKG 12 Lead    Saline lock IV    ADMIT TO INPATIENT        Medications ketorolac (TORADOL) injection 15 mg (0 mg IntraVENous Held 11/2/22 1606)   0.9 % sodium chloride bolus (0 mLs IntraVENous Stopped 11/2/22 1622)   ondansetron (ZOFRAN) injection 8 mg (8 mg IntraVENous Given 11/2/22 1421)   dexamethasone (DECADRON) injection 10 mg (10 mg IntraVENous Given 11/2/22 1602)   ipratropium-albuterol (DUONEB) nebulizer solution 1 ampule (1 ampule Inhalation Given 11/2/22 1526)   labetalol (NORMODYNE;TRANDATE) injection 20 mg (20 mg IntraVENous Given 11/2/22 1607)       New Prescriptions    No medications on file        Meryl Echavarria is a 58 y.o. female who presents to the Emergency Department with chief complaint of    Chief Complaint   Patient presents with    Chest Pain     Left sided weakeness      55-year-old -American female with history of CKD, chronic systolic heart failure, diabetes and known influenza A infection presented to the emergency department for repeat evaluation with continued chest pain and vomiting. Patient has been seen multiple times in the ER over the past week. She was diagnosed with influenza A on 10/27/2022. Patient was seen again in the emergency department last evening. Patient states that since she was diagnosed with the flu, she has been having fevers, body aches, nausea, vomiting and cough. Patient states that she has not been able to keep any of her medications down secondary to her vomiting. Patient presented today with complaints of anterior chest wall pain that has been ongoing with her cough. Patient states that her fevers have now resolved and she just really feels weak and fatigued. She states that she gets lightheaded and dizzy when she stands up to walk because she feels like she is dehydrated. No Other complaints at this time. The history is provided by the patient and medical records. All other systems reviewed and are negative unless otherwise stated in the history of present illness section.     Review of Systems Constitutional:  Positive for activity change, appetite change, chills, fatigue and fever. HENT: Negative. Respiratory:  Positive for cough, chest tightness and shortness of breath. Cardiovascular:  Positive for chest pain. Gastrointestinal:  Positive for nausea and vomiting. Musculoskeletal:  Positive for myalgias. Skin: Negative. Psychiatric/Behavioral: Negative. All other systems reviewed and are negative. Past Medical History:   Diagnosis Date    Breast cancer ([facility] 2015    left stage 1 infiltrating ductal, radiation, and lumpectomy, letrozole    CAD (coronary artery disease)     multivessel, awaiting CABG and mitral valve repair/replacement    CKD (chronic kidney disease)     Diabetes ([facility] [address] typell, dx 2016    typell, ID, avfbs 160, last A1C was 10.8. denies lows    Heart failure (HCC)     EF 40-45%    Hypercholesteremia     Hypertension     Hypoxia 10/1/2019    Intertrochanteric fracture of right femur ([facility] [address] 2/24/2018    Mitral valve regurgitation     PUD (peptic ulcer disease)     age 12, no treatment since    Thrombocytopenia ([facility] [address] 10/2/2019    UTI (urinary tract infection) 9/30/2019        Past Surgical History:   Procedure Laterality Date    BREAST LUMPECTOMY  2015    CARDIAC SURGERY  2019    CABG    HIP FRACTURE SURGERY Right     ORTHOPEDIC SURGERY Right     hip fracture repair with hardware , not replacent        Family History   Problem Relation Age of Onset    Heart Disease Brother     Heart Disease Father     Kidney Disease Mother         Social History     Socioeconomic History    Marital status:      Spouse name: None    Number of children: None    Years of education: None    Highest education level: None   Tobacco Use    Smoking status: Never    Smokeless tobacco: Never   Substance and Sexual Activity    Alcohol use: Yes    Drug use: No   Social History Narrative    . Lives with her         Allergies: Patient has no known allergies.     Previous Medications    ACETAMINOPHEN (TYLENOL) 500 MG TABLET    Take 500 mg by mouth every 4 hours as needed    ALLOPURINOL (ZYLOPRIM) 100 MG TABLET    Take 50 mg by mouth every other day    ASPIRIN 81 MG CHEWABLE TABLET    Take 81 mg by mouth daily    CALCIUM CARBONATE-VITAMIN D (OYSTER SHELL CALCIUM/D) 500-200 MG-UNIT TABS    Take 1 tablet by mouth 2 times daily (with meals)    CARVEDILOL (COREG) 25 MG TABLET    Take 1 tablet by mouth 2 times daily (with meals)    DULAGLUTIDE 3 MG/0.5ML SOPN    Inject into the skin every 7 days    ESOMEPRAZOLE (NEXIUM) 40 MG DELAYED RELEASE CAPSULE    Take 40 mg by mouth daily    FERROUS SULFATE (IRON 325) 325 (65 FE) MG TABLET    Take 325 mg by mouth    FUROSEMIDE (LASIX) 20 MG TABLET    Take 20 mg by mouth 2 times daily    GABAPENTIN (NEURONTIN) 100 MG CAPSULE    Take 1 capsule by mouth at bedtime for 90 days. Intended supply: 30 days    INSULIN GLARGINE (LANTUS;BASAGLAR) 100 UNIT/ML INJECTION PEN    Inject 20 Units into the skin nightly The details of the medication are not available because there are pending changes by a home health clinician. LETROZOLE (FEMARA) 2.5 MG TABLET    Take 2.5 mg by mouth daily    LISINOPRIL (PRINIVIL;ZESTRIL) 40 MG TABLET    Take 40 mg by mouth daily    NEBIVOLOL (BYSTOLIC) 5 MG TABLET    TAKE 1 TABLET BY MOUTH EVERY DAY    ONDANSETRON (ZOFRAN) 4 MG TABLET    Take 1 tablet by mouth every 8 hours as needed for Nausea or Vomiting    ROSUVASTATIN (CRESTOR) 10 MG TABLET    Take 10 mg by mouth    SPIRONOLACTONE (ALDACTONE) 25 MG TABLET    TAKE 1 TABLET (25 MG) BY MOUTH 2 (TWO) TIMES A DAY        Vitals signs and nursing note reviewed.    Patient Vitals for the past 4 hrs:   Temp Pulse Resp BP SpO2   11/02/22 1703 -- 81 20 -- 98 %   11/02/22 1702 -- 80 17 -- 98 %   11/02/22 1659 -- 81 -- (!) 157/74 97 %   11/02/22 1646 -- 82 -- (!) 196/95 98 %   11/02/22 1631 -- 85 -- (!) 207/95 100 %   11/02/22 1626 -- 86 -- (!) 204/113 98 %   11/02/22 1611 -- 81 13 (!) 176/102 99 %   11/02/22 1526 -- 84 16 -- 98 %   11/02/22 1420 -- 85 25 (!) 195/109 100 %   11/02/22 1351 98.6 °F (37 °C) 87 18 (!) 196/97 99 %   11/02/22 1350 98.1 °F (36.7 °C) -- -- -- --   11/02/22 1349 -- 85 17 -- 100 %   11/02/22 1346 -- -- -- (!) 196/97 99 %          Physical Exam  Vitals and nursing note reviewed. Constitutional:       Appearance: Normal appearance. She is ill-appearing (Appears to not feel well). HENT:      Head: Normocephalic and atraumatic. Mouth/Throat:      Mouth: Mucous membranes are dry. Eyes:      Extraocular Movements: Extraocular movements intact. Pupils: Pupils are equal, round, and reactive to light. Cardiovascular:      Rate and Rhythm: Normal rate and regular rhythm. Pulses: Normal pulses. Heart sounds: Normal heart sounds. Pulmonary:      Effort: Pulmonary effort is normal.      Breath sounds: Normal breath sounds. Abdominal:      Palpations: Abdomen is soft. Tenderness: There is no abdominal tenderness. There is no guarding or rebound. Musculoskeletal:         General: Normal range of motion. Cervical back: Normal range of motion. Skin:     General: Skin is warm and dry. Neurological:      General: No focal deficit present. Mental Status: She is alert and oriented to person, place, and time. Psychiatric:         Mood and Affect: Mood normal.         Behavior: Behavior normal.         Thought Content: Thought content normal.         Judgment: Judgment normal.        ED EKG Interpretation  EKG was interpreted in the absence of a cardiologist.    Rate: 85  EKG Interpretation: Normal sinus rhythm. Nonspecific ST and T wave abnormalities.   Prolonged QT interval.  ST Segments: Nonspecific ST segments - NO STEMI    Results for orders placed or performed during the hospital encounter of 11/02/22   XR CHEST PORTABLE    Narrative    EXAMINATION: CHEST RADIOGRAPH 11/2/2022 2:17 PM    ACCESSION NUMBER: [id number]    INDICATION: flu/vomiting/htn    COMPARISON: Chest x-ray 10/27/2022 and 8/21/2022, chest CT 5/11/2021    TECHNIQUE: A single view of the chest was obtained. FINDINGS:     Support Lines and Tubes: None    Cardiac Silhouette: Within normal limits in size. Prior median sternotomy and  cardiac valve replacement. Lungs: No focal airspace disease. Pleura: No pleural effusion. No pneumothorax. Osseous Structures: Bilateral axillary surgical clips. Upper Abdomen: Unremarkable. Impression    No evidence of acute cardiopulmonary disease.      CBC   Result Value Ref Range    WBC 6.6 4.3 - 11.1 K/uL    RBC 4.46 4.05 - 5.2 M/uL    Hemoglobin 13.2 11.7 - 15.4 g/dL    Hematocrit 39.1 35.8 - 46.3 %    MCV 87.7 82.0 - 102.0 FL    MCH 29.6 26.1 - 32.9 PG    MCHC 33.8 31.4 - 35.0 g/dL    RDW 13.1 11.9 - 14.6 %    Platelets 390 521 - 653 K/uL    MPV 11.8 9.4 - 12.3 FL    nRBC 0.00 0.0 - 0.2 K/uL   Comprehensive Metabolic Panel   Result Value Ref Range    Sodium 141 133 - 143 mmol/L    Potassium 4.0 3.5 - 5.1 mmol/L    Chloride 110 101 - 110 mmol/L    CO2 22 21 - 32 mmol/L    Anion Gap 9 2 - 11 mmol/L    Glucose 118 (H) 65 - 100 mg/dL    BUN 62 (H) 8 - 23 MG/DL    Creatinine 2.75 (H) 0.6 - 1.0 MG/DL    Est, Glom Filt Rate 19 (L) >60 ml/min/1.73m2    Calcium 9.5 8.3 - 10.4 MG/DL    Total Bilirubin 0.3 0.2 - 1.1 MG/DL    ALT 15 12 - 65 U/L    AST 23 15 - 37 U/L    Alk Phosphatase 57 50 - 130 U/L    Total Protein 8.0 6.3 - 8.2 g/dL    Albumin 2.9 (L) 3.2 - 4.6 g/dL    Globulin 5.1 (H) 2.8 - 4.5 g/dL    Albumin/Globulin Ratio 0.6 0.4 - 1.6     Troponin   Result Value Ref Range    Troponin, High Sensitivity 23.0 (H) 0 - 14 pg/mL   Troponin   Result Value Ref Range    Troponin, High Sensitivity 19.5 (H) 0 - 14 pg/mL   Urinalysis w rflx microscopic   Result Value Ref Range    Color, UA YELLOW/STRAW      Appearance CLOUDY      Specific Gravity, UA 1.020 1.001 - 1.023      pH, Urine 5.0 5.0 - 9.0      Protein, UA >300 (A) NEG mg/dL Glucose, UA Negative mg/dL    Ketones, Urine Negative NEG mg/dL    Bilirubin Urine Negative NEG      Blood, Urine SMALL (A) NEG      Urobilinogen, Urine 1.0 0.2 - 1.0 EU/dL    Nitrite, Urine Negative NEG      Leukocyte Esterase, Urine Negative NEG      WBC, UA 0-3 0 /hpf    RBC, UA 10-20 0 /hpf    Epithelial Cells UA 10-20 0 /hpf    BACTERIA, URINE 2+ (H) 0 /hpf    Casts 0-3 0 /lpf    Yeast, UA MODERATE      OTHER OBSERVATIONS RESULTS VERIFIED MANUALLY     EKG 12 Lead   Result Value Ref Range    Ventricular Rate 85 BPM    Atrial Rate 85 BPM    P-R Interval 182 ms    QRS Duration 86 ms    Q-T Interval 392 ms    QTc Calculation (Bazett) 466 ms    P Axis 42 degrees    R Axis 4 degrees    T Axis 101 degrees    Diagnosis Normal sinus rhythm         XR CHEST PORTABLE   Final Result      No evidence of acute cardiopulmonary disease. Voice dictation software was used during the making of this note. This software is not perfect and grammatical and other typographical errors may be present. This note has not been completely proofread for errors.        Suhail Meadows MD  11/02/22 8197

## 2022-11-02 NOTE — H&P
Hospitalist History and Physical   Admit Date:  2022  1:43 PM   Name:  Christianne Padilla   Age:  58 y.o. Sex:  female  :  1960   MRN:  [id number]   Room:  [facility]/    Presenting Complaint: Chest Pain (Left sided weakeness)     Reason(s) for Admission: Dehydration [E86.0]  Essential hypertension [I10]  Influenza A [J10.1]  YURY (acute kidney injury) ([facility] [N17.9]  Acute renal failure superimposed on stage 3 chronic kidney disease, unspecified acute renal failure type, unspecified whether stage 3a or 3b CKD ([facility] [N17.9, N18.30]     History of Present Illness:   Christianne Padilla is a 58 y.o. female with medical history of hypertension, CKD3, CHF who presented with 5 days of progressive weakness and fatigue. She has had n/v/d, cough, dyspnea on exertion. In the ED she was noted to have increased sCr, BNP with no elevation of WBC. She was influenza positive but well outside of the treatment window for oselstamivir. Hospitalist was consulted and she was admitted for further evaluation and management. Review of Systems:  10 systems reviewed and negative except as noted in HPI. Assessment & Plan:   Acute renal failure superimposed on stage 3b chronic kidney disease, unspecified acute renal failure type,   -avoid nephrotoxic substances  -gentle resuscitation in the setting of CHF  -consult nephrology    Gout  -allopurinol    Chronic kidney disease, stage 3b  Noted     Mixed hyperlipidemia  -hold lisinopril  -metorpolol    Chronic systolic congestive heart failure  Not in acute exacerbation  -optomize meds prior to discharge      S/P CABG x 3  Noted       Hyperglycemia due to type 2 diabetes mellitus ([facility]  -basal 20U  -sliding scale if needed      Anticipated discharge needs: To be determined    Diet: ADULT DIET;  Regular; 4 carb choices (60 gm/meal)  ADULT ORAL NUTRITION SUPPLEMENT; Breakfast, Lunch, Dinner; Diabetic Oral Supplement  VTE ppx: heparin  Code status: [place] Problems:  Principal Problem:    Acute renal failure superimposed on stage 3 chronic kidney disease, unspecified acute renal failure type, unspecified whether stage 3a or 3b CKD (HCC)  Active Problems:    Gout    Chronic kidney disease, stage 3b (HCC)    Mixed hyperlipidemia    Chronic systolic congestive heart failure (HCC)    S/P CABG x 3    Hyperglycemia due to type 2 diabetes mellitus ([facility]  Resolved Problems:    * No resolved hospital problems.  *       Past History:     Past Medical History:   Diagnosis Date    Breast cancer ([facility] 2015    left stage 1 infiltrating ductal, radiation, and lumpectomy, letrozole    CAD (coronary artery disease)     multivessel, awaiting CABG and mitral valve repair/replacement    CKD (chronic kidney disease)     Diabetes ([facility] typell, dx 2016    typell, ID, avfbs 160, last A1C was 10.8. denies lows    Heart failure (HCC)     EF 40-45%    Hypercholesteremia     Hypertension     Hypoxia 10/1/2019    Intertrochanteric fracture of right femur ([facility] 2/24/2018    Mitral valve regurgitation     PUD (peptic ulcer disease)     age 12, no treatment since    Thrombocytopenia ([facility] 10/2/2019    UTI (urinary tract infection) 9/30/2019       Past Surgical History:   Procedure Laterality Date    BREAST LUMPECTOMY  2015    CARDIAC SURGERY  2019    CABG    HIP FRACTURE SURGERY Right     ORTHOPEDIC SURGERY Right     hip fracture repair with hardware , not replacent        Social History     Tobacco Use    Smoking status: Never    Smokeless tobacco: Never   Substance Use Topics    Alcohol use: Yes      Social History     Substance and Sexual Activity   Drug Use No       Family History   Problem Relation Age of Onset    Heart Disease Brother     Heart Disease Father     Kidney Disease Mother         Immunization History   Administered Date(s) Administered    COVID-19, MODERNA BLUE border, Primary or Immunocompromised, (age 12y+), IM, 100 mcg/0.5mL 04/04/2021, 05/02/2021, 11/09/2021    Influenza Virus Vaccine 10/12/2017, 09/26/2019, 08/22/2020, 10/12/2020    Influenza, FLUARIX, FLULAVAL, [address] (age 10 mo+) AND AFLURIA, (age 1 y+), PF, 0.5mL 09/26/2019    Influenza, FLUBLOK, (age 25 y+), PF, 0.5mL 10/20/2021    PPD Test 02/24/2018, 03/16/2018, 10/01/2019    Pneumococcal Polysaccharide ([id number]) 12/27/2012    Pneumococcal Vaccine 04/01/2022    Tdap (Boostrix, Adacel) 09/13/1993, 07/30/2020    Zoster Recombinant (Shingrix) 08/22/2020     No Known Allergies  Prior to Admit Medications:  Current Outpatient Medications   Medication Instructions    acetaminophen (TYLENOL) 500 mg, Oral, EVERY 4 HOURS PRN    allopurinol (ZYLOPRIM) 50 mg, Oral, EVERY OTHER DAY    aspirin 81 mg, Oral, DAILY    Calcium Carbonate-Vitamin D (OYSTER SHELL CALCIUM/D) 500-200 MG-UNIT TABS 1 tablet, Oral, 2 TIMES DAILY WITH MEALS    carvedilol (COREG) 25 mg, Oral, 2 TIMES DAILY WITH MEALS    Dulaglutide 3 MG/0.5ML SOPN SubCUTAneous, EVERY 7 DAYS    esomeprazole (NEXIUM) 40 mg, Oral, DAILY    ferrous sulfate (IRON 325) 325 mg, Oral    furosemide (LASIX) 20 mg, Oral, 2 TIMES DAILY    gabapentin (NEURONTIN) 100 mg, Oral, Nightly, Intended supply: 30 days    insulin glargine (LANTUS;BASAGLAR) 20 Units, SubCUTAneous, NIGHTLY, The details of the medication are not available because there are pending changes by a home health clinician.     letrozole (FEMARA) 2.5 mg, Oral, DAILY    lisinopril (PRINIVIL;ZESTRIL) 40 mg, Oral, DAILY    nebivolol (BYSTOLIC) 5 MG tablet TAKE 1 TABLET BY MOUTH EVERY DAY    ondansetron (ZOFRAN) 4 mg, Oral, EVERY 8 HOURS PRN    rosuvastatin (CRESTOR) 10 mg, Oral    spironolactone (ALDACTONE) 25 MG tablet TAKE 1 TABLET (25 MG) BY MOUTH 2 (TWO) TIMES A DAY         Objective:   Patient Vitals for the past 24 hrs:   Temp Pulse Resp BP SpO2   11/02/22 1928 98.4 °F (36.9 °C) 92 -- (!) 163/92 96 %   11/02/22 1814 99 °F (37.2 °C) 89 16 (!) 184/100 98 %   11/02/22 1756 -- 83 -- -- 98 %   11/02/22 1755 -- 83 15 (!) 152/82 99 % 11/02/22 1733 -- 85 -- (!) 153/86 99 %   11/02/22 1713 -- 81 16 (!) 175/77 98 %   11/02/22 1703 -- 81 20 -- 98 %   11/02/22 1702 -- 80 17 -- 98 %   11/02/22 1659 -- 81 -- (!) 157/74 97 %   11/02/22 1646 -- 82 -- (!) 196/95 98 %   11/02/22 1631 -- 85 -- (!) 207/95 100 %   11/02/22 1626 -- 86 -- (!) 204/113 98 %   11/02/22 1611 -- 81 13 (!) 176/102 99 %   11/02/22 1526 -- 84 16 -- 98 %   11/02/22 1420 -- 85 25 (!) 195/109 100 %   11/02/22 1351 98.6 °F (37 °C) 87 18 (!) 196/97 99 %   11/02/22 1350 98.1 °F (36.7 °C) -- -- -- --   11/02/22 1349 -- 85 17 -- 100 %   11/02/22 1346 -- -- -- (!) 196/97 99 %       Oxygen Therapy  SpO2: 96 %  Pulse Oximeter Device Mode: Continuous  Pulse Oximeter Device Location: Left, Finger  O2 Device: None (Room air)    Estimated body mass index is 28.34 kg/m² as calculated from the following:    Height as of this encounter: 5' 3\" (1.6 m). Weight as of this encounter: 160 lb (72.6 kg). No intake or output data in the 24 hours ending 11/02/22 2032      Blood pressure (!) 163/92, pulse 92, temperature 98.4 °F (36.9 °C), temperature source Oral, resp. rate 16, height 5' 3\" (1.6 m), weight 160 lb (72.6 kg), SpO2 96 %. Physical Exam  Vitals and nursing note reviewed. Constitutional:       General: She is not in acute distress. Appearance: She is ill-appearing. She is not diaphoretic. Eyes:      Extraocular Movements: Extraocular movements intact. Cardiovascular:      Rate and Rhythm: Normal rate and regular rhythm. Pulses: Normal pulses. Heart sounds: Murmur heard. Pulmonary:      Effort: Pulmonary effort is normal. No respiratory distress. Breath sounds: No wheezing. Abdominal:      General: Bowel sounds are normal. There is no distension. Palpations: Abdomen is soft. Musculoskeletal:         General: No deformity. Right lower leg: No edema. Left lower leg: No edema. Skin:     General: Skin is dry.       Coloration: Skin is not jaundiced or pale.   Neurological:      General: No focal deficit present. Mental Status: She is alert and oriented to person, place, and time.    Psychiatric:         Mood and Affect: Mood normal.         Behavior: Behavior normal.           I have personally reviewed labs and tests showing:  Recent Labs:  Recent Results (from the past 24 hour(s))   EKG 12 Lead    Collection Time: 11/02/22  1:53 PM   Result Value Ref Range    Ventricular Rate 85 BPM    Atrial Rate 85 BPM    P-R Interval 182 ms    QRS Duration 86 ms    Q-T Interval 392 ms    QTc Calculation (Bazett) 466 ms    P Axis 42 degrees    R Axis 4 degrees    T Axis 101 degrees    Diagnosis Normal sinus rhythm    CBC    Collection Time: 11/02/22  2:19 PM   Result Value Ref Range    WBC 6.6 4.3 - 11.1 K/uL    RBC 4.46 4.05 - 5.2 M/uL    Hemoglobin 13.2 11.7 - 15.4 g/dL    Hematocrit 39.1 35.8 - 46.3 %    MCV 87.7 82.0 - 102.0 FL    MCH 29.6 26.1 - 32.9 PG    MCHC 33.8 31.4 - 35.0 g/dL    RDW 13.1 11.9 - 14.6 %    Platelets 863 140 - 904 K/uL    MPV 11.8 9.4 - 12.3 FL    nRBC 0.00 0.0 - 0.2 K/uL   Comprehensive Metabolic Panel    Collection Time: 11/02/22  2:19 PM   Result Value Ref Range    Sodium 141 133 - 143 mmol/L    Potassium 4.0 3.5 - 5.1 mmol/L    Chloride 110 101 - 110 mmol/L    CO2 22 21 - 32 mmol/L    Anion Gap 9 2 - 11 mmol/L    Glucose 118 (H) 65 - 100 mg/dL    BUN 62 (H) 8 - 23 MG/DL    Creatinine 2.75 (H) 0.6 - 1.0 MG/DL    Est, Glom Filt Rate 19 (L) >60 ml/min/1.73m2    Calcium 9.5 8.3 - 10.4 MG/DL    Total Bilirubin 0.3 0.2 - 1.1 MG/DL    ALT 15 12 - 65 U/L    AST 23 15 - 37 U/L    Alk Phosphatase 57 50 - 130 U/L    Total Protein 8.0 6.3 - 8.2 g/dL    Albumin 2.9 (L) 3.2 - 4.6 g/dL    Globulin 5.1 (H) 2.8 - 4.5 g/dL    Albumin/Globulin Ratio 0.6 0.4 - 1.6     Troponin    Collection Time: 11/02/22  2:19 PM   Result Value Ref Range    Troponin, High Sensitivity 23.0 (H) 0 - 14 pg/mL   Troponin    Collection Time: 11/02/22  4:15 PM   Result Value Ref Range Troponin, High Sensitivity 19.5 (H) 0 - 14 pg/mL   Urinalysis w rflx microscopic    Collection Time: 11/02/22  4:15 PM   Result Value Ref Range    Color, UA YELLOW/STRAW      Appearance CLOUDY      Specific Gravity, UA 1.020 1.001 - 1.023      pH, Urine 5.0 5.0 - 9.0      Protein, UA >300 (A) NEG mg/dL    Glucose, UA Negative mg/dL    Ketones, Urine Negative NEG mg/dL    Bilirubin Urine Negative NEG      Blood, Urine SMALL (A) NEG      Urobilinogen, Urine 1.0 0.2 - 1.0 EU/dL    Nitrite, Urine Negative NEG      Leukocyte Esterase, Urine Negative NEG      WBC, UA 0-3 0 /hpf    RBC, UA 10-20 0 /hpf    Epithelial Cells UA 10-20 0 /hpf    BACTERIA, URINE 2+ (H) 0 /hpf    Casts 0-3 0 /lpf    Yeast, UA MODERATE      OTHER OBSERVATIONS RESULTS VERIFIED MANUALLY         I have personally reviewed imaging studies showing:  XR CHEST PORTABLE    Result Date: 11/2/2022  EXAMINATION: CHEST RADIOGRAPH 11/2/2022 2:17 PM ACCESSION NUMBER: [id number] INDICATION: flu/vomiting/htn COMPARISON: Chest x-ray 10/27/2022 and 8/21/2022, chest CT 5/11/2021 TECHNIQUE: A single view of the chest was obtained. FINDINGS: Support Lines and Tubes: None Cardiac Silhouette: Within normal limits in size. Prior median sternotomy and cardiac valve replacement. Lungs: No focal airspace disease. Pleura: No pleural effusion. No pneumothorax. Osseous Structures: Bilateral axillary surgical clips. Upper Abdomen: Unremarkable. No evidence of acute cardiopulmonary disease. Echocardiogram:  09/23/21    TRANSTHORACIC ECHOCARDIOGRAM (TTE) COMPLETE (CONTRAST/BUBBLE/3D PRN) 09/26/2021  6:54 PM, 09/26/2021 12:00 AM (Final)    Narrative  This is a summary report. The complete report is available in the patient's medical record. If you cannot access the medical record, please contact the sending organization for a detailed fax or copy. · LA: Dilated left atrium. Left Atrium volume index is 35.3 mL/m2.   · TV: Mild to moderate tricuspid valve regurgitation is present. Right Ventricular Arterial Pressure (RVSP) is 35 mmHg. · MV: Mitral valve is prosthetic. Mitral annular calcification. Mitral valve mean gradient is 10 mmHg. · LV: Calculated LVEF is 50%. Normal cavity size and wall thickness. Low normal systolic function.     Signed by: Dylan Mccann MD on 9/26/2021  6:54 PM, Signed by: Unknown Provider Result on 9/26/2021 12:00 AM        Orders Placed This Encounter   Medications    0.9 % sodium chloride bolus    ondansetron (ZOFRAN) injection 8 mg    dexamethasone (DECADRON) injection 10 mg    ketorolac (TORADOL) injection 15 mg    ipratropium-albuterol (DUONEB) nebulizer solution 1 ampule     Order Specific Question:   Initiate RT Bronchodilator Protocol     Answer:   Yes - ED protocol    labetalol (NORMODYNE;TRANDATE) injection 20 mg    aspirin chewable tablet 81 mg    allopurinol (ZYLOPRIM) tablet 50 mg    pantoprazole (PROTONIX) tablet 40 mg    furosemide (LASIX) tablet 20 mg    gabapentin (NEURONTIN) capsule 100 mg    insulin glargine (LANTUS) injection vial 20 Units    lisinopril (PRINIVIL;ZESTRIL) tablet 40 mg    DISCONTD: ondansetron (ZOFRAN) tablet 4 mg    rosuvastatin (CRESTOR) tablet 10 mg    0.9 % sodium chloride infusion    OR Linked Order Group     ondansetron (ZOFRAN-ODT) disintegrating tablet 4 mg     ondansetron (ZOFRAN) injection 4 mg    polyethylene glycol (GLYCOLAX) packet 17 g    OR Linked Order Group     acetaminophen (TYLENOL) tablet 650 mg     acetaminophen (TYLENOL) suppository 650 mg    heparin (porcine) injection 5,000 Units    OR Linked Order Group     potassium chloride (KLOR-CON M) extended release tablet 40 mEq     potassium bicarb-citric acid (EFFER-K) effervescent tablet 40 mEq     potassium chloride 10 mEq/100 mL IVPB (Peripheral Line)    magnesium sulfate 2000 mg in 50 mL IVPB premix    OR Linked Order Group     sodium phosphate 10 mmol in sodium chloride 0.9 % 250 mL IVPB     sodium phosphate 15 mmol in sodium chloride 0.9 % 250 mL IVPB     sodium phosphate 20 mmol in sodium chloride 0.9 % 500 mL IVPB         Signed:  Janeth Velasquez MD

## 2022-11-03 ENCOUNTER — APPOINTMENT (OUTPATIENT)
Dept: ULTRASOUND IMAGING | Age: 62
DRG: 683 | End: 2022-11-03
Payer: MEDICARE

## 2022-11-03 LAB
ALBUMIN SERPL-MCNC: 2.6 G/DL (ref 3.2–4.6)
ANION GAP SERPL CALC-SCNC: 8 MMOL/L (ref 2–11)
BUN SERPL-MCNC: 65 MG/DL (ref 8–23)
CALCIUM SERPL-MCNC: 9.2 MG/DL (ref 8.3–10.4)
CHLORIDE SERPL-SCNC: 112 MMOL/L (ref 101–110)
CO2 SERPL-SCNC: 19 MMOL/L (ref 21–32)
CREAT SERPL-MCNC: 2.45 MG/DL (ref 0.6–1)
CREAT UR-MCNC: 97 MG/DL
CREAT UR-MCNC: 99 MG/DL
ERYTHROCYTE [DISTWIDTH] IN BLOOD BY AUTOMATED COUNT: 12.8 % (ref 11.9–14.6)
EST. AVERAGE GLUCOSE BLD GHB EST-MCNC: 160 MG/DL
GLUCOSE BLD STRIP.AUTO-MCNC: 152 MG/DL (ref 65–100)
GLUCOSE BLD STRIP.AUTO-MCNC: 154 MG/DL (ref 65–100)
GLUCOSE SERPL-MCNC: 179 MG/DL (ref 65–100)
HBA1C MFR BLD: 7.2 % (ref 4.8–5.6)
HCT VFR BLD AUTO: 35.9 % (ref 35.8–46.3)
HGB BLD-MCNC: 11.8 G/DL (ref 11.7–15.4)
MCH RBC QN AUTO: 28.9 PG (ref 26.1–32.9)
MCHC RBC AUTO-ENTMCNC: 32.9 G/DL (ref 31.4–35)
MCV RBC AUTO: 88 FL (ref 82–102)
NRBC # BLD: 0 K/UL (ref 0–0.2)
PHOSPHATE SERPL-MCNC: 5.1 MG/DL (ref 2.3–3.7)
PLATELET # BLD AUTO: 214 K/UL (ref 150–450)
PMV BLD AUTO: 10.7 FL (ref 9.4–12.3)
POTASSIUM SERPL-SCNC: 4.1 MMOL/L (ref 3.5–5.1)
PROT UR-MCNC: 805 MG/DL
PROT/CREAT UR-RTO: 8.3
RBC # BLD AUTO: 4.08 M/UL (ref 4.05–5.2)
SERVICE CMNT-IMP: ABNORMAL
SERVICE CMNT-IMP: ABNORMAL
SODIUM SERPL-SCNC: 139 MMOL/L (ref 133–143)
SODIUM UR-SCNC: 39 MMOL/L
TSH W FREE THYROID IF ABNORMAL: 0.75 UIU/ML (ref 0.36–3.74)
URATE SERPL-MCNC: 8.8 MG/DL (ref 2.6–6)
WBC # BLD AUTO: 6.5 K/UL (ref 4.3–11.1)

## 2022-11-03 PROCEDURE — 76770 US EXAM ABDO BACK WALL COMP: CPT

## 2022-11-03 PROCEDURE — 6370000000 HC RX 637 (ALT 250 FOR IP): Performed by: FAMILY MEDICINE

## 2022-11-03 PROCEDURE — 82962 GLUCOSE BLOOD TEST: CPT

## 2022-11-03 PROCEDURE — 80048 BASIC METABOLIC PNL TOTAL CA: CPT

## 2022-11-03 PROCEDURE — 1100000000 HC RM PRIVATE

## 2022-11-03 PROCEDURE — 2580000003 HC RX 258: Performed by: FAMILY MEDICINE

## 2022-11-03 PROCEDURE — 36415 COLL VENOUS BLD VENIPUNCTURE: CPT

## 2022-11-03 PROCEDURE — 82570 ASSAY OF URINE CREATININE: CPT

## 2022-11-03 PROCEDURE — 84550 ASSAY OF BLOOD/URIC ACID: CPT

## 2022-11-03 PROCEDURE — 84300 ASSAY OF URINE SODIUM: CPT

## 2022-11-03 PROCEDURE — 84443 ASSAY THYROID STIM HORMONE: CPT

## 2022-11-03 PROCEDURE — 6360000002 HC RX W HCPCS: Performed by: FAMILY MEDICINE

## 2022-11-03 PROCEDURE — 82040 ASSAY OF SERUM ALBUMIN: CPT

## 2022-11-03 PROCEDURE — 84100 ASSAY OF PHOSPHORUS: CPT

## 2022-11-03 PROCEDURE — 85027 COMPLETE CBC AUTOMATED: CPT

## 2022-11-03 PROCEDURE — 83036 HEMOGLOBIN GLYCOSYLATED A1C: CPT

## 2022-11-03 RX ORDER — PROCHLORPERAZINE EDISYLATE 5 MG/ML
10 INJECTION INTRAMUSCULAR; INTRAVENOUS ONCE
Status: COMPLETED | OUTPATIENT
Start: 2022-11-04 | End: 2022-11-04

## 2022-11-03 RX ORDER — LORAZEPAM 0.5 MG/1
0.5 TABLET ORAL EVERY 6 HOURS PRN
Status: DISCONTINUED | OUTPATIENT
Start: 2022-11-03 | End: 2022-11-05 | Stop reason: HOSPADM

## 2022-11-03 RX ORDER — METOCLOPRAMIDE HYDROCHLORIDE 5 MG/ML
10 INJECTION INTRAMUSCULAR; INTRAVENOUS ONCE
Status: COMPLETED | OUTPATIENT
Start: 2022-11-03 | End: 2022-11-03

## 2022-11-03 RX ADMIN — ASPIRIN 81 MG: 81 TABLET, CHEWABLE ORAL at 07:42

## 2022-11-03 RX ADMIN — METOCLOPRAMIDE 10 MG: 5 INJECTION, SOLUTION INTRAMUSCULAR; INTRAVENOUS at 15:26

## 2022-11-03 RX ADMIN — ONDANSETRON 4 MG: 2 INJECTION INTRAMUSCULAR; INTRAVENOUS at 21:24

## 2022-11-03 RX ADMIN — HEPARIN SODIUM 5000 UNITS: 5000 INJECTION INTRAVENOUS; SUBCUTANEOUS at 05:35

## 2022-11-03 RX ADMIN — ACETAMINOPHEN 650 MG: 325 TABLET, FILM COATED ORAL at 21:23

## 2022-11-03 RX ADMIN — ONDANSETRON 4 MG: 2 INJECTION INTRAMUSCULAR; INTRAVENOUS at 13:35

## 2022-11-03 RX ADMIN — ONDANSETRON 4 MG: 2 INJECTION INTRAMUSCULAR; INTRAVENOUS at 05:35

## 2022-11-03 RX ADMIN — HEPARIN SODIUM 5000 UNITS: 5000 INJECTION INTRAVENOUS; SUBCUTANEOUS at 21:15

## 2022-11-03 RX ADMIN — HEPARIN SODIUM 5000 UNITS: 5000 INJECTION INTRAVENOUS; SUBCUTANEOUS at 13:47

## 2022-11-03 RX ADMIN — INSULIN GLARGINE 20 UNITS: 100 INJECTION, SOLUTION SUBCUTANEOUS at 21:30

## 2022-11-03 RX ADMIN — METOPROLOL TARTRATE 25 MG: 25 TABLET, FILM COATED ORAL at 07:37

## 2022-11-03 RX ADMIN — ROSUVASTATIN CALCIUM 10 MG: 5 TABLET, COATED ORAL at 21:15

## 2022-11-03 RX ADMIN — PANTOPRAZOLE SODIUM 40 MG: 40 TABLET, DELAYED RELEASE ORAL at 07:41

## 2022-11-03 RX ADMIN — ACETAMINOPHEN 650 MG: 325 TABLET, FILM COATED ORAL at 07:37

## 2022-11-03 RX ADMIN — METOPROLOL TARTRATE 25 MG: 25 TABLET, FILM COATED ORAL at 21:15

## 2022-11-03 RX ADMIN — SODIUM CHLORIDE, POTASSIUM CHLORIDE, SODIUM LACTATE AND CALCIUM CHLORIDE: 600; 310; 30; 20 INJECTION, SOLUTION INTRAVENOUS at 15:27

## 2022-11-03 ASSESSMENT — PAIN SCALES - GENERAL
PAINLEVEL_OUTOF10: 3
PAINLEVEL_OUTOF10: 0

## 2022-11-03 ASSESSMENT — PAIN DESCRIPTION - LOCATION: LOCATION: HEAD

## 2022-11-03 NOTE — PROGRESS NOTES
Patient received resting in bed. Shift assessment completed. Patient  at bedside. Isolation precautions maintained, will monitor.

## 2022-11-03 NOTE — PROGRESS NOTES
Comprehensive Nutrition Assessment  This assessment was completed remotely from within the hospital.   Type and Reason for Visit: Initial, Positive Nutrition Screen  Malnutrition Screening Tool: Malnutrition Screen  Have you recently lost weight without trying?: 2 to 13 pounds (1 point)  Have you been eating poorly because of a decreased appetite?: Yes (1 point)  Malnutrition Screening Tool Score: 2    Nutrition Recommendations/Plan:   Food and/or Nutrient Delivery: Continue Current Diet  Medical food supplement therapy:  Discontinue d/t prior non acceptance/dislike       Coordination of Nutrition Care: Continue to monitor while inpatient, Interdisciplinary Rounds   Order placed to obtain weight       Malnutrition Assessment:  Malnutrition Status: Insufficient data (No NFPE, diet hx or current actual weight)  C  Nutrition Assessment:  Nutrition History: 11/3: Per EMR review, pt has not accepted ONS during prior admissions, dislikes \"milk\" based supplements. [facility] In 2019 was drinking 2L/ regular soda per day as she was depressed from loss of son and was not eating. Was aware of CHO content of regular soda. Historically does not like hospital food and has eaten food from outside sources. Nutrition Background:   H/O: DM, CAD, CHF, HTN, breast cancer,gout, CKD,S/P CABG and MVR (2019). Flu + 10/27. Presented with vomiting x 5 days and unable to keep any medications down. Admitted d/t dehydration and YURY on CKD    Nutrition Interval:  Pt did not answer room phone. Per provider note-denies N/V today, still weak and without appetite. Did receive 2 doses of zofran and 1 dose of reglan  today           Current Nutrition Therapies:  ADULT DIET;  Regular; 4 carb choices (60 gm/meal)  ADULT ORAL NUTRITION SUPPLEMENT; Breakfast, Lunch, Dinner; Diabetic Oral Supplement    Current Intake:   Average Meal Intake: Unable to assess        Anthropometric Measures:  Height: 5' 3\" (160 cm)  Current Body Wt: 160 lb (72.6 kg) (11/2), Weight source: Stated  BMI: 28.4  Admission Body Weight: 160 lb (72.6 kg) (stated)  Ideal Body Weight (Kg) (Calculated): 52 kg (115 lbs),    Usual Body Wt:  (176-183# per EMR review of OV), Percent weight change:  Defer assessment until actual current weight obtained       Edema: No data recorded  BMI Category Overweight (BMI 25.0-29. 9)  Estimated Daily Nutrient Needs:  Energy (kcal/day): 2380-9122 (25-30 kcal/kg) (Kcal/kg Weight used: 52.3 kg Ideal  Protein (g/day): 42-52 (0.8-1 g/kg) Weight Used: (Ideal) 52.3 kg  Fluid (ml/day):   (1 ml/kcal)    Nutrition Diagnosis:   Predicted inadequate energy intake related to  (predicted intake < needs) as evidenced by nausea, vomiting, poor intake prior to admission (per EMR)    Goals: Active Goal: PO intake 50% or greater, by next RD assessment       Nutrition Monitoring and Evaluation:      Food/Nutrient Intake Outcomes: Food and Nutrient Intake  Physical Signs/Symptoms Outcomes: Weight    Discharge Planning:     Too soon to determine    Genoveva Guillen, RD,LD, [id number]

## 2022-11-03 NOTE — CONSULTS
Nephrology consult    Admission Date:  11/2/2022    Admission Diagnosis  Dehydration [E86.0]  Essential hypertension [I10]  Influenza A [J10.1]  YURY (acute kidney injury) ([facility] [N17.9]  Acute renal failure superimposed on stage 3 chronic kidney disease, unspecified acute renal failure type, unspecified whether stage 3a or 3b CKD (HCC) [N17.9, N18.30]    We are asked by Faina Young MD    History of Present Illness: Ms. Wilmer Odonnell is a 58 y.o female with PMH significant for HTN, CHF, CAD s/p CABG, CKD 3b, gout, PUD, DM type II and breast cancer reportedly admitted with complaints of shortness of breath, cough, nausea and vomiting for 6-7 days, found to influenza. We are consulted for YURY/CKD 3b. From a renal standpoint her Cr/BUN on admission was 2.75/62->2.45/65, baseline Cr in the 2s. UA noted with >300 protein, BP on admission was 196/97. A1C 11/3 was 7.2. Home meds significant for aldactone, lasix, and ACEi. Pt seen and examined in room spouse and daughter at the bedside, pt complaints of chills, weakness, poor appetite. Nausea and vomiting controlled with antiemetics, + heart burn symptoms, chest tightness with deep inspiration. Denies syncope, edema, NSAID use, dysuria, urinary hesitancy or urgency. States she was recently treated or UTI outpt.      Past Medical History:   Diagnosis Date    Bre[facility] 2015    left stage 1 infiltrating ductal, radiation, and lumpectomy, letrozole    CAD (coronary artery disease)     multivessel, awaiting CABG and mitral valve repair/replacement    CKD (chronic kidney disease)     Diabetes ([facility] typell, dx 2016    typell, ID, avfbs 160, last A1C was 10.8. denies lows    Heart failure ([facility]     EF 40-45%    Hypercholesteremia     Hypertension     Hypoxia 10/1/2019    Intertrochanteric fracture of right femur ([facility] 2/24/2018    Mitral valve regurgitation     PUD (peptic ulcer disease)     age 12, no treatment since    Thrombocytopenia ([facility] 10/2/2019    UTI (urinary tract infection) 9/30/2019      Past Surgical History:   Procedure Laterality Date    BREAST LUMPECTOMY  2015    CARDIAC SURGERY  2019    CABG    HIP FRACTURE SURGERY Right     ORTHOPEDIC SURGERY Right     hip fracture repair with hardware , not replacent      Current Facility-Administered Medications   Medication Dose Route Frequency    aspirin chewable tablet 81 mg  81 mg Oral Daily    allopurinol (ZYLOPRIM) tablet 50 mg  50 mg Oral Every Other Day    pantoprazole (PROTONIX) tablet 40 mg  40 mg Oral QAM AC    [Held by provider] furosemide (LASIX) tablet 20 mg  20 mg Oral BID    gabapentin (NEURONTIN) capsule 100 mg  100 mg Oral Nightly    insulin glargine (LANTUS) injection vial 20 Units  20 Units SubCUTAneous Nightly    [Held by provider] lisinopril (PRINIVIL;ZESTRIL) tablet 40 mg  40 mg Oral Daily    rosuvastatin (CRESTOR) tablet 10 mg  10 mg Oral Nightly    0.9 % sodium chloride infusion   IntraVENous PRN    ondansetron (ZOFRAN-ODT) disintegrating tablet 4 mg  4 mg Oral Q8H PRN    Or    ondansetron (ZOFRAN) injection 4 mg  4 mg IntraVENous Q6H PRN    polyethylene glycol (GLYCOLAX) packet 17 g  17 g Oral Daily PRN    acetaminophen (TYLENOL) tablet 650 mg  650 mg Oral Q6H PRN    Or    acetaminophen (TYLENOL) suppository 650 mg  650 mg Rectal Q6H PRN    heparin (porcine) injection 5,000 Units  5,000 Units SubCUTAneous 3 times per day    potassium chloride (KLOR-CON M) extended release tablet 40 mEq  40 mEq Oral PRN    Or    potassium bicarb-citric acid (EFFER-K) effervescent tablet 40 mEq  40 mEq Oral PRN    Or    potassium chloride 10 mEq/100 mL IVPB (Peripheral Line)  10 mEq IntraVENous PRN    magnesium sulfate 2000 mg in 50 mL IVPB premix  2,000 mg IntraVENous PRN    sodium phosphate 10 mmol in sodium chloride 0.9 % 250 mL IVPB  10 mmol IntraVENous PRN    Or    sodium phosphate 15 mmol in sodium chloride 0.9 % 250 mL IVPB  15 mmol IntraVENous PRN    Or    sodium phosphate 20 mmol in sodium chloride 0.9 % 500 mL IVPB  20 mmol IntraVENous PRN    metoprolol tartrate (LOPRESSOR) tablet 25 mg  25 mg Oral BID    lactated ringers infusion   IntraVENous Continuous     No Known Allergies   Social History     Tobacco Use    Smoking status: Never    Smokeless tobacco: Never   Substance Use Topics    Alcohol use: Yes      Family History   Problem Relation Age of Onset    Heart Disease Brother     Heart Disease Father     Kidney Disease Mother         Review of Systems  Gen - intermittent fever,  appetite poor, + weakness, chills  HEENT - no sore throat, no decreased vision, no hearing loss  CV - no chest pain, no palpitation, no orthopnea  Lung - + exertional shortness of breath, + cough, no hemoptysis  Abd - no tenderness, + nausea/vomiting- controlled, no bloody stool  Ext - no edema, no clubbing, no cyanosis  Musculoskeletal - no joint pain, + back pain  Neurologic - no headaches, no dizziness, no seizures  Psychiatric - no anxiety, no depression  Skin - no rashes, no pupura  Genitourinary - no decreased urine output, no hematuria, no dysuria     Objective:     Vitals:    11/03/22 0530 11/03/22 0735 11/03/22 1055 11/03/22 1445   BP: (!) 185/93 (!) 166/95 (!) 144/94 (!) 197/123   Pulse:  90 83 92   Resp:  16 16 18   Temp:  98.8 °F (37.1 °C) 98.5 °F (36.9 °C) 98.5 °F (36.9 °C)   TempSrc:  Oral Oral Oral   SpO2:   100% 100%   Weight:       Height:         No intake or output data in the 24 hours ending 11/03/22 1530    Physical Exam  GEN :in no distress, alert and oriented  HEENT: anicteric sclerae, Mucous membranes are dry   Neck - supple without JVD  CV - regular rate, S1, S2, no rub   Lung - clear bilaterally, lungs expand symmetrically  Abd - soft, nontender, bowel sounds present  Ext - no clubbing, no cyanosis, no edema  Neurologic - nonfocal  Genitourinary - bladder nonpalpable  Skin - no rashes, no purpura  Psychiatric: Normal mood and affect.       Data Review:   Recent Labs     11/02/22  1419 11/03/22  0344   WBC 6.6 6. 5   HGB 13.2 11.8   HCT 39.1 35.9    214     Recent Labs     11/02/22  1419 11/03/22  0344    139   K 4.0 4.1    112*   CO2 22 19*   BUN 62* 65*   CREATININE 2.75* 2.45*   PHOS  --  5.1*     No results for input(s): PH, PCO2, PO2, PCO2 in the last 72 hours. Problem List:     Patient Active Problem List    Diagnosis Date Noted    Acute renal failure superimposed on stage 3b chronic kidney disease ([address] 11/02/2022    Gout 09/21/2022    Chronic kidney disease, stage 3b ([address] 09/21/2022    Neuropathy 09/21/2022    Anemia 09/21/2022    Mixed hyperlipidemia 09/21/2022    Chronic systolic congestive heart failure ([facility] 09/21/2022    Fatigue 09/21/2022    S/P CABG x 3 10/02/2019    S/P MVR (mitral valve repair) 10/02/2019    Suspected sleep apnea 10/02/2019    Hypocalcemia 10/02/2019    Mitral valve regurgitation 10/01/2019    CAD (coronary artery disease) 09/26/2019    Malignant neoplasm of left female breast ([facility] 09/22/2019    Controlled type 2 diabetes mellitus, with long-term current use of insulin ([facility] 09/22/2019    Hyperglycemia due to type 2 diabetes mellitus ([facility] 02/24/2018       Impression:    Plan:   YURY/CKD 3b likely pre renal azotemia- Cr improving with IVFs, baseline Cr in the 2s  -obtain renal US, P/C ratio, uric acid with hx of gout, further work up pending clinical course  -hold ACEi, lasix and HCTZ  -trend renal function with uop    2. Influenza- supportive care per primary     3. Hypertension- uncontrolled on admission, pt states she did not take antihypertensives on admission due to N/V  -BP better- continue antihypertensives    4. Nausea and vomiting- controlled on antiemetic per primary     5. DM type II- SSI per hosp     6.  Hx CAD, s/p CABG, CHF

## 2022-11-03 NOTE — CARE COORDINATION
11/03/22 1141   Service Assessment   Patient Orientation Alert and Oriented;Person;Place;Situation;Self   Cognition Alert   History Provided By Patient   Primary Caregiver Self   Accompanied By/Relationship Spouse Jose John [phone]   Support Systems Spouse/Significant Other;Children   Patient's Healthcare Decision Maker is: Legal Next of Kin   PCP Verified by CM Yes   Last Visit to PCP Within last 3 months   Prior Functional Level Independent in ADLs/IADLs   Current Functional Level Independent in ADLs/IADLs   Can patient return to prior living arrangement Yes   Ability to make needs known: Good   Family able to assist with home care needs: Yes   Would you like for me to discuss the discharge plan with any other family members/significant others, and if so, who? 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  No   Potential Assistance Purchasing Medications No   Type of Home Care Services None   Patient expects to be discharged to: [address] Discharge   Transition of Care Consult (CM Consult) Discharge Planning   Services At/After Discharge None  (anticipate no needs at this time)   Confirm Follow Up Transport Family   Condition of Participation: Discharge Planning   The Plan for Transition of Care is related to the following treatment goals: Home with spouse   Interdisciplinary team meeting with attending, CM, nursing, PT and nutritional services for plan of care. Nephrology consulted for YURY. CM met with patient for d/c planning. Patient alert and oriented x 3, independent of ADL's and lives with her spouse Vidya Pereira [phone]. 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 She requires no DME and has transportation and able to drive. She has [employer] and Medicaid. She obtains medications at [facility] on [address]. Confirmed demographics and contact information is correct. PCP is Dr Ghulam You who she saw approximately 3 weeks ago. Patient voices no concerns or needs for d/c. She states been sick for about a week and \"I just want to feel better. \" Patient anticipates d/c home with spouse when medically stable.

## 2022-11-03 NOTE — PROGRESS NOTES
Hospitalist Progress Note   Admit Date:  2022  1:43 PM   Name:  Sonali Cox   Age:  58 y.o. Sex:  female  :  1960   MRN:  [id number]   Room:  [facility]/    Presenting Complaint: Chest Pain (Left sided weakeness)     Reason(s) for Admission: Dehydration [E86.0]  Essential hypertension [I10]  Influenza A [J10.1]  YURY (acute kidney injury) ([facility] [N17.9]  Acute renal failure superimposed on stage 3 chronic kidney disease, unspecified acute renal failure type, unspecified whether stage 3a or 3b CKD ([facility] [N17.9, N18.30]     Hospital Course:   Sonali Cox is a 58 y.o. female with medical history of hypertension, CKD3, CHF who presented with 5 days of progressive weakness and fatigue. She has had n/v/d, cough, dyspnea on exertion. In the ED she was noted to have increased sCr, BNP with no elevation of WBC. She was influenza positive but well outside of the treatment window for oselstamivir. Hospitalist was consulted and she was admitted for further evaluation and management. Subjective & 24hr Events (22): Seen at bedside with . Still feeling weak and without appetite. Denies SOB, n/v/d, constipation. Urinating normally. Assessment & Plan:     Principal Problem:    Acute renal failure superimposed on stage 3b chronic kidney disease ([facility]  - gentle IVF given hx of CHF (no e/o fluid overload)  - nephrology c/s pending, in the meantime will check Diane, Ucr, prot/cr ratio and renal ultrasound    Active Problems:    Gout  - continue allopurinol      Mixed hyperlipidemia  - continue crestor      Chronic systolic congestive heart failure ([facility] S/P CABG x 3  - hold lisinopril and continue metoprolol  - ASA      Hyperglycemia due to type 2 diabetes mellitus ([facility]  - 20U basal with SSI, glucose has been acceptable      Anticipated discharge needs:      None, pt is independent    Diet:  ADULT DIET;  Regular; 4 carb choices (60 gm/meal)  ADULT ORAL NUTRITION SUPPLEMENT; Breakfast, Lunch, Dinner; Diabetic Oral Supplement  DVT PPx: heparin  Code status: Full Code    Hospital Problems:  Principal Problem:    Acute renal failure superimposed on stage 3b chronic kidney disease (HCC)  Active Problems:    Gout    Chronic kidney disease, stage 3b (HCC)    Mixed hyperlipidemia    Chronic systolic congestive heart failure (HCC)    S/P CABG x 3    Hyperglycemia due to type 2 diabetes mellitus ([facility]  Resolved Problems:    * No resolved hospital problems. *      Objective:   Patient Vitals for the past 24 hrs:   Temp Pulse Resp BP SpO2   11/03/22 0735 98.8 °F (37.1 °C) 90 16 (!) 166/95 --   11/03/22 0530 -- -- -- (!) 185/93 --   11/03/22 0252 98.6 °F (37 °C) 89 16 (!) 180/103 99 %   11/02/22 2258 97.5 °F (36.4 °C) 88 16 (!) 164/84 96 %   11/02/22 2110 -- 86 16 -- 96 %   11/02/22 1928 98.4 °F (36.9 °C) 92 16 (!) 163/92 96 %   11/02/22 1814 99 °F (37.2 °C) 89 16 (!) 184/100 98 %   11/02/22 1756 -- 83 -- -- 98 %   11/02/22 1755 -- 83 15 (!) 152/82 99 %   11/02/22 1733 -- 85 -- (!) 153/86 99 %   11/02/22 1713 -- 81 16 (!) 175/77 98 %   11/02/22 1703 -- 81 20 -- 98 %   11/02/22 1702 -- 80 17 -- 98 %   11/02/22 1659 -- 81 -- (!) 157/74 97 %   11/02/22 1646 -- 82 -- (!) 196/95 98 %   11/02/22 1631 -- 85 -- (!) 207/95 100 %   11/02/22 1626 -- 86 -- (!) 204/113 98 %   11/02/22 1611 -- 81 13 (!) 176/102 99 %   11/02/22 1526 -- 84 16 -- 98 %   11/02/22 1420 -- 85 25 (!) 195/109 100 %   11/02/22 1351 98.6 °F (37 °C) 87 18 (!) 196/97 99 %   11/02/22 1350 98.1 °F (36.7 °C) -- -- -- --   11/02/22 1349 -- 85 17 -- 100 %   11/02/22 1346 -- -- -- (!) 196/97 99 %       Oxygen Therapy  SpO2: 99 %  Pulse Oximeter Device Mode: Intermittent  Pulse Oximeter Device Location: Left, Finger  O2 Device: None (Room air)    Estimated body mass index is 28.34 kg/m² as calculated from the following:    Height as of this encounter: 5' 3\" (1.6 m). Weight as of this encounter: 160 lb (72.6 kg).   No intake or output data in the 24 hours ending 11/03/22 1042      Physical Exam:   Blood pressure (!) 166/95, pulse 90, temperature 98.8 °F (37.1 °C), temperature source Oral, resp. rate 16, height 5' 3\" (1.6 m), weight 160 lb (72.6 kg), SpO2 99 %. General:    Well nourished. Head:  Normocephalic, atraumatic  Eyes:  Sclerae appear normal.  Pupils equally round. ENT:  Nares appear normal, no drainage. Moist oral mucosa  Neck:  No restricted ROM. Trachea midline   CV:   RRR. No m/r/g. No jugular venous distension. Lungs:   CTAB. No wheezing, rhonchi, or rales. Symmetric expansion. Abdomen: Bowel sounds present. Soft, nontender, nondistended. Extremities: No cyanosis or clubbing. No edema  Skin:     No rashes and normal coloration. Warm and dry. Neuro:  CN II-XII grossly intact. Sensation intact. A&Ox3  Psych:  Normal mood and affect.       I have personally reviewed labs and tests showing:  Recent Labs:  Recent Results (from the past 48 hour(s))   EKG 12 Lead    Collection Time: 11/02/22  1:53 PM   Result Value Ref Range    Ventricular Rate 85 BPM    Atrial Rate 85 BPM    P-R Interval 182 ms    QRS Duration 86 ms    Q-T Interval 392 ms    QTc Calculation (Bazett) 466 ms    P Axis 42 degrees    R Axis 4 degrees    T Axis 101 degrees    Diagnosis Normal sinus rhythm    CBC    Collection Time: 11/02/22  2:19 PM   Result Value Ref Range    WBC 6.6 4.3 - 11.1 K/uL    RBC 4.46 4.05 - 5.2 M/uL    Hemoglobin 13.2 11.7 - 15.4 g/dL    Hematocrit 39.1 35.8 - 46.3 %    MCV 87.7 82.0 - 102.0 FL    MCH 29.6 26.1 - 32.9 PG    MCHC 33.8 31.4 - 35.0 g/dL    RDW 13.1 11.9 - 14.6 %    Platelets 059 757 - 787 K/uL    MPV 11.8 9.4 - 12.3 FL    nRBC 0.00 0.0 - 0.2 K/uL   Comprehensive Metabolic Panel    Collection Time: 11/02/22  2:19 PM   Result Value Ref Range    Sodium 141 133 - 143 mmol/L    Potassium 4.0 3.5 - 5.1 mmol/L    Chloride 110 101 - 110 mmol/L    CO2 22 21 - 32 mmol/L    Anion Gap 9 2 - 11 mmol/L    Glucose 118 (H) 65 - 100 mg/dL BUN 62 (H) 8 - 23 MG/DL    Creatinine 2.75 (H) 0.6 - 1.0 MG/DL    Est, Glom Filt Rate 19 (L) >60 ml/min/1.73m2    Calcium 9.5 8.3 - 10.4 MG/DL    Total Bilirubin 0.3 0.2 - 1.1 MG/DL    ALT 15 12 - 65 U/L    AST 23 15 - 37 U/L    Alk Phosphatase 57 50 - 130 U/L    Total Protein 8.0 6.3 - 8.2 g/dL    Albumin 2.9 (L) 3.2 - 4.6 g/dL    Globulin 5.1 (H) 2.8 - 4.5 g/dL    Albumin/Globulin Ratio 0.6 0.4 - 1.6     Troponin    Collection Time: 11/02/22  2:19 PM   Result Value Ref Range    Troponin, High Sensitivity 23.0 (H) 0 - 14 pg/mL   Troponin    Collection Time: 11/02/22  4:15 PM   Result Value Ref Range    Troponin, High Sensitivity 19.5 (H) 0 - 14 pg/mL   Urinalysis w rflx microscopic    Collection Time: 11/02/22  4:15 PM   Result Value Ref Range    Color, UA YELLOW/STRAW      Appearance CLOUDY      Specific Gravity, UA 1.020 1.001 - 1.023      pH, Urine 5.0 5.0 - 9.0      Protein, UA >300 (A) NEG mg/dL    Glucose, UA Negative mg/dL    Ketones, Urine Negative NEG mg/dL    Bilirubin Urine Negative NEG      Blood, Urine SMALL (A) NEG      Urobilinogen, Urine 1.0 0.2 - 1.0 EU/dL    Nitrite, Urine Negative NEG      Leukocyte Esterase, Urine Negative NEG      WBC, UA 0-3 0 /hpf    RBC, UA 10-20 0 /hpf    Epithelial Cells UA 10-20 0 /hpf    BACTERIA, URINE 2+ (H) 0 /hpf    Casts 0-3 0 /lpf    Yeast, UA MODERATE      OTHER OBSERVATIONS RESULTS VERIFIED MANUALLY     POCT Glucose    Collection Time: 11/02/22  9:38 PM   Result Value Ref Range    POC Glucose 133 (H) 65 - 100 mg/dL    Performed by: Jennifer Das    Phosphorus    Collection Time: 11/03/22  3:44 AM   Result Value Ref Range    Phosphorus 5.1 (H) 2.3 - 3.7 MG/DL   Albumin    Collection Time: 11/03/22  3:44 AM   Result Value Ref Range    Albumin 2.6 (L) 3.2 - 4.6 g/dL   Basic Metabolic Panel w/ Reflex to MG    Collection Time: 11/03/22  3:44 AM   Result Value Ref Range    Sodium 139 133 - 143 mmol/L    Potassium 4.1 3.5 - 5.1 mmol/L    Chloride 112 (H) 101 - 110 mmol/L    CO2 19 (L) 21 - 32 mmol/L    Anion Gap 8 2 - 11 mmol/L    Glucose 179 (H) 65 - 100 mg/dL    BUN 65 (H) 8 - 23 MG/DL    Creatinine 2.45 (H) 0.6 - 1.0 MG/DL    Est, Glom Filt Rate 22 (L) >60 ml/min/1.73m2    Calcium 9.2 8.3 - 10.4 MG/DL   CBC    Collection Time: 11/03/22  3:44 AM   Result Value Ref Range    WBC 6.5 4.3 - 11.1 K/uL    RBC 4.08 4.05 - 5.2 M/uL    Hemoglobin 11.8 11.7 - 15.4 g/dL    Hematocrit 35.9 35.8 - 46.3 %    MCV 88.0 82.0 - 102.0 FL    MCH 28.9 26.1 - 32.9 PG    MCHC 32.9 31.4 - 35.0 g/dL    RDW 12.8 11.9 - 14.6 %    Platelets 850 752 - 353 K/uL    MPV 10.7 9.4 - 12.3 FL    nRBC 0.00 0.0 - 0.2 K/uL   Hemoglobin A1C    Collection Time: 11/03/22  3:44 AM   Result Value Ref Range    Hemoglobin A1C 7.2 (H) 4.8 - 5.6 %    eAG 160 mg/dL   TSH with Reflex    Collection Time: 11/03/22  3:44 AM   Result Value Ref Range    TSH w Free Thyroid if Abnormal 0.75 0.358 - 3.740 UIU/ML   POCT Glucose    Collection Time: 11/03/22  5:36 AM   Result Value Ref Range    POC Glucose 152 (H) 65 - 100 mg/dL    Performed by: Dave Ugalde        I have personally reviewed imaging studies showing: Other Studies:  XR CHEST PORTABLE   Final Result      No evidence of acute cardiopulmonary disease.              Current Meds:  Current Facility-Administered Medications   Medication Dose Route Frequency    aspirin chewable tablet 81 mg  81 mg Oral Daily    allopurinol (ZYLOPRIM) tablet 50 mg  50 mg Oral Every Other Day    pantoprazole (PROTONIX) tablet 40 mg  40 mg Oral QAM AC    [Held by provider] furosemide (LASIX) tablet 20 mg  20 mg Oral BID    gabapentin (NEURONTIN) capsule 100 mg  100 mg Oral Nightly    insulin glargine (LANTUS) injection vial 20 Units  20 Units SubCUTAneous Nightly    [Held by provider] lisinopril (PRINIVIL;ZESTRIL) tablet 40 mg  40 mg Oral Daily    rosuvastatin (CRESTOR) tablet 10 mg  10 mg Oral Nightly    0.9 % sodium chloride infusion   IntraVENous PRN    ondansetron (ZOFRAN-ODT) disintegrating tablet 4 mg  4 mg Oral Q8H PRN    Or    ondansetron (ZOFRAN) injection 4 mg  4 mg IntraVENous Q6H PRN    polyethylene glycol (GLYCOLAX) packet 17 g  17 g Oral Daily PRN    acetaminophen (TYLENOL) tablet 650 mg  650 mg Oral Q6H PRN    Or    acetaminophen (TYLENOL) suppository 650 mg  650 mg Rectal Q6H PRN    heparin (porcine) injection 5,000 Units  5,000 Units SubCUTAneous 3 times per day    potassium chloride (KLOR-CON M) extended release tablet 40 mEq  40 mEq Oral PRN    Or    potassium bicarb-citric acid (EFFER-K) effervescent tablet 40 mEq  40 mEq Oral PRN    Or    potassium chloride 10 mEq/100 mL IVPB (Peripheral Line)  10 mEq IntraVENous PRN    magnesium sulfate 2000 mg in 50 mL IVPB premix  2,000 mg IntraVENous PRN    sodium phosphate 10 mmol in sodium chloride 0.9 % 250 mL IVPB  10 mmol IntraVENous PRN    Or    sodium phosphate 15 mmol in sodium chloride 0.9 % 250 mL IVPB  15 mmol IntraVENous PRN    Or    sodium phosphate 20 mmol in sodium chloride 0.9 % 500 mL IVPB  20 mmol IntraVENous PRN    metoprolol tartrate (LOPRESSOR) tablet 25 mg  25 mg Oral BID    lactated ringers infusion   IntraVENous Continuous       Signed:  ALVINA Robertson

## 2022-11-04 LAB
ALBUMIN SERPL-MCNC: 2.4 G/DL (ref 3.2–4.6)
ANION GAP SERPL CALC-SCNC: 7 MMOL/L (ref 2–11)
BUN SERPL-MCNC: 60 MG/DL (ref 8–23)
CALCIUM SERPL-MCNC: 8.9 MG/DL (ref 8.3–10.4)
CHLORIDE SERPL-SCNC: 113 MMOL/L (ref 101–110)
CO2 SERPL-SCNC: 23 MMOL/L (ref 21–32)
CREAT SERPL-MCNC: 2.41 MG/DL (ref 0.6–1)
ERYTHROCYTE [DISTWIDTH] IN BLOOD BY AUTOMATED COUNT: 12.9 % (ref 11.9–14.6)
GLUCOSE BLD STRIP.AUTO-MCNC: 116 MG/DL (ref 65–100)
GLUCOSE BLD STRIP.AUTO-MCNC: 133 MG/DL (ref 65–100)
GLUCOSE BLD STRIP.AUTO-MCNC: 217 MG/DL (ref 65–100)
GLUCOSE SERPL-MCNC: 122 MG/DL (ref 65–100)
HCT VFR BLD AUTO: 33.2 % (ref 35.8–46.3)
HGB BLD-MCNC: 11 G/DL (ref 11.7–15.4)
MCH RBC QN AUTO: 28.8 PG (ref 26.1–32.9)
MCHC RBC AUTO-ENTMCNC: 33.1 G/DL (ref 31.4–35)
MCV RBC AUTO: 86.9 FL (ref 82–102)
NRBC # BLD: 0 K/UL (ref 0–0.2)
PHOSPHATE SERPL-MCNC: 3.2 MG/DL (ref 2.3–3.7)
PLATELET # BLD AUTO: 205 K/UL (ref 150–450)
PMV BLD AUTO: 10.8 FL (ref 9.4–12.3)
POTASSIUM SERPL-SCNC: 3.7 MMOL/L (ref 3.5–5.1)
RBC # BLD AUTO: 3.82 M/UL (ref 4.05–5.2)
SERVICE CMNT-IMP: ABNORMAL
SODIUM SERPL-SCNC: 143 MMOL/L (ref 133–143)
WBC # BLD AUTO: 6.6 K/UL (ref 4.3–11.1)

## 2022-11-04 PROCEDURE — 36415 COLL VENOUS BLD VENIPUNCTURE: CPT

## 2022-11-04 PROCEDURE — 85027 COMPLETE CBC AUTOMATED: CPT

## 2022-11-04 PROCEDURE — 1100000000 HC RM PRIVATE

## 2022-11-04 PROCEDURE — 84100 ASSAY OF PHOSPHORUS: CPT

## 2022-11-04 PROCEDURE — 6360000002 HC RX W HCPCS: Performed by: FAMILY MEDICINE

## 2022-11-04 PROCEDURE — 82962 GLUCOSE BLOOD TEST: CPT

## 2022-11-04 PROCEDURE — 6370000000 HC RX 637 (ALT 250 FOR IP): Performed by: NURSE PRACTITIONER

## 2022-11-04 PROCEDURE — 2580000003 HC RX 258: Performed by: FAMILY MEDICINE

## 2022-11-04 PROCEDURE — 6370000000 HC RX 637 (ALT 250 FOR IP): Performed by: FAMILY MEDICINE

## 2022-11-04 PROCEDURE — 82040 ASSAY OF SERUM ALBUMIN: CPT

## 2022-11-04 PROCEDURE — 80048 BASIC METABOLIC PNL TOTAL CA: CPT

## 2022-11-04 RX ORDER — METOPROLOL TARTRATE 50 MG/1
50 TABLET, FILM COATED ORAL 2 TIMES DAILY
Status: DISCONTINUED | OUTPATIENT
Start: 2022-11-04 | End: 2022-11-05

## 2022-11-04 RX ORDER — PREDNISONE 20 MG/1
20 TABLET ORAL 2 TIMES DAILY
Status: DISCONTINUED | OUTPATIENT
Start: 2022-11-04 | End: 2022-11-05 | Stop reason: HOSPADM

## 2022-11-04 RX ORDER — HYDRALAZINE HYDROCHLORIDE 20 MG/ML
10 INJECTION INTRAMUSCULAR; INTRAVENOUS EVERY 6 HOURS PRN
Status: DISCONTINUED | OUTPATIENT
Start: 2022-11-04 | End: 2022-11-05 | Stop reason: HOSPADM

## 2022-11-04 RX ADMIN — PREDNISONE 20 MG: 20 TABLET ORAL at 20:47

## 2022-11-04 RX ADMIN — METOPROLOL TARTRATE 25 MG: 25 TABLET, FILM COATED ORAL at 09:04

## 2022-11-04 RX ADMIN — PREDNISONE 20 MG: 20 TABLET ORAL at 11:59

## 2022-11-04 RX ADMIN — PROCHLORPERAZINE EDISYLATE 10 MG: 5 INJECTION INTRAMUSCULAR; INTRAVENOUS at 00:00

## 2022-11-04 RX ADMIN — HEPARIN SODIUM 5000 UNITS: 5000 INJECTION INTRAVENOUS; SUBCUTANEOUS at 05:43

## 2022-11-04 RX ADMIN — ALLOPURINOL 50 MG: 100 TABLET ORAL at 09:04

## 2022-11-04 RX ADMIN — PANTOPRAZOLE SODIUM 40 MG: 40 TABLET, DELAYED RELEASE ORAL at 09:04

## 2022-11-04 RX ADMIN — HEPARIN SODIUM 5000 UNITS: 5000 INJECTION INTRAVENOUS; SUBCUTANEOUS at 14:54

## 2022-11-04 RX ADMIN — METOPROLOL TARTRATE 50 MG: 50 TABLET ORAL at 20:47

## 2022-11-04 RX ADMIN — SODIUM CHLORIDE, POTASSIUM CHLORIDE, SODIUM LACTATE AND CALCIUM CHLORIDE: 600; 310; 30; 20 INJECTION, SOLUTION INTRAVENOUS at 20:48

## 2022-11-04 RX ADMIN — ASPIRIN 81 MG: 81 TABLET, CHEWABLE ORAL at 09:05

## 2022-11-04 RX ADMIN — GABAPENTIN 100 MG: 100 CAPSULE ORAL at 20:47

## 2022-11-04 RX ADMIN — ROSUVASTATIN CALCIUM 10 MG: 5 TABLET, COATED ORAL at 20:47

## 2022-11-04 RX ADMIN — HEPARIN SODIUM 5000 UNITS: 5000 INJECTION INTRAVENOUS; SUBCUTANEOUS at 21:30

## 2022-11-04 RX ADMIN — SODIUM CHLORIDE, POTASSIUM CHLORIDE, SODIUM LACTATE AND CALCIUM CHLORIDE: 600; 310; 30; 20 INJECTION, SOLUTION INTRAVENOUS at 09:06

## 2022-11-04 RX ADMIN — INSULIN GLARGINE 20 UNITS: 100 INJECTION, SOLUTION SUBCUTANEOUS at 20:45

## 2022-11-04 ASSESSMENT — PAIN SCALES - GENERAL
PAINLEVEL_OUTOF10: 0
PAINLEVEL_OUTOF10: 0
PAINLEVEL_OUTOF10: 7

## 2022-11-04 ASSESSMENT — PAIN DESCRIPTION - LOCATION: LOCATION: HEAD

## 2022-11-04 NOTE — ADT AUTH CERT
Patient Demographics    Name Patient ID SSN Gender Identity Birth Date   Tara Burkett [id number]  Female 60 (58 yrs)     Address Phone Email Employer    [address] [place] [address] [phone] ()   [phone] () [email or web address] Race Occupation Emp Status    Jamari Gtz / April Ivan -- Retired      Reg Status PCP Date Last Verified Next Review Date    Verified Mukesh Cifuentes MD  [phone] 10/27/22 11/26/22      Admission Date Discharge Date Admitting Provider     22 -- --       Marital Status Sikh       [place]        Emergency Contact 1 Emergency Contact 2   Nancyraoul Philip [phone] (H)   [phone] Julienne Copmagalie) Mike  (C)   [phone] Julienne Copmagalie)     [facility] [de-identified]  [address] [place] [address] Name/Sex/Relation Subscriber  Subscriber Address/Phone Subscriber Emp/Emp Phone   1. Walden Cumber [employer]   [id number] [address] [place] [address] Female   (Self) 1960 [address]   [phone](A) RETIRED    2.  [place]   [id number] [address] [place] [address] - Female   (Self) 1960 [address] [place] [address]   [phone](Q) RETIRED      Utilization Reviews       Renal Failure, Acute - Care Day 3 (2022) by Glyn Osler, RN       Review Entered Review Status   2022 1434 Completed      Criteria Review      Care Day: 3 Care Date: 2022 Level of Care: Inpatient Floor    Guideline Day 3    Clinical Status    (X) * Mental status at baseline or improved    (X) * Respiratory status at baseline or improved    (X) * Renal function stable or improving    Activity    (X) Activity as tolerated    Routes    ( ) * Oral hydration    2022 2:34 PM EDT by Mann Izaguirre      LR @ 75ml/hr    ( ) * Oral medications    2022 2:34 PM EDT by Mann zIaguirre      Compazine 10mg IV x 1   Prednisone 20mg PO BID    (X) * Oral diet 11/4/2022 2:34 PM EDT by Ponciano Baumgarten      Diabetic diet    Interventions    (X) Monitor electrolytes, renal function tests, acid-base, and volume status    11/4/2022 2:34 PM EDT by Ponciano Baumgarten      Cl- 113  CO2- 23  BUN- 60  Creat- 2.41  GFR- 22  Glucose- 122  Uric acid- 8.8  HGB- 11.0  HCT- 33.2    * Milestone   Additional Notes   11/4/2022      ATTENDING NOTE:       Renal function slightly improving, stable. Renal US neg for acute findings. Uric acid 8.8, reports no joint pain, erythema. pt reports feeling better. Denies CP, SOB, n/v/d, abd pain. Assessment & Plan:       Principal Problem:     Acute renal failure superimposed on stage 3b chronic kidney disease ([facility]   11/4/22   - gentle IVF given hx of CHF (no e/o fluid overload)   -pending Diane, Ucr, prot/cr ratio    Renal US neg for acute findings   Creatinine stable       Active Problems:     Gout   11/4/22   Stop allopurinol   Start short course prednisone         Mixed hyperlipidemia   - continue crestor         Chronic systolic congestive heart failure ([facility] S/P CABG x 3   - hold lisinopril, lasix and continue metoprolol   - ASA         Hyperglycemia due to type 2 diabetes mellitus ([facility]   - 20U basal with SSI, glucose has been acceptable         Anticipated discharge needs:       None, pt is independent       Diet:  ADULT DIET; Regular; 4 carb choices (60 gm/meal)   ADULT ORAL NUTRITION SUPPLEMENT; Breakfast, Lunch, Dinner; Diabetic Oral Supplement   DVT PPx: heparin   Code status: Full Code      Physical Exam:        Blood pressure (!) 171/79, pulse 71, temperature 98.8 °F (37.1 °C), temperature source Oral, resp. rate 19, height 5' 3\" (1.6 m), weight 172 lb 3.2 oz (78.1 kg), SpO2 96 %. General:          Well nourished. Head:               Normocephalic, atraumatic   Eyes:               Sclerae appear normal.  Pupils equally round. ENT:                Nares appear normal, no drainage.   Moist oral mucosa   Neck: No restricted ROM. Trachea midline    CV:                  RRR. No m/r/g. No jugular venous distension. Lungs:             CTAB. No wheezing, rhonchi, or rales. Symmetric expansion. Abdomen: Bowel sounds present. Soft, nontender, nondistended. Extremities:     No cyanosis or clubbing. No edema   Skin:                No rashes and normal coloration. Warm and dry. Neuro:             CN II-XII grossly intact. Sensation intact. A&Ox3   Psych:             Normal mood and affect. Renal US by Apple Moreno RN       Review Entered Review Status   11/4/2022 1429 In Primary      Criteria Review   Multiple images from real time ultrasound evaluation of the kidneys   show that they are normal in size, orientation, and echogenicity bilaterally. The right kidney measures 12.1 cm in length while the left measures 11.5 cm. No   definite solid renal mass, hydronephrosis, stone, or abnormal perinephric   collection is seen. There is a 9 mm cyst at the midpole the right kidney. The   bladder appears unremarkable as imaged. Aorta and IVC are unremarkable as   imaged. Impression   Unremarkable examination.

## 2022-11-04 NOTE — PROGRESS NOTES
Hospitalist Progress Note   Admit Date:  2022  1:43 PM   Name:  Sonali Cox   Age:  58 y.o. Sex:  female  :  1960   MRN:  [id number]   Room:  [facility]/    Presenting Complaint: Chest Pain (Left sided weakeness)     Reason(s) for Admission: Dehydration [E86.0]  Essential hypertension [I10]  Influenza A [J10.1]  YURY (acute kidney injury) ([facility] [N17.9]  Acute renal failure superimposed on stage 3 chronic kidney disease, unspecified acute renal failure type, unspecified whether stage 3a or 3b CKD ([facility] [N17.9, N18.30]     Hospital Course:   Sonali Cox is a 58 y.o. female with medical history of hypertension, CKD3, CHF who presented with 5 days of progressive weakness and fatigue. She has had n/v/d, cough, dyspnea on exertion. In the ED she was noted to have increased sCr, BNP with no elevation of WBC. She was influenza positive but well outside of the treatment window for oselstamivir. Subjective & 24hr Events (22): Renal function slightly improving, stable. Renal US neg for acute findings. Uric acid 8.8, reports no joint pain, erythema. pt reports feeling better. Denies CP, SOB, n/v/d, abd pain. Assessment & Plan:     Principal Problem:    Acute renal failure superimposed on stage 3b chronic kidney disease ([facility]  22  - gentle IVF given hx of CHF (no e/o fluid overload)  -pending Diane, Ucr, prot/cr ratio   Renal US neg for acute findings  Creatinine stable     Active Problems:    Gout  22  Stop allopurinol  Start short course prednisone       Mixed hyperlipidemia  - continue crestor       Chronic systolic congestive heart failure ([facility] S/P CABG x 3  - hold lisinopril, lasix and continue metoprolol  - ASA       Hyperglycemia due to type 2 diabetes mellitus ([facility]  - 20U basal with SSI, glucose has been acceptable       Anticipated discharge needs:      None, pt is independent     Diet:  ADULT DIET;  Regular; 4 carb choices (60 gm/meal)  ADULT ORAL NUTRITION SUPPLEMENT; Breakfast, Lunch, Dinner; Diabetic Oral Supplement  DVT PPx: heparin  Code status: Full Code    Hospital Problems:  Principal Problem:    Acute renal failure superimposed on stage 3b chronic kidney disease (HCC)  Active Problems:    Gout    Chronic kidney disease, stage 3b (HCC)    Mixed hyperlipidemia    Chronic systolic congestive heart failure (HCC)    S/P CABG x 3    Hyperglycemia due to type 2 diabetes mellitus ([facility]  Resolved Problems:    * No resolved hospital problems. *      Objective:   Patient Vitals for the past 24 hrs:   Temp Pulse Resp BP SpO2   11/04/22 0747 98.8 °F (37.1 °C) 71 19 (!) 171/79 96 %   11/04/22 0316 98.4 °F (36.9 °C) 73 17 (!) 154/73 94 %   11/03/22 2337 98.4 °F (36.9 °C) 78 17 (!) 174/93 96 %   11/03/22 2115 -- 82 -- (!) 180/90 --   11/03/22 1949 98.6 °F (37 °C) 80 17 (!) 163/90 94 %   11/03/22 1615 -- 82 -- (!) 157/83 97 %   11/03/22 1445 98.5 °F (36.9 °C) 92 18 (!) 197/123 100 %   11/03/22 1055 98.5 °F (36.9 °C) 83 16 (!) 144/94 100 %       Oxygen Therapy  SpO2: 96 %  Pulse Oximeter Device Mode: Intermittent  Pulse Oximeter Device Location: Left, Finger  O2 Device: None (Room air)    Estimated body mass index is 30.5 kg/m² as calculated from the following:    Height as of this encounter: 5' 3\" (1.6 m). Weight as of this encounter: 172 lb 3.2 oz (78.1 kg). No intake or output data in the 24 hours ending 11/04/22 1045      Physical Exam:     Blood pressure (!) 171/79, pulse 71, temperature 98.8 °F (37.1 °C), temperature source Oral, resp. rate 19, height 5' 3\" (1.6 m), weight 172 lb 3.2 oz (78.1 kg), SpO2 96 %. General:          Well nourished. Head:               Normocephalic, atraumatic  Eyes:               Sclerae appear normal.  Pupils equally round. ENT:                Nares appear normal, no drainage. Moist oral mucosa  Neck:               No restricted ROM. Trachea midline   CV:                  RRR. No m/r/g.   No jugular venous distension. Lungs:             CTAB. No wheezing, rhonchi, or rales. Symmetric expansion. Abdomen: Bowel sounds present. Soft, nontender, nondistended. Extremities:     No cyanosis or clubbing. No edema  Skin:                No rashes and normal coloration. Warm and dry. Neuro:             CN II-XII grossly intact. Sensation intact. A&Ox3  Psych:             Normal mood and affect.        I have personally reviewed labs and tests showing:  Recent Labs:  Recent Results (from the past 48 hour(s))   EKG 12 Lead    Collection Time: 11/02/22  1:53 PM   Result Value Ref Range    Ventricular Rate 85 BPM    Atrial Rate 85 BPM    P-R Interval 182 ms    QRS Duration 86 ms    Q-T Interval 392 ms    QTc Calculation (Bazett) 466 ms    P Axis 42 degrees    R Axis 4 degrees    T Axis 101 degrees    Diagnosis Normal sinus rhythm    CBC    Collection Time: 11/02/22  2:19 PM   Result Value Ref Range    WBC 6.6 4.3 - 11.1 K/uL    RBC 4.46 4.05 - 5.2 M/uL    Hemoglobin 13.2 11.7 - 15.4 g/dL    Hematocrit 39.1 35.8 - 46.3 %    MCV 87.7 82.0 - 102.0 FL    MCH 29.6 26.1 - 32.9 PG    MCHC 33.8 31.4 - 35.0 g/dL    RDW 13.1 11.9 - 14.6 %    Platelets 887 869 - 865 K/uL    MPV 11.8 9.4 - 12.3 FL    nRBC 0.00 0.0 - 0.2 K/uL   Comprehensive Metabolic Panel    Collection Time: 11/02/22  2:19 PM   Result Value Ref Range    Sodium 141 133 - 143 mmol/L    Potassium 4.0 3.5 - 5.1 mmol/L    Chloride 110 101 - 110 mmol/L    CO2 22 21 - 32 mmol/L    Anion Gap 9 2 - 11 mmol/L    Glucose 118 (H) 65 - 100 mg/dL    BUN 62 (H) 8 - 23 MG/DL    Creatinine 2.75 (H) 0.6 - 1.0 MG/DL    Est, Glom Filt Rate 19 (L) >60 ml/min/1.73m2    Calcium 9.5 8.3 - 10.4 MG/DL    Total Bilirubin 0.3 0.2 - 1.1 MG/DL    ALT 15 12 - 65 U/L    AST 23 15 - 37 U/L    Alk Phosphatase 57 50 - 130 U/L    Total Protein 8.0 6.3 - 8.2 g/dL    Albumin 2.9 (L) 3.2 - 4.6 g/dL    Globulin 5.1 (H) 2.8 - 4.5 g/dL    Albumin/Globulin Ratio 0.6 0.4 - 1.6     Troponin Collection Time: 11/02/22  2:19 PM   Result Value Ref Range    Troponin, High Sensitivity 23.0 (H) 0 - 14 pg/mL   Troponin    Collection Time: 11/02/22  4:15 PM   Result Value Ref Range    Troponin, High Sensitivity 19.5 (H) 0 - 14 pg/mL   Urinalysis w rflx microscopic    Collection Time: 11/02/22  4:15 PM   Result Value Ref Range    Color, UA YELLOW/STRAW      Appearance CLOUDY      Specific Gravity, UA 1.020 1.001 - 1.023      pH, Urine 5.0 5.0 - 9.0      Protein, UA >300 (A) NEG mg/dL    Glucose, UA Negative mg/dL    Ketones, Urine Negative NEG mg/dL    Bilirubin Urine Negative NEG      Blood, Urine SMALL (A) NEG      Urobilinogen, Urine 1.0 0.2 - 1.0 EU/dL    Nitrite, Urine Negative NEG      Leukocyte Esterase, Urine Negative NEG      WBC, UA 0-3 0 /hpf    RBC, UA 10-20 0 /hpf    Epithelial Cells UA 10-20 0 /hpf    BACTERIA, URINE 2+ (H) 0 /hpf    Casts 0-3 0 /lpf    Yeast, UA MODERATE      OTHER OBSERVATIONS RESULTS VERIFIED MANUALLY     POCT Glucose    Collection Time: 11/02/22  9:38 PM   Result Value Ref Range    POC Glucose 133 (H) 65 - 100 mg/dL    Performed by: Jero Figueroa    Phosphorus    Collection Time: 11/03/22  3:44 AM   Result Value Ref Range    Phosphorus 5.1 (H) 2.3 - 3.7 MG/DL   Albumin    Collection Time: 11/03/22  3:44 AM   Result Value Ref Range    Albumin 2.6 (L) 3.2 - 4.6 g/dL   Basic Metabolic Panel w/ Reflex to MG    Collection Time: 11/03/22  3:44 AM   Result Value Ref Range    Sodium 139 133 - 143 mmol/L    Potassium 4.1 3.5 - 5.1 mmol/L    Chloride 112 (H) 101 - 110 mmol/L    CO2 19 (L) 21 - 32 mmol/L    Anion Gap 8 2 - 11 mmol/L    Glucose 179 (H) 65 - 100 mg/dL    BUN 65 (H) 8 - 23 MG/DL    Creatinine 2.45 (H) 0.6 - 1.0 MG/DL    Est, Glom Filt Rate 22 (L) >60 ml/min/1.73m2    Calcium 9.2 8.3 - 10.4 MG/DL   CBC    Collection Time: 11/03/22  3:44 AM   Result Value Ref Range    WBC 6.5 4.3 - 11.1 K/uL    RBC 4.08 4.05 - 5.2 M/uL    Hemoglobin 11.8 11.7 - 15.4 g/dL    Hematocrit 35.9 35.8 - 46.3 %    MCV 88.0 82.0 - 102.0 FL    MCH 28.9 26.1 - 32.9 PG    MCHC 32.9 31.4 - 35.0 g/dL    RDW 12.8 11.9 - 14.6 %    Platelets 511 031 - 534 K/uL    MPV 10.7 9.4 - 12.3 FL    nRBC 0.00 0.0 - 0.2 K/uL   Hemoglobin A1C    Collection Time: 11/03/22  3:44 AM   Result Value Ref Range    Hemoglobin A1C 7.2 (H) 4.8 - 5.6 %    eAG 160 mg/dL   TSH with Reflex    Collection Time: 11/03/22  3:44 AM   Result Value Ref Range    TSH w Free Thyroid if Abnormal 0.75 0.358 - 3.740 UIU/ML   POCT Glucose    Collection Time: 11/03/22  5:36 AM   Result Value Ref Range    POC Glucose 152 (H) 65 - 100 mg/dL    Performed by: Vivienne    Sodium, urine, random    Collection Time: 11/03/22  1:44 PM   Result Value Ref Range    SODIUM, RANDOM URINE 39 MMOL/L   Creatinine, Random Urine    Collection Time: 11/03/22  1:44 PM   Result Value Ref Range    Creatinine, Ur 99.00 mg/dL   Protein / creatinine ratio, urine    Collection Time: 11/03/22  1:44 PM   Result Value Ref Range    Protein, Urine, Random 805 mg/dL    Creatinine, Ur 97.00 mg/dL    PROTEIN/CREAT RATIO URINE RAN 8.3     Uric Acid    Collection Time: 11/03/22  5:45 PM   Result Value Ref Range    Uric Acid 8.8 (H) 2.6 - 6.0 MG/DL   POCT Glucose    Collection Time: 11/03/22  9:32 PM   Result Value Ref Range    POC Glucose 154 (H) 65 - 100 mg/dL    Performed by: Sophia    Phosphorus    Collection Time: 11/04/22  3:54 AM   Result Value Ref Range    Phosphorus 3.2 2.3 - 3.7 MG/DL   Albumin    Collection Time: 11/04/22  3:54 AM   Result Value Ref Range    Albumin 2.4 (L) 3.2 - 4.6 g/dL   Basic Metabolic Panel w/ Reflex to MG    Collection Time: 11/04/22  3:54 AM   Result Value Ref Range    Sodium 143 133 - 143 mmol/L    Potassium 3.7 3.5 - 5.1 mmol/L    Chloride 113 (H) 101 - 110 mmol/L    CO2 23 21 - 32 mmol/L    Anion Gap 7 2 - 11 mmol/L    Glucose 122 (H) 65 - 100 mg/dL    BUN 60 (H) 8 - 23 MG/DL    Creatinine 2.41 (H) 0.6 - 1.0 MG/DL    Est, Glom Filt Rate 22 (L) >60 ml/min/1.73m2    Calcium 8.9 8.3 - 10.4 MG/DL   CBC    Collection Time: 11/04/22  3:54 AM   Result Value Ref Range    WBC 6.6 4.3 - 11.1 K/uL    RBC 3.82 (L) 4.05 - 5.2 M/uL    Hemoglobin 11.0 (L) 11.7 - 15.4 g/dL    Hematocrit 33.2 (L) 35.8 - 46.3 %    MCV 86.9 82.0 - 102.0 FL    MCH 28.8 26.1 - 32.9 PG    MCHC 33.1 31.4 - 35.0 g/dL    RDW 12.9 11.9 - 14.6 %    Platelets 051 135 - 720 K/uL    MPV 10.8 9.4 - 12.3 FL    nRBC 0.00 0.0 - 0.2 K/uL       I have personally reviewed imaging studies showing: Other Studies:  US RETROPERITONEAL COMPLETE   Final Result   Unremarkable examination. XR CHEST PORTABLE   Final Result      No evidence of acute cardiopulmonary disease.              Current Meds:  Current Facility-Administered Medications   Medication Dose Route Frequency    LORazepam (ATIVAN) tablet 0.5 mg  0.5 mg Oral Q6H PRN    aspirin chewable tablet 81 mg  81 mg Oral Daily    allopurinol (ZYLOPRIM) tablet 50 mg  50 mg Oral Every Other Day    pantoprazole (PROTONIX) tablet 40 mg  40 mg Oral QAM AC    [Held by provider] furosemide (LASIX) tablet 20 mg  20 mg Oral BID    gabapentin (NEURONTIN) capsule 100 mg  100 mg Oral Nightly    insulin glargine (LANTUS) injection vial 20 Units  20 Units SubCUTAneous Nightly    [Held by provider] lisinopril (PRINIVIL;ZESTRIL) tablet 40 mg  40 mg Oral Daily    rosuvastatin (CRESTOR) tablet 10 mg  10 mg Oral Nightly    0.9 % sodium chloride infusion   IntraVENous PRN    ondansetron (ZOFRAN-ODT) disintegrating tablet 4 mg  4 mg Oral Q8H PRN    Or    ondansetron (ZOFRAN) injection 4 mg  4 mg IntraVENous Q6H PRN    polyethylene glycol (GLYCOLAX) packet 17 g  17 g Oral Daily PRN    acetaminophen (TYLENOL) tablet 650 mg  650 mg Oral Q6H PRN    Or    acetaminophen (TYLENOL) suppository 650 mg  650 mg Rectal Q6H PRN    heparin (porcine) injection 5,000 Units  5,000 Units SubCUTAneous 3 times per day    potassium chloride (KLOR-CON M) extended release tablet 40 mEq 40 mEq Oral PRN    Or    potassium bicarb-citric acid (EFFER-K) effervescent tablet 40 mEq  40 mEq Oral PRN    Or    potassium chloride 10 mEq/100 mL IVPB (Peripheral Line)  10 mEq IntraVENous PRN    magnesium sulfate 2000 mg in 50 mL IVPB premix  2,000 mg IntraVENous PRN    sodium phosphate 10 mmol in sodium chloride 0.9 % 250 mL IVPB  10 mmol IntraVENous PRN    Or    sodium phosphate 15 mmol in sodium chloride 0.9 % 250 mL IVPB  15 mmol IntraVENous PRN    Or    sodium phosphate 20 mmol in sodium chloride 0.9 % 500 mL IVPB  20 mmol IntraVENous PRN    metoprolol tartrate (LOPRESSOR) tablet 25 mg  25 mg Oral BID    lactated ringers infusion   IntraVENous Continuous       Signed:  Cheryl Schuster APRN - CNP    Notes, labs, VS, diagnostic testing reviewed  Case discussed with pt, care team, Dr. Supa Agee

## 2022-11-05 VITALS
WEIGHT: 172.2 LBS | HEIGHT: 63 IN | OXYGEN SATURATION: 94 % | BODY MASS INDEX: 30.51 KG/M2 | TEMPERATURE: 98.9 F | SYSTOLIC BLOOD PRESSURE: 165 MMHG | DIASTOLIC BLOOD PRESSURE: 80 MMHG | HEART RATE: 82 BPM | RESPIRATION RATE: 16 BRPM

## 2022-11-05 PROBLEM — N18.32 ACUTE RENAL FAILURE SUPERIMPOSED ON STAGE 3B CHRONIC KIDNEY DISEASE (HCC): Status: RESOLVED | Noted: 2022-11-02 | Resolved: 2022-11-05

## 2022-11-05 PROBLEM — E66.811 CLASS 1 OBESITY DUE TO EXCESS CALORIES WITH SERIOUS COMORBIDITY AND BODY MASS INDEX (BMI) OF 30.0 TO 30.9 IN ADULT: Chronic | Status: ACTIVE | Noted: 2022-11-05

## 2022-11-05 PROBLEM — E66.09 CLASS 1 OBESITY DUE TO EXCESS CALORIES WITH SERIOUS COMORBIDITY AND BODY MASS INDEX (BMI) OF 30.0 TO 30.9 IN ADULT: Chronic | Status: ACTIVE | Noted: 2022-11-05

## 2022-11-05 PROBLEM — N17.9 ACUTE RENAL FAILURE SUPERIMPOSED ON STAGE 3B CHRONIC KIDNEY DISEASE (HCC): Status: RESOLVED | Noted: 2022-11-02 | Resolved: 2022-11-05

## 2022-11-05 PROBLEM — E66.811 CLASS 1 OBESITY DUE TO EXCESS CALORIES WITH SERIOUS COMORBIDITY AND BODY MASS INDEX (BMI) OF 30.0 TO 30.9 IN ADULT: Status: ACTIVE | Noted: 2022-11-05

## 2022-11-05 PROBLEM — E66.09 CLASS 1 OBESITY DUE TO EXCESS CALORIES WITH SERIOUS COMORBIDITY AND BODY MASS INDEX (BMI) OF 30.0 TO 30.9 IN ADULT: Status: ACTIVE | Noted: 2022-11-05

## 2022-11-05 LAB
ALBUMIN SERPL-MCNC: 2.8 G/DL (ref 3.2–4.6)
ANION GAP SERPL CALC-SCNC: 6 MMOL/L (ref 2–11)
BUN SERPL-MCNC: 48 MG/DL (ref 8–23)
CALCIUM SERPL-MCNC: 9 MG/DL (ref 8.3–10.4)
CHLORIDE SERPL-SCNC: 109 MMOL/L (ref 101–110)
CO2 SERPL-SCNC: 22 MMOL/L (ref 21–32)
CREAT SERPL-MCNC: 1.96 MG/DL (ref 0.6–1)
ERYTHROCYTE [DISTWIDTH] IN BLOOD BY AUTOMATED COUNT: 12.6 % (ref 11.9–14.6)
GLUCOSE BLD STRIP.AUTO-MCNC: 162 MG/DL (ref 65–100)
GLUCOSE SERPL-MCNC: 193 MG/DL (ref 65–100)
HCT VFR BLD AUTO: 36 % (ref 35.8–46.3)
HGB BLD-MCNC: 12.2 G/DL (ref 11.7–15.4)
MCH RBC QN AUTO: 29.6 PG (ref 26.1–32.9)
MCHC RBC AUTO-ENTMCNC: 33.9 G/DL (ref 31.4–35)
MCV RBC AUTO: 87.4 FL (ref 82–102)
NRBC # BLD: 0 K/UL (ref 0–0.2)
PHOSPHATE SERPL-MCNC: 2.8 MG/DL (ref 2.3–3.7)
PLATELET # BLD AUTO: 205 K/UL (ref 150–450)
PMV BLD AUTO: 11.2 FL (ref 9.4–12.3)
POTASSIUM SERPL-SCNC: 4.2 MMOL/L (ref 3.5–5.1)
RBC # BLD AUTO: 4.12 M/UL (ref 4.05–5.2)
SERVICE CMNT-IMP: ABNORMAL
SODIUM SERPL-SCNC: 137 MMOL/L (ref 133–143)
WBC # BLD AUTO: 7.2 K/UL (ref 4.3–11.1)

## 2022-11-05 PROCEDURE — 84100 ASSAY OF PHOSPHORUS: CPT

## 2022-11-05 PROCEDURE — 6370000000 HC RX 637 (ALT 250 FOR IP): Performed by: FAMILY MEDICINE

## 2022-11-05 PROCEDURE — 82962 GLUCOSE BLOOD TEST: CPT

## 2022-11-05 PROCEDURE — 80048 BASIC METABOLIC PNL TOTAL CA: CPT

## 2022-11-05 PROCEDURE — 6360000002 HC RX W HCPCS: Performed by: FAMILY MEDICINE

## 2022-11-05 PROCEDURE — 97161 PT EVAL LOW COMPLEX 20 MIN: CPT

## 2022-11-05 PROCEDURE — 6360000002 HC RX W HCPCS: Performed by: NURSE PRACTITIONER

## 2022-11-05 PROCEDURE — 82040 ASSAY OF SERUM ALBUMIN: CPT

## 2022-11-05 PROCEDURE — 6370000000 HC RX 637 (ALT 250 FOR IP): Performed by: NURSE PRACTITIONER

## 2022-11-05 PROCEDURE — 85027 COMPLETE CBC AUTOMATED: CPT

## 2022-11-05 PROCEDURE — 36415 COLL VENOUS BLD VENIPUNCTURE: CPT

## 2022-11-05 RX ORDER — FERROUS SULFATE 325(65) MG
325 TABLET ORAL
Status: DISCONTINUED | OUTPATIENT
Start: 2022-11-05 | End: 2022-11-05 | Stop reason: HOSPADM

## 2022-11-05 RX ORDER — METOPROLOL SUCCINATE 50 MG/1
50 TABLET, EXTENDED RELEASE ORAL DAILY
Status: DISCONTINUED | OUTPATIENT
Start: 2022-11-05 | End: 2022-11-05 | Stop reason: HOSPADM

## 2022-11-05 RX ORDER — SPIRONOLACTONE 25 MG/1
25 TABLET ORAL 2 TIMES DAILY
Status: DISCONTINUED | OUTPATIENT
Start: 2022-11-05 | End: 2022-11-05 | Stop reason: HOSPADM

## 2022-11-05 RX ORDER — CARVEDILOL 25 MG/1
25 TABLET ORAL 2 TIMES DAILY WITH MEALS
Status: DISCONTINUED | OUTPATIENT
Start: 2022-11-05 | End: 2022-11-05 | Stop reason: HOSPADM

## 2022-11-05 RX ADMIN — METOPROLOL SUCCINATE 50 MG: 50 TABLET, EXTENDED RELEASE ORAL at 09:12

## 2022-11-05 RX ADMIN — CARVEDILOL 25 MG: 25 TABLET, FILM COATED ORAL at 09:12

## 2022-11-05 RX ADMIN — FERROUS SULFATE TAB 325 MG (65 MG ELEMENTAL FE) 325 MG: 325 (65 FE) TAB at 09:12

## 2022-11-05 RX ADMIN — PREDNISONE 20 MG: 20 TABLET ORAL at 09:12

## 2022-11-05 RX ADMIN — PANTOPRAZOLE SODIUM 40 MG: 40 TABLET, DELAYED RELEASE ORAL at 06:18

## 2022-11-05 RX ADMIN — HEPARIN SODIUM 5000 UNITS: 5000 INJECTION INTRAVENOUS; SUBCUTANEOUS at 06:18

## 2022-11-05 RX ADMIN — HYDRALAZINE HYDROCHLORIDE 10 MG: 20 INJECTION INTRAMUSCULAR; INTRAVENOUS at 03:03

## 2022-11-05 RX ADMIN — ASPIRIN 81 MG: 81 TABLET, CHEWABLE ORAL at 09:12

## 2022-11-05 RX ADMIN — SPIRONOLACTONE 25 MG: 25 TABLET ORAL at 09:12

## 2022-11-05 NOTE — DISCHARGE INSTRUCTIONS
Influenza (Flu): Care Instructions  Overview     Influenza (flu) is an infection in the lungs and breathing passages. It is caused by the influenza virus. There are different strains, or types, of the flu virus from year to year. Unlike the common cold, the flu comes on suddenly and the symptoms can be more severe. These symptoms include a cough, congestion, fever, chills, fatigue, aches, and pains. These symptoms may last for a few weeks. Although the flu can make you feel very sick, it usually doesn't cause serious health problems. Home treatment is usually all you need for flu symptoms. But your doctor may prescribe antiviral medicine to prevent other health problems, such as pneumonia, from developing. The risk of other health problems from the flu is highest for young children (under 2), older adults (over 72), pregnant women, and people with long-term health conditions. Follow-up care is a key part of your treatment and safety. Be sure to make and go to all appointments, and call your doctor if you are having problems. It's also a good idea to know your test results and keep a list of the medicines you take. How can you care for yourself at home? Get plenty of rest.  Drink plenty of fluids. If you have to limit fluids because of a health problem, talk with your doctor before you increase the amount of fluids you drink. Take an over-the-counter pain medicine if needed, such as acetaminophen (Tylenol), ibuprofen (Advil, Motrin), or naproxen (Aleve), to relieve fever, headache, and muscle aches. Be safe with medicines. Read and follow all instructions on the label. No one younger than 20 should take aspirin. It has been linked to Reye syndrome, a serious illness. Take any prescribed medicine exactly as directed. Do not smoke. Smoking can make the flu worse. If you need help quitting, talk to your doctor about stop-smoking programs and medicines.  These can increase your chances of quitting for good.  If the skin around your nose and lips becomes sore, put some petroleum jelly (such as Vaseline) on the area. To ease coughing:  Suck on cough drops or plain, hard candy. Try an over-the-counter cough or cold medicine. Read and follow all instructions on the label. Raise your head at night with an extra pillow. This may help you rest if coughing keeps you awake. To avoid spreading the flu  Wash your hands regularly, and keep your hands away from your face. Stay home from school, work, and other public places until you are feeling better and your fever has been gone for at least 24 hours. The fever needs to have gone away on its own without the help of medicine. Ask people living with you to talk to their doctors about preventing the flu. They may get antiviral medicine to keep from getting the flu from you. To prevent the flu in the future, get the flu vaccine every fall. Encourage people living with you to get the vaccine. Cover your mouth when you cough or sneeze. If you can, cough or sneeze into the bend of your elbow, not your hands. When should you call for help? Call 911 anytime you think you may need emergency care. For example, call if:    You have severe trouble breathing. You have a seizure. Call your doctor now or seek immediate medical care if:    You have trouble breathing. You have a fever with a stiff neck or a severe headache. You have pain or pressure in your chest or belly. You have a fever or cough that returns after getting better. You feel very sleepy, dizzy, or confused. You are not urinating. You have severe muscle pain. You have severe weakness, or you are unsteady. You have medical conditions that are getting worse. Watch closely for changes in your health, and be sure to contact your doctor if:    You do not get better as expected. You are having a problem with your medicine. Where can you learn more?   Go to [email or web address] and sign in to your "[employer] account. Enter V802 in the [facility] box to learn more about \"Influenza (Flu): Care Instructions. \"     If you do not have an account, please click on the \"Sign Up Now\" link. Current as of: February 9, 2022               Content Version: 13.4  © 2006-2022 [employer], [facility]. Care instructions adapted under license by [facility] ([facility]). If you have questions about a medical condition or this instruction, always ask your healthcare professional. [place] [address] any warranty or liability for your use of this information.

## 2022-11-05 NOTE — DISCHARGE SUMMARY
Hospitalist Discharge Summary   Admit Date:  2022  1:43 PM   DC Note date: 2022  Name:  Emily Chambers   Age:  58 y.o. Sex:  female  :  1960   MRN:  [id number]   Room:  [facility]  PCP:  No primary care provider on file. Presenting Complaint: Chest Pain (Left sided weakeness)     Initial Admission Diagnosis: Dehydration [E86.0]  Essential hypertension [I10]  Influenza A [J10.1]  YURY (acute kidney injury) ([facility] [address] [N17.9]  Acute renal failure superimposed on stage 3 chronic kidney disease, unspecified acute renal failure type, unspecified whether stage 3a or 3b CKD ([facility] [address] [N17.9, N18.30]     Problem List for this Hospitalization (present on admission):    Principal Problem (Resolved):    Acute renal failure superimposed on stage 3b chronic kidney disease ([facility] [address]  Active Problems:    Gout    Chronic kidney disease, stage 3b (HCC)    Mixed hyperlipidemia    Chronic systolic congestive heart failure (HCC)    Class 1 obesity due to excess calories with serious comorbidity and body mass index (BMI) of 30.0 to 30.9 in adult    S/P CABG x 3    Hyperglycemia due to type 2 diabetes mellitus [facility])      Hospital Course:  62F PMHx hypertension, CKD3, CHF who presented with 5 days of progressive weakness and fatigue. She had n/v/d, cough, dyspnea on exertion. In the ED she was noted to have increased sCr, BNP with no elevation of WBC. She was influenza positive but well outside of the treatment window for oselstamivir. She was admitted. Nephrology was consulted. She was resuscitated with marked improvement in her YURY. Her BP meds were initially held and she became hypertensive. They were restarted and her body responded appropriately. She was given steroids for gout. She was determined to be appropriate for discharge . Disposition: home with outpatient follow up  Diet: ADULT DIET; Regular; 4 carb choices (60 gm/meal);  Safety Tray; Safety Tray (Disposables)  Code Status: Full Code    Follow Ups: Jhon Valentino MD. Schedule an appointment as soon as possible for a   visit in 1 week(s). Specialty: Internal Medicine  Why: Transition of Care Management  Contact information:  [address]  Saw Lindsay MD. Schedule an appointment as soon as possible for a visit   in 1 week(s). Specialty: Nephrology  Why: Routine progression of care  Contact information:  [address]  [phone]                       Time spent in patient discharge and coordination 37 minutes. Follow up labs/diagnostics (ultimately defer to outpatient provider): She has two betablockers on her med list, both recently filled. Medication changes as noted below  Control of hypertension  Control of gout    Plan was discussed with patient, , nurse, . All questions answered. Patient was stable at time of discharge. Instructions given to call a physician or return if any concerns. Current Discharge Medication List        CONTINUE these medications which have NOT CHANGED    Details   allopurinol (ZYLOPRIM) 100 MG tablet Take 50 mg by mouth every other day      spironolactone (ALDACTONE) 25 MG tablet TAKE 1 TABLET (25 MG) BY MOUTH 2 (TWO) TIMES A DAY  Qty: 60 tablet, Refills: 2    Associated Diagnoses: Resistant hypertension      carvedilol (COREG) 25 MG tablet Take 1 tablet by mouth 2 times daily (with meals)  Qty: 60 tablet, Refills: 2    Associated Diagnoses: Resistant hypertension      nebivolol (BYSTOLIC) 5 MG tablet TAKE 1 TABLET BY MOUTH EVERY DAY      ferrous sulfate (IRON 325) 325 (65 Fe) MG tablet Take 325 mg by mouth      gabapentin (NEURONTIN) 100 MG capsule Take 1 capsule by mouth at bedtime for 90 days.  Intended supply: 30 days  Qty: 30 capsule, Refills: 2      acetaminophen (TYLENOL) 500 MG tablet Take 500 mg by mouth every 4 hours as needed      aspirin 81 MG chewable tablet Take 81 mg by mouth daily      Calcium Carbonate-Vitamin D (OYSTER SHELL CALCIUM/D) 500-200 MG-UNIT TABS Take 1 tablet by mouth 2 times daily (with meals)      Dulaglutide 3 MG/0.5ML SOPN Inject into the skin every 7 days      esomeprazole (NEXIUM) 40 MG delayed release capsule Take 40 mg by mouth daily      insulin glargine (LANTUS;BASAGLAR) 100 UNIT/ML injection pen Inject 20 Units into the skin nightly The details of the medication are not available because there are pending changes by a home health clinician. letrozole (FEMARA) 2.5 MG tablet Take 2.5 mg by mouth daily      lisinopril (PRINIVIL;ZESTRIL) 40 MG tablet Take 40 mg by mouth daily      rosuvastatin (CRESTOR) 10 MG tablet Take 10 mg by mouth           STOP taking these medications       ondansetron (ZOFRAN) 4 MG tablet Comments:   Reason for Stopping:         furosemide (LASIX) 20 MG tablet Comments:   Reason for Stopping:               Procedures done this admission:  * No surgery found *    Consults this admission:  IP CONSULT TO NEPHROLOGY    Echocardiogram results:  09/23/21    TRANSTHORACIC ECHOCARDIOGRAM (TTE) COMPLETE (CONTRAST/BUBBLE/3D PRN) 09/26/2021  6:54 PM, 09/26/2021 12:00 AM (Final)    Narrative  This is a summary report. The complete report is available in the patient's medical record. If you cannot access the medical record, please contact the sending organization for a detailed fax or copy. · LA: Dilated left atrium. Left Atrium volume index is 35.3 mL/m2. · TV: Mild to moderate tricuspid valve regurgitation is present. Right Ventricular Arterial Pressure (RVSP) is 35 mmHg. · MV: Mitral valve is prosthetic. Mitral annular calcification. Mitral valve mean gradient is 10 mmHg. · LV: Calculated LVEF is 50%. Normal cavity size and wall thickness. Low normal systolic function.     Signed by: Franco Mccracken MD on 9/26/2021  6:54 PM, Signed by: Unknown Provider Result on 9/26/2021 12:00 AM      Diagnostic Imaging/Tests:   XR CHEST (2 VW)    Result Date: 10/27/2022  No evidence of an acute intrathoracic process. XR CHEST PORTABLE    Result Date: 11/2/2022  No evidence of acute cardiopulmonary disease. US RETROPERITONEAL COMPLETE    Result Date: 11/3/2022  Unremarkable examination.         Labs: Results:       BMP, Mg, Phos Recent Labs     11/03/22 0344 11/04/22 0354 11/05/22 0421    143 137   K 4.1 3.7 4.2   * 113* 109   CO2 19* 23 22   ANIONGAP 8 7 6   BUN 65* 60* 48*   CREATININE 2.45* 2.41* 1.96*   LABGLOM 22* 22* 28*   CALCIUM 9.2 8.9 9.0   GLUCOSE 179* 122* 193*   PHOS 5.1* 3.2 2.8      CBC Recent Labs     11/03/22 0344 11/04/22 0354 11/05/22 0421   WBC 6.5 6.6 7.2   RBC 4.08 3.82* 4.12   HGB 11.8 11.0* 12.2   HCT 35.9 33.2* 36.0   MCV 88.0 86.9 87.4   MCH 28.9 28.8 29.6   MCHC 32.9 33.1 33.9   RDW 12.8 12.9 12.6    205 205   MPV 10.7 10.8 11.2   NRBC 0.00 0.00 0.00      LFT Recent Labs     11/02/22  1419 11/03/22 0344 11/04/22 0354 11/05/22 0421   BILITOT 0.3  --   --   --    ALKPHOS 57  --   --   --    AST 23  --   --   --    ALT 15  --   --   --    PROT 8.0  --   --   --    LABALBU 2.9* 2.6* 2.4* 2.8*   GLOB 5.1*  --   --   --       Cardiac  Lab Results   Component Value Date/Time    TROPHS 19.5 11/02/2022 04:15 PM    TROPHS 23.0 11/02/2022 02:19 PM    TROPHS 13.9 08/21/2022 12:50 AM      Coags Lab Results   Component Value Date/Time    PROTIME 18.5 10/01/2019 02:44 PM    PROTIME 12.9 09/30/2019 08:15 AM    INR 1.5 10/01/2019 02:44 PM    INR 1.0 09/30/2019 08:15 AM    APTT 36.5 10/01/2019 02:44 PM    APTT 29.9 09/30/2019 08:15 AM      A1c Lab Results   Component Value Date/Time    LABA1C 7.2 11/03/2022 03:44 AM    LABA1C 10.8 09/23/2019 07:27 AM     11/03/2022 03:44 AM     09/23/2019 07:27 AM      Lipids Lab Results   Component Value Date/Time    CHOL 205 09/11/2020 09:31 AM    LDLCALC 115.4 09/11/2020 09:31 AM    LABVLDL 56.6 09/11/2020 09:31 AM    HDL 33 09/11/2020 09:31 AM CHOLHDLRATIO 6.2 09/11/2020 09:31 AM    TRIG 283 09/11/2020 09:31 AM      Thyroid  Lab Results   Component Value Date/Time    TSHELE 0.75 11/03/2022 03:44 AM        Most Recent UA Lab Results   Component Value Date/Time    COLORU YELLOW/STRAW 11/02/2022 04:15 PM    APPEARANCE CLOUDY 11/02/2022 04:15 PM    SPECGRAV 1.020 11/02/2022 04:15 PM    LABPH 5.0 11/02/2022 04:15 PM    PROTEINU >300 11/02/2022 04:15 PM    GLUCOSEU Negative 11/02/2022 04:15 PM    KETUA Negative 11/02/2022 04:15 PM    BILIRUBINUR Negative 11/02/2022 04:15 PM    BILIRUBINUR NEGATIVE 09/26/2019 12:20 PM    BLOODU SMALL 11/02/2022 04:15 PM    UROBILINOGEN 1.0 11/02/2022 04:15 PM    NITRU Negative 11/02/2022 04:15 PM    LEUKOCYTESUR Negative 11/02/2022 04:15 PM    WBCUA 0-3 11/02/2022 04:15 PM    RBCUA 10-20 11/02/2022 04:15 PM    EPITHUA 10-20 11/02/2022 04:15 PM    BACTERIA 2+ 11/02/2022 04:15 PM    LABCAST 0-3 11/02/2022 04:15 PM        No results for input(s): CULTURE in the last 720 hours.     All Labs from Last 24 Hrs:  Recent Results (from the past 24 hour(s))   POCT Glucose    Collection Time: 11/04/22 11:29 AM   Result Value Ref Range    POC Glucose 116 (H) 65 - 100 mg/dL    Performed by: Billie Piper    POCT Glucose    Collection Time: 11/04/22  4:39 PM   Result Value Ref Range    POC Glucose 133 (H) 65 - 100 mg/dL    Performed by: Billie Piper    POCT Glucose    Collection Time: 11/04/22  8:43 PM   Result Value Ref Range    POC Glucose 217 (H) 65 - 100 mg/dL    Performed by: Pastor    Phosphorus    Collection Time: 11/05/22  4:21 AM   Result Value Ref Range    Phosphorus 2.8 2.3 - 3.7 MG/DL   Albumin    Collection Time: 11/05/22  4:21 AM   Result Value Ref Range    Albumin 2.8 (L) 3.2 - 4.6 g/dL   Basic Metabolic Panel w/ Reflex to MG    Collection Time: 11/05/22  4:21 AM   Result Value Ref Range    Sodium 137 133 - 143 mmol/L    Potassium 4.2 3.5 - 5.1 mmol/L    Chloride 109 101 - 110 mmol/L    CO2 22 21 - 32 mmol/L Anion Gap 6 2 - 11 mmol/L    Glucose 193 (H) 65 - 100 mg/dL    BUN 48 (H) 8 - 23 MG/DL    Creatinine 1.96 (H) 0.6 - 1.0 MG/DL    Est, Glom Filt Rate 28 (L) >60 ml/min/1.73m2    Calcium 9.0 8.3 - 10.4 MG/DL   CBC    Collection Time: 11/05/22  4:21 AM   Result Value Ref Range    WBC 7.2 4.3 - 11.1 K/uL    RBC 4.12 4.05 - 5.2 M/uL    Hemoglobin 12.2 11.7 - 15.4 g/dL    Hematocrit 36.0 35.8 - 46.3 %    MCV 87.4 82.0 - 102.0 FL    MCH 29.6 26.1 - 32.9 PG    MCHC 33.9 31.4 - 35.0 g/dL    RDW 12.6 11.9 - 14.6 %    Platelets 411 777 - 794 K/uL    MPV 11.2 9.4 - 12.3 FL    nRBC 0.00 0.0 - 0.2 K/uL   POCT Glucose    Collection Time: 11/05/22  6:24 AM   Result Value Ref Range    POC Glucose 162 (H) 65 - 100 mg/dL    Performed by: Pastor        No Known Allergies  Immunization History   Administered Date(s) Administered    COVID-19, MODERNA BLUE border, Primary or Immunocompromised, (age 12y+), IM, 100 mcg/0.5mL 04/04/2021, 05/02/2021, 11/09/2021    Influenza Virus Vaccine 10/12/2017, 09/26/2019, 08/22/2020, 10/12/2020    Influenza, FLUARIX, FLULAVAL, FLUZONE (age 10 mo+) AND AFLURIA, (age 1 y+), PF, 0.5mL 09/26/2019    Influenza, FLUBLOK, (age 25 y+), PF, 0.5mL 10/20/2021    PPD Test 02/24/2018, 03/16/2018, 10/01/2019    Pneumococcal Polysaccharide ([id number]) 12/27/2012    Pneumococcal Vaccine 04/01/2022    Tdap (Boostrix, Adacel) 09/13/1993, 07/30/2020    Zoster Recombinant (Shingrix) 08/22/2020       Recent Vital Data:  Patient Vitals for the past 24 hrs:   Temp Pulse Resp BP SpO2   11/05/22 0345 -- -- -- (!) 157/88 --   11/05/22 0257 97.7 °F (36.5 °C) 74 16 (!) 209/99 99 %   11/05/22 0000 -- -- -- -- 98 %   11/04/22 2355 98.2 °F (36.8 °C) 75 14 (!) 157/83 --   11/04/22 2047 -- 82 -- (!) 154/79 --   11/04/22 1915 98.1 °F (36.7 °C) 82 17 (!) 154/79 97 %   11/04/22 1519 98.6 °F (37 °C) 73 18 (!) 182/79 94 %   11/04/22 1128 98.6 °F (37 °C) 68 18 (!) 174/77 95 %       Oxygen Therapy  SpO2: 99 %  Pulse Oximeter Device Mode: Intermittent  Pulse Oximeter Device Location: Left, Finger  O2 Device: None (Room air)    Estimated body mass index is 30.5 kg/m² as calculated from the following:    Height as of this encounter: 5' 3\" (1.6 m). Weight as of this encounter: 172 lb 3.2 oz (78.1 kg). No intake or output data in the 24 hours ending 11/05/22 0919    Physical Exam  Vitals and nursing note reviewed. Constitutional:       General: She is not in acute distress. Appearance: Normal appearance. She is obese. She is not ill-appearing or diaphoretic. Eyes:      Extraocular Movements: Extraocular movements intact. Cardiovascular:      Rate and Rhythm: Normal rate. Heart sounds: No murmur heard. Pulmonary:      Effort: Pulmonary effort is normal. No respiratory distress. Breath sounds: No wheezing. Abdominal:      General: There is no distension. Musculoskeletal:         General: No deformity. Right lower leg: No edema. Left lower leg: No edema. Skin:     Coloration: Skin is not jaundiced or pale. Neurological:      General: No focal deficit present. Mental Status: She is alert and oriented to person, place, and time.    Psychiatric:         Mood and Affect: Mood normal.         Behavior: Behavior normal.         Signed:  Darline Galaviz MD

## 2022-11-05 NOTE — PROGRESS NOTES
ACUTE PHYSICAL THERAPY GOALS:   (Developed with and agreed upon by patient and/or caregiver.)  No goals-evaluation/discharge    PHYSICAL THERAPY Initial Assessment and Discharge  (Link to Caseload Tracking: PT Visit Days : 1  Acknowledge Orders  Time In/Out  PT Charge Capture  Rehab Caseload Tracker    Kentrell Quarles is a 58 y.o. female   PRIMARY DIAGNOSIS: Acute renal failure superimposed on stage 3b chronic kidney disease ([facility] [address]  Dehydration [E86.0]  Essential hypertension [I10]  Influenza A [J10.1]  YURY (acute kidney injury) ([facility] [address] [N17.9]  Acute renal failure superimposed on stage 3 chronic kidney disease, unspecified acute renal failure type, unspecified whether stage 3a or 3b CKD ([facility] [address] [N17.9, N18.30]       Reason for Referral: Generalized Muscle Weakness (M62.81)  Inpatient: Payor: [employer] / Plan: [employer] G[employer]US HMO / Product Type: *No Product type* /     ASSESSMENT:     REHAB RECOMMENDATIONS:   Recommendation to date pending progress:  Setting:  No further skilled therapy after discharge from hospital    Equipment:    None     ASSESSMENT:  Ms. Bernabe Lindsey admitted with above diagnoses. Patient is independent at baseline and demonstrated continued independence with all transfers/mobility without a device. Patient up ad carlos a in room and planning on d/c home today. She voiced no concerns and no needs at d/c.      [address] [facility] [address] [address] AM-PAC 6 Clicks Basic Mobility Inpatient Short Form  AM-PAC Mobility Inpatient   How much difficulty turning over in bed?: None  How much difficulty sitting down on / standing up from a chair with arms?: None  How much difficulty moving from lying on back to sitting on side of bed?: None  How much help from another person moving to and from a bed to a chair?: None  How much help from another person needed to walk in hospital room?: None  How much help from another person for climbing 3-5 steps with a railing?: None  AM-PAC Inpatient Mobility Raw Score : 24  AM-PAC Inpatient T-Scale Score : 61.14  Mobility Inpatient CMS 0-100% Score: 0  Mobility Inpatient CMS G-Code Modifier : CH    SUBJECTIVE:   Ms. Zhang Painter agreeable.     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    OBJECTIVE:     PAIN: Farnaz Denilson / O2: PRECAUTION / Jazz Ding / Storm Zimmerman:   Pre Treatment:   Pain Assessment: None - Denies Pain      Post Treatment: 0 Vitals        Oxygen      None    RESTRICTIONS/PRECAUTIONS:  Restrictions/Precautions: None                 GROSS EVALUATION: Intact Impaired (Comments):   AROM [x]     PROM []    Strength [x]     Balance [x]     Posture [] N/A   Sensation [x]     Coordination [x]      Tone [x]     Edema []    Activity Tolerance [x]      []      COGNITION/  PERCEPTION: Intact Impaired (Comments):   Orientation [x]     Vision [x]     Hearing [x]     Cognition  [x]       MOBILITY: I Mod I S SBA CGA Min Mod Max Total  NT x2 Comments:   Bed Mobility    Rolling [] [] [] [] [] [] [] [] [] [] []    Supine to Sit [] [] [] [] [] [] [] [] [] [] []    Scooting [] [] [] [] [] [] [] [] [] [] []    Sit to Supine [] [] [] [] [] [] [] [] [] [] []    Transfers    Sit to Stand [x] [] [] [] [] [] [] [] [] [] []    Bed to Chair [x] [] [] [] [] [] [] [] [] [] []    Stand to Sit [x] [] [] [] [] [] [] [] [] [] []     [] [] [] [] [] [] [] [] [] [] []    I=Independent, Mod I=Modified Independent, S=Supervision, SBA=Standby Assistance, CGA=Contact Guard Assistance,   Min=Minimal Assistance, Mod=Moderate Assistance, Max=Maximal Assistance, Total=Total Assistance, NT=Not Tested    GAIT: I Mod I S SBA CGA Min Mod Max Total  NT x2 Comments:   Level of Assistance [x] [] [] [] [] [] [] [] [] [] []    Distance Room distance      DME None    Gait Quality N/A    Weightbearing Status Restrictions/Precautions  Restrictions/Precautions: None    Stairs      I=Independent, Mod I=Modified Independent, S=Supervision, SBA=Standby Assistance, CGA=Contact Guard Assistance,   Min=Minimal Assistance, Mod=Moderate Assistance, Max=Maximal Assistance, Total=Total Assistance, NT=Not Tested    PLAN:   FREQUENCY AND DURATION:   evaluation/discharge    THERAPY PROGNOSIS: Excellent    PROBLEM LIST:   (Skilled intervention is medically necessary to address:)  none INTERVENTIONS PLANNED:   (Benefits and precautions of physical therapy have been discussed with the patient.)  Evaluation only       TREATMENT:   EVALUATION: LOW COMPLEXITY: (Untimed Charge)    TREATMENT:   Evaluation only    TREATMENT GRID:  N/A    AFTER TREATMENT PRECAUTIONS: RN notified, Visitors at bedside, and up ad carlos a in room    INTERDISCIPLINARY COLLABORATION:  RN/ PCT    EDUCATION: Education Given To: Patient  Education Provided: Role of Therapy;Plan of Care  Education Method: Verbal  Education Outcome: Verbalized understanding    TIME IN/OUT:  Time In: 1025  Time Out: [address]  Minutes: [address] [place] [address] Selina PT

## 2022-11-07 ENCOUNTER — OFFICE VISIT (OUTPATIENT)
Dept: CARDIOLOGY CLINIC | Age: 62
End: 2022-11-07
Payer: MEDICARE

## 2022-11-07 VITALS
WEIGHT: 183.2 LBS | SYSTOLIC BLOOD PRESSURE: 124 MMHG | HEART RATE: 83 BPM | BODY MASS INDEX: 32.46 KG/M2 | HEIGHT: 63 IN | DIASTOLIC BLOOD PRESSURE: 66 MMHG

## 2022-11-07 DIAGNOSIS — I25.118 CORONARY ARTERY DISEASE OF NATIVE ARTERY OF NATIVE HEART WITH STABLE ANGINA PECTORIS (HCC): ICD-10-CM

## 2022-11-07 DIAGNOSIS — R94.31 PROLONGED Q-T INTERVAL ON ECG: Primary | ICD-10-CM

## 2022-11-07 DIAGNOSIS — E78.5 DYSLIPIDEMIA: ICD-10-CM

## 2022-11-07 DIAGNOSIS — I05.1 RHEUMATIC MITRAL REGURGITATION: ICD-10-CM

## 2022-11-07 DIAGNOSIS — Z98.890 H/O MITRAL VALVE REPAIR: ICD-10-CM

## 2022-11-07 PROCEDURE — 99214 OFFICE O/P EST MOD 30 MIN: CPT | Performed by: INTERNAL MEDICINE

## 2022-11-07 PROCEDURE — G9899 SCRN MAM PERF RSLTS DOC: HCPCS | Performed by: INTERNAL MEDICINE

## 2022-11-07 PROCEDURE — G8484 FLU IMMUNIZE NO ADMIN: HCPCS | Performed by: INTERNAL MEDICINE

## 2022-11-07 PROCEDURE — 1111F DSCHRG MED/CURRENT MED MERGE: CPT | Performed by: INTERNAL MEDICINE

## 2022-11-07 PROCEDURE — 3017F COLORECTAL CA SCREEN DOC REV: CPT | Performed by: INTERNAL MEDICINE

## 2022-11-07 PROCEDURE — 93000 ELECTROCARDIOGRAM COMPLETE: CPT | Performed by: INTERNAL MEDICINE

## 2022-11-07 PROCEDURE — G8427 DOCREV CUR MEDS BY ELIG CLIN: HCPCS | Performed by: INTERNAL MEDICINE

## 2022-11-07 PROCEDURE — 1036F TOBACCO NON-USER: CPT | Performed by: INTERNAL MEDICINE

## 2022-11-07 PROCEDURE — G8417 CALC BMI ABV UP PARAM F/U: HCPCS | Performed by: INTERNAL MEDICINE

## 2022-11-07 RX ORDER — ROSUVASTATIN CALCIUM 20 MG/1
20 TABLET, COATED ORAL DAILY
Qty: 90 TABLET | Refills: 3 | Status: SHIPPED | OUTPATIENT
Start: 2022-11-07

## 2022-11-07 ASSESSMENT — ENCOUNTER SYMPTOMS: BLOATING: 1

## 2022-11-07 NOTE — PATIENT INSTRUCTIONS
Patient Education        Learning About the Mediterranean Diet  What is the [address] [facility]? The Mediterranean diet is a style of eating rather than a diet plan. It features foods eaten in [place], [place], [place] and [place], and other countries along the [facility]. It emphasizes eating foods like fish, fruits, vegetables, beans, high-fiber breads and whole grains, nuts, and olive oil. This style of eating includes limited red meat, cheese, and sweets. Why choose the Mediterranean diet? A Mediterranean-style diet may improve heart health. It contains more fat than other heart-healthy diets. But the fats are mainly from nuts, unsaturated oils (such as fish oils and olive oil), and certain nut or seed oils (such as canola, soybean, or flaxseed oil). These fats may help protect the heart and blood vessels. How can you get started on the Mediterranean diet? Here are some things you can do to switch to a more Mediterranean way of eating. What to eat  Eat a variety of fruits and vegetables each day, such as grapes, blueberries, tomatoes, broccoli, peppers, figs, olives, spinach, eggplant, beans, lentils, and chickpeas. Eat a variety of whole-grain foods each day, such as oats, brown rice, and whole wheat bread, pasta, and couscous. Eat fish at least 2 times a week. Try tuna, salmon, mackerel, lake trout, herring, or sardines. Eat moderate amounts of low-fat dairy products, such as milk, cheese, or yogurt. Eat moderate amounts of poultry and eggs. Choose healthy (unsaturated) fats, such as nuts, olive oil, and certain nut or seed oils like canola, soybean, and flaxseed. Limit unhealthy (saturated) fats, such as butter, palm oil, and coconut oil. And limit fats found in animal products, such as meat and dairy products made with whole milk. Try to eat red meat only a few times a month in very small amounts. Limit sweets and desserts to only a few times a week.  This includes sugar-sweetened drinks like soda. The Mediterranean diet may also include red wine with your meal--1 glass each day for women and up to 2 glasses a day for men. Tips for eating at home  Use herbs, spices, garlic, lemon zest, and citrus juice instead of salt to add flavor to foods. Add avocado slices to your sandwich instead of nolasco. Have fish for lunch or dinner instead of red meat. Brush the fish with olive oil, and broil or grill it. Sprinkle your salad with seeds or nuts instead of cheese. Cook with olive or canola oil instead of butter or oils that are high in saturated fat. Switch from 2% milk or whole milk to 1% or fat-free milk. Dip raw vegetables in a vinaigrette dressing or hummus instead of dips made from mayonnaise or sour cream.  Have a piece of fruit for dessert instead of a piece of cake. Try baked apples, or have some dried fruit. Tips for eating out  Try broiled, grilled, baked, or poached fish instead of having it fried or breaded. Ask your  to have your meals prepared with olive oil instead of butter. Order dishes made with marinara sauce or sauces made from olive oil. Avoid sauces made from cream or mayonnaise. Choose whole-grain breads, whole wheat pasta and pizza crust, brown rice, beans, and lentils. Cut back on butter or margarine on bread. Instead, you can dip your bread in a small amount of olive oil. Ask for a side salad or grilled vegetables instead of french fries or chips. Where can you learn more? Go to https://FlashnotescorrinaOnfido.[employer] [email or web address] org and sign in to your [employer] account. Enter [phone] in the [facility] box to learn more about \"Learning About the Mediterranean Diet. \"     If you do not have an account, please click on the \"Sign Up Now\" link. Current as of: May 9, 2022               Content Version: 13.4  © 6876-2535 Healthwise, Incorporated. Care instructions adapted under license by [facility] ([facility]).  If you have questions about a medical condition or this instruction, always ask your healthcare professional. [place] [address] any warranty or liability for your use of this information.

## 2022-11-07 NOTE — PROGRESS NOTES
[facility] CARDIOLOGY  [address], [address]  PHONE: [phone]      22    NAME:  Emily Chambers  : 1960  MRN: [id number]         SUBJECTIVE:   Emily Chambers is a 58 y.o. female seen for a follow up visit regarding the following:     Chief Complaint   Patient presents with    Coronary Artery Disease              HPI:  Follow up  Coronary Artery Disease   . Patient returns, nearly a year after expected follow up, with prior  CAD/CABG, hypertensive heart disease, lymphedema, MV repair, recurrent beast cancer on antiestrogen therapy,  uncontrolled DM. At last visit, amlodipine was exchanged for higher dose ACEi d/t peripheral edema and worsening hypertension. While she failed to follow up here she did continue to follow up with her PCP regularly, where her BP was consistently > 150/80. They attempted several changes including addition of spironolactone, addition of clonidine which she failed to tolerate and subsequently stopped, ultimately referred to nephrology for this and progressive CKD where her last BP was controlled. ER visit on 10/27 with influenza with normal BP. Returned several days later with progressive symptoms, admitted with YURY on CKD, they had to transiently hold all her BP meds with nephrology on board to follow, and resumed them at discharge. Her PCP had previously arranged this follow up long prior to all of the above both to re establish care and to evaluate chest discomfort. Notably, her EKG is reviewed from hospital with mild QT prolongation, Qtc 466 ms. SHE APPEARS TO BE TAKING 2 BETA BLOCKERS. BUT HER BP AND HEART RATE ARE CONTROLLED, SHE IS AWARE SHE IS TAKING 2 AND TOLERATES IT. She has tightness around her left breast only when she lies down, associated with reflux of gastric contents. While she informed the nurse she was taking her PPI she tells me she is uncertain and doesn't recall it.   Did not bring meds today. Had been walking but got out of the habit during her bout with flu. Nephrology - Dr Romano      12/22/21 (!) 151/91   11/09/21 (!) 170/108   10/20/21 159/88   09/22/21 (!) 156/91   05/18/21 137/84   05/11/21 130/79   05/07/21 139/76   04/21/21 119/75   04/21/21 153/85   03/31/21 135/72     9/30/22 - 128/66 - nephrology   10/27/22 - 137/70-ER for influenza         Past cardiac history:   2015 - XRT for left breast cancer at 4240 cGy, advised 5 years of adjuvant letrozole. She was not compliant with her letrozole and follow up. Sep 2019- admitted with CHF, not compliant with BP meds   EF 40-45% with regional wma   10/1/19 - CABG- LIMA>LAD, SVG > OM, SVG >PDA MV repair 28 mm physio II ring and LA appendage clip    Jan 2021- recurrent left breast DCIS - mastectomy, no high risk findings - antiestrogen therapy    Sep 2021- Echo - mild LVH, normal SF, mean MV gradient 10, peak velocity 2.4 m/s, HR not reported. RVSP 35                Key CAD CHF Meds            rosuvastatin (CRESTOR) 20 MG tablet (Taking)    Sig - Route: Take 1 tablet by mouth daily - Oral    spironolactone (ALDACTONE) 25 MG tablet (Taking)    Sig: TAKE 1 TABLET (25 MG) BY MOUTH 2 (TWO) TIMES A DAY    carvedilol (COREG) 25 MG tablet (Taking)    Sig - Route: Take 1 tablet by mouth 2 times daily (with meals) - Oral    nebivolol (BYSTOLIC) 5 MG tablet (Taking)    Class: Historical Med    lisinopril (PRINIVIL;ZESTRIL) 40 MG tablet (Taking)    Class: Historical Med              Past Medical History, Past Surgical History, Family history, Social History, and Medications were all reviewed with the patient today and updated as necessary. Prior to Admission medications    Medication Sig Start Date End Date Taking?  Authorizing Provider   rosuvastatin (CRESTOR) 20 MG tablet Take 1 tablet by mouth daily 11/7/22  Yes Theresa Moon MD   allopurinol (ZYLOPRIM) 100 MG tablet Take 50 mg by mouth every other day 9/30/22  Yes Historical Provider, MD   spironolactone (ALDACTONE) 25 MG tablet TAKE 1 TABLET (25 MG) BY MOUTH 2 (TWO) TIMES A DAY 10/31/22  Yes Kenny Valladares MD   carvedilol (COREG) 25 MG tablet Take 1 tablet by mouth 2 times daily (with meals) 10/31/22 1/29/23 Yes Kenny Valladares MD   nebivolol (BYSTOLIC) 5 MG tablet TAKE 1 TABLET BY MOUTH EVERY DAY 7/5/22  Yes Historical Provider, MD   ferrous sulfate (IRON 325) 325 (65 Fe) MG tablet Take 325 mg by mouth 10/20/21  Yes Historical Provider, MD   gabapentin (NEURONTIN) 100 MG capsule Take 1 capsule by mouth at bedtime for 90 days.  Intended supply: 30 days 9/21/22 12/20/22 Yes Kenny Valladares MD   acetaminophen (TYLENOL) 500 MG tablet Take 500 mg by mouth every 4 hours as needed 10/9/19  Yes Ar Automatic Reconciliation   aspirin 81 MG chewable tablet Take 81 mg by mouth daily 9/27/19  Yes Ar Automatic Reconciliation   Calcium Carbonate-Vitamin D (OYSTER SHELL CALCIUM/D) 500-200 MG-UNIT TABS Take 1 tablet by mouth 2 times daily (with meals) 9/26/19  Yes Ar Automatic Reconciliation   Dulaglutide 3 MG/0.5ML SOPN Inject into the skin every 7 days   Yes Ar Automatic Reconciliation   esomeprazole (NEXIUM) 40 MG delayed release capsule Take 40 mg by mouth daily 9/11/20  Yes Ar Automatic Reconciliation   insulin glargine (LANTUS;BASAGLAR) 100 UNIT/ML injection pen Inject 20 Units into the skin nightly The details of the medication are not available because there are pending changes by a home health clinician. 9/26/19  Yes Ar Automatic Reconciliation   letrozole (FEMARA) 2.5 MG tablet Take 2.5 mg by mouth daily   Yes Ar Automatic Reconciliation   lisinopril (PRINIVIL;ZESTRIL) 40 MG tablet Take 40 mg by mouth daily 10/6/21  Yes Ar Automatic Reconciliation     No Known Allergies  Past Medical History:   Diagnosis Date    Acute renal failure superimposed on stage 3b chronic kidney disease ([facility] [address] 11/2/2022    Breast cancer ([facility] [address] [facility] 2015    left stage 1 infiltrating ductal, radiation, and lumpectomy, letrozole    CAD (coronary artery disease)     multivessel, awaiting CABG and mitral valve repair/replacement    CKD (chronic kidney disease)     Diabetes ([facility] [address] typell, dx 2016    typejose luis, ID, avfbs 160, last A1C was 10.8. denies lows    Heart failure ([facility] [address]     EF 40-45%    Hypercholesteremia     Hypertension     Hypoxia 10/1/2019    Intertrochanteric fracture of right femur ([facility] [address] 2/24/2018    Mitral valve regurgitation     PUD (peptic ulcer disease)     age 12, no treatment since    Thrombocytopenia ([address] 10/2/2019    UTI (urinary tract infection) 9/30/2019     Past Surgical History:   Procedure Laterality Date    BREAST LUMPECTOMY  2015    CARDIAC SURGERY  2019    CABG    HIP FRACTURE SURGERY Right     ORTHOPEDIC SURGERY Right     hip fracture repair with hardware , not replacent     Family History   Problem Relation Age of Onset    Heart Disease Brother     Heart Disease Father     Kidney Disease Mother      Social History     Tobacco Use    Smoking status: Never    Smokeless tobacco: Never   Substance Use Topics    Alcohol use: Yes       ROS:    Review of Systems   Cardiovascular:  Positive for chest pain (see HPI, non exertional, only positional). Gastrointestinal:  Positive for bloating. Reflux          PHYSICAL EXAM:   /66   Pulse 83   Ht 5' 3\" (1.6 m)   Wt 183 lb 3.2 oz (83.1 kg)   BMI 32.45 kg/m²      Wt Readings from Last 3 Encounters:   11/07/22 183 lb 3.2 oz (83.1 kg)   11/03/22 172 lb 3.2 oz (78.1 kg)   11/01/22 160 lb (72.6 kg)     BP Readings from Last 3 Encounters:   11/07/22 124/66   11/05/22 (!) 165/80   11/01/22 (!) 151/99     Pulse Readings from Last 3 Encounters:   11/07/22 83   11/05/22 82   11/01/22 81           Physical Exam    Medical problems and test results were reviewed with the patient today.      DATA REVIEW    LIPID PANEL     Lab Results   Component Value Date    CHOL 205 (H) 09/11/2020    CHOL 231 (H) 09/25/2019     Lab Results   Component Value Date    TRIG 283 (H) 09/11/2020    TRIG 169 (H) 09/25/2019     Lab Results   Component Value Date    HDL 33 (L) 09/11/2020    HDL 34 (L) 09/25/2019     Lab Results   Component Value Date    LDLCALC 115.4 (H) 09/11/2020    LDLCALC 163.2 (H) 09/25/2019     Lab Results   Component Value Date    LABVLDL 56.6 (H) 09/11/2020    LABVLDL 33.8 (H) 09/25/2019     Lab Results   Component Value Date    CHOLHDLRATIO 6.2 09/11/2020    CHOLHDLRATIO 6.8 09/25/2019 9/28/22 0908    Cholesterol 100 - 199 mg/dL 146    Triglycerides 0 - 149 mg/dL 121    HDL Cholesterol >39 mg/dL 38 Low     VLDL Cholesterol 5 - 40 mg/dL 22    LDL, Calculated 0 - 99 mg/dL 86      BMP  Lab Results   Component Value Date/Time     11/05/2022 04:21 AM    K 4.2 11/05/2022 04:21 AM     11/05/2022 04:21 AM    CO2 22 11/05/2022 04:21 AM    BUN 48 11/05/2022 04:21 AM    CREATININE 1.96 11/05/2022 04:21 AM    GLUCOSE 193 11/05/2022 04:21 AM    CALCIUM 9.0 11/05/2022 04:21 AM          EKG    Sinus  Rhythm 83  normal axis  IMI age undetermined  Diffuse NSST abnormality    Compared with prior tracings dating back to 2021, Q wave previously isolated to lead 3 now present in aVF      CXR/IMAGING        DEVICE INTERROGATION        OUTSIDE RECORDS REVIEW    Records from outside providers have been reviewed and summarized as noted in the HPI, past history and data review sections of this note, and reviewed with patient. .       ASSESSMENT and PLAN    Onesimo Alvarez was seen today for coronary artery disease. Diagnoses and all orders for this visit:    Prolonged Q-T interval on ECG  -     EKG 12 Lead    Coronary artery disease of native artery of native heart with stable angina pectoris (HCC)    Rheumatic mitral regurgitation    H/O mitral valve repair    Dyslipidemia  -     Lipid Panel; Future    Other orders  -     rosuvastatin (CRESTOR) 20 MG tablet; Take 1 tablet by mouth daily        IMPRESSION:    BP is finally well controlled.   She is aware that she is taking 2 beta blockers but she is tolerating this regimen and it has her controlled. Continue with that knowledge. CP as described suggests GERD, reviewed behaviors, diet and meds. LDL close to goal but not there, intensify statin therapy, resume physical activity and diet, return for labs in 3 months, OV in 6 months. Stable valve disease, continue regular echo surveillance          Return in about 6 months (around 5/7/2023). Thank you for allowing me to participate in this patient's care. Please call or contact me if there are any questions or concerns regarding the above.       Dawayne Goodpasture, MD  11/07/22  11:47 AM

## 2022-11-17 ENCOUNTER — OFFICE VISIT (OUTPATIENT)
Dept: INTERNAL MEDICINE CLINIC | Facility: CLINIC | Age: 62
End: 2022-11-17

## 2022-11-17 VITALS
TEMPERATURE: 98 F | HEIGHT: 63 IN | BODY MASS INDEX: 32.6 KG/M2 | RESPIRATION RATE: 16 BRPM | DIASTOLIC BLOOD PRESSURE: 66 MMHG | HEART RATE: 91 BPM | OXYGEN SATURATION: 98 % | SYSTOLIC BLOOD PRESSURE: 116 MMHG | WEIGHT: 184 LBS

## 2022-11-17 DIAGNOSIS — I50.22 CHRONIC SYSTOLIC CONGESTIVE HEART FAILURE (HCC): ICD-10-CM

## 2022-11-17 DIAGNOSIS — R00.2 PALPITATIONS: ICD-10-CM

## 2022-11-17 DIAGNOSIS — Z09 HOSPITAL DISCHARGE FOLLOW-UP: Primary | ICD-10-CM

## 2022-11-17 DIAGNOSIS — N18.32 CHRONIC KIDNEY DISEASE, STAGE 3B (HCC): ICD-10-CM

## 2022-11-17 LAB
ALBUMIN SERPL-MCNC: 3.2 G/DL (ref 3.2–4.6)
ALBUMIN/GLOB SERPL: 0.9 {RATIO} (ref 0.4–1.6)
ALP SERPL-CCNC: 58 U/L (ref 50–136)
ALT SERPL-CCNC: 21 U/L (ref 12–65)
ANION GAP SERPL CALC-SCNC: 4 MMOL/L (ref 2–11)
AST SERPL-CCNC: 19 U/L (ref 15–37)
BASOPHILS # BLD: 0.1 K/UL (ref 0–0.2)
BASOPHILS NFR BLD: 1 % (ref 0–2)
BILIRUB SERPL-MCNC: 0.3 MG/DL (ref 0.2–1.1)
BUN SERPL-MCNC: 28 MG/DL (ref 8–23)
CALCIUM SERPL-MCNC: 8.5 MG/DL (ref 8.3–10.4)
CHLORIDE SERPL-SCNC: 111 MMOL/L (ref 101–110)
CO2 SERPL-SCNC: 27 MMOL/L (ref 21–32)
CREAT SERPL-MCNC: 2 MG/DL (ref 0.6–1)
DIFFERENTIAL METHOD BLD: ABNORMAL
EOSINOPHIL # BLD: 0.2 K/UL (ref 0–0.8)
EOSINOPHIL NFR BLD: 3 % (ref 0.5–7.8)
ERYTHROCYTE [DISTWIDTH] IN BLOOD BY AUTOMATED COUNT: 13.7 % (ref 11.9–14.6)
GLOBULIN SER CALC-MCNC: 3.5 G/DL (ref 2.8–4.5)
GLUCOSE SERPL-MCNC: 114 MG/DL (ref 65–100)
HCT VFR BLD AUTO: 34.3 % (ref 35.8–46.3)
HGB BLD-MCNC: 10.6 G/DL (ref 11.7–15.4)
IMM GRANULOCYTES # BLD AUTO: 0 K/UL (ref 0–0.5)
IMM GRANULOCYTES NFR BLD AUTO: 0 % (ref 0–5)
LYMPHOCYTES # BLD: 1.9 K/UL (ref 0.5–4.6)
LYMPHOCYTES NFR BLD: 29 % (ref 13–44)
MAGNESIUM SERPL-MCNC: 1.6 MG/DL (ref 1.8–2.4)
MCH RBC QN AUTO: 29 PG (ref 26.1–32.9)
MCHC RBC AUTO-ENTMCNC: 30.9 G/DL (ref 31.4–35)
MCV RBC AUTO: 94 FL (ref 82–102)
MONOCYTES # BLD: 0.3 K/UL (ref 0.1–1.3)
MONOCYTES NFR BLD: 5 % (ref 4–12)
NEUTS SEG # BLD: 4.1 K/UL (ref 1.7–8.2)
NEUTS SEG NFR BLD: 62 % (ref 43–78)
NRBC # BLD: 0 K/UL (ref 0–0.2)
PLATELET # BLD AUTO: 259 K/UL (ref 150–450)
PMV BLD AUTO: 11.9 FL (ref 9.4–12.3)
POTASSIUM SERPL-SCNC: 4.5 MMOL/L (ref 3.5–5.1)
PROT SERPL-MCNC: 6.7 G/DL (ref 6.3–8.2)
RBC # BLD AUTO: 3.65 M/UL (ref 4.05–5.2)
SODIUM SERPL-SCNC: 142 MMOL/L (ref 133–143)
WBC # BLD AUTO: 6.6 K/UL (ref 4.3–11.1)

## 2022-11-17 RX ORDER — ONDANSETRON 4 MG/1
4 TABLET, ORALLY DISINTEGRATING ORAL 3 TIMES DAILY PRN
COMMUNITY
Start: 2021-06-22

## 2022-11-17 RX ORDER — FUROSEMIDE 20 MG/1
20 TABLET ORAL 2 TIMES DAILY
COMMUNITY

## 2022-11-17 ASSESSMENT — PATIENT HEALTH QUESTIONNAIRE - PHQ9
SUM OF ALL RESPONSES TO PHQ QUESTIONS 1-9: 0
2. FEELING DOWN, DEPRESSED OR HOPELESS: 0
1. LITTLE INTEREST OR PLEASURE IN DOING THINGS: 0
SUM OF ALL RESPONSES TO PHQ QUESTIONS 1-9: 0
SUM OF ALL RESPONSES TO PHQ9 QUESTIONS 1 & 2: 0

## 2022-11-17 ASSESSMENT — ENCOUNTER SYMPTOMS
ABDOMINAL PAIN: 0
SHORTNESS OF BREATH: 0
VOMITING: 0
NAUSEA: 0

## 2022-11-17 NOTE — PROGRESS NOTES
FOLLOW UP VISIT    Subjective:    Norma Forde (: 1960) is a 58 y.o., female,   Chief Complaint   Patient presents with    Irregular Heart Beat     And can hear heart beat in ear , was in hospital 2 weeks ago        HPI:    Here for chief complaint that she can hear heart beat in her ears, just started 1 week ago. Feels like her heart is beating fast. Has pulsating whooshing sound and ringing in both ears. Sometimes feels like her heart skipped a beat. Endorses chest tightness, left sided, worse with exertion. She was recently hospitalized from 22-22 for influenza, YURY on CKD IIIb, weakness, fatigue. She was given IV fluids and Cr improved, was initially 2.75 and improved to 1.96 on day of discharge. Chronic systolic heart failure, CAD, history of mitral valve repair: noted, she saw cardiology about a week ago. DM2: Last a1c 7.2 on 11/3/22    HTN: blood pressure controlled.     The following portions of the patient's history were reviewed and updated as appropriate:      Patient Active Problem List   Diagnosis    S/P CABG x 3    Malignant neoplasm of left female breast ([facility] [address]    Controlled type 2 diabetes mellitus, with long-term current use of insulin (HCC)    S/P MVR (mitral valve repair)    Mitral valve regurgitation    Suspected sleep apnea    Hypocalcemia    CAD (coronary artery disease)    Hyperglycemia due to type 2 diabetes mellitus (HCC)    Gout    Chronic kidney disease, stage 3b (HCC)    Neuropathy    Anemia    Mixed hyperlipidemia    Chronic systolic congestive heart failure (HCC)    Fatigue    Class 1 obesity due to excess calories with serious comorbidity and body mass index (BMI) of 30.0 to 30.9 in adult     Past Surgical History:   Procedure Laterality Date    BREAST LUMPECTOMY  2015    CARDIAC SURGERY  2019    CABG    HIP FRACTURE SURGERY Right     ORTHOPEDIC SURGERY Right     hip fracture repair with hardware , not replacent     Family History   Problem Relation Age of Onset    Heart Disease Brother     Heart Disease Father     Kidney Disease Mother      Social History     Socioeconomic History    Marital status:      Spouse name: Not on file    Number of children: Not on file    Years of education: Not on file    Highest education level: Not on file   Occupational History    Not on file   Tobacco Use    Smoking status: Never    Smokeless tobacco: Never   Substance and Sexual Activity    Alcohol use: Yes    Drug use: No    Sexual activity: Not on file   Other Topics Concern    Not on file   Social History Narrative    . Lives with her      Social Determinants of Health     Financial Resource Strain: Not on file   Food Insecurity: Not on file   Transportation Needs: Not on file   Physical Activity: Not on file   Stress: Not on file   Social Connections: Not on file   Intimate Partner Violence: Not on file   Housing Stability: Not on file     Current Outpatient Medications   Medication Sig Dispense Refill    furosemide (LASIX) 20 MG tablet Take 20 mg by mouth 2 times daily      ondansetron (ZOFRAN-ODT) 4 MG disintegrating tablet Take 4 mg by mouth 3 times daily as needed      rosuvastatin (CRESTOR) 20 MG tablet Take 1 tablet by mouth daily 90 tablet 3    allopurinol (ZYLOPRIM) 100 MG tablet Take 50 mg by mouth every other day      spironolactone (ALDACTONE) 25 MG tablet TAKE 1 TABLET (25 MG) BY MOUTH 2 (TWO) TIMES A DAY 60 tablet 2    carvedilol (COREG) 25 MG tablet Take 1 tablet by mouth 2 times daily (with meals) 60 tablet 2    ferrous sulfate (IRON 325) 325 (65 Fe) MG tablet Take 325 mg by mouth      gabapentin (NEURONTIN) 100 MG capsule Take 1 capsule by mouth at bedtime for 90 days.  Intended supply: 30 days 30 capsule 2    acetaminophen (TYLENOL) 500 MG tablet Take 500 mg by mouth every 4 hours as needed      aspirin 81 MG chewable tablet Take 81 mg by mouth daily      Calcium Carbonate-Vitamin D (OYSTER SHELL CALCIUM/D) 500-200 MG-UNIT TABS Take 1 tablet by mouth 2 times daily (with meals)      Dulaglutide 3 MG/0.5ML SOPN Inject into the skin every 7 days      esomeprazole (NEXIUM) 40 MG delayed release capsule Take 40 mg by mouth daily      insulin glargine (LANTUS;BASAGLAR) 100 UNIT/ML injection pen Inject 20 Units into the skin nightly The details of the medication are not available because there are pending changes by a home health clinician. letrozole (FEMARA) 2.5 MG tablet Take 2.5 mg by mouth daily      lisinopril (PRINIVIL;ZESTRIL) 40 MG tablet Take 40 mg by mouth daily       No current facility-administered medications for this visit. Allergies as of 11/17/2022    (No Known Allergies)       Review of Systems   Constitutional:  Negative for chills and fever. HENT:  Negative for congestion. Eyes:  Negative for visual disturbance. Respiratory:  Negative for shortness of breath. Cardiovascular:  Negative for chest pain. Gastrointestinal:  Negative for abdominal pain, nausea and vomiting. Musculoskeletal:  Negative for arthralgias. Neurological:  Negative for syncope and headaches. Objective:    Vitals:    11/17/22 1036   BP: 116/66   Site: Left Upper Arm   Position: Sitting   Cuff Size: Large Adult   Pulse: 91   Resp: 16   Temp: 98 °F (36.7 °C)   TempSrc: Temporal   SpO2: 98%   Weight: 184 lb (83.5 kg)   Height: 5' 3\" (1.6 m)        Physical Exam  Constitutional:       General: She is not in acute distress. Appearance: Normal appearance. HENT:      Head: Normocephalic and atraumatic. Eyes:      Extraocular Movements: Extraocular movements intact. Conjunctiva/sclera: Conjunctivae normal.   Cardiovascular:      Rate and Rhythm: Normal rate and regular rhythm. Heart sounds: No murmur heard. Pulmonary:      Effort: Pulmonary effort is normal.      Breath sounds: Normal breath sounds. No wheezing, rhonchi or rales. Musculoskeletal:      Right lower leg: No edema. Left lower leg: No edema.    Skin: General: Skin is warm and dry. Neurological:      Mental Status: She is alert. Mental status is at baseline.    Psychiatric:         Mood and Affect: Mood normal.         Behavior: Behavior normal.       Recent Results (from the past 672 hour(s))   COVID-19, Rapid    Collection Time: 10/27/22 10:08 PM    Specimen: Nasopharyngeal   Result Value Ref Range    Source NASAL SWAB      SARS-CoV-2, Rapid Not detected NOTD     Rapid influenza A/B antigens    Collection Time: 10/27/22 10:08 PM    Specimen: Nasal Washing   Result Value Ref Range    Influenza A Ag Positive (A) NEG      Influenza B Ag Negative NEG      Source Nasopharyngeal     EKG 12 Lead    Collection Time: 11/02/22  1:53 PM   Result Value Ref Range    Ventricular Rate 85 BPM    Atrial Rate 85 BPM    P-R Interval 182 ms    QRS Duration 86 ms    Q-T Interval 392 ms    QTc Calculation (Bazett) 466 ms    P Axis 42 degrees    R Axis 4 degrees    T Axis 101 degrees    Diagnosis Normal sinus rhythm    CBC    Collection Time: 11/02/22  2:19 PM   Result Value Ref Range    WBC 6.6 4.3 - 11.1 K/uL    RBC 4.46 4.05 - 5.2 M/uL    Hemoglobin 13.2 11.7 - 15.4 g/dL    Hematocrit 39.1 35.8 - 46.3 %    MCV 87.7 82.0 - 102.0 FL    MCH 29.6 26.1 - 32.9 PG    MCHC 33.8 31.4 - 35.0 g/dL    RDW 13.1 11.9 - 14.6 %    Platelets 426 534 - 980 K/uL    MPV 11.8 9.4 - 12.3 FL    nRBC 0.00 0.0 - 0.2 K/uL   Comprehensive Metabolic Panel    Collection Time: 11/02/22  2:19 PM   Result Value Ref Range    Sodium 141 133 - 143 mmol/L    Potassium 4.0 3.5 - 5.1 mmol/L    Chloride 110 101 - 110 mmol/L    CO2 22 21 - 32 mmol/L    Anion Gap 9 2 - 11 mmol/L    Glucose 118 (H) 65 - 100 mg/dL    BUN 62 (H) 8 - 23 MG/DL    Creatinine 2.75 (H) 0.6 - 1.0 MG/DL    Est, Glom Filt Rate 19 (L) >60 ml/min/1.73m2    Calcium 9.5 8.3 - 10.4 MG/DL    Total Bilirubin 0.3 0.2 - 1.1 MG/DL    ALT 15 12 - 65 U/L    AST 23 15 - 37 U/L    Alk Phosphatase 57 50 - 130 U/L    Total Protein 8.0 6.3 - 8.2 g/dL    Albumin 2.9 (L) 3.2 - 4.6 g/dL    Globulin 5.1 (H) 2.8 - 4.5 g/dL    Albumin/Globulin Ratio 0.6 0.4 - 1.6     Troponin    Collection Time: 11/02/22  2:19 PM   Result Value Ref Range    Troponin, High Sensitivity 23.0 (H) 0 - 14 pg/mL   Troponin    Collection Time: 11/02/22  4:15 PM   Result Value Ref Range    Troponin, High Sensitivity 19.5 (H) 0 - 14 pg/mL   Urinalysis w rflx microscopic    Collection Time: 11/02/22  4:15 PM   Result Value Ref Range    Color, UA YELLOW/STRAW      Appearance CLOUDY      Specific Gravity, UA 1.020 1.001 - 1.023      pH, Urine 5.0 5.0 - 9.0      Protein, UA >300 (A) NEG mg/dL    Glucose, UA Negative mg/dL    Ketones, Urine Negative NEG mg/dL    Bilirubin Urine Negative NEG      Blood, Urine SMALL (A) NEG      Urobilinogen, Urine 1.0 0.2 - 1.0 EU/dL    Nitrite, Urine Negative NEG      Leukocyte Esterase, Urine Negative NEG      WBC, UA 0-3 0 /hpf    RBC, UA 10-20 0 /hpf    Epithelial Cells UA 10-20 0 /hpf    BACTERIA, URINE 2+ (H) 0 /hpf    Casts 0-3 0 /lpf    Yeast, UA MODERATE      Other observations RESULTS VERIFIED MANUALLY     POCT Glucose    Collection Time: 11/02/22  9:38 PM   Result Value Ref Range    POC Glucose 133 (H) 65 - 100 mg/dL    Performed by: Truman Purcell    Phosphorus    Collection Time: 11/03/22  3:44 AM   Result Value Ref Range    Phosphorus 5.1 (H) 2.3 - 3.7 MG/DL   Albumin    Collection Time: 11/03/22  3:44 AM   Result Value Ref Range    Albumin 2.6 (L) 3.2 - 4.6 g/dL   Basic Metabolic Panel w/ Reflex to MG    Collection Time: 11/03/22  3:44 AM   Result Value Ref Range    Sodium 139 133 - 143 mmol/L    Potassium 4.1 3.5 - 5.1 mmol/L    Chloride 112 (H) 101 - 110 mmol/L    CO2 19 (L) 21 - 32 mmol/L    Anion Gap 8 2 - 11 mmol/L    Glucose 179 (H) 65 - 100 mg/dL    BUN 65 (H) 8 - 23 MG/DL    Creatinine 2.45 (H) 0.6 - 1.0 MG/DL    Est, Glom Filt Rate 22 (L) >60 ml/min/1.73m2    Calcium 9.2 8.3 - 10.4 MG/DL   CBC    Collection Time: 11/03/22  3:44 AM   Result Value Ref Range    WBC 6.5 4.3 - 11.1 K/uL    RBC 4.08 4.05 - 5.2 M/uL    Hemoglobin 11.8 11.7 - 15.4 g/dL    Hematocrit 35.9 35.8 - 46.3 %    MCV 88.0 82.0 - 102.0 FL    MCH 28.9 26.1 - 32.9 PG    MCHC 32.9 31.4 - 35.0 g/dL    RDW 12.8 11.9 - 14.6 %    Platelets 201 554 - 413 K/uL    MPV 10.7 9.4 - 12.3 FL    nRBC 0.00 0.0 - 0.2 K/uL   Hemoglobin A1C    Collection Time: 11/03/22  3:44 AM   Result Value Ref Range    Hemoglobin A1C 7.2 (H) 4.8 - 5.6 %    eAG 160 mg/dL   TSH with Reflex    Collection Time: 11/03/22  3:44 AM   Result Value Ref Range    TSH w Free Thyroid if Abnormal 0.75 0.358 - 3.740 UIU/ML   POCT Glucose    Collection Time: 11/03/22  5:36 AM   Result Value Ref Range    POC Glucose 152 (H) 65 - 100 mg/dL    Performed by: Vivienne    Sodium, urine, random    Collection Time: 11/03/22  1:44 PM   Result Value Ref Range    SODIUM, RANDOM URINE 39 MMOL/L   Creatinine, Random Urine    Collection Time: 11/03/22  1:44 PM   Result Value Ref Range    Creatinine, Ur 99.00 mg/dL   Protein / creatinine ratio, urine    Collection Time: 11/03/22  1:44 PM   Result Value Ref Range    Protein, Urine, Random 805 mg/dL    Creatinine, Ur 97.00 mg/dL    PROTEIN/CREAT RATIO URINE RAN 8.3     Uric Acid    Collection Time: 11/03/22  5:45 PM   Result Value Ref Range    Uric Acid 8.8 (H) 2.6 - 6.0 MG/DL   POCT Glucose    Collection Time: 11/03/22  9:32 PM   Result Value Ref Range    POC Glucose 154 (H) 65 - 100 mg/dL    Performed by: Sophia    Phosphorus    Collection Time: 11/04/22  3:54 AM   Result Value Ref Range    Phosphorus 3.2 2.3 - 3.7 MG/DL   Albumin    Collection Time: 11/04/22  3:54 AM   Result Value Ref Range    Albumin 2.4 (L) 3.2 - 4.6 g/dL   Basic Metabolic Panel w/ Reflex to MG    Collection Time: 11/04/22  3:54 AM   Result Value Ref Range    Sodium 143 133 - 143 mmol/L    Potassium 3.7 3.5 - 5.1 mmol/L    Chloride 113 (H) 101 - 110 mmol/L    CO2 23 21 - 32 mmol/L    Anion Gap 7 2 - 11 mmol/L Glucose 122 (H) 65 - 100 mg/dL    BUN 60 (H) 8 - 23 MG/DL    Creatinine 2.41 (H) 0.6 - 1.0 MG/DL    Est, Glom Filt Rate 22 (L) >60 ml/min/1.73m2    Calcium 8.9 8.3 - 10.4 MG/DL   CBC    Collection Time: 11/04/22  3:54 AM   Result Value Ref Range    WBC 6.6 4.3 - 11.1 K/uL    RBC 3.82 (L) 4.05 - 5.2 M/uL    Hemoglobin 11.0 (L) 11.7 - 15.4 g/dL    Hematocrit 33.2 (L) 35.8 - 46.3 %    MCV 86.9 82.0 - 102.0 FL    MCH 28.8 26.1 - 32.9 PG    MCHC 33.1 31.4 - 35.0 g/dL    RDW 12.9 11.9 - 14.6 %    Platelets 355 971 - 898 K/uL    MPV 10.8 9.4 - 12.3 FL    nRBC 0.00 0.0 - 0.2 K/uL   POCT Glucose    Collection Time: 11/04/22 11:29 AM   Result Value Ref Range    POC Glucose 116 (H) 65 - 100 mg/dL    Performed by: [facility]    POCT Glucose    Collection Time: 11/04/22  4:39 PM   Result Value Ref Range    POC Glucose 133 (H) 65 - 100 mg/dL    Performed by: [facility]    POCT Glucose    Collection Time: 11/04/22  8:43 PM   Result Value Ref Range    POC Glucose 217 (H) 65 - 100 mg/dL    Performed by: Pastor    Phosphorus    Collection Time: 11/05/22  4:21 AM   Result Value Ref Range    Phosphorus 2.8 2.3 - 3.7 MG/DL   Albumin    Collection Time: 11/05/22  4:21 AM   Result Value Ref Range    Albumin 2.8 (L) 3.2 - 4.6 g/dL   Basic Metabolic Panel w/ Reflex to MG    Collection Time: 11/05/22  4:21 AM   Result Value Ref Range    Sodium 137 133 - 143 mmol/L    Potassium 4.2 3.5 - 5.1 mmol/L    Chloride 109 101 - 110 mmol/L    CO2 22 21 - 32 mmol/L    Anion Gap 6 2 - 11 mmol/L    Glucose 193 (H) 65 - 100 mg/dL    BUN 48 (H) 8 - 23 MG/DL    Creatinine 1.96 (H) 0.6 - 1.0 MG/DL    Est, Glom Filt Rate 28 (L) >60 ml/min/1.73m2    Calcium 9.0 8.3 - 10.4 MG/DL   CBC    Collection Time: 11/05/22  4:21 AM   Result Value Ref Range    WBC 7.2 4.3 - 11.1 K/uL    RBC 4.12 4.05 - 5.2 M/uL    Hemoglobin 12.2 11.7 - 15.4 g/dL    Hematocrit 36.0 35.8 - 46.3 %    MCV 87.4 82.0 - 102.0 FL    MCH 29.6 26.1 - 32.9 PG    MCHC 33.9 31.4 - 35.0 g/dL    RDW 12.6 11.9 - 14.6 %    Platelets 836 905 - 278 K/uL    MPV 11.2 9.4 - 12.3 FL    nRBC 0.00 0.0 - 0.2 K/uL   POCT Glucose    Collection Time: 11/05/22  6:24 AM   Result Value Ref Range    POC Glucose 162 (H) 65 - 100 mg/dL    Performed by: Pastor    Magnesium    Collection Time: 11/17/22 11:47 AM   Result Value Ref Range    Magnesium 1.6 (L) 1.8 - 2.4 mg/dL   Comprehensive Metabolic Panel    Collection Time: 11/17/22 11:47 AM   Result Value Ref Range    Sodium 142 133 - 143 mmol/L    Potassium 4.5 3.5 - 5.1 mmol/L    Chloride 111 (H) 101 - 110 mmol/L    CO2 27 21 - 32 mmol/L    Anion Gap 4 2 - 11 mmol/L    Glucose 114 (H) 65 - 100 mg/dL    BUN 28 (H) 8 - 23 MG/DL    Creatinine 2.00 (H) 0.6 - 1.0 MG/DL    Est, Glom Filt Rate 28 (L) >60 ml/min/1.73m2    Calcium 8.5 8.3 - 10.4 MG/DL    Total Bilirubin 0.3 0.2 - 1.1 MG/DL    ALT 21 12 - 65 U/L    AST 19 15 - 37 U/L    Alk Phosphatase 58 50 - 136 U/L    Total Protein 6.7 6.3 - 8.2 g/dL    Albumin 3.2 3.2 - 4.6 g/dL    Globulin 3.5 2.8 - 4.5 g/dL    Albumin/Globulin Ratio 0.9 0.4 - 1.6     CBC with Auto Differential    Collection Time: 11/17/22 11:47 AM   Result Value Ref Range    WBC 6.6 4.3 - 11.1 K/uL    RBC 3.65 (L) 4.05 - 5.2 M/uL    Hemoglobin 10.6 (L) 11.7 - 15.4 g/dL    Hematocrit 34.3 (L) 35.8 - 46.3 %    MCV 94.0 82 - 102 FL    MCH 29.0 26.1 - 32.9 PG    MCHC 30.9 (L) 31.4 - 35.0 g/dL    RDW 13.7 11.9 - 14.6 %    Platelets 833 599 - 679 K/uL    MPV 11.9 9.4 - 12.3 FL    nRBC 0.00 0.0 - 0.2 K/uL    Differential Type AUTOMATED      Seg Neutrophils 62 43 - 78 %    Lymphocytes 29 13 - 44 %    Monocytes 5 4.0 - 12.0 %    Eosinophils % 3 0.5 - 7.8 %    Basophils 1 0.0 - 2.0 %    Immature Granulocytes 0 0.0 - 5.0 %    Segs Absolute 4.1 1.7 - 8.2 K/UL    Absolute Lymph # 1.9 0.5 - 4.6 K/UL    Absolute Mono # 0.3 0.1 - 1.3 K/UL    Absolute Eos # 0.2 0.0 - 0.8 K/UL    Basophils Absolute 0.1 0.0 - 0.2 K/UL    Absolute Immature Granulocyte 0.0 0.0 - 0.5 K/UL       Assessent & Plan    1. Hospital discharge follow-up  2. Palpitations  -     EKG 12 Lead  -     [address]  -     CBC with Auto Differential; Future  -     Comprehensive Metabolic Panel; Future  -     Magnesium; Future  3. Chronic kidney disease, stage 3b ([facility] [address]  4. Chronic systolic congestive heart failure ([facility] [address]     She is indeed on two beta blockers, will stop nebivolol. EKG obtained in office appears unchanged from prior, except for one PVC. Will check electrolytes and get 24 hour heart monitor. Advised to let us know if symptoms worsen. The patient and/or patient representative voiced understanding and agreement with the current diagnoses, recommendations, and possible side effects. Return in about 6 weeks (around 12/29/2022) for routine follow up.      Kory Collazo MD

## 2022-11-18 ENCOUNTER — TELEPHONE (OUTPATIENT)
Dept: INTERNAL MEDICINE CLINIC | Facility: CLINIC | Age: 62
End: 2022-11-18

## 2022-11-18 DIAGNOSIS — D64.9 ANEMIA, UNSPECIFIED TYPE: ICD-10-CM

## 2022-11-18 DIAGNOSIS — E83.42 HYPOMAGNESEMIA: Primary | ICD-10-CM

## 2022-11-18 RX ORDER — ADHESIVE TAPE 3"X 2.3 YD
200 TAPE, NON-MEDICATED TOPICAL DAILY
Qty: 30 TABLET | Refills: 0 | Status: SHIPPED | OUTPATIENT
Start: 2022-11-18 | End: 2022-12-18

## 2022-11-18 NOTE — TELEPHONE ENCOUNTER
Patient was informed recent magnesium levels were low and as a result Magnesium 200mg po daily have been sent to [facility] [address] [place] [address]. Also that labs have been placed to have magnesium levels rechecked 2 weeks often starting supplements to ensure not receiving to much magnesium. Report fatigue, muscle weakness, nausea, lack of appetite.

## 2022-11-23 DIAGNOSIS — I10 RESISTANT HYPERTENSION: ICD-10-CM

## 2022-11-25 RX ORDER — SPIRONOLACTONE 25 MG/1
TABLET ORAL
Qty: 180 TABLET | OUTPATIENT
Start: 2022-11-25

## 2023-01-03 DIAGNOSIS — I10 RESISTANT HYPERTENSION: ICD-10-CM

## 2023-01-04 RX ORDER — SPIRONOLACTONE 25 MG/1
25 TABLET ORAL 2 TIMES DAILY
Qty: 180 TABLET | Refills: 1 | Status: SHIPPED | OUTPATIENT
Start: 2023-01-04

## 2023-01-10 ENCOUNTER — OFFICE VISIT (OUTPATIENT)
Dept: INTERNAL MEDICINE CLINIC | Facility: CLINIC | Age: 63
End: 2023-01-10
Payer: MEDICARE

## 2023-01-10 VITALS
WEIGHT: 185 LBS | HEART RATE: 81 BPM | SYSTOLIC BLOOD PRESSURE: 130 MMHG | OXYGEN SATURATION: 99 % | BODY MASS INDEX: 32.78 KG/M2 | HEIGHT: 63 IN | DIASTOLIC BLOOD PRESSURE: 80 MMHG

## 2023-01-10 DIAGNOSIS — I10 RESISTANT HYPERTENSION: ICD-10-CM

## 2023-01-10 DIAGNOSIS — Z79.4 CONTROLLED TYPE 2 DIABETES MELLITUS WITH HYPERGLYCEMIA, WITH LONG-TERM CURRENT USE OF INSULIN (HCC): Primary | ICD-10-CM

## 2023-01-10 DIAGNOSIS — E11.65 CONTROLLED TYPE 2 DIABETES MELLITUS WITH HYPERGLYCEMIA, WITH LONG-TERM CURRENT USE OF INSULIN (HCC): Primary | ICD-10-CM

## 2023-01-10 PROCEDURE — 3079F DIAST BP 80-89 MM HG: CPT | Performed by: STUDENT IN AN ORGANIZED HEALTH CARE EDUCATION/TRAINING PROGRAM

## 2023-01-10 PROCEDURE — G8484 FLU IMMUNIZE NO ADMIN: HCPCS | Performed by: STUDENT IN AN ORGANIZED HEALTH CARE EDUCATION/TRAINING PROGRAM

## 2023-01-10 PROCEDURE — 3017F COLORECTAL CA SCREEN DOC REV: CPT | Performed by: STUDENT IN AN ORGANIZED HEALTH CARE EDUCATION/TRAINING PROGRAM

## 2023-01-10 PROCEDURE — 3075F SYST BP GE 130 - 139MM HG: CPT | Performed by: STUDENT IN AN ORGANIZED HEALTH CARE EDUCATION/TRAINING PROGRAM

## 2023-01-10 PROCEDURE — G9899 SCRN MAM PERF RSLTS DOC: HCPCS | Performed by: STUDENT IN AN ORGANIZED HEALTH CARE EDUCATION/TRAINING PROGRAM

## 2023-01-10 PROCEDURE — 1036F TOBACCO NON-USER: CPT | Performed by: STUDENT IN AN ORGANIZED HEALTH CARE EDUCATION/TRAINING PROGRAM

## 2023-01-10 PROCEDURE — G8417 CALC BMI ABV UP PARAM F/U: HCPCS | Performed by: STUDENT IN AN ORGANIZED HEALTH CARE EDUCATION/TRAINING PROGRAM

## 2023-01-10 PROCEDURE — G8427 DOCREV CUR MEDS BY ELIG CLIN: HCPCS | Performed by: STUDENT IN AN ORGANIZED HEALTH CARE EDUCATION/TRAINING PROGRAM

## 2023-01-10 PROCEDURE — 3046F HEMOGLOBIN A1C LEVEL >9.0%: CPT | Performed by: STUDENT IN AN ORGANIZED HEALTH CARE EDUCATION/TRAINING PROGRAM

## 2023-01-10 PROCEDURE — 2022F DILAT RTA XM EVC RTNOPTHY: CPT | Performed by: STUDENT IN AN ORGANIZED HEALTH CARE EDUCATION/TRAINING PROGRAM

## 2023-01-10 PROCEDURE — 99213 OFFICE O/P EST LOW 20 MIN: CPT | Performed by: STUDENT IN AN ORGANIZED HEALTH CARE EDUCATION/TRAINING PROGRAM

## 2023-01-10 RX ORDER — CARVEDILOL 25 MG/1
25 TABLET ORAL 2 TIMES DAILY WITH MEALS
Qty: 180 TABLET | Refills: 1 | Status: SHIPPED | OUTPATIENT
Start: 2023-01-10 | End: 2023-04-10

## 2023-01-10 RX ORDER — FUROSEMIDE 20 MG/1
20 TABLET ORAL 2 TIMES DAILY
Qty: 180 TABLET | Refills: 1 | Status: SHIPPED | OUTPATIENT
Start: 2023-01-10

## 2023-01-10 RX ORDER — LISINOPRIL 40 MG/1
40 TABLET ORAL DAILY
Qty: 90 TABLET | Refills: 1 | Status: SHIPPED | OUTPATIENT
Start: 2023-01-10

## 2023-01-10 RX ORDER — SPIRONOLACTONE 25 MG/1
25 TABLET ORAL 2 TIMES DAILY
Qty: 180 TABLET | Refills: 1 | Status: SHIPPED | OUTPATIENT
Start: 2023-01-10

## 2023-01-10 RX ORDER — NEBIVOLOL 5 MG/1
5 TABLET ORAL DAILY
COMMUNITY

## 2023-01-10 ASSESSMENT — PATIENT HEALTH QUESTIONNAIRE - PHQ9
SUM OF ALL RESPONSES TO PHQ QUESTIONS 1-9: 0
SUM OF ALL RESPONSES TO PHQ9 QUESTIONS 1 & 2: 0
SUM OF ALL RESPONSES TO PHQ QUESTIONS 1-9: 0
1. LITTLE INTEREST OR PLEASURE IN DOING THINGS: 0
SUM OF ALL RESPONSES TO PHQ QUESTIONS 1-9: 0
SUM OF ALL RESPONSES TO PHQ QUESTIONS 1-9: 0
2. FEELING DOWN, DEPRESSED OR HOPELESS: 0

## 2023-01-10 ASSESSMENT — ENCOUNTER SYMPTOMS
VOMITING: 0
ABDOMINAL PAIN: 0
NAUSEA: 0
SHORTNESS OF BREATH: 0

## 2023-01-10 NOTE — PROGRESS NOTES
FOLLOW UP VISIT    Subjective:    Jose Hurley (: 1960) is a 58 y.o., female,   Chief Complaint   Patient presents with    Chronic Kidney Disease     6 week f/u. Saw nephrologist last week       HPI:    She was placed on antibiotic for UTI, she was having burning urination, treated with cipro currently on day 4 of 7 by her nephrologist.  Creatinine checked on 2022 was elevated, she had blood work done recently to have creatinine rechecked. Gout symptoms improved. Uric acid 7.9 on 2023, previously 10.9 on 2022. She is on allopurinol. DM2: Blood glucose fasting has been around 120. A1c on 11/3/2022 was 7.2. HTN: She has been checking her blood pressure at home, has been around 130/60-70s    No longer having palpitations.     The following portions of the patient's history were reviewed and updated as appropriate:      Patient Active Problem List   Diagnosis    S/P CABG x 3    Malignant neoplasm of left female breast ([facility] [address]    Controlled type 2 diabetes mellitus, with long-term current use of insulin (HCC)    S/P MVR (mitral valve repair)    Mitral valve regurgitation    Suspected sleep apnea    Hypocalcemia    CAD (coronary artery disease)    Hyperglycemia due to type 2 diabetes mellitus (HCC)    Gout    Chronic kidney disease, stage 3b (HCC)    Neuropathy    Anemia    Mixed hyperlipidemia    Chronic systolic congestive heart failure (HCC)    Fatigue    Class 1 obesity due to excess calories with serious comorbidity and body mass index (BMI) of 30.0 to 30.9 in adult     Past Surgical History:   Procedure Laterality Date    BREAST LUMPECTOMY      CARDIAC SURGERY  2019    CABG    HIP FRACTURE SURGERY Right     ORTHOPEDIC SURGERY Right     hip fracture repair with hardware , not replacent     Family History   Problem Relation Age of Onset    Heart Disease Brother     Heart Disease Father     Kidney Disease Mother      Social History     Socioeconomic History    Marital status:      Spouse name: Not on file    Number of children: Not on file    Years of education: Not on file    Highest education level: Not on file   Occupational History    Not on file   Tobacco Use    Smoking status: Never    Smokeless tobacco: Never   Substance and Sexual Activity    Alcohol use: Yes    Drug use: No    Sexual activity: Not on file   Other Topics Concern    Not on file   Social History Narrative    . Lives with her      Social Determinants of Health     Financial Resource Strain: Not on file   Food Insecurity: Not on file   Transportation Needs: Not on file   Physical Activity: Not on file   Stress: Not on file   Social Connections: Not on file   Intimate Partner Violence: Not on file   Housing Stability: Not on file     Current Outpatient Medications   Medication Sig Dispense Refill    nebivolol (BYSTOLIC) 5 MG tablet Take 5 mg by mouth daily      vitamin D 25 MCG (1000 UT) CAPS Take 1,000 Units by mouth daily      lisinopril (PRINIVIL;ZESTRIL) 40 MG tablet Take 1 tablet by mouth daily 90 tablet 1    furosemide (LASIX) 20 MG tablet Take 1 tablet by mouth 2 times daily 180 tablet 1    carvedilol (COREG) 25 MG tablet Take 1 tablet by mouth 2 times daily (with meals) 180 tablet 1    spironolactone (ALDACTONE) 25 MG tablet Take 1 tablet by mouth 2 times daily 180 tablet 1    rosuvastatin (CRESTOR) 20 MG tablet Take 1 tablet by mouth daily 90 tablet 3    allopurinol (ZYLOPRIM) 100 MG tablet Take 50 mg by mouth every other day      ferrous sulfate (IRON 325) 325 (65 Fe) MG tablet Take 325 mg by mouth      gabapentin (NEURONTIN) 100 MG capsule Take 1 capsule by mouth at bedtime for 90 days.  Intended supply: 30 days 30 capsule 2    acetaminophen (TYLENOL) 500 MG tablet Take 500 mg by mouth every 4 hours as needed      aspirin 81 MG chewable tablet Take 81 mg by mouth daily      Calcium Carbonate-Vitamin D (OYSTER SHELL CALCIUM/D) 500-200 MG-UNIT TABS Take 1 tablet by mouth 2 times daily (with meals)      Dulaglutide 3 MG/0.5ML SOPN Inject into the skin every 7 days      esomeprazole (NEXIUM) 40 MG delayed release capsule Take 40 mg by mouth daily      insulin glargine (LANTUS;BASAGLAR) 100 UNIT/ML injection pen Inject 20 Units into the skin nightly The details of the medication are not available because there are pending changes by a home health clinician. letrozole (FEMARA) 2.5 MG tablet Take 2.5 mg by mouth daily      Magnesium Oxide 200 MG TABS Take 200 mg by mouth daily 30 tablet 0    ondansetron (ZOFRAN-ODT) 4 MG disintegrating tablet Take 4 mg by mouth 3 times daily as needed       No current facility-administered medications for this visit. Allergies as of 01/10/2023    (No Known Allergies)       Review of Systems   Constitutional:  Negative for chills and fever. HENT:  Negative for congestion. Eyes:  Negative for visual disturbance. Respiratory:  Negative for shortness of breath. Cardiovascular:  Negative for chest pain. Gastrointestinal:  Negative for abdominal pain, nausea and vomiting. Musculoskeletal:  Negative for arthralgias. Neurological:  Negative for syncope and headaches. Objective:    Vitals:    01/10/23 1331   BP: 130/80   Pulse: 81   SpO2: 99%   Weight: 185 lb (83.9 kg)   Height: 5' 3\" (1.6 m)        Physical Exam  Constitutional:       General: She is not in acute distress. Appearance: Normal appearance. HENT:      Head: Normocephalic and atraumatic. Eyes:      Extraocular Movements: Extraocular movements intact. Conjunctiva/sclera: Conjunctivae normal.   Cardiovascular:      Rate and Rhythm: Normal rate and regular rhythm. Heart sounds: No murmur heard. Pulmonary:      Effort: Pulmonary effort is normal.      Breath sounds: Normal breath sounds. No wheezing, rhonchi or rales. Skin:     General: Skin is warm and dry. Neurological:      Mental Status: She is alert. Mental status is at baseline.    Psychiatric:         Mood and Affect: Mood normal.         Behavior: Behavior normal.       Lab Results   Component Value Date    WBC 6.6 11/17/2022    HGB 10.6 (L) 11/17/2022    HCT 34.3 (L) 11/17/2022    MCV 94.0 11/17/2022     11/17/2022      Lab Results   Component Value Date/Time     11/17/2022 11:47 AM    K 4.5 11/17/2022 11:47 AM     11/17/2022 11:47 AM    CO2 27 11/17/2022 11:47 AM    BUN 28 11/17/2022 11:47 AM    CREATININE 2.00 11/17/2022 11:47 AM    GLUCOSE 114 11/17/2022 11:47 AM    CALCIUM 8.5 11/17/2022 11:47 AM       Lab Results   Component Value Date     11/17/2022    K 4.5 11/17/2022     (H) 11/17/2022    CO2 27 11/17/2022    BUN 28 (H) 11/17/2022    CREATININE 2.00 (H) 11/17/2022    GLUCOSE 114 (H) 11/17/2022    CALCIUM 8.5 11/17/2022    PROT 6.7 11/17/2022    LABALBU 3.2 11/17/2022    BILITOT 0.3 11/17/2022    ALKPHOS 58 11/17/2022    AST 19 11/17/2022    ALT 21 11/17/2022    LABGLOM 28 (L) 11/17/2022    GFRAA 22 (L) 08/21/2022    AGRATIO 0.6 (L) 05/16/2021    GLOB 3.5 11/17/2022     No results found for: TSHFT4, TSH, TSHREFLEX, DSE7VCX   Lab Results   Component Value Date    CHOL 205 (H) 09/11/2020    CHOL 231 (H) 09/25/2019     Lab Results   Component Value Date    TRIG 283 (H) 09/11/2020    TRIG 169 (H) 09/25/2019     Lab Results   Component Value Date    HDL 33 (L) 09/11/2020    HDL 34 (L) 09/25/2019     Lab Results   Component Value Date    LDLCALC 115.4 (H) 09/11/2020    LDLCALC 163.2 (H) 09/25/2019     Lab Results   Component Value Date    LABVLDL 56.6 (H) 09/11/2020    LABVLDL 33.8 (H) 09/25/2019     Lab Results   Component Value Date    CHOLHDLRATIO 6.2 09/11/2020    CHOLHDLRATIO 6.8 09/25/2019      Hemoglobin A1C   Date Value Ref Range Status   11/03/2022 7.2 (H) 4.8 - 5.6 % Final        Assessent & Plan    1. Controlled type 2 diabetes mellitus with hyperglycemia, with long-term current use of insulin ([address]  2. Resistant hypertension  -     carvedilol (COREG) 25 MG tablet;  Take 1 tablet by mouth 2 times daily (with meals), Disp-180 tablet, R-1Normal  -     spironolactone (ALDACTONE) 25 MG tablet; Take 1 tablet by mouth 2 times daily, Disp-180 tablet, R-1Normal     Continue current antibiotic for UTI and await repeat labs for kidney function. will recheck urine at next visit. Encouraged to continue following up with her nephrologist.    The patient and/or patient representative voiced understanding and agreement with the current diagnoses, recommendations, and possible side effects. Return in about 2 weeks (around 1/24/2023) for routine follow up.      Kamar Funk MD

## 2023-01-26 ENCOUNTER — OFFICE VISIT (OUTPATIENT)
Dept: INTERNAL MEDICINE CLINIC | Facility: CLINIC | Age: 63
End: 2023-01-26
Payer: MEDICARE

## 2023-01-26 ENCOUNTER — TELEPHONE (OUTPATIENT)
Dept: INTERNAL MEDICINE CLINIC | Facility: CLINIC | Age: 63
End: 2023-01-26

## 2023-01-26 ENCOUNTER — HOSPITAL ENCOUNTER (OUTPATIENT)
Dept: CT IMAGING | Age: 63
Discharge: HOME | End: 2023-01-29

## 2023-01-26 VITALS
HEART RATE: 86 BPM | BODY MASS INDEX: 32.78 KG/M2 | DIASTOLIC BLOOD PRESSURE: 76 MMHG | SYSTOLIC BLOOD PRESSURE: 142 MMHG | HEIGHT: 63 IN | OXYGEN SATURATION: 96 % | WEIGHT: 185 LBS

## 2023-01-26 DIAGNOSIS — R10.9 BILATERAL FLANK PAIN: ICD-10-CM

## 2023-01-26 DIAGNOSIS — R30.0 DYSURIA: Primary | ICD-10-CM

## 2023-01-26 DIAGNOSIS — R30.0 DYSURIA: ICD-10-CM

## 2023-01-26 LAB
BILIRUBIN, URINE, POC: NEGATIVE
BLOOD URINE, POC: NORMAL
GLUCOSE URINE, POC: NEGATIVE
KETONES, URINE, POC: NEGATIVE
LEUKOCYTE ESTERASE, URINE, POC: NEGATIVE
NITRITE, URINE, POC: NEGATIVE
PH, URINE, POC: 6 (ref 4.6–8)
PROTEIN,URINE, POC: NORMAL
SPECIFIC GRAVITY, URINE, POC: 1.02 (ref 1–1.03)
URINALYSIS CLARITY, POC: CLEAR
URINALYSIS COLOR, POC: YELLOW
UROBILINOGEN, POC: 0.2

## 2023-01-26 PROCEDURE — 1036F TOBACCO NON-USER: CPT | Performed by: STUDENT IN AN ORGANIZED HEALTH CARE EDUCATION/TRAINING PROGRAM

## 2023-01-26 PROCEDURE — G8428 CUR MEDS NOT DOCUMENT: HCPCS | Performed by: STUDENT IN AN ORGANIZED HEALTH CARE EDUCATION/TRAINING PROGRAM

## 2023-01-26 PROCEDURE — 99213 OFFICE O/P EST LOW 20 MIN: CPT | Performed by: STUDENT IN AN ORGANIZED HEALTH CARE EDUCATION/TRAINING PROGRAM

## 2023-01-26 PROCEDURE — G8484 FLU IMMUNIZE NO ADMIN: HCPCS | Performed by: STUDENT IN AN ORGANIZED HEALTH CARE EDUCATION/TRAINING PROGRAM

## 2023-01-26 PROCEDURE — 3017F COLORECTAL CA SCREEN DOC REV: CPT | Performed by: STUDENT IN AN ORGANIZED HEALTH CARE EDUCATION/TRAINING PROGRAM

## 2023-01-26 PROCEDURE — G9899 SCRN MAM PERF RSLTS DOC: HCPCS | Performed by: STUDENT IN AN ORGANIZED HEALTH CARE EDUCATION/TRAINING PROGRAM

## 2023-01-26 PROCEDURE — G8417 CALC BMI ABV UP PARAM F/U: HCPCS | Performed by: STUDENT IN AN ORGANIZED HEALTH CARE EDUCATION/TRAINING PROGRAM

## 2023-01-26 PROCEDURE — 81003 URINALYSIS AUTO W/O SCOPE: CPT | Performed by: STUDENT IN AN ORGANIZED HEALTH CARE EDUCATION/TRAINING PROGRAM

## 2023-01-26 RX ORDER — CEFDINIR 300 MG/1
300 CAPSULE ORAL DAILY
Qty: 7 CAPSULE | Refills: 0 | Status: SHIPPED | OUTPATIENT
Start: 2023-01-26 | End: 2023-02-02

## 2023-01-26 ASSESSMENT — ENCOUNTER SYMPTOMS
SHORTNESS OF BREATH: 0
ABDOMINAL PAIN: 0
NAUSEA: 0
VOMITING: 0

## 2023-01-26 NOTE — TELEPHONE ENCOUNTER
[facility]icas [facility].  Pt said she thinks she may have a UTI, I let pt know she already has an chris today with Dr Ally Oliver at 2 pm and she can discuss sx then

## 2023-01-26 NOTE — PROGRESS NOTES
FOLLOW UP VISIT    Subjective:    Minesh Marquis (: 1960) is a 58 y.o., female,   Chief Complaint   Patient presents with    Dysuria    Urinary Frequency     Continuing sx of UTI. F/O from seeing kidney doctor. HPI:    She finished ciprofloxacin for UTI recently and her urinary symptoms improved, however started having dysuria which started yesterday. No fever, chills. Finished ciprofloxacin. Bilateral flank pain that started 2-3 days ago, radiating up right side of back. Has not had kidney stone before. Urine dipstick today shows negative nitrite, negative leukocyte esterase, negative ketones, 2+ protein, 2+ blood    Yesterday she was called by her nephrologist and told that kidney function is still low, to avoid potassium (bananas). Results not viewable to me.     The following portions of the patient's history were reviewed and updated as appropriate:      Patient Active Problem List   Diagnosis    S/P CABG x 3    Malignant neoplasm of left female breast ([facility] [address]    Controlled type 2 diabetes mellitus, with long-term current use of insulin (HCC)    S/P MVR (mitral valve repair)    Mitral valve regurgitation    Suspected sleep apnea    Hypocalcemia    CAD (coronary artery disease)    Hyperglycemia due to type 2 diabetes mellitus (HCC)    Gout    Chronic kidney disease, stage 3b (HCC)    Neuropathy    Anemia    Mixed hyperlipidemia    Chronic systolic congestive heart failure (HCC)    Fatigue    Class 1 obesity due to excess calories with serious comorbidity and body mass index (BMI) of 30.0 to 30.9 in adult     Past Surgical History:   Procedure Laterality Date    BREAST LUMPECTOMY      CARDIAC SURGERY  2019    CABG    HIP FRACTURE SURGERY Right     ORTHOPEDIC SURGERY Right     hip fracture repair with hardware , not replacent     Family History   Problem Relation Age of Onset    Heart Disease Brother     Heart Disease Father     Kidney Disease Mother      Social History     Socioeconomic History    Marital status:      Spouse name: Not on file    Number of children: Not on file    Years of education: Not on file    Highest education level: Not on file   Occupational History    Not on file   Tobacco Use    Smoking status: Never    Smokeless tobacco: Never   Substance and Sexual Activity    Alcohol use: Yes    Drug use: No    Sexual activity: Not on file   Other Topics Concern    Not on file   Social History Narrative    . Lives with her      Social Determinants of Health     Financial Resource Strain: Not on file   Food Insecurity: Not on file   Transportation Needs: Not on file   Physical Activity: Not on file   Stress: Not on file   Social Connections: Not on file   Intimate Partner Violence: Not on file   Housing Stability: Not on file     Current Outpatient Medications   Medication Sig Dispense Refill    cefdinir (OMNICEF) 300 MG capsule Take 1 capsule by mouth daily for 7 days 7 capsule 0    nebivolol (BYSTOLIC) 5 MG tablet Take 5 mg by mouth daily      vitamin D 25 MCG (1000 UT) CAPS Take 1,000 Units by mouth daily      lisinopril (PRINIVIL;ZESTRIL) 40 MG tablet Take 1 tablet by mouth daily 90 tablet 1    furosemide (LASIX) 20 MG tablet Take 1 tablet by mouth 2 times daily 180 tablet 1    carvedilol (COREG) 25 MG tablet Take 1 tablet by mouth 2 times daily (with meals) 180 tablet 1    spironolactone (ALDACTONE) 25 MG tablet Take 1 tablet by mouth 2 times daily 180 tablet 1    rosuvastatin (CRESTOR) 20 MG tablet Take 1 tablet by mouth daily 90 tablet 3    allopurinol (ZYLOPRIM) 100 MG tablet Take 50 mg by mouth every other day      ferrous sulfate (IRON 325) 325 (65 Fe) MG tablet Take 325 mg by mouth      gabapentin (NEURONTIN) 100 MG capsule Take 1 capsule by mouth at bedtime for 90 days.  Intended supply: 30 days 30 capsule 2    acetaminophen (TYLENOL) 500 MG tablet Take 500 mg by mouth every 4 hours as needed      aspirin 81 MG chewable tablet Take 81 mg by mouth daily Calcium Carbonate-Vitamin D (OYSTER SHELL CALCIUM/D) 500-200 MG-UNIT TABS Take 1 tablet by mouth 2 times daily (with meals)      Dulaglutide 3 MG/0.5ML SOPN Inject into the skin every 7 days      esomeprazole (NEXIUM) 40 MG delayed release capsule Take 40 mg by mouth daily      insulin glargine (LANTUS;BASAGLAR) 100 UNIT/ML injection pen Inject 20 Units into the skin nightly The details of the medication are not available because there are pending changes by a home health clinician. letrozole (FEMARA) 2.5 MG tablet Take 2.5 mg by mouth daily      Magnesium Oxide 200 MG TABS Take 200 mg by mouth daily (Patient not taking: Reported on 1/26/2023) 30 tablet 0     No current facility-administered medications for this visit. Allergies as of 01/26/2023    (No Known Allergies)       Review of Systems   Constitutional:  Negative for chills and fever. HENT:  Negative for congestion. Eyes:  Negative for visual disturbance. Respiratory:  Negative for shortness of breath. Cardiovascular:  Negative for chest pain. Gastrointestinal:  Negative for abdominal pain, nausea and vomiting. Musculoskeletal:  Negative for arthralgias. Neurological:  Negative for syncope and headaches. Objective:    Vitals:    01/26/23 1419   BP: (!) 142/76   Site: Right Upper Arm   Position: Sitting   Cuff Size: Large Adult   Pulse: 86   SpO2: 96%   Weight: 185 lb (83.9 kg)   Height: 5' 3\" (1.6 m)        Physical Exam  Constitutional:       General: She is not in acute distress. Appearance: Normal appearance. HENT:      Head: Normocephalic and atraumatic. Eyes:      Extraocular Movements: Extraocular movements intact. Conjunctiva/sclera: Conjunctivae normal.   Cardiovascular:      Rate and Rhythm: Normal rate and regular rhythm. Heart sounds: Normal heart sounds. No murmur heard. Pulmonary:      Effort: Pulmonary effort is normal.      Breath sounds: Normal breath sounds. No wheezing, rhonchi or rales. Abdominal:      General: There is no distension. Palpations: Abdomen is soft. Tenderness: There is no abdominal tenderness. There is no right CVA tenderness, left CVA tenderness or guarding. Musculoskeletal:         General: No deformity. Skin:     General: Skin is warm and dry. Neurological:      Mental Status: She is alert. Mental status is at baseline.    Psychiatric:         Mood and Affect: Mood normal.         Behavior: Behavior normal.       Lab Results   Component Value Date    WBC 6.6 11/17/2022    HGB 10.6 (L) 11/17/2022    HCT 34.3 (L) 11/17/2022    MCV 94.0 11/17/2022     11/17/2022      Lab Results   Component Value Date/Time     11/17/2022 11:47 AM    K 4.5 11/17/2022 11:47 AM     11/17/2022 11:47 AM    CO2 27 11/17/2022 11:47 AM    BUN 28 11/17/2022 11:47 AM    CREATININE 2.00 11/17/2022 11:47 AM    GLUCOSE 114 11/17/2022 11:47 AM    CALCIUM 8.5 11/17/2022 11:47 AM       Lab Results   Component Value Date     11/17/2022    K 4.5 11/17/2022     (H) 11/17/2022    CO2 27 11/17/2022    BUN 28 (H) 11/17/2022    CREATININE 2.00 (H) 11/17/2022    GLUCOSE 114 (H) 11/17/2022    CALCIUM 8.5 11/17/2022    PROT 6.7 11/17/2022    LABALBU 3.2 11/17/2022    BILITOT 0.3 11/17/2022    ALKPHOS 58 11/17/2022    AST 19 11/17/2022    ALT 21 11/17/2022    LABGLOM 28 (L) 11/17/2022    GFRAA 22 (L) 08/21/2022    AGRATIO 0.6 (L) 05/16/2021    GLOB 3.5 11/17/2022     No results found for: TSHFT4, TSH, TSHREFLEX, SEV8DAU   Lab Results   Component Value Date    CHOL 205 (H) 09/11/2020    CHOL 231 (H) 09/25/2019     Lab Results   Component Value Date    TRIG 283 (H) 09/11/2020    TRIG 169 (H) 09/25/2019     Lab Results   Component Value Date    HDL 33 (L) 09/11/2020    HDL 34 (L) 09/25/2019     Lab Results   Component Value Date    LDLCALC 115.4 (H) 09/11/2020    LDLCALC 163.2 (H) 09/25/2019     Lab Results   Component Value Date    LABVLDL 56.6 (H) 09/11/2020    LABVLDL 33.8 (H) 09/25/2019     Lab Results   Component Value Date    CHOLHDLRATIO 6.2 09/11/2020    CHOLHDLRATIO 6.8 09/25/2019      Hemoglobin A1C   Date Value Ref Range Status   11/03/2022 7.2 (H) 4.8 - 5.6 % Final        Assessent & Plan    1. Dysuria  -     AMB POC URINALYSIS DIP STICK AUTO W/O MICRO  -     Urinalysis; Future  -     Culture, Urine; Future  2. Bilateral flank pain  -     CT ABDOMEN PELVIS RENAL STONE; Future     Will send urine for urinalysis and culture. Will obtain CT to evaluate for kidney stone. Will empirically treat for UTI with cefdinir 7-day course, renally dosed. Encouraged for her to let us know if symptoms worsen or do not improve. Encourage patient to follow-up with nephrologist.    The patient and/or patient representative voiced understanding and agreement with the current diagnoses, recommendations, and possible side effects. Return in about 4 weeks (around 2/23/2023) for routine follow up.      Dontae Bliss MD

## 2023-01-29 LAB
BACTERIA SPEC CULT: NORMAL
BACTERIA SPEC CULT: NORMAL
SERVICE CMNT-IMP: NORMAL

## 2023-01-30 RX ORDER — GABAPENTIN 100 MG/1
100 CAPSULE ORAL NIGHTLY
Qty: 90 CAPSULE | Refills: 0 | Status: SHIPPED | OUTPATIENT
Start: 2023-01-30 | End: 2023-04-30

## 2023-01-30 RX ORDER — ROSUVASTATIN CALCIUM 20 MG/1
20 TABLET, COATED ORAL DAILY
Qty: 90 TABLET | Refills: 0 | Status: SHIPPED | OUTPATIENT
Start: 2023-01-30 | End: 2023-02-10

## 2023-01-30 NOTE — TELEPHONE ENCOUNTER
Patient needs refill for Gabapentin 100 and Rosuvastin 20 mg to be sent to [facility] on [address]

## 2023-03-07 ENCOUNTER — OFFICE VISIT (OUTPATIENT)
Dept: INTERNAL MEDICINE CLINIC | Facility: CLINIC | Age: 63
End: 2023-03-07
Payer: MEDICARE

## 2023-03-07 VITALS
HEIGHT: 63 IN | OXYGEN SATURATION: 98 % | DIASTOLIC BLOOD PRESSURE: 64 MMHG | BODY MASS INDEX: 32.96 KG/M2 | SYSTOLIC BLOOD PRESSURE: 116 MMHG | HEART RATE: 78 BPM | WEIGHT: 186 LBS

## 2023-03-07 DIAGNOSIS — N18.4 CKD (CHRONIC KIDNEY DISEASE) STAGE 4, GFR 15-29 ML/MIN (HCC): ICD-10-CM

## 2023-03-07 DIAGNOSIS — Z79.4 CONTROLLED TYPE 2 DIABETES MELLITUS WITH HYPERGLYCEMIA, WITH LONG-TERM CURRENT USE OF INSULIN (HCC): Primary | ICD-10-CM

## 2023-03-07 DIAGNOSIS — E78.2 MIXED HYPERLIPIDEMIA: ICD-10-CM

## 2023-03-07 DIAGNOSIS — E11.65 CONTROLLED TYPE 2 DIABETES MELLITUS WITH HYPERGLYCEMIA, WITH LONG-TERM CURRENT USE OF INSULIN (HCC): Primary | ICD-10-CM

## 2023-03-07 DIAGNOSIS — G62.9 NEUROPATHY: ICD-10-CM

## 2023-03-07 PROCEDURE — G9899 SCRN MAM PERF RSLTS DOC: HCPCS | Performed by: STUDENT IN AN ORGANIZED HEALTH CARE EDUCATION/TRAINING PROGRAM

## 2023-03-07 PROCEDURE — 1036F TOBACCO NON-USER: CPT | Performed by: STUDENT IN AN ORGANIZED HEALTH CARE EDUCATION/TRAINING PROGRAM

## 2023-03-07 PROCEDURE — G8427 DOCREV CUR MEDS BY ELIG CLIN: HCPCS | Performed by: STUDENT IN AN ORGANIZED HEALTH CARE EDUCATION/TRAINING PROGRAM

## 2023-03-07 PROCEDURE — G8417 CALC BMI ABV UP PARAM F/U: HCPCS | Performed by: STUDENT IN AN ORGANIZED HEALTH CARE EDUCATION/TRAINING PROGRAM

## 2023-03-07 PROCEDURE — G8484 FLU IMMUNIZE NO ADMIN: HCPCS | Performed by: STUDENT IN AN ORGANIZED HEALTH CARE EDUCATION/TRAINING PROGRAM

## 2023-03-07 PROCEDURE — 3046F HEMOGLOBIN A1C LEVEL >9.0%: CPT | Performed by: STUDENT IN AN ORGANIZED HEALTH CARE EDUCATION/TRAINING PROGRAM

## 2023-03-07 PROCEDURE — 99214 OFFICE O/P EST MOD 30 MIN: CPT | Performed by: STUDENT IN AN ORGANIZED HEALTH CARE EDUCATION/TRAINING PROGRAM

## 2023-03-07 PROCEDURE — 2022F DILAT RTA XM EVC RTNOPTHY: CPT | Performed by: STUDENT IN AN ORGANIZED HEALTH CARE EDUCATION/TRAINING PROGRAM

## 2023-03-07 PROCEDURE — 3017F COLORECTAL CA SCREEN DOC REV: CPT | Performed by: STUDENT IN AN ORGANIZED HEALTH CARE EDUCATION/TRAINING PROGRAM

## 2023-03-07 RX ORDER — ROSUVASTATIN CALCIUM 20 MG/1
TABLET, COATED ORAL
Qty: 90 TABLET | Refills: 1 | Status: SHIPPED | OUTPATIENT
Start: 2023-03-07

## 2023-03-07 RX ORDER — GABAPENTIN 100 MG/1
100 CAPSULE ORAL NIGHTLY
Qty: 90 CAPSULE | Refills: 1 | Status: SHIPPED | OUTPATIENT
Start: 2023-03-07 | End: 2023-06-05

## 2023-03-07 SDOH — ECONOMIC STABILITY: INCOME INSECURITY: HOW HARD IS IT FOR YOU TO PAY FOR THE VERY BASICS LIKE FOOD, HOUSING, MEDICAL CARE, AND HEATING?: NOT HARD AT ALL

## 2023-03-07 SDOH — ECONOMIC STABILITY: HOUSING INSECURITY
IN THE LAST 12 MONTHS, WAS THERE A TIME WHEN YOU DID NOT HAVE A STEADY PLACE TO SLEEP OR SLEPT IN A SHELTER (INCLUDING NOW)?: NO

## 2023-03-07 SDOH — ECONOMIC STABILITY: FOOD INSECURITY: WITHIN THE PAST 12 MONTHS, THE FOOD YOU BOUGHT JUST DIDN'T LAST AND YOU DIDN'T HAVE MONEY TO GET MORE.: SOMETIMES TRUE

## 2023-03-07 SDOH — ECONOMIC STABILITY: FOOD INSECURITY: WITHIN THE PAST 12 MONTHS, YOU WORRIED THAT YOUR FOOD WOULD RUN OUT BEFORE YOU GOT MONEY TO BUY MORE.: SOMETIMES TRUE

## 2023-03-07 ASSESSMENT — ENCOUNTER SYMPTOMS
SHORTNESS OF BREATH: 0
VOMITING: 0
ABDOMINAL PAIN: 0
NAUSEA: 0

## 2023-03-07 NOTE — PATIENT INSTRUCTIONS
FINANCIAL RESOURCES    [facility]   o What they offer: The [employer] Program helps uninsured patients who do not qualify for government-sponsored health insurance and cannot afford to pay for their medical care. Insured patients may also qualify for assistance based on family income, family size, and medical needs. o Phone Number: [phone]      o How to apply for the [employer] Program:   Option 1: To apply for financial assistance, a patient (or their family or other provider) should fill out the Financial Assistance Application. Copies of the Financial Assistance Application and the FAP may be obtained for free by calling the [employer] customer service department at [phone]. Option 2: The Financial Assistance Application and policy may be obtained for free by downloading a copy from the [employer]: o http://best-julian.org/. com/patient-resources/financial-assistance       o Applications are available in several languages on the website     [employer]  What they offer:  May be able to assist with medical bills if you are uninsured. Phone number: [phone]  www. [employer]    [employer] and Resources for the [employer] (uninsured and under insured) A Nurse and  are available. Jordanian speaking staff available   What they offer: Provide information and assistance for the whole family  Discuss your health and help you find a doctor if you need one. Answer questions about your medications. Diabetes Self-Management education. Connect you with financial assistance agencies, social and medical services. Provide moral support during difficult times. Unfortunately they are unable to administer any medicine, provide you with money or transportation in our vehicles. However, the team will try to help you with your health needs. Phone: [phone] to make an appointment.  Leave voice mail with name and call back number.        Medication Cost Assistance    Good Rx    o What they offer: Good Rx tracks prescription drug prices and provides drug  coupons for discounts on medications.   o Website: https://www.Amerpages/     [employer]eds   o What they offer: [employer] offers free information on medications and healthcare cost savings programs including prescription assistance programs, coupons, and discount programs.   o Website: https://www.IES.org/   o Helpline: 483.242.1068     RX Assist   o What they offer: Information about free and low-cost medicine programs.   o Website: https://www.rxassist.[employer]/     [employer] $4 Prescription Program   o What they offer: Prescription Program includes up to a 30-day supply for $4 and a 90-day supply for $10 of some covered generic drugs at commonly prescribed dosages   o Website: https://www.Intio/cp/4-prescriptions/63510     [employer]  What they offer:  If you are uninsured and cannot afford the prescription medicine you need, you may be able to have your prescriptions filled at no cost through [employer].  You must live in [place].   To find out if you qualify.  Website: https://www.[employer].org/    Cost Plus Drugs  What they offer:  Low-cost versions of high-cost generic drugs  Website: https://costplusdrugs.[employer]/    [facility] of   https://[facility].gov/  Phone: [phone]      Need additional resources? Call 211 or Find Help: https://www.findWhistle [email or web address]

## 2023-03-07 NOTE — PROGRESS NOTES
FOLLOW UP VISIT    Subjective:    Robel Ley (: 1960) is a 58 y.o., female,   Chief Complaint   Patient presents with    Diabetes    Hypertension       HPI:    YURY on CKD, proteinuria: Seeing nephrology, latest kidney function and potassium stable. Gout: On allopurinol. DM2: Takes 20 units lantus nightly, trulicity 3 mg weekly. Fasting glucose the other day was 120. Checks glucose every other day. Due for A1c. Needs refills. HTN: on lisinopril 40 mg daily, Coreg 25 mg twice a day, nebivolol 5 mg daily, lasix 20 mg BID, spironolactone 25 mg BID, furosemide 20 mg twice a day    Neuropathy: takes gabapentin 100 mg nightly    Other medical history:  Chronic systolic heart failure, CABG in 2019, CAD, history of mitral valve repair: noted, has followed up with cardiology. Stage 1A infiltrating ductal carcinoma of left breast: s/p L breast mastectomy . She was advised for 5 years of adjuvant letrozole, however issues with insurance. On letrozole with Dr. Zheng Gudino.     The following portions of the patient's history were reviewed and updated as appropriate:      Patient Active Problem List   Diagnosis    S/P CABG x 3    Malignant neoplasm of left female breast ([facility]    Controlled type 2 diabetes mellitus, with long-term current use of insulin (HCC)    S/P MVR (mitral valve repair)    Mitral valve regurgitation    Suspected sleep apnea    Hypocalcemia    CAD (coronary artery disease)    Hyperglycemia due to type 2 diabetes mellitus (HCC)    Gout    Chronic kidney disease, stage 3b (HCC)    Neuropathy    Anemia    Mixed hyperlipidemia    Chronic systolic congestive heart failure (HCC)    Fatigue    Class 1 obesity due to excess calories with serious comorbidity and body mass index (BMI) of 30.0 to 30.9 in adult     Past Surgical History:   Procedure Laterality Date    BREAST LUMPECTOMY      CARDIAC SURGERY      CABG    HIP FRACTURE SURGERY Right     ORTHOPEDIC SURGERY Right     hip fracture repair with hardware , not replacent     Family History   Problem Relation Age of Onset    Heart Disease Brother     Heart Disease Father     Kidney Disease Mother      Social History     Socioeconomic History    Marital status:      Spouse name: Not on file    Number of children: Not on file    Years of education: Not on file    Highest education level: Not on file   Occupational History    Not on file   Tobacco Use    Smoking status: Never    Smokeless tobacco: Never   Vaping Use    Vaping Use: Never used   Substance and Sexual Activity    Alcohol use: Yes    Drug use: No    Sexual activity: Yes     Partners: Male   Other Topics Concern    Not on file   Social History Narrative    . Lives with her      Social Determinants of Health     Financial Resource Strain: Low Risk     Difficulty of Paying Living Expenses: Not hard at all   Food Insecurity: Food Insecurity Present    Worried About [address] in the Last Year: Sometimes true    Ran Out of Food in the Last Year: Sometimes true   Transportation Needs: Unknown    Lack of Transportation (Medical): Not on file    Lack of Transportation (Non-Medical): No   Physical Activity: Not on file   Stress: Not on file   Social Connections: Not on file   Intimate Partner Violence: Not on file   Housing Stability: Unknown    Unable to Pay for Housing in the Last Year: Not on file    Number of Places Lived in the Last Year: Not on file    Unstable Housing in the Last Year: No     Current Outpatient Medications   Medication Sig Dispense Refill    rosuvastatin (CRESTOR) 20 MG tablet TAKE 1 TABLET BY MOUTH EVERY DAY 90 tablet 1    gabapentin (NEURONTIN) 100 MG capsule Take 1 capsule by mouth at bedtime for 90 days.  Intended supply: 30 days 90 capsule 1    Dulaglutide 3 MG/0.5ML SOPN Inject 3 mg into the skin every 7 days 12 Adjustable Dose Pre-filled Pen Syringe 1    insulin glargine (LANTUS;BASAGLAR) 100 UNIT/ML injection pen Inject 20 Units into the skin nightly 10 Adjustable Dose Pre-filled Pen Syringe 2    nebivolol (BYSTOLIC) 5 MG tablet Take 5 mg by mouth daily      vitamin D 25 MCG (1000 UT) CAPS Take 1,000 Units by mouth daily      lisinopril (PRINIVIL;ZESTRIL) 40 MG tablet Take 1 tablet by mouth daily 90 tablet 1    furosemide (LASIX) 20 MG tablet Take 1 tablet by mouth 2 times daily 180 tablet 1    carvedilol (COREG) 25 MG tablet Take 1 tablet by mouth 2 times daily (with meals) 180 tablet 1    spironolactone (ALDACTONE) 25 MG tablet Take 1 tablet by mouth 2 times daily 180 tablet 1    allopurinol (ZYLOPRIM) 100 MG tablet Take 50 mg by mouth every other day      ferrous sulfate (IRON 325) 325 (65 Fe) MG tablet Take 325 mg by mouth      acetaminophen (TYLENOL) 500 MG tablet Take 500 mg by mouth every 4 hours as needed      aspirin 81 MG chewable tablet Take 81 mg by mouth daily      Calcium Carbonate-Vitamin D (OYSTER SHELL CALCIUM/D) 500-200 MG-UNIT TABS Take 1 tablet by mouth 2 times daily (with meals)      letrozole (FEMARA) 2.5 MG tablet Take 2.5 mg by mouth daily       No current facility-administered medications for this visit.     Allergies as of 03/07/2023    (No Known Allergies)       Review of Systems   Constitutional:  Negative for chills and fever.   Respiratory:  Negative for shortness of breath.    Cardiovascular:  Negative for chest pain.   Gastrointestinal:  Negative for abdominal pain, nausea and vomiting.     Objective:    Vitals:    03/07/23 1526   BP: 116/64   Site: Right Upper Arm   Position: Sitting   Cuff Size: Medium Adult   Pulse: 78   SpO2: 98%   Weight: 186 lb (84.4 kg)   Height: 5' 3\" (1.6 m)        Physical Exam  Constitutional:       General: She is not in acute distress.     Appearance: Normal appearance.   HENT:      Head: Normocephalic and atraumatic.   Eyes:      Extraocular Movements: Extraocular movements intact.      Conjunctiva/sclera: Conjunctivae normal.   Cardiovascular:      Rate and Rhythm: Normal rate  and regular rhythm. Heart sounds: No murmur heard. Pulmonary:      Effort: Pulmonary effort is normal.      Breath sounds: Normal breath sounds. No wheezing, rhonchi or rales. Skin:     General: Skin is warm and dry. Neurological:      Mental Status: She is alert. Mental status is at baseline.    Psychiatric:         Mood and Affect: Mood normal.         Behavior: Behavior normal.       Lab Results   Component Value Date    WBC 6.6 11/17/2022    HGB 10.6 (L) 11/17/2022    HCT 34.3 (L) 11/17/2022    MCV 94.0 11/17/2022     11/17/2022      Lab Results   Component Value Date/Time     11/17/2022 11:47 AM    K 4.5 11/17/2022 11:47 AM     11/17/2022 11:47 AM    CO2 27 11/17/2022 11:47 AM    BUN 28 11/17/2022 11:47 AM    CREATININE 2.00 11/17/2022 11:47 AM    GLUCOSE 114 11/17/2022 11:47 AM    CALCIUM 8.5 11/17/2022 11:47 AM       Lab Results   Component Value Date     11/17/2022    K 4.5 11/17/2022     (H) 11/17/2022    CO2 27 11/17/2022    BUN 28 (H) 11/17/2022    CREATININE 2.00 (H) 11/17/2022    GLUCOSE 114 (H) 11/17/2022    CALCIUM 8.5 11/17/2022    PROT 6.7 11/17/2022    LABALBU 3.2 11/17/2022    BILITOT 0.3 11/17/2022    ALKPHOS 58 11/17/2022    AST 19 11/17/2022    ALT 21 11/17/2022    LABGLOM 28 (L) 11/17/2022    GFRAA 22 (L) 08/21/2022    AGRATIO 0.6 (L) 05/16/2021    GLOB 3.5 11/17/2022     No results found for: TSHFT4, TSH, TSHREFLEX, PER4BBE   Lab Results   Component Value Date    CHOL 205 (H) 09/11/2020    CHOL 231 (H) 09/25/2019     Lab Results   Component Value Date    TRIG 283 (H) 09/11/2020    TRIG 169 (H) 09/25/2019     Lab Results   Component Value Date    HDL 33 (L) 09/11/2020    HDL 34 (L) 09/25/2019     Lab Results   Component Value Date    LDLCALC 115.4 (H) 09/11/2020    LDLCALC 163.2 (H) 09/25/2019     Lab Results   Component Value Date    LABVLDL 56.6 (H) 09/11/2020    LABVLDL 33.8 (H) 09/25/2019     Lab Results   Component Value Date    CHOLHDLRATIO 6.2 09/11/2020    CHOLHDLRATIO 6.8 09/25/2019      Hemoglobin A1C   Date Value Ref Range Status   11/03/2022 7.2 (H) 4.8 - 5.6 % Final        Assessent & Plan    1. Controlled type 2 diabetes mellitus with hyperglycemia, with long-term current use of insulin (HCC)  -     Hemoglobin A1C; Future  2. CKD (chronic kidney disease) stage 4, GFR 15-29 ml/min ([facility])  3. Mixed hyperlipidemia  -     rosuvastatin (CRESTOR) 20 MG tablet; TAKE 1 TABLET BY MOUTH EVERY DAY, Disp-90 tablet, R-1Normal  -     Lipid Panel; Future  4. Neuropathy  -     gabapentin (NEURONTIN) 100 MG capsule; Take 1 capsule by mouth at bedtime for 90 days. Intended supply: 30 days, Disp-90 capsule, R-1Normal     Check lipid, A1c. Continue current dose of Lantus and Trulicity. Neuropathy managed on gabapentin, continue current dose with renal dosing. Continue to follow with nephrology for CKD. The patient and/or patient representative voiced understanding and agreement with the current diagnoses, recommendations, and possible side effects. Return in about 3 months (around 6/7/2023) for medicare AWE.      Татьяна Santana MD

## 2023-03-08 DIAGNOSIS — R30.0 DYSURIA: ICD-10-CM

## 2023-03-08 DIAGNOSIS — Z79.4 CONTROLLED TYPE 2 DIABETES MELLITUS WITH HYPERGLYCEMIA, WITH LONG-TERM CURRENT USE OF INSULIN (HCC): ICD-10-CM

## 2023-03-08 DIAGNOSIS — E11.65 CONTROLLED TYPE 2 DIABETES MELLITUS WITH HYPERGLYCEMIA, WITH LONG-TERM CURRENT USE OF INSULIN (HCC): ICD-10-CM

## 2023-03-08 DIAGNOSIS — E78.2 MIXED HYPERLIPIDEMIA: ICD-10-CM

## 2023-03-08 LAB
APPEARANCE UR: ABNORMAL
BACTERIA URNS QL MICRO: 0 /HPF
BILIRUB UR QL: NEGATIVE
CHOLEST SERPL-MCNC: 118 MG/DL
COLOR UR: ABNORMAL
EPI CELLS #/AREA URNS HPF: ABNORMAL /HPF
EST. AVERAGE GLUCOSE BLD GHB EST-MCNC: 143 MG/DL
GLUCOSE UR STRIP.AUTO-MCNC: NEGATIVE MG/DL
HBA1C MFR BLD: 6.6 % (ref 4.8–5.6)
HDLC SERPL-MCNC: 33 MG/DL (ref 40–60)
HDLC SERPL: 3.6
HGB UR QL STRIP: ABNORMAL
KETONES UR QL STRIP.AUTO: NEGATIVE MG/DL
LDLC SERPL CALC-MCNC: 63.2 MG/DL
LEUKOCYTE ESTERASE UR QL STRIP.AUTO: NEGATIVE
NITRITE UR QL STRIP.AUTO: NEGATIVE
OTHER OBSERVATIONS: ABNORMAL
PH UR STRIP: 5.5 (ref 5–9)
PROT UR STRIP-MCNC: 100 MG/DL
SP GR UR REFRACTOMETRY: 1.01 (ref 1–1.02)
TRIGL SERPL-MCNC: 109 MG/DL (ref 35–150)
UROBILINOGEN UR QL STRIP.AUTO: 0.2 EU/DL (ref 0.2–1)
VLDLC SERPL CALC-MCNC: 21.8 MG/DL (ref 6–23)
YEAST URNS QL MICRO: ABNORMAL

## 2023-03-21 RX ORDER — MAGNESIUM OXIDE 400 MG/1
TABLET ORAL
Qty: 15 TABLET | OUTPATIENT
Start: 2023-03-21

## 2023-04-18 PROCEDURE — 96372 THER/PROPH/DIAG INJ SC/IM: CPT

## 2023-04-18 PROCEDURE — 99284 EMERGENCY DEPT VISIT MOD MDM: CPT

## 2023-04-19 ENCOUNTER — APPOINTMENT (OUTPATIENT)
Dept: GENERAL RADIOLOGY | Age: 63
End: 2023-04-19
Payer: MEDICARE

## 2023-04-19 ENCOUNTER — HOSPITAL ENCOUNTER (EMERGENCY)
Age: 63
Discharge: HOME | End: 2023-04-19
Attending: GENERAL PRACTICE
Payer: MEDICARE

## 2023-04-19 VITALS
WEIGHT: 160 LBS | BODY MASS INDEX: 28.35 KG/M2 | HEIGHT: 63 IN | HEART RATE: 86 BPM | OXYGEN SATURATION: 98 % | TEMPERATURE: 97.5 F | RESPIRATION RATE: 20 BRPM | SYSTOLIC BLOOD PRESSURE: 153 MMHG | DIASTOLIC BLOOD PRESSURE: 94 MMHG

## 2023-04-19 DIAGNOSIS — M65.20 CALCIFIC TENDINITIS: Primary | ICD-10-CM

## 2023-04-19 PROCEDURE — 6360000002 HC RX W HCPCS: Performed by: GENERAL PRACTICE

## 2023-04-19 PROCEDURE — 96372 THER/PROPH/DIAG INJ SC/IM: CPT

## 2023-04-19 PROCEDURE — 73030 X-RAY EXAM OF SHOULDER: CPT

## 2023-04-19 RX ORDER — KETOROLAC TROMETHAMINE 30 MG/ML
30 INJECTION, SOLUTION INTRAMUSCULAR; INTRAVENOUS ONCE
Status: COMPLETED | OUTPATIENT
Start: 2023-04-19 | End: 2023-04-19

## 2023-04-19 RX ADMIN — KETOROLAC TROMETHAMINE 30 MG: 30 INJECTION, SOLUTION INTRAMUSCULAR at 00:40

## 2023-04-19 ASSESSMENT — LIFESTYLE VARIABLES
HOW OFTEN DO YOU HAVE A DRINK CONTAINING ALCOHOL: NEVER
HOW MANY STANDARD DRINKS CONTAINING ALCOHOL DO YOU HAVE ON A TYPICAL DAY: PATIENT DOES NOT DRINK

## 2023-04-19 ASSESSMENT — PAIN DESCRIPTION - FREQUENCY: FREQUENCY: INTERMITTENT

## 2023-04-19 ASSESSMENT — PAIN SCALES - GENERAL: PAINLEVEL_OUTOF10: 8

## 2023-04-19 ASSESSMENT — PAIN DESCRIPTION - PAIN TYPE: TYPE: ACUTE PAIN

## 2023-04-19 ASSESSMENT — PAIN DESCRIPTION - DESCRIPTORS: DESCRIPTORS: SHARP

## 2023-04-19 ASSESSMENT — PAIN - FUNCTIONAL ASSESSMENT: PAIN_FUNCTIONAL_ASSESSMENT: 0-10

## 2023-04-19 ASSESSMENT — PAIN DESCRIPTION - ORIENTATION: ORIENTATION: RIGHT

## 2023-04-19 ASSESSMENT — PAIN DESCRIPTION - LOCATION: LOCATION: ARM

## 2023-04-19 NOTE — ED PROVIDER NOTES
([facility] 11/2/2022    Breast cancer ([facility] 2015    left stage 1 infiltrating ductal, radiation, and lumpectomy, letrozole    CAD (coronary artery disease)     multivessel, awaiting CABG and mitral valve repair/replacement    CKD (chronic kidney disease)     Diabetes ([facility] [address] typell, dx 2016    typell, ID, avfbs 160, last A1C was 10.8. denies lows    [facility] ([facility]     EF 40-45%    Hypercholesteremia     Hypertension     Hypoxia 10/1/2019    Intertrochanteric fracture of right femur ([facility] 2/24/2018    Mitral valve regurgitation     PUD (peptic ulcer disease)     age 12, no treatment since    Thrombocytopenia ([facility] 10/2/2019    UTI (urinary tract infection) 9/30/2019        Past Surgical History:   Procedure Laterality Date    BREAST LUMPECTOMY  2015    CARDIAC SURGERY  2019    CABG    HIP FRACTURE SURGERY Right     ORTHOPEDIC SURGERY Right     hip fracture repair with hardware , not replacent        Social History     Socioeconomic History    Marital status:    Tobacco Use    Smoking status: Never    Smokeless tobacco: Never   Vaping Use    Vaping Use: Never used   Substance and Sexual Activity    Alcohol use: Yes    Drug use: No    Sexual activity: Yes     Partners: Male   Social History Narrative    .  Lives with her      Social Determinants of Health     Financial Resource Strain: Low Risk     Difficulty of Paying Living Expenses: Not hard at all   Food Insecurity: Food Insecurity Present    Worried About [address] in the Last Year: Sometimes true    Ran Out of Food in the Last Year: Sometimes true   Transportation Needs: Unknown    Lack of Transportation (Non-Medical): No   Housing Stability: Unknown    Unstable Housing in the Last Year: No        Previous Medications    ACETAMINOPHEN (TYLENOL) 500 MG TABLET    Take 500 mg by mouth every 4 hours as needed    ALLOPURINOL (ZYLOPRIM) 100 MG TABLET    Take 50 mg by mouth every other day    ASPIRIN 81 MG CHEWABLE TABLET    Take 81 mg by

## 2023-04-19 NOTE — ED TRIAGE NOTES
Right arm and shoulder pan reports using it more than often while getting in and out of a van while on a trip.     Denies fallor injury

## 2023-04-28 ENCOUNTER — CARE COORDINATION (OUTPATIENT)
Dept: CARE COORDINATION | Facility: CLINIC | Age: 63
End: 2023-04-28

## 2023-04-28 NOTE — CARE COORDINATION
Outreached to patient due to [facility]  Patient had ED visit on 4/19/2023   Patient is doing better at this time from ED visit   Patient declined CCM services at this time   No further outreaches scheduled and case closed

## 2023-05-02 ENCOUNTER — TELEPHONE (OUTPATIENT)
Dept: INTERNAL MEDICINE CLINIC | Facility: CLINIC | Age: 63
End: 2023-05-02

## 2023-05-02 NOTE — TELEPHONE ENCOUNTER
----- Message from Sancho Hale sent at 5/2/2023 10:46 AM EDT -----  Subject: Message to Provider    QUESTIONS  Information for Provider?  Patient returning call from [facility] and requested a   call back didnt leave message what was about.  ---------------------------------------------------------------------------  --------------  [facility] [facility]  [phone]; OK to leave message on voicemail  ---------------------------------------------------------------------------  --------------  SCRIPT ANSWERS  undefined

## 2023-05-08 NOTE — PROGRESS NOTES
[facility] CARDIOLOGY  [address], [address]  PHONE: [phone]      05/10/23    NAME:  Alison Garibay  : 1960  MRN: [id number]         SUBJECTIVE:   Alison Garibay is a 61 y.o. female seen for a follow up visit regarding the following:     Chief Complaint   Patient presents with    6 Month Follow-Up    Hyperlipidemia    Coronary Artery Disease    Congestive Heart Failure            HPI:  Follow up  6 Month Follow-Up, Hyperlipidemia, Coronary Artery Disease, and Congestive Heart Failure   . Follow up CAD/CABG, MV repair, lymphedema, prior breast cancer, CKD, white coat hypertension. She is aware she is taking 2 BB but has controlled her BP and has wanted to continue. She's felt ok physically. Previously lost her son and recently lost her grandson whose  is tomorrow. She's had no cp, dyspnea, palpitations and has recent good reports from oncology and nephrology        Nephrology - Dr Marlys Gomez     21 (!) 151/91   21 (!) 170/108   10/20/21 159/88   21 (!) 156/91   21 137/84   21 130/79   21 139/76   21 119/75   21 153/85   21 135/72      22 - 128/66 - nephrology   10/27/22 - 137/70-ER for influenza          Past cardiac history:    - XRT for left breast cancer at [address], advised 5 years of adjuvant letrozole. She was not compliant with her letrozole and follow up. Sep 2019- admitted with CHF, not compliant with BP meds   EF 40-45% with regional wma   10/1/19 - CABG- LIMA>LAD, SVG > OM, SVG >PDA MV repair 28 mm physio II ring and LA appendage clip    2021- recurrent left breast DCIS - mastectomy, no high risk findings - antiestrogen therapy    Sep 2021- Echo - mild LVH, normal SF, mean MV gradient 10, peak velocity 2.4 m/s, HR not reported.  RVSP 35                Key CAD CHF Meds            rosuvastatin (CRESTOR) 20 MG tablet (Taking)    Sig: TAKE 1 TABLET BY MOUTH EVERY DAY

## 2023-05-10 ENCOUNTER — OFFICE VISIT (OUTPATIENT)
Age: 63
End: 2023-05-10
Payer: MEDICARE

## 2023-05-10 VITALS
WEIGHT: 185.2 LBS | DIASTOLIC BLOOD PRESSURE: 70 MMHG | HEIGHT: 63 IN | BODY MASS INDEX: 32.82 KG/M2 | HEART RATE: 91 BPM | SYSTOLIC BLOOD PRESSURE: 136 MMHG

## 2023-05-10 DIAGNOSIS — Z98.890 S/P MVR (MITRAL VALVE REPAIR): ICD-10-CM

## 2023-05-10 DIAGNOSIS — E78.2 MIXED HYPERLIPIDEMIA: ICD-10-CM

## 2023-05-10 DIAGNOSIS — I05.1 RHEUMATIC MITRAL REGURGITATION: ICD-10-CM

## 2023-05-10 DIAGNOSIS — I25.118 CORONARY ARTERY DISEASE OF NATIVE ARTERY OF NATIVE HEART WITH STABLE ANGINA PECTORIS (HCC): ICD-10-CM

## 2023-05-10 DIAGNOSIS — I50.22 CHRONIC SYSTOLIC CONGESTIVE HEART FAILURE (HCC): Primary | ICD-10-CM

## 2023-05-10 PROCEDURE — 1036F TOBACCO NON-USER: CPT | Performed by: INTERNAL MEDICINE

## 2023-05-10 PROCEDURE — G8417 CALC BMI ABV UP PARAM F/U: HCPCS | Performed by: INTERNAL MEDICINE

## 2023-05-10 PROCEDURE — 99214 OFFICE O/P EST MOD 30 MIN: CPT | Performed by: INTERNAL MEDICINE

## 2023-05-10 PROCEDURE — 3017F COLORECTAL CA SCREEN DOC REV: CPT | Performed by: INTERNAL MEDICINE

## 2023-05-10 PROCEDURE — G8427 DOCREV CUR MEDS BY ELIG CLIN: HCPCS | Performed by: INTERNAL MEDICINE

## 2023-05-10 PROCEDURE — G9899 SCRN MAM PERF RSLTS DOC: HCPCS | Performed by: INTERNAL MEDICINE

## 2023-05-10 ASSESSMENT — ENCOUNTER SYMPTOMS: SHORTNESS OF BREATH: 0

## 2023-05-10 NOTE — PATIENT INSTRUCTIONS
Patient Education        Learning About Coronary Artery Disease (CAD)  What is coronary artery disease? Coronary artery disease is a condition that occurs when plaque builds up in the arteries that bring oxygen-rich blood to your heart. Plaque is a fatty substance made of cholesterol, calcium, and other substances in the blood. This process is called hardening of the arteries, or atherosclerosis. What happens when you have coronary artery disease? Plaque may narrow the coronary arteries. Narrowed arteries cause poor blood flow. This can lead to angina symptoms such as chest pain or discomfort. If blood flow is completely blocked, you could have a heart attack. You can slow and reduce the risk of future problems by making changes in your lifestyle. These include quitting smoking and eating heart-healthy foods. Treatment, along with changes in your lifestyle, can help you live a longer and healthier life. How can you prevent coronary artery disease? Do not smoke. It may be the best thing you can do to prevent coronary artery disease. If you need help quitting, talk to your doctor about stop-smoking programs and medicines. These can increase your chances of quitting for good. Be active. Try to do moderate activity at least 2½ hours a week. Or try to do vigorous activity at least 1¼ hours a week. You may want to walk or try other activities, such as running, swimming, cycling, or playing tennis or team sports. Eat heart-healthy foods. Eat more fruits and vegetables and less food that contains saturated and trans fats. Limit alcohol, sodium, and sweets. Stay at a healthy weight. Lose weight if you need to. Manage other health problems such as diabetes, high blood pressure, and high cholesterol. How is coronary artery disease treated? Your doctor will suggest that you make lifestyle changes. For example, your doctor may ask you to eat healthy foods, quit smoking, lose extra weight, and be more active.   You

## 2023-06-07 ENCOUNTER — OFFICE VISIT (OUTPATIENT)
Dept: INTERNAL MEDICINE CLINIC | Facility: CLINIC | Age: 63
End: 2023-06-07
Payer: MEDICARE

## 2023-06-07 VITALS
HEIGHT: 63 IN | BODY MASS INDEX: 32.78 KG/M2 | HEART RATE: 82 BPM | SYSTOLIC BLOOD PRESSURE: 122 MMHG | DIASTOLIC BLOOD PRESSURE: 60 MMHG | WEIGHT: 185 LBS | OXYGEN SATURATION: 95 %

## 2023-06-07 DIAGNOSIS — Z00.00 MEDICARE ANNUAL WELLNESS VISIT, SUBSEQUENT: Primary | ICD-10-CM

## 2023-06-07 DIAGNOSIS — B35.1 ONYCHOMYCOSIS: ICD-10-CM

## 2023-06-07 DIAGNOSIS — D64.9 ANEMIA, UNSPECIFIED TYPE: ICD-10-CM

## 2023-06-07 DIAGNOSIS — I10 ESSENTIAL HYPERTENSION: ICD-10-CM

## 2023-06-07 DIAGNOSIS — C50.912 MALIGNANT NEOPLASM OF LEFT BREAST IN FEMALE, ESTROGEN RECEPTOR POSITIVE, UNSPECIFIED SITE OF BREAST (HCC): ICD-10-CM

## 2023-06-07 DIAGNOSIS — R23.2 HOT FLASHES: ICD-10-CM

## 2023-06-07 DIAGNOSIS — E11.65 CONTROLLED TYPE 2 DIABETES MELLITUS WITH HYPERGLYCEMIA, WITH LONG-TERM CURRENT USE OF INSULIN (HCC): ICD-10-CM

## 2023-06-07 DIAGNOSIS — M10.9 GOUT, UNSPECIFIED CAUSE, UNSPECIFIED CHRONICITY, UNSPECIFIED SITE: ICD-10-CM

## 2023-06-07 DIAGNOSIS — Z79.4 CONTROLLED TYPE 2 DIABETES MELLITUS WITH HYPERGLYCEMIA, WITH LONG-TERM CURRENT USE OF INSULIN (HCC): ICD-10-CM

## 2023-06-07 DIAGNOSIS — Z12.11 ENCOUNTER FOR SCREENING FOR MALIGNANT NEOPLASM OF COLON: ICD-10-CM

## 2023-06-07 DIAGNOSIS — Z17.0 MALIGNANT NEOPLASM OF LEFT BREAST IN FEMALE, ESTROGEN RECEPTOR POSITIVE, UNSPECIFIED SITE OF BREAST (HCC): ICD-10-CM

## 2023-06-07 DIAGNOSIS — I10 RESISTANT HYPERTENSION: ICD-10-CM

## 2023-06-07 DIAGNOSIS — M25.551 PAIN OF RIGHT HIP: ICD-10-CM

## 2023-06-07 PROCEDURE — 3074F SYST BP LT 130 MM HG: CPT | Performed by: STUDENT IN AN ORGANIZED HEALTH CARE EDUCATION/TRAINING PROGRAM

## 2023-06-07 PROCEDURE — 3017F COLORECTAL CA SCREEN DOC REV: CPT | Performed by: STUDENT IN AN ORGANIZED HEALTH CARE EDUCATION/TRAINING PROGRAM

## 2023-06-07 PROCEDURE — 3078F DIAST BP <80 MM HG: CPT | Performed by: STUDENT IN AN ORGANIZED HEALTH CARE EDUCATION/TRAINING PROGRAM

## 2023-06-07 PROCEDURE — G0439 PPPS, SUBSEQ VISIT: HCPCS | Performed by: STUDENT IN AN ORGANIZED HEALTH CARE EDUCATION/TRAINING PROGRAM

## 2023-06-07 PROCEDURE — 3044F HG A1C LEVEL LT 7.0%: CPT | Performed by: STUDENT IN AN ORGANIZED HEALTH CARE EDUCATION/TRAINING PROGRAM

## 2023-06-07 RX ORDER — ALLOPURINOL 100 MG/1
50 TABLET ORAL EVERY OTHER DAY
Qty: 90 TABLET | Refills: 2 | Status: SHIPPED | OUTPATIENT
Start: 2023-06-07

## 2023-06-07 RX ORDER — FUROSEMIDE 20 MG/1
20 TABLET ORAL 2 TIMES DAILY
Qty: 180 TABLET | Refills: 2 | Status: SHIPPED | OUTPATIENT
Start: 2023-06-07

## 2023-06-07 RX ORDER — FERROUS SULFATE 325(65) MG
325 TABLET ORAL
Qty: 90 TABLET | Refills: 3 | Status: SHIPPED | OUTPATIENT
Start: 2023-06-07

## 2023-06-07 RX ORDER — VENLAFAXINE HYDROCHLORIDE 37.5 MG/1
37.5 CAPSULE, EXTENDED RELEASE ORAL DAILY
Qty: 30 CAPSULE | Refills: 3 | Status: SHIPPED | OUTPATIENT
Start: 2023-06-07

## 2023-06-07 RX ORDER — NEBIVOLOL 5 MG/1
5 TABLET ORAL DAILY
Qty: 90 TABLET | Refills: 2 | Status: SHIPPED | OUTPATIENT
Start: 2023-06-07

## 2023-06-07 RX ORDER — CARVEDILOL 25 MG/1
25 TABLET ORAL 2 TIMES DAILY WITH MEALS
Qty: 180 TABLET | Refills: 2 | Status: SHIPPED | OUTPATIENT
Start: 2023-06-07 | End: 2024-03-03

## 2023-06-07 ASSESSMENT — PATIENT HEALTH QUESTIONNAIRE - PHQ9
10. IF YOU CHECKED OFF ANY PROBLEMS, HOW DIFFICULT HAVE THESE PROBLEMS MADE IT FOR YOU TO DO YOUR WORK, TAKE CARE OF THINGS AT HOME, OR GET ALONG WITH OTHER PEOPLE: 1
7. TROUBLE CONCENTRATING ON THINGS, SUCH AS READING THE NEWSPAPER OR WATCHING TELEVISION: 3
SUM OF ALL RESPONSES TO PHQ9 QUESTIONS 1 & 2: 3
6. FEELING BAD ABOUT YOURSELF - OR THAT YOU ARE A FAILURE OR HAVE LET YOURSELF OR YOUR FAMILY DOWN: 0
2. FEELING DOWN, DEPRESSED OR HOPELESS: 3
5. POOR APPETITE OR OVEREATING: 3
SUM OF ALL RESPONSES TO PHQ QUESTIONS 1-9: 15
4. FEELING TIRED OR HAVING LITTLE ENERGY: 3
8. MOVING OR SPEAKING SO SLOWLY THAT OTHER PEOPLE COULD HAVE NOTICED. OR THE OPPOSITE, BEING SO FIGETY OR RESTLESS THAT YOU HAVE BEEN MOVING AROUND A LOT MORE THAN USUAL: 0
9. THOUGHTS THAT YOU WOULD BE BETTER OFF DEAD, OR OF HURTING YOURSELF: 0
SUM OF ALL RESPONSES TO PHQ QUESTIONS 1-9: 15
1. LITTLE INTEREST OR PLEASURE IN DOING THINGS: 0
3. TROUBLE FALLING OR STAYING ASLEEP: 3

## 2023-06-07 ASSESSMENT — LIFESTYLE VARIABLES: HOW OFTEN DO YOU HAVE A DRINK CONTAINING ALCOHOL: MONTHLY OR LESS

## 2023-06-07 NOTE — PATIENT INSTRUCTIONS
stress when you go on a job interview, take a test, or run a race. This kind of short-term stress is normal and even useful. It can help you if you need to work hard or react quickly. Stress also can last a long time. Long-term stress is caused by stressful situations or events. Examples of long-term stress include long-term health problems, ongoing problems at work, and conflicts in your family. Long-term stress can harm your health. Mindfulness is a focus only on things happening in the present moment. It's a process of purposefully paying attention to and being aware of your surroundings, your emotions, your thoughts, and how your body feels. You are aware of these things, but you aren't judging these experiences as \"good\" or \"bad. \" Mindfulness can help you learn to calm your mind and body to help you cope with illness, pain, and stress. How does mindfulness help to relieve stress? Mindfulness can help quiet your mind and relax your body. Studies show that it can help some people sleep better, feel less anxious, and bring their blood pressure down. And it's been shown to help some people live and cope better with certain health problems like heart disease, depression, chronic pain, and cancer. How do you practice mindfulness? To be mindful is to pay attention, to be present, and to be accepting. When you're mindful, you do just one thing and you pay close attention to that one thing. For example, you may sit quietly and notice your emotions or how your food tastes and smells. When you're present, you focus on the things that are happening right now. You let go of your thoughts about the past and the future. When you dwell on the past or the future, you miss moments that can heal and strengthen you. You may miss moments like hearing a child laugh or seeing a friendly face when you think you're all alone. When you're accepting, you don't  the present moment.  Instead you accept your thoughts and

## 2023-06-20 ENCOUNTER — TELEPHONE (OUTPATIENT)
Dept: INTERNAL MEDICINE CLINIC | Facility: CLINIC | Age: 63
End: 2023-06-20

## 2023-06-20 NOTE — TELEPHONE ENCOUNTER
Pt called c/o low B/P 81/65 weakness/sweaty and lightheaded this morning. She said she has felt bad for 2 days, but this is the first time she has had low B/P. She denies N/V, fever or S.O B. I have ask her to recheck it. She did and it is 102/72 with Heart rate 82. She states that she has not taken her Lisinopril today. I spoke with Dr Deanne Noble and she said pt needs to contact her kidney doctor and to go to Urgent Care or ER for evaluation and treatment. Pt agrees to do this.

## 2023-06-29 RX ORDER — LISINOPRIL 40 MG/1
TABLET ORAL
Qty: 90 TABLET | Refills: 1 | Status: SHIPPED | OUTPATIENT
Start: 2023-06-29 | End: 2023-08-24 | Stop reason: ALTCHOICE

## 2023-06-29 RX ORDER — INSULIN GLARGINE 100 [IU]/ML
INJECTION, SOLUTION SUBCUTANEOUS
Qty: 10 ML | Refills: 2 | Status: SHIPPED | OUTPATIENT
Start: 2023-06-29

## 2023-07-31 NOTE — PROGRESS NOTES
Detail Level: Detailed Discharge instructions and education completed. Prescriptions reviewed with patient. Opportunity for questions and clarification provided. Patient verbalizes understanding. Patient discharged in stable condition to car via wheelchair. Quality 226: Preventive Care And Screening: Tobacco Use: Screening And Cessation Intervention: Patient screened for tobacco use and is an ex/non-smoker

## 2023-08-02 ENCOUNTER — TELEPHONE (OUTPATIENT)
Dept: INTERNAL MEDICINE CLINIC | Facility: CLINIC | Age: 63
End: 2023-08-02

## 2023-08-04 NOTE — TELEPHONE ENCOUNTER
Per Dr Karin Fernandez note pt advised that she needs to discuss this with her cancer doctor and he agrees.

## 2023-08-18 RX ORDER — DULAGLUTIDE 3 MG/.5ML
INJECTION, SOLUTION SUBCUTANEOUS
Refills: 1 | OUTPATIENT
Start: 2023-08-18

## 2023-08-24 ENCOUNTER — OFFICE VISIT (OUTPATIENT)
Dept: INTERNAL MEDICINE CLINIC | Facility: CLINIC | Age: 63
End: 2023-08-24
Payer: MEDICARE

## 2023-08-24 DIAGNOSIS — N18.4 CKD (CHRONIC KIDNEY DISEASE) STAGE 4, GFR 15-29 ML/MIN (HCC): ICD-10-CM

## 2023-08-24 DIAGNOSIS — E78.2 MIXED HYPERLIPIDEMIA: ICD-10-CM

## 2023-08-24 DIAGNOSIS — R07.9 CHEST PAIN, UNSPECIFIED TYPE: ICD-10-CM

## 2023-08-24 DIAGNOSIS — Z95.1 S/P CABG X 3: ICD-10-CM

## 2023-08-24 DIAGNOSIS — I25.118 CORONARY ARTERY DISEASE OF NATIVE ARTERY OF NATIVE HEART WITH STABLE ANGINA PECTORIS (HCC): ICD-10-CM

## 2023-08-24 DIAGNOSIS — R21 RASH: Primary | ICD-10-CM

## 2023-08-24 DIAGNOSIS — E11.65 CONTROLLED TYPE 2 DIABETES MELLITUS WITH HYPERGLYCEMIA, WITH LONG-TERM CURRENT USE OF INSULIN (HCC): ICD-10-CM

## 2023-08-24 DIAGNOSIS — I10 ESSENTIAL HYPERTENSION: ICD-10-CM

## 2023-08-24 DIAGNOSIS — Z79.4 CONTROLLED TYPE 2 DIABETES MELLITUS WITH HYPERGLYCEMIA, WITH LONG-TERM CURRENT USE OF INSULIN (HCC): ICD-10-CM

## 2023-08-24 PROBLEM — R80.1 PERSISTENT PROTEINURIA: Status: ACTIVE | Noted: 2022-04-06

## 2023-08-24 PROCEDURE — 3017F COLORECTAL CA SCREEN DOC REV: CPT | Performed by: STUDENT IN AN ORGANIZED HEALTH CARE EDUCATION/TRAINING PROGRAM

## 2023-08-24 PROCEDURE — G9899 SCRN MAM PERF RSLTS DOC: HCPCS | Performed by: STUDENT IN AN ORGANIZED HEALTH CARE EDUCATION/TRAINING PROGRAM

## 2023-08-24 PROCEDURE — 2022F DILAT RTA XM EVC RTNOPTHY: CPT | Performed by: STUDENT IN AN ORGANIZED HEALTH CARE EDUCATION/TRAINING PROGRAM

## 2023-08-24 PROCEDURE — 1036F TOBACCO NON-USER: CPT | Performed by: STUDENT IN AN ORGANIZED HEALTH CARE EDUCATION/TRAINING PROGRAM

## 2023-08-24 PROCEDURE — 3044F HG A1C LEVEL LT 7.0%: CPT | Performed by: STUDENT IN AN ORGANIZED HEALTH CARE EDUCATION/TRAINING PROGRAM

## 2023-08-24 PROCEDURE — G8417 CALC BMI ABV UP PARAM F/U: HCPCS | Performed by: STUDENT IN AN ORGANIZED HEALTH CARE EDUCATION/TRAINING PROGRAM

## 2023-08-24 PROCEDURE — 93000 ELECTROCARDIOGRAM COMPLETE: CPT | Performed by: STUDENT IN AN ORGANIZED HEALTH CARE EDUCATION/TRAINING PROGRAM

## 2023-08-24 PROCEDURE — 99214 OFFICE O/P EST MOD 30 MIN: CPT | Performed by: STUDENT IN AN ORGANIZED HEALTH CARE EDUCATION/TRAINING PROGRAM

## 2023-08-24 PROCEDURE — G8427 DOCREV CUR MEDS BY ELIG CLIN: HCPCS | Performed by: STUDENT IN AN ORGANIZED HEALTH CARE EDUCATION/TRAINING PROGRAM

## 2023-08-24 RX ORDER — TRAZODONE HYDROCHLORIDE 50 MG/1
50 TABLET ORAL NIGHTLY PRN
Qty: 60 TABLET | Refills: 0 | Status: SHIPPED | OUTPATIENT
Start: 2023-08-24

## 2023-08-24 RX ORDER — ROSUVASTATIN CALCIUM 20 MG/1
20 TABLET, COATED ORAL DAILY
COMMUNITY
Start: 2023-07-21 | End: 2023-08-24 | Stop reason: ALTCHOICE

## 2023-08-24 RX ORDER — TRIAMCINOLONE ACETONIDE 0.25 MG/G
CREAM TOPICAL
Qty: 1 EACH | Refills: 0 | Status: SHIPPED | OUTPATIENT
Start: 2023-08-24

## 2023-08-24 RX ORDER — NEBIVOLOL 5 MG/1
5 TABLET ORAL DAILY
COMMUNITY
Start: 2023-06-29 | End: 2023-08-24 | Stop reason: ALTCHOICE

## 2023-08-24 ASSESSMENT — PATIENT HEALTH QUESTIONNAIRE - PHQ9
1. LITTLE INTEREST OR PLEASURE IN DOING THINGS: 3
3. TROUBLE FALLING OR STAYING ASLEEP: 3
SUM OF ALL RESPONSES TO PHQ QUESTIONS 1-9: 9
2. FEELING DOWN, DEPRESSED OR HOPELESS: 3
10. IF YOU CHECKED OFF ANY PROBLEMS, HOW DIFFICULT HAVE THESE PROBLEMS MADE IT FOR YOU TO DO YOUR WORK, TAKE CARE OF THINGS AT HOME, OR GET ALONG WITH OTHER PEOPLE: 1
SUM OF ALL RESPONSES TO PHQ QUESTIONS 1-9: 9
5. POOR APPETITE OR OVEREATING: 0
9. THOUGHTS THAT YOU WOULD BE BETTER OFF DEAD, OR OF HURTING YOURSELF: 0
7. TROUBLE CONCENTRATING ON THINGS, SUCH AS READING THE NEWSPAPER OR WATCHING TELEVISION: 0
SUM OF ALL RESPONSES TO PHQ9 QUESTIONS 1 & 2: 6
SUM OF ALL RESPONSES TO PHQ QUESTIONS 1-9: 9
6. FEELING BAD ABOUT YOURSELF - OR THAT YOU ARE A FAILURE OR HAVE LET YOURSELF OR YOUR FAMILY DOWN: 0
SUM OF ALL RESPONSES TO PHQ QUESTIONS 1-9: 9
8. MOVING OR SPEAKING SO SLOWLY THAT OTHER PEOPLE COULD HAVE NOTICED. OR THE OPPOSITE, BEING SO FIGETY OR RESTLESS THAT YOU HAVE BEEN MOVING AROUND A LOT MORE THAN USUAL: 0
4. FEELING TIRED OR HAVING LITTLE ENERGY: 0

## 2023-08-24 NOTE — PROGRESS NOTES
Component Value Date    LABVLDL 21.8 03/08/2023    LABVLDL 56.6 (H) 09/11/2020    LABVLDL 33.8 (H) 09/25/2019     Lab Results   Component Value Date    CHOLHDLRATIO 3.6 03/08/2023    CHOLHDLRATIO 6.2 09/11/2020    CHOLHDLRATIO 6.8 09/25/2019      Hemoglobin A1C   Date Value Ref Range Status   03/08/2023 6.6 (H) 4.8 - 5.6 % Final        Assessent & Plan    1. Rash  2. Essential hypertension  3. Controlled type 2 diabetes mellitus with hyperglycemia, with long-term current use of insulin ([facility])  -     Hemoglobin A1C; Future  -     Comprehensive Metabolic Panel; Future  4. Chest pain, unspecified type  -     EKG 12 Lead  5. S/P CABG x 3  6. CKD (chronic kidney disease) stage 4, GFR 15-29 ml/min ([facility])  -     Comprehensive Metabolic Panel; Future  7. Coronary artery disease of native artery of native heart with stable angina pectoris ([address])  8. Mixed hyperlipidemia  -     Lipid Panel; Future  -     Comprehensive Metabolic Panel; Future     EKG unchanged from prior, no signs of acute ischemia. Discussed warning signs for chest pain, angina. Encouraged to follow-up with cardiology. Having difficulty remembering medications that she is on and keeping them in order, needs to bring medications to every doctor visit  For rash trial of Kenalog cream  Advised to take atorvastatin instead of Crestor, and to not take nebivolol, I wrote this on a sticky note and handed this to patient. Advised to monitor blood pressure at home. Trial of trazodone for sleep  Coping with recent loss of her mother, condolences offered    The patient and/or patient representative voiced understanding and agreement with the current diagnoses, recommendations, and possible side effects. Return in about 6 weeks (around 10/5/2023) for routine follow up, med check, with fasting labs 1 week prior.      Monica Díaz MD

## 2023-09-08 RX ORDER — DULAGLUTIDE 3 MG/.5ML
INJECTION, SOLUTION SUBCUTANEOUS
Qty: 12 ADJUSTABLE DOSE PRE-FILLED PEN SYRINGE | Refills: 3 | Status: SHIPPED | OUTPATIENT
Start: 2023-09-08 | End: 2023-10-05 | Stop reason: SDUPTHER

## 2023-09-10 DIAGNOSIS — G62.9 NEUROPATHY: ICD-10-CM

## 2023-09-10 DIAGNOSIS — I10 RESISTANT HYPERTENSION: ICD-10-CM

## 2023-09-12 RX ORDER — INSULIN GLARGINE 100 [IU]/ML
20 INJECTION, SOLUTION SUBCUTANEOUS NIGHTLY
Qty: 20 ML | Refills: 2 | Status: SHIPPED | OUTPATIENT
Start: 2023-09-12

## 2023-09-12 RX ORDER — TRAZODONE HYDROCHLORIDE 50 MG/1
50 TABLET ORAL DAILY PRN
Qty: 90 TABLET | Refills: 0 | Status: SHIPPED | OUTPATIENT
Start: 2023-09-12

## 2023-09-12 RX ORDER — SPIRONOLACTONE 25 MG/1
25 TABLET ORAL 2 TIMES DAILY
OUTPATIENT
Start: 2023-09-12

## 2023-09-12 RX ORDER — GABAPENTIN 100 MG/1
100 CAPSULE ORAL NIGHTLY
Qty: 90 CAPSULE | Refills: 1 | Status: SHIPPED | OUTPATIENT
Start: 2023-09-12 | End: 2024-03-10

## 2023-09-17 DIAGNOSIS — R23.2 HOT FLASHES: ICD-10-CM

## 2023-09-17 DIAGNOSIS — I1A.0 RESISTANT HYPERTENSION: ICD-10-CM

## 2023-09-17 DIAGNOSIS — E78.2 MIXED HYPERLIPIDEMIA: Primary | ICD-10-CM

## 2023-09-18 RX ORDER — VENLAFAXINE HYDROCHLORIDE 37.5 MG/1
CAPSULE, EXTENDED RELEASE ORAL DAILY
Qty: 90 CAPSULE | Refills: 1 | Status: SHIPPED | OUTPATIENT
Start: 2023-09-18 | End: 2023-10-05 | Stop reason: ALTCHOICE

## 2023-09-18 RX ORDER — ATORVASTATIN CALCIUM 40 MG/1
40 TABLET, FILM COATED ORAL DAILY
Qty: 90 TABLET | Refills: 3 | Status: SHIPPED | OUTPATIENT
Start: 2023-09-18 | End: 2023-10-02 | Stop reason: SDUPTHER

## 2023-09-18 RX ORDER — SPIRONOLACTONE 25 MG/1
25 TABLET ORAL 2 TIMES DAILY
OUTPATIENT
Start: 2023-09-18

## 2023-10-02 DIAGNOSIS — E78.2 MIXED HYPERLIPIDEMIA: ICD-10-CM

## 2023-10-02 RX ORDER — LETROZOLE 2.5 MG/1
2.5 TABLET, FILM COATED ORAL DAILY
Qty: 90 TABLET | Refills: 0 | OUTPATIENT
Start: 2023-10-02

## 2023-10-02 RX ORDER — ATORVASTATIN CALCIUM 40 MG/1
40 TABLET, FILM COATED ORAL DAILY
Qty: 90 TABLET | Refills: 3 | Status: SHIPPED | OUTPATIENT
Start: 2023-10-02

## 2023-10-02 NOTE — TELEPHONE ENCOUNTER
----- Message from Kevin Villalba sent at 10/2/2023 10:01 AM EDT -----  Subject: Refill Request    QUESTIONS  Name of Medication? letrozole ([address]) 2.5 MG tablet  Patient-reported dosage and instructions? once a day  How many days do you have left? 0  Preferred Pharmacy? [id number]  Pharmacy phone number (if available)? [phone]  Additional Information for Provider? Patient is having trouble getting   both meds refilled because they need doctor approval.  ---------------------------------------------------------------------------  --------------,  Name of Medication? atorvastatin (LIPITOR) 40 MG tablet  Patient-reported dosage and instructions? once a day  How many days do you have left? 0  Preferred Pharmacy? [id number]  Pharmacy phone number (if available)? [phone]  ---------------------------------------------------------------------------  --------------  [place] INFO  What is the best way for the office to contact you? OK to leave message on   voicemail  Preferred Call Back Phone Number? [phone]  ---------------------------------------------------------------------------  --------------  SCRIPT ANSWERS  Relationship to Patient?  Self

## 2023-10-05 ENCOUNTER — OFFICE VISIT (OUTPATIENT)
Dept: INTERNAL MEDICINE CLINIC | Facility: CLINIC | Age: 63
End: 2023-10-05
Payer: MEDICARE

## 2023-10-05 VITALS
HEART RATE: 81 BPM | BODY MASS INDEX: 35.08 KG/M2 | DIASTOLIC BLOOD PRESSURE: 64 MMHG | WEIGHT: 198 LBS | SYSTOLIC BLOOD PRESSURE: 132 MMHG | OXYGEN SATURATION: 98 % | HEIGHT: 63 IN

## 2023-10-05 DIAGNOSIS — Z79.4 CONTROLLED TYPE 2 DIABETES MELLITUS WITH HYPERGLYCEMIA, WITH LONG-TERM CURRENT USE OF INSULIN (HCC): ICD-10-CM

## 2023-10-05 DIAGNOSIS — E11.65 CONTROLLED TYPE 2 DIABETES MELLITUS WITH HYPERGLYCEMIA, WITH LONG-TERM CURRENT USE OF INSULIN (HCC): ICD-10-CM

## 2023-10-05 DIAGNOSIS — I50.22 CHRONIC SYSTOLIC CONGESTIVE HEART FAILURE (HCC): ICD-10-CM

## 2023-10-05 DIAGNOSIS — M10.9 GOUT, UNSPECIFIED CAUSE, UNSPECIFIED CHRONICITY, UNSPECIFIED SITE: ICD-10-CM

## 2023-10-05 DIAGNOSIS — C50.912 MALIGNANT NEOPLASM OF LEFT BREAST IN FEMALE, ESTROGEN RECEPTOR POSITIVE, UNSPECIFIED SITE OF BREAST (HCC): ICD-10-CM

## 2023-10-05 DIAGNOSIS — R23.2 HOT FLASHES: ICD-10-CM

## 2023-10-05 DIAGNOSIS — G62.9 NEUROPATHY: ICD-10-CM

## 2023-10-05 DIAGNOSIS — Z17.0 MALIGNANT NEOPLASM OF LEFT BREAST IN FEMALE, ESTROGEN RECEPTOR POSITIVE, UNSPECIFIED SITE OF BREAST (HCC): ICD-10-CM

## 2023-10-05 DIAGNOSIS — E66.01 SEVERE OBESITY (BMI 35.0-39.9) WITH COMORBIDITY (HCC): ICD-10-CM

## 2023-10-05 DIAGNOSIS — N18.4 CKD (CHRONIC KIDNEY DISEASE) STAGE 4, GFR 15-29 ML/MIN (HCC): ICD-10-CM

## 2023-10-05 DIAGNOSIS — I10 ESSENTIAL HYPERTENSION: Primary | ICD-10-CM

## 2023-10-05 PROCEDURE — 3017F COLORECTAL CA SCREEN DOC REV: CPT | Performed by: STUDENT IN AN ORGANIZED HEALTH CARE EDUCATION/TRAINING PROGRAM

## 2023-10-05 PROCEDURE — 99214 OFFICE O/P EST MOD 30 MIN: CPT | Performed by: STUDENT IN AN ORGANIZED HEALTH CARE EDUCATION/TRAINING PROGRAM

## 2023-10-05 PROCEDURE — G8484 FLU IMMUNIZE NO ADMIN: HCPCS | Performed by: STUDENT IN AN ORGANIZED HEALTH CARE EDUCATION/TRAINING PROGRAM

## 2023-10-05 PROCEDURE — G9899 SCRN MAM PERF RSLTS DOC: HCPCS | Performed by: STUDENT IN AN ORGANIZED HEALTH CARE EDUCATION/TRAINING PROGRAM

## 2023-10-05 PROCEDURE — G8427 DOCREV CUR MEDS BY ELIG CLIN: HCPCS | Performed by: STUDENT IN AN ORGANIZED HEALTH CARE EDUCATION/TRAINING PROGRAM

## 2023-10-05 PROCEDURE — G8417 CALC BMI ABV UP PARAM F/U: HCPCS | Performed by: STUDENT IN AN ORGANIZED HEALTH CARE EDUCATION/TRAINING PROGRAM

## 2023-10-05 PROCEDURE — 3078F DIAST BP <80 MM HG: CPT | Performed by: STUDENT IN AN ORGANIZED HEALTH CARE EDUCATION/TRAINING PROGRAM

## 2023-10-05 PROCEDURE — 1036F TOBACCO NON-USER: CPT | Performed by: STUDENT IN AN ORGANIZED HEALTH CARE EDUCATION/TRAINING PROGRAM

## 2023-10-05 PROCEDURE — 3075F SYST BP GE 130 - 139MM HG: CPT | Performed by: STUDENT IN AN ORGANIZED HEALTH CARE EDUCATION/TRAINING PROGRAM

## 2023-10-05 PROCEDURE — 3044F HG A1C LEVEL LT 7.0%: CPT | Performed by: STUDENT IN AN ORGANIZED HEALTH CARE EDUCATION/TRAINING PROGRAM

## 2023-10-05 PROCEDURE — 2022F DILAT RTA XM EVC RTNOPTHY: CPT | Performed by: STUDENT IN AN ORGANIZED HEALTH CARE EDUCATION/TRAINING PROGRAM

## 2023-10-05 RX ORDER — GABAPENTIN 100 MG/1
100 CAPSULE ORAL NIGHTLY
Qty: 90 CAPSULE | Refills: 3 | Status: SHIPPED | OUTPATIENT
Start: 2023-10-05 | End: 2024-09-29

## 2023-10-05 RX ORDER — NEBIVOLOL 5 MG/1
5 TABLET ORAL DAILY
COMMUNITY
Start: 2023-09-28 | End: 2023-10-05 | Stop reason: ALTCHOICE

## 2023-10-05 RX ORDER — LISINOPRIL 40 MG/1
40 TABLET ORAL DAILY
COMMUNITY
Start: 2023-09-04

## 2023-10-05 RX ORDER — LETROZOLE 2.5 MG/1
2.5 TABLET, FILM COATED ORAL DAILY
Qty: 90 TABLET | Refills: 0 | Status: SHIPPED | OUTPATIENT
Start: 2023-10-05

## 2023-10-05 RX ORDER — EMPAGLIFLOZIN 10 MG/1
TABLET, FILM COATED ORAL
COMMUNITY
Start: 2023-09-07

## 2023-10-05 RX ORDER — DULAGLUTIDE 3 MG/.5ML
INJECTION, SOLUTION SUBCUTANEOUS
Qty: 12 ADJUSTABLE DOSE PRE-FILLED PEN SYRINGE | Refills: 3 | Status: SHIPPED | OUTPATIENT
Start: 2023-10-05

## 2023-10-05 RX ORDER — ALLOPURINOL 100 MG/1
50 TABLET ORAL EVERY OTHER DAY
Qty: 90 TABLET | Refills: 2 | Status: SHIPPED | OUTPATIENT
Start: 2023-10-05

## 2023-10-05 ASSESSMENT — PATIENT HEALTH QUESTIONNAIRE - PHQ9
1. LITTLE INTEREST OR PLEASURE IN DOING THINGS: 0
SUM OF ALL RESPONSES TO PHQ QUESTIONS 1-9: 0
SUM OF ALL RESPONSES TO PHQ QUESTIONS 1-9: 0
SUM OF ALL RESPONSES TO PHQ9 QUESTIONS 1 & 2: 0
SUM OF ALL RESPONSES TO PHQ QUESTIONS 1-9: 0
2. FEELING DOWN, DEPRESSED OR HOPELESS: 0
SUM OF ALL RESPONSES TO PHQ QUESTIONS 1-9: 0

## 2023-10-05 NOTE — PROGRESS NOTES
Appearance: Normal appearance. HENT:      Head: Normocephalic and atraumatic. Eyes:      Extraocular Movements: Extraocular movements intact. Conjunctiva/sclera: Conjunctivae normal.   Cardiovascular:      Rate and Rhythm: Normal rate and regular rhythm. Heart sounds: No murmur heard. Pulmonary:      Effort: Pulmonary effort is normal.      Breath sounds: Normal breath sounds. No wheezing, rhonchi or rales. Skin:     General: Skin is warm and dry. Neurological:      Mental Status: She is alert. Mental status is at baseline. Psychiatric:         Mood and Affect: Mood normal.         Behavior: Behavior normal.         Assessent & Plan    1. Essential hypertension  Assessment & Plan:    blood pressure controlled, continue current antihypertensives carvedilol, lisinopril, furosemide  2. CKD (chronic kidney disease) stage 4, GFR 15-29 ml/min ([facility])  Assessment & Plan:    seeing nephrology, will confirm that lisinopril is okay to continue, her potassium appears to be fine  3. Neuropathy  -     gabapentin (NEURONTIN) 100 MG capsule; Take 1 capsule by mouth at bedtime for 360 days. Intended supply: 30 days, Disp-90 capsule, R-3Normal  4. Gout, unspecified cause, unspecified chronicity, unspecified site  -     allopurinol (ZYLOPRIM) 100 MG tablet; Take 0.5 tablets by mouth every other day, Disp-90 tablet, R-2Normal  5. Controlled type 2 diabetes mellitus with hyperglycemia, with long-term current use of insulin ([facility])  Assessment & Plan:    continue Trulicity, she is on Jardiance I agree with this, check A1c  6. Severe obesity (BMI 35.0-39. 9) with comorbidity ([facility])  7. Malignant neoplasm of left breast in female, estrogen receptor positive, unspecified site of breast [facility])  Assessment & Plan:    needs to see her oncologist Dr. Linda Rainey, I provided phone number for her to call, refilled Femara  8. Chronic systolic congestive heart failure [facility])  Assessment & Plan:    seeing her cardiologist next month  9.

## 2023-10-06 NOTE — ASSESSMENT & PLAN NOTE
needs to see her oncologist Dr. Steve Rangel, I provided phone number for her to call, refilled Femara

## 2023-11-28 NOTE — PROGRESS NOTES
09/11/2020    CHOLHDLRATIO 6.8 09/25/2019       BMP  Lab Results   Component Value Date/Time     11/17/2022 11:47 AM    K 4.5 11/17/2022 11:47 AM     11/17/2022 11:47 AM    CO2 27 11/17/2022 11:47 AM    BUN 28 11/17/2022 11:47 AM    CREATININE 2.00 11/17/2022 11:47 AM    GLUCOSE 114 11/17/2022 11:47 AM    CALCIUM 8.5 11/17/2022 11:47 AM          EKG        CXR/IMAGING        DEVICE INTERROGATION        OUTSIDE RECORDS REVIEW    Records from outside providers have been reviewed and summarized as noted in the HPI, past history and data review sections of this note, and reviewed with patient. .       ASSESSMENT and PLAN    Anthony Solis was seen today for chronic systolic congestive heart failure . Diagnoses and all orders for this visit:    Essential hypertension    S/P MVR (mitral valve repair)  -     Echo (TTE) complete (PRN contrast/bubble/strain/3D); Future    Mixed hyperlipidemia    Coronary artery disease of native artery of native heart with stable angina pectoris (HCC)    Ischemic cardiomyopathy  -     Echo (TTE) complete (PRN contrast/bubble/strain/3D); Future          IMPRESSION:    No angina, continue meds and lifestyle modification    BP typically at target, continue to follow on current meds    Lipids at goal, continue meds    Stable valve disease, continue regular echo surveillance    Patient is encouraged to engage in moderate physical activity for at least 30 minutes on at least 6 days of the week, and to follow a diet rich in whole grains, fruits and vegetables, proven to reduce the incidence of events related to cardiovascular disease. For more detailed information, patient is referred to www.cardiosmart.org.        Return in about 1 year (around 11/29/2024) for ECHO BEFORE VISIT. Thank you for allowing me to participate in this patient's care. Please call or contact me if there are any questions or concerns regarding the above.       Opal Batres MD  11/29/23  11:58 AM

## 2023-11-29 ENCOUNTER — OFFICE VISIT (OUTPATIENT)
Age: 63
End: 2023-11-29
Payer: MEDICARE

## 2023-11-29 VITALS
WEIGHT: 204.4 LBS | SYSTOLIC BLOOD PRESSURE: 148 MMHG | HEART RATE: 79 BPM | BODY MASS INDEX: 36.21 KG/M2 | OXYGEN SATURATION: 98 % | HEIGHT: 63 IN | DIASTOLIC BLOOD PRESSURE: 88 MMHG

## 2023-11-29 DIAGNOSIS — I25.5 ISCHEMIC CARDIOMYOPATHY: ICD-10-CM

## 2023-11-29 DIAGNOSIS — E78.2 MIXED HYPERLIPIDEMIA: ICD-10-CM

## 2023-11-29 DIAGNOSIS — I25.118 CORONARY ARTERY DISEASE OF NATIVE ARTERY OF NATIVE HEART WITH STABLE ANGINA PECTORIS (HCC): ICD-10-CM

## 2023-11-29 DIAGNOSIS — I10 ESSENTIAL HYPERTENSION: Primary | ICD-10-CM

## 2023-11-29 DIAGNOSIS — Z98.890 S/P MVR (MITRAL VALVE REPAIR): ICD-10-CM

## 2023-11-29 PROCEDURE — 1036F TOBACCO NON-USER: CPT | Performed by: INTERNAL MEDICINE

## 2023-11-29 PROCEDURE — 3079F DIAST BP 80-89 MM HG: CPT | Performed by: INTERNAL MEDICINE

## 2023-11-29 PROCEDURE — 3077F SYST BP >= 140 MM HG: CPT | Performed by: INTERNAL MEDICINE

## 2023-11-29 PROCEDURE — G9899 SCRN MAM PERF RSLTS DOC: HCPCS | Performed by: INTERNAL MEDICINE

## 2023-11-29 PROCEDURE — G8484 FLU IMMUNIZE NO ADMIN: HCPCS | Performed by: INTERNAL MEDICINE

## 2023-11-29 PROCEDURE — G8427 DOCREV CUR MEDS BY ELIG CLIN: HCPCS | Performed by: INTERNAL MEDICINE

## 2023-11-29 PROCEDURE — 99214 OFFICE O/P EST MOD 30 MIN: CPT | Performed by: INTERNAL MEDICINE

## 2023-11-29 PROCEDURE — G8417 CALC BMI ABV UP PARAM F/U: HCPCS | Performed by: INTERNAL MEDICINE

## 2023-11-29 PROCEDURE — 3017F COLORECTAL CA SCREEN DOC REV: CPT | Performed by: INTERNAL MEDICINE

## 2023-11-29 ASSESSMENT — ENCOUNTER SYMPTOMS: SHORTNESS OF BREATH: 0

## 2023-12-11 RX ORDER — LISINOPRIL 40 MG/1
40 TABLET ORAL DAILY
Qty: 90 TABLET | Refills: 1 | Status: SHIPPED | OUTPATIENT
Start: 2023-12-11

## 2024-01-08 DIAGNOSIS — I1A.0 RESISTANT HYPERTENSION: ICD-10-CM

## 2024-01-10 ENCOUNTER — OFFICE VISIT (OUTPATIENT)
Dept: INTERNAL MEDICINE CLINIC | Facility: CLINIC | Age: 64
End: 2024-01-10

## 2024-01-10 VITALS
SYSTOLIC BLOOD PRESSURE: 160 MMHG | HEART RATE: 90 BPM | TEMPERATURE: 97 F | BODY MASS INDEX: 35.68 KG/M2 | HEIGHT: 63 IN | RESPIRATION RATE: 18 BRPM | OXYGEN SATURATION: 99 % | WEIGHT: 201.4 LBS | DIASTOLIC BLOOD PRESSURE: 82 MMHG

## 2024-01-10 DIAGNOSIS — I50.22 CHRONIC SYSTOLIC CONGESTIVE HEART FAILURE (HCC): ICD-10-CM

## 2024-01-10 DIAGNOSIS — I25.118 CORONARY ARTERY DISEASE OF NATIVE ARTERY OF NATIVE HEART WITH STABLE ANGINA PECTORIS (HCC): ICD-10-CM

## 2024-01-10 DIAGNOSIS — E11.21 CONTROLLED TYPE 2 DIABETES MELLITUS WITH DIABETIC NEPHROPATHY, WITH LONG-TERM CURRENT USE OF INSULIN (HCC): Primary | ICD-10-CM

## 2024-01-10 DIAGNOSIS — I10 ESSENTIAL HYPERTENSION: ICD-10-CM

## 2024-01-10 DIAGNOSIS — Z79.4 CONTROLLED TYPE 2 DIABETES MELLITUS WITH DIABETIC NEPHROPATHY, WITH LONG-TERM CURRENT USE OF INSULIN (HCC): Primary | ICD-10-CM

## 2024-01-10 DIAGNOSIS — Z79.4 CONTROLLED TYPE 2 DIABETES MELLITUS WITH DIABETIC NEPHROPATHY, WITH LONG-TERM CURRENT USE OF INSULIN (HCC): ICD-10-CM

## 2024-01-10 DIAGNOSIS — C50.912 MALIGNANT NEOPLASM OF LEFT BREAST IN FEMALE, ESTROGEN RECEPTOR POSITIVE, UNSPECIFIED SITE OF BREAST (HCC): ICD-10-CM

## 2024-01-10 DIAGNOSIS — Z17.0 MALIGNANT NEOPLASM OF LEFT BREAST IN FEMALE, ESTROGEN RECEPTOR POSITIVE, UNSPECIFIED SITE OF BREAST (HCC): ICD-10-CM

## 2024-01-10 DIAGNOSIS — N18.4 CKD (CHRONIC KIDNEY DISEASE) STAGE 4, GFR 15-29 ML/MIN (HCC): ICD-10-CM

## 2024-01-10 DIAGNOSIS — E66.01 SEVERE OBESITY (BMI 35.0-39.9) WITH COMORBIDITY (HCC): ICD-10-CM

## 2024-01-10 DIAGNOSIS — Z95.1 S/P CABG X 3: ICD-10-CM

## 2024-01-10 DIAGNOSIS — I34.0 NONRHEUMATIC MITRAL VALVE REGURGITATION: ICD-10-CM

## 2024-01-10 DIAGNOSIS — E78.2 MIXED HYPERLIPIDEMIA: ICD-10-CM

## 2024-01-10 DIAGNOSIS — M10.9 GOUT, UNSPECIFIED CAUSE, UNSPECIFIED CHRONICITY, UNSPECIFIED SITE: ICD-10-CM

## 2024-01-10 DIAGNOSIS — G62.9 NEUROPATHY: ICD-10-CM

## 2024-01-10 DIAGNOSIS — N18.32 CHRONIC KIDNEY DISEASE, STAGE 3B (HCC): ICD-10-CM

## 2024-01-10 DIAGNOSIS — E11.21 CONTROLLED TYPE 2 DIABETES MELLITUS WITH DIABETIC NEPHROPATHY, WITH LONG-TERM CURRENT USE OF INSULIN (HCC): ICD-10-CM

## 2024-01-10 DIAGNOSIS — I10 WHITE COAT SYNDROME WITH HYPERTENSION: ICD-10-CM

## 2024-01-10 PROBLEM — E11.65 HYPERGLYCEMIA DUE TO TYPE 2 DIABETES MELLITUS (HCC): Status: RESOLVED | Noted: 2018-02-24 | Resolved: 2024-01-10

## 2024-01-10 LAB
ALBUMIN SERPL-MCNC: 3.4 G/DL (ref 3.2–4.6)
ALBUMIN/GLOB SERPL: 0.8 (ref 0.4–1.6)
ALP SERPL-CCNC: 90 U/L (ref 50–136)
ALT SERPL-CCNC: 26 U/L (ref 12–65)
ANION GAP SERPL CALC-SCNC: 9 MMOL/L (ref 2–11)
AST SERPL-CCNC: 19 U/L (ref 15–37)
BILIRUB SERPL-MCNC: 0.4 MG/DL (ref 0.2–1.1)
BUN SERPL-MCNC: 54 MG/DL (ref 8–23)
CALCIUM SERPL-MCNC: 9.1 MG/DL (ref 8.3–10.4)
CHLORIDE SERPL-SCNC: 107 MMOL/L (ref 103–113)
CO2 SERPL-SCNC: 23 MMOL/L (ref 21–32)
CREAT SERPL-MCNC: 3 MG/DL (ref 0.6–1)
GLOBULIN SER CALC-MCNC: 4.3 G/DL (ref 2.8–4.5)
GLUCOSE SERPL-MCNC: 78 MG/DL (ref 65–100)
POTASSIUM SERPL-SCNC: 4 MMOL/L (ref 3.5–5.1)
PROT SERPL-MCNC: 7.7 G/DL (ref 6.3–8.2)
SODIUM SERPL-SCNC: 139 MMOL/L (ref 136–146)

## 2024-01-10 RX ORDER — LISINOPRIL 40 MG/1
40 TABLET ORAL DAILY
Qty: 90 TABLET | Refills: 3 | Status: SHIPPED | OUTPATIENT
Start: 2024-01-10

## 2024-01-10 RX ORDER — FUROSEMIDE 20 MG/1
20 TABLET ORAL 2 TIMES DAILY
Qty: 180 TABLET | Refills: 3 | Status: SHIPPED | OUTPATIENT
Start: 2024-01-10

## 2024-01-10 RX ORDER — OXYBUTYNIN CHLORIDE 5 MG/1
5 TABLET ORAL DAILY
Qty: 90 TABLET | Refills: 3
Start: 2024-01-10

## 2024-01-10 SDOH — ECONOMIC STABILITY: FOOD INSECURITY: WITHIN THE PAST 12 MONTHS, THE FOOD YOU BOUGHT JUST DIDN'T LAST AND YOU DIDN'T HAVE MONEY TO GET MORE.: NEVER TRUE

## 2024-01-10 SDOH — ECONOMIC STABILITY: INCOME INSECURITY: HOW HARD IS IT FOR YOU TO PAY FOR THE VERY BASICS LIKE FOOD, HOUSING, MEDICAL CARE, AND HEATING?: NOT HARD AT ALL

## 2024-01-10 SDOH — ECONOMIC STABILITY: FOOD INSECURITY: WITHIN THE PAST 12 MONTHS, YOU WORRIED THAT YOUR FOOD WOULD RUN OUT BEFORE YOU GOT MONEY TO BUY MORE.: NEVER TRUE

## 2024-01-10 ASSESSMENT — ANXIETY QUESTIONNAIRES
3. WORRYING TOO MUCH ABOUT DIFFERENT THINGS: 0
6. BECOMING EASILY ANNOYED OR IRRITABLE: 0
GAD7 TOTAL SCORE: 0
5. BEING SO RESTLESS THAT IT IS HARD TO SIT STILL: 0
4. TROUBLE RELAXING: 0
2. NOT BEING ABLE TO STOP OR CONTROL WORRYING: 0
1. FEELING NERVOUS, ANXIOUS, OR ON EDGE: 0
7. FEELING AFRAID AS IF SOMETHING AWFUL MIGHT HAPPEN: 0

## 2024-01-10 ASSESSMENT — PATIENT HEALTH QUESTIONNAIRE - PHQ9
1. LITTLE INTEREST OR PLEASURE IN DOING THINGS: 0
SUM OF ALL RESPONSES TO PHQ9 QUESTIONS 1 & 2: 0
SUM OF ALL RESPONSES TO PHQ QUESTIONS 1-9: 0
DEPRESSION UNABLE TO ASSESS: FUNCTIONAL CAPACITY MOTIVATION LIMITS ACCURACY
SUM OF ALL RESPONSES TO PHQ QUESTIONS 1-9: 0
2. FEELING DOWN, DEPRESSED OR HOPELESS: 0

## 2024-01-10 NOTE — PROGRESS NOTES
FOLLOW UP VISIT    Subjective:    Claudette Zelaya (: 1960) is a 63 y.o., female,   Chief Complaint   Patient presents with    Follow-up       HPI:    BP goes up in office consistently. BP at home 120s.  She last checked yesterday.    She gets medications confused, she is taking both atorvastatin and rosuvastatin currently.  Together we reviewed her current medications that she should be taking, she brought all pills that she is taking with her today.  She needs refill on Jardiance, I advised for her to get this from her nephrologist.    The following portions of the patient's history were reviewed and updated as appropriate:      Patient Active Problem List    Diagnosis Date Noted    Class 1 obesity due to excess calories with serious comorbidity and body mass index (BMI) of 30.0 to 30.9 in adult 2022    Gout 2022    Neuropathy 2022    Anemia 2022    Mixed hyperlipidemia 2022    Chronic systolic congestive heart failure (HCC) 2022    Fatigue 2022    CKD (chronic kidney disease) stage 4, GFR 15-29 ml/min (HCC) 01/10/2024    White coat syndrome with hypertension 01/10/2024    Hot flashes 2023    Pain of right hip 2023    Persistent proteinuria 2022    Essential hypertension 10/07/2019    S/P CABG x 3 10/02/2019    S/P MVR (mitral valve repair) 10/02/2019    Suspected sleep apnea 10/02/2019    Hypocalcemia 10/02/2019    Nonrheumatic mitral valve regurgitation 10/01/2019    Coronary artery disease of native artery of native heart with stable angina pectoris (HCC) 2019    Malignant neoplasm of left female breast ([facility]) 2019    Controlled type 2 diabetes mellitus, with long-term current use of insulin ([facility]) 2019      Past Medical History:   Diagnosis Date    Acute renal failure superimposed on stage 3b chronic kidney disease (HCC) 2022    Breast cancer ([facility]) 2015    left stage 1 infiltrating ductal, radiation, and lumpectomy,

## 2024-01-11 LAB
EST. AVERAGE GLUCOSE BLD GHB EST-MCNC: 169 MG/DL
HBA1C MFR BLD: 7.5 % (ref 4.8–5.6)

## 2024-01-15 ENCOUNTER — OFFICE VISIT (OUTPATIENT)
Dept: INTERNAL MEDICINE CLINIC | Facility: CLINIC | Age: 64
End: 2024-01-15

## 2024-01-15 VITALS
SYSTOLIC BLOOD PRESSURE: 136 MMHG | OXYGEN SATURATION: 96 % | HEART RATE: 84 BPM | WEIGHT: 203 LBS | BODY MASS INDEX: 35.97 KG/M2 | HEIGHT: 63 IN | DIASTOLIC BLOOD PRESSURE: 68 MMHG

## 2024-01-15 DIAGNOSIS — E11.21 CONTROLLED TYPE 2 DIABETES MELLITUS WITH DIABETIC NEPHROPATHY, WITH LONG-TERM CURRENT USE OF INSULIN (HCC): Primary | ICD-10-CM

## 2024-01-15 DIAGNOSIS — Z79.4 CONTROLLED TYPE 2 DIABETES MELLITUS WITH DIABETIC NEPHROPATHY, WITH LONG-TERM CURRENT USE OF INSULIN (HCC): Primary | ICD-10-CM

## 2024-01-15 RX ORDER — VENLAFAXINE HYDROCHLORIDE 37.5 MG/1
37.5 CAPSULE, EXTENDED RELEASE ORAL DAILY
COMMUNITY
Start: 2024-01-04

## 2024-01-15 RX ORDER — NEBIVOLOL 5 MG/1
5 TABLET ORAL DAILY
COMMUNITY
Start: 2023-12-28

## 2024-01-15 RX ORDER — SEMAGLUTIDE 1.34 MG/ML
1 INJECTION, SOLUTION SUBCUTANEOUS WEEKLY
Qty: 6 ML | Refills: 1 | Status: SHIPPED | OUTPATIENT
Start: 2024-01-15

## 2024-01-15 ASSESSMENT — PATIENT HEALTH QUESTIONNAIRE - PHQ9
SUM OF ALL RESPONSES TO PHQ QUESTIONS 1-9: 0
SUM OF ALL RESPONSES TO PHQ9 QUESTIONS 1 & 2: 0
SUM OF ALL RESPONSES TO PHQ QUESTIONS 1-9: 0
2. FEELING DOWN, DEPRESSED OR HOPELESS: 0
1. LITTLE INTEREST OR PLEASURE IN DOING THINGS: 0
SUM OF ALL RESPONSES TO PHQ QUESTIONS 1-9: 0
SUM OF ALL RESPONSES TO PHQ QUESTIONS 1-9: 0

## 2024-01-15 NOTE — PROGRESS NOTES
ozempic. Take insulin in the evening continue the 20 U and monitor fasting glucose. Advised to avoid sweet tea. Continue Jardiance.              The patient and/or patient representative voiced understanding and agreement with the current diagnoses, recommendations, and possible side effects.    Return in about 2 months (around 3/15/2024) for routine follow up.     Daniella Moses MD

## 2024-01-16 RX ORDER — ROSUVASTATIN CALCIUM 20 MG/1
TABLET, COATED ORAL
Qty: 90 TABLET | Refills: 1 | OUTPATIENT
Start: 2024-01-16

## 2024-01-26 DIAGNOSIS — I10 ESSENTIAL (PRIMARY) HYPERTENSION: ICD-10-CM

## 2024-01-26 DIAGNOSIS — R23.2 FLUSHING: ICD-10-CM

## 2024-01-26 DIAGNOSIS — I1A.0 RESISTANT HYPERTENSION: ICD-10-CM

## 2024-01-28 RX ORDER — SPIRONOLACTONE 25 MG/1
25 TABLET ORAL 2 TIMES DAILY
Qty: 180 TABLET | Refills: 1 | OUTPATIENT
Start: 2024-01-28

## 2024-01-28 RX ORDER — NEBIVOLOL 5 MG/1
5 TABLET ORAL DAILY
Qty: 90 TABLET | Refills: 2 | OUTPATIENT
Start: 2024-01-28

## 2024-01-28 RX ORDER — VENLAFAXINE HYDROCHLORIDE 37.5 MG/1
CAPSULE, EXTENDED RELEASE ORAL DAILY
Qty: 90 CAPSULE | Refills: 1 | Status: SHIPPED | OUTPATIENT
Start: 2024-01-28

## 2024-03-11 DIAGNOSIS — I1A.0 RESISTANT HYPERTENSION: ICD-10-CM

## 2024-03-12 RX ORDER — SPIRONOLACTONE 25 MG/1
25 TABLET ORAL 2 TIMES DAILY
Qty: 180 TABLET | Refills: 1 | OUTPATIENT
Start: 2024-03-12

## 2024-03-12 RX ORDER — NEBIVOLOL 5 MG/1
5 TABLET ORAL DAILY
Qty: 90 TABLET | Refills: 2 | OUTPATIENT
Start: 2024-03-12

## 2024-03-12 RX ORDER — ROSUVASTATIN CALCIUM 20 MG/1
TABLET, COATED ORAL
Qty: 90 TABLET | Refills: 1 | OUTPATIENT
Start: 2024-03-12

## 2024-03-18 DIAGNOSIS — D64.9 ANEMIA, UNSPECIFIED TYPE: ICD-10-CM

## 2024-03-21 RX ORDER — FERROUS SULFATE 325(65) MG
1 TABLET ORAL
Qty: 90 TABLET | Refills: 3 | OUTPATIENT
Start: 2024-03-21

## 2024-04-01 DIAGNOSIS — I1A.0 RESISTANT HYPERTENSION: ICD-10-CM

## 2024-04-01 RX ORDER — SPIRONOLACTONE 25 MG/1
25 TABLET ORAL 2 TIMES DAILY
Qty: 180 TABLET | Refills: 0 | OUTPATIENT
Start: 2024-04-01

## 2024-04-01 RX ORDER — ROSUVASTATIN CALCIUM 20 MG/1
TABLET, COATED ORAL
Qty: 90 TABLET | Refills: 0 | OUTPATIENT
Start: 2024-04-01

## 2024-04-01 RX ORDER — NEBIVOLOL 5 MG/1
5 TABLET ORAL DAILY
Qty: 90 TABLET | Refills: 0 | OUTPATIENT
Start: 2024-04-01

## 2024-04-18 ENCOUNTER — OFFICE VISIT (OUTPATIENT)
Dept: INTERNAL MEDICINE CLINIC | Facility: CLINIC | Age: 64
End: 2024-04-18
Payer: MEDICARE

## 2024-04-18 VITALS
SYSTOLIC BLOOD PRESSURE: 120 MMHG | OXYGEN SATURATION: 99 % | BODY MASS INDEX: 33.84 KG/M2 | DIASTOLIC BLOOD PRESSURE: 68 MMHG | HEART RATE: 78 BPM | HEIGHT: 63 IN | WEIGHT: 191 LBS

## 2024-04-18 DIAGNOSIS — I10 ESSENTIAL (PRIMARY) HYPERTENSION: ICD-10-CM

## 2024-04-18 DIAGNOSIS — R23.2 FLUSHING: ICD-10-CM

## 2024-04-18 DIAGNOSIS — Z79.4 CONTROLLED TYPE 2 DIABETES MELLITUS WITH DIABETIC NEPHROPATHY, WITH LONG-TERM CURRENT USE OF INSULIN (HCC): Primary | ICD-10-CM

## 2024-04-18 DIAGNOSIS — E11.21 CONTROLLED TYPE 2 DIABETES MELLITUS WITH DIABETIC NEPHROPATHY, WITH LONG-TERM CURRENT USE OF INSULIN (HCC): ICD-10-CM

## 2024-04-18 DIAGNOSIS — D64.9 ANEMIA, UNSPECIFIED TYPE: ICD-10-CM

## 2024-04-18 DIAGNOSIS — E11.21 CONTROLLED TYPE 2 DIABETES MELLITUS WITH DIABETIC NEPHROPATHY, WITH LONG-TERM CURRENT USE OF INSULIN (HCC): Primary | ICD-10-CM

## 2024-04-18 DIAGNOSIS — I10 ESSENTIAL HYPERTENSION: ICD-10-CM

## 2024-04-18 DIAGNOSIS — Z79.4 CONTROLLED TYPE 2 DIABETES MELLITUS WITH DIABETIC NEPHROPATHY, WITH LONG-TERM CURRENT USE OF INSULIN (HCC): ICD-10-CM

## 2024-04-18 DIAGNOSIS — M10.9 GOUT, UNSPECIFIED CAUSE, UNSPECIFIED CHRONICITY, UNSPECIFIED SITE: ICD-10-CM

## 2024-04-18 LAB
ALBUMIN SERPL-MCNC: 3.4 G/DL (ref 3.2–4.6)
ALBUMIN/GLOB SERPL: 0.7 (ref 0.4–1.6)
ALP SERPL-CCNC: 92 U/L (ref 50–136)
ALT SERPL-CCNC: 21 U/L (ref 12–65)
AST SERPL-CCNC: 16 U/L (ref 15–37)
BASOPHILS # BLD: 0.1 K/UL (ref 0–0.2)
BASOPHILS NFR BLD: 1 % (ref 0–2)
BILIRUB DIRECT SERPL-MCNC: <0.1 MG/DL
BILIRUB SERPL-MCNC: 0.4 MG/DL (ref 0.2–1.1)
DIFFERENTIAL METHOD BLD: ABNORMAL
EOSINOPHIL # BLD: 0.2 K/UL (ref 0–0.8)
EOSINOPHIL NFR BLD: 4 % (ref 0.5–7.8)
ERYTHROCYTE [DISTWIDTH] IN BLOOD BY AUTOMATED COUNT: 13.4 % (ref 11.9–14.6)
GLOBULIN SER CALC-MCNC: 4.6 G/DL (ref 2.8–4.5)
HCT VFR BLD AUTO: 40 % (ref 35.8–46.3)
HGB BLD-MCNC: 12.6 G/DL (ref 11.7–15.4)
IMM GRANULOCYTES # BLD AUTO: 0 K/UL (ref 0–0.5)
IMM GRANULOCYTES NFR BLD AUTO: 0 % (ref 0–5)
LYMPHOCYTES # BLD: 2.3 K/UL (ref 0.5–4.6)
LYMPHOCYTES NFR BLD: 36 % (ref 13–44)
MCH RBC QN AUTO: 29.1 PG (ref 26.1–32.9)
MCHC RBC AUTO-ENTMCNC: 31.5 G/DL (ref 31.4–35)
MCV RBC AUTO: 92.4 FL (ref 82–102)
MONOCYTES # BLD: 0.4 K/UL (ref 0.1–1.3)
MONOCYTES NFR BLD: 6 % (ref 4–12)
NEUTS SEG # BLD: 3.4 K/UL (ref 1.7–8.2)
NEUTS SEG NFR BLD: 53 % (ref 43–78)
NRBC # BLD: 0 K/UL (ref 0–0.2)
PLATELET # BLD AUTO: 221 K/UL (ref 150–450)
PMV BLD AUTO: 12.4 FL (ref 9.4–12.3)
PROT SERPL-MCNC: 8 G/DL (ref 6.3–8.2)
RBC # BLD AUTO: 4.33 M/UL (ref 4.05–5.2)
WBC # BLD AUTO: 6.3 K/UL (ref 4.3–11.1)

## 2024-04-18 PROCEDURE — G8417 CALC BMI ABV UP PARAM F/U: HCPCS | Performed by: STUDENT IN AN ORGANIZED HEALTH CARE EDUCATION/TRAINING PROGRAM

## 2024-04-18 PROCEDURE — G8427 DOCREV CUR MEDS BY ELIG CLIN: HCPCS | Performed by: STUDENT IN AN ORGANIZED HEALTH CARE EDUCATION/TRAINING PROGRAM

## 2024-04-18 PROCEDURE — 99214 OFFICE O/P EST MOD 30 MIN: CPT | Performed by: STUDENT IN AN ORGANIZED HEALTH CARE EDUCATION/TRAINING PROGRAM

## 2024-04-18 PROCEDURE — 3051F HG A1C>EQUAL 7.0%<8.0%: CPT | Performed by: STUDENT IN AN ORGANIZED HEALTH CARE EDUCATION/TRAINING PROGRAM

## 2024-04-18 PROCEDURE — 3074F SYST BP LT 130 MM HG: CPT | Performed by: STUDENT IN AN ORGANIZED HEALTH CARE EDUCATION/TRAINING PROGRAM

## 2024-04-18 PROCEDURE — 1036F TOBACCO NON-USER: CPT | Performed by: STUDENT IN AN ORGANIZED HEALTH CARE EDUCATION/TRAINING PROGRAM

## 2024-04-18 PROCEDURE — 3017F COLORECTAL CA SCREEN DOC REV: CPT | Performed by: STUDENT IN AN ORGANIZED HEALTH CARE EDUCATION/TRAINING PROGRAM

## 2024-04-18 PROCEDURE — 3078F DIAST BP <80 MM HG: CPT | Performed by: STUDENT IN AN ORGANIZED HEALTH CARE EDUCATION/TRAINING PROGRAM

## 2024-04-18 PROCEDURE — 2022F DILAT RTA XM EVC RTNOPTHY: CPT | Performed by: STUDENT IN AN ORGANIZED HEALTH CARE EDUCATION/TRAINING PROGRAM

## 2024-04-18 RX ORDER — LISINOPRIL 20 MG/1
20 TABLET ORAL DAILY
COMMUNITY

## 2024-04-18 RX ORDER — SODIUM BICARBONATE 650 MG/1
1300 TABLET ORAL DAILY
COMMUNITY
Start: 2024-04-18 | End: 2025-04-18

## 2024-04-18 RX ORDER — VENLAFAXINE HYDROCHLORIDE 37.5 MG/1
37.5 CAPSULE, EXTENDED RELEASE ORAL DAILY
Qty: 90 CAPSULE | Refills: 2 | Status: CANCELLED | OUTPATIENT
Start: 2024-04-18

## 2024-04-18 RX ORDER — DULAGLUTIDE 3 MG/.5ML
INJECTION, SOLUTION SUBCUTANEOUS
COMMUNITY
Start: 2024-04-01

## 2024-04-18 RX ORDER — NEBIVOLOL 5 MG/1
5 TABLET ORAL DAILY
Qty: 90 TABLET | Refills: 2 | Status: CANCELLED | OUTPATIENT
Start: 2024-04-18

## 2024-04-18 RX ORDER — ALLOPURINOL 100 MG/1
50 TABLET ORAL EVERY OTHER DAY
Qty: 90 TABLET | Refills: 1 | Status: SHIPPED | OUTPATIENT
Start: 2024-04-18

## 2024-04-18 RX ORDER — FERROUS SULFATE 325(65) MG
325 TABLET ORAL
Qty: 90 TABLET | Refills: 3 | Status: SHIPPED | OUTPATIENT
Start: 2024-04-18

## 2024-04-18 ASSESSMENT — PATIENT HEALTH QUESTIONNAIRE - PHQ9
SUM OF ALL RESPONSES TO PHQ9 QUESTIONS 1 & 2: 0
SUM OF ALL RESPONSES TO PHQ QUESTIONS 1-9: 0
1. LITTLE INTEREST OR PLEASURE IN DOING THINGS: NOT AT ALL
2. FEELING DOWN, DEPRESSED OR HOPELESS: NOT AT ALL
SUM OF ALL RESPONSES TO PHQ QUESTIONS 1-9: 0

## 2024-04-18 NOTE — PROGRESS NOTES
multivessel, awaiting CABG and mitral valve repair/replacement    CKD (chronic kidney disease)     Diabetes ([facility]) typell, dx 2016    typell, ID, avfbs 160, last A1C was 10.8. denies lows    Heart failure ([facility])     EF 40-45%    Hypercholesteremia     Hypertension     Hypoxia 10/1/2019    Intertrochanteric fracture of right femur ([facility]) 2/24/2018    Mitral valve regurgitation     PUD (peptic ulcer disease)     age 16, no treatment since    Thrombocytopenia ([facility]) 10/2/2019    UTI (urinary tract infection) 9/30/2019      Past Surgical History:   Procedure Laterality Date    BREAST LUMPECTOMY  2015    CARDIAC SURGERY  2019    CABG    HIP FRACTURE SURGERY Right 2018    ORTHOPEDIC SURGERY Right     hip fracture repair with hardware , not replacent     Family History   Problem Relation Age of Onset    Heart Disease Brother     Heart Disease Father     Kidney Disease Mother      Social History     Tobacco Use   Smoking Status Never   Smokeless Tobacco Never      Social History     Substance and Sexual Activity   Alcohol Use Yes      Social History     Substance and Sexual Activity   Drug Use No      Allergies as of 04/18/2024    (No Known Allergies)       Review of Systems    Objective:    Vitals:    04/18/24 1101   BP: 120/68   Site: Right Upper Arm   Position: Sitting   Pulse: 78   SpO2: 99%   Weight: 86.6 kg (191 lb)   Height: 1.588 m (5' 2.5\")        Physical Exam  Constitutional:       General: She is not in acute distress.     Appearance: Normal appearance.   HENT:      Head: Normocephalic and atraumatic.   Eyes:      Extraocular Movements: Extraocular movements intact.      Conjunctiva/sclera: Conjunctivae normal.   Cardiovascular:      Rate and Rhythm: Normal rate and regular rhythm.      Heart sounds: No murmur heard.  Pulmonary:      Effort: Pulmonary effort is normal.      Breath sounds: Normal breath sounds. No wheezing, rhonchi or rales.   Skin:     General: Skin is warm and dry.   Neurological:      Mental

## 2024-04-19 LAB
EST. AVERAGE GLUCOSE BLD GHB EST-MCNC: 148 MG/DL
HBA1C MFR BLD: 6.8 % (ref 4.8–5.6)

## 2024-04-19 NOTE — ASSESSMENT & PLAN NOTE
Insurance would not cover Ozempic so we are continuing Trulicity.  Continue insulin 20 units nightly, Jardiance.  Check A1c.

## 2024-04-26 NOTE — PROGRESS NOTES
Called to pts room to speak with family, sister Ronal Maxwell [phone] requested additional referral facilities be made for STR, referrals sent to [facility] and [facility] via CC link. Spoke to pharmacy and patient's co-pay for the medication is $565.25. It may be going to patients deductible.   Please advise

## 2024-05-03 ENCOUNTER — APPOINTMENT (OUTPATIENT)
Dept: GENERAL RADIOLOGY | Age: 64
End: 2024-05-03
Payer: MEDICARE

## 2024-05-03 ENCOUNTER — HOSPITAL ENCOUNTER (EMERGENCY)
Age: 64
Discharge: HOME | End: 2024-05-03
Attending: EMERGENCY MEDICINE
Payer: MEDICARE

## 2024-05-03 VITALS
BODY MASS INDEX: 28.35 KG/M2 | HEART RATE: 85 BPM | OXYGEN SATURATION: 95 % | DIASTOLIC BLOOD PRESSURE: 90 MMHG | HEIGHT: 63 IN | TEMPERATURE: 98.5 F | SYSTOLIC BLOOD PRESSURE: 156 MMHG | WEIGHT: 160 LBS | RESPIRATION RATE: 16 BRPM

## 2024-05-03 DIAGNOSIS — R07.9 CHEST PAIN, UNSPECIFIED TYPE: Primary | ICD-10-CM

## 2024-05-03 LAB
ALBUMIN SERPL-MCNC: 3.5 G/DL (ref 3.2–4.6)
ALBUMIN/GLOB SERPL: 0.8 (ref 1–1.9)
ALP SERPL-CCNC: 78 U/L (ref 35–104)
ALT SERPL-CCNC: 11 U/L (ref 12–65)
ANION GAP SERPL CALC-SCNC: 12 MMOL/L (ref 9–18)
AST SERPL-CCNC: 21 U/L (ref 15–37)
BASOPHILS # BLD: 0.1 K/UL (ref 0–0.2)
BASOPHILS NFR BLD: 1 % (ref 0–2)
BILIRUB SERPL-MCNC: 0.3 MG/DL (ref 0–1.2)
BUN SERPL-MCNC: 29 MG/DL (ref 8–23)
CALCIUM SERPL-MCNC: 9.7 MG/DL (ref 8.8–10.2)
CHLORIDE SERPL-SCNC: 104 MMOL/L (ref 98–107)
CO2 SERPL-SCNC: 25 MMOL/L (ref 20–28)
CREAT SERPL-MCNC: 2.17 MG/DL (ref 0.6–1.1)
DIFFERENTIAL METHOD BLD: NORMAL
EKG ATRIAL RATE: 85 BPM
EKG DIAGNOSIS: NORMAL
EKG P AXIS: 29 DEGREES
EKG P-R INTERVAL: 211 MS
EKG Q-T INTERVAL: 390 MS
EKG QRS DURATION: 102 MS
EKG QTC CALCULATION (BAZETT): 464 MS
EKG R AXIS: 42 DEGREES
EKG T AXIS: 62 DEGREES
EKG VENTRICULAR RATE: 85 BPM
EOSINOPHIL # BLD: 0.2 K/UL (ref 0–0.8)
EOSINOPHIL NFR BLD: 4 % (ref 0.5–7.8)
ERYTHROCYTE [DISTWIDTH] IN BLOOD BY AUTOMATED COUNT: 13.6 % (ref 11.9–14.6)
GLOBULIN SER CALC-MCNC: 4.3 G/DL (ref 2.3–3.5)
GLUCOSE SERPL-MCNC: 127 MG/DL (ref 70–99)
HCT VFR BLD AUTO: 36.3 % (ref 35.8–46.3)
HGB BLD-MCNC: 11.8 G/DL (ref 11.7–15.4)
IMM GRANULOCYTES # BLD AUTO: 0 K/UL (ref 0–0.5)
IMM GRANULOCYTES NFR BLD AUTO: 1 % (ref 0–5)
LIPASE SERPL-CCNC: 23 U/L (ref 13–60)
LYMPHOCYTES # BLD: 2.3 K/UL (ref 0.5–4.6)
LYMPHOCYTES NFR BLD: 36 % (ref 13–44)
MCH RBC QN AUTO: 28.9 PG (ref 26.1–32.9)
MCHC RBC AUTO-ENTMCNC: 32.5 G/DL (ref 31.4–35)
MCV RBC AUTO: 89 FL (ref 82–102)
MONOCYTES # BLD: 0.4 K/UL (ref 0.1–1.3)
MONOCYTES NFR BLD: 6 % (ref 4–12)
NEUTS SEG # BLD: 3.3 K/UL (ref 1.7–8.2)
NEUTS SEG NFR BLD: 52 % (ref 43–78)
NRBC # BLD: 0 K/UL (ref 0–0.2)
NT PRO BNP: 839 PG/ML (ref 0–125)
PLATELET # BLD AUTO: 213 K/UL (ref 150–450)
PMV BLD AUTO: 11.5 FL (ref 9.4–12.3)
POTASSIUM SERPL-SCNC: 3.7 MMOL/L (ref 3.5–5.1)
PROT SERPL-MCNC: 7.8 G/DL (ref 6.3–8.2)
RBC # BLD AUTO: 4.08 M/UL (ref 4.05–5.2)
SODIUM SERPL-SCNC: 140 MMOL/L (ref 136–145)
TROPONIN T SERPL HS-MCNC: 35 NG/L (ref 0–14)
TROPONIN T SERPL HS-MCNC: 38 NG/L (ref 0–14)
WBC # BLD AUTO: 6.2 K/UL (ref 4.3–11.1)

## 2024-05-03 PROCEDURE — 84484 ASSAY OF TROPONIN QUANT: CPT

## 2024-05-03 PROCEDURE — 96374 THER/PROPH/DIAG INJ IV PUSH: CPT

## 2024-05-03 PROCEDURE — 93010 ELECTROCARDIOGRAM REPORT: CPT | Performed by: INTERNAL MEDICINE

## 2024-05-03 PROCEDURE — 71045 X-RAY EXAM CHEST 1 VIEW: CPT

## 2024-05-03 PROCEDURE — 83880 ASSAY OF NATRIURETIC PEPTIDE: CPT

## 2024-05-03 PROCEDURE — 83690 ASSAY OF LIPASE: CPT

## 2024-05-03 PROCEDURE — 6360000002 HC RX W HCPCS: Performed by: EMERGENCY MEDICINE

## 2024-05-03 PROCEDURE — 99285 EMERGENCY DEPT VISIT HI MDM: CPT

## 2024-05-03 PROCEDURE — 93005 ELECTROCARDIOGRAM TRACING: CPT | Performed by: EMERGENCY MEDICINE

## 2024-05-03 PROCEDURE — 80053 COMPREHEN METABOLIC PANEL: CPT

## 2024-05-03 PROCEDURE — 85025 COMPLETE CBC W/AUTO DIFF WBC: CPT

## 2024-05-03 RX ORDER — FUROSEMIDE 10 MG/ML
40 INJECTION INTRAMUSCULAR; INTRAVENOUS ONCE
Status: COMPLETED | OUTPATIENT
Start: 2024-05-03 | End: 2024-05-03

## 2024-05-03 RX ADMIN — FUROSEMIDE 40 MG: 10 INJECTION, SOLUTION INTRAMUSCULAR; INTRAVENOUS at 06:38

## 2024-05-03 ASSESSMENT — ENCOUNTER SYMPTOMS
CHEST TIGHTNESS: 0
COUGH: 0
SHORTNESS OF BREATH: 0
VOICE CHANGE: 0
BACK PAIN: 0
NAUSEA: 1
EYE PAIN: 0
SORE THROAT: 0
VOMITING: 1
ABDOMINAL PAIN: 0
FACIAL SWELLING: 0
TROUBLE SWALLOWING: 0

## 2024-05-03 NOTE — ED NOTES
Patient mobility status  with no difficulty. Provider aware     I have reviewed discharge instructions with the patient.  The patient verbalized understanding.    Patient left ED via Discharge Method: ambulatory to Home with Spouse.    Opportunity for questions and clarification provided.     Patient given 0 scripts.            Bandar Hussein RN  05/03/24 0648

## 2024-05-03 NOTE — ED TRIAGE NOTES
Pt c/o chest pain for two days, worse when laying down, eases when she sits or stands.  Pt received  ASA 324mg, NTG 0.4mg by EMS

## 2024-05-03 NOTE — DISCHARGE INSTRUCTIONS
There is no identifiable emergency seen here today.  I recommend you follow-up with your cardiologist for continued valuation of your chest pain.  Please return to the emergency department for any worsening symptoms.

## 2024-05-03 NOTE — ED PROVIDER NOTES
Emergency Department Provider Note       PCP: Daniella Moses MD   Age: 63 y.o.   Sex: female     DISPOSITION Decision To Discharge 05/03/2024 06:07:15 AM       ICD-10-CM    1. Chest pain, unspecified type  R07.9           Medical Decision Making     Level 5 46603  63-year-old female presents to the emergency department via EMS with chief complaint of chest pain.  Vital signs are reviewed..  External records were reviewed.  Shared decision-making was utilized in the patient's visit.  Cardiac work appears initiated.  EKG showed no signs of acute ischemia.  CBC is unremarkable, CMP showed a creatinine of 2.17 she has a history of chronic kidney disease.  Troponin came back slightly elevated at 38 with a BNP of 839.  Repeat troponin is trended downward to 35.  This point patient is chest pain-free.  She is given extra dose of Lasix.  Patient was given follow-up with her cardiologist.  Patient stable on discharge cardiac and respiratory examination     1 acute complicated illness or injury.  Shared medical decision making was utilized in creating the patients health plan today.    I independently ordered and reviewed each unique test.  I reviewed external records: ED visit note from an outside group.  I reviewed external records: provider visit note from PCP.  I reviewed external records: provider visit note from outside specialist.  I reviewed external records: previous EKG including cardiologist interpretation.       I interpreted the X-rays Cardiomegaly with CABG present no evidence of any acute findings.  Normal sinus rhythm with a ventricular rate of 85 bpm, KS interval 211, , QTc 464, normal axis no ST segment elevation or depression noted no signs of acute ischemia            History     63-year-old female presents to the emergency department via EMS with chief complaint of chest pain.  Patient reports having chest pain started yesterday.  She states that she had an episode of nausea and vomiting.

## 2024-06-24 DIAGNOSIS — I1A.0 RESISTANT HYPERTENSION: ICD-10-CM

## 2024-06-24 RX ORDER — SPIRONOLACTONE 25 MG/1
25 TABLET ORAL 2 TIMES DAILY
Qty: 180 TABLET | Refills: 1 | OUTPATIENT
Start: 2024-06-24

## 2024-06-24 RX ORDER — CARVEDILOL 25 MG/1
25 TABLET ORAL 2 TIMES DAILY WITH MEALS
Qty: 180 TABLET | Refills: 3 | Status: SHIPPED | OUTPATIENT
Start: 2024-06-24

## 2024-06-24 RX ORDER — ROSUVASTATIN CALCIUM 20 MG/1
TABLET, COATED ORAL
Qty: 90 TABLET | Refills: 1 | OUTPATIENT
Start: 2024-06-24

## 2024-06-24 RX ORDER — NEBIVOLOL 5 MG/1
5 TABLET ORAL DAILY
Qty: 90 TABLET | Refills: 2 | OUTPATIENT
Start: 2024-06-24

## 2024-06-24 NOTE — TELEPHONE ENCOUNTER
Please advise. Patient last seen on 4/18/24, follow-up scheduled for 8/22/24. Rx last wrote on 6/7/23 #180 with 2 refills. Rx pended.

## 2024-07-15 DIAGNOSIS — I1A.0 RESISTANT HYPERTENSION: ICD-10-CM

## 2024-07-15 DIAGNOSIS — R23.2 FLUSHING: ICD-10-CM

## 2024-07-15 RX ORDER — NEBIVOLOL 5 MG/1
5 TABLET ORAL DAILY
Qty: 90 TABLET | Refills: 2 | OUTPATIENT
Start: 2024-07-15

## 2024-07-15 RX ORDER — DULAGLUTIDE 3 MG/.5ML
INJECTION, SOLUTION SUBCUTANEOUS
Qty: 4 ADJUSTABLE DOSE PRE-FILLED PEN SYRINGE | Refills: 11 | Status: SHIPPED | OUTPATIENT
Start: 2024-07-15

## 2024-07-15 RX ORDER — VENLAFAXINE HYDROCHLORIDE 37.5 MG/1
CAPSULE, EXTENDED RELEASE ORAL DAILY
Qty: 90 CAPSULE | Refills: 1 | OUTPATIENT
Start: 2024-07-15

## 2024-07-15 RX ORDER — ROSUVASTATIN CALCIUM 20 MG/1
TABLET, COATED ORAL
Qty: 90 TABLET | Refills: 1 | OUTPATIENT
Start: 2024-07-15

## 2024-07-15 RX ORDER — SPIRONOLACTONE 25 MG/1
TABLET ORAL
Qty: 180 TABLET | Refills: 1 | OUTPATIENT
Start: 2024-07-15

## 2024-07-30 ENCOUNTER — OFFICE VISIT (OUTPATIENT)
Dept: INTERNAL MEDICINE CLINIC | Facility: CLINIC | Age: 64
End: 2024-07-30
Payer: MEDICARE

## 2024-07-30 VITALS
BODY MASS INDEX: 35.15 KG/M2 | WEIGHT: 191 LBS | DIASTOLIC BLOOD PRESSURE: 88 MMHG | SYSTOLIC BLOOD PRESSURE: 132 MMHG | HEIGHT: 62 IN

## 2024-07-30 DIAGNOSIS — R30.0 DYSURIA: Primary | ICD-10-CM

## 2024-07-30 DIAGNOSIS — R11.2 NAUSEA AND VOMITING, UNSPECIFIED VOMITING TYPE: ICD-10-CM

## 2024-07-30 DIAGNOSIS — Z79.4 CONTROLLED TYPE 2 DIABETES MELLITUS WITH DIABETIC NEPHROPATHY, WITH LONG-TERM CURRENT USE OF INSULIN (HCC): ICD-10-CM

## 2024-07-30 DIAGNOSIS — N18.4 CKD (CHRONIC KIDNEY DISEASE) STAGE 4, GFR 15-29 ML/MIN (HCC): ICD-10-CM

## 2024-07-30 DIAGNOSIS — Z95.1 S/P CABG X 3: ICD-10-CM

## 2024-07-30 DIAGNOSIS — R82.81 PYURIA: ICD-10-CM

## 2024-07-30 DIAGNOSIS — E11.21 CONTROLLED TYPE 2 DIABETES MELLITUS WITH DIABETIC NEPHROPATHY, WITH LONG-TERM CURRENT USE OF INSULIN (HCC): ICD-10-CM

## 2024-07-30 LAB
ALBUMIN SERPL-MCNC: 3.3 G/DL (ref 3.2–4.6)
ALBUMIN/GLOB SERPL: 0.9 (ref 1–1.9)
ALP SERPL-CCNC: 83 U/L (ref 35–104)
ALT SERPL-CCNC: 10 U/L (ref 12–65)
ANION GAP SERPL CALC-SCNC: 10 MMOL/L (ref 9–18)
AST SERPL-CCNC: 21 U/L (ref 15–37)
BASOPHILS # BLD: 0.1 K/UL (ref 0–0.2)
BASOPHILS NFR BLD: 1 % (ref 0–2)
BILIRUB SERPL-MCNC: 0.5 MG/DL (ref 0–1.2)
BILIRUBIN, URINE, POC: NEGATIVE
BLOOD URINE, POC: ABNORMAL
BUN SERPL-MCNC: 27 MG/DL (ref 8–23)
CALCIUM SERPL-MCNC: 9.4 MG/DL (ref 8.8–10.2)
CHLORIDE SERPL-SCNC: 104 MMOL/L (ref 98–107)
CO2 SERPL-SCNC: 26 MMOL/L (ref 20–28)
CREAT SERPL-MCNC: 2.29 MG/DL (ref 0.6–1.1)
DIFFERENTIAL METHOD BLD: NORMAL
EOSINOPHIL # BLD: 0.2 K/UL (ref 0–0.8)
EOSINOPHIL NFR BLD: 3 % (ref 0.5–7.8)
ERYTHROCYTE [DISTWIDTH] IN BLOOD BY AUTOMATED COUNT: 13.7 % (ref 11.9–14.6)
GLOBULIN SER CALC-MCNC: 3.8 G/DL (ref 2.3–3.5)
GLUCOSE SERPL-MCNC: 113 MG/DL (ref 70–99)
GLUCOSE URINE, POC: ABNORMAL
HCT VFR BLD AUTO: 37.7 % (ref 35.8–46.3)
HGB BLD-MCNC: 11.9 G/DL (ref 11.7–15.4)
IMM GRANULOCYTES # BLD AUTO: 0 K/UL (ref 0–0.5)
IMM GRANULOCYTES NFR BLD AUTO: 0 % (ref 0–5)
KETONES, URINE, POC: NEGATIVE
LEUKOCYTE ESTERASE, URINE, POC: ABNORMAL
LIPASE SERPL-CCNC: 22 U/L (ref 13–60)
LYMPHOCYTES # BLD: 2.1 K/UL (ref 0.5–4.6)
LYMPHOCYTES NFR BLD: 35 % (ref 13–44)
MCH RBC QN AUTO: 28.9 PG (ref 26.1–32.9)
MCHC RBC AUTO-ENTMCNC: 31.6 G/DL (ref 31.4–35)
MCV RBC AUTO: 91.5 FL (ref 82–102)
MONOCYTES # BLD: 0.4 K/UL (ref 0.1–1.3)
MONOCYTES NFR BLD: 6 % (ref 4–12)
NEUTS SEG # BLD: 3.4 K/UL (ref 1.7–8.2)
NEUTS SEG NFR BLD: 55 % (ref 43–78)
NITRITE, URINE, POC: NEGATIVE
NRBC # BLD: 0 K/UL (ref 0–0.2)
PH, URINE, POC: 6 (ref 4.6–8)
PLATELET # BLD AUTO: 227 K/UL (ref 150–450)
PMV BLD AUTO: 11.9 FL (ref 9.4–12.3)
POTASSIUM SERPL-SCNC: 4.2 MMOL/L (ref 3.5–5.1)
PROT SERPL-MCNC: 7.2 G/DL (ref 6.3–8.2)
PROTEIN,URINE, POC: 300
RBC # BLD AUTO: 4.12 M/UL (ref 4.05–5.2)
SODIUM SERPL-SCNC: 140 MMOL/L (ref 136–145)
SPECIFIC GRAVITY, URINE, POC: 1.02 (ref 1–1.03)
URINALYSIS CLARITY, POC: CLEAR
URINALYSIS COLOR, POC: ABNORMAL
UROBILINOGEN, POC: NORMAL
WBC # BLD AUTO: 6.1 K/UL (ref 4.3–11.1)

## 2024-07-30 PROCEDURE — 3075F SYST BP GE 130 - 139MM HG: CPT | Performed by: NURSE PRACTITIONER

## 2024-07-30 PROCEDURE — 1036F TOBACCO NON-USER: CPT | Performed by: NURSE PRACTITIONER

## 2024-07-30 PROCEDURE — 99214 OFFICE O/P EST MOD 30 MIN: CPT | Performed by: NURSE PRACTITIONER

## 2024-07-30 PROCEDURE — 3079F DIAST BP 80-89 MM HG: CPT | Performed by: NURSE PRACTITIONER

## 2024-07-30 PROCEDURE — 81002 URINALYSIS NONAUTO W/O SCOPE: CPT | Performed by: NURSE PRACTITIONER

## 2024-07-30 PROCEDURE — 3017F COLORECTAL CA SCREEN DOC REV: CPT | Performed by: NURSE PRACTITIONER

## 2024-07-30 PROCEDURE — G8427 DOCREV CUR MEDS BY ELIG CLIN: HCPCS | Performed by: NURSE PRACTITIONER

## 2024-07-30 PROCEDURE — G8417 CALC BMI ABV UP PARAM F/U: HCPCS | Performed by: NURSE PRACTITIONER

## 2024-07-30 RX ORDER — LEVOFLOXACIN 250 MG/1
250 TABLET, FILM COATED ORAL DAILY
Qty: 3 TABLET | Refills: 0 | Status: SHIPPED | OUTPATIENT
Start: 2024-07-30 | End: 2024-08-02

## 2024-07-30 NOTE — PROGRESS NOTES
capsule by mouth daily      acetaminophen (TYLENOL) 500 MG tablet Take 1 tablet by mouth every 4 hours as needed      aspirin 81 MG chewable tablet Take 1 tablet by mouth daily      Calcium Carbonate-Vitamin D (OYSTER SHELL CALCIUM/D) 500-200 MG-UNIT TABS Take 1 tablet by mouth 2 times daily (with meals)      amLODIPine (NORVASC) 5 MG tablet Take 1 tablet by mouth daily 30 tablet 0     No current facility-administered medications for this visit.     No Known Allergies    ASSESSMENT and PLAN    Claudette was seen today for abdominal pain and emesis.    Diagnoses and all orders for this visit:    Dysuria  -     AMB POC URINALYSIS DIP STICK MANUAL W/O MICRO    Pyuria  -     CBC with Auto Differential; Future  -     Culture, Urine; Future  -     levoFLOXacin (LEVAQUIN) 250 MG tablet; Take 1 tablet by mouth daily for 3 days    Nausea and vomiting, unspecified vomiting type  -     Comprehensive Metabolic Panel; Future  -     Lipase; Future    CKD (chronic kidney disease) stage 4, GFR 15-29 ml/min ([facility])    Controlled type 2 diabetes mellitus with diabetic nephropathy, with long-term current use of insulin ([facility])    S/P CABG x 3    Labs today. Will treat for UTI with pyuria- low dose Levaquin for 3 days pending urine culture. ER if worsening pain, fever, vomiting.     Medical problems and test results were reviewed with the patient today.     FOLLOW UP    Return for Pending test results.       REVIEW OF SYSTEMS    Review of Systems   Constitutional:  Positive for fatigue. Negative for chills and fever.   Gastrointestinal:  Positive for abdominal pain, nausea and vomiting. Negative for blood in stool and diarrhea.   Genitourinary:  Positive for dysuria. Negative for flank pain, hematuria and vaginal discharge.       PHYSICAL EXAM    /88   Ht 1.581 m (5' 2.25\")   Wt 86.6 kg (191 lb)   BMI 34.65 kg/m²      Physical Exam  Vitals and nursing note reviewed.   Constitutional:       General: She is not in acute distress.

## 2024-07-31 ENCOUNTER — TELEPHONE (OUTPATIENT)
Age: 64
End: 2024-07-31

## 2024-07-31 ENCOUNTER — APPOINTMENT (OUTPATIENT)
Dept: GENERAL RADIOLOGY | Age: 64
End: 2024-07-31
Payer: MEDICARE

## 2024-07-31 ENCOUNTER — HOSPITAL ENCOUNTER (EMERGENCY)
Age: 64
Discharge: HOME | End: 2024-07-31
Attending: EMERGENCY MEDICINE
Payer: MEDICARE

## 2024-07-31 VITALS
WEIGHT: 175 LBS | TEMPERATURE: 98.7 F | BODY MASS INDEX: 31.01 KG/M2 | HEIGHT: 63 IN | HEART RATE: 86 BPM | SYSTOLIC BLOOD PRESSURE: 133 MMHG | DIASTOLIC BLOOD PRESSURE: 64 MMHG | RESPIRATION RATE: 22 BRPM | OXYGEN SATURATION: 99 %

## 2024-07-31 DIAGNOSIS — I10 ESSENTIAL HYPERTENSION: ICD-10-CM

## 2024-07-31 DIAGNOSIS — R07.9 CHEST PAIN, UNSPECIFIED TYPE: Primary | ICD-10-CM

## 2024-07-31 LAB
ALBUMIN SERPL-MCNC: 3.3 G/DL (ref 3.2–4.6)
ALBUMIN/GLOB SERPL: 0.9 (ref 1–1.9)
ALP SERPL-CCNC: 82 U/L (ref 35–104)
ALT SERPL-CCNC: 10 U/L (ref 12–65)
ANION GAP SERPL CALC-SCNC: 14 MMOL/L (ref 9–18)
AST SERPL-CCNC: 16 U/L (ref 15–37)
BASOPHILS # BLD: 0 K/UL (ref 0–0.2)
BASOPHILS NFR BLD: 1 % (ref 0–2)
BILIRUB SERPL-MCNC: 0.3 MG/DL (ref 0–1.2)
BUN SERPL-MCNC: 28 MG/DL (ref 8–23)
CALCIUM SERPL-MCNC: 9 MG/DL (ref 8.8–10.2)
CHLORIDE SERPL-SCNC: 104 MMOL/L (ref 98–107)
CO2 SERPL-SCNC: 24 MMOL/L (ref 20–28)
CREAT SERPL-MCNC: 2.58 MG/DL (ref 0.6–1.1)
DIFFERENTIAL METHOD BLD: NORMAL
EKG ATRIAL RATE: 84 BPM
EKG DIAGNOSIS: NORMAL
EKG P AXIS: 39 DEGREES
EKG P-R INTERVAL: 200 MS
EKG Q-T INTERVAL: 398 MS
EKG QRS DURATION: 82 MS
EKG QTC CALCULATION (BAZETT): 470 MS
EKG R AXIS: -5 DEGREES
EKG T AXIS: 53 DEGREES
EKG VENTRICULAR RATE: 84 BPM
EOSINOPHIL # BLD: 0.2 K/UL (ref 0–0.8)
EOSINOPHIL NFR BLD: 3 % (ref 0.5–7.8)
ERYTHROCYTE [DISTWIDTH] IN BLOOD BY AUTOMATED COUNT: 13.6 % (ref 11.9–14.6)
GLOBULIN SER CALC-MCNC: 3.7 G/DL (ref 2.3–3.5)
GLUCOSE SERPL-MCNC: 104 MG/DL (ref 70–99)
HCT VFR BLD AUTO: 38.6 % (ref 35.8–46.3)
HGB BLD-MCNC: 12.4 G/DL (ref 11.7–15.4)
IMM GRANULOCYTES # BLD AUTO: 0 K/UL (ref 0–0.5)
IMM GRANULOCYTES NFR BLD AUTO: 0 % (ref 0–5)
LYMPHOCYTES # BLD: 2.5 K/UL (ref 0.5–4.6)
LYMPHOCYTES NFR BLD: 38 % (ref 13–44)
MCH RBC QN AUTO: 28.9 PG (ref 26.1–32.9)
MCHC RBC AUTO-ENTMCNC: 32.1 G/DL (ref 31.4–35)
MCV RBC AUTO: 90 FL (ref 82–102)
MONOCYTES # BLD: 0.5 K/UL (ref 0.1–1.3)
MONOCYTES NFR BLD: 7 % (ref 4–12)
NEUTS SEG # BLD: 3.4 K/UL (ref 1.7–8.2)
NEUTS SEG NFR BLD: 52 % (ref 43–78)
NRBC # BLD: 0 K/UL (ref 0–0.2)
PLATELET # BLD AUTO: 225 K/UL (ref 150–450)
PMV BLD AUTO: 11.1 FL (ref 9.4–12.3)
POTASSIUM SERPL-SCNC: 4 MMOL/L (ref 3.5–5.1)
PROT SERPL-MCNC: 7.1 G/DL (ref 6.3–8.2)
RBC # BLD AUTO: 4.29 M/UL (ref 4.05–5.2)
SODIUM SERPL-SCNC: 142 MMOL/L (ref 136–145)
TROPONIN T SERPL HS-MCNC: 31 NG/L (ref 0–14)
TROPONIN T SERPL HS-MCNC: 35 NG/L (ref 0–14)
WBC # BLD AUTO: 6.5 K/UL (ref 4.3–11.1)

## 2024-07-31 PROCEDURE — 85025 COMPLETE CBC W/AUTO DIFF WBC: CPT

## 2024-07-31 PROCEDURE — 71046 X-RAY EXAM CHEST 2 VIEWS: CPT

## 2024-07-31 PROCEDURE — 93010 ELECTROCARDIOGRAM REPORT: CPT | Performed by: INTERNAL MEDICINE

## 2024-07-31 PROCEDURE — 99285 EMERGENCY DEPT VISIT HI MDM: CPT

## 2024-07-31 PROCEDURE — 6370000000 HC RX 637 (ALT 250 FOR IP): Performed by: EMERGENCY MEDICINE

## 2024-07-31 PROCEDURE — 84484 ASSAY OF TROPONIN QUANT: CPT

## 2024-07-31 PROCEDURE — 96374 THER/PROPH/DIAG INJ IV PUSH: CPT

## 2024-07-31 PROCEDURE — 6360000002 HC RX W HCPCS: Performed by: EMERGENCY MEDICINE

## 2024-07-31 PROCEDURE — 80053 COMPREHEN METABOLIC PANEL: CPT

## 2024-07-31 PROCEDURE — 93005 ELECTROCARDIOGRAM TRACING: CPT | Performed by: EMERGENCY MEDICINE

## 2024-07-31 RX ORDER — AMLODIPINE BESYLATE 5 MG/1
5 TABLET ORAL
Status: COMPLETED | OUTPATIENT
Start: 2024-07-31 | End: 2024-07-31

## 2024-07-31 RX ORDER — ASPIRIN 81 MG/1
324 TABLET, CHEWABLE ORAL DAILY
Status: DISCONTINUED | OUTPATIENT
Start: 2024-07-31 | End: 2024-07-31 | Stop reason: HOSPADM

## 2024-07-31 RX ORDER — AMLODIPINE BESYLATE 5 MG/1
5 TABLET ORAL DAILY
Qty: 30 TABLET | Refills: 0 | Status: SHIPPED | OUTPATIENT
Start: 2024-07-31

## 2024-07-31 RX ORDER — NITROGLYCERIN 0.4 MG/1
0.4 TABLET SUBLINGUAL ONCE
Status: COMPLETED | OUTPATIENT
Start: 2024-07-31 | End: 2024-07-31

## 2024-07-31 RX ORDER — HYDRALAZINE HYDROCHLORIDE 20 MG/ML
10 INJECTION INTRAMUSCULAR; INTRAVENOUS
Status: COMPLETED | OUTPATIENT
Start: 2024-07-31 | End: 2024-07-31

## 2024-07-31 RX ORDER — AMLODIPINE BESYLATE 10 MG/1
10 TABLET ORAL
Status: DISCONTINUED | OUTPATIENT
Start: 2024-07-31 | End: 2024-07-31

## 2024-07-31 RX ADMIN — ASPIRIN 81 MG 324 MG: 81 TABLET ORAL at 14:13

## 2024-07-31 RX ADMIN — AMLODIPINE BESYLATE 5 MG: 5 TABLET ORAL at 16:29

## 2024-07-31 RX ADMIN — HYDRALAZINE HYDROCHLORIDE 10 MG: 20 INJECTION INTRAMUSCULAR; INTRAVENOUS at 15:57

## 2024-07-31 RX ADMIN — NITROGLYCERIN 0.4 MG: 0.4 TABLET SUBLINGUAL at 14:13

## 2024-07-31 ASSESSMENT — PAIN DESCRIPTION - PAIN TYPE: TYPE: ACUTE PAIN

## 2024-07-31 ASSESSMENT — PAIN DESCRIPTION - FREQUENCY: FREQUENCY: CONTINUOUS

## 2024-07-31 ASSESSMENT — PAIN DESCRIPTION - LOCATION
LOCATION: CHEST
LOCATION: CHEST

## 2024-07-31 ASSESSMENT — PAIN SCALES - GENERAL
PAINLEVEL_OUTOF10: 7
PAINLEVEL_OUTOF10: 8
PAINLEVEL_OUTOF10: 5

## 2024-07-31 ASSESSMENT — PAIN DESCRIPTION - DESCRIPTORS: DESCRIPTORS: TIGHTNESS

## 2024-07-31 ASSESSMENT — PAIN - FUNCTIONAL ASSESSMENT: PAIN_FUNCTIONAL_ASSESSMENT: 0-10

## 2024-07-31 NOTE — TELEPHONE ENCOUNTER
Pt was at the ER last week and heart issues are still going on.  Tighness in her chest and feels like there is fuild and sore really back.  Sweating.

## 2024-07-31 NOTE — ED PROVIDER NOTES
Emergency Department Provider Note       PCP: Daniella Moses MD   Age: 64 y.o.   Sex: female     DISPOSITION Decision To Discharge 07/31/2024 04:22:18 PM       ICD-10-CM    1. Chest pain, unspecified type  R07.9       2. Essential hypertension  I10           Medical Decision Making     4:07 PM EDT  Patient has not ischemic EKG, negative troponin, negative delta troponin.  I have extremely low suspicion for ACS.  She has noted to be hypertensive, and her hypertension has responded both to a dose of nitroglycerin and amlodipine.  Both times as her pressures come down her chest pain is improved.    4:23 PM EDT  Patient is continuing to feel improved.  I discussed the case with cardiology who reviewed the patient's medications and results and recommend initiating a new antihypertensive medication.  Will start Norvasc 5 mg daily.  I discussed the plan with the patient.  She is comfortable with this plan.  If her symptoms worsen return or change in any way she should return the emergency department for further evaluation.  Otherwise follow-up with primary care     1 or more acute illnesses that pose a threat to life or bodily function.   Prescription drug management performed.  Drug therapy given requiring intensive monitoring for toxicity.  Discussion with external consultants.    I independently ordered and reviewed each unique test.  I reviewed external records: provider visit note from PCP.  I reviewed external records: provider visit note from outside specialist.  I reviewed external records: previous lab results from outside ED.  I reviewed external records: previous imaging study including radiologist interpretation.     I independently interpreted the cardiac monitor rhythm strip nsr.  I interpreted the X-rays no pna.  My Independent EKG Interpretation: sinus rhythm, no evidence of arrhythmia      ST Segments:Nonspecific ST segments - NO STEMI   Rate: 84            History     64-year-old female with a known

## 2024-07-31 NOTE — DISCHARGE INSTRUCTIONS
As we discussed, your heart markers are normal, showing that you have not had any damage to your heart muscle, not had a heart attack.  It does seem like your chest pain could be related to your elevated blood pressure.  Begin taking the Norvasc tomorrow.  This is a new blood pressure medicine to help control your blood pressure.    If your chest pain returns or increases or changes in any way please return to the emergency department for reevaluation.

## 2024-07-31 NOTE — ED TRIAGE NOTES
Pt presents with complaints of chest pain that started 4 days ago.  Pt also states her blood pressure has been high at home.  BP in triage is 188/102.

## 2024-08-01 LAB
BACTERIA SPEC CULT: NORMAL
BACTERIA SPEC CULT: NORMAL
SERVICE CMNT-IMP: NORMAL

## 2024-08-01 NOTE — TELEPHONE ENCOUNTER
Nurse called pt back and pt said she went to the Er yesterday and problem was taken care of. No other issues noted pt will call back if any further problems.

## 2024-08-02 ASSESSMENT — ENCOUNTER SYMPTOMS
BLOOD IN STOOL: 0
VOMITING: 1
NAUSEA: 1
DIARRHEA: 0
ABDOMINAL PAIN: 1

## 2024-08-05 DIAGNOSIS — R23.2 FLUSHING: ICD-10-CM

## 2024-08-05 DIAGNOSIS — I1A.0 RESISTANT HYPERTENSION: ICD-10-CM

## 2024-08-05 RX ORDER — ROSUVASTATIN CALCIUM 20 MG/1
20 TABLET, COATED ORAL DAILY
Qty: 90 TABLET | Refills: 1 | OUTPATIENT
Start: 2024-08-05

## 2024-08-05 RX ORDER — VENLAFAXINE HYDROCHLORIDE 37.5 MG/1
CAPSULE, EXTENDED RELEASE ORAL DAILY
Qty: 90 CAPSULE | Refills: 1 | OUTPATIENT
Start: 2024-08-05

## 2024-08-05 RX ORDER — NEBIVOLOL 5 MG/1
5 TABLET ORAL DAILY
Qty: 90 TABLET | Refills: 2 | OUTPATIENT
Start: 2024-08-05

## 2024-08-26 DIAGNOSIS — I1A.0 RESISTANT HYPERTENSION: ICD-10-CM

## 2024-08-26 DIAGNOSIS — R23.2 FLUSHING: ICD-10-CM

## 2024-08-26 RX ORDER — NEBIVOLOL 5 MG/1
5 TABLET ORAL DAILY
Qty: 90 TABLET | Refills: 2 | OUTPATIENT
Start: 2024-08-26

## 2024-08-26 RX ORDER — VENLAFAXINE HYDROCHLORIDE 37.5 MG/1
CAPSULE, EXTENDED RELEASE ORAL DAILY
Qty: 90 CAPSULE | Refills: 1 | OUTPATIENT
Start: 2024-08-26

## 2024-08-26 RX ORDER — ROSUVASTATIN CALCIUM 20 MG/1
20 TABLET, COATED ORAL DAILY
Qty: 90 TABLET | Refills: 1 | OUTPATIENT
Start: 2024-08-26

## 2024-08-29 DIAGNOSIS — I10 ESSENTIAL HYPERTENSION: Primary | ICD-10-CM

## 2024-08-29 RX ORDER — AMLODIPINE BESYLATE 5 MG/1
5 TABLET ORAL DAILY
Qty: 90 TABLET | Refills: 3 | Status: SHIPPED | OUTPATIENT
Start: 2024-08-29

## 2024-08-29 NOTE — TELEPHONE ENCOUNTER
Patient is requesting a refill on Amlodipine 5 mg, [facility]/pharmacy #[id number] - [place], [place] - [address] -  [phone] -  [phone]. 10/2/24 OV scheduled w/ Dr. Moses. Please advise.

## 2024-09-30 DIAGNOSIS — R23.2 FLUSHING: ICD-10-CM

## 2024-10-01 RX ORDER — VENLAFAXINE HYDROCHLORIDE 37.5 MG/1
CAPSULE, EXTENDED RELEASE ORAL DAILY
Qty: 90 CAPSULE | Refills: 1 | OUTPATIENT
Start: 2024-10-01

## 2024-10-01 RX ORDER — LISINOPRIL 40 MG/1
40 TABLET ORAL DAILY
Qty: 90 TABLET | Refills: 1 | OUTPATIENT
Start: 2024-10-01

## 2024-10-02 ENCOUNTER — OFFICE VISIT (OUTPATIENT)
Dept: INTERNAL MEDICINE CLINIC | Facility: CLINIC | Age: 64
End: 2024-10-02
Payer: MEDICARE

## 2024-10-02 VITALS
OXYGEN SATURATION: 97 % | DIASTOLIC BLOOD PRESSURE: 62 MMHG | WEIGHT: 199 LBS | HEIGHT: 63 IN | BODY MASS INDEX: 35.26 KG/M2 | HEART RATE: 86 BPM | SYSTOLIC BLOOD PRESSURE: 124 MMHG

## 2024-10-02 DIAGNOSIS — M10.9 GOUT, UNSPECIFIED CAUSE, UNSPECIFIED CHRONICITY, UNSPECIFIED SITE: ICD-10-CM

## 2024-10-02 DIAGNOSIS — I10 ESSENTIAL HYPERTENSION: Primary | ICD-10-CM

## 2024-10-02 DIAGNOSIS — Z79.4 CONTROLLED TYPE 2 DIABETES MELLITUS WITH DIABETIC NEPHROPATHY, WITH LONG-TERM CURRENT USE OF INSULIN (HCC): ICD-10-CM

## 2024-10-02 DIAGNOSIS — E11.21 CONTROLLED TYPE 2 DIABETES MELLITUS WITH DIABETIC NEPHROPATHY, WITH LONG-TERM CURRENT USE OF INSULIN (HCC): ICD-10-CM

## 2024-10-02 PROCEDURE — 1036F TOBACCO NON-USER: CPT | Performed by: STUDENT IN AN ORGANIZED HEALTH CARE EDUCATION/TRAINING PROGRAM

## 2024-10-02 PROCEDURE — G8417 CALC BMI ABV UP PARAM F/U: HCPCS | Performed by: STUDENT IN AN ORGANIZED HEALTH CARE EDUCATION/TRAINING PROGRAM

## 2024-10-02 PROCEDURE — G8484 FLU IMMUNIZE NO ADMIN: HCPCS | Performed by: STUDENT IN AN ORGANIZED HEALTH CARE EDUCATION/TRAINING PROGRAM

## 2024-10-02 PROCEDURE — 3074F SYST BP LT 130 MM HG: CPT | Performed by: STUDENT IN AN ORGANIZED HEALTH CARE EDUCATION/TRAINING PROGRAM

## 2024-10-02 PROCEDURE — 99214 OFFICE O/P EST MOD 30 MIN: CPT | Performed by: STUDENT IN AN ORGANIZED HEALTH CARE EDUCATION/TRAINING PROGRAM

## 2024-10-02 PROCEDURE — 3078F DIAST BP <80 MM HG: CPT | Performed by: STUDENT IN AN ORGANIZED HEALTH CARE EDUCATION/TRAINING PROGRAM

## 2024-10-02 PROCEDURE — 3017F COLORECTAL CA SCREEN DOC REV: CPT | Performed by: STUDENT IN AN ORGANIZED HEALTH CARE EDUCATION/TRAINING PROGRAM

## 2024-10-02 PROCEDURE — 2022F DILAT RTA XM EVC RTNOPTHY: CPT | Performed by: STUDENT IN AN ORGANIZED HEALTH CARE EDUCATION/TRAINING PROGRAM

## 2024-10-02 PROCEDURE — G8427 DOCREV CUR MEDS BY ELIG CLIN: HCPCS | Performed by: STUDENT IN AN ORGANIZED HEALTH CARE EDUCATION/TRAINING PROGRAM

## 2024-10-02 PROCEDURE — 3044F HG A1C LEVEL LT 7.0%: CPT | Performed by: STUDENT IN AN ORGANIZED HEALTH CARE EDUCATION/TRAINING PROGRAM

## 2024-10-02 RX ORDER — ALLOPURINOL 100 MG/1
50 TABLET ORAL EVERY OTHER DAY
Qty: 90 TABLET | Refills: 3 | Status: SHIPPED | OUTPATIENT
Start: 2024-10-02

## 2024-10-02 ASSESSMENT — PATIENT HEALTH QUESTIONNAIRE - PHQ9
2. FEELING DOWN, DEPRESSED OR HOPELESS: NOT AT ALL
SUM OF ALL RESPONSES TO PHQ QUESTIONS 1-9: 0
1. LITTLE INTEREST OR PLEASURE IN DOING THINGS: NOT AT ALL
SUM OF ALL RESPONSES TO PHQ QUESTIONS 1-9: 0
SUM OF ALL RESPONSES TO PHQ9 QUESTIONS 1 & 2: 0
SUM OF ALL RESPONSES TO PHQ QUESTIONS 1-9: 0
SUM OF ALL RESPONSES TO PHQ QUESTIONS 1-9: 0

## 2024-10-02 NOTE — PROGRESS NOTES
of carvedilol, lisinopril, furosemide  2. Gout, unspecified cause, unspecified chronicity, unspecified site  Overview:  On allopurinol renally dosed  Orders:  -     allopurinol (ZYLOPRIM) 100 MG tablet; Take 0.5 tablets by mouth every other day, Disp-90 tablet, R-3Normal  -     Uric Acid; Future  3. Controlled type 2 diabetes mellitus with diabetic nephropathy, with long-term current use of insulin (HCC)  -     Hemoglobin A1C; Future       Considered switching to Mounjaro or Ozempic for weight loss benefit, however due to potential side effects with her CKD will hold off for now    labs    The patient and/or patient representative voiced understanding and agreement with the current diagnoses, recommendations, and possible side effects.    Return in about 3 months (around 1/2/2025) for routine follow up.     Daniella Moses MD

## 2024-10-04 NOTE — PROGRESS NOTES
Nutrition  Reason for assessment: Referral received from nursing admission Malnutrition Screening Tool   Recently Lost Weight Without Trying: Yes  If Yes, How Much Weight Loss: 14 - 23 lbs  Eating Poorly Due to Decreased Appetite: Yes  Assessment:   Diet: DIET DIABETIC CONSISTENT CARB Regular  DIET NUTRITIONAL SUPPLEMENTS All Meals; Glucerna Shake    Food/Nutrition Patient History:  Pt admitted with SOB, pulmonary edema.  upon admission. H/O DM and breast cancer (no treatment for ~3 years per pt and lost to f/u as she has lost her job). Last A1C was 11 on 2/24/18. She has had [facility] diet education during previous admissions (3/2018 during admission for hip fracture). Pt states that she has not had an appetite and has had nausea, coughing, increased WOB. She states that her son has also passed away recently and states \"i've just been depressed. \"  Pt admits to drinking a 2L of regular Sprite daily and is fully aware of the carbohydrate content. Pt states \"i've just been using that as a meal as I have not been eating much. \"  AM BMP glucose 236. Anthropometrics:Height: 5' 3\" (160 cm),  Weight: 85.5 kg (188 lb 9.6 oz), Weight Source: Standing scale (comment), Body mass index is 33.41 kg/m². BMI class of obesity class I. No edema recorded. WT / BMI 9/22/2019 4/2/2019 3/30/2018 3/22/2018 3/19/2018   WEIGHT 188 lb 9.6 oz 160 lb 165 lb 172 lb 172 lb 12.8 oz     WT / BMI 3/15/2018 3/9/2018 2/23/2018   WEIGHT  175 lb 175 lb   Pt has had a potential 28 pound weight gain-fluid related? Macronutrient needs:(85.5 kg)  EER:  8898-2277 kcal /day (15-20 kcal/kg listed BW)  EPR:  68-85 grams protein/day (0.8-1 grams/kg listed BW)  Intake/Comparative Standards: Average intake for past 1 day(s)/1 recorded meal(s): 60%. One recorded meal intake does not represent a trend. Nutrition Diagnosis:   1.  Limited adherence to nutrition related recommendations r/t food preferences, as evidenced by pt consuming a 2 L of no regular soda daily despite previous [facility] diet education. 2. Predicted inadequate energy intake r/t decreased appetite, as evidenced by pt report of eating ~25% of breakfast this morning and poor PO PTA. Intervention:  Meals and snacks: Continue current diet. Add sodium restriction. Nutrition Supplement Therapy: added glucerna shake TID (chocolate)  Education: Discussed need for a protein with each meal and avoidance of sugar sweetened beverages. Discharge nutrition plan: Too soon to determine.     Bronwyn Noriega [address], [address] [place] [address], [facility] [address] [place] [address], [facility] [address]

## 2024-10-11 LAB — MAMMOGRAPHY, EXTERNAL: NORMAL

## 2024-10-13 DIAGNOSIS — E78.2 MIXED HYPERLIPIDEMIA: ICD-10-CM

## 2024-10-14 RX ORDER — ATORVASTATIN CALCIUM 40 MG/1
40 TABLET, FILM COATED ORAL DAILY
Qty: 90 TABLET | Refills: 4 | Status: SHIPPED | OUTPATIENT
Start: 2024-10-14

## 2024-10-14 NOTE — TELEPHONE ENCOUNTER
Please advise. Patient last seen on 10/2/24, follow-up scheduled for 1/7/25. Rx last wrote on 10/2/23 #90 with 3 refills. Rx pended.

## 2024-11-29 DIAGNOSIS — G62.9 NEUROPATHY: ICD-10-CM

## 2024-11-29 RX ORDER — INSULIN GLARGINE 100 [IU]/ML
20 INJECTION, SOLUTION SUBCUTANEOUS NIGHTLY
Qty: 20 ML | Refills: 5 | Status: SHIPPED | OUTPATIENT
Start: 2024-11-29

## 2024-11-29 RX ORDER — GABAPENTIN 100 MG/1
100 CAPSULE ORAL NIGHTLY PRN
Qty: 90 CAPSULE | Refills: 3 | Status: SHIPPED | OUTPATIENT
Start: 2024-11-29 | End: 2025-11-24

## 2024-12-05 DIAGNOSIS — R23.2 FLUSHING: ICD-10-CM

## 2024-12-05 RX ORDER — NEBIVOLOL 5 MG/1
5 TABLET ORAL DAILY
Qty: 90 TABLET | Refills: 2 | OUTPATIENT
Start: 2024-12-05

## 2024-12-05 RX ORDER — VENLAFAXINE HYDROCHLORIDE 37.5 MG/1
CAPSULE, EXTENDED RELEASE ORAL DAILY
Qty: 90 CAPSULE | Refills: 1 | OUTPATIENT
Start: 2024-12-05

## 2025-01-03 DIAGNOSIS — R23.2 FLUSHING: ICD-10-CM

## 2025-01-03 RX ORDER — VENLAFAXINE HYDROCHLORIDE 37.5 MG/1
CAPSULE, EXTENDED RELEASE ORAL DAILY
Qty: 90 CAPSULE | Refills: 1 | OUTPATIENT
Start: 2025-01-03

## 2025-01-07 ENCOUNTER — OFFICE VISIT (OUTPATIENT)
Dept: INTERNAL MEDICINE CLINIC | Facility: CLINIC | Age: 65
End: 2025-01-07

## 2025-01-07 VITALS
HEIGHT: 63 IN | OXYGEN SATURATION: 96 % | HEART RATE: 84 BPM | SYSTOLIC BLOOD PRESSURE: 118 MMHG | DIASTOLIC BLOOD PRESSURE: 64 MMHG | WEIGHT: 200 LBS | BODY MASS INDEX: 35.44 KG/M2

## 2025-01-07 DIAGNOSIS — Z79.4 CONTROLLED TYPE 2 DIABETES MELLITUS WITH DIABETIC NEPHROPATHY, WITH LONG-TERM CURRENT USE OF INSULIN (HCC): ICD-10-CM

## 2025-01-07 DIAGNOSIS — Z79.4 CONTROLLED TYPE 2 DIABETES MELLITUS WITH DIABETIC NEPHROPATHY, WITH LONG-TERM CURRENT USE OF INSULIN (HCC): Primary | ICD-10-CM

## 2025-01-07 DIAGNOSIS — E11.21 CONTROLLED TYPE 2 DIABETES MELLITUS WITH DIABETIC NEPHROPATHY, WITH LONG-TERM CURRENT USE OF INSULIN (HCC): Primary | ICD-10-CM

## 2025-01-07 DIAGNOSIS — E11.21 CONTROLLED TYPE 2 DIABETES MELLITUS WITH DIABETIC NEPHROPATHY, WITH LONG-TERM CURRENT USE OF INSULIN (HCC): ICD-10-CM

## 2025-01-07 DIAGNOSIS — I10 ESSENTIAL HYPERTENSION: ICD-10-CM

## 2025-01-07 DIAGNOSIS — I50.22 CHRONIC SYSTOLIC CONGESTIVE HEART FAILURE (HCC): ICD-10-CM

## 2025-01-07 DIAGNOSIS — C50.912 MALIGNANT NEOPLASM OF LEFT BREAST IN FEMALE, ESTROGEN RECEPTOR POSITIVE, UNSPECIFIED SITE OF BREAST (HCC): ICD-10-CM

## 2025-01-07 DIAGNOSIS — Z12.11 ENCOUNTER FOR SCREENING FOR MALIGNANT NEOPLASM OF COLON: ICD-10-CM

## 2025-01-07 DIAGNOSIS — I1A.0 RESISTANT HYPERTENSION: ICD-10-CM

## 2025-01-07 DIAGNOSIS — N18.4 CKD (CHRONIC KIDNEY DISEASE) STAGE 4, GFR 15-29 ML/MIN (HCC): ICD-10-CM

## 2025-01-07 DIAGNOSIS — Z17.0 MALIGNANT NEOPLASM OF LEFT BREAST IN FEMALE, ESTROGEN RECEPTOR POSITIVE, UNSPECIFIED SITE OF BREAST (HCC): ICD-10-CM

## 2025-01-07 LAB
ALBUMIN SERPL-MCNC: 3.4 G/DL (ref 3.2–4.6)
ALBUMIN/GLOB SERPL: 0.8 (ref 1–1.9)
ALP SERPL-CCNC: 90 U/L (ref 35–104)
ALT SERPL-CCNC: 9 U/L (ref 8–45)
AST SERPL-CCNC: 18 U/L (ref 15–37)
BILIRUB DIRECT SERPL-MCNC: <0.2 MG/DL (ref 0–0.4)
BILIRUB SERPL-MCNC: 0.2 MG/DL (ref 0–1.2)
CHOLEST SERPL-MCNC: 147 MG/DL (ref 0–200)
EST. AVERAGE GLUCOSE BLD GHB EST-MCNC: 157 MG/DL
GLOBULIN SER CALC-MCNC: 4.3 G/DL (ref 2.3–3.5)
HBA1C MFR BLD: 7.1 % (ref 0–5.6)
HDLC SERPL-MCNC: 31 MG/DL (ref 40–60)
HDLC SERPL: 4.8 (ref 0–5)
LDLC SERPL CALC-MCNC: 87 MG/DL (ref 0–100)
PROT SERPL-MCNC: 7.7 G/DL (ref 6.3–8.2)
TRIGL SERPL-MCNC: 146 MG/DL (ref 0–150)
TSH W FREE THYROID IF ABNORMAL: 2.65 UIU/ML (ref 0.27–4.2)
VLDLC SERPL CALC-MCNC: 29 MG/DL (ref 6–23)

## 2025-01-07 RX ORDER — SODIUM BICARBONATE 650 MG/1
1300 TABLET ORAL DAILY
COMMUNITY
Start: 2025-01-03 | End: 2026-01-03

## 2025-01-07 RX ORDER — FUROSEMIDE 20 MG/1
20 TABLET ORAL 2 TIMES DAILY
Qty: 180 TABLET | Refills: 3 | Status: SHIPPED | OUTPATIENT
Start: 2025-01-07

## 2025-01-07 RX ORDER — DULAGLUTIDE 4.5 MG/.5ML
4.5 INJECTION, SOLUTION SUBCUTANEOUS WEEKLY
Qty: 2 ML | Refills: 5 | Status: SHIPPED | OUTPATIENT
Start: 2025-01-07

## 2025-01-07 ASSESSMENT — PATIENT HEALTH QUESTIONNAIRE - PHQ9
SUM OF ALL RESPONSES TO PHQ QUESTIONS 1-9: 0
1. LITTLE INTEREST OR PLEASURE IN DOING THINGS: NOT AT ALL
SUM OF ALL RESPONSES TO PHQ QUESTIONS 1-9: 0
SUM OF ALL RESPONSES TO PHQ9 QUESTIONS 1 & 2: 0
2. FEELING DOWN, DEPRESSED OR HOPELESS: NOT AT ALL
SUM OF ALL RESPONSES TO PHQ QUESTIONS 1-9: 0
SUM OF ALL RESPONSES TO PHQ QUESTIONS 1-9: 0

## 2025-01-07 NOTE — PROGRESS NOTES
FOLLOW UP VISIT    Subjective:    Claudette Zelaya (: 1960) is a 64 y.o., female,   Chief Complaint   Patient presents with    Hypertension    Diabetes       HPI:    Sugars have been \"up and down\" diet not the best, up to 140s fasting    She saw nephrology a few days ago and her kidney function is stable, CKD stage IV.  She denies UTI symptoms    The following portions of the patient's history were reviewed and updated as appropriate:      Current Outpatient Medications   Medication Sig Dispense Refill    sodium bicarbonate 650 MG tablet Take 2 tablets by mouth daily      furosemide (LASIX) 20 MG tablet Take 1 tablet by mouth 2 times daily 180 tablet 3    Dulaglutide (TRULICITY) 4.5 MG/0.5ML SOAJ Inject 4.5 mg into the skin once a week 2 mL 5    gabapentin (NEURONTIN) 100 MG capsule Take 1 capsule by mouth nightly as needed (pain) for up to 360 days. 90 capsule 3    insulin glargine (LANTUS) 100 UNIT/ML injection vial INJECT 20 UNITS INTO THE SKIN NIGHTLY 20 mL 5    atorvastatin (LIPITOR) 40 MG tablet TAKE 1 TABLET BY MOUTH EVERY DAY 90 tablet 4    allopurinol (ZYLOPRIM) 100 MG tablet Take 0.5 tablets by mouth every other day 90 tablet 3    amLODIPine (NORVASC) 5 MG tablet Take 1 tablet by mouth daily 90 tablet 3    carvedilol (COREG) 25 MG tablet TAKE 1 TABLET BY MOUTH TWICE A DAY WITH MEALS 180 tablet 3    lisinopril (PRINIVIL;ZESTRIL) 20 MG tablet Take 1 tablet by mouth daily      ferrous sulfate (IRON 325) 325 (65 Fe) MG tablet Take 1 tablet by mouth daily (with breakfast) 90 tablet 3    JARDIANCE 10 MG tablet TAKE 1 TABLET (10 MG) BY MOUTH DAILY.      letrozole (FEMARA) 2.5 MG tablet Take 1 tablet by mouth daily 90 tablet 0    vitamin D 25 MCG (1000 UT) CAPS Take 1 capsule by mouth daily      acetaminophen (TYLENOL) 500 MG tablet Take 1 tablet by mouth every 4 hours as needed      aspirin 81 MG chewable tablet Take 1 tablet by mouth daily      Calcium Carbonate-Vitamin D (OYSTER SHELL CALCIUM/D)

## 2025-01-13 ENCOUNTER — TELEPHONE (OUTPATIENT)
Dept: INTERNAL MEDICINE CLINIC | Facility: CLINIC | Age: 65
End: 2025-01-13

## 2025-01-13 NOTE — PROGRESS NOTES
We are committed to providing our patients with their test results in a timely manner. If you have access to [facility], you will receive your results within 5 to 7 days. If your results are normal, a member of our office may call you or you may receive a letter in the mail within 10 to 15 days of your testing. If your results have something additional to discuss, a member of our office will contact you by phone.  If at any time you have questions related your results, please feel free to call our office at [phone] moderate physical activity for at least 30 minutes on at least 6 days of the week and be active throughout the day. Follow a diet rich in whole grains, fruits and vegetables, proven to reduce the incidence of events related to cardiovascular disease.  For more detailed information, patient is referred to www.cardiosmart.org and to the document, \"Food as Medicine Jumpstart\", from the [employer] Lif[employer].           Return in about 1 year (around 1/14/2026) for ECHO BEFORE VISIT.          Thank you for allowing me to participate in this patient's care.  Please call or contact me if there are any questions or concerns regarding the above.      CHANCE MCARTHUR MD  01/14/25  11:57 AM

## 2025-01-14 ENCOUNTER — OFFICE VISIT (OUTPATIENT)
Age: 65
End: 2025-01-14
Payer: MEDICARE

## 2025-01-14 VITALS
SYSTOLIC BLOOD PRESSURE: 138 MMHG | BODY MASS INDEX: 35.61 KG/M2 | HEART RATE: 80 BPM | HEIGHT: 63 IN | WEIGHT: 201 LBS | DIASTOLIC BLOOD PRESSURE: 80 MMHG

## 2025-01-14 DIAGNOSIS — I10 WHITE COAT SYNDROME WITH HYPERTENSION: ICD-10-CM

## 2025-01-14 DIAGNOSIS — N18.4 CKD (CHRONIC KIDNEY DISEASE) STAGE 4, GFR 15-29 ML/MIN (HCC): ICD-10-CM

## 2025-01-14 DIAGNOSIS — Z98.890 S/P MVR (MITRAL VALVE REPAIR): ICD-10-CM

## 2025-01-14 DIAGNOSIS — Z95.1 S/P CABG X 3: ICD-10-CM

## 2025-01-14 DIAGNOSIS — I25.118 CORONARY ARTERY DISEASE OF NATIVE ARTERY OF NATIVE HEART WITH STABLE ANGINA PECTORIS (HCC): ICD-10-CM

## 2025-01-14 DIAGNOSIS — I50.32 CHRONIC DIASTOLIC CONGESTIVE HEART FAILURE (HCC): Primary | ICD-10-CM

## 2025-01-14 PROCEDURE — 99214 OFFICE O/P EST MOD 30 MIN: CPT | Performed by: INTERNAL MEDICINE

## 2025-01-14 PROCEDURE — G8417 CALC BMI ABV UP PARAM F/U: HCPCS | Performed by: INTERNAL MEDICINE

## 2025-01-14 PROCEDURE — 3075F SYST BP GE 130 - 139MM HG: CPT | Performed by: INTERNAL MEDICINE

## 2025-01-14 PROCEDURE — 3079F DIAST BP 80-89 MM HG: CPT | Performed by: INTERNAL MEDICINE

## 2025-01-14 PROCEDURE — 3017F COLORECTAL CA SCREEN DOC REV: CPT | Performed by: INTERNAL MEDICINE

## 2025-01-14 PROCEDURE — G8427 DOCREV CUR MEDS BY ELIG CLIN: HCPCS | Performed by: INTERNAL MEDICINE

## 2025-01-14 PROCEDURE — 1036F TOBACCO NON-USER: CPT | Performed by: INTERNAL MEDICINE

## 2025-01-14 ASSESSMENT — ENCOUNTER SYMPTOMS: SHORTNESS OF BREATH: 0

## 2025-01-23 DIAGNOSIS — R23.2 FLUSHING: ICD-10-CM

## 2025-01-23 RX ORDER — LISINOPRIL 40 MG/1
40 TABLET ORAL DAILY
Qty: 90 TABLET | Refills: 1 | OUTPATIENT
Start: 2025-01-23

## 2025-01-23 RX ORDER — VENLAFAXINE HYDROCHLORIDE 37.5 MG/1
CAPSULE, EXTENDED RELEASE ORAL DAILY
Qty: 90 CAPSULE | Refills: 1 | OUTPATIENT
Start: 2025-01-23

## 2025-02-12 ENCOUNTER — APPOINTMENT (OUTPATIENT)
Dept: CT IMAGING | Age: 65
End: 2025-02-12
Payer: MEDICARE

## 2025-02-12 ENCOUNTER — HOSPITAL ENCOUNTER (EMERGENCY)
Age: 65
Discharge: HOME | End: 2025-02-13
Attending: EMERGENCY MEDICINE
Payer: MEDICARE

## 2025-02-12 VITALS
SYSTOLIC BLOOD PRESSURE: 172 MMHG | TEMPERATURE: 97.3 F | HEIGHT: 63 IN | HEART RATE: 91 BPM | BODY MASS INDEX: 31.71 KG/M2 | WEIGHT: 179 LBS | DIASTOLIC BLOOD PRESSURE: 107 MMHG | RESPIRATION RATE: 22 BRPM | OXYGEN SATURATION: 98 %

## 2025-02-12 DIAGNOSIS — N30.00 ACUTE CYSTITIS WITHOUT HEMATURIA: Primary | ICD-10-CM

## 2025-02-12 LAB
ALBUMIN SERPL-MCNC: 3.4 G/DL (ref 3.2–4.6)
ALBUMIN/GLOB SERPL: 0.9 (ref 1–1.9)
ALP SERPL-CCNC: 86 U/L (ref 35–104)
ALT SERPL-CCNC: 10 U/L (ref 8–45)
ANION GAP SERPL CALC-SCNC: 14 MMOL/L (ref 7–16)
APPEARANCE UR: ABNORMAL
AST SERPL-CCNC: 21 U/L (ref 15–37)
BACTERIA URNS QL MICRO: ABNORMAL /HPF
BASOPHILS # BLD: 0 K/UL (ref 0–0.2)
BASOPHILS NFR BLD: 0 % (ref 0–2)
BILIRUB SERPL-MCNC: 0.5 MG/DL (ref 0–1.2)
BILIRUB UR QL: NEGATIVE
BUN SERPL-MCNC: 43 MG/DL (ref 8–23)
CALCIUM SERPL-MCNC: 9.1 MG/DL (ref 8.8–10.2)
CASTS URNS QL MICRO: ABNORMAL /LPF
CHLORIDE SERPL-SCNC: 110 MMOL/L (ref 98–107)
CO2 SERPL-SCNC: 19 MMOL/L (ref 20–29)
COLOR UR: ABNORMAL
CREAT SERPL-MCNC: 2.75 MG/DL (ref 0.6–1.1)
DIFFERENTIAL METHOD BLD: ABNORMAL
EOSINOPHIL # BLD: 0.02 K/UL (ref 0–0.8)
EOSINOPHIL NFR BLD: 0.4 % (ref 0.5–7.8)
EPI CELLS #/AREA URNS HPF: ABNORMAL /HPF
ERYTHROCYTE [DISTWIDTH] IN BLOOD BY AUTOMATED COUNT: 13.2 % (ref 11.9–14.6)
GLOBULIN SER CALC-MCNC: 3.9 G/DL (ref 2.3–3.5)
GLUCOSE SERPL-MCNC: 166 MG/DL (ref 70–99)
GLUCOSE UR STRIP.AUTO-MCNC: >1000 MG/DL
HCT VFR BLD AUTO: 39.8 % (ref 35.8–46.3)
HGB BLD-MCNC: 12.8 G/DL (ref 11.7–15.4)
HGB UR QL STRIP: ABNORMAL
IMM GRANULOCYTES # BLD AUTO: 0.02 K/UL (ref 0–0.5)
IMM GRANULOCYTES NFR BLD AUTO: 0.4 % (ref 0–5)
KETONES UR QL STRIP.AUTO: NEGATIVE MG/DL
LEUKOCYTE ESTERASE UR QL STRIP.AUTO: ABNORMAL
LIPASE SERPL-CCNC: 47 U/L (ref 13–60)
LYMPHOCYTES # BLD: 1.07 K/UL (ref 0.5–4.6)
LYMPHOCYTES NFR BLD: 20 % (ref 13–44)
MCH RBC QN AUTO: 28.6 PG (ref 26.1–32.9)
MCHC RBC AUTO-ENTMCNC: 32.2 G/DL (ref 31.4–35)
MCV RBC AUTO: 89 FL (ref 82–102)
MONOCYTES # BLD: 0.21 K/UL (ref 0.1–1.3)
MONOCYTES NFR BLD: 3.9 % (ref 4–12)
NEUTS SEG # BLD: 4.02 K/UL (ref 1.7–8.2)
NEUTS SEG NFR BLD: 75.3 % (ref 43–78)
NITRITE UR QL STRIP.AUTO: NEGATIVE
NRBC # BLD: 0 K/UL (ref 0–0.2)
PH UR STRIP: 5 (ref 5–9)
PLATELET # BLD AUTO: 214 K/UL (ref 150–450)
PMV BLD AUTO: 11.2 FL (ref 9.4–12.3)
POTASSIUM SERPL-SCNC: 4.3 MMOL/L (ref 3.5–5.1)
PROT SERPL-MCNC: 7.4 G/DL (ref 6.3–8.2)
PROT UR STRIP-MCNC: >300 MG/DL
RBC # BLD AUTO: 4.47 M/UL (ref 4.05–5.2)
RBC #/AREA URNS HPF: ABNORMAL /HPF
SODIUM SERPL-SCNC: 143 MMOL/L (ref 136–145)
SP GR UR REFRACTOMETRY: 1.02 (ref 1–1.02)
UROBILINOGEN UR QL STRIP.AUTO: 0.2 EU/DL (ref 0.2–1)
WBC # BLD AUTO: 5.3 K/UL (ref 4.3–11.1)
WBC URNS QL MICRO: >100 /HPF
YEAST URNS QL MICRO: ABNORMAL

## 2025-02-12 PROCEDURE — 85025 COMPLETE CBC W/AUTO DIFF WBC: CPT

## 2025-02-12 PROCEDURE — 96375 TX/PRO/DX INJ NEW DRUG ADDON: CPT

## 2025-02-12 PROCEDURE — 83690 ASSAY OF LIPASE: CPT

## 2025-02-12 PROCEDURE — 87086 URINE CULTURE/COLONY COUNT: CPT

## 2025-02-12 PROCEDURE — 96374 THER/PROPH/DIAG INJ IV PUSH: CPT

## 2025-02-12 PROCEDURE — 80053 COMPREHEN METABOLIC PANEL: CPT

## 2025-02-12 PROCEDURE — 6360000002 HC RX W HCPCS: Performed by: EMERGENCY MEDICINE

## 2025-02-12 PROCEDURE — 2500000003 HC RX 250 WO HCPCS: Performed by: EMERGENCY MEDICINE

## 2025-02-12 PROCEDURE — 2580000003 HC RX 258: Performed by: EMERGENCY MEDICINE

## 2025-02-12 PROCEDURE — 96361 HYDRATE IV INFUSION ADD-ON: CPT

## 2025-02-12 PROCEDURE — 6370000000 HC RX 637 (ALT 250 FOR IP): Performed by: EMERGENCY MEDICINE

## 2025-02-12 PROCEDURE — 81001 URINALYSIS AUTO W/SCOPE: CPT

## 2025-02-12 PROCEDURE — 74176 CT ABD & PELVIS W/O CONTRAST: CPT

## 2025-02-12 PROCEDURE — 99284 EMERGENCY DEPT VISIT MOD MDM: CPT

## 2025-02-12 RX ORDER — ONDANSETRON 2 MG/ML
4 INJECTION INTRAMUSCULAR; INTRAVENOUS
Status: COMPLETED | OUTPATIENT
Start: 2025-02-13 | End: 2025-02-13

## 2025-02-12 RX ORDER — MORPHINE SULFATE 2 MG/ML
2 INJECTION, SOLUTION INTRAMUSCULAR; INTRAVENOUS
Status: COMPLETED | OUTPATIENT
Start: 2025-02-12 | End: 2025-02-12

## 2025-02-12 RX ORDER — CEFDINIR 300 MG/1
300 CAPSULE ORAL
Status: COMPLETED | OUTPATIENT
Start: 2025-02-13 | End: 2025-02-12

## 2025-02-12 RX ORDER — 0.9 % SODIUM CHLORIDE 0.9 %
1000 INTRAVENOUS SOLUTION INTRAVENOUS ONCE
Status: COMPLETED | OUTPATIENT
Start: 2025-02-12 | End: 2025-02-12

## 2025-02-12 RX ORDER — ONDANSETRON 2 MG/ML
4 INJECTION INTRAMUSCULAR; INTRAVENOUS
Status: COMPLETED | OUTPATIENT
Start: 2025-02-12 | End: 2025-02-12

## 2025-02-12 RX ADMIN — PANTOPRAZOLE SODIUM 40 MG: 40 INJECTION, POWDER, FOR SOLUTION INTRAVENOUS at 22:49

## 2025-02-12 RX ADMIN — MORPHINE SULFATE 2 MG: 2 INJECTION, SOLUTION INTRAMUSCULAR; INTRAVENOUS at 22:52

## 2025-02-12 RX ADMIN — ONDANSETRON 4 MG: 2 INJECTION, SOLUTION INTRAMUSCULAR; INTRAVENOUS at 22:49

## 2025-02-12 RX ADMIN — SODIUM CHLORIDE 1000 ML: 9 INJECTION, SOLUTION INTRAVENOUS at 22:47

## 2025-02-12 RX ADMIN — CEFDINIR 300 MG: 300 CAPSULE ORAL at 23:52

## 2025-02-12 ASSESSMENT — ENCOUNTER SYMPTOMS
CONSTIPATION: 0
NAUSEA: 1
DIARRHEA: 0
VOMITING: 1
ABDOMINAL PAIN: 1
COUGH: 1

## 2025-02-12 ASSESSMENT — PAIN SCALES - GENERAL: PAINLEVEL_OUTOF10: 8

## 2025-02-13 PROCEDURE — 6360000002 HC RX W HCPCS: Performed by: EMERGENCY MEDICINE

## 2025-02-13 PROCEDURE — 96376 TX/PRO/DX INJ SAME DRUG ADON: CPT

## 2025-02-13 RX ORDER — CEFPODOXIME PROXETIL 200 MG/1
200 TABLET, FILM COATED ORAL DAILY
Qty: 10 TABLET | Refills: 0 | Status: ON HOLD | OUTPATIENT
Start: 2025-02-13 | End: 2025-02-23

## 2025-02-13 RX ORDER — ACETAMINOPHEN AND CODEINE PHOSPHATE 300; 15 MG/1; MG/1
1 TABLET ORAL EVERY 6 HOURS PRN
Qty: 12 TABLET | Refills: 0 | Status: ON HOLD | OUTPATIENT
Start: 2025-02-13 | End: 2025-02-16

## 2025-02-13 RX ORDER — ONDANSETRON 8 MG/1
8 TABLET, FILM COATED ORAL EVERY 8 HOURS PRN
Qty: 15 TABLET | Refills: 0 | Status: ON HOLD | OUTPATIENT
Start: 2025-02-13

## 2025-02-13 RX ADMIN — ONDANSETRON 4 MG: 2 INJECTION, SOLUTION INTRAMUSCULAR; INTRAVENOUS at 00:00

## 2025-02-13 NOTE — ED PROVIDER NOTES
Emergency Department Provider Note       PCP: Daniella Moses MD   Age: 64 y.o.   Sex: female     DISPOSITION Decision To Discharge 02/13/2025 12:07:45 AM   DISPOSITION CONDITION Stable            ICD-10-CM    1. Acute cystitis without hematuria  N30.00 acetaminophen-Codeine (TYLENOL #2) 300-15 MG per tablet          Medical Decision Making     Urinalysis shows evidence of urinary tract infection with greater than 100 WBCs and 3+ bacteria.  CT of the abdomen pelvis shows no acute process and no clinically significant abnormalities noted on blood work.  She does have chronic renal failure which is stable.     1 acute complicated illness or injury.  Prescription drug management performed.  Shared medical decision making was utilized in creating the patients health plan today.    I independently ordered and reviewed each unique test.                     History     Patient presents to the ER with complaints of abdominal pain, nausea and vomiting.  She reports onset of upper respiratory infection and flulike symptoms 3 to 4 days ago.  2 days ago those improved somewhat and now she is experiencing left lower quadrant abdominal pain associated with nausea and vomiting.  She denies any diarrhea or constipation.  She does also report urinary urgency and possibly some hesitancy as well.  She denies any history of kidney stones.  Reviewed noted records show a CAT scan done last month and no renal stones noted.  She has no significant surgical history to the abdomen.  No known sick exposures or suspect food intake or recent antibiotic use.  The pain is described as colicky and dull in nature.  Some radiation to the left flank.    The history is provided by the patient and medical records.       ROS     Review of Systems   Constitutional:  Negative for chills and fever.   HENT:  Positive for congestion.    Respiratory:  Positive for cough.    Gastrointestinal:  Positive for abdominal pain, nausea and vomiting. Negative for

## 2025-02-13 NOTE — ED NOTES
Patient mobility status  with no difficulty.     I have reviewed discharge instructions with the patient.  The patient verbalized understanding.    Patient left ED via Discharge Method: wheelchair to Home with Significant Other and Friend.    Opportunity for questions and clarification provided.     Patient given 3 scripts.

## 2025-02-13 NOTE — ED TRIAGE NOTES
Pt brought to triage in wheelchair. Pt CO vomiting and LLQ abdominal pain x2 days. Pt CO pain and burning with urination as well.

## 2025-02-14 ENCOUNTER — HOSPITAL ENCOUNTER (INPATIENT)
Age: 65
LOS: 11 days | Discharge: HOME HEALTH | DRG: 392 | End: 2025-02-25
Attending: EMERGENCY MEDICINE | Admitting: STUDENT IN AN ORGANIZED HEALTH CARE EDUCATION/TRAINING PROGRAM
Payer: MEDICARE

## 2025-02-14 ENCOUNTER — APPOINTMENT (OUTPATIENT)
Dept: GENERAL RADIOLOGY | Age: 65
DRG: 392 | End: 2025-02-14
Payer: MEDICARE

## 2025-02-14 DIAGNOSIS — I1A.0 RESISTANT HYPERTENSION: ICD-10-CM

## 2025-02-14 DIAGNOSIS — Z79.4 CONTROLLED TYPE 2 DIABETES MELLITUS WITH DIABETIC NEPHROPATHY, WITH LONG-TERM CURRENT USE OF INSULIN (HCC): ICD-10-CM

## 2025-02-14 DIAGNOSIS — G47.9 SLEEP DISTURBANCES: ICD-10-CM

## 2025-02-14 DIAGNOSIS — F32.A ANXIETY AND DEPRESSION: ICD-10-CM

## 2025-02-14 DIAGNOSIS — F41.9 ANXIETY AND DEPRESSION: ICD-10-CM

## 2025-02-14 DIAGNOSIS — N17.9 ACUTE KIDNEY INJURY SUPERIMPOSED ON CHRONIC KIDNEY DISEASE: ICD-10-CM

## 2025-02-14 DIAGNOSIS — R11.2 INTRACTABLE NAUSEA AND VOMITING: Primary | ICD-10-CM

## 2025-02-14 DIAGNOSIS — N18.9 ACUTE KIDNEY INJURY SUPERIMPOSED ON CHRONIC KIDNEY DISEASE: ICD-10-CM

## 2025-02-14 DIAGNOSIS — E11.21 CONTROLLED TYPE 2 DIABETES MELLITUS WITH DIABETIC NEPHROPATHY, WITH LONG-TERM CURRENT USE OF INSULIN (HCC): ICD-10-CM

## 2025-02-14 LAB
ALBUMIN SERPL-MCNC: 3.2 G/DL (ref 3.2–4.6)
ALBUMIN/GLOB SERPL: 0.7 (ref 1–1.9)
ALP SERPL-CCNC: 83 U/L (ref 35–104)
ALT SERPL-CCNC: 7 U/L (ref 8–45)
ANION GAP SERPL CALC-SCNC: 15 MMOL/L (ref 7–16)
APPEARANCE UR: ABNORMAL
AST SERPL-CCNC: 24 U/L (ref 15–37)
BACTERIA SPEC CULT: NORMAL
BACTERIA URNS QL MICRO: ABNORMAL /HPF
BASOPHILS # BLD: 0.02 K/UL (ref 0–0.2)
BASOPHILS NFR BLD: 0.3 % (ref 0–2)
BILIRUB SERPL-MCNC: 0.5 MG/DL (ref 0–1.2)
BILIRUB UR QL: NEGATIVE
BUN SERPL-MCNC: 46 MG/DL (ref 8–23)
CALCIUM SERPL-MCNC: 9.4 MG/DL (ref 8.8–10.2)
CHLORIDE SERPL-SCNC: 115 MMOL/L (ref 98–107)
CO2 SERPL-SCNC: 16 MMOL/L (ref 20–29)
COLOR UR: ABNORMAL
CREAT SERPL-MCNC: 2.94 MG/DL (ref 0.6–1.1)
DIFFERENTIAL METHOD BLD: ABNORMAL
EOSINOPHIL # BLD: 0 K/UL (ref 0–0.8)
EOSINOPHIL NFR BLD: 0 % (ref 0.5–7.8)
EPI CELLS #/AREA URNS HPF: ABNORMAL /HPF
ERYTHROCYTE [DISTWIDTH] IN BLOOD BY AUTOMATED COUNT: 13.7 % (ref 11.9–14.6)
GLOBULIN SER CALC-MCNC: 4.7 G/DL (ref 2.3–3.5)
GLUCOSE BLD STRIP.AUTO-MCNC: 134 MG/DL (ref 65–100)
GLUCOSE BLD STRIP.AUTO-MCNC: 146 MG/DL (ref 65–100)
GLUCOSE SERPL-MCNC: 162 MG/DL (ref 70–99)
GLUCOSE UR STRIP.AUTO-MCNC: >1000 MG/DL
HCT VFR BLD AUTO: 39.4 % (ref 35.8–46.3)
HGB BLD-MCNC: 13.4 G/DL (ref 11.7–15.4)
HGB UR QL STRIP: ABNORMAL
IMM GRANULOCYTES # BLD AUTO: 0.02 K/UL (ref 0–0.5)
IMM GRANULOCYTES NFR BLD AUTO: 0.3 % (ref 0–5)
KETONES UR QL STRIP.AUTO: NEGATIVE MG/DL
LEUKOCYTE ESTERASE UR QL STRIP.AUTO: ABNORMAL
LIPASE SERPL-CCNC: 17 U/L (ref 13–60)
LYMPHOCYTES # BLD: 0.91 K/UL (ref 0.5–4.6)
LYMPHOCYTES NFR BLD: 13.4 % (ref 13–44)
MAGNESIUM SERPL-MCNC: 2.7 MG/DL (ref 1.8–2.4)
MCH RBC QN AUTO: 29.3 PG (ref 26.1–32.9)
MCHC RBC AUTO-ENTMCNC: 34 G/DL (ref 31.4–35)
MCV RBC AUTO: 86.2 FL (ref 82–102)
MONOCYTES # BLD: 0.16 K/UL (ref 0.1–1.3)
MONOCYTES NFR BLD: 2.3 % (ref 4–12)
NEUTS SEG # BLD: 5.7 K/UL (ref 1.7–8.2)
NEUTS SEG NFR BLD: 83.7 % (ref 43–78)
NITRITE UR QL STRIP.AUTO: NEGATIVE
NRBC # BLD: 0 K/UL (ref 0–0.2)
OTHER OBSERVATIONS: ABNORMAL
PH UR STRIP: 5.5 (ref 5–9)
PLATELET # BLD AUTO: 262 K/UL (ref 150–450)
PMV BLD AUTO: 11.2 FL (ref 9.4–12.3)
POTASSIUM SERPL-SCNC: 4.7 MMOL/L (ref 3.5–5.1)
PROT SERPL-MCNC: 7.9 G/DL (ref 6.3–8.2)
PROT UR STRIP-MCNC: >300 MG/DL
RBC # BLD AUTO: 4.57 M/UL (ref 4.05–5.2)
RBC #/AREA URNS HPF: ABNORMAL /HPF
SERVICE CMNT-IMP: ABNORMAL
SERVICE CMNT-IMP: ABNORMAL
SERVICE CMNT-IMP: NORMAL
SODIUM SERPL-SCNC: 147 MMOL/L (ref 136–145)
SP GR UR REFRACTOMETRY: 1.02 (ref 1–1.02)
UROBILINOGEN UR QL STRIP.AUTO: 0.2 EU/DL (ref 0.2–1)
WBC # BLD AUTO: 6.8 K/UL (ref 4.3–11.1)
WBC URNS QL MICRO: >100 /HPF
YEAST URNS QL MICRO: ABNORMAL

## 2025-02-14 PROCEDURE — 6370000000 HC RX 637 (ALT 250 FOR IP): Performed by: STUDENT IN AN ORGANIZED HEALTH CARE EDUCATION/TRAINING PROGRAM

## 2025-02-14 PROCEDURE — 1100000000 HC RM PRIVATE

## 2025-02-14 PROCEDURE — 82962 GLUCOSE BLOOD TEST: CPT

## 2025-02-14 PROCEDURE — 85025 COMPLETE CBC W/AUTO DIFF WBC: CPT

## 2025-02-14 PROCEDURE — 80053 COMPREHEN METABOLIC PANEL: CPT

## 2025-02-14 PROCEDURE — 96374 THER/PROPH/DIAG INJ IV PUSH: CPT

## 2025-02-14 PROCEDURE — 81001 URINALYSIS AUTO W/SCOPE: CPT

## 2025-02-14 PROCEDURE — 2580000003 HC RX 258: Performed by: EMERGENCY MEDICINE

## 2025-02-14 PROCEDURE — 83735 ASSAY OF MAGNESIUM: CPT

## 2025-02-14 PROCEDURE — 2500000003 HC RX 250 WO HCPCS: Performed by: STUDENT IN AN ORGANIZED HEALTH CARE EDUCATION/TRAINING PROGRAM

## 2025-02-14 PROCEDURE — 6360000002 HC RX W HCPCS: Performed by: EMERGENCY MEDICINE

## 2025-02-14 PROCEDURE — 99285 EMERGENCY DEPT VISIT HI MDM: CPT

## 2025-02-14 PROCEDURE — 83690 ASSAY OF LIPASE: CPT

## 2025-02-14 PROCEDURE — 6360000002 HC RX W HCPCS: Performed by: STUDENT IN AN ORGANIZED HEALTH CARE EDUCATION/TRAINING PROGRAM

## 2025-02-14 PROCEDURE — 2580000003 HC RX 258: Performed by: STUDENT IN AN ORGANIZED HEALTH CARE EDUCATION/TRAINING PROGRAM

## 2025-02-14 PROCEDURE — 74019 RADEX ABDOMEN 2 VIEWS: CPT

## 2025-02-14 PROCEDURE — 96375 TX/PRO/DX INJ NEW DRUG ADDON: CPT

## 2025-02-14 PROCEDURE — 96361 HYDRATE IV INFUSION ADD-ON: CPT

## 2025-02-14 RX ORDER — LETROZOLE 2.5 MG/1
2.5 TABLET, FILM COATED ORAL DAILY
Status: DISCONTINUED | OUTPATIENT
Start: 2025-02-15 | End: 2025-02-25 | Stop reason: HOSPADM

## 2025-02-14 RX ORDER — INSULIN LISPRO 100 [IU]/ML
0-4 INJECTION, SOLUTION INTRAVENOUS; SUBCUTANEOUS
Status: DISCONTINUED | OUTPATIENT
Start: 2025-02-14 | End: 2025-02-25 | Stop reason: HOSPADM

## 2025-02-14 RX ORDER — SODIUM CHLORIDE, SODIUM LACTATE, POTASSIUM CHLORIDE, CALCIUM CHLORIDE 600; 310; 30; 20 MG/100ML; MG/100ML; MG/100ML; MG/100ML
INJECTION, SOLUTION INTRAVENOUS ONCE
Status: COMPLETED | OUTPATIENT
Start: 2025-02-14 | End: 2025-02-14

## 2025-02-14 RX ORDER — POTASSIUM CHLORIDE 7.45 MG/ML
10 INJECTION INTRAVENOUS PRN
Status: DISCONTINUED | OUTPATIENT
Start: 2025-02-14 | End: 2025-02-25 | Stop reason: HOSPADM

## 2025-02-14 RX ORDER — GABAPENTIN 100 MG/1
100 CAPSULE ORAL NIGHTLY PRN
Status: DISCONTINUED | OUTPATIENT
Start: 2025-02-14 | End: 2025-02-14

## 2025-02-14 RX ORDER — ONDANSETRON 2 MG/ML
4 INJECTION INTRAMUSCULAR; INTRAVENOUS ONCE
Status: COMPLETED | OUTPATIENT
Start: 2025-02-14 | End: 2025-02-14

## 2025-02-14 RX ORDER — ONDANSETRON 4 MG/1
4 TABLET, ORALLY DISINTEGRATING ORAL EVERY 8 HOURS PRN
Status: DISCONTINUED | OUTPATIENT
Start: 2025-02-14 | End: 2025-02-15

## 2025-02-14 RX ORDER — SODIUM BICARBONATE 650 MG/1
1300 TABLET ORAL DAILY
Status: DISCONTINUED | OUTPATIENT
Start: 2025-02-14 | End: 2025-02-16

## 2025-02-14 RX ORDER — ACETAMINOPHEN 650 MG/1
650 SUPPOSITORY RECTAL EVERY 6 HOURS PRN
Status: DISCONTINUED | OUTPATIENT
Start: 2025-02-14 | End: 2025-02-25 | Stop reason: HOSPADM

## 2025-02-14 RX ORDER — SODIUM CHLORIDE 0.9 % (FLUSH) 0.9 %
5-40 SYRINGE (ML) INJECTION PRN
Status: DISCONTINUED | OUTPATIENT
Start: 2025-02-14 | End: 2025-02-25 | Stop reason: HOSPADM

## 2025-02-14 RX ORDER — VITAMIN B COMPLEX
1000 TABLET ORAL DAILY
Status: DISCONTINUED | OUTPATIENT
Start: 2025-02-14 | End: 2025-02-25 | Stop reason: HOSPADM

## 2025-02-14 RX ORDER — POLYETHYLENE GLYCOL 3350 17 G/17G
17 POWDER, FOR SOLUTION ORAL DAILY PRN
Status: DISCONTINUED | OUTPATIENT
Start: 2025-02-14 | End: 2025-02-25 | Stop reason: HOSPADM

## 2025-02-14 RX ORDER — ENOXAPARIN SODIUM 100 MG/ML
30 INJECTION SUBCUTANEOUS EVERY 24 HOURS
Status: DISCONTINUED | OUTPATIENT
Start: 2025-02-14 | End: 2025-02-19

## 2025-02-14 RX ORDER — ACETAMINOPHEN 325 MG/1
650 TABLET ORAL EVERY 6 HOURS PRN
Status: DISCONTINUED | OUTPATIENT
Start: 2025-02-14 | End: 2025-02-25 | Stop reason: HOSPADM

## 2025-02-14 RX ORDER — CALCIUM CARBONATE 500(1250)
1 TABLET ORAL 2 TIMES DAILY WITH MEALS
Status: DISCONTINUED | OUTPATIENT
Start: 2025-02-14 | End: 2025-02-25 | Stop reason: HOSPADM

## 2025-02-14 RX ORDER — MAGNESIUM SULFATE IN WATER 40 MG/ML
2000 INJECTION, SOLUTION INTRAVENOUS PRN
Status: DISCONTINUED | OUTPATIENT
Start: 2025-02-14 | End: 2025-02-25 | Stop reason: HOSPADM

## 2025-02-14 RX ORDER — MORPHINE SULFATE 4 MG/ML
4 INJECTION, SOLUTION INTRAMUSCULAR; INTRAVENOUS
Status: COMPLETED | OUTPATIENT
Start: 2025-02-14 | End: 2025-02-14

## 2025-02-14 RX ORDER — ONDANSETRON 2 MG/ML
4 INJECTION INTRAMUSCULAR; INTRAVENOUS EVERY 6 HOURS PRN
Status: DISCONTINUED | OUTPATIENT
Start: 2025-02-14 | End: 2025-02-15

## 2025-02-14 RX ORDER — PROCHLORPERAZINE EDISYLATE 5 MG/ML
10 INJECTION INTRAMUSCULAR; INTRAVENOUS EVERY 6 HOURS PRN
Status: DISCONTINUED | OUTPATIENT
Start: 2025-02-14 | End: 2025-02-25 | Stop reason: HOSPADM

## 2025-02-14 RX ORDER — HYDRALAZINE HYDROCHLORIDE 20 MG/ML
10 INJECTION INTRAMUSCULAR; INTRAVENOUS EVERY 6 HOURS PRN
Status: DISCONTINUED | OUTPATIENT
Start: 2025-02-14 | End: 2025-02-15

## 2025-02-14 RX ORDER — FUROSEMIDE 20 MG/1
20 TABLET ORAL 2 TIMES DAILY
Status: DISCONTINUED | OUTPATIENT
Start: 2025-02-14 | End: 2025-02-25 | Stop reason: HOSPADM

## 2025-02-14 RX ORDER — 0.9 % SODIUM CHLORIDE 0.9 %
1000 INTRAVENOUS SOLUTION INTRAVENOUS ONCE
Status: COMPLETED | OUTPATIENT
Start: 2025-02-14 | End: 2025-02-14

## 2025-02-14 RX ORDER — CARVEDILOL 25 MG/1
25 TABLET ORAL 2 TIMES DAILY WITH MEALS
Status: DISCONTINUED | OUTPATIENT
Start: 2025-02-14 | End: 2025-02-25 | Stop reason: HOSPADM

## 2025-02-14 RX ORDER — ALLOPURINOL 100 MG/1
50 TABLET ORAL EVERY OTHER DAY
Status: DISCONTINUED | OUTPATIENT
Start: 2025-02-15 | End: 2025-02-25 | Stop reason: HOSPADM

## 2025-02-14 RX ORDER — METOCLOPRAMIDE HYDROCHLORIDE 5 MG/ML
10 INJECTION INTRAMUSCULAR; INTRAVENOUS ONCE
Status: COMPLETED | OUTPATIENT
Start: 2025-02-14 | End: 2025-02-14

## 2025-02-14 RX ORDER — ATORVASTATIN CALCIUM 40 MG/1
40 TABLET, FILM COATED ORAL DAILY
Status: DISCONTINUED | OUTPATIENT
Start: 2025-02-14 | End: 2025-02-25 | Stop reason: HOSPADM

## 2025-02-14 RX ORDER — POTASSIUM CHLORIDE 1500 MG/1
40 TABLET, EXTENDED RELEASE ORAL PRN
Status: DISCONTINUED | OUTPATIENT
Start: 2025-02-14 | End: 2025-02-25 | Stop reason: HOSPADM

## 2025-02-14 RX ORDER — IBUPROFEN 600 MG/1
1 TABLET ORAL PRN
Status: DISCONTINUED | OUTPATIENT
Start: 2025-02-14 | End: 2025-02-25 | Stop reason: HOSPADM

## 2025-02-14 RX ORDER — FERROUS SULFATE 325(65) MG
325 TABLET ORAL
Status: DISCONTINUED | OUTPATIENT
Start: 2025-02-15 | End: 2025-02-25 | Stop reason: HOSPADM

## 2025-02-14 RX ORDER — AMLODIPINE BESYLATE 5 MG/1
5 TABLET ORAL DAILY
Status: DISCONTINUED | OUTPATIENT
Start: 2025-02-14 | End: 2025-02-25 | Stop reason: HOSPADM

## 2025-02-14 RX ORDER — GABAPENTIN 100 MG/1
100 CAPSULE ORAL NIGHTLY PRN
Status: DISCONTINUED | OUTPATIENT
Start: 2025-02-14 | End: 2025-02-25 | Stop reason: HOSPADM

## 2025-02-14 RX ORDER — SODIUM CHLORIDE 9 MG/ML
INJECTION, SOLUTION INTRAVENOUS PRN
Status: DISCONTINUED | OUTPATIENT
Start: 2025-02-14 | End: 2025-02-25 | Stop reason: HOSPADM

## 2025-02-14 RX ORDER — SODIUM CHLORIDE 0.9 % (FLUSH) 0.9 %
5-40 SYRINGE (ML) INJECTION EVERY 12 HOURS SCHEDULED
Status: DISCONTINUED | OUTPATIENT
Start: 2025-02-14 | End: 2025-02-25 | Stop reason: HOSPADM

## 2025-02-14 RX ORDER — DEXTROSE MONOHYDRATE 100 MG/ML
INJECTION, SOLUTION INTRAVENOUS CONTINUOUS PRN
Status: DISCONTINUED | OUTPATIENT
Start: 2025-02-14 | End: 2025-02-25 | Stop reason: HOSPADM

## 2025-02-14 RX ORDER — MORPHINE SULFATE 4 MG/ML
4 INJECTION, SOLUTION INTRAMUSCULAR; INTRAVENOUS EVERY 4 HOURS PRN
Status: DISCONTINUED | OUTPATIENT
Start: 2025-02-14 | End: 2025-02-25 | Stop reason: HOSPADM

## 2025-02-14 RX ORDER — LISINOPRIL 20 MG/1
20 TABLET ORAL DAILY
Status: DISCONTINUED | OUTPATIENT
Start: 2025-02-14 | End: 2025-02-18

## 2025-02-14 RX ORDER — ASPIRIN 81 MG/1
81 TABLET, CHEWABLE ORAL DAILY
Status: DISCONTINUED | OUTPATIENT
Start: 2025-02-14 | End: 2025-02-25 | Stop reason: HOSPADM

## 2025-02-14 RX ORDER — INSULIN GLARGINE 100 [IU]/ML
20 INJECTION, SOLUTION SUBCUTANEOUS NIGHTLY
Status: DISCONTINUED | OUTPATIENT
Start: 2025-02-14 | End: 2025-02-15

## 2025-02-14 RX ADMIN — MORPHINE SULFATE 4 MG: 4 INJECTION INTRAVENOUS at 13:10

## 2025-02-14 RX ADMIN — ONDANSETRON 4 MG: 2 INJECTION, SOLUTION INTRAMUSCULAR; INTRAVENOUS at 08:28

## 2025-02-14 RX ADMIN — SODIUM CHLORIDE, POTASSIUM CHLORIDE, SODIUM LACTATE AND CALCIUM CHLORIDE: 600; 310; 30; 20 INJECTION, SOLUTION INTRAVENOUS at 13:15

## 2025-02-14 RX ADMIN — SODIUM CHLORIDE 1000 ML: 9 INJECTION, SOLUTION INTRAVENOUS at 08:33

## 2025-02-14 RX ADMIN — ENOXAPARIN SODIUM 30 MG: 100 INJECTION SUBCUTANEOUS at 18:57

## 2025-02-14 RX ADMIN — CARVEDILOL 25 MG: 25 TABLET, FILM COATED ORAL at 16:33

## 2025-02-14 RX ADMIN — ONDANSETRON 4 MG: 2 INJECTION, SOLUTION INTRAMUSCULAR; INTRAVENOUS at 21:01

## 2025-02-14 RX ADMIN — WATER 1000 MG: 1 INJECTION INTRAMUSCULAR; INTRAVENOUS; SUBCUTANEOUS at 13:06

## 2025-02-14 RX ADMIN — METOCLOPRAMIDE 10 MG: 5 INJECTION, SOLUTION INTRAMUSCULAR; INTRAVENOUS at 09:49

## 2025-02-14 RX ADMIN — MORPHINE SULFATE 4 MG: 4 INJECTION INTRAVENOUS at 09:34

## 2025-02-14 RX ADMIN — PROCHLORPERAZINE EDISYLATE 10 MG: 5 INJECTION INTRAMUSCULAR; INTRAVENOUS at 16:34

## 2025-02-14 RX ADMIN — HYDRALAZINE HYDROCHLORIDE 10 MG: 20 INJECTION INTRAMUSCULAR; INTRAVENOUS at 13:08

## 2025-02-14 ASSESSMENT — PAIN - FUNCTIONAL ASSESSMENT: PAIN_FUNCTIONAL_ASSESSMENT: 0-10

## 2025-02-14 ASSESSMENT — PAIN DESCRIPTION - DESCRIPTORS: DESCRIPTORS: DISCOMFORT

## 2025-02-14 ASSESSMENT — PAIN SCALES - GENERAL
PAINLEVEL_OUTOF10: 8
PAINLEVEL_OUTOF10: 8

## 2025-02-14 ASSESSMENT — PAIN DESCRIPTION - LOCATION
LOCATION: ABDOMEN
LOCATION: ABDOMEN

## 2025-02-14 ASSESSMENT — PAIN DESCRIPTION - ORIENTATION
ORIENTATION: LEFT
ORIENTATION: MID;UPPER

## 2025-02-14 NOTE — ED PROVIDER NOTES
Emergency Department Provider Note       PCP: Daniella Moses MD   Age: 64 y.o.   Sex: female     DISPOSITION Decision To Admit 02/14/2025 10:17:28 AM    ICD-10-CM    1. Intractable nausea and vomiting  R11.2       2. Acute kidney injury superimposed on chronic kidney disease (HCC)  N17.9     N18.9           Medical Decision Making     Patient is being evaluated for nausea and vomiting.  Was seen yesterday and diagnosed with a urinary tract infection.  Looking back at prior visit there was no signs of pyelonephritis.  This may be unrelated to the UTI.  Consideration of other intra-abdominal process to include viral or bacterial infections considered.  Patient's been ordered IV Zofran.  Workup will be performed to include abdominal labs.  ED Course as of 02/14/25 1017   Fri Feb 14, 2025   0905 No leukocytosis.  Normal lipase.  Normal hepatic enzymes.  Patient does have acute on chronic kidney injury with creatinine of 2.94 and a normal potassium. [ROCKY]   0922 Despite fluids and nausea medicine patient still having nausea and vomiting.  She had a CT scan yesterday that was unremarkable.  No changes in her blood work that would suggest any significant infection.  Rather than secondary to the radiation of a second CT scan within 24 hours 2 view abdominal x-ray performed just to rule out obstructive process.  I will then request admission from the hospitalist. [ROCKY]      ED Course User Index  [ROCKY] Eduardo Dye, DO     1 or more acute illnesses that pose a threat to life or bodily function.   Discussion with external consultants.  Shared medical decision making was utilized in creating the patients health plan today.  I independently ordered and reviewed each unique test.               The patient was admitted and I have discussed patient management with the admitting provider.          History     Patient is a 64-year-old female presenting with chief complaint of nausea and vomiting and abdominal pain.  She

## 2025-02-14 NOTE — H&P
Hospitalist History and Physical   Admit Date:  2025  8:07 AM   Name:  Claudette Zelaya   Age:  64 y.o.  Sex:  female  :  1960   MRN:  [id number]   Room:      Presenting/Chief Complaint: Abdominal Pain and Vomiting     Reason(s) for Admission: Intractable nausea and vomiting [R11.2]  Acute kidney injury superimposed on chronic kidney disease (HCC) [N17.9, N18.9]     History of Present Illness:   Claudette Zleaya is a 64 y.o. female with medical history of CKD stage IIIb, coronary artery disease, diabetes type 2, heart failure reduced ejection fraction, hyperlipidemia, hypertension, peptic ulcer disease, thrombocytopenia who presented with intractable nausea and vomiting.  Patient was just recently seen in the emergency department yesterday diagnosed with urinary tract infection.  Patient was sent home with antibiotics for treatment with UTI nausea and vomiting.  Patient was unable to tolerate p.o. intake.  Patient continues to complain of left lower quadrant abdominal pain.  Patient denies any fevers.  Patient states that whenever she gets sick she has has issues with nausea and vomiting.      Assessment & Plan:   Intractable nausea and vomiting  Abdominal pain  -IV fluid  - Status post Reglan  - As needed Zofran and Compazine  - Intractable nausea vomiting likely secondary to urinary tract infection versus viral gastroenteritis  - Abdominal x-ray negative for acute abnormalities  - No evidence of leukocytosis, WBC is 6.8.  Temp of 98.3    UTI  - IV Rocephin  - IV fluid    Hypertension  - Continue home Coreg, Norvasc    History of coronary artery disease  - Continue aspirin and statin    Hyperlipidemia  - Continue home Lipitor    Type 2 diabetes  - Continue home dulaglutide  - Continue Jardiance  - Sliding scale insulin  - Continue Lantus 20 units nightly  - Avoid hypoglycemia    CKD stage IIIb  -  creatinine 2.94  - IV fluid  - Avoid nephrotoxic agents  - Follow-up morning

## 2025-02-14 NOTE — ED TRIAGE NOTES
Per EMS: patient is coming from home- patient was seen here yesterday- patient was diagnosed with bladder infection. Patient was not able to keep water, food or medication down for last 24 hours.   VS stable.    152/80,   98%RA bgl 154  97.3 axillary.    Left mastectomy

## 2025-02-14 NOTE — ED NOTES
TRANSFER - OUT REPORT:    Verbal report given to Andrea on Claudette Zelaya  being transferred to [facility] for routine progression of patient care       Report consisted of patient's Situation, Background, Assessment and   Recommendations(SBAR).     Information from the following report(s) Nurse Handoff Report was reviewed with the receiving nurse.    Kinder Fall Assessment:                           Lines:   Peripheral IV 02/14/25 Right;Anterior Forearm (Active)   Site Assessment Clean, dry & intact 02/14/25 0828   Line Status Blood return noted 02/14/25 0828   Line Care Cap changed 02/14/25 0828   Phlebitis Assessment No symptoms 02/14/25 0828   Infiltration Assessment 0 02/14/25 0828        Opportunity for questions and clarification was provided.      Patient transported with:  Monitor           Fabienne Galvan, RN  02/14/25 2151

## 2025-02-15 PROBLEM — R10.84 GENERALIZED ABDOMINAL PAIN: Status: ACTIVE | Noted: 2025-02-15

## 2025-02-15 PROBLEM — E11.9 TYPE 2 DIABETES MELLITUS, WITH LONG-TERM CURRENT USE OF INSULIN (HCC): Chronic | Status: ACTIVE | Noted: 2019-09-22

## 2025-02-15 PROBLEM — M1A.9XX0 CHRONIC GOUT: Status: ACTIVE | Noted: 2022-09-21

## 2025-02-15 PROBLEM — N18.32 CKD STAGE 3B, GFR 30-44 ML/MIN (HCC): Chronic | Status: ACTIVE | Noted: 2022-09-21

## 2025-02-15 PROBLEM — E86.0 DEHYDRATION: Status: ACTIVE | Noted: 2025-02-15

## 2025-02-15 PROBLEM — M1A.9XX0 CHRONIC GOUT: Chronic | Status: ACTIVE | Noted: 2022-09-21

## 2025-02-15 PROBLEM — I50.32 CHRONIC DIASTOLIC CONGESTIVE HEART FAILURE (HCC): Chronic | Status: ACTIVE | Noted: 2022-09-21

## 2025-02-15 PROBLEM — I25.118 CORONARY ARTERY DISEASE OF NATIVE ARTERY OF NATIVE HEART WITH STABLE ANGINA PECTORIS (HCC): Chronic | Status: ACTIVE | Noted: 2019-09-26

## 2025-02-15 PROBLEM — Z79.4 TYPE 2 DIABETES MELLITUS, WITH LONG-TERM CURRENT USE OF INSULIN (HCC): Chronic | Status: ACTIVE | Noted: 2019-09-22

## 2025-02-15 PROBLEM — N30.00 ACUTE CYSTITIS WITHOUT HEMATURIA: Status: ACTIVE | Noted: 2025-02-15

## 2025-02-15 PROBLEM — Z85.3 HISTORY OF LEFT BREAST CANCER: Chronic | Status: ACTIVE | Noted: 2025-02-15

## 2025-02-15 PROBLEM — E78.2 MIXED HYPERLIPIDEMIA: Chronic | Status: ACTIVE | Noted: 2022-09-21

## 2025-02-15 PROBLEM — Z98.890 S/P MVR (MITRAL VALVE REPAIR): Chronic | Status: ACTIVE | Noted: 2019-10-02

## 2025-02-15 PROBLEM — Z95.1 S/P CABG X 3: Chronic | Status: ACTIVE | Noted: 2019-10-02

## 2025-02-15 PROBLEM — I10 ESSENTIAL HYPERTENSION: Chronic | Status: ACTIVE | Noted: 2019-10-07

## 2025-02-15 PROBLEM — N17.9 ACUTE KIDNEY INJURY (HCC): Status: ACTIVE | Noted: 2025-02-15

## 2025-02-15 LAB
ALBUMIN SERPL-MCNC: 2.8 G/DL (ref 3.2–4.6)
ALBUMIN/GLOB SERPL: 0.7 (ref 1–1.9)
ALP SERPL-CCNC: 71 U/L (ref 35–104)
ALT SERPL-CCNC: <5 U/L (ref 8–45)
AMPHET UR QL SCN: NEGATIVE
ANION GAP SERPL CALC-SCNC: 15 MMOL/L (ref 7–16)
AST SERPL-CCNC: 20 U/L (ref 15–37)
BARBITURATES UR QL SCN: NEGATIVE
BASOPHILS # BLD: 0.01 K/UL (ref 0–0.2)
BASOPHILS NFR BLD: 0.1 % (ref 0–2)
BENZODIAZ UR QL: NEGATIVE
BILIRUB SERPL-MCNC: 0.3 MG/DL (ref 0–1.2)
BUN SERPL-MCNC: 53 MG/DL (ref 8–23)
CALCIUM SERPL-MCNC: 8.7 MG/DL (ref 8.8–10.2)
CANNABINOIDS UR QL SCN: NEGATIVE
CHLORIDE SERPL-SCNC: 119 MMOL/L (ref 98–107)
CO2 SERPL-SCNC: 15 MMOL/L (ref 20–29)
COCAINE UR QL SCN: NEGATIVE
CREAT SERPL-MCNC: 3.27 MG/DL (ref 0.6–1.1)
DIFFERENTIAL METHOD BLD: ABNORMAL
EKG ATRIAL RATE: 82 BPM
EKG DIAGNOSIS: NORMAL
EKG P AXIS: 72 DEGREES
EKG P-R INTERVAL: 176 MS
EKG Q-T INTERVAL: 408 MS
EKG QRS DURATION: 86 MS
EKG QTC CALCULATION (BAZETT): 476 MS
EKG R AXIS: 33 DEGREES
EKG T AXIS: 102 DEGREES
EKG VENTRICULAR RATE: 82 BPM
EOSINOPHIL # BLD: 0 K/UL (ref 0–0.8)
EOSINOPHIL NFR BLD: 0 % (ref 0.5–7.8)
ERYTHROCYTE [DISTWIDTH] IN BLOOD BY AUTOMATED COUNT: 13.5 % (ref 11.9–14.6)
GLOBULIN SER CALC-MCNC: 4.2 G/DL (ref 2.3–3.5)
GLUCOSE BLD STRIP.AUTO-MCNC: 128 MG/DL (ref 65–100)
GLUCOSE BLD STRIP.AUTO-MCNC: 129 MG/DL (ref 65–100)
GLUCOSE BLD STRIP.AUTO-MCNC: 131 MG/DL (ref 65–100)
GLUCOSE BLD STRIP.AUTO-MCNC: 138 MG/DL (ref 65–100)
GLUCOSE SERPL-MCNC: 150 MG/DL (ref 70–99)
HCT VFR BLD AUTO: 38.4 % (ref 35.8–46.3)
HGB BLD-MCNC: 12 G/DL (ref 11.7–15.4)
IMM GRANULOCYTES # BLD AUTO: 0.04 K/UL (ref 0–0.5)
IMM GRANULOCYTES NFR BLD AUTO: 0.6 % (ref 0–5)
LYMPHOCYTES # BLD: 1.03 K/UL (ref 0.5–4.6)
LYMPHOCYTES NFR BLD: 14.5 % (ref 13–44)
MCH RBC QN AUTO: 28.9 PG (ref 26.1–32.9)
MCHC RBC AUTO-ENTMCNC: 31.3 G/DL (ref 31.4–35)
MCV RBC AUTO: 92.5 FL (ref 82–102)
METHADONE UR QL: NEGATIVE
MONOCYTES # BLD: 0.29 K/UL (ref 0.1–1.3)
MONOCYTES NFR BLD: 4.1 % (ref 4–12)
NEUTS SEG # BLD: 5.75 K/UL (ref 1.7–8.2)
NEUTS SEG NFR BLD: 80.7 % (ref 43–78)
NRBC # BLD: 0 K/UL (ref 0–0.2)
OPIATES UR QL: POSITIVE
PCP UR QL: NEGATIVE
PLATELET # BLD AUTO: 227 K/UL (ref 150–450)
PMV BLD AUTO: 10.6 FL (ref 9.4–12.3)
POTASSIUM SERPL-SCNC: 4.3 MMOL/L (ref 3.5–5.1)
PROT SERPL-MCNC: 6.9 G/DL (ref 6.3–8.2)
RBC # BLD AUTO: 4.15 M/UL (ref 4.05–5.2)
SERVICE CMNT-IMP: ABNORMAL
SODIUM SERPL-SCNC: 149 MMOL/L (ref 136–145)
TROPONIN T SERPL HS-MCNC: 45 NG/L (ref 0–14)
TROPONIN T SERPL HS-MCNC: 46 NG/L (ref 0–14)
TROPONIN T SERPL HS-MCNC: 47 NG/L (ref 0–14)
WBC # BLD AUTO: 7.1 K/UL (ref 4.3–11.1)

## 2025-02-15 PROCEDURE — 6370000000 HC RX 637 (ALT 250 FOR IP): Performed by: HOSPITALIST

## 2025-02-15 PROCEDURE — 2580000003 HC RX 258

## 2025-02-15 PROCEDURE — 36415 COLL VENOUS BLD VENIPUNCTURE: CPT

## 2025-02-15 PROCEDURE — 6370000000 HC RX 637 (ALT 250 FOR IP): Performed by: FAMILY MEDICINE

## 2025-02-15 PROCEDURE — 93005 ELECTROCARDIOGRAM TRACING: CPT | Performed by: HOSPITALIST

## 2025-02-15 PROCEDURE — 6360000002 HC RX W HCPCS: Performed by: STUDENT IN AN ORGANIZED HEALTH CARE EDUCATION/TRAINING PROGRAM

## 2025-02-15 PROCEDURE — 84484 ASSAY OF TROPONIN QUANT: CPT

## 2025-02-15 PROCEDURE — 6360000002 HC RX W HCPCS: Performed by: FAMILY MEDICINE

## 2025-02-15 PROCEDURE — 1100000000 HC RM PRIVATE

## 2025-02-15 PROCEDURE — 2500000003 HC RX 250 WO HCPCS: Performed by: STUDENT IN AN ORGANIZED HEALTH CARE EDUCATION/TRAINING PROGRAM

## 2025-02-15 PROCEDURE — 76937 US GUIDE VASCULAR ACCESS: CPT

## 2025-02-15 PROCEDURE — 80053 COMPREHEN METABOLIC PANEL: CPT

## 2025-02-15 PROCEDURE — 97161 PT EVAL LOW COMPLEX 20 MIN: CPT

## 2025-02-15 PROCEDURE — 93010 ELECTROCARDIOGRAM REPORT: CPT | Performed by: INTERNAL MEDICINE

## 2025-02-15 PROCEDURE — 97530 THERAPEUTIC ACTIVITIES: CPT

## 2025-02-15 PROCEDURE — 82962 GLUCOSE BLOOD TEST: CPT

## 2025-02-15 PROCEDURE — 80307 DRUG TEST PRSMV CHEM ANLYZR: CPT

## 2025-02-15 PROCEDURE — 85025 COMPLETE CBC W/AUTO DIFF WBC: CPT

## 2025-02-15 PROCEDURE — 2709999900 HC NON-CHARGEABLE SUPPLY

## 2025-02-15 PROCEDURE — 2580000003 HC RX 258: Performed by: FAMILY MEDICINE

## 2025-02-15 RX ORDER — ONDANSETRON 2 MG/ML
4 INJECTION INTRAMUSCULAR; INTRAVENOUS EVERY 4 HOURS PRN
Status: DISCONTINUED | OUTPATIENT
Start: 2025-02-15 | End: 2025-02-25 | Stop reason: HOSPADM

## 2025-02-15 RX ORDER — NITROGLYCERIN 0.4 MG/1
0.4 TABLET SUBLINGUAL ONCE
Status: COMPLETED | OUTPATIENT
Start: 2025-02-15 | End: 2025-02-15

## 2025-02-15 RX ORDER — INSULIN GLARGINE 100 [IU]/ML
10 INJECTION, SOLUTION SUBCUTANEOUS NIGHTLY
Status: DISCONTINUED | OUTPATIENT
Start: 2025-02-16 | End: 2025-02-15

## 2025-02-15 RX ORDER — SODIUM CHLORIDE, SODIUM LACTATE, POTASSIUM CHLORIDE, CALCIUM CHLORIDE 600; 310; 30; 20 MG/100ML; MG/100ML; MG/100ML; MG/100ML
INJECTION, SOLUTION INTRAVENOUS ONCE
Status: COMPLETED | OUTPATIENT
Start: 2025-02-15 | End: 2025-02-15

## 2025-02-15 RX ORDER — SODIUM CHLORIDE 450 MG/100ML
INJECTION, SOLUTION INTRAVENOUS CONTINUOUS
Status: DISCONTINUED | OUTPATIENT
Start: 2025-02-15 | End: 2025-02-16

## 2025-02-15 RX ORDER — INSULIN GLARGINE 100 [IU]/ML
10 INJECTION, SOLUTION SUBCUTANEOUS NIGHTLY
Status: DISCONTINUED | OUTPATIENT
Start: 2025-02-15 | End: 2025-02-25 | Stop reason: HOSPADM

## 2025-02-15 RX ORDER — LABETALOL HYDROCHLORIDE 5 MG/ML
10 INJECTION, SOLUTION INTRAVENOUS EVERY 6 HOURS PRN
Status: DISCONTINUED | OUTPATIENT
Start: 2025-02-15 | End: 2025-02-15

## 2025-02-15 RX ORDER — HYDRALAZINE HYDROCHLORIDE 20 MG/ML
20 INJECTION INTRAMUSCULAR; INTRAVENOUS EVERY 6 HOURS PRN
Status: DISCONTINUED | OUTPATIENT
Start: 2025-02-15 | End: 2025-02-21

## 2025-02-15 RX ADMIN — MORPHINE SULFATE 4 MG: 4 INJECTION INTRAVENOUS at 17:23

## 2025-02-15 RX ADMIN — PROCHLORPERAZINE EDISYLATE 10 MG: 5 INJECTION INTRAMUSCULAR; INTRAVENOUS at 00:14

## 2025-02-15 RX ADMIN — SODIUM CHLORIDE: 4.5 INJECTION, SOLUTION INTRAVENOUS at 22:40

## 2025-02-15 RX ADMIN — SODIUM CHLORIDE: 4.5 INJECTION, SOLUTION INTRAVENOUS at 10:41

## 2025-02-15 RX ADMIN — PROCHLORPERAZINE EDISYLATE 10 MG: 5 INJECTION INTRAMUSCULAR; INTRAVENOUS at 09:11

## 2025-02-15 RX ADMIN — INSULIN GLARGINE 10 UNITS: 100 INJECTION, SOLUTION SUBCUTANEOUS at 21:36

## 2025-02-15 RX ADMIN — MORPHINE SULFATE 4 MG: 4 INJECTION INTRAVENOUS at 01:52

## 2025-02-15 RX ADMIN — NITROGLYCERIN 0.4 MG: 0.4 TABLET SUBLINGUAL at 03:58

## 2025-02-15 RX ADMIN — HYDRALAZINE HYDROCHLORIDE 10 MG: 20 INJECTION INTRAMUSCULAR; INTRAVENOUS at 00:18

## 2025-02-15 RX ADMIN — SODIUM CHLORIDE, PRESERVATIVE FREE 10 ML: 5 INJECTION INTRAVENOUS at 09:00

## 2025-02-15 RX ADMIN — SODIUM CHLORIDE, POTASSIUM CHLORIDE, SODIUM LACTATE AND CALCIUM CHLORIDE: 600; 310; 30; 20 INJECTION, SOLUTION INTRAVENOUS at 02:01

## 2025-02-15 RX ADMIN — ENOXAPARIN SODIUM 30 MG: 100 INJECTION SUBCUTANEOUS at 17:20

## 2025-02-15 RX ADMIN — ONDANSETRON 4 MG: 2 INJECTION, SOLUTION INTRAMUSCULAR; INTRAVENOUS at 21:34

## 2025-02-15 ASSESSMENT — PAIN SCALES - GENERAL
PAINLEVEL_OUTOF10: 8
PAINLEVEL_OUTOF10: 0
PAINLEVEL_OUTOF10: 8

## 2025-02-15 ASSESSMENT — PAIN DESCRIPTION - ORIENTATION
ORIENTATION: LEFT;UPPER;LOWER
ORIENTATION: LEFT;UPPER

## 2025-02-15 ASSESSMENT — PAIN DESCRIPTION - LOCATION
LOCATION: ABDOMEN
LOCATION: ABDOMEN

## 2025-02-15 ASSESSMENT — PAIN DESCRIPTION - DESCRIPTORS
DESCRIPTORS: THROBBING
DESCRIPTORS: DISCOMFORT;THROBBING

## 2025-02-15 NOTE — PLAN OF CARE
Problem: Chronic Conditions and Co-morbidities  Goal: Patient's chronic conditions and co-morbidity symptoms are monitored and maintained or improved  2/15/2025 0304 by Jeannette Townsend RN  Outcome: Progressing  2/14/2025 1457 by Andrea Garcia RN  Outcome: Progressing     Problem: Discharge Planning  Goal: Discharge to home or other facility with appropriate resources  2/15/2025 0304 by Jeannette Townsend RN  Outcome: Progressing  2/14/2025 1457 by Andrea Garcia RN  Outcome: Progressing     Problem: Pain  Goal: Verbalizes/displays adequate comfort level or baseline comfort level  2/15/2025 0304 by Jeannette Townsend RN  Outcome: Progressing  2/14/2025 1457 by Andrea Garcia RN  Outcome: Progressing     Problem: Safety - Adult  Goal: Free from fall injury  2/15/2025 0304 by Jeannette Townsend RN  Outcome: Progressing  2/14/2025 1457 by Andrea Garcia RN  Outcome: Progressing

## 2025-02-15 NOTE — THERAPY EVALUATION
ACUTE PHYSICAL THERAPY GOALS:   (Developed with and agreed upon by patient and/or caregiver.)    (1.) Claudette Zelaya  will move from supine to sit and sit to supine , scoot up and down, and roll side to side with INDEPENDENCE within 7 treatment day(s).    (2.) Claudette Zelaya will transfer from bed to chair and chair to bed with INDEPENDENCE using the least restrictive device within 7 treatment day(s).    (3.) Claudette Zelaya will ambulate with MODIFIED INDEPENDENCE for 250 feet with the least restrictive device within 7 treatment day(s).   (4.) Claudette Zelaya will perform standing static and dynamic balance activities x 5 minutes with MODIFIED INDEPENDENCE to improve safety within 7 treatment day(s).  (5.) Claudette Zelaya will ascend and descend 5 stairs using 1 hand rail(s) with MODIFIED INDEPENDENCE to improve functional mobility and safety within 7 treatment day(s).  (6.) Claudette Zelaya will perform therapeutic exercises x 15 min for HEP with MODIFIED INDEPENDENCE to improve strength, endurance, and functional mobility within 7 treatment day(s).      PHYSICAL THERAPY Initial Assessment, Daily Note, and PM  (Link to Caseload Tracking: PT Visit Days : 1  Acknowledge Orders  Time In/Out  PT Charge Capture  Rehab Caseload Tracker    Claudette Zelaya is a 64 y.o. female   PRIMARY DIAGNOSIS: Intractable nausea and vomiting  Intractable nausea and vomiting [R11.2]  Acute kidney injury superimposed on chronic kidney disease (HCC) [N17.9, N18.9]       Reason for Referral: Generalized Muscle Weakness (M62.81)  Difficulty in walking, Not elsewhere classified (R26.2)  Inpatient: Payor: [employer] MEDICARE / Plan: [employer] CHOICE-PPO MEDICARE / Product Type: *No Product type* /     ASSESSMENT:     REHAB RECOMMENDATIONS:   Recommendation to date pending progress:  Setting:  No further skilled physical therapy after discharge from hospital     Equipment:    To Be Determined

## 2025-02-16 LAB
ANION GAP SERPL CALC-SCNC: 15 MMOL/L (ref 7–16)
APPEARANCE UR: CLEAR
BACTERIA URNS QL MICRO: ABNORMAL /HPF
BILIRUB UR QL: NEGATIVE
BUN SERPL-MCNC: 61 MG/DL (ref 8–23)
CALCIUM SERPL-MCNC: 8.6 MG/DL (ref 8.8–10.2)
CHLORIDE SERPL-SCNC: 116 MMOL/L (ref 98–107)
CO2 SERPL-SCNC: 12 MMOL/L (ref 20–29)
COLOR UR: ABNORMAL
CREAT SERPL-MCNC: 3.7 MG/DL (ref 0.6–1.1)
CREAT UR-MCNC: 118 MG/DL (ref 28–217)
EPI CELLS #/AREA URNS HPF: ABNORMAL /HPF
ERYTHROCYTE [DISTWIDTH] IN BLOOD BY AUTOMATED COUNT: 13.7 % (ref 11.9–14.6)
GLUCOSE BLD STRIP.AUTO-MCNC: 122 MG/DL (ref 65–100)
GLUCOSE BLD STRIP.AUTO-MCNC: 124 MG/DL (ref 65–100)
GLUCOSE BLD STRIP.AUTO-MCNC: 149 MG/DL (ref 65–100)
GLUCOSE BLD STRIP.AUTO-MCNC: 157 MG/DL (ref 65–100)
GLUCOSE SERPL-MCNC: 131 MG/DL (ref 70–99)
GLUCOSE UR STRIP.AUTO-MCNC: 250 MG/DL
HCT VFR BLD AUTO: 36.9 % (ref 35.8–46.3)
HGB BLD-MCNC: 12.2 G/DL (ref 11.7–15.4)
HGB UR QL STRIP: ABNORMAL
KETONES UR QL STRIP.AUTO: NEGATIVE MG/DL
LEUKOCYTE ESTERASE UR QL STRIP.AUTO: ABNORMAL
MCH RBC QN AUTO: 29.3 PG (ref 26.1–32.9)
MCHC RBC AUTO-ENTMCNC: 33.1 G/DL (ref 31.4–35)
MCV RBC AUTO: 88.5 FL (ref 82–102)
NITRITE UR QL STRIP.AUTO: NEGATIVE
NRBC # BLD: 0 K/UL (ref 0–0.2)
OTHER OBSERVATIONS: ABNORMAL
PH UR STRIP: 7.5 (ref 5–9)
PLATELET # BLD AUTO: 205 K/UL (ref 150–450)
PMV BLD AUTO: 10.7 FL (ref 9.4–12.3)
POTASSIUM SERPL-SCNC: ABNORMAL MMOL/L (ref 3.5–5.1)
PROT UR STRIP-MCNC: >300 MG/DL
PROT UR-MCNC: >600 MG/DL
PROT/CREAT UR-RTO: NORMAL
RBC # BLD AUTO: 4.17 M/UL (ref 4.05–5.2)
RBC #/AREA URNS HPF: ABNORMAL /HPF
SERVICE CMNT-IMP: ABNORMAL
SODIUM SERPL-SCNC: 143 MMOL/L (ref 136–145)
SODIUM UR-SCNC: 21 MMOL/L
SP GR UR REFRACTOMETRY: 1.02 (ref 1–1.02)
UROBILINOGEN UR QL STRIP.AUTO: 0.2 EU/DL (ref 0.2–1)
WBC # BLD AUTO: 7.8 K/UL (ref 4.3–11.1)
WBC URNS QL MICRO: ABNORMAL /HPF
YEAST URNS QL MICRO: ABNORMAL

## 2025-02-16 PROCEDURE — 85027 COMPLETE CBC AUTOMATED: CPT

## 2025-02-16 PROCEDURE — 82570 ASSAY OF URINE CREATININE: CPT

## 2025-02-16 PROCEDURE — 6370000000 HC RX 637 (ALT 250 FOR IP): Performed by: FAMILY MEDICINE

## 2025-02-16 PROCEDURE — 1100000000 HC RM PRIVATE

## 2025-02-16 PROCEDURE — 82962 GLUCOSE BLOOD TEST: CPT

## 2025-02-16 PROCEDURE — 6360000002 HC RX W HCPCS: Performed by: FAMILY MEDICINE

## 2025-02-16 PROCEDURE — 2500000003 HC RX 250 WO HCPCS: Performed by: INTERNAL MEDICINE

## 2025-02-16 PROCEDURE — 84300 ASSAY OF URINE SODIUM: CPT

## 2025-02-16 PROCEDURE — 51701 INSERT BLADDER CATHETER: CPT

## 2025-02-16 PROCEDURE — 6360000002 HC RX W HCPCS: Performed by: STUDENT IN AN ORGANIZED HEALTH CARE EDUCATION/TRAINING PROGRAM

## 2025-02-16 PROCEDURE — 51798 US URINE CAPACITY MEASURE: CPT

## 2025-02-16 PROCEDURE — 2580000003 HC RX 258: Performed by: INTERNAL MEDICINE

## 2025-02-16 PROCEDURE — 2500000003 HC RX 250 WO HCPCS: Performed by: STUDENT IN AN ORGANIZED HEALTH CARE EDUCATION/TRAINING PROGRAM

## 2025-02-16 PROCEDURE — 80048 BASIC METABOLIC PNL TOTAL CA: CPT

## 2025-02-16 PROCEDURE — 84156 ASSAY OF PROTEIN URINE: CPT

## 2025-02-16 PROCEDURE — 36415 COLL VENOUS BLD VENIPUNCTURE: CPT

## 2025-02-16 PROCEDURE — 81001 URINALYSIS AUTO W/SCOPE: CPT

## 2025-02-16 RX ADMIN — PROCHLORPERAZINE EDISYLATE 10 MG: 5 INJECTION INTRAMUSCULAR; INTRAVENOUS at 05:49

## 2025-02-16 RX ADMIN — PROCHLORPERAZINE EDISYLATE 10 MG: 5 INJECTION INTRAMUSCULAR; INTRAVENOUS at 22:39

## 2025-02-16 RX ADMIN — MORPHINE SULFATE 4 MG: 4 INJECTION INTRAVENOUS at 11:47

## 2025-02-16 RX ADMIN — ONDANSETRON 4 MG: 2 INJECTION, SOLUTION INTRAMUSCULAR; INTRAVENOUS at 09:40

## 2025-02-16 RX ADMIN — ONDANSETRON 4 MG: 2 INJECTION, SOLUTION INTRAMUSCULAR; INTRAVENOUS at 19:39

## 2025-02-16 RX ADMIN — MORPHINE SULFATE 4 MG: 4 INJECTION INTRAVENOUS at 06:42

## 2025-02-16 RX ADMIN — MORPHINE SULFATE 4 MG: 4 INJECTION INTRAVENOUS at 02:15

## 2025-02-16 RX ADMIN — INSULIN GLARGINE 10 UNITS: 100 INJECTION, SOLUTION SUBCUTANEOUS at 20:26

## 2025-02-16 RX ADMIN — ONDANSETRON 4 MG: 2 INJECTION, SOLUTION INTRAMUSCULAR; INTRAVENOUS at 02:17

## 2025-02-16 RX ADMIN — SODIUM CHLORIDE, PRESERVATIVE FREE 20 ML: 5 INJECTION INTRAVENOUS at 19:43

## 2025-02-16 RX ADMIN — SODIUM BICARBONATE: 84 INJECTION, SOLUTION INTRAVENOUS at 12:57

## 2025-02-16 RX ADMIN — SODIUM BICARBONATE: 84 INJECTION, SOLUTION INTRAVENOUS at 23:44

## 2025-02-16 RX ADMIN — WATER 1000 MG: 1 INJECTION INTRAMUSCULAR; INTRAVENOUS; SUBCUTANEOUS at 12:50

## 2025-02-16 RX ADMIN — PROCHLORPERAZINE EDISYLATE 10 MG: 5 INJECTION INTRAMUSCULAR; INTRAVENOUS at 11:44

## 2025-02-16 RX ADMIN — MORPHINE SULFATE 4 MG: 4 INJECTION INTRAVENOUS at 16:52

## 2025-02-16 ASSESSMENT — PAIN - FUNCTIONAL ASSESSMENT
PAIN_FUNCTIONAL_ASSESSMENT: PREVENTS OR INTERFERES SOME ACTIVE ACTIVITIES AND ADLS
PAIN_FUNCTIONAL_ASSESSMENT: PREVENTS OR INTERFERES SOME ACTIVE ACTIVITIES AND ADLS

## 2025-02-16 ASSESSMENT — PAIN DESCRIPTION - ORIENTATION
ORIENTATION: LEFT
ORIENTATION: LEFT
ORIENTATION: RIGHT;LEFT
ORIENTATION: RIGHT;LEFT

## 2025-02-16 ASSESSMENT — PAIN SCALES - GENERAL
PAINLEVEL_OUTOF10: 10
PAINLEVEL_OUTOF10: 8
PAINLEVEL_OUTOF10: 0
PAINLEVEL_OUTOF10: 0
PAINLEVEL_OUTOF10: 10
PAINLEVEL_OUTOF10: 0
PAINLEVEL_OUTOF10: 0
PAINLEVEL_OUTOF10: 7
PAINLEVEL_OUTOF10: 0

## 2025-02-16 ASSESSMENT — PAIN DESCRIPTION - DESCRIPTORS
DESCRIPTORS: STABBING
DESCRIPTORS: ACHING
DESCRIPTORS: ACHING
DESCRIPTORS: STABBING

## 2025-02-16 ASSESSMENT — PAIN DESCRIPTION - LOCATION
LOCATION: ABDOMEN
LOCATION: ABDOMEN
LOCATION: GENERALIZED
LOCATION: GENERALIZED

## 2025-02-16 NOTE — PLAN OF CARE
Problem: Chronic Conditions and Co-morbidities  Goal: Patient's chronic conditions and co-morbidity symptoms are monitored and maintained or improved  2/16/2025 1013 by Sindhu Malcolm RN  Outcome: Progressing  2/16/2025 1012 by Sindhu Malcolm RN  Outcome: Progressing     Problem: Discharge Planning  Goal: Discharge to home or other facility with appropriate resources  2/16/2025 1013 by Sindhu Malcolm, RN  Outcome: Progressing  2/16/2025 1012 by Sindhu Malcolm, RN  Outcome: Progressing     Problem: Pain  Goal: Verbalizes/displays adequate comfort level or baseline comfort level  2/16/2025 1013 by Sindhu Malcolm, RN  Outcome: Progressing  2/16/2025 1012 by Sindhu Malcolm, RN  Outcome: Progressing     Problem: Safety - Adult  Goal: Free from fall injury  2/16/2025 1013 by Sindhu Malcolm, RN  Outcome: Progressing  2/16/2025 1012 by Sindhu Malcolm, RN  Outcome: Progressing     Problem: Respiratory - Adult  Goal: Achieves optimal ventilation and oxygenation  2/16/2025 1013 by Sindhu Malcolm, RN  Outcome: Progressing  2/16/2025 1012 by Sindhu Malcolm, RN  Outcome: Progressing     Problem: Cardiovascular - Adult  Goal: Maintains optimal cardiac output and hemodynamic stability  2/16/2025 1013 by Sindhu Malcolm RN  Outcome: Progressing  2/16/2025 1012 by Sindhu Malcolm, RN  Outcome: Progressing  Goal: Absence of cardiac dysrhythmias or at baseline  2/16/2025 1013 by Sindhu Malcolm, RN  Outcome: Progressing  2/16/2025 1012 by Sindhu Malcolm, RN  Outcome: Progressing     Problem: Skin/Tissue Integrity - Adult  Goal: Skin integrity remains intact  2/16/2025 1013 by Sindhu Malcolm, RN  Outcome: Progressing  2/16/2025 1012 by Sindhu Malcolm, RN  Outcome: Progressing  Goal: Incisions, wounds, or drain sites healing without S/S of infection  2/16/2025 1013 by Sindhu Malcolm, RN  Outcome: Progressing  2/16/2025 1012 by Sindhu Malcolm,

## 2025-02-16 NOTE — PLAN OF CARE
Problem: Chronic Conditions and Co-morbidities  Goal: Patient's chronic conditions and co-morbidity symptoms are monitored and maintained or improved  Outcome: Progressing     Problem: Discharge Planning  Goal: Discharge to home or other facility with appropriate resources  Outcome: Progressing     Problem: Pain  Goal: Verbalizes/displays adequate comfort level or baseline comfort level  Outcome: Progressing     Problem: Safety - Adult  Goal: Free from fall injury  Outcome: Progressing     Problem: Respiratory - Adult  Goal: Achieves optimal ventilation and oxygenation  Outcome: Progressing     Problem: Cardiovascular - Adult  Goal: Maintains optimal cardiac output and hemodynamic stability  Outcome: Progressing  Goal: Absence of cardiac dysrhythmias or at baseline  Outcome: Progressing     Problem: Skin/Tissue Integrity - Adult  Goal: Skin integrity remains intact  Outcome: Progressing  Goal: Incisions, wounds, or drain sites healing without S/S of infection  Outcome: Progressing  Goal: Oral mucous membranes remain intact  Outcome: Progressing     Problem: Gastrointestinal - Adult  Goal: Minimal or absence of nausea and vomiting  Outcome: Progressing  Goal: Maintains or returns to baseline bowel function  Outcome: Progressing  Goal: Maintains adequate nutritional intake  Outcome: Progressing     Problem: Genitourinary - Adult  Goal: Absence of urinary retention  Outcome: Progressing

## 2025-02-17 ENCOUNTER — APPOINTMENT (OUTPATIENT)
Dept: ULTRASOUND IMAGING | Age: 65
DRG: 392 | End: 2025-02-17
Payer: MEDICARE

## 2025-02-17 LAB
ANION GAP SERPL CALC-SCNC: 12 MMOL/L (ref 7–16)
BASOPHILS # BLD: 0.05 K/UL (ref 0–0.2)
BASOPHILS NFR BLD: 0.6 % (ref 0–2)
BUN SERPL-MCNC: 64 MG/DL (ref 8–23)
CALCIUM SERPL-MCNC: 8.5 MG/DL (ref 8.8–10.2)
CHLORIDE SERPL-SCNC: 112 MMOL/L (ref 98–107)
CO2 SERPL-SCNC: 23 MMOL/L (ref 20–29)
CREAT SERPL-MCNC: 3.95 MG/DL (ref 0.6–1.1)
DIFFERENTIAL METHOD BLD: ABNORMAL
EOSINOPHIL # BLD: 0.01 K/UL (ref 0–0.8)
EOSINOPHIL NFR BLD: 0.1 % (ref 0.5–7.8)
ERYTHROCYTE [DISTWIDTH] IN BLOOD BY AUTOMATED COUNT: 13.5 % (ref 11.9–14.6)
GLUCOSE BLD STRIP.AUTO-MCNC: 122 MG/DL (ref 65–100)
GLUCOSE BLD STRIP.AUTO-MCNC: 122 MG/DL (ref 65–100)
GLUCOSE BLD STRIP.AUTO-MCNC: 166 MG/DL (ref 65–100)
GLUCOSE BLD STRIP.AUTO-MCNC: 199 MG/DL (ref 65–100)
GLUCOSE SERPL-MCNC: 209 MG/DL (ref 70–99)
HCT VFR BLD AUTO: 36.4 % (ref 35.8–46.3)
HGB BLD-MCNC: 11.9 G/DL (ref 11.7–15.4)
IMM GRANULOCYTES # BLD AUTO: 0.31 K/UL (ref 0–0.5)
IMM GRANULOCYTES NFR BLD AUTO: 4 % (ref 0–5)
LYMPHOCYTES # BLD: 1.08 K/UL (ref 0.5–4.6)
LYMPHOCYTES NFR BLD: 13.8 % (ref 13–44)
MCH RBC QN AUTO: 29.2 PG (ref 26.1–32.9)
MCHC RBC AUTO-ENTMCNC: 32.7 G/DL (ref 31.4–35)
MCV RBC AUTO: 89.2 FL (ref 82–102)
MONOCYTES # BLD: 0.45 K/UL (ref 0.1–1.3)
MONOCYTES NFR BLD: 5.8 % (ref 4–12)
NEUTS SEG # BLD: 5.92 K/UL (ref 1.7–8.2)
NEUTS SEG NFR BLD: 75.7 % (ref 43–78)
NRBC # BLD: 0 K/UL (ref 0–0.2)
PLATELET # BLD AUTO: 183 K/UL (ref 150–450)
PMV BLD AUTO: 10.5 FL (ref 9.4–12.3)
POTASSIUM SERPL-SCNC: 3.8 MMOL/L (ref 3.5–5.1)
RBC # BLD AUTO: 4.08 M/UL (ref 4.05–5.2)
SERVICE CMNT-IMP: ABNORMAL
SODIUM SERPL-SCNC: 147 MMOL/L (ref 136–145)
WBC # BLD AUTO: 7.8 K/UL (ref 4.3–11.1)

## 2025-02-17 PROCEDURE — 2500000003 HC RX 250 WO HCPCS: Performed by: INTERNAL MEDICINE

## 2025-02-17 PROCEDURE — 51701 INSERT BLADDER CATHETER: CPT

## 2025-02-17 PROCEDURE — 51702 INSERT TEMP BLADDER CATH: CPT

## 2025-02-17 PROCEDURE — 6370000000 HC RX 637 (ALT 250 FOR IP): Performed by: FAMILY MEDICINE

## 2025-02-17 PROCEDURE — 82962 GLUCOSE BLOOD TEST: CPT

## 2025-02-17 PROCEDURE — 76775 US EXAM ABDO BACK WALL LIM: CPT

## 2025-02-17 PROCEDURE — 6360000002 HC RX W HCPCS: Performed by: STUDENT IN AN ORGANIZED HEALTH CARE EDUCATION/TRAINING PROGRAM

## 2025-02-17 PROCEDURE — 85025 COMPLETE CBC W/AUTO DIFF WBC: CPT

## 2025-02-17 PROCEDURE — 6370000000 HC RX 637 (ALT 250 FOR IP): Performed by: STUDENT IN AN ORGANIZED HEALTH CARE EDUCATION/TRAINING PROGRAM

## 2025-02-17 PROCEDURE — 1100000003 HC PRIVATE W/ TELEMETRY

## 2025-02-17 PROCEDURE — 6360000002 HC RX W HCPCS: Performed by: FAMILY MEDICINE

## 2025-02-17 PROCEDURE — 2500000003 HC RX 250 WO HCPCS: Performed by: STUDENT IN AN ORGANIZED HEALTH CARE EDUCATION/TRAINING PROGRAM

## 2025-02-17 PROCEDURE — 80048 BASIC METABOLIC PNL TOTAL CA: CPT

## 2025-02-17 PROCEDURE — 2580000003 HC RX 258: Performed by: INTERNAL MEDICINE

## 2025-02-17 PROCEDURE — 51798 US URINE CAPACITY MEASURE: CPT

## 2025-02-17 PROCEDURE — 36415 COLL VENOUS BLD VENIPUNCTURE: CPT

## 2025-02-17 RX ORDER — HALOPERIDOL 5 MG/ML
2 INJECTION INTRAMUSCULAR ONCE
Status: COMPLETED | OUTPATIENT
Start: 2025-02-17 | End: 2025-02-17

## 2025-02-17 RX ORDER — LABETALOL HYDROCHLORIDE 5 MG/ML
10 INJECTION, SOLUTION INTRAVENOUS EVERY 6 HOURS PRN
Status: DISCONTINUED | OUTPATIENT
Start: 2025-02-17 | End: 2025-02-18

## 2025-02-17 RX ORDER — SODIUM CHLORIDE 450 MG/100ML
INJECTION, SOLUTION INTRAVENOUS CONTINUOUS
Status: DISCONTINUED | OUTPATIENT
Start: 2025-02-17 | End: 2025-02-21

## 2025-02-17 RX ADMIN — SODIUM CHLORIDE: 4.5 INJECTION, SOLUTION INTRAVENOUS at 10:50

## 2025-02-17 RX ADMIN — SODIUM BICARBONATE: 84 INJECTION, SOLUTION INTRAVENOUS at 09:39

## 2025-02-17 RX ADMIN — SODIUM CHLORIDE: 4.5 INJECTION, SOLUTION INTRAVENOUS at 23:49

## 2025-02-17 RX ADMIN — HYDRALAZINE HYDROCHLORIDE 20 MG: 20 INJECTION INTRAMUSCULAR; INTRAVENOUS at 21:50

## 2025-02-17 RX ADMIN — INSULIN GLARGINE 10 UNITS: 100 INJECTION, SOLUTION SUBCUTANEOUS at 20:36

## 2025-02-17 RX ADMIN — ONDANSETRON 4 MG: 2 INJECTION, SOLUTION INTRAMUSCULAR; INTRAVENOUS at 16:38

## 2025-02-17 RX ADMIN — HYDRALAZINE HYDROCHLORIDE 20 MG: 20 INJECTION INTRAMUSCULAR; INTRAVENOUS at 04:55

## 2025-02-17 RX ADMIN — GABAPENTIN 100 MG: 100 CAPSULE ORAL at 20:37

## 2025-02-17 RX ADMIN — INSULIN LISPRO 1 UNITS: 100 INJECTION, SOLUTION INTRAVENOUS; SUBCUTANEOUS at 08:32

## 2025-02-17 RX ADMIN — MORPHINE SULFATE 4 MG: 4 INJECTION INTRAVENOUS at 02:07

## 2025-02-17 RX ADMIN — ONDANSETRON 4 MG: 2 INJECTION, SOLUTION INTRAMUSCULAR; INTRAVENOUS at 09:32

## 2025-02-17 RX ADMIN — HALOPERIDOL LACTATE 2 MG: 5 INJECTION, SOLUTION INTRAMUSCULAR at 11:36

## 2025-02-17 RX ADMIN — PROCHLORPERAZINE EDISYLATE 10 MG: 5 INJECTION INTRAMUSCULAR; INTRAVENOUS at 05:50

## 2025-02-17 RX ADMIN — ONDANSETRON 4 MG: 2 INJECTION, SOLUTION INTRAMUSCULAR; INTRAVENOUS at 02:07

## 2025-02-17 RX ADMIN — ONDANSETRON 4 MG: 2 INJECTION, SOLUTION INTRAMUSCULAR; INTRAVENOUS at 21:50

## 2025-02-17 RX ADMIN — SODIUM CHLORIDE, PRESERVATIVE FREE 10 ML: 5 INJECTION INTRAVENOUS at 08:34

## 2025-02-17 RX ADMIN — SODIUM CHLORIDE, PRESERVATIVE FREE 40 ML: 5 INJECTION INTRAVENOUS at 05:52

## 2025-02-17 RX ADMIN — ENOXAPARIN SODIUM 30 MG: 100 INJECTION SUBCUTANEOUS at 16:38

## 2025-02-17 ASSESSMENT — PAIN - FUNCTIONAL ASSESSMENT: PAIN_FUNCTIONAL_ASSESSMENT: PREVENTS OR INTERFERES SOME ACTIVE ACTIVITIES AND ADLS

## 2025-02-17 ASSESSMENT — PAIN SCALES - GENERAL
PAINLEVEL_OUTOF10: 0
PAINLEVEL_OUTOF10: 9
PAINLEVEL_OUTOF10: 0

## 2025-02-17 ASSESSMENT — PAIN DESCRIPTION - ORIENTATION: ORIENTATION: LEFT

## 2025-02-17 ASSESSMENT — PAIN DESCRIPTION - LOCATION: LOCATION: ABDOMEN;GENERALIZED

## 2025-02-17 ASSESSMENT — PAIN DESCRIPTION - DESCRIPTORS: DESCRIPTORS: ACHING

## 2025-02-18 ENCOUNTER — APPOINTMENT (OUTPATIENT)
Dept: GENERAL RADIOLOGY | Age: 65
DRG: 392 | End: 2025-02-18
Payer: MEDICARE

## 2025-02-18 LAB
ANION GAP SERPL CALC-SCNC: 13 MMOL/L (ref 7–16)
BASOPHILS # BLD: 0.02 K/UL (ref 0–0.2)
BASOPHILS NFR BLD: 0.3 % (ref 0–2)
BUN SERPL-MCNC: 61 MG/DL (ref 8–23)
CALCIUM SERPL-MCNC: 8.4 MG/DL (ref 8.8–10.2)
CHLORIDE SERPL-SCNC: 112 MMOL/L (ref 98–107)
CO2 SERPL-SCNC: 23 MMOL/L (ref 20–29)
CREAT SERPL-MCNC: 4.11 MG/DL (ref 0.6–1.1)
DIFFERENTIAL METHOD BLD: ABNORMAL
EKG ATRIAL RATE: 92 BPM
EKG DIAGNOSIS: NORMAL
EKG P AXIS: 52 DEGREES
EKG P-R INTERVAL: 208 MS
EKG Q-T INTERVAL: 378 MS
EKG QRS DURATION: 86 MS
EKG QTC CALCULATION (BAZETT): 467 MS
EKG R AXIS: -10 DEGREES
EKG T AXIS: 104 DEGREES
EKG VENTRICULAR RATE: 92 BPM
EOSINOPHIL # BLD: 0.02 K/UL (ref 0–0.8)
EOSINOPHIL NFR BLD: 0.3 % (ref 0.5–7.8)
ERYTHROCYTE [DISTWIDTH] IN BLOOD BY AUTOMATED COUNT: 13.5 % (ref 11.9–14.6)
GLUCOSE BLD STRIP.AUTO-MCNC: 80 MG/DL (ref 65–100)
GLUCOSE BLD STRIP.AUTO-MCNC: 89 MG/DL (ref 65–100)
GLUCOSE BLD STRIP.AUTO-MCNC: 95 MG/DL (ref 65–100)
GLUCOSE BLD STRIP.AUTO-MCNC: 98 MG/DL (ref 65–100)
GLUCOSE SERPL-MCNC: 101 MG/DL (ref 70–99)
HCT VFR BLD AUTO: 39.6 % (ref 35.8–46.3)
HGB BLD-MCNC: 12.5 G/DL (ref 11.7–15.4)
IMM GRANULOCYTES # BLD AUTO: 0.14 K/UL (ref 0–0.5)
IMM GRANULOCYTES NFR BLD AUTO: 1.9 % (ref 0–5)
LYMPHOCYTES # BLD: 0.98 K/UL (ref 0.5–4.6)
LYMPHOCYTES NFR BLD: 13.1 % (ref 13–44)
MCH RBC QN AUTO: 29 PG (ref 26.1–32.9)
MCHC RBC AUTO-ENTMCNC: 31.6 G/DL (ref 31.4–35)
MCV RBC AUTO: 91.9 FL (ref 82–102)
MONOCYTES # BLD: 0.44 K/UL (ref 0.1–1.3)
MONOCYTES NFR BLD: 5.9 % (ref 4–12)
NEUTS SEG # BLD: 5.9 K/UL (ref 1.7–8.2)
NEUTS SEG NFR BLD: 78.5 % (ref 43–78)
NRBC # BLD: 0 K/UL (ref 0–0.2)
PLATELET # BLD AUTO: 185 K/UL (ref 150–450)
PMV BLD AUTO: 10.9 FL (ref 9.4–12.3)
POTASSIUM SERPL-SCNC: 3.6 MMOL/L (ref 3.5–5.1)
RBC # BLD AUTO: 4.31 M/UL (ref 4.05–5.2)
SERVICE CMNT-IMP: NORMAL
SODIUM SERPL-SCNC: 147 MMOL/L (ref 136–145)
WBC # BLD AUTO: 7.5 K/UL (ref 4.3–11.1)

## 2025-02-18 PROCEDURE — 74018 RADEX ABDOMEN 1 VIEW: CPT

## 2025-02-18 PROCEDURE — 2500000003 HC RX 250 WO HCPCS: Performed by: STUDENT IN AN ORGANIZED HEALTH CARE EDUCATION/TRAINING PROGRAM

## 2025-02-18 PROCEDURE — 80048 BASIC METABOLIC PNL TOTAL CA: CPT

## 2025-02-18 PROCEDURE — 6360000002 HC RX W HCPCS: Performed by: FAMILY MEDICINE

## 2025-02-18 PROCEDURE — 93010 ELECTROCARDIOGRAM REPORT: CPT | Performed by: INTERNAL MEDICINE

## 2025-02-18 PROCEDURE — 76937 US GUIDE VASCULAR ACCESS: CPT

## 2025-02-18 PROCEDURE — 6370000000 HC RX 637 (ALT 250 FOR IP): Performed by: STUDENT IN AN ORGANIZED HEALTH CARE EDUCATION/TRAINING PROGRAM

## 2025-02-18 PROCEDURE — 93005 ELECTROCARDIOGRAM TRACING: CPT | Performed by: INTERNAL MEDICINE

## 2025-02-18 PROCEDURE — 6360000002 HC RX W HCPCS: Performed by: STUDENT IN AN ORGANIZED HEALTH CARE EDUCATION/TRAINING PROGRAM

## 2025-02-18 PROCEDURE — 6360000002 HC RX W HCPCS: Performed by: INTERNAL MEDICINE

## 2025-02-18 PROCEDURE — 6370000000 HC RX 637 (ALT 250 FOR IP): Performed by: INTERNAL MEDICINE

## 2025-02-18 PROCEDURE — 1100000003 HC PRIVATE W/ TELEMETRY

## 2025-02-18 PROCEDURE — 85025 COMPLETE CBC W/AUTO DIFF WBC: CPT

## 2025-02-18 PROCEDURE — 97530 THERAPEUTIC ACTIVITIES: CPT

## 2025-02-18 PROCEDURE — 82962 GLUCOSE BLOOD TEST: CPT

## 2025-02-18 PROCEDURE — 36415 COLL VENOUS BLD VENIPUNCTURE: CPT

## 2025-02-18 RX ORDER — HYDRALAZINE HYDROCHLORIDE 50 MG/1
25 TABLET, FILM COATED ORAL EVERY 8 HOURS SCHEDULED
Status: DISCONTINUED | OUTPATIENT
Start: 2025-02-18 | End: 2025-02-25 | Stop reason: HOSPADM

## 2025-02-18 RX ORDER — LABETALOL HYDROCHLORIDE 5 MG/ML
10 INJECTION, SOLUTION INTRAVENOUS EVERY 8 HOURS
Status: DISCONTINUED | OUTPATIENT
Start: 2025-02-18 | End: 2025-02-19

## 2025-02-18 RX ORDER — CLONIDINE 0.2 MG/24H
1 PATCH, EXTENDED RELEASE TRANSDERMAL WEEKLY
Status: DISCONTINUED | OUTPATIENT
Start: 2025-02-18 | End: 2025-02-25 | Stop reason: HOSPADM

## 2025-02-18 RX ADMIN — ONDANSETRON 4 MG: 2 INJECTION, SOLUTION INTRAMUSCULAR; INTRAVENOUS at 19:47

## 2025-02-18 RX ADMIN — LABETALOL HYDROCHLORIDE 10 MG: 5 INJECTION INTRAVENOUS at 15:31

## 2025-02-18 RX ADMIN — FERROUS SULFATE TAB 325 MG (65 MG ELEMENTAL FE) 325 MG: 325 (65 FE) TAB at 09:13

## 2025-02-18 RX ADMIN — ONDANSETRON 4 MG: 2 INJECTION, SOLUTION INTRAMUSCULAR; INTRAVENOUS at 04:52

## 2025-02-18 RX ADMIN — CARVEDILOL 25 MG: 25 TABLET, FILM COATED ORAL at 09:10

## 2025-02-18 RX ADMIN — SODIUM CHLORIDE, PRESERVATIVE FREE 10 ML: 5 INJECTION INTRAVENOUS at 20:04

## 2025-02-18 RX ADMIN — LABETALOL HYDROCHLORIDE 10 MG: 5 INJECTION INTRAVENOUS at 09:39

## 2025-02-18 RX ADMIN — LABETALOL HYDROCHLORIDE 10 MG: 5 INJECTION INTRAVENOUS at 17:30

## 2025-02-18 RX ADMIN — PROCHLORPERAZINE EDISYLATE 10 MG: 5 INJECTION INTRAMUSCULAR; INTRAVENOUS at 02:16

## 2025-02-18 RX ADMIN — POLYETHYLENE GLYCOL 3350 17 G: 17 POWDER, FOR SOLUTION ORAL at 04:48

## 2025-02-18 RX ADMIN — CALCIUM 500 MG: 500 TABLET ORAL at 09:13

## 2025-02-18 RX ADMIN — ENOXAPARIN SODIUM 30 MG: 100 INJECTION SUBCUTANEOUS at 17:35

## 2025-02-18 RX ADMIN — GABAPENTIN 100 MG: 100 CAPSULE ORAL at 19:47

## 2025-02-18 RX ADMIN — ONDANSETRON 4 MG: 2 INJECTION, SOLUTION INTRAMUSCULAR; INTRAVENOUS at 09:01

## 2025-02-18 RX ADMIN — ASPIRIN 81 MG: 81 TABLET, CHEWABLE ORAL at 09:13

## 2025-02-18 RX ADMIN — VITAMIN D, TAB 1000IU (100/BT) 1000 UNITS: 25 TAB at 09:13

## 2025-02-18 RX ADMIN — AMLODIPINE BESYLATE 5 MG: 5 TABLET ORAL at 09:10

## 2025-02-18 ASSESSMENT — PAIN SCALES - GENERAL
PAINLEVEL_OUTOF10: 0
PAINLEVEL_OUTOF10: 0

## 2025-02-18 NOTE — PLAN OF CARE
Problem: Chronic Conditions and Co-morbidities  Goal: Patient's chronic conditions and co-morbidity symptoms are monitored and maintained or improved  Outcome: Progressing     Problem: Discharge Planning  Goal: Discharge to home or other facility with appropriate resources  Outcome: Progressing     Problem: Pain  Goal: Verbalizes/displays adequate comfort level or baseline comfort level  Outcome: Progressing     Problem: Safety - Adult  Goal: Free from fall injury  2/18/2025 1103 by Sindhu Malcolm RN  Outcome: Progressing  2/18/2025 0034 by Mitra Baker RN  Outcome: Progressing     Problem: Respiratory - Adult  Goal: Achieves optimal ventilation and oxygenation  2/18/2025 1103 by Sindhu Malcolm RN  Outcome: Progressing  2/18/2025 0034 by Mitra Baker RN  Outcome: Progressing     Problem: Cardiovascular - Adult  Goal: Maintains optimal cardiac output and hemodynamic stability  Outcome: Progressing  Goal: Absence of cardiac dysrhythmias or at baseline  Outcome: Progressing     Problem: Skin/Tissue Integrity - Adult  Goal: Skin integrity remains intact  Description: 1.  Monitor for areas of redness and/or skin breakdown  2.  Assess vascular access sites hourly  3.  Every 4-6 hours minimum:  Change oxygen saturation probe site  4.  Every 4-6 hours:  If on nasal continuous positive airway pressure, respiratory therapy assess nares and determine need for appliance change or resting period  2/18/2025 1103 by Sindhu Malcolm RN  Outcome: Progressing  2/18/2025 0034 by Mitra Baker RN  Flowsheets (Taken 2/17/2025 2030)  Skin Integrity Remains Intact: Monitor for areas of redness and/or skin breakdown  Goal: Incisions, wounds, or drain sites healing without S/S of infection  Outcome: Progressing  Goal: Oral mucous membranes remain intact  Outcome: Progressing     Problem: Gastrointestinal - Adult  Goal: Minimal or absence of nausea and vomiting  Outcome: Progressing  Goal: Maintains or

## 2025-02-18 NOTE — PLAN OF CARE
Problem: Pain  Goal: Verbalizes/displays adequate comfort level or baseline comfort level  Recent Flowsheet Documentation  Taken 2/17/2025 2030 by Mitra Baker RN  Verbalizes/displays adequate comfort level or baseline comfort level:   Encourage patient to monitor pain and request assistance   Assess pain using appropriate pain scale   Administer analgesics based on type and severity of pain and evaluate response     Problem: Safety - Adult  Goal: Free from fall injury  Outcome: Progressing     Problem: Respiratory - Adult  Goal: Achieves optimal ventilation and oxygenation  Outcome: Progressing     Problem: Cardiovascular - Adult  Goal: Maintains optimal cardiac output and hemodynamic stability  Recent Flowsheet Documentation  Taken 2/17/2025 2030 by Mitra Baker RN  Maintains optimal cardiac output and hemodynamic stability:   Monitor blood pressure and heart rate   Monitor urine output and notify Licensed Independent Practitioner for values outside of normal range     Problem: Skin/Tissue Integrity - Adult  Goal: Skin integrity remains intact  Description: 1.  Monitor for areas of redness and/or skin breakdown  2.  Assess vascular access sites hourly  3.  Every 4-6 hours minimum:  Change oxygen saturation probe site  4.  Every 4-6 hours:  If on nasal continuous positive airway pressure, respiratory therapy assess nares and determine need for appliance change or resting period  Flowsheets (Taken 2/17/2025 2030)  Skin Integrity Remains Intact: Monitor for areas of redness and/or skin breakdown

## 2025-02-19 LAB
ANION GAP SERPL CALC-SCNC: 13 MMOL/L (ref 7–16)
BASOPHILS # BLD: 0.03 K/UL (ref 0–0.2)
BASOPHILS NFR BLD: 0.4 % (ref 0–2)
BUN SERPL-MCNC: 62 MG/DL (ref 8–23)
CALCIUM SERPL-MCNC: 8.1 MG/DL (ref 8.8–10.2)
CHLORIDE SERPL-SCNC: 111 MMOL/L (ref 98–107)
CO2 SERPL-SCNC: 20 MMOL/L (ref 20–29)
CREAT SERPL-MCNC: 4.18 MG/DL (ref 0.6–1.1)
DIFFERENTIAL METHOD BLD: ABNORMAL
EOSINOPHIL # BLD: 0.03 K/UL (ref 0–0.8)
EOSINOPHIL NFR BLD: 0.4 % (ref 0.5–7.8)
ERYTHROCYTE [DISTWIDTH] IN BLOOD BY AUTOMATED COUNT: 13.2 % (ref 11.9–14.6)
GLUCOSE BLD STRIP.AUTO-MCNC: 108 MG/DL (ref 65–100)
GLUCOSE BLD STRIP.AUTO-MCNC: 121 MG/DL (ref 65–100)
GLUCOSE BLD STRIP.AUTO-MCNC: 121 MG/DL (ref 65–100)
GLUCOSE BLD STRIP.AUTO-MCNC: 124 MG/DL (ref 65–100)
GLUCOSE BLD STRIP.AUTO-MCNC: 131 MG/DL (ref 65–100)
GLUCOSE SERPL-MCNC: 109 MG/DL (ref 70–99)
HCT VFR BLD AUTO: 37.1 % (ref 35.8–46.3)
HGB BLD-MCNC: 12.2 G/DL (ref 11.7–15.4)
IMM GRANULOCYTES # BLD AUTO: 0.16 K/UL (ref 0–0.5)
IMM GRANULOCYTES NFR BLD AUTO: 2.3 % (ref 0–5)
LYMPHOCYTES # BLD: 1.38 K/UL (ref 0.5–4.6)
LYMPHOCYTES NFR BLD: 19.8 % (ref 13–44)
MCH RBC QN AUTO: 29.3 PG (ref 26.1–32.9)
MCHC RBC AUTO-ENTMCNC: 32.9 G/DL (ref 31.4–35)
MCV RBC AUTO: 89 FL (ref 82–102)
MONOCYTES # BLD: 0.48 K/UL (ref 0.1–1.3)
MONOCYTES NFR BLD: 6.9 % (ref 4–12)
NEUTS SEG # BLD: 4.9 K/UL (ref 1.7–8.2)
NEUTS SEG NFR BLD: 70.2 % (ref 43–78)
NRBC # BLD: 0 K/UL (ref 0–0.2)
PLATELET # BLD AUTO: 162 K/UL (ref 150–450)
PMV BLD AUTO: 11.5 FL (ref 9.4–12.3)
POTASSIUM SERPL-SCNC: 4 MMOL/L (ref 3.5–5.1)
RBC # BLD AUTO: 4.17 M/UL (ref 4.05–5.2)
SERVICE CMNT-IMP: ABNORMAL
SODIUM SERPL-SCNC: 145 MMOL/L (ref 136–145)
WBC # BLD AUTO: 7 K/UL (ref 4.3–11.1)

## 2025-02-19 PROCEDURE — 36415 COLL VENOUS BLD VENIPUNCTURE: CPT

## 2025-02-19 PROCEDURE — 6360000002 HC RX W HCPCS: Performed by: INTERNAL MEDICINE

## 2025-02-19 PROCEDURE — 2500000003 HC RX 250 WO HCPCS: Performed by: STUDENT IN AN ORGANIZED HEALTH CARE EDUCATION/TRAINING PROGRAM

## 2025-02-19 PROCEDURE — 1100000003 HC PRIVATE W/ TELEMETRY

## 2025-02-19 PROCEDURE — 76937 US GUIDE VASCULAR ACCESS: CPT

## 2025-02-19 PROCEDURE — 6360000002 HC RX W HCPCS: Performed by: STUDENT IN AN ORGANIZED HEALTH CARE EDUCATION/TRAINING PROGRAM

## 2025-02-19 PROCEDURE — 6370000000 HC RX 637 (ALT 250 FOR IP): Performed by: INTERNAL MEDICINE

## 2025-02-19 PROCEDURE — 82962 GLUCOSE BLOOD TEST: CPT

## 2025-02-19 PROCEDURE — 6360000002 HC RX W HCPCS: Performed by: FAMILY MEDICINE

## 2025-02-19 PROCEDURE — 85025 COMPLETE CBC W/AUTO DIFF WBC: CPT

## 2025-02-19 PROCEDURE — 6370000000 HC RX 637 (ALT 250 FOR IP): Performed by: FAMILY MEDICINE

## 2025-02-19 PROCEDURE — 2580000003 HC RX 258: Performed by: INTERNAL MEDICINE

## 2025-02-19 PROCEDURE — 6370000000 HC RX 637 (ALT 250 FOR IP): Performed by: STUDENT IN AN ORGANIZED HEALTH CARE EDUCATION/TRAINING PROGRAM

## 2025-02-19 PROCEDURE — 80048 BASIC METABOLIC PNL TOTAL CA: CPT

## 2025-02-19 RX ORDER — LABETALOL HYDROCHLORIDE 5 MG/ML
10 INJECTION, SOLUTION INTRAVENOUS EVERY 8 HOURS PRN
Status: DISCONTINUED | OUTPATIENT
Start: 2025-02-19 | End: 2025-02-25 | Stop reason: HOSPADM

## 2025-02-19 RX ORDER — LORAZEPAM 0.5 MG/1
0.25 TABLET ORAL EVERY 8 HOURS PRN
Status: DISCONTINUED | OUTPATIENT
Start: 2025-02-19 | End: 2025-02-25 | Stop reason: HOSPADM

## 2025-02-19 RX ORDER — LORAZEPAM 0.5 MG/1
0.5 TABLET ORAL EVERY 6 HOURS PRN
Status: DISCONTINUED | OUTPATIENT
Start: 2025-02-19 | End: 2025-02-19

## 2025-02-19 RX ORDER — HEPARIN SODIUM 5000 [USP'U]/ML
5000 INJECTION, SOLUTION INTRAVENOUS; SUBCUTANEOUS EVERY 8 HOURS SCHEDULED
Status: DISCONTINUED | OUTPATIENT
Start: 2025-02-19 | End: 2025-02-25 | Stop reason: HOSPADM

## 2025-02-19 RX ADMIN — PROCHLORPERAZINE EDISYLATE 10 MG: 5 INJECTION INTRAMUSCULAR; INTRAVENOUS at 03:55

## 2025-02-19 RX ADMIN — HYDRALAZINE HYDROCHLORIDE 20 MG: 20 INJECTION INTRAMUSCULAR; INTRAVENOUS at 02:47

## 2025-02-19 RX ADMIN — LABETALOL HYDROCHLORIDE 10 MG: 5 INJECTION INTRAVENOUS at 08:15

## 2025-02-19 RX ADMIN — LORAZEPAM 0.5 MG: 0.5 TABLET ORAL at 13:50

## 2025-02-19 RX ADMIN — MORPHINE SULFATE 4 MG: 4 INJECTION INTRAVENOUS at 22:08

## 2025-02-19 RX ADMIN — CALCIUM 500 MG: 500 TABLET ORAL at 08:11

## 2025-02-19 RX ADMIN — INSULIN GLARGINE 10 UNITS: 100 INJECTION, SOLUTION SUBCUTANEOUS at 20:29

## 2025-02-19 RX ADMIN — ALLOPURINOL 50 MG: 100 TABLET ORAL at 08:11

## 2025-02-19 RX ADMIN — HEPARIN SODIUM 5000 UNITS: 5000 INJECTION INTRAVENOUS; SUBCUTANEOUS at 20:29

## 2025-02-19 RX ADMIN — LABETALOL HYDROCHLORIDE 10 MG: 5 INJECTION INTRAVENOUS at 00:14

## 2025-02-19 RX ADMIN — ASPIRIN 81 MG: 81 TABLET, CHEWABLE ORAL at 08:14

## 2025-02-19 RX ADMIN — ONDANSETRON 4 MG: 2 INJECTION, SOLUTION INTRAMUSCULAR; INTRAVENOUS at 12:56

## 2025-02-19 RX ADMIN — VITAMIN D, TAB 1000IU (100/BT) 1000 UNITS: 25 TAB at 08:11

## 2025-02-19 RX ADMIN — SODIUM CHLORIDE, PRESERVATIVE FREE 10 ML: 5 INJECTION INTRAVENOUS at 20:32

## 2025-02-19 RX ADMIN — AMLODIPINE BESYLATE 5 MG: 5 TABLET ORAL at 08:14

## 2025-02-19 RX ADMIN — FERROUS SULFATE TAB 325 MG (65 MG ELEMENTAL FE) 325 MG: 325 (65 FE) TAB at 08:14

## 2025-02-19 RX ADMIN — SODIUM CHLORIDE: 4.5 INJECTION, SOLUTION INTRAVENOUS at 13:54

## 2025-02-19 RX ADMIN — SODIUM CHLORIDE: 4.5 INJECTION, SOLUTION INTRAVENOUS at 02:39

## 2025-02-19 RX ADMIN — ONDANSETRON 4 MG: 2 INJECTION, SOLUTION INTRAMUSCULAR; INTRAVENOUS at 02:38

## 2025-02-19 RX ADMIN — ONDANSETRON 4 MG: 2 INJECTION, SOLUTION INTRAMUSCULAR; INTRAVENOUS at 21:11

## 2025-02-19 ASSESSMENT — PAIN SCALES - GENERAL
PAINLEVEL_OUTOF10: 0
PAINLEVEL_OUTOF10: 8

## 2025-02-19 ASSESSMENT — PAIN DESCRIPTION - ORIENTATION: ORIENTATION: MID

## 2025-02-19 ASSESSMENT — PAIN - FUNCTIONAL ASSESSMENT: PAIN_FUNCTIONAL_ASSESSMENT: PREVENTS OR INTERFERES SOME ACTIVE ACTIVITIES AND ADLS

## 2025-02-19 ASSESSMENT — PAIN DESCRIPTION - FREQUENCY: FREQUENCY: INTERMITTENT

## 2025-02-19 ASSESSMENT — PAIN DESCRIPTION - ONSET: ONSET: PROGRESSIVE

## 2025-02-19 ASSESSMENT — PAIN DESCRIPTION - PAIN TYPE: TYPE: CHRONIC PAIN

## 2025-02-19 ASSESSMENT — PAIN DESCRIPTION - LOCATION: LOCATION: RIB CAGE;GENERALIZED

## 2025-02-19 ASSESSMENT — PAIN DESCRIPTION - DESCRIPTORS: DESCRIPTORS: ACHING;SORE

## 2025-02-19 NOTE — PLAN OF CARE
Problem: Skin/Tissue Integrity - Adult  Goal: Skin integrity remains intact  Description: 1.  Monitor for areas of redness and/or skin breakdown  2.  Assess vascular access sites hourly  3.  Every 4-6 hours minimum:  Change oxygen saturation probe site  4.  Every 4-6 hours:  If on nasal continuous positive airway pressure, respiratory therapy assess nares and determine need for appliance change or resting period  2/18/2025 2237 by Mitra Baker RN  Outcome: Progressing  Flowsheets (Taken 2/18/2025 1945)  Skin Integrity Remains Intact: Monitor for areas of redness and/or skin breakdown     Problem: Gastrointestinal - Adult  Goal: Maintains adequate nutritional intake  2/18/2025 2237 by Mirta Baker RN  Outcome: Not Progressing  Flowsheets (Taken 2/18/2025 1945)  Maintains adequate nutritional intake: Monitor percentage of each meal consumed     Problem: Gastrointestinal - Adult  Goal: Maintains adequate nutritional intake  2/18/2025 2237 by Mitra Baker RN  Outcome: Not Progressing  Flowsheets (Taken 2/18/2025 1945)  Maintains adequate nutritional intake: Monitor percentage of each meal consumed  2/18/2025 1103 by Sindhu Malcolm RN  Outcome: Progressing

## 2025-02-19 NOTE — PLAN OF CARE
Problem: Gastrointestinal - Adult  Goal: Maintains adequate nutritional intake  2/19/2025 1117 by Salena Perales, RN  Outcome: Progressing  2/18/2025 2237 by Mitra Baker, RN  Outcome: Not Progressing  Flowsheets (Taken 2/18/2025 1945)  Maintains adequate nutritional intake: Monitor percentage of each meal consumed

## 2025-02-20 PROBLEM — F41.9 ANXIETY AND DEPRESSION: Status: ACTIVE | Noted: 2025-02-20

## 2025-02-20 PROBLEM — F32.A ANXIETY AND DEPRESSION: Status: ACTIVE | Noted: 2025-02-20

## 2025-02-20 LAB
ANION GAP SERPL CALC-SCNC: 11 MMOL/L (ref 7–16)
BASOPHILS # BLD: 0.02 K/UL (ref 0–0.2)
BASOPHILS NFR BLD: 0.4 % (ref 0–2)
BUN SERPL-MCNC: 59 MG/DL (ref 8–23)
CALCIUM SERPL-MCNC: 8 MG/DL (ref 8.8–10.2)
CHLORIDE SERPL-SCNC: 109 MMOL/L (ref 98–107)
CO2 SERPL-SCNC: 21 MMOL/L (ref 20–29)
CREAT SERPL-MCNC: 3.86 MG/DL (ref 0.6–1.1)
DIFFERENTIAL METHOD BLD: ABNORMAL
EOSINOPHIL # BLD: 0.05 K/UL (ref 0–0.8)
EOSINOPHIL NFR BLD: 1 % (ref 0.5–7.8)
ERYTHROCYTE [DISTWIDTH] IN BLOOD BY AUTOMATED COUNT: 13.1 % (ref 11.9–14.6)
GLUCOSE BLD STRIP.AUTO-MCNC: 104 MG/DL (ref 65–100)
GLUCOSE BLD STRIP.AUTO-MCNC: 84 MG/DL (ref 65–100)
GLUCOSE BLD STRIP.AUTO-MCNC: 88 MG/DL (ref 65–100)
GLUCOSE BLD STRIP.AUTO-MCNC: 98 MG/DL (ref 65–100)
GLUCOSE SERPL-MCNC: 99 MG/DL (ref 70–99)
HCT VFR BLD AUTO: 34.2 % (ref 35.8–46.3)
HGB BLD-MCNC: 11.6 G/DL (ref 11.7–15.4)
IMM GRANULOCYTES # BLD AUTO: 0.13 K/UL (ref 0–0.5)
IMM GRANULOCYTES NFR BLD AUTO: 2.6 % (ref 0–5)
LYMPHOCYTES # BLD: 1.26 K/UL (ref 0.5–4.6)
LYMPHOCYTES NFR BLD: 25 % (ref 13–44)
MAGNESIUM SERPL-MCNC: 2.4 MG/DL (ref 1.8–2.4)
MCH RBC QN AUTO: 29.4 PG (ref 26.1–32.9)
MCHC RBC AUTO-ENTMCNC: 33.9 G/DL (ref 31.4–35)
MCV RBC AUTO: 86.8 FL (ref 82–102)
MONOCYTES # BLD: 0.31 K/UL (ref 0.1–1.3)
MONOCYTES NFR BLD: 6.2 % (ref 4–12)
NEUTS SEG # BLD: 3.27 K/UL (ref 1.7–8.2)
NEUTS SEG NFR BLD: 64.8 % (ref 43–78)
NRBC # BLD: 0 K/UL (ref 0–0.2)
PLATELET # BLD AUTO: 147 K/UL (ref 150–450)
PMV BLD AUTO: 11.3 FL (ref 9.4–12.3)
POTASSIUM SERPL-SCNC: 3.5 MMOL/L (ref 3.5–5.1)
RBC # BLD AUTO: 3.94 M/UL (ref 4.05–5.2)
SERVICE CMNT-IMP: ABNORMAL
SERVICE CMNT-IMP: NORMAL
SODIUM SERPL-SCNC: 141 MMOL/L (ref 136–145)
WBC # BLD AUTO: 5 K/UL (ref 4.3–11.1)

## 2025-02-20 PROCEDURE — 6360000002 HC RX W HCPCS: Performed by: FAMILY MEDICINE

## 2025-02-20 PROCEDURE — 2580000003 HC RX 258: Performed by: INTERNAL MEDICINE

## 2025-02-20 PROCEDURE — 85025 COMPLETE CBC W/AUTO DIFF WBC: CPT

## 2025-02-20 PROCEDURE — 83735 ASSAY OF MAGNESIUM: CPT

## 2025-02-20 PROCEDURE — 6370000000 HC RX 637 (ALT 250 FOR IP): Performed by: INTERNAL MEDICINE

## 2025-02-20 PROCEDURE — 1100000000 HC RM PRIVATE

## 2025-02-20 PROCEDURE — 82962 GLUCOSE BLOOD TEST: CPT

## 2025-02-20 PROCEDURE — 6370000000 HC RX 637 (ALT 250 FOR IP): Performed by: STUDENT IN AN ORGANIZED HEALTH CARE EDUCATION/TRAINING PROGRAM

## 2025-02-20 PROCEDURE — 36415 COLL VENOUS BLD VENIPUNCTURE: CPT

## 2025-02-20 PROCEDURE — 6360000002 HC RX W HCPCS: Performed by: INTERNAL MEDICINE

## 2025-02-20 PROCEDURE — 2500000003 HC RX 250 WO HCPCS: Performed by: STUDENT IN AN ORGANIZED HEALTH CARE EDUCATION/TRAINING PROGRAM

## 2025-02-20 PROCEDURE — 80048 BASIC METABOLIC PNL TOTAL CA: CPT

## 2025-02-20 RX ADMIN — HEPARIN SODIUM 5000 UNITS: 5000 INJECTION INTRAVENOUS; SUBCUTANEOUS at 21:27

## 2025-02-20 RX ADMIN — ONDANSETRON 4 MG: 2 INJECTION, SOLUTION INTRAMUSCULAR; INTRAVENOUS at 06:20

## 2025-02-20 RX ADMIN — HEPARIN SODIUM 5000 UNITS: 5000 INJECTION INTRAVENOUS; SUBCUTANEOUS at 06:12

## 2025-02-20 RX ADMIN — ASPIRIN 81 MG: 81 TABLET, CHEWABLE ORAL at 09:36

## 2025-02-20 RX ADMIN — LORAZEPAM 0.25 MG: 0.5 TABLET ORAL at 09:44

## 2025-02-20 RX ADMIN — CALCIUM 500 MG: 500 TABLET ORAL at 09:36

## 2025-02-20 RX ADMIN — FERROUS SULFATE TAB 325 MG (65 MG ELEMENTAL FE) 325 MG: 325 (65 FE) TAB at 09:36

## 2025-02-20 RX ADMIN — SODIUM CHLORIDE, PRESERVATIVE FREE 10 ML: 5 INJECTION INTRAVENOUS at 19:58

## 2025-02-20 RX ADMIN — CARVEDILOL 25 MG: 25 TABLET, FILM COATED ORAL at 09:36

## 2025-02-20 RX ADMIN — AMLODIPINE BESYLATE 5 MG: 5 TABLET ORAL at 09:36

## 2025-02-20 RX ADMIN — SODIUM CHLORIDE, PRESERVATIVE FREE 10 ML: 5 INJECTION INTRAVENOUS at 09:00

## 2025-02-20 RX ADMIN — GABAPENTIN 100 MG: 100 CAPSULE ORAL at 20:05

## 2025-02-20 RX ADMIN — SODIUM CHLORIDE: 4.5 INJECTION, SOLUTION INTRAVENOUS at 00:59

## 2025-02-20 RX ADMIN — ONDANSETRON 4 MG: 2 INJECTION, SOLUTION INTRAMUSCULAR; INTRAVENOUS at 09:56

## 2025-02-20 RX ADMIN — VITAMIN D, TAB 1000IU (100/BT) 1000 UNITS: 25 TAB at 09:35

## 2025-02-20 RX ADMIN — HEPARIN SODIUM 5000 UNITS: 5000 INJECTION INTRAVENOUS; SUBCUTANEOUS at 15:30

## 2025-02-20 RX ADMIN — CALCIUM 500 MG: 500 TABLET ORAL at 16:27

## 2025-02-20 RX ADMIN — CARVEDILOL 25 MG: 25 TABLET, FILM COATED ORAL at 16:26

## 2025-02-20 RX ADMIN — LORAZEPAM 0.25 MG: 0.5 TABLET ORAL at 00:57

## 2025-02-20 RX ADMIN — SODIUM CHLORIDE: 4.5 INJECTION, SOLUTION INTRAVENOUS at 09:46

## 2025-02-20 RX ADMIN — LORAZEPAM 0.25 MG: 0.5 TABLET ORAL at 20:05

## 2025-02-20 ASSESSMENT — PAIN SCALES - GENERAL
PAINLEVEL_OUTOF10: 0
PAINLEVEL_OUTOF10: 1
PAINLEVEL_OUTOF10: 0
PAINLEVEL_OUTOF10: 0

## 2025-02-20 NOTE — PLAN OF CARE
Problem: Chronic Conditions and Co-morbidities  Goal: Patient's chronic conditions and co-morbidity symptoms are monitored and maintained or improved  Outcome: Progressing     Problem: Discharge Planning  Goal: Discharge to home or other facility with appropriate resources  2/20/2025 1132 by Chandni Howell, RN  Outcome: Progressing  2/20/2025 0555 by Suzy Watts RN  Outcome: Progressing  Flowsheets (Taken 2/19/2025 2000)  Discharge to home or other facility with appropriate resources: Identify barriers to discharge with patient and caregiver     Problem: Pain  Goal: Verbalizes/displays adequate comfort level or baseline comfort level  2/20/2025 0555 by Suzy Watts, RN  Outcome: Progressing

## 2025-02-20 NOTE — PLAN OF CARE
Problem: Discharge Planning  Goal: Discharge to home or other facility with appropriate resources  Outcome: Progressing  Flowsheets (Taken 2/19/2025 2000)  Discharge to home or other facility with appropriate resources: Identify barriers to discharge with patient and caregiver     Problem: Pain  Goal: Verbalizes/displays adequate comfort level or baseline comfort level  Outcome: Progressing     Problem: Safety - Adult  Goal: Free from fall injury  Outcome: Progressing  Flowsheets (Taken 2/19/2025 2230)  Free From Fall Injury: Instruct family/caregiver on patient safety

## 2025-02-21 PROBLEM — G47.9 SLEEP DISTURBANCES: Status: ACTIVE | Noted: 2025-02-21

## 2025-02-21 LAB
ALBUMIN SERPL-MCNC: 2.3 G/DL (ref 3.2–4.6)
ANION GAP SERPL CALC-SCNC: 12 MMOL/L (ref 7–16)
BASOPHILS # BLD: 0.01 K/UL (ref 0–0.2)
BASOPHILS NFR BLD: 0.2 % (ref 0–2)
BILIRUB DIRECT SERPL-MCNC: <0.2 MG/DL (ref 0–0.4)
BUN SERPL-MCNC: 53 MG/DL (ref 8–23)
CALCIUM SERPL-MCNC: 7.9 MG/DL (ref 8.8–10.2)
CHLORIDE SERPL-SCNC: 108 MMOL/L (ref 98–107)
CO2 SERPL-SCNC: 19 MMOL/L (ref 20–29)
CREAT SERPL-MCNC: 3.62 MG/DL (ref 0.6–1.1)
DIFFERENTIAL METHOD BLD: ABNORMAL
EOSINOPHIL # BLD: 0.1 K/UL (ref 0–0.8)
EOSINOPHIL NFR BLD: 2.1 % (ref 0.5–7.8)
ERYTHROCYTE [DISTWIDTH] IN BLOOD BY AUTOMATED COUNT: 12.9 % (ref 11.9–14.6)
GLUCOSE BLD STRIP.AUTO-MCNC: 106 MG/DL (ref 65–100)
GLUCOSE BLD STRIP.AUTO-MCNC: 111 MG/DL (ref 65–100)
GLUCOSE BLD STRIP.AUTO-MCNC: 126 MG/DL (ref 65–100)
GLUCOSE BLD STRIP.AUTO-MCNC: 92 MG/DL (ref 65–100)
GLUCOSE SERPL-MCNC: 97 MG/DL (ref 70–99)
HCT VFR BLD AUTO: 33.9 % (ref 35.8–46.3)
HGB BLD-MCNC: 11.2 G/DL (ref 11.7–15.4)
IMM GRANULOCYTES # BLD AUTO: 0.06 K/UL (ref 0–0.5)
IMM GRANULOCYTES NFR BLD AUTO: 1.2 % (ref 0–5)
LYMPHOCYTES # BLD: 1.2 K/UL (ref 0.5–4.6)
LYMPHOCYTES NFR BLD: 24.9 % (ref 13–44)
MAGNESIUM SERPL-MCNC: 2.4 MG/DL (ref 1.8–2.4)
MCH RBC QN AUTO: 29 PG (ref 26.1–32.9)
MCHC RBC AUTO-ENTMCNC: 33 G/DL (ref 31.4–35)
MCV RBC AUTO: 87.8 FL (ref 82–102)
MONOCYTES # BLD: 0.41 K/UL (ref 0.1–1.3)
MONOCYTES NFR BLD: 8.5 % (ref 4–12)
NEUTS SEG # BLD: 3.04 K/UL (ref 1.7–8.2)
NEUTS SEG NFR BLD: 63.1 % (ref 43–78)
NRBC # BLD: 0 K/UL (ref 0–0.2)
PHOSPHATE SERPL-MCNC: 3.1 MG/DL (ref 2.5–4.5)
PLATELET # BLD AUTO: 158 K/UL (ref 150–450)
PMV BLD AUTO: 11.6 FL (ref 9.4–12.3)
POTASSIUM SERPL-SCNC: 3.5 MMOL/L (ref 3.5–5.1)
RBC # BLD AUTO: 3.86 M/UL (ref 4.05–5.2)
SERVICE CMNT-IMP: ABNORMAL
SERVICE CMNT-IMP: NORMAL
SODIUM SERPL-SCNC: 139 MMOL/L (ref 136–145)
WBC # BLD AUTO: 4.8 K/UL (ref 4.3–11.1)

## 2025-02-21 PROCEDURE — 82040 ASSAY OF SERUM ALBUMIN: CPT

## 2025-02-21 PROCEDURE — 6360000002 HC RX W HCPCS: Performed by: INTERNAL MEDICINE

## 2025-02-21 PROCEDURE — 1100000000 HC RM PRIVATE

## 2025-02-21 PROCEDURE — 6370000000 HC RX 637 (ALT 250 FOR IP): Performed by: INTERNAL MEDICINE

## 2025-02-21 PROCEDURE — 6360000002 HC RX W HCPCS: Performed by: STUDENT IN AN ORGANIZED HEALTH CARE EDUCATION/TRAINING PROGRAM

## 2025-02-21 PROCEDURE — 82248 BILIRUBIN DIRECT: CPT

## 2025-02-21 PROCEDURE — 90792 PSYCH DIAG EVAL W/MED SRVCS: CPT | Performed by: NURSE PRACTITIONER

## 2025-02-21 PROCEDURE — 6370000000 HC RX 637 (ALT 250 FOR IP): Performed by: STUDENT IN AN ORGANIZED HEALTH CARE EDUCATION/TRAINING PROGRAM

## 2025-02-21 PROCEDURE — 2500000003 HC RX 250 WO HCPCS: Performed by: STUDENT IN AN ORGANIZED HEALTH CARE EDUCATION/TRAINING PROGRAM

## 2025-02-21 PROCEDURE — 85025 COMPLETE CBC W/AUTO DIFF WBC: CPT

## 2025-02-21 PROCEDURE — 83735 ASSAY OF MAGNESIUM: CPT

## 2025-02-21 PROCEDURE — 2580000003 HC RX 258: Performed by: INTERNAL MEDICINE

## 2025-02-21 PROCEDURE — 84100 ASSAY OF PHOSPHORUS: CPT

## 2025-02-21 PROCEDURE — 36415 COLL VENOUS BLD VENIPUNCTURE: CPT

## 2025-02-21 PROCEDURE — 80048 BASIC METABOLIC PNL TOTAL CA: CPT

## 2025-02-21 PROCEDURE — 6360000002 HC RX W HCPCS: Performed by: FAMILY MEDICINE

## 2025-02-21 PROCEDURE — 97530 THERAPEUTIC ACTIVITIES: CPT

## 2025-02-21 PROCEDURE — 82962 GLUCOSE BLOOD TEST: CPT

## 2025-02-21 RX ORDER — SODIUM CHLORIDE, SODIUM LACTATE, POTASSIUM CHLORIDE, CALCIUM CHLORIDE 600; 310; 30; 20 MG/100ML; MG/100ML; MG/100ML; MG/100ML
INJECTION, SOLUTION INTRAVENOUS CONTINUOUS
Status: DISCONTINUED | OUTPATIENT
Start: 2025-02-21 | End: 2025-02-25 | Stop reason: HOSPADM

## 2025-02-21 RX ADMIN — HYDRALAZINE HYDROCHLORIDE 25 MG: 50 TABLET ORAL at 21:23

## 2025-02-21 RX ADMIN — FERROUS SULFATE TAB 325 MG (65 MG ELEMENTAL FE) 325 MG: 325 (65 FE) TAB at 10:30

## 2025-02-21 RX ADMIN — SODIUM CHLORIDE, POTASSIUM CHLORIDE, SODIUM LACTATE AND CALCIUM CHLORIDE: 600; 310; 30; 20 INJECTION, SOLUTION INTRAVENOUS at 23:57

## 2025-02-21 RX ADMIN — ONDANSETRON 4 MG: 2 INJECTION, SOLUTION INTRAMUSCULAR; INTRAVENOUS at 15:59

## 2025-02-21 RX ADMIN — MORPHINE SULFATE 4 MG: 4 INJECTION INTRAVENOUS at 21:24

## 2025-02-21 RX ADMIN — SODIUM CHLORIDE: 4.5 INJECTION, SOLUTION INTRAVENOUS at 03:23

## 2025-02-21 RX ADMIN — CARVEDILOL 25 MG: 25 TABLET, FILM COATED ORAL at 18:34

## 2025-02-21 RX ADMIN — ONDANSETRON 4 MG: 2 INJECTION, SOLUTION INTRAMUSCULAR; INTRAVENOUS at 10:40

## 2025-02-21 RX ADMIN — CARVEDILOL 25 MG: 25 TABLET, FILM COATED ORAL at 10:29

## 2025-02-21 RX ADMIN — HEPARIN SODIUM 5000 UNITS: 5000 INJECTION INTRAVENOUS; SUBCUTANEOUS at 21:23

## 2025-02-21 RX ADMIN — ALLOPURINOL 50 MG: 100 TABLET ORAL at 10:29

## 2025-02-21 RX ADMIN — HYDRALAZINE HYDROCHLORIDE 20 MG: 20 INJECTION INTRAMUSCULAR; INTRAVENOUS at 03:33

## 2025-02-21 RX ADMIN — HEPARIN SODIUM 5000 UNITS: 5000 INJECTION INTRAVENOUS; SUBCUTANEOUS at 12:40

## 2025-02-21 RX ADMIN — CALCIUM 500 MG: 500 TABLET ORAL at 18:35

## 2025-02-21 RX ADMIN — MORPHINE SULFATE 4 MG: 4 INJECTION INTRAVENOUS at 04:13

## 2025-02-21 RX ADMIN — SODIUM CHLORIDE, PRESERVATIVE FREE 10 ML: 5 INJECTION INTRAVENOUS at 21:24

## 2025-02-21 RX ADMIN — SERTRALINE 50 MG: 50 TABLET, FILM COATED ORAL at 12:40

## 2025-02-21 RX ADMIN — AMLODIPINE BESYLATE 5 MG: 5 TABLET ORAL at 10:30

## 2025-02-21 RX ADMIN — CALCIUM 500 MG: 500 TABLET ORAL at 10:28

## 2025-02-21 RX ADMIN — VITAMIN D, TAB 1000IU (100/BT) 1000 UNITS: 25 TAB at 10:29

## 2025-02-21 RX ADMIN — LORAZEPAM 0.25 MG: 0.5 TABLET ORAL at 03:39

## 2025-02-21 RX ADMIN — SODIUM CHLORIDE, PRESERVATIVE FREE 10 ML: 5 INJECTION INTRAVENOUS at 10:30

## 2025-02-21 RX ADMIN — ONDANSETRON 4 MG: 2 INJECTION, SOLUTION INTRAMUSCULAR; INTRAVENOUS at 04:13

## 2025-02-21 RX ADMIN — SODIUM CHLORIDE, POTASSIUM CHLORIDE, SODIUM LACTATE AND CALCIUM CHLORIDE: 600; 310; 30; 20 INJECTION, SOLUTION INTRAVENOUS at 10:27

## 2025-02-21 RX ADMIN — ASPIRIN 81 MG: 81 TABLET, CHEWABLE ORAL at 10:28

## 2025-02-21 RX ADMIN — HEPARIN SODIUM 5000 UNITS: 5000 INJECTION INTRAVENOUS; SUBCUTANEOUS at 05:01

## 2025-02-21 ASSESSMENT — PAIN DESCRIPTION - LOCATION
LOCATION: GENERALIZED
LOCATION: ABDOMEN;BACK

## 2025-02-21 ASSESSMENT — PAIN SCALES - GENERAL
PAINLEVEL_OUTOF10: 1
PAINLEVEL_OUTOF10: 8
PAINLEVEL_OUTOF10: 7
PAINLEVEL_OUTOF10: 0

## 2025-02-21 ASSESSMENT — PAIN - FUNCTIONAL ASSESSMENT: PAIN_FUNCTIONAL_ASSESSMENT: ACTIVITIES ARE NOT PREVENTED

## 2025-02-21 ASSESSMENT — PAIN DESCRIPTION - DESCRIPTORS
DESCRIPTORS: ACHING;SHARP
DESCRIPTORS: ACHING;DISCOMFORT

## 2025-02-21 ASSESSMENT — PAIN DESCRIPTION - ORIENTATION: ORIENTATION: UPPER;LOWER;LEFT

## 2025-02-21 NOTE — PLAN OF CARE
Problem: Chronic Conditions and Co-morbidities  Goal: Patient's chronic conditions and co-morbidity symptoms are monitored and maintained or improved  2/21/2025 0718 by Chandni Howell, RN  Outcome: Progressing  2/20/2025 2042 by Jolene Martinez RN  Outcome: Progressing     Problem: Discharge Planning  Goal: Discharge to home or other facility with appropriate resources  2/21/2025 0718 by Chandni Howell, RN  Outcome: Progressing  2/20/2025 2042 by Jolene Martinez, RN  Outcome: Progressing

## 2025-02-22 LAB
ANION GAP SERPL CALC-SCNC: 10 MMOL/L (ref 7–16)
BASOPHILS # BLD: 0.01 K/UL (ref 0–0.2)
BASOPHILS NFR BLD: 0.2 % (ref 0–2)
BUN SERPL-MCNC: 54 MG/DL (ref 8–23)
CALCIUM SERPL-MCNC: 8.3 MG/DL (ref 8.8–10.2)
CHLORIDE SERPL-SCNC: 110 MMOL/L (ref 98–107)
CO2 SERPL-SCNC: 21 MMOL/L (ref 20–29)
CREAT SERPL-MCNC: 3.77 MG/DL (ref 0.6–1.1)
DIFFERENTIAL METHOD BLD: ABNORMAL
EOSINOPHIL # BLD: 0.03 K/UL (ref 0–0.8)
EOSINOPHIL NFR BLD: 0.7 % (ref 0.5–7.8)
ERYTHROCYTE [DISTWIDTH] IN BLOOD BY AUTOMATED COUNT: 12.9 % (ref 11.9–14.6)
GLUCOSE BLD STRIP.AUTO-MCNC: 101 MG/DL (ref 65–100)
GLUCOSE BLD STRIP.AUTO-MCNC: 101 MG/DL (ref 65–100)
GLUCOSE BLD STRIP.AUTO-MCNC: 102 MG/DL (ref 65–100)
GLUCOSE BLD STRIP.AUTO-MCNC: 93 MG/DL (ref 65–100)
GLUCOSE SERPL-MCNC: 98 MG/DL (ref 70–99)
HCT VFR BLD AUTO: 35.2 % (ref 35.8–46.3)
HGB BLD-MCNC: 11.2 G/DL (ref 11.7–15.4)
IMM GRANULOCYTES # BLD AUTO: 0.06 K/UL (ref 0–0.5)
IMM GRANULOCYTES NFR BLD AUTO: 1.4 % (ref 0–5)
LYMPHOCYTES # BLD: 1.03 K/UL (ref 0.5–4.6)
LYMPHOCYTES NFR BLD: 24.8 % (ref 13–44)
MCH RBC QN AUTO: 29.2 PG (ref 26.1–32.9)
MCHC RBC AUTO-ENTMCNC: 31.8 G/DL (ref 31.4–35)
MCV RBC AUTO: 91.9 FL (ref 82–102)
MONOCYTES # BLD: 0.33 K/UL (ref 0.1–1.3)
MONOCYTES NFR BLD: 8 % (ref 4–12)
NEUTS SEG # BLD: 2.69 K/UL (ref 1.7–8.2)
NEUTS SEG NFR BLD: 64.9 % (ref 43–78)
NRBC # BLD: 0 K/UL (ref 0–0.2)
PLATELET # BLD AUTO: 141 K/UL (ref 150–450)
PMV BLD AUTO: 11.8 FL (ref 9.4–12.3)
POTASSIUM SERPL-SCNC: 3.9 MMOL/L (ref 3.5–5.1)
RBC # BLD AUTO: 3.83 M/UL (ref 4.05–5.2)
SERVICE CMNT-IMP: ABNORMAL
SERVICE CMNT-IMP: NORMAL
SODIUM SERPL-SCNC: 141 MMOL/L (ref 136–145)
WBC # BLD AUTO: 4.2 K/UL (ref 4.3–11.1)

## 2025-02-22 PROCEDURE — 2580000003 HC RX 258: Performed by: INTERNAL MEDICINE

## 2025-02-22 PROCEDURE — 6360000002 HC RX W HCPCS: Performed by: STUDENT IN AN ORGANIZED HEALTH CARE EDUCATION/TRAINING PROGRAM

## 2025-02-22 PROCEDURE — 6360000002 HC RX W HCPCS: Performed by: INTERNAL MEDICINE

## 2025-02-22 PROCEDURE — 6370000000 HC RX 637 (ALT 250 FOR IP): Performed by: INTERNAL MEDICINE

## 2025-02-22 PROCEDURE — 6370000000 HC RX 637 (ALT 250 FOR IP): Performed by: STUDENT IN AN ORGANIZED HEALTH CARE EDUCATION/TRAINING PROGRAM

## 2025-02-22 PROCEDURE — 36415 COLL VENOUS BLD VENIPUNCTURE: CPT

## 2025-02-22 PROCEDURE — 2500000003 HC RX 250 WO HCPCS: Performed by: FAMILY MEDICINE

## 2025-02-22 PROCEDURE — 1100000000 HC RM PRIVATE

## 2025-02-22 PROCEDURE — 80048 BASIC METABOLIC PNL TOTAL CA: CPT

## 2025-02-22 PROCEDURE — 82962 GLUCOSE BLOOD TEST: CPT

## 2025-02-22 PROCEDURE — 85025 COMPLETE CBC W/AUTO DIFF WBC: CPT

## 2025-02-22 PROCEDURE — 6370000000 HC RX 637 (ALT 250 FOR IP): Performed by: FAMILY MEDICINE

## 2025-02-22 PROCEDURE — 6360000002 HC RX W HCPCS: Performed by: FAMILY MEDICINE

## 2025-02-22 RX ORDER — MORPHINE SULFATE 2 MG/ML
1 INJECTION, SOLUTION INTRAMUSCULAR; INTRAVENOUS ONCE
Status: COMPLETED | OUTPATIENT
Start: 2025-02-22 | End: 2025-02-22

## 2025-02-22 RX ADMIN — CALCIUM 500 MG: 500 TABLET ORAL at 17:13

## 2025-02-22 RX ADMIN — HYDRALAZINE HYDROCHLORIDE 25 MG: 50 TABLET ORAL at 13:57

## 2025-02-22 RX ADMIN — MORPHINE SULFATE 4 MG: 4 INJECTION INTRAVENOUS at 21:48

## 2025-02-22 RX ADMIN — ASPIRIN 81 MG: 81 TABLET, CHEWABLE ORAL at 08:57

## 2025-02-22 RX ADMIN — ONDANSETRON 4 MG: 2 INJECTION, SOLUTION INTRAMUSCULAR; INTRAVENOUS at 19:50

## 2025-02-22 RX ADMIN — HEPARIN SODIUM 5000 UNITS: 5000 INJECTION INTRAVENOUS; SUBCUTANEOUS at 06:26

## 2025-02-22 RX ADMIN — CALCIUM 500 MG: 500 TABLET ORAL at 08:48

## 2025-02-22 RX ADMIN — HYDRALAZINE HYDROCHLORIDE 25 MG: 50 TABLET ORAL at 21:05

## 2025-02-22 RX ADMIN — MORPHINE SULFATE 1 MG: 2 INJECTION, SOLUTION INTRAMUSCULAR; INTRAVENOUS at 03:55

## 2025-02-22 RX ADMIN — LETROZOLE 2.5 MG: 2.5 TABLET, FILM COATED ORAL at 10:04

## 2025-02-22 RX ADMIN — AMLODIPINE BESYLATE 5 MG: 5 TABLET ORAL at 08:49

## 2025-02-22 RX ADMIN — HYDRALAZINE HYDROCHLORIDE 25 MG: 50 TABLET ORAL at 06:26

## 2025-02-22 RX ADMIN — SODIUM CHLORIDE, POTASSIUM CHLORIDE, SODIUM LACTATE AND CALCIUM CHLORIDE: 600; 310; 30; 20 INJECTION, SOLUTION INTRAVENOUS at 13:49

## 2025-02-22 RX ADMIN — CARVEDILOL 25 MG: 25 TABLET, FILM COATED ORAL at 08:48

## 2025-02-22 RX ADMIN — VITAMIN D, TAB 1000IU (100/BT) 1000 UNITS: 25 TAB at 08:47

## 2025-02-22 RX ADMIN — ONDANSETRON 4 MG: 2 INJECTION, SOLUTION INTRAMUSCULAR; INTRAVENOUS at 13:57

## 2025-02-22 RX ADMIN — CARVEDILOL 25 MG: 25 TABLET, FILM COATED ORAL at 17:13

## 2025-02-22 RX ADMIN — INSULIN GLARGINE 10 UNITS: 100 INJECTION, SOLUTION SUBCUTANEOUS at 21:03

## 2025-02-22 RX ADMIN — HEPARIN SODIUM 5000 UNITS: 5000 INJECTION INTRAVENOUS; SUBCUTANEOUS at 13:57

## 2025-02-22 RX ADMIN — FERROUS SULFATE TAB 325 MG (65 MG ELEMENTAL FE) 325 MG: 325 (65 FE) TAB at 08:57

## 2025-02-22 RX ADMIN — HEPARIN SODIUM 5000 UNITS: 5000 INJECTION INTRAVENOUS; SUBCUTANEOUS at 21:04

## 2025-02-22 ASSESSMENT — PAIN SCALES - GENERAL
PAINLEVEL_OUTOF10: 0
PAINLEVEL_OUTOF10: 7

## 2025-02-22 ASSESSMENT — PAIN DESCRIPTION - LOCATION
LOCATION: ABDOMEN
LOCATION: FLANK

## 2025-02-22 ASSESSMENT — PAIN - FUNCTIONAL ASSESSMENT: PAIN_FUNCTIONAL_ASSESSMENT: PREVENTS OR INTERFERES SOME ACTIVE ACTIVITIES AND ADLS

## 2025-02-22 ASSESSMENT — PAIN DESCRIPTION - DESCRIPTORS
DESCRIPTORS: ACHING
DESCRIPTORS: CRAMPING

## 2025-02-22 ASSESSMENT — PAIN DESCRIPTION - ORIENTATION: ORIENTATION: LEFT

## 2025-02-23 ENCOUNTER — APPOINTMENT (OUTPATIENT)
Dept: GENERAL RADIOLOGY | Age: 65
DRG: 392 | End: 2025-02-23
Payer: MEDICARE

## 2025-02-23 LAB
ANION GAP SERPL CALC-SCNC: 10 MMOL/L (ref 7–16)
BASOPHILS # BLD: 0.01 K/UL (ref 0–0.2)
BASOPHILS NFR BLD: 0.2 % (ref 0–2)
BUN SERPL-MCNC: 52 MG/DL (ref 8–23)
CALCIUM SERPL-MCNC: 8.5 MG/DL (ref 8.8–10.2)
CHLORIDE SERPL-SCNC: 111 MMOL/L (ref 98–107)
CO2 SERPL-SCNC: 22 MMOL/L (ref 20–29)
CREAT SERPL-MCNC: 3.8 MG/DL (ref 0.6–1.1)
DIFFERENTIAL METHOD BLD: ABNORMAL
EOSINOPHIL # BLD: 0.05 K/UL (ref 0–0.8)
EOSINOPHIL NFR BLD: 1.1 % (ref 0.5–7.8)
ERYTHROCYTE [DISTWIDTH] IN BLOOD BY AUTOMATED COUNT: 13.2 % (ref 11.9–14.6)
GLUCOSE BLD STRIP.AUTO-MCNC: 72 MG/DL (ref 65–100)
GLUCOSE BLD STRIP.AUTO-MCNC: 87 MG/DL (ref 65–100)
GLUCOSE BLD STRIP.AUTO-MCNC: 88 MG/DL (ref 65–100)
GLUCOSE BLD STRIP.AUTO-MCNC: 89 MG/DL (ref 65–100)
GLUCOSE SERPL-MCNC: 86 MG/DL (ref 70–99)
HCT VFR BLD AUTO: 32.7 % (ref 35.8–46.3)
HGB BLD-MCNC: 10.8 G/DL (ref 11.7–15.4)
IMM GRANULOCYTES # BLD AUTO: 0.06 K/UL (ref 0–0.5)
IMM GRANULOCYTES NFR BLD AUTO: 1.3 % (ref 0–5)
LYMPHOCYTES # BLD: 1.11 K/UL (ref 0.5–4.6)
LYMPHOCYTES NFR BLD: 24.2 % (ref 13–44)
MCH RBC QN AUTO: 29.4 PG (ref 26.1–32.9)
MCHC RBC AUTO-ENTMCNC: 33 G/DL (ref 31.4–35)
MCV RBC AUTO: 89.1 FL (ref 82–102)
MONOCYTES # BLD: 0.33 K/UL (ref 0.1–1.3)
MONOCYTES NFR BLD: 7.2 % (ref 4–12)
NEUTS SEG # BLD: 3.02 K/UL (ref 1.7–8.2)
NEUTS SEG NFR BLD: 66 % (ref 43–78)
NRBC # BLD: 0 K/UL (ref 0–0.2)
PLATELET # BLD AUTO: 153 K/UL (ref 150–450)
PMV BLD AUTO: 12.2 FL (ref 9.4–12.3)
POTASSIUM SERPL-SCNC: 3.8 MMOL/L (ref 3.5–5.1)
RBC # BLD AUTO: 3.67 M/UL (ref 4.05–5.2)
SERVICE CMNT-IMP: NORMAL
SODIUM SERPL-SCNC: 143 MMOL/L (ref 136–145)
WBC # BLD AUTO: 4.6 K/UL (ref 4.3–11.1)

## 2025-02-23 PROCEDURE — 6360000002 HC RX W HCPCS: Performed by: STUDENT IN AN ORGANIZED HEALTH CARE EDUCATION/TRAINING PROGRAM

## 2025-02-23 PROCEDURE — 6370000000 HC RX 637 (ALT 250 FOR IP): Performed by: INTERNAL MEDICINE

## 2025-02-23 PROCEDURE — 6370000000 HC RX 637 (ALT 250 FOR IP): Performed by: FAMILY MEDICINE

## 2025-02-23 PROCEDURE — 6360000002 HC RX W HCPCS: Performed by: INTERNAL MEDICINE

## 2025-02-23 PROCEDURE — 85025 COMPLETE CBC W/AUTO DIFF WBC: CPT

## 2025-02-23 PROCEDURE — 36415 COLL VENOUS BLD VENIPUNCTURE: CPT

## 2025-02-23 PROCEDURE — 1100000000 HC RM PRIVATE

## 2025-02-23 PROCEDURE — 99222 1ST HOSP IP/OBS MODERATE 55: CPT | Performed by: STUDENT IN AN ORGANIZED HEALTH CARE EDUCATION/TRAINING PROGRAM

## 2025-02-23 PROCEDURE — 2580000003 HC RX 258: Performed by: INTERNAL MEDICINE

## 2025-02-23 PROCEDURE — 71045 X-RAY EXAM CHEST 1 VIEW: CPT

## 2025-02-23 PROCEDURE — 80048 BASIC METABOLIC PNL TOTAL CA: CPT

## 2025-02-23 PROCEDURE — 82962 GLUCOSE BLOOD TEST: CPT

## 2025-02-23 PROCEDURE — 51798 US URINE CAPACITY MEASURE: CPT

## 2025-02-23 PROCEDURE — 6370000000 HC RX 637 (ALT 250 FOR IP): Performed by: STUDENT IN AN ORGANIZED HEALTH CARE EDUCATION/TRAINING PROGRAM

## 2025-02-23 PROCEDURE — 2500000003 HC RX 250 WO HCPCS: Performed by: STUDENT IN AN ORGANIZED HEALTH CARE EDUCATION/TRAINING PROGRAM

## 2025-02-23 RX ORDER — METOCLOPRAMIDE HYDROCHLORIDE 5 MG/ML
10 INJECTION INTRAMUSCULAR; INTRAVENOUS 3 TIMES DAILY
Status: DISCONTINUED | OUTPATIENT
Start: 2025-02-23 | End: 2025-02-25 | Stop reason: HOSPADM

## 2025-02-23 RX ADMIN — FERROUS SULFATE TAB 325 MG (65 MG ELEMENTAL FE) 325 MG: 325 (65 FE) TAB at 09:06

## 2025-02-23 RX ADMIN — SODIUM CHLORIDE, POTASSIUM CHLORIDE, SODIUM LACTATE AND CALCIUM CHLORIDE: 600; 310; 30; 20 INJECTION, SOLUTION INTRAVENOUS at 03:17

## 2025-02-23 RX ADMIN — CALCIUM 500 MG: 500 TABLET ORAL at 15:42

## 2025-02-23 RX ADMIN — ALLOPURINOL 50 MG: 100 TABLET ORAL at 09:05

## 2025-02-23 RX ADMIN — HYDRALAZINE HYDROCHLORIDE 25 MG: 50 TABLET ORAL at 05:35

## 2025-02-23 RX ADMIN — CALCIUM 500 MG: 500 TABLET ORAL at 09:06

## 2025-02-23 RX ADMIN — HYDRALAZINE HYDROCHLORIDE 25 MG: 50 TABLET ORAL at 21:09

## 2025-02-23 RX ADMIN — HEPARIN SODIUM 5000 UNITS: 5000 INJECTION INTRAVENOUS; SUBCUTANEOUS at 05:35

## 2025-02-23 RX ADMIN — VITAMIN D, TAB 1000IU (100/BT) 1000 UNITS: 25 TAB at 09:05

## 2025-02-23 RX ADMIN — METOCLOPRAMIDE 10 MG: 5 INJECTION, SOLUTION INTRAMUSCULAR; INTRAVENOUS at 21:09

## 2025-02-23 RX ADMIN — AMLODIPINE BESYLATE 5 MG: 5 TABLET ORAL at 09:06

## 2025-02-23 RX ADMIN — MORPHINE SULFATE 4 MG: 4 INJECTION INTRAVENOUS at 05:39

## 2025-02-23 RX ADMIN — SODIUM CHLORIDE, PRESERVATIVE FREE 20 ML: 5 INJECTION INTRAVENOUS at 21:13

## 2025-02-23 RX ADMIN — HEPARIN SODIUM 5000 UNITS: 5000 INJECTION INTRAVENOUS; SUBCUTANEOUS at 21:08

## 2025-02-23 RX ADMIN — CARVEDILOL 25 MG: 25 TABLET, FILM COATED ORAL at 09:05

## 2025-02-23 RX ADMIN — INSULIN GLARGINE 10 UNITS: 100 INJECTION, SOLUTION SUBCUTANEOUS at 21:08

## 2025-02-23 RX ADMIN — LETROZOLE 2.5 MG: 2.5 TABLET, FILM COATED ORAL at 09:10

## 2025-02-23 RX ADMIN — ACETAMINOPHEN 650 MG: 325 TABLET ORAL at 09:16

## 2025-02-23 RX ADMIN — ASPIRIN 81 MG: 81 TABLET, CHEWABLE ORAL at 09:06

## 2025-02-23 RX ADMIN — HEPARIN SODIUM 5000 UNITS: 5000 INJECTION INTRAVENOUS; SUBCUTANEOUS at 14:37

## 2025-02-23 RX ADMIN — METOCLOPRAMIDE 10 MG: 5 INJECTION, SOLUTION INTRAMUSCULAR; INTRAVENOUS at 14:37

## 2025-02-23 RX ADMIN — CARVEDILOL 25 MG: 25 TABLET, FILM COATED ORAL at 15:42

## 2025-02-23 RX ADMIN — HYDRALAZINE HYDROCHLORIDE 25 MG: 50 TABLET ORAL at 14:37

## 2025-02-23 RX ADMIN — ACETAMINOPHEN 650 MG: 325 TABLET ORAL at 15:42

## 2025-02-23 RX ADMIN — MORPHINE SULFATE 4 MG: 4 INJECTION INTRAVENOUS at 16:20

## 2025-02-23 RX ADMIN — PROCHLORPERAZINE EDISYLATE 10 MG: 5 INJECTION INTRAMUSCULAR; INTRAVENOUS at 00:49

## 2025-02-23 ASSESSMENT — PAIN SCALES - GENERAL
PAINLEVEL_OUTOF10: 0
PAINLEVEL_OUTOF10: 0
PAINLEVEL_OUTOF10: 7
PAINLEVEL_OUTOF10: 8

## 2025-02-23 ASSESSMENT — PAIN DESCRIPTION - LOCATION: LOCATION: GENERALIZED

## 2025-02-23 ASSESSMENT — PAIN DESCRIPTION - DESCRIPTORS: DESCRIPTORS: ACHING

## 2025-02-23 ASSESSMENT — PAIN DESCRIPTION - ORIENTATION: ORIENTATION: LEFT

## 2025-02-23 ASSESSMENT — PAIN - FUNCTIONAL ASSESSMENT: PAIN_FUNCTIONAL_ASSESSMENT: PREVENTS OR INTERFERES SOME ACTIVE ACTIVITIES AND ADLS

## 2025-02-23 NOTE — CONSULTS
Claudette Zelaya is 64 y.o. y/o female here for initial evaluation.     History of Present Illness    PMH of CKD stage IIIb, coronary artery disease, diabetes type 2, heart failure reduced ejection fraction, hyperlipidemia, hypertension, peptic ulcer disease, thrombocytopenia who presented with intractable nausea and vomiting.      She reports dysphagia to solids and liquids, feels fullness, remote history of EGD reportedly.  This am had breakfast and had n/v within minutes after the breakfast.   No GI procedures found on chart review.       Lab Results   Component Value Date    HGB 10.8 (L) 02/23/2025    WBC 4.6 02/23/2025     02/23/2025    MCV 89.1 02/23/2025    CREATININE 3.80 (H) 02/23/2025    ALT <5 (L) 02/15/2025    AST 20 02/15/2025       Past Medical History:   Diagnosis Date    Acute renal failure superimposed on stage 3b chronic kidney disease (HCC) 11/2/2022    Breast cancer ([facility]) 2015    left stage 1 infiltrating ductal, radiation, and lumpectomy, letrozole    CAD (coronary artery disease)     multivessel, awaiting CABG and mitral valve repair/replacement    CKD (chronic kidney disease)     Diabetes (HCC) typell, dx 2016    typell, ID, avfbs 160, last A1C was 10.8. denies lows    Heart failure ([facility])     EF 40-45%    Hypercholesteremia     Hypertension     Hypoxia 10/1/2019    Intertrochanteric fracture of right femur (HCC) 2/24/2018    Mitral valve regurgitation     PUD (peptic ulcer disease)     age 16, no treatment since    Thrombocytopenia 10/2/2019    UTI (urinary tract infection) 9/30/2019     Past Surgical History:   Procedure Laterality Date    BREAST LUMPECTOMY  2015    CARDIAC SURGERY  2019    CABG    HIP FRACTURE SURGERY Right 2018    ORTHOPEDIC SURGERY Right     hip fracture repair with hardware , not replacent     Family History   Problem Relation Age of Onset    Heart Disease Brother     Heart Disease Father     Kidney Disease Mother      Social History     Tobacco Use    
US Guided PIV access-    Ultrasound was used to find the vein which was compressible and without any ultrasound features of an artery or nerve bundle. Skin was cleaned and disinfected prior to IV puncture.  Under real-time ultrasound guidance peripheral access was obtained in the right AC using 20 G 2.25\" Peripheral IV catheter after 1 attempt(s). Blood return was present and IV flushed without difficulty with no clinical signs of infiltration. IV dressing applied and no immediate complications noted. Patient tolerated the procedure well.    
(chronic kidney disease) stage 4, GFR 15-29 ml/min ([facility]) 01/10/2024    White coat syndrome with hypertension 01/10/2024    Hot flashes 06/07/2023    Pain of right hip 06/07/2023    Persistent proteinuria 04/06/2022    Essential hypertension 10/07/2019    S/P CABG x 3 10/02/2019    S/P MVR (mitral valve repair) 10/02/2019    Suspected sleep apnea 10/02/2019    Hypocalcemia 10/02/2019    Nonrheumatic mitral valve regurgitation 10/01/2019    Coronary artery disease of native artery of native heart with stable angina pectoris ([facility]) 09/26/2019    Malignant neoplasm of left female breast ([facility]) 09/22/2019    Type 2 diabetes mellitus, with long-term current use of insulin ([facility]) 09/22/2019       Assessment and Plan:    1) YURY - most likely secondary to volume depletion in the setting on advanced underlying CKD. Creatinine is up to 3.7. Would benjamin her to isotonic fluid and increase rate. Will check urine studies and an US.    2) Acidosis - gap and non gap. She denies diarrhea. Suspect atleast in art due to renal failure. Would change her IV to bicarbonate and replace.     3) N/V - remains intractable. Per IM    4) Hypernatremia - secondary to free water deficit. Better today but would restore volume mehnaz then change to hypotonic fluid.     Thank you for the kind courtesy of this consultation. Will make further adjustments to the medical regimen as the clinical course dictates and follow very closely with you.      JOSE ARTHUR MD    
Chronic gout    CKD stage 3b, GFR 30-44 ml/min ([facility])    Mixed hyperlipidemia    Chronic diastolic congestive heart failure (HCC)    Class 1 obesity due to excess calories with serious comorbidity and body mass index (BMI) of 30.0 to 30.9 in adult    S/P CABG x 3    Essential hypertension    Type 2 diabetes mellitus, with long-term current use of insulin ([facility])    S/P MVR (mitral valve repair)    Coronary artery disease of native artery of native heart with stable angina pectoris    Acute kidney injury    Dehydration    Acute cystitis without hematuria    Generalized abdominal pain    History of left breast cancer  Resolved Problems:    * No resolved hospital problems. *     1) YURY/CKD stage 4  - Baseline Cr probably upper 2's  - YURY in setting of N/V  - Cr about the same today, hopefully plateauing     2) Metabolic acidosis - better  - Can add oral bicarb if needed     3) Hypernatremia  - Continue hypotonic saline     4) N/V with abdominal pain - seems better     5) HTN - multiple meds     High Medical Decision Making  -Patient with at least one acute illness that poses a threat to bodily function  -Reviewed 3 labs  -Reviewed external charts     RN note - Pt is requesting kimball to be removed due to burning sensation and discomfort. Notified hospitalist- okay to d/c kimball.     PT note - Attempted to see patient this PM for physical therapy treatment  session. Treatment deferred as every attempt today the patient has been asleep. Will follow and re-attempt as schedule permits/patient available. Thank you     Dietitian note - At encounter again today, pt is sleeping, only briefly awakens s/p ativan. RN reports pt was more alert last pm at shift change and earlier today. She is accepting small amounts of oral intake. Her overall intake remains poor, nursing notes no active vomiting.     Hospitalist note - Mrs. Zelaya is seen today after lunch.  She ate about 75% of her lunch she is much more awake and alert today and

## 2025-02-24 ENCOUNTER — ANESTHESIA (OUTPATIENT)
Dept: ENDOSCOPY | Age: 65
DRG: 392 | End: 2025-02-24
Payer: MEDICARE

## 2025-02-24 ENCOUNTER — ANESTHESIA EVENT (OUTPATIENT)
Dept: ENDOSCOPY | Age: 65
DRG: 392 | End: 2025-02-24
Payer: MEDICARE

## 2025-02-24 LAB
ANION GAP SERPL CALC-SCNC: 10 MMOL/L (ref 7–16)
BASOPHILS # BLD: 0.02 K/UL (ref 0–0.2)
BASOPHILS NFR BLD: 0.4 % (ref 0–2)
BUN SERPL-MCNC: 52 MG/DL (ref 8–23)
CALCIUM SERPL-MCNC: 8.6 MG/DL (ref 8.8–10.2)
CHLORIDE SERPL-SCNC: 110 MMOL/L (ref 98–107)
CO2 SERPL-SCNC: 23 MMOL/L (ref 20–29)
CREAT SERPL-MCNC: 3.89 MG/DL (ref 0.6–1.1)
DIFFERENTIAL METHOD BLD: ABNORMAL
EOSINOPHIL # BLD: 0.08 K/UL (ref 0–0.8)
EOSINOPHIL NFR BLD: 1.6 % (ref 0.5–7.8)
ERYTHROCYTE [DISTWIDTH] IN BLOOD BY AUTOMATED COUNT: 13.1 % (ref 11.9–14.6)
GLUCOSE BLD STRIP.AUTO-MCNC: 156 MG/DL (ref 65–100)
GLUCOSE BLD STRIP.AUTO-MCNC: 160 MG/DL (ref 65–100)
GLUCOSE BLD STRIP.AUTO-MCNC: 69 MG/DL (ref 65–100)
GLUCOSE BLD STRIP.AUTO-MCNC: 73 MG/DL (ref 65–100)
GLUCOSE BLD STRIP.AUTO-MCNC: 91 MG/DL (ref 65–100)
GLUCOSE SERPL-MCNC: 83 MG/DL (ref 70–99)
HCT VFR BLD AUTO: 33.9 % (ref 35.8–46.3)
HGB BLD-MCNC: 10.8 G/DL (ref 11.7–15.4)
IMM GRANULOCYTES # BLD AUTO: 0.08 K/UL (ref 0–0.5)
IMM GRANULOCYTES NFR BLD AUTO: 1.6 % (ref 0–5)
LYMPHOCYTES # BLD: 1.15 K/UL (ref 0.5–4.6)
LYMPHOCYTES NFR BLD: 22.4 % (ref 13–44)
MCH RBC QN AUTO: 29.2 PG (ref 26.1–32.9)
MCHC RBC AUTO-ENTMCNC: 31.9 G/DL (ref 31.4–35)
MCV RBC AUTO: 91.6 FL (ref 82–102)
MONOCYTES # BLD: 0.41 K/UL (ref 0.1–1.3)
MONOCYTES NFR BLD: 8 % (ref 4–12)
NEUTS SEG # BLD: 3.39 K/UL (ref 1.7–8.2)
NEUTS SEG NFR BLD: 66 % (ref 43–78)
NRBC # BLD: 0 K/UL (ref 0–0.2)
PLATELET # BLD AUTO: 156 K/UL (ref 150–450)
PMV BLD AUTO: 12.3 FL (ref 9.4–12.3)
POTASSIUM SERPL-SCNC: 3.9 MMOL/L (ref 3.5–5.1)
RBC # BLD AUTO: 3.7 M/UL (ref 4.05–5.2)
SERVICE CMNT-IMP: ABNORMAL
SERVICE CMNT-IMP: ABNORMAL
SERVICE CMNT-IMP: NORMAL
SODIUM SERPL-SCNC: 142 MMOL/L (ref 136–145)
WBC # BLD AUTO: 5.1 K/UL (ref 4.3–11.1)

## 2025-02-24 PROCEDURE — 88305 TISSUE EXAM BY PATHOLOGIST: CPT

## 2025-02-24 PROCEDURE — 3700000001 HC ADD 15 MINUTES (ANESTHESIA): Performed by: INTERNAL MEDICINE

## 2025-02-24 PROCEDURE — 3700000000 HC ANESTHESIA ATTENDED CARE: Performed by: INTERNAL MEDICINE

## 2025-02-24 PROCEDURE — 2580000003 HC RX 258: Performed by: REGISTERED NURSE

## 2025-02-24 PROCEDURE — 82962 GLUCOSE BLOOD TEST: CPT

## 2025-02-24 PROCEDURE — 80048 BASIC METABOLIC PNL TOTAL CA: CPT

## 2025-02-24 PROCEDURE — 6370000000 HC RX 637 (ALT 250 FOR IP): Performed by: STUDENT IN AN ORGANIZED HEALTH CARE EDUCATION/TRAINING PROGRAM

## 2025-02-24 PROCEDURE — 7100000011 HC PHASE II RECOVERY - ADDTL 15 MIN: Performed by: INTERNAL MEDICINE

## 2025-02-24 PROCEDURE — 7100000010 HC PHASE II RECOVERY - FIRST 15 MIN: Performed by: INTERNAL MEDICINE

## 2025-02-24 PROCEDURE — 6370000000 HC RX 637 (ALT 250 FOR IP): Performed by: FAMILY MEDICINE

## 2025-02-24 PROCEDURE — 85025 COMPLETE CBC W/AUTO DIFF WBC: CPT

## 2025-02-24 PROCEDURE — 6370000000 HC RX 637 (ALT 250 FOR IP): Performed by: INTERNAL MEDICINE

## 2025-02-24 PROCEDURE — 2500000003 HC RX 250 WO HCPCS: Performed by: STUDENT IN AN ORGANIZED HEALTH CARE EDUCATION/TRAINING PROGRAM

## 2025-02-24 PROCEDURE — 2500000003 HC RX 250 WO HCPCS: Performed by: REGISTERED NURSE

## 2025-02-24 PROCEDURE — 97530 THERAPEUTIC ACTIVITIES: CPT

## 2025-02-24 PROCEDURE — 6360000002 HC RX W HCPCS: Performed by: INTERNAL MEDICINE

## 2025-02-24 PROCEDURE — 6360000002 HC RX W HCPCS: Performed by: STUDENT IN AN ORGANIZED HEALTH CARE EDUCATION/TRAINING PROGRAM

## 2025-02-24 PROCEDURE — 6360000002 HC RX W HCPCS: Performed by: REGISTERED NURSE

## 2025-02-24 PROCEDURE — 43239 EGD BIOPSY SINGLE/MULTIPLE: CPT | Performed by: INTERNAL MEDICINE

## 2025-02-24 PROCEDURE — 2709999900 HC NON-CHARGEABLE SUPPLY: Performed by: INTERNAL MEDICINE

## 2025-02-24 PROCEDURE — 1100000000 HC RM PRIVATE

## 2025-02-24 PROCEDURE — 43248 EGD GUIDE WIRE INSERTION: CPT | Performed by: INTERNAL MEDICINE

## 2025-02-24 PROCEDURE — 0DB58ZX EXCISION OF ESOPHAGUS, VIA NATURAL OR ARTIFICIAL OPENING ENDOSCOPIC, DIAGNOSTIC: ICD-10-PCS | Performed by: INTERNAL MEDICINE

## 2025-02-24 PROCEDURE — C1769 GUIDE WIRE: HCPCS | Performed by: INTERNAL MEDICINE

## 2025-02-24 PROCEDURE — 3609017700 HC EGD DILATION GASTRIC/DUODENAL STRICTURE: Performed by: INTERNAL MEDICINE

## 2025-02-24 PROCEDURE — 36415 COLL VENOUS BLD VENIPUNCTURE: CPT

## 2025-02-24 RX ORDER — LIDOCAINE HYDROCHLORIDE 20 MG/ML
INJECTION, SOLUTION EPIDURAL; INFILTRATION; INTRACAUDAL; PERINEURAL
Status: DISCONTINUED | OUTPATIENT
Start: 2025-02-24 | End: 2025-02-24 | Stop reason: SDUPTHER

## 2025-02-24 RX ORDER — SODIUM CHLORIDE, SODIUM LACTATE, POTASSIUM CHLORIDE, CALCIUM CHLORIDE 600; 310; 30; 20 MG/100ML; MG/100ML; MG/100ML; MG/100ML
INJECTION, SOLUTION INTRAVENOUS
Status: DISCONTINUED | OUTPATIENT
Start: 2025-02-24 | End: 2025-02-24 | Stop reason: SDUPTHER

## 2025-02-24 RX ORDER — EPHEDRINE SULFATE 5 MG/ML
INJECTION INTRAVENOUS
Status: DISCONTINUED | OUTPATIENT
Start: 2025-02-24 | End: 2025-02-24 | Stop reason: SDUPTHER

## 2025-02-24 RX ORDER — BISACODYL 10 MG
10 SUPPOSITORY, RECTAL RECTAL DAILY PRN
Status: DISCONTINUED | OUTPATIENT
Start: 2025-02-24 | End: 2025-02-25 | Stop reason: HOSPADM

## 2025-02-24 RX ORDER — PROPOFOL 10 MG/ML
INJECTION, EMULSION INTRAVENOUS
Status: DISCONTINUED | OUTPATIENT
Start: 2025-02-24 | End: 2025-02-24 | Stop reason: SDUPTHER

## 2025-02-24 RX ADMIN — PROCHLORPERAZINE EDISYLATE 10 MG: 5 INJECTION INTRAMUSCULAR; INTRAVENOUS at 00:38

## 2025-02-24 RX ADMIN — CALCIUM 500 MG: 500 TABLET ORAL at 17:44

## 2025-02-24 RX ADMIN — EPHEDRINE SULFATE 10 MG: 5 INJECTION INTRAVENOUS at 11:08

## 2025-02-24 RX ADMIN — HYDRALAZINE HYDROCHLORIDE 25 MG: 50 TABLET ORAL at 22:02

## 2025-02-24 RX ADMIN — SODIUM CHLORIDE, SODIUM LACTATE, POTASSIUM CHLORIDE, AND CALCIUM CHLORIDE: 600; 310; 30; 20 INJECTION, SOLUTION INTRAVENOUS at 10:47

## 2025-02-24 RX ADMIN — METOCLOPRAMIDE 10 MG: 5 INJECTION, SOLUTION INTRAMUSCULAR; INTRAVENOUS at 08:02

## 2025-02-24 RX ADMIN — PROPOFOL 200 MCG/KG/MIN: 10 INJECTION, EMULSION INTRAVENOUS at 10:52

## 2025-02-24 RX ADMIN — PROPOFOL 60 MG: 10 INJECTION, EMULSION INTRAVENOUS at 10:51

## 2025-02-24 RX ADMIN — CALCIUM 500 MG: 500 TABLET ORAL at 13:24

## 2025-02-24 RX ADMIN — HYDRALAZINE HYDROCHLORIDE 25 MG: 50 TABLET ORAL at 05:54

## 2025-02-24 RX ADMIN — SODIUM CHLORIDE, PRESERVATIVE FREE 10 ML: 5 INJECTION INTRAVENOUS at 21:01

## 2025-02-24 RX ADMIN — BISACODYL 10 MG: 10 SUPPOSITORY RECTAL at 15:32

## 2025-02-24 RX ADMIN — HEPARIN SODIUM 5000 UNITS: 5000 INJECTION INTRAVENOUS; SUBCUTANEOUS at 22:04

## 2025-02-24 RX ADMIN — ASPIRIN 81 MG: 81 TABLET, CHEWABLE ORAL at 13:23

## 2025-02-24 RX ADMIN — METOCLOPRAMIDE 10 MG: 5 INJECTION, SOLUTION INTRAMUSCULAR; INTRAVENOUS at 15:32

## 2025-02-24 RX ADMIN — FERROUS SULFATE TAB 325 MG (65 MG ELEMENTAL FE) 325 MG: 325 (65 FE) TAB at 13:24

## 2025-02-24 RX ADMIN — INSULIN GLARGINE 10 UNITS: 100 INJECTION, SOLUTION SUBCUTANEOUS at 21:57

## 2025-02-24 RX ADMIN — HEPARIN SODIUM 5000 UNITS: 5000 INJECTION INTRAVENOUS; SUBCUTANEOUS at 14:09

## 2025-02-24 RX ADMIN — CARVEDILOL 25 MG: 25 TABLET, FILM COATED ORAL at 08:18

## 2025-02-24 RX ADMIN — POLYETHYLENE GLYCOL 3350 17 G: 17 POWDER, FOR SOLUTION ORAL at 17:44

## 2025-02-24 RX ADMIN — HYDRALAZINE HYDROCHLORIDE 25 MG: 50 TABLET ORAL at 14:10

## 2025-02-24 RX ADMIN — METOCLOPRAMIDE 10 MG: 5 INJECTION, SOLUTION INTRAMUSCULAR; INTRAVENOUS at 21:00

## 2025-02-24 RX ADMIN — VITAMIN D, TAB 1000IU (100/BT) 1000 UNITS: 25 TAB at 13:23

## 2025-02-24 RX ADMIN — LIDOCAINE HYDROCHLORIDE 100 MG: 20 INJECTION, SOLUTION EPIDURAL; INFILTRATION; INTRACAUDAL; PERINEURAL at 10:51

## 2025-02-24 RX ADMIN — LORAZEPAM 0.25 MG: 0.5 TABLET ORAL at 03:41

## 2025-02-24 RX ADMIN — AMLODIPINE BESYLATE 5 MG: 5 TABLET ORAL at 08:19

## 2025-02-24 RX ADMIN — ACETAMINOPHEN 650 MG: 325 TABLET ORAL at 03:40

## 2025-02-24 RX ADMIN — CARVEDILOL 25 MG: 25 TABLET, FILM COATED ORAL at 17:45

## 2025-02-24 RX ADMIN — SODIUM CHLORIDE, PRESERVATIVE FREE 10 ML: 5 INJECTION INTRAVENOUS at 08:02

## 2025-02-24 ASSESSMENT — PAIN SCALES - GENERAL
PAINLEVEL_OUTOF10: 0
PAINLEVEL_OUTOF10: 3
PAINLEVEL_OUTOF10: 0

## 2025-02-24 ASSESSMENT — PAIN DESCRIPTION - ORIENTATION: ORIENTATION: LEFT;ANTERIOR

## 2025-02-24 ASSESSMENT — PAIN DESCRIPTION - DESCRIPTORS: DESCRIPTORS: SORE

## 2025-02-24 ASSESSMENT — LIFESTYLE VARIABLES: SMOKING_STATUS: 0

## 2025-02-24 ASSESSMENT — PAIN - FUNCTIONAL ASSESSMENT: PAIN_FUNCTIONAL_ASSESSMENT: PREVENTS OR INTERFERES SOME ACTIVE ACTIVITIES AND ADLS

## 2025-02-24 ASSESSMENT — PAIN DESCRIPTION - LOCATION: LOCATION: GENERALIZED

## 2025-02-24 NOTE — ANESTHESIA POSTPROCEDURE EVALUATION
Department of Anesthesiology  Postprocedure Note    Patient: Claudette Zelaya  MRN: [id number]  YOB: 1960  Date of evaluation: 2/24/2025    Procedure Summary       Date: 02/24/25 Room / Location: [facility] ENDO 03 / [facility] ENDOSCOPY    Anesthesia Start: 1047 Anesthesia Stop: 1110    Procedure: ESOPHAGOGASTRODUODENOSCOPY DILATION SAVORY and BXS (Upper GI Region) Diagnosis:       Dysphagia, unspecified type      (Dysphagia, unspecified type [R13.10])    Surgeons: Cristiane Purvis MD Responsible Provider: Donovan Hameed MD    Anesthesia Type: TIVA ASA Status: 3            Anesthesia Type: TIVA    Jessica Phase I: Jessica Score: 10    Jessica Phase II: Jessica Score: 10    Anesthesia Post Evaluation    Patient location during evaluation: PACU  Patient participation: complete - patient participated  Level of consciousness: awake and alert  Airway patency: patent  Nausea & Vomiting: no nausea and no vomiting  Cardiovascular status: hemodynamically stable  Respiratory status: acceptable, nonlabored ventilation and spontaneous ventilation  Hydration status: euvolemic  Comments: BP (!) 145/66   Pulse 71   Temp 97.7 °F (36.5 °C) (Skin)   Resp 10   Ht 1.6 m (5' 2.99\")   Wt 97.7 kg (215 lb 6.2 oz)   SpO2 94%   BMI 38.16 kg/m²     Multimodal analgesia pain management approach  Pain management: adequate and satisfactory to patient        No notable events documented.

## 2025-02-24 NOTE — ANESTHESIA PRE PROCEDURE
Department of Anesthesiology  Preprocedure Note       Name:  Claudette Zelaya   Age:  64 y.o.  :  1960                                          MRN:  [id number]         Date:  2025      Surgeon: Surgeon(s):  Cristiane Purvis MD    Procedure: Procedure(s):  ESOPHAGOGASTRODUODENOSCOPY DILATION BALLOON    Medications prior to admission:   Prior to Admission medications    Medication Sig Start Date End Date Taking? Authorizing Provider   ondansetron (ZOFRAN) 8 MG tablet Take 1 tablet by mouth every 8 hours as needed for Nausea or Vomiting 25   Desean Carroll DO   sodium bicarbonate 650 MG tablet Take 2 tablets by mouth daily 1/3/25 1/3/26  ProviderAng MD   furosemide (LASIX) 20 MG tablet Take 1 tablet by mouth 2 times daily 25   Daniella Moses MD   Dulaglutide (TRULICITY) 4.5 MG/0.5ML SOAJ Inject 4.5 mg into the skin once a week 25   Daniella Moses MD   gabapentin (NEURONTIN) 100 MG capsule Take 1 capsule by mouth nightly as needed (pain) for up to 360 days. 24  Daniella Moses MD   insulin glargine (LANTUS) 100 UNIT/ML injection vial INJECT 20 UNITS INTO THE SKIN NIGHTLY 24   Daniella Moses MD   atorvastatin (LIPITOR) 40 MG tablet TAKE 1 TABLET BY MOUTH EVERY DAY 10/14/24   Daniella Moses MD   allopurinol (ZYLOPRIM) 100 MG tablet Take 0.5 tablets by mouth every other day 10/2/24   Daniella Moses MD   amLODIPine (NORVASC) 5 MG tablet Take 1 tablet by mouth daily 24   Daniella Moses MD   carvedilol (COREG) 25 MG tablet TAKE 1 TABLET BY MOUTH TWICE A DAY WITH MEALS 24   Daniella Moses MD   lisinopril (PRINIVIL;ZESTRIL) 20 MG tablet Take 1 tablet by mouth daily    Ang Oneill MD   ferrous sulfate (IRON 325) 325 (65 Fe) MG tablet Take 1 tablet by mouth daily (with breakfast) 24   Daniella Moses MD   JARDIANCE 10 MG tablet TAKE 1 TABLET (10 MG) BY MOUTH DAILY. 23   Provider, MD Ang   letrozole

## 2025-02-24 NOTE — PLAN OF CARE
Problem: Chronic Conditions and Co-morbidities  Goal: Patient's chronic conditions and co-morbidity symptoms are monitored and maintained or improved  2/24/2025 0710 by Chandni Howell RN  Outcome: Progressing  2/23/2025 2214 by Lara Rice RN  Outcome: Progressing     Problem: Discharge Planning  Goal: Discharge to home or other facility with appropriate resources  2/24/2025 0710 by Chandni Howell RN  Outcome: Progressing  2/23/2025 2214 by Lara Rice RN  Outcome: Progressing     Problem: Pain  Goal: Verbalizes/displays adequate comfort level or baseline comfort level  2/23/2025 2214 by Lara Rice, RN  Outcome: Progressing

## 2025-02-24 NOTE — PLAN OF CARE
EGD complete, see procedure note. Mild gastritis biopsied. Esophagus normal, EoE biopsies obtained and empiric dilation performed.       - Follow up pathology results   - Advance diet as tolerated  - Continue antiemetics as needed   - GI team will sign off at this time, please call with questions     Cristiane Purvis MD  [facility]

## 2025-02-25 VITALS
RESPIRATION RATE: 12 BRPM | DIASTOLIC BLOOD PRESSURE: 69 MMHG | BODY MASS INDEX: 38.36 KG/M2 | SYSTOLIC BLOOD PRESSURE: 128 MMHG | TEMPERATURE: 97.7 F | HEART RATE: 70 BPM | HEIGHT: 63 IN | WEIGHT: 216.49 LBS | OXYGEN SATURATION: 94 %

## 2025-02-25 LAB
ANION GAP SERPL CALC-SCNC: 9 MMOL/L (ref 7–16)
BASOPHILS # BLD: 0.02 K/UL (ref 0–0.2)
BASOPHILS NFR BLD: 0.4 % (ref 0–2)
BUN SERPL-MCNC: 48 MG/DL (ref 8–23)
CALCIUM SERPL-MCNC: 8.4 MG/DL (ref 8.8–10.2)
CHLORIDE SERPL-SCNC: 111 MMOL/L (ref 98–107)
CO2 SERPL-SCNC: 21 MMOL/L (ref 20–29)
CREAT SERPL-MCNC: 3.74 MG/DL (ref 0.6–1.1)
DIFFERENTIAL METHOD BLD: ABNORMAL
EOSINOPHIL # BLD: 0.1 K/UL (ref 0–0.8)
EOSINOPHIL NFR BLD: 2 % (ref 0.5–7.8)
ERYTHROCYTE [DISTWIDTH] IN BLOOD BY AUTOMATED COUNT: 13.2 % (ref 11.9–14.6)
GLUCOSE BLD STRIP.AUTO-MCNC: 123 MG/DL (ref 65–100)
GLUCOSE BLD STRIP.AUTO-MCNC: 158 MG/DL (ref 65–100)
GLUCOSE SERPL-MCNC: 183 MG/DL (ref 70–99)
HCT VFR BLD AUTO: 31.4 % (ref 35.8–46.3)
HGB BLD-MCNC: 10.4 G/DL (ref 11.7–15.4)
IMM GRANULOCYTES # BLD AUTO: 0.14 K/UL (ref 0–0.5)
IMM GRANULOCYTES NFR BLD AUTO: 2.7 % (ref 0–5)
LYMPHOCYTES # BLD: 1.18 K/UL (ref 0.5–4.6)
LYMPHOCYTES NFR BLD: 23 % (ref 13–44)
MCH RBC QN AUTO: 29.2 PG (ref 26.1–32.9)
MCHC RBC AUTO-ENTMCNC: 33.1 G/DL (ref 31.4–35)
MCV RBC AUTO: 88.2 FL (ref 82–102)
MONOCYTES # BLD: 0.46 K/UL (ref 0.1–1.3)
MONOCYTES NFR BLD: 9 % (ref 4–12)
NEUTS SEG # BLD: 3.22 K/UL (ref 1.7–8.2)
NEUTS SEG NFR BLD: 62.9 % (ref 43–78)
NRBC # BLD: 0 K/UL (ref 0–0.2)
PLATELET # BLD AUTO: 145 K/UL (ref 150–450)
PMV BLD AUTO: 12.8 FL (ref 9.4–12.3)
POTASSIUM SERPL-SCNC: 4.1 MMOL/L (ref 3.5–5.1)
RBC # BLD AUTO: 3.56 M/UL (ref 4.05–5.2)
SERVICE CMNT-IMP: ABNORMAL
SERVICE CMNT-IMP: ABNORMAL
SODIUM SERPL-SCNC: 141 MMOL/L (ref 136–145)
WBC # BLD AUTO: 5.1 K/UL (ref 4.3–11.1)

## 2025-02-25 PROCEDURE — 6370000000 HC RX 637 (ALT 250 FOR IP): Performed by: INTERNAL MEDICINE

## 2025-02-25 PROCEDURE — 6370000000 HC RX 637 (ALT 250 FOR IP): Performed by: STUDENT IN AN ORGANIZED HEALTH CARE EDUCATION/TRAINING PROGRAM

## 2025-02-25 PROCEDURE — 80048 BASIC METABOLIC PNL TOTAL CA: CPT

## 2025-02-25 PROCEDURE — 97530 THERAPEUTIC ACTIVITIES: CPT

## 2025-02-25 PROCEDURE — 6360000002 HC RX W HCPCS: Performed by: INTERNAL MEDICINE

## 2025-02-25 PROCEDURE — 82962 GLUCOSE BLOOD TEST: CPT

## 2025-02-25 PROCEDURE — 85025 COMPLETE CBC W/AUTO DIFF WBC: CPT

## 2025-02-25 PROCEDURE — 6360000002 HC RX W HCPCS: Performed by: FAMILY MEDICINE

## 2025-02-25 PROCEDURE — 36415 COLL VENOUS BLD VENIPUNCTURE: CPT

## 2025-02-25 PROCEDURE — 2500000003 HC RX 250 WO HCPCS: Performed by: STUDENT IN AN ORGANIZED HEALTH CARE EDUCATION/TRAINING PROGRAM

## 2025-02-25 RX ORDER — CLONIDINE 0.2 MG/24H
1 PATCH, EXTENDED RELEASE TRANSDERMAL WEEKLY
Qty: 10 PATCH | Refills: 1 | Status: SHIPPED | OUTPATIENT
Start: 2025-02-25

## 2025-02-25 RX ORDER — DULAGLUTIDE 4.5 MG/.5ML
INJECTION, SOLUTION SUBCUTANEOUS
Qty: 2 ML | Refills: 5
Start: 2025-02-25

## 2025-02-25 RX ORDER — HYDRALAZINE HYDROCHLORIDE 25 MG/1
25 TABLET, FILM COATED ORAL EVERY 8 HOURS SCHEDULED
Qty: 90 TABLET | Refills: 3 | Status: SHIPPED | OUTPATIENT
Start: 2025-02-25

## 2025-02-25 RX ORDER — FUROSEMIDE 20 MG/1
TABLET ORAL
Qty: 180 TABLET | Refills: 3
Start: 2025-02-25

## 2025-02-25 RX ORDER — LISINOPRIL 10 MG/1
10 TABLET ORAL DAILY
Qty: 30 TABLET | Refills: 2 | Status: SHIPPED | OUTPATIENT
Start: 2025-02-25

## 2025-02-25 RX ORDER — ONDANSETRON 8 MG/1
8 TABLET, FILM COATED ORAL EVERY 8 HOURS PRN
Qty: 15 TABLET | Refills: 0 | Status: SHIPPED | OUTPATIENT
Start: 2025-02-25

## 2025-02-25 RX ADMIN — AMLODIPINE BESYLATE 5 MG: 5 TABLET ORAL at 08:09

## 2025-02-25 RX ADMIN — CALCIUM 500 MG: 500 TABLET ORAL at 08:09

## 2025-02-25 RX ADMIN — CARVEDILOL 25 MG: 25 TABLET, FILM COATED ORAL at 08:09

## 2025-02-25 RX ADMIN — ASPIRIN 81 MG: 81 TABLET, CHEWABLE ORAL at 08:09

## 2025-02-25 RX ADMIN — VITAMIN D, TAB 1000IU (100/BT) 1000 UNITS: 25 TAB at 08:09

## 2025-02-25 RX ADMIN — LORAZEPAM 0.25 MG: 0.5 TABLET ORAL at 02:43

## 2025-02-25 RX ADMIN — SODIUM CHLORIDE, PRESERVATIVE FREE 10 ML: 5 INJECTION INTRAVENOUS at 08:12

## 2025-02-25 RX ADMIN — METOCLOPRAMIDE 10 MG: 5 INJECTION, SOLUTION INTRAMUSCULAR; INTRAVENOUS at 08:09

## 2025-02-25 RX ADMIN — HYDRALAZINE HYDROCHLORIDE 25 MG: 50 TABLET ORAL at 05:35

## 2025-02-25 RX ADMIN — ONDANSETRON 4 MG: 2 INJECTION, SOLUTION INTRAMUSCULAR; INTRAVENOUS at 01:33

## 2025-02-25 RX ADMIN — HEPARIN SODIUM 5000 UNITS: 5000 INJECTION INTRAVENOUS; SUBCUTANEOUS at 05:35

## 2025-02-25 RX ADMIN — LETROZOLE 2.5 MG: 2.5 TABLET, FILM COATED ORAL at 08:14

## 2025-02-25 RX ADMIN — ALLOPURINOL 50 MG: 100 TABLET ORAL at 08:09

## 2025-02-25 RX ADMIN — FERROUS SULFATE TAB 325 MG (65 MG ELEMENTAL FE) 325 MG: 325 (65 FE) TAB at 08:09

## 2025-02-25 RX ADMIN — BISACODYL 10 MG: 10 SUPPOSITORY RECTAL at 11:54

## 2025-02-25 NOTE — DISCHARGE SUMMARY
tolerating diet well without any further nausea or vomiting.  Renal function has been stable, did have some worsening with YURY on CKD on admission for which nephrology was consulted.  Creatinine plateaued at around 4.1 and has been trended downward since then with latest creatinine of 3.74 and BUN of 48.  Nephrology recommends outpatient follow-up in next 1 to 2 weeks as scheduled.  She is currently medically cleared and hemodynamically stable for discharge to home.  Patient is advised to return back to the emergency in case of recurring nausea vomiting, abdominal pain, fever chills or night sweats.    Disposition: Home  Diet: ADULT DIET; Regular  ADULT ORAL NUTRITION SUPPLEMENT; Breakfast, Lunch, Dinner; Low Calorie/High Protein Oral Supplement  Code Status: Full Code    Follow Ups:  Follow-up Information       Follow up With Specialties Details Why Contact Info    [facility] ([facility]) [facility]   [address] [place], [place] [id number]  [phone]    Daniella Moses MD Internal Medicine Follow up in 1 week(s)  [address] [place] [address]  [phone]      Daniella Moses MD Internal Medicine   [address]  [place] [address]  [phone]      [facility]  Follow up in 1 week(s)  [address] [facility] [address]  [phone]            Future Appointments         Provider Specialty Dept Phone    6/2/2025 2:40 PM Daniella Moses MD Internal Medicine [phone]              Plan was discussed with Patient.  All questions answered.  Patient was stable at time of discharge.  Instructions given to call a physician or return if any concerns.    Pending Labs       Order Current Status    Basic Metabolic Panel w/ Reflex to MG In process    CBC with Auto Differential In process            Current Discharge Medication List        START taking these medications    Details   hydrALAZINE (APRESOLINE) 25 MG

## 2025-02-25 NOTE — PLAN OF CARE
Problem: Chronic Conditions and Co-morbidities  Goal: Patient's chronic conditions and co-morbidity symptoms are monitored and maintained or improved  2/25/2025 0747 by Chandni Howell RN  Outcome: Progressing  2/25/2025 0000 by Jess Oh RN  Outcome: Progressing     Problem: Discharge Planning  Goal: Discharge to home or other facility with appropriate resources  2/25/2025 0747 by Chandni Howell RN  Outcome: Progressing  2/25/2025 0000 by Jess Oh RN  Outcome: Progressing     Problem: Pain  Goal: Verbalizes/displays adequate comfort level or baseline comfort level  2/25/2025 0747 by Chandni Howell RN  Outcome: Progressing  2/25/2025 0000 by Jess Oh RN  Outcome: Progressing       Pt aware a BM is needed prior to d/c.

## 2025-02-25 NOTE — PROGRESS NOTES
Hospitalist Progress Note   Admit Date:  2025  8:07 AM   Name:  Claudette Zelaya   Age:  64 y.o.  Sex:  female  :  1960   MRN:  [id number]   Room:      Presenting/Chief Complaint: Abdominal Pain and Vomiting     Reason(s) for Admission: Intractable nausea and vomiting [R11.2]  Acute kidney injury superimposed on chronic kidney disease (HCC) [N17.9, N18.9]     Hospital Day #3      Hospital Course:   Claudette Zelaya is a 64 y.o. female with medical history of CKD stage IIIb, coronary artery disease, diabetes type 2, heart failure reduced ejection fraction, hyperlipidemia, hypertension, peptic ulcer disease, thrombocytopenia who presented with intractable nausea and vomiting.  Patient was just recently seen in the emergency department yesterday diagnosed with urinary tract infection.  Patient was sent home with antibiotics for treatment with UTI nausea and vomiting.  Patient was unable to tolerate p.o. intake.  Patient continues to complain of left lower quadrant abdominal pain.  Patient denies any fevers.  Patient states that whenever she gets sick she has has issues with nausea and vomiting.     Subjective & 24hr Events:   Patient reclining nearly supine in bed at time of visit.  As she did 2 days ago, chooses to interact little, even following sister arrival to bedside during my visit.  Patient keeps her eyes closed throughout visit, answers occasional simple question, but otherwise not participatory with exam.    Assessment & Plan:     Intractable nausea and vomiting/generalized abdominal pain  Remains unchanged.  Required 4 doses of IV Zofran and 4 doses of IV Compazine over the last 24 hours.  Required 3 doses of IV morphine over the same period.  Due to the refractory nature of her symptoms despite antiemetics and other supportive care, will trial off label use of haloperidol 2 mg IV x 1.  UDS positive for only for opiates which she has been provided here, ruling out 
       Hospitalist Progress Note   Admit Date:  2025  8:07 AM   Name:  Claudette Zelaya   Age:  64 y.o.  Sex:  female  :  1960   MRN:  [id number]   Room:      Presenting/Chief Complaint: Abdominal Pain and Vomiting     Reason(s) for Admission: Intractable nausea and vomiting [R11.2]  Acute kidney injury superimposed on chronic kidney disease [N17.9, N18.9]     Hospital Course:   Claudette Zelaya is a 64 y.o. female with medical history of CKD stage IIIb, coronary artery disease, diabetes type 2, heart failure reduced ejection fraction, hyperlipidemia, hypertension, peptic ulcer disease, thrombocytopenia who presented with intractable nausea and vomiting. Patient was just recently seen in the emergency department yesterday diagnosed with urinary tract infection. Patient was sent home with antibiotics for treatment with UTI nausea and vomiting. Patient was unable to tolerate p.o. intake. Patient continues to complain of left lower quadrant abdominal pain. Patient denies any fevers. Patient states that whenever she gets sick she has has issues with nausea and vomiting       Subjective & 24hr Events:   Mrs. Zelaya was seen by GI today.  She will have EGD tomorrow morning you will need to be n.p.o. at midnight.  Also recommended IV Reglan be started and I have restarted this.  Nephrology is also seeing and will continue to monitor kidney function      Assessment & Plan:   Intractable nausea and vomiting/generalized abdominal pain  --Slowly improving  --Was also started on trial of haloperidol 2 mg IV x 1 but didn't seem to help much  --UDS positive for only for opiates which she has been provided here, ruling out contribution from cyclical vomiting syndrome.    --Dulaglutide has known side effects of nausea and vomiting, so unclear how much if any this is contributing to symptoms  --GI consulted and appreciate recommendations, will have EGD tomorrow and IV Reglan has been started      
       Hospitalist Progress Note   Admit Date:  2025  8:07 AM   Name:  Claudette Zelaya   Age:  64 y.o.  Sex:  female  :  1960   MRN:  [id number]   Room:      Presenting/Chief Complaint: Abdominal Pain and Vomiting     Reason(s) for Admission: Intractable nausea and vomiting [R11.2]  Acute kidney injury superimposed on chronic kidney disease (HCC) [N17.9, N18.9]     Hospital Day #2      Hospital Course:   Claudette Zelaya is a 64 y.o. female with medical history of CKD stage IIIb, coronary artery disease, diabetes type 2, heart failure reduced ejection fraction, hyperlipidemia, hypertension, peptic ulcer disease, thrombocytopenia who presented with intractable nausea and vomiting.  Patient was just recently seen in the emergency department yesterday diagnosed with urinary tract infection.  Patient was sent home with antibiotics for treatment with UTI nausea and vomiting.  Patient was unable to tolerate p.o. intake.  Patient continues to complain of left lower quadrant abdominal pain.  Patient denies any fevers.  Patient states that whenever she gets sick she has has issues with nausea and vomiting.     Subjective & 24hr Events:   Patient reclining nearly supine in bed at time of visit.  She is more interactive with me today.  Endorses continued nausea and generalized abdominal pain.    Assessment & Plan:     Intractable nausea and vomiting/generalized abdominal pain  Unchanged.  Required 2 doses each of IV Zofran and IV Compazine for the last 24 hours.  3 doses of IV morphine over the same period.  UDS positive for only for opiates which she has been provided here, ruling out contribution from cyclical vomiting syndrome.  Dulaglutide has known side effects of nausea and vomiting, so unclear how much if any this is contributing to symptoms.    Urinary tract infection, POA  UCx grew >100K each of Proteus mirabilis and Klebsiella pneumoniae.  Continue ceftriaxone to which the organisms are 
    [facility] Progress Note    Follow-Up on: YURY/CKD    Patient seen and examined. N/V improving.  EGD yesterday showed gastritis.   Eating and drinking better today.  Labs pending this AM    ROS:  Gen - no fever, no chills, appetite unchanged  CV - no chest pain, no palpitation  Lung - no shortness of breath, no cough  Abd -  nausea/vomiting better, no diarrhea  Ext - no edema    Exam:  Vitals:    02/25/25 0250 02/25/25 0351 02/25/25 0535 02/25/25 0700   BP: 132/71  (!) 154/72 (!) 142/70   Pulse: 72   74   Resp: 17   12   Temp: 97.7 °F (36.5 °C)   97.9 °F (36.6 °C)   TempSrc: Oral   Oral   SpO2: 94%   95%   Weight:  98.2 kg (216 lb 7.9 oz)     Height:             Intake/Output Summary (Last 24 hours) at 2/25/2025 0906  Last data filed at 2/25/2025 0700  Gross per 24 hour   Intake 712 ml   Output 1250 ml   Net -538 ml       Wt Readings from Last 3 Encounters:   02/25/25 98.2 kg (216 lb 7.9 oz)   02/12/25 81.2 kg (179 lb)   01/14/25 91.2 kg (201 lb)       GEN - in no distress  CV - regular, no murmur, no rub  Lung - clear bilaterally  Abd - soft, general pain but no obvious rebound  Ext - no edema    Recent Labs     02/23/25  0608 02/24/25  0626   WBC 4.6 5.1   HGB 10.8* 10.8*   HCT 32.7* 33.9*    156        Recent Labs     02/23/25  0608 02/24/25  0626    142   K 3.8 3.9   * 110*   CO2 22 23   BUN 52* 52*   CREATININE 3.80* 3.89*   CALCIUM 8.5* 8.6*       Assessment / Plan:  Principal Problem:    Intractable nausea and vomiting  Active Problems:    Chronic gout    CKD stage 3b, GFR 30-44 ml/min ([facility])    Mixed hyperlipidemia    Chronic diastolic congestive heart failure (HCC)    Class 1 obesity due to excess calories with serious comorbidity and body mass index (BMI) of 30.0 to 30.9 in adult    S/P CABG x 3    Essential hypertension    Type 2 diabetes mellitus, with long-term current use of insulin (HCC)    S/P MVR (mitral valve repair)    Coronary artery disease of native artery of 
    [facility] Progress Note    Follow-Up on: YURY/CKD    Patient seen and examined. N/V improving.  Scheduled for EGD today.    ROS:  Gen - no fever, no chills, appetite unchanged  CV - no chest pain, no palpitation  Lung - no shortness of breath, no cough  Abd - + tenderness better, + nausea/vomiting better, no diarrhea  Ext - no edema    Exam:  Vitals:    02/24/25 0345 02/24/25 0554 02/24/25 0741 02/24/25 0818   BP:  (!) 161/73 139/60 (!) 161/73   Pulse:  93 79 93   Resp:   18    Temp:   97.7 °F (36.5 °C)    TempSrc:   Oral    SpO2:       Weight: 97.7 kg (215 lb 6.2 oz)      Height:             Intake/Output Summary (Last 24 hours) at 2/24/2025 0851  Last data filed at 2/24/2025 0340  Gross per 24 hour   Intake --   Output 600 ml   Net -600 ml       Wt Readings from Last 3 Encounters:   02/24/25 97.7 kg (215 lb 6.2 oz)   02/12/25 81.2 kg (179 lb)   01/14/25 91.2 kg (201 lb)       GEN - in no distress  CV - regular, no murmur, no rub  Lung - clear bilaterally  Abd - soft, general pain but no obvious rebound  Ext - no edema    Recent Labs     02/22/25  0644 02/23/25  0608 02/24/25  0626   WBC 4.2* 4.6 5.1   HGB 11.2* 10.8* 10.8*   HCT 35.2* 32.7* 33.9*   * 153 156        Recent Labs     02/22/25  0644 02/23/25  0608 02/24/25  0626    143 142   K 3.9 3.8 3.9   * 111* 110*   CO2 21 22 23   BUN 54* 52* 52*   CREATININE 3.77* 3.80* 3.89*   CALCIUM 8.3* 8.5* 8.6*       Assessment / Plan:  Principal Problem:    Intractable nausea and vomiting  Active Problems:    Chronic gout    CKD stage 3b, GFR 30-44 ml/min ([facility])    Mixed hyperlipidemia    Chronic diastolic congestive heart failure (HCC)    Class 1 obesity due to excess calories with serious comorbidity and body mass index (BMI) of 30.0 to 30.9 in adult    S/P CABG x 3    Essential hypertension    Type 2 diabetes mellitus, with long-term current use of insulin (HCC)    S/P MVR (mitral valve repair)    Coronary artery disease of native artery 
    [facility] Progress Note    Follow-Up on: YURY/CKD    ROS:  Gen - no fever, no chills, appetite unchanged  CV - no chest pain, no palpitation  Lung - no shortness of breath, no cough  Abd - + tenderness better, + nausea/vomiting better, no diarrhea  Ext - no edema    Exam:  Vitals:    02/19/25 0242 02/19/25 0255 02/19/25 0554 02/19/25 0734   BP: (!) 193/96 (!) 145/70  (!) 170/93   Pulse: 81 83  98   Resp: 16 19 17   Temp: 97.8 °F (36.6 °C) 97.9 °F (36.6 °C)  98.2 °F (36.8 °C)   TempSrc: Oral Oral  Oral   SpO2: 99% 96%  95%   Weight:   92.8 kg (204 lb 9.4 oz)    Height:             Intake/Output Summary (Last 24 hours) at 2/19/2025 0931  Last data filed at 2/19/2025 0734  Gross per 24 hour   Intake 1410.01 ml   Output 1125 ml   Net 285.01 ml       Wt Readings from Last 3 Encounters:   02/19/25 92.8 kg (204 lb 9.4 oz)   02/12/25 81.2 kg (179 lb)   01/14/25 91.2 kg (201 lb)       GEN - in no distress  CV - regular, no murmur, no rub  Lung - clear bilaterally  Abd - soft, general pain but no obvious rebound  Ext - no edema    Recent Labs     02/17/25  0845 02/18/25  0856 02/19/25  0750   WBC 7.8 7.5 7.0   HGB 11.9 12.5 12.2   HCT 36.4 39.6 37.1    185 162        Recent Labs     02/17/25  0845 02/18/25  0856 02/19/25  0524   * 147* 145   K 3.8 3.6 4.0   * 112* 111*   CO2 23 23 20   BUN 64* 61* 62*   CREATININE 3.95* 4.11* 4.18*   CALCIUM 8.5* 8.4* 8.1*       Assessment / Plan:  Principal Problem:    Intractable nausea and vomiting  Active Problems:    Chronic gout    CKD stage 3b, GFR 30-44 ml/min ([facility])    Mixed hyperlipidemia    Chronic diastolic congestive heart failure (HCC)    Class 1 obesity due to excess calories with serious comorbidity and body mass index (BMI) of 30.0 to 30.9 in adult    S/P CABG x 3    Essential hypertension    Type 2 diabetes mellitus, with long-term current use of insulin ([facility])    S/P MVR (mitral valve repair)    Coronary artery disease of native artery of 
    [facility] Progress Note    Follow-Up on: YURY/CKD    ROS:  Gen - no fever, no chills, appetite unchanged  CV - no chest pain, no palpitation  Lung - no shortness of breath, no cough  Abd - + tenderness better, + nausea/vomiting better, no diarrhea  Ext - no edema    Exam:  Vitals:    02/19/25 2321 02/20/25 0240 02/20/25 0727 02/20/25 0936   BP:  (!) 150/75 (!) 180/105 (!) 180/105   Pulse: 86 82 90 90   Resp:  16 16    Temp:  98.2 °F (36.8 °C) 98.2 °F (36.8 °C)    TempSrc:  Oral Oral    SpO2:  95% 95%    Weight:       Height:             Intake/Output Summary (Last 24 hours) at 2/20/2025 0948  Last data filed at 2/20/2025 0630  Gross per 24 hour   Intake 4725.79 ml   Output 1050 ml   Net 3675.79 ml       Wt Readings from Last 3 Encounters:   02/19/25 92.8 kg (204 lb 9.4 oz)   02/12/25 81.2 kg (179 lb)   01/14/25 91.2 kg (201 lb)       GEN - in no distress  CV - regular, no murmur, no rub  Lung - clear bilaterally  Abd - soft, general pain but no obvious rebound  Ext - no edema    Recent Labs     02/18/25  0856 02/19/25  0750 02/20/25  0744   WBC 7.5 7.0 5.0   HGB 12.5 12.2 11.6*   HCT 39.6 37.1 34.2*    162 147*        Recent Labs     02/18/25  0856 02/19/25  0524 02/20/25  0744   * 145 141   K 3.6 4.0 3.5   * 111* 109*   CO2 23 20 21   BUN 61* 62* 59*   CREATININE 4.11* 4.18* 3.86*   CALCIUM 8.4* 8.1* 8.0*   MG  --   --  2.4       Assessment / Plan:  Principal Problem:    Intractable nausea and vomiting  Active Problems:    Chronic gout    CKD stage 3b, GFR 30-44 ml/min ([facility])    Mixed hyperlipidemia    Chronic diastolic congestive heart failure (HCC)    Class 1 obesity due to excess calories with serious comorbidity and body mass index (BMI) of 30.0 to 30.9 in adult    S/P CABG x 3    Essential hypertension    Type 2 diabetes mellitus, with long-term current use of insulin (HCC)    S/P MVR (mitral valve repair)    Coronary artery disease of native artery of native heart with stable 
    [facility] Progress Note    Follow-Up on: YURY/CKD    ROS:  Gen - no fever, no chills, appetite unchanged  CV - no chest pain, no palpitation  Lung - no shortness of breath, no cough  Abd - + tenderness better, + nausea/vomiting better, no diarrhea  Ext - no edema    Exam:  Vitals:    02/21/25 0324 02/21/25 0413 02/21/25 0445 02/21/25 0738   BP: (!) 183/78  136/63 (!) 157/68   Pulse: 75  76 76   Resp: 17 16 16    Temp: 98.2 °F (36.8 °C)   98.1 °F (36.7 °C)   TempSrc: Oral   Oral   SpO2: 96%   95%   Weight:       Height:             Intake/Output Summary (Last 24 hours) at 2/21/2025 0909  Last data filed at 2/21/2025 0420  Gross per 24 hour   Intake 3373.24 ml   Output 1125 ml   Net 2248.24 ml       Wt Readings from Last 3 Encounters:   02/19/25 92.8 kg (204 lb 9.4 oz)   02/12/25 81.2 kg (179 lb)   01/14/25 91.2 kg (201 lb)       GEN - in no distress  CV - regular, no murmur, no rub  Lung - clear bilaterally  Abd - soft, general pain but no obvious rebound  Ext - no edema    Recent Labs     02/19/25  0750 02/20/25  0744 02/21/25  0452   WBC 7.0 5.0 4.8   HGB 12.2 11.6* 11.2*   HCT 37.1 34.2* 33.9*    147* 158        Recent Labs     02/19/25  0524 02/20/25  0744 02/21/25  0452    141 139   K 4.0 3.5 3.5   * 109* 108*   CO2 20 21 19*   BUN 62* 59* 53*   CREATININE 4.18* 3.86* 3.62*   CALCIUM 8.1* 8.0* 7.9*   MG  --  2.4 2.4   PHOS  --   --  3.1   ALBUMIN  --   --  2.3*       Assessment / Plan:  Principal Problem:    Intractable nausea and vomiting  Active Problems:    Chronic gout    CKD stage 3b, GFR 30-44 ml/min (HCC)    Mixed hyperlipidemia    Chronic diastolic congestive heart failure (HCC)    Class 1 obesity due to excess calories with serious comorbidity and body mass index (BMI) of 30.0 to 30.9 in adult    S/P CABG x 3    Essential hypertension    Type 2 diabetes mellitus, with long-term current use of insulin (HCC)    S/P MVR (mitral valve repair)    Coronary artery disease of 
    [facility] Progress Note    Follow-Up on: YURY/CKD    ROS:  Gen - no fever, no chills, appetite unchanged  CV - no chest pain, no palpitation  Lung - no shortness of breath, no cough  Abd - + tenderness better, + nausea/vomiting better, no diarrhea  Ext - no edema    Exam:  Vitals:    02/22/25 0306 02/22/25 0626 02/22/25 0700 02/22/25 0848   BP: (!) 164/70 127/66 132/68 132/68   Pulse: 68  78 78   Resp: 17 17    Temp: 97.5 °F (36.4 °C)  97.9 °F (36.6 °C)    TempSrc: Oral  Oral    SpO2: 96%  97%    Weight:       Height:             Intake/Output Summary (Last 24 hours) at 2/22/2025 0943  Last data filed at 2/22/2025 0746  Gross per 24 hour   Intake 1076.25 ml   Output 700 ml   Net 376.25 ml       Wt Readings from Last 3 Encounters:   02/19/25 92.8 kg (204 lb 9.4 oz)   02/12/25 81.2 kg (179 lb)   01/14/25 91.2 kg (201 lb)       GEN - in no distress  CV - regular, no murmur, no rub  Lung - clear bilaterally  Abd - soft, general pain but no obvious rebound  Ext - no edema    Recent Labs     02/20/25  0744 02/21/25  0452 02/22/25  0644   WBC 5.0 4.8 4.2*   HGB 11.6* 11.2* 11.2*   HCT 34.2* 33.9* 35.2*   * 158 141*        Recent Labs     02/20/25  0744 02/21/25  0452 02/22/25  0644    139 141   K 3.5 3.5 3.9   * 108* 110*   CO2 21 19* 21   BUN 59* 53* 54*   CREATININE 3.86* 3.62* 3.77*   CALCIUM 8.0* 7.9* 8.3*   MG 2.4 2.4  --    PHOS  --  3.1  --    ALBUMIN  --  2.3*  --        Assessment / Plan:  Principal Problem:    Intractable nausea and vomiting  Active Problems:    Chronic gout    CKD stage 3b, GFR 30-44 ml/min ([facility])    Mixed hyperlipidemia    Chronic diastolic congestive heart failure ([facility])    Class 1 obesity due to excess calories with serious comorbidity and body mass index (BMI) of 30.0 to 30.9 in adult    S/P CABG x 3    Essential hypertension    Type 2 diabetes mellitus, with long-term current use of insulin (HCC)    S/P MVR (mitral valve repair)    Coronary artery disease of 
    [facility] Progress Note    Follow-Up on: YURY/CKD    ROS:  Gen - no fever, no chills, appetite unchanged  CV - no chest pain, no palpitation  Lung - no shortness of breath, no cough  Abd - + tenderness better, + nausea/vomiting better, no diarrhea  Ext - no edema    Exam:  Vitals:    02/22/25 2300 02/23/25 0315 02/23/25 0530 02/23/25 0837   BP: (!) 144/73  (!) 153/68 (!) 143/67   Pulse: 92  94 79   Resp: 16  16 12   Temp: 98.4 °F (36.9 °C)  98 °F (36.7 °C) 98.1 °F (36.7 °C)   TempSrc: Oral  Oral    SpO2: 92%   92%   Weight:  95.1 kg (209 lb 10.5 oz)     Height:             Intake/Output Summary (Last 24 hours) at 2/23/2025 0909  Last data filed at 2/23/2025 0544  Gross per 24 hour   Intake 1610.61 ml   Output 200 ml   Net 1410.61 ml       Wt Readings from Last 3 Encounters:   02/23/25 95.1 kg (209 lb 10.5 oz)   02/12/25 81.2 kg (179 lb)   01/14/25 91.2 kg (201 lb)       GEN - in no distress  CV - regular, no murmur, no rub  Lung - clear bilaterally  Abd - soft, general pain but no obvious rebound  Ext - no edema    Recent Labs     02/21/25  0452 02/22/25  0644 02/23/25  0608   WBC 4.8 4.2* 4.6   HGB 11.2* 11.2* 10.8*   HCT 33.9* 35.2* 32.7*    141* 153        Recent Labs     02/21/25  0452 02/22/25  0644 02/23/25  0608    141 143   K 3.5 3.9 3.8   * 110* 111*   CO2 19* 21 22   BUN 53* 54* 52*   CREATININE 3.62* 3.77* 3.80*   CALCIUM 7.9* 8.3* 8.5*   MG 2.4  --   --    PHOS 3.1  --   --    ALBUMIN 2.3*  --   --        Assessment / Plan:  Principal Problem:    Intractable nausea and vomiting  Active Problems:    Chronic gout    CKD stage 3b, GFR 30-44 ml/min ([facility])    Mixed hyperlipidemia    Chronic diastolic congestive heart failure ([facility])    Class 1 obesity due to excess calories with serious comorbidity and body mass index (BMI) of 30.0 to 30.9 in adult    S/P CABG x 3    Essential hypertension    Type 2 diabetes mellitus, with long-term current use of insulin ([facility])    S/P MVR (mitral 
    [facility] Progress Note    Follow-Up on: YURY/CKD    ROS:  Gen - no fever, no chills, appetite unchanged  CV - no chest pain, no palpitation  Lung - no shortness of breath, no cough  Abd - + tenderness, + nausea/vomiting, no diarrhea  Ext - no edema    Exam:  Vitals:    02/17/25 0245 02/17/25 0455 02/17/25 0515 02/17/25 0715   BP: (!) 181/92 (!) 199/106  (!) 155/100   Pulse: 93   (!) 106   Resp: 17 18   Temp: 97.5 °F (36.4 °C)   98.4 °F (36.9 °C)   TempSrc: Oral   Oral   SpO2: 92%   96%   Weight:   87.3 kg (192 lb 7.4 oz)    Height:             Intake/Output Summary (Last 24 hours) at 2/17/2025 0942  Last data filed at 2/17/2025 0915  Gross per 24 hour   Intake 0 ml   Output 1200 ml   Net -1200 ml       Wt Readings from Last 3 Encounters:   02/17/25 87.3 kg (192 lb 7.4 oz)   02/12/25 81.2 kg (179 lb)   01/14/25 91.2 kg (201 lb)       GEN - in no distress  CV - regular, no murmur, no rub  Lung - clear bilaterally  Abd - soft, general pain but no obvious rebound  Ext - no edema    Recent Labs     02/15/25  0434 02/16/25  0910 02/17/25  0845   WBC 7.1 7.8 7.8   HGB 12.0 12.2 11.9   HCT 38.4 36.9 36.4    205 183        Recent Labs     02/15/25  0434 02/16/25  0938 02/17/25  0845   * 143 147*   K 4.3 HEMOLYZED SPECIMEN 3.8   * 116* 112*   CO2 15* 12* 23   BUN 53* 61* 64*   CREATININE 3.27* 3.70* 3.95*   CALCIUM 8.7* 8.6* 8.5*   ALBUMIN 2.8*  --   --        Assessment / Plan:  Principal Problem:    Intractable nausea and vomiting  Active Problems:    Chronic gout    CKD stage 3b, GFR 30-44 ml/min ([facility])    Mixed hyperlipidemia    Chronic diastolic congestive heart failure (HCC)    Class 1 obesity due to excess calories with serious comorbidity and body mass index (BMI) of 30.0 to 30.9 in adult    S/P CABG x 3    Essential hypertension    Type 2 diabetes mellitus, with long-term current use of insulin (HCC)    S/P MVR (mitral valve repair)    Coronary artery disease of native artery of 
    [facility] Progress Note    Follow-Up on: YURY/CKD    ROS:  Gen - no fever, no chills, appetite unchanged  CV - no chest pain, no palpitation  Lung - no shortness of breath, no cough  Abd - + tenderness, + nausea/vomiting, no diarrhea  Ext - no edema    Exam:  Vitals:    02/18/25 0532 02/18/25 0758 02/18/25 0930 02/18/25 1000   BP:  (!) 190/103 (!) 190/106 (!) 185/103   Pulse:  (!) 101 94 88   Resp:  17     Temp:  97.9 °F (36.6 °C)     TempSrc:  Oral     SpO2:  93%     Weight: 92.3 kg (203 lb 7.8 oz)      Height:             Intake/Output Summary (Last 24 hours) at 2/18/2025 1033  Last data filed at 2/18/2025 0936  Gross per 24 hour   Intake 1553.67 ml   Output 900 ml   Net 653.67 ml       Wt Readings from Last 3 Encounters:   02/18/25 92.3 kg (203 lb 7.8 oz)   02/12/25 81.2 kg (179 lb)   01/14/25 91.2 kg (201 lb)       GEN - in no distress  CV - regular, no murmur, no rub  Lung - clear bilaterally  Abd - soft, general pain but no obvious rebound  Ext - no edema    Recent Labs     02/16/25  0910 02/17/25  0845 02/18/25  0856   WBC 7.8 7.8 7.5   HGB 12.2 11.9 12.5   HCT 36.9 36.4 39.6    183 185        Recent Labs     02/16/25  0938 02/17/25  0845 02/18/25  0856    147* 147*   K HEMOLYZED SPECIMEN 3.8 3.6   * 112* 112*   CO2 12* 23 23   BUN 61* 64* 61*   CREATININE 3.70* 3.95* 4.11*   CALCIUM 8.6* 8.5* 8.4*       Assessment / Plan:  Principal Problem:    Intractable nausea and vomiting  Active Problems:    Chronic gout    CKD stage 3b, GFR 30-44 ml/min ([facility])    Mixed hyperlipidemia    Chronic diastolic congestive heart failure (HCC)    Class 1 obesity due to excess calories with serious comorbidity and body mass index (BMI) of 30.0 to 30.9 in adult    S/P CABG x 3    Essential hypertension    Type 2 diabetes mellitus, with long-term current use of insulin (HCC)    S/P MVR (mitral valve repair)    Coronary artery disease of native artery of native heart with stable angina pectoris 
 offered spiritual care visit with patient. Patient declined a visit at this time. Will follow up as possible.  
0205: Patient impulsive. Sitter unavailable. Hospitalist notified. New order placed for VSC.    0445: Patient w/ complaints of nausea/stomach pain. Patient given Zofran and Glycolax.    0510: Patient attempted to use BSC. Patient very drowsy. Writer stayed at bedside. No BM. Patient returned to bed.    0520: Patient came in on 2/14 for N/V. Patient has been given nausea meds thorugh the night. I gave her some Myralax to help with BM's, however, patient w/ emesis approx. 30 minutes later. Patient states she has \"felt like this since admission.\" Hospitalist notified. STAT KUB ordered.    0530: VAT's unable to place a PIV at this time d/t previous site. SHARON's RN to address with VAT's team in the AM. Patient does not have a PIV at this time.    
2040: Patient with poor PO intake since admission. Admitted 2/14 w/ N/V. Patient BGL: 98. Patient given Lantus throughout the day. Writer contacted Hospitalist. MD approved to hold evening dose of Lantus.     0230: Patient admits to writer a hx of depression. Patient states she has lost several family members in the past year (son, grandson, and mother)Patient also expressed her  has dementia and can be overwhelming at times. Writer offered to have spiritual services to come and speak with her concerning grief. Patient agreed.     0300: Patient called writer to the room c/o \"not feeling good.\" Patient was given Zofran (0238) and Hydralazine (0247). Patient PIV appeared to be infiltrated. VAT team consulted for new PIV. Monitor room:  91.     0315: Patient was agitated d/t the amount of time spent placing a new PIV. Patient with constant request for meds to \"help her\". Patient VS stable w/ no signs of distress. Patient appeared to be anxious and wanted to just go back to sleep.     0350: Compazine administered. Patient resting comfortably.      
2101: Pt has scheduled lantus 20 units tonight. Current . Pt with poor oral intake due to nausea and unable to tolerate dinner tonight. Hospitalist notified. Per hospitalist, ok to hold lantus tonight.    0142: Pt receiving IVF @ 75/hr. IVF bag complete and the order in MAR has been d/c. Pt has only had 1 vomiting occurance tonight but still dry heaving frequently. Pt with poor oral intake. Hospitalist notified. New order received: LR @ 75/hr x1.    0344: This RN called to bedside by PCT. Pt stating \"I feel like my heart is racing.\" Denies SOB and chest pain. Current /95, HR 84 apical. Hospitalist notified. New orders received for: STAT EKG and troponin, labetolol 10mg IV Q6h PRN, nitro 0.4 mg tablet once.    0959: Hospitalist notified of STAT EKG results. No new orders received.     0530: Troponin result: 45. Hospitalist notified. No new orders received.   
4 Eyes Skin Assessment     NAME:  Claudette Zelaya  YOB: 1960  MEDICAL RECORD NUMBER:  [id number]    The patient is being assessed for  Admission    I agree that at least one RN has performed a thorough Head to Toe Skin Assessment on the patient. ALL assessment sites listed below have been assessed.      Areas assessed by both nurses:    Sacrum. Buttock, Coccyx, Ischium        Does the Patient have a Wound? No noted wound(s)       Oscar Prevention initiated by RN: Yes  Wound Care Orders initiated by RN: No    Pressure Injury (Stage 3,4, Unstageable, DTI, NWPT, and Complex wounds) if present, place Wound referral order by RN under : No    New Ostomies, if present place, Ostomy referral order under : No     Nurse 1 eSignature: Electronically signed by CHRISTA SHEN RN on 2/14/25 at 5:43 PM EST    **SHARE this note so that the co-signing nurse can place an eSignature**    Nurse 2 eSignature: {Esignature:[id number]}   
ACUTE PHYSICAL THERAPY GOALS:   (Developed with and agreed upon by patient and/or caregiver.)    (1.) Claudette Zelaya  will move from supine to sit and sit to supine , scoot up and down, and roll side to side with INDEPENDENCE within 7 treatment day(s).    (2.) Claudette Zelaya will transfer from bed to chair and chair to bed with INDEPENDENCE using the least restrictive device within 7 treatment day(s).    (3.) Claudette Zelaya will ambulate with MODIFIED INDEPENDENCE for 250 feet with the least restrictive device within 7 treatment day(s).   (4.) Claudette Zelaya will perform standing static and dynamic balance activities x 5 minutes with MODIFIED INDEPENDENCE to improve safety within 7 treatment day(s).  (5.) Claudette Zelaya will ascend and descend 5 stairs using 1 hand rail(s) with MODIFIED INDEPENDENCE to improve functional mobility and safety within 7 treatment day(s).  (6.) Claudette Zelaya will perform therapeutic exercises x 15 min for HEP with MODIFIED INDEPENDENCE to improve strength, endurance, and functional mobility within 7 treatment day(s)    PHYSICAL THERAPY: Daily Note AM   (Link to Caseload Tracking: PT Visit Days : 3  Time In/Out PT Charge Capture  Rehab Caseload Tracker  Orders    Claudette Zelaya is a 64 y.o. female   PRIMARY DIAGNOSIS: Intractable nausea and vomiting  Intractable nausea and vomiting [R11.2]  Acute kidney injury superimposed on chronic kidney disease [N17.9, N18.9]       Inpatient: Payor: [employer] MEDICARE / [facility] / Product Type: *No Product type* /     ASSESSMENT:     REHAB RECOMMENDATIONS:   Recommendation to date pending progress:  Setting:  No further skilled physical therapy after discharge from hospital    Equipment:    To Be Determined     ASSESSMENT:  Ms. Zelaya is supine in  bed and agreeable to therapy with a little encouragement from the RN.   Bed mobility is independent.  Sitting balance good 
Alert, oriented x 4.  Stand by assist to be OOB to BSC.  Voiding without difficulty.  IVF infusing.  Appetite poor, flat affect.  Po ativan given x 2 for anxiety, was a little agitated this am..  IV pain and nausea med x 1.  Needed IV prn hydralazine x 1 for HTN >180; good results.  Bed alarm on, call light in reach, virtual sitter at bedside.  
Bed alarm on  
Bed alarm on  
Ch visited with the pt in his room. CH overviewed the chart prior to the visit. The pt was asleep at the time of the visit. The pt appeared to be comfortable. The CH offered pastoral presence and prayed for the pt. Ch is available as needed.   
Concerned that the zoloft 'made me dizzy and heart pounded and I just got sick' . Told her that note would be posted and provider notified.     1838-perfect serve to provider r/t pt complaints from antidepressant.    1945-BP better controlled today. Was OOB to chair for breakfast and also walked and worked with pt. Had complaints of believing the zoloft may have caused dizziness and heart racing. She reported this near shift change, the med was given earlier in the day. She was not entirely clear on what time the symptoms occurred.   
END OF SHIFT NOTE:    INTAKE/OUTPUT  02/15 0701 - 02/16 0700  In: 240 [P.O.:240]  Out: 600 [Urine:500]  Voiding: yes  Catheter: no  Drain:              Flatus: Patient does have flatus present.    Stool:  0 occurrences.    Characteristics:       Emesis: 2 occurrences.  Bringing up small amounts emesis   Characteristics:        VITAL SIGNS  Patient Vitals for the past 12 hrs:   Temp Pulse Resp BP SpO2   02/16/25 0219 98.1 °F (36.7 °C) 91 20 (!) 169/94 94 %   02/16/25 0215 -- -- 20 -- --   02/15/25 2311 98.2 °F (36.8 °C) 99 16 (!) 168/91 93 %   02/15/25 1930 98.4 °F (36.9 °C) 98 18 (!) 165/83 94 %   02/15/25 1723 -- -- 17 -- --       Pain Assessment                Ambulating  Yes in room. Given Zofran twice this shift for underlying nausea.     Shift report given to oncoming nurse at the bedside.    Lara Rice, RN     
END OF SHIFT NOTE:    INTAKE/OUTPUT  02/15 0701 - 02/16 0700  In: 2499.3 [P.O.:240; I.V.:2259.3]  Out: 600 [Urine:500]  Voiding: Yes - new hat placed in commode to collect urine sample  Catheter: No - bathroom priv.   Drain:        Flatus: Patient does have flatus present.    Stool:  0 occurrences.    Characteristics:           Stool Assessment  Last BM (including prior to admit): 02/12/25 (per pt)    Emesis: multiple occurrences.  Pt has not saved in emesis bag despite education that we need to measure.     Characteristics:        VITAL SIGNS  Patient Vitals for the past 12 hrs:   Temp Pulse Resp BP SpO2   02/16/25 1652 -- -- 18 -- --   02/16/25 1509 98.6 °F (37 °C) 95 18 (!) 145/83 95 %   02/16/25 1217 -- -- 19 -- --   02/16/25 1147 -- -- 20 -- --   02/16/25 1115 98.1 °F (36.7 °C) 92 18 (!) 165/90 95 %   02/16/25 0712 -- -- 18 -- --   02/16/25 0711 98.1 °F (36.7 °C) 93 18 (!) 166/98 94 %   02/16/25 0630 98.2 °F (36.8 °C) 93 16 (!) 152/94 96 %       Pain Assessment  Pain Level: 7 (02/16/25 1652)  Pain Location: Abdomen  Patient's Stated Pain Goal: 0 - No pain    Ambulating  Yes independently     Shift report given to oncoming nurse at the bedside.Pt has been sleeping most of the day. Has not eaten any meals, drank some ginger-selina. Refusing to take PO medications. Pt continuing to have abdominal pain, mainly on the right side but is relieved with morphine for a few hours.     Sindhu Malcolm RN     
END OF SHIFT NOTE:    INTAKE/OUTPUT  02/16 0701 - 02/17 0700  In: 0   Out: 1200 [Urine:1000]  Voiding: no; ; had to be straight cath'd twice this shfit  Catheter: 0  Drain:              Flatus: Patient does have flatus present.    Stool:  0 occurrences.    Characteristics:       Emesis: multiple occurrences.  Vomiting small amounts intermittently. Difficult to measure because of spitting up in trach can etc.   Characteristics:        VITAL SIGNS  Patient Vitals for the past 12 hrs:   Temp Pulse Resp BP SpO2   02/17/25 0455 -- -- -- (!) 199/106 --   02/17/25 0245 97.5 °F (36.4 °C) 93 17 (!) 181/92 92 %   02/17/25 0207 -- -- 18 -- --   02/16/25 2230 97.3 °F (36.3 °C) 93 17 (!) 175/105 100 %   02/16/25 1915 97.7 °F (36.5 °C) 94 16 (!) 174/94 92 %       Pain Assessment                Ambulating  Ye to restroom  Nauseated all night long , medicated with Zofran and compazine. Given Morphine once this shift for pain. Prn hydralazine given for elevated bp. Pt is very worried that she is not improving in her mind and says she wants to give up.  Emotional support givne.     Shift report given to oncoming nurse at the bedside.    Lara Rice, RN     
END OF SHIFT NOTE:    INTAKE/OUTPUT  02/19 0701 - 02/20 0700  In: 3522.6 [P.O.:120; I.V.:3402.6]  Out: 725 [Urine:725]  Voiding: Yes  Catheter: No  Drain:              Flatus: Patient does have flatus present.    Stool: 0 occurrences.    Characteristics:           Stool Assessment  Last BM (including prior to admit): 02/13/25 (per patient sometime prior to admission)    Emesis:  0 occurrences.    Characteristics:   Emesis Appearance: Clear    VITAL SIGNS  Patient Vitals for the past 12 hrs:   Temp Pulse Resp BP SpO2   02/20/25 0240 98.2 °F (36.8 °C) 82 16 (!) 150/75 95 %   02/19/25 2321 -- 86 -- -- --   02/19/25 2240 98.1 °F (36.7 °C) 92 16 (!) 147/88 94 %   02/19/25 2208 -- -- 16 -- --   02/19/25 1920 98.2 °F (36.8 °C) 88 15 (!) 149/64 100 %       Pain Assessment  Pain Level: 0 (02/19/25 2240)  Pain Location: Rib cage, Generalized  Patient's Stated Pain Goal: 0 - No pain    Ambulating  Yes    Shift report given to oncoming nurse at the bedside.    Suzy Watts RN     
END OF SHIFT NOTE:    INTAKE/OUTPUT  02/22 0701 - 02/23 0700  In: 446.3 [P.O.:240; I.V.:206.3]  Out: 200 [Urine:100]  Voiding: yes just 100cc thuisshift  Catheter: no  Drain:              Flatus: Patient does have flatus present.    Stool:  0 occurrences.    Characteristics:       Emesis: 0 occurrences.    Characteristics:   Emesis Assessment  Emesis Appearance: Green    VITAL SIGNS  Patient Vitals for the past 12 hrs:   Temp Pulse Resp BP SpO2   02/22/25 2300 98.4 °F (36.9 °C) 92 16 (!) 144/73 92 %   02/22/25 2148 -- -- 18 -- --   02/22/25 2105 -- -- -- (!) 151/71 --   02/22/25 1935 97.9 °F (36.6 °C) 70 16 (!) 151/71 97 %       Pain Assessment                Ambulating  Yes in room. Sat in chair x 30 minutes this shift. Can't get comfortable. Given zofran/compazine/Morphine this shift.     Shift report given to oncoming nurse at the bedside.    Lara Rice RN     
END OF SHIFT NOTE:    INTAKE/OUTPUT  02/23 0701 - 02/24 0700  In: -   Out: 600 [Urine:600]  Voiding: yes, urine is cloudy. Voided 100 cc this shift.   Catheter: no  Drain:              Flatus: Patient does have flatus present.    Stool:  0 occurrences.    Characteristics:       Emesis: 0 occurrences.    Characteristics:   Emesis Assessment  Emesis Appearance: Green    VITAL SIGNS  Patient Vitals for the past 12 hrs:   Temp Pulse Resp BP SpO2   02/24/25 0554 -- 93 -- (!) 161/73 --   02/24/25 0340 98.1 °F (36.7 °C) 70 20 (!) 144/59 93 %   02/23/25 2245 97.9 °F (36.6 °C) 77 17 (!) 126/58 93 %   02/23/25 2109 -- -- -- (!) 112/54 --   02/23/25 1920 98.1 °F (36.7 °C) 62 16 (!) 112/54 92 %       Pain Assessment                Ambulating  Yes few steps in room.  Underlying nausea continues. No vomiting tonight. Gave compazine once for nauea and tylenol/Ativan for pain/anxiety. Slept intermittently.    Shift report given to oncoming nurse at the bedside.    Lara Rice RN     
Encouraged to be OOB for all meals. Assisted OOB for breakfast by ambulation tech, chair alarm in place.   
IP Consult to Vascular Access Team  Consult performed by: Chepe Jose RN  Consult ordered by: Kisha Juan DO       US Guided PIV access-    Ultrasound was used to find the vein which was compressible and without any ultrasound features of an artery or nerve bundle. Skin was cleaned and disinfected prior to IV puncture.  Under real-time ultrasound guidance peripheral access was obtained in the right cephalic using 22 G 1.75\" Peripheral IV catheter after 1 attempt(s). Blood return was present and IV flushed without difficulty with no clinical signs of infiltration. IV dressing applied and no immediate complications noted. Patient tolerated the procedure well.       
IP Consult to Vascular Access Team  Consult performed by: Chepe Jose, CHELY  Consult ordered by: Rosas Phillips MD       Pt PIV assessed w/ US. Catheter noted in vessel, remains patent. No blood return noted, but flush visualized along length of vessel. No signs of infiltration.  
Ludwin, (sister) called. Has code. Updated from nursing perspective.   
Nutrition Assessment  Assessment Type: Initial  Reason for visit:  Best Practice Alert: Malnutrition Screening Tool   Malnutrition Screening Tool Score: 2    Nutrition Intervention:   Food and/or Nutrient Delivery:   Meals and Snacks:  Diet: Continue current order  Medical Food Supplements:   Medical food supplement therapy:  Initiate Glucerna Nutrition Shake (diabetic oral supplement) 220 calories, 10 grams protein per 8 ounce serving  Coordination of Nutrition Care:  Coordination with yessenia care provider Jeannette MO       Malnutrition Assessment:  [facility]y/[facility]N Clinical Malnutrition Criteria  Malnutrition Status: Insufficient data (unable to obtain any hx from pt, wt trending up over the past year, potential for 4% wt loss over 1 month)    Nutrition Assessment:  Food/Nutrition Related History:   Unable to obtain from pt as she can't stay awake to answer questions.  Family/friend at bedside reports at baseline, she eats well.  They note she hasn't eaten much lately d/t vomiting.       Do You Have Any Cultural, Protestant, or Ethnic Food Preferences?: No   Weight History:   Wt hx per EMR review:  IM office: 200# 1/2025, 199# 10/2024, 191# 7/2024, 191# 4/2024, 203# 1/2024.    Nutrition Background:       PMH remarkable for CKD stage III, CAD, DM, heart failure, HLP, HTN, PUD, thrombocytopenia.  Presented with intractable NV.    Admitted with intractable NV, generalized abdominal pain, UTI, YURY on CKD, hypernatremia/hyperchloremia, DM.   Nutrition Monitoring/Evaluation:  Pt only briefly awakens for RD, family/friends at bedside report she is not eating due to ongoing NV.  Jardiance and dulaglutide have been held.  Reported multiple episodes of emesis difficult to assess as pt spitting in trash can, etc  No large volume emesis not noticed.  +zofran and compazine prn being utilized regularly.      Current Nutrition Therapies:  ADULT DIET; Regular; 3 carb choices (45 gm/meal); Low Fat/Low Chol/High Fiber/2 gm Na; No 
Nutrition Assessment  Assessment Type: Reassess  Reason for visit:  Best Practice Alert: Malnutrition Screening Tool   Malnutrition Screening Tool Score: 2    Nutrition Intervention:   Food and/or Nutrient Delivery:   Meals and Snacks:  Diet: Continue current order  Medical Food Supplements:   Medical food supplement therapy:  Initiate Ensure High Protein (low calorie high protein) 160 calories, 16 grams protein per 8 ounce serving     Malnutrition Assessment:  Academy/A.S.P.[facility]N Clinical Malnutrition Criteria  Malnutrition Status: At risk for malnutrition (poor appetite with decr po)    Nutrition Assessment:  Food/Nutrition Related History:   Unable to obtain from pt as she can't stay awake to answer questions.  Family/friend at bedside reports at baseline, she eats well.  They note she hasn't eaten much lately d/t vomiting.    2/24: states that her appetite was poor prior to admit. States that her MD started her on Trulicity for weight loss which suppressed her appetite.      Do You Have Any Cultural, Adventist, or Ethnic Food Preferences?: No     Weight History:   Wt hx per EMR review:  IM office: 200# 1/2025, 199# 10/2024, 191# 7/2024, 191# 4/2024, 203# 1/2025.    Weights highly variable here.    2/24: reports her UBW is 200#. Denies any recent weight changes.     Nutrition Background:       PMH remarkable for CKD stage III, CAD, DM, heart failure, HLP, HTN, PUD, thrombocytopenia.  Presented with intractable NV.    Admitted with intractable NV, generalized abdominal pain, UTI, YURY on CKD, hypernatremia/hyperchloremia, DM.   2/24: EGD today with GI - Mild gastritis biopsied. Esophagus normal, EoE biopsies obtained and empiric dilation performed.     Nutrition Monitoring/Evaluation:  KUB 2/18 without acute findings.      Patient is sitting up on the edge of bed eating lunch, seen after EGD. Had eaten a few bites only. Friend at her side. Provides history as above. Reports that her appetite and intakes are slowly 
OOB to chair today, worked with therapy. Appeared more alert, awake, participated and initiated conversation. Daughter came by for awhile. Assisted to BSC by assistant and BR with RN later. No BM, PRN NV dulcolax given and miralax given. Reports flatus. No n/v this shift.   
Occupational Therapy Note:    Attempted to see patient this AM for occupational therapy evaluation session. Patient declined participation in therapy at this time despite encouragement, stating she feels too nauseous and unwell. Will follow and re-attempt as schedule permits/patient available. Thank you,    TRELL YEAGER, OT    Rehab Caseload Tracker      
Occupational Therapy Note:    Attempted to see patient this PM for occupational therapy evaluation session.     Patient refused evaluation 3rd attempt in a row, requested to be removed from OT caseload. Will follow and re-attempt as schedule permits/patient available. Thank you,    Yola Mason, OT    Rehab Caseload Tracker     
Occupational Therapy Note:    Occupational therapy orders received and patient's chart reviewed. Attempted to see patient this PM for occupational therapy evaluation session. Patient politely declined as she is feeling unwell with recurrent episodes of vomiting/nausea. Will follow and re-attempt as schedule permits/patient available. Thank you,    TRELL YEAGER, OT    Rehab Caseload Tracker      
Patient with c/o chest pain. BP= 137/55, pulse= 69, 02=91% RA. Notified Hospitalist. Orders received for stat CXR. Patient does appear to have generalized increased swelling.  
Perfect serve to provider r/t d/c plan.   
Perfect serve to provider r/t n/n, PRN med.  
Phlebotomy unable to obtain AM BMP and CBC after 2 attempts. RN aware and to pass on to oncoming shift.    
Physical Therapy Note:    Attempted to see patient this AM for physical therapy treatment  session. Treatment deferred as the patient is off the floor at a procedure per RN. Will follow and re-attempt as schedule permits/patient available. Thank you,    Rosie Hutchins-Jin, [facility]     Rehab Caseload Tracker    
Physical Therapy Note:    Attempted to see patient this PM for physical therapy evaluation session, however, pt currently vomiting. RN aware. Will follow and re-attempt as schedule permits/patient available. Thank you,    ALLAN RAINEY, PT     Rehab Caseload Tracker    
Physical Therapy Note:    Attempted to see patient this PM for physical therapy treatment  session. Patient refused stating \"not right now, I just got through throwin up\". Will follow and re-attempt as schedule permits/patient available. Thank you,    NATHALIE JAUREGUI, PT     Rehab Caseload Tracker   
Physical Therapy Note:    Attempted to see patient this PM for physical therapy treatment  session. Treatment deferred  today as this writer has attempted the patient 3 times today but without success. RN made aware.   Will follow and re-attempt as schedule permits/patient available. Thank you,    Rosie Hutchins-Jin, [facility]     Rehab Caseload Tracker    
Physical Therapy Note:    Attempted to see patient this PM for physical therapy treatment  session. Treatment deferred as every attempt today the patient has been asleep. Will follow and re-attempt as schedule permits/patient available. Thank you,    Rosie Hutchins-Jin, [facility]     Rehab Caseload Tracker    
Placed 20g 1.75in PIV in right basilic vein with ultrasound assistance.      
Pt is requesting kimball to be removed due to burning sensation and discomfort. Notified hospitalist- robel to d/c kimball.   
Pt requesting pain medication, drowsy and has been sleeping all day. Let pt know that I cannot give her pain medication due to being drowsy.   
Pt with c/o LLQ pain followed by nausea and vomiting. Pt unable to eat/drink and keep PO intake down. Notified hospitalist. No new orders placed.   
Pt with minimal intake today. BG is 80 and trending down. Notified hospitalist. No change in IV fluids, use PRN replacements through tonight if needed and allow pt to eat/drink if able.     Pt was able to eat 2 saltines and some tea. 1815: BP is 158/93    
SBAR out to RITA Alexander.  
Shared that she lost her 13 y/o grandson last April to a homocide, GSW.  She lost her son and her father in a short period of time. She has also had a left mastectomy and hx of CABG.  She appears with slight flat affect and is also aware she will benefit from psychiatry eval and possibly meds if ordered.   
TRANSFER - IN REPORT:    Verbal report received from CHELY Loving on Claudette Zelaya  being received from ED for routine progression of patient care      Report consisted of patient's Situation, Background, Assessment and   Recommendations(SBAR).     Information from the following report(s) ED SBAR was reviewed with the receiving nurse.    Opportunity for questions and clarification was provided.      Assessment completed upon patient's arrival to unit and care assumed.    
TRANSFER - IN REPORT:    Verbal report received from CHELY Tariq on Claudette Morris Zelaya  being received from  215 for ordered procedure      Report consisted of patient's Situation, Background, Assessment and   Recommendations(SBAR).     Information from the following report(s) Procedure Verification was reviewed with the receiving nurse.    Opportunity for questions and clarification was provided.          
TRANSFER - OUT REPORT:    Verbal report given to CHELY Tariq on Claudette Zelaya  being transferred to [facility] for routine progression of patient care       Report consisted of patient’s Situation, Background, Assessment and Recommendations(SBAR).     Information from the following report(s) Procedure Summary was reviewed with the receiving nurse.    
Tele called and pt HR is 204, said it could be artifact. Pt is quiet and still in bed. BP is 215/89. Notified hospitalist- STAT EKG ordered.   
Unable to void. Bladder scan showing 754. Messaged hospitalists; straight cath ordered and done via sterile technique obtaining 700cc clear yellow urine. Urine sample sent to lab for multiple tests.   
Fat/Low Chol/High Fiber/2 gm Na; No Added Salt (3-4 gm); Low Potassium (Less than 3000 mg/day); Low Phosphorus (Less than 1000 mg)  ADULT ORAL NUTRITION SUPPLEMENT; Breakfast, Lunch, Dinner; Diabetic Oral Supplement    Current Intake:   Average Meal Intake: 1-25% Average Supplements Intake: 1-25%      Anthropometric Measures:  Height: 160 cm (5' 2.99\")  Current Body Wt: 92.8 kg (204 lb 9.4 oz) (2/19), Weight source: Bed scale  BMI: 36.3, Obese Class 2 (BMI 35.0 -39.9)  Admission Body Weight: 93.4 kg (206 lb) (bed scale)  Ideal Body Weight (Kg) (Calculated): 52 kg (115 lbs), 167.4 %  BMI Category Obese Class 2 (BMI 35.0 -39.9)  Comparative Standards:  Energy (kcal/day): 5379-3021(25-30 kcal/kg) (Kcal/kg Weight used: 52 kg Ideal  Protein (g/day): 52-62 (1-1.2 g/kg) Weight Used: (Ideal) 52 kg  Fluid (ml/day):   (1 ml/kcal)    Nutrition Diagnosis:   Inadequate oral intake related to cognitive or neurological impairment as evidenced by  (decreased alertness, limited acceptance of oral intake when alert)    Nutrition Goal(s):   Previous Goal Met: Progressing toward Goal(s)  Active Goal:  (Meet >50% estimated needs by RD f/u)  Type of Goal: Continue current goal    Discharge Planning:    Too soon to determine    KAROL JACOBS RD    
following their further recommendations      Assessment & Plan:   Intractable nausea and vomiting/generalized abdominal pain  --Remains unchanged.    --Required IV Zofran and IV Compazine and has also received IV morphine   --Was also started on trial of haloperidol 2 mg IV x 1 but didn't seem to help much  --UDS positive for only for opiates which she has been provided here, ruling out contribution from cyclical vomiting syndrome.    --Dulaglutide has known side effects of nausea and vomiting, so unclear how much if any this is contributing to symptoms.        Urinary tract infection, POA  --Afebrile.  WBC remains WNL.    --UCx grew >100K each of Proteus mirabilis and Klebsiella pneumoniae.    --ceftriaxone x 3 days to which the organisms are sensitive.          Acute kidney injury on chronic kidney disease stage IIIb/dehydration/metabolic acidosis  --Continues to worsen, Cr 2.9 -> 3.2 -> 3.7 -> 3.9-->4.11-->4.18   --Nephrology following, appreciate assistance.  --Acidosis improving with IVF.        Hypernatremia/hyperchloremia  --Na+ rising, 143 -> 147-->145, IVFs changed        Diabetes mellitus type 2 (A1c 7.1; 1/7/2025)  --FSBG's adequately controlled.  Continue Lantus, LDSSI.  Holding Jardiance and dulaglutide with present GI issues.        Essential hypertension, uncontrolled  --BP remains persistently elevated.    --Unable to tolerate Coreg and Norvasc currently due to N/V.  Considered start of scheduled IV enalaprilat, but unable to do so in the setting of YURY.    --Does have IV labetalol as needed and as needed IV hydralazine.  --Was able to take her Norvas and has clonidine patch        Hyperlipidemia  --Hold Lipitor with current GI issues.        CAD  --Continue ASA and statin when tolerating p.o.        Chronic gout  --Continue allopurinol when able to take p.o.        Chronic diastolic congestive heart failure (previous mention of systolic heart failure was erroneous)/history of mitral valve 
Assistance, Mod=Moderate Assistance, Max=Maximal Assistance, Total=Total Assistance, NT=Not Tested    BALANCE: Good Fair+ Fair Fair- Poor NT Comments   Sitting Static [x] [] [] [] [] []    Sitting Dynamic [x] [] [] [] [] []              Standing Static [x] [] [] [] [] []    Standing Dynamic [x] [] [] [] [] []      GAIT: I Mod I S SBA CGA Min Mod Max Total  NT x2 Comments:   Level of Assistance [] [] [x] [] [] [] [] [] [] [] []    Distance 80  feet    DME Rolling Walker    Gait Quality Decreased kevan , Decreased step clearance, Decreased step length, and Decreased stance    Weightbearing Status      Stairs      I=Independent, Mod I=Modified Independent, S=Supervision, SBA=Standby Assistance, CGA=Contact Guard Assistance,   Min=Minimal Assistance, Mod=Moderate Assistance, Max=Maximal Assistance, Total=Total Assistance, NT=Not Tested    PLAN:   FREQUENCY AND DURATION: 3 times/week for duration of hospital stay or until stated goals are met, whichever comes first.    TREATMENT:   TREATMENT:   Therapeutic Activity (23 Minutes): Therapeutic activity included Rolling, Supine to Sit, Sit to Supine, Scooting, Transfer Training, Ambulation on level ground, Sitting balance , and Standing balance to improve functional Activity tolerance, Balance, Coordination, Mobility, and Strength.    TREATMENT GRID:  N/A    AFTER TREATMENT PRECAUTIONS: Bed/Chair Locked, Call light within reach, Chair, Needs within reach, RN at bedside, and Virtual     INTERDISCIPLINARY COLLABORATION:  RN/ PCT and PT/ PTA    EDUCATION:  review POC and importance of mobility in the recovery process    TIME IN/OUT:  Time In: 1400  Time Out: 1423  Minutes: 23    Rosie Diez PTA    
CGA=Contact Guard Assistance,   Min=Minimal Assistance, Mod=Moderate Assistance, Max=Maximal Assistance, Total=Total Assistance, NT=Not Tested    BALANCE: Good Fair+ Fair Fair- Poor NT Comments   Sitting Static [x] [] [] [] [] []    Sitting Dynamic [x] [] [] [] [] []              Standing Static [x] [] [] [] [] []    Standing Dynamic [x] [] [] [] [] []      GAIT: I Mod I S SBA CGA Min Mod Max Total  NT x2 Comments:   Level of Assistance [] [] [x] [] [] [] [] [] [] [] []    Distance 80  feet    DME Rolling Walker    Gait Quality Decreased kevan , Decreased step clearance, Decreased step length, and Decreased stance    Weightbearing Status      Stairs      I=Independent, Mod I=Modified Independent, S=Supervision, SBA=Standby Assistance, CGA=Contact Guard Assistance,   Min=Minimal Assistance, Mod=Moderate Assistance, Max=Maximal Assistance, Total=Total Assistance, NT=Not Tested    PLAN:   FREQUENCY AND DURATION: 3 times/week for duration of hospital stay or until stated goals are met, whichever comes first.    TREATMENT:   TREATMENT:   Therapeutic Activity (25 Minutes): Therapeutic activity included Rolling, Supine to Sit, Sit to Supine, Scooting, Transfer Training, Ambulation on level ground, Sitting balance , and Standing balance to improve functional Activity tolerance, Balance, Coordination, Mobility, and Strength.    TREATMENT GRID:  N/A    AFTER TREATMENT PRECAUTIONS: Alarm Activated, Bed/Chair Locked, Call light within reach, Chair, Needs within reach, RN notified, and Virtual     INTERDISCIPLINARY COLLABORATION:  RN/ PCT and PT/ PTA    EDUCATION:  review POC and importance of mobility in the recovery process    TIME IN/OUT:  Time In: 1031  Time Out: 1056  Minutes: 25    Rosie Diez PTA    
Good Fair+ Fair Fair- Poor NT Comments   Sitting Static [x] [] [] [] [] []    Sitting Dynamic [x] [] [] [] [] []              Standing Static [x] [] [] [] [] []    Standing Dynamic [x] [] [] [] [] []      GAIT: I Mod I S SBA CGA Min Mod Max Total  NT x2 Comments:   Level of Assistance [] [] [] [x] [] [] [] [] [] [] []    Distance 40  feet    DME Rolling Walker    Gait Quality Decreased kevan , Decreased step clearance, Decreased step length, and Decreased stance    Weightbearing Status      Stairs      I=Independent, Mod I=Modified Independent, S=Supervision, SBA=Standby Assistance, CGA=Contact Guard Assistance,   Min=Minimal Assistance, Mod=Moderate Assistance, Max=Maximal Assistance, Total=Total Assistance, NT=Not Tested    PLAN:   FREQUENCY AND DURATION: 3 times/week for duration of hospital stay or until stated goals are met, whichever comes first.    TREATMENT:   TREATMENT:   Therapeutic Activity (27 Minutes): Therapeutic activity included Rolling, Supine to Sit, Sit to Supine, Scooting, Transfer Training, Ambulation on level ground, Sitting balance , and Standing balance to improve functional Activity tolerance, Balance, Coordination, Mobility, and Strength.    TREATMENT GRID:  N/A    AFTER TREATMENT PRECAUTIONS: Bed, Bed/Chair Locked, Call light within reach, Needs within reach, RN notified, and Virtual     INTERDISCIPLINARY COLLABORATION:  RN/ PCT and PT/ PTA    EDUCATION:  review POC and importance of mobility in the recovery process    TIME IN/OUT:  Time In: 1351  Time Out: 1418  Minutes: 27    Rosie Diez PTA    
  Basic Metabolic Panel w/ Reflex to MG    Collection Time: 02/20/25  7:44 AM   Result Value Ref Range    Sodium 141 136 - 145 mmol/L    Potassium 3.5 3.5 - 5.1 mmol/L    Chloride 109 (H) 98 - 107 mmol/L    CO2 21 20 - 29 mmol/L    Anion Gap 11 7 - 16 mmol/L    Glucose 99 70 - 99 mg/dL    BUN 59 (H) 8 - 23 MG/DL    Creatinine 3.86 (H) 0.60 - 1.10 MG/DL    Est, Glom Filt Rate 12 (L) >60 ml/min/1.73m2    Calcium 8.0 (L) 8.8 - 10.2 MG/DL   CBC with Auto Differential    Collection Time: 02/20/25  7:44 AM   Result Value Ref Range    WBC 5.0 4.3 - 11.1 K/uL    RBC 3.94 (L) 4.05 - 5.2 M/uL    Hemoglobin 11.6 (L) 11.7 - 15.4 g/dL    Hematocrit 34.2 (L) 35.8 - 46.3 %    MCV 86.8 82 - 102 FL    MCH 29.4 26.1 - 32.9 PG    MCHC 33.9 31.4 - 35.0 g/dL    RDW 13.1 11.9 - 14.6 %    Platelets 147 (L) 150 - 450 K/uL    MPV 11.3 9.4 - 12.3 FL    nRBC 0.00 0.0 - 0.2 K/uL    Differential Type AUTOMATED      Neutrophils % 64.8 43.0 - 78.0 %    Lymphocytes % 25.0 13.0 - 44.0 %    Monocytes % 6.2 4.0 - 12.0 %    Eosinophils % 1.0 0.5 - 7.8 %    Basophils % 0.4 0.0 - 2.0 %    Immature Granulocytes % 2.6 0.0 - 5.0 %    Neutrophils Absolute 3.27 1.70 - 8.20 K/UL    Lymphocytes Absolute 1.26 0.50 - 4.60 K/UL    Monocytes Absolute 0.31 0.10 - 1.30 K/UL    Eosinophils Absolute 0.05 0.00 - 0.80 K/UL    Basophils Absolute 0.02 0.00 - 0.20 K/UL    Immature Granulocytes Absolute 0.13 0.0 - 0.5 K/UL   Magnesium    Collection Time: 02/20/25  7:44 AM   Result Value Ref Range    Magnesium 2.4 1.8 - 2.4 mg/dL   POCT Glucose    Collection Time: 02/20/25 11:44 AM   Result Value Ref Range    POC Glucose 104 (H) 65 - 100 mg/dL    Performed by: [employer]    POCT Glucose    Collection Time: 02/20/25  5:02 PM   Result Value Ref Range    POC Glucose 88 65 - 100 mg/dL    Performed by: [employer]    POCT Glucose    Collection Time: 02/20/25  7:35 PM   Result Value Ref Range    POC Glucose 84 65 - 100 mg/dL    Performed by: Navya Bradford 
6.9 4.0 - 12.0 %    Eosinophils % 0.4 (L) 0.5 - 7.8 %    Basophils % 0.4 0.0 - 2.0 %    Immature Granulocytes % 2.3 0.0 - 5.0 %    Neutrophils Absolute 4.90 1.70 - 8.20 K/UL    Lymphocytes Absolute 1.38 0.50 - 4.60 K/UL    Monocytes Absolute 0.48 0.10 - 1.30 K/UL    Eosinophils Absolute 0.03 0.00 - 0.80 K/UL    Basophils Absolute 0.03 0.00 - 0.20 K/UL    Immature Granulocytes Absolute 0.16 0.0 - 0.5 K/UL   POCT Glucose    Collection Time: 02/19/25 11:32 AM   Result Value Ref Range    POC Glucose 121 (H) 65 - 100 mg/dL    Performed by: Audacious    POCT Glucose    Collection Time: 02/19/25  4:45 PM   Result Value Ref Range    POC Glucose 121 (H) 65 - 100 mg/dL    Performed by: [employer]    POCT Glucose    Collection Time: 02/19/25  7:46 PM   Result Value Ref Range    POC Glucose 131 (H) 65 - 100 mg/dL    Performed by: Karen    POCT Glucose    Collection Time: 02/20/25  7:29 AM   Result Value Ref Range    POC Glucose 98 65 - 100 mg/dL    Performed by: Audacious    Basic Metabolic Panel w/ Reflex to MG    Collection Time: 02/20/25  7:44 AM   Result Value Ref Range    Sodium 141 136 - 145 mmol/L    Potassium 3.5 3.5 - 5.1 mmol/L    Chloride 109 (H) 98 - 107 mmol/L    CO2 21 20 - 29 mmol/L    Anion Gap 11 7 - 16 mmol/L    Glucose 99 70 - 99 mg/dL    BUN 59 (H) 8 - 23 MG/DL    Creatinine 3.86 (H) 0.60 - 1.10 MG/DL    Est, Glom Filt Rate 12 (L) >60 ml/min/1.73m2    Calcium 8.0 (L) 8.8 - 10.2 MG/DL   CBC with Auto Differential    Collection Time: 02/20/25  7:44 AM   Result Value Ref Range    WBC 5.0 4.3 - 11.1 K/uL    RBC 3.94 (L) 4.05 - 5.2 M/uL    Hemoglobin 11.6 (L) 11.7 - 15.4 g/dL    Hematocrit 34.2 (L) 35.8 - 46.3 %    MCV 86.8 82 - 102 FL    MCH 29.4 26.1 - 32.9 PG    MCHC 33.9 31.4 - 35.0 g/dL    RDW 13.1 11.9 - 14.6 %    Platelets 147 (L) 150 - 450 K/uL    MPV 11.3 9.4 - 12.3 FL    nRBC 0.00 0.0 - 0.2 K/uL    Differential Type AUTOMATED      Neutrophils % 64.8 43.0 - 78.0 %    
Glucose 131 (H) 65 - 100 mg/dL    Performed by: Swapnil        Current Meds:  Current Facility-Administered Medications   Medication Dose Route Frequency    hydrALAZINE (APRESOLINE) injection 20 mg  20 mg IntraVENous Q6H PRN    ondansetron (ZOFRAN) injection 4 mg  4 mg IntraVENous Q4H PRN    allopurinol (ZYLOPRIM) tablet 50 mg  50 mg Oral Every Other Day    amLODIPine (NORVASC) tablet 5 mg  5 mg Oral Daily    aspirin chewable tablet 81 mg  81 mg Oral Daily    atorvastatin (LIPITOR) tablet 40 mg  40 mg Oral Daily    carvedilol (COREG) tablet 25 mg  25 mg Oral BID WC    calcium elemental (OSCAL) tablet 500 mg  1 tablet Oral BID WC    [START ON 2/21/2025] Dulaglutide SOAJ 4.5 mg (Patient Supplied)  4.5 mg SubCUTAneous Weekly    ferrous sulfate (IRON 325) tablet 325 mg  325 mg Oral Daily with breakfast    insulin glargine (LANTUS) injection vial 20 Units  20 Units SubCUTAneous Nightly    empagliflozin (JARDIANCE) tablet 10 mg  10 mg Oral Daily    letrozole (FEMARA) tablet 2.5 mg (Patient Supplied)  2.5 mg Oral Daily    [Held by provider] lisinopril (PRINIVIL;ZESTRIL) tablet 20 mg  20 mg Oral Daily    sodium bicarbonate tablet 1,300 mg  1,300 mg Oral Daily    Vitamin D (CHOLECALCIFEROL) tablet 1,000 Units  1,000 Units Oral Daily    [Held by provider] furosemide (LASIX) tablet 20 mg  20 mg Oral BID    cefTRIAXone (ROCEPHIN) 1,000 mg in sterile water 10 mL IV syringe  1,000 mg IntraVENous Q24H    prochlorperazine (COMPAZINE) injection 10 mg  10 mg IntraVENous Q6H PRN    sodium chloride flush 0.9 % injection 5-40 mL  5-40 mL IntraVENous 2 times per day    sodium chloride flush 0.9 % injection 5-40 mL  5-40 mL IntraVENous PRN    0.9 % sodium chloride infusion   IntraVENous PRN    enoxaparin Sodium (LOVENOX) injection 30 mg  30 mg SubCUTAneous Q24H    polyethylene glycol (GLYCOLAX) packet 17 g  17 g Oral Daily PRN    acetaminophen (TYLENOL) tablet 650 mg  650 mg Oral Q6H PRN    Or    acetaminophen (TYLENOL) 
(APRESOLINE) tablet 25 mg  25 mg Oral 3 times per day    labetalol (NORMODYNE;TRANDATE) injection 10 mg  10 mg IntraVENous Q6H PRN    0.45 % sodium chloride infusion   IntraVENous Continuous    hydrALAZINE (APRESOLINE) injection 20 mg  20 mg IntraVENous Q6H PRN    ondansetron (ZOFRAN) injection 4 mg  4 mg IntraVENous Q4H PRN    insulin glargine (LANTUS) injection vial 10 Units  10 Units SubCUTAneous Nightly    allopurinol (ZYLOPRIM) tablet 50 mg  50 mg Oral Every Other Day    amLODIPine (NORVASC) tablet 5 mg  5 mg Oral Daily    aspirin chewable tablet 81 mg  81 mg Oral Daily    [Held by provider] atorvastatin (LIPITOR) tablet 40 mg  40 mg Oral Daily    carvedilol (COREG) tablet 25 mg  25 mg Oral BID WC    calcium elemental (OSCAL) tablet 500 mg  1 tablet Oral BID WC    [Held by provider] Dulaglutide SOAJ 4.5 mg (Patient Supplied)  4.5 mg SubCUTAneous Weekly    ferrous sulfate (IRON 325) tablet 325 mg  325 mg Oral Daily with breakfast    [Held by provider] empagliflozin (JARDIANCE) tablet 10 mg  10 mg Oral Daily    letrozole (FEMARA) tablet 2.5 mg (Patient Supplied)  2.5 mg Oral Daily    Vitamin D (CHOLECALCIFEROL) tablet 1,000 Units  1,000 Units Oral Daily    [Held by provider] furosemide (LASIX) tablet 20 mg  20 mg Oral BID    prochlorperazine (COMPAZINE) injection 10 mg  10 mg IntraVENous Q6H PRN    sodium chloride flush 0.9 % injection 5-40 mL  5-40 mL IntraVENous 2 times per day    sodium chloride flush 0.9 % injection 5-40 mL  5-40 mL IntraVENous PRN    0.9 % sodium chloride infusion   IntraVENous PRN    enoxaparin Sodium (LOVENOX) injection 30 mg  30 mg SubCUTAneous Q24H    polyethylene glycol (GLYCOLAX) packet 17 g  17 g Oral Daily PRN    acetaminophen (TYLENOL) tablet 650 mg  650 mg Oral Q6H PRN    Or    acetaminophen (TYLENOL) suppository 650 mg  650 mg Rectal Q6H PRN    magnesium sulfate 2000 mg in 50 mL IVPB premix  2,000 mg IntraVENous PRN    potassium chloride (KLOR-CON M) extended release tablet 40 mEq 
chloride flush 0.9 % injection 5-40 mL  5-40 mL IntraVENous PRN    0.9 % sodium chloride infusion   IntraVENous PRN    polyethylene glycol (GLYCOLAX) packet 17 g  17 g Oral Daily PRN    acetaminophen (TYLENOL) tablet 650 mg  650 mg Oral Q6H PRN    Or    acetaminophen (TYLENOL) suppository 650 mg  650 mg Rectal Q6H PRN    magnesium sulfate 2000 mg in 50 mL IVPB premix  2,000 mg IntraVENous PRN    potassium chloride (KLOR-CON M) extended release tablet 40 mEq  40 mEq Oral PRN    Or    potassium bicarb-citric acid (EFFER-K) effervescent tablet 40 mEq  40 mEq Oral PRN    Or    potassium chloride 10 mEq/100 mL IVPB (Peripheral Line)  10 mEq IntraVENous PRN    gabapentin (NEURONTIN) capsule 100 mg  100 mg Oral Nightly PRN    glucose chewable tablet 16 g  4 tablet Oral PRN    dextrose bolus 10% 125 mL  125 mL IntraVENous PRN    Or    dextrose bolus 10% 250 mL  250 mL IntraVENous PRN    Glucagon Emergency KIT 1 mg  1 mg SubCUTAneous PRN    dextrose 10 % infusion   IntraVENous Continuous PRN    morphine sulfate (PF) injection 4 mg  4 mg IntraVENous Q4H PRN    insulin lispro (HUMALOG,ADMELOG) injection vial 0-4 Units  0-4 Units SubCUTAneous 4x Daily AC & HS       Signed:  Kisha Juan,     Part of this note may have been written by using a voice dictation software.  The note has been proof read but may still contain some grammatical/other typographical errors.  
provider] furosemide (LASIX) tablet 20 mg  20 mg Oral BID    prochlorperazine (COMPAZINE) injection 10 mg  10 mg IntraVENous Q6H PRN    sodium chloride flush 0.9 % injection 5-40 mL  5-40 mL IntraVENous 2 times per day    sodium chloride flush 0.9 % injection 5-40 mL  5-40 mL IntraVENous PRN    0.9 % sodium chloride infusion   IntraVENous PRN    polyethylene glycol (GLYCOLAX) packet 17 g  17 g Oral Daily PRN    acetaminophen (TYLENOL) tablet 650 mg  650 mg Oral Q6H PRN    Or    acetaminophen (TYLENOL) suppository 650 mg  650 mg Rectal Q6H PRN    magnesium sulfate 2000 mg in 50 mL IVPB premix  2,000 mg IntraVENous PRN    potassium chloride (KLOR-CON M) extended release tablet 40 mEq  40 mEq Oral PRN    Or    potassium bicarb-citric acid (EFFER-K) effervescent tablet 40 mEq  40 mEq Oral PRN    Or    potassium chloride 10 mEq/100 mL IVPB (Peripheral Line)  10 mEq IntraVENous PRN    gabapentin (NEURONTIN) capsule 100 mg  100 mg Oral Nightly PRN    glucose chewable tablet 16 g  4 tablet Oral PRN    dextrose bolus 10% 125 mL  125 mL IntraVENous PRN    Or    dextrose bolus 10% 250 mL  250 mL IntraVENous PRN    Glucagon Emergency KIT 1 mg  1 mg SubCUTAneous PRN    dextrose 10 % infusion   IntraVENous Continuous PRN    morphine sulfate (PF) injection 4 mg  4 mg IntraVENous Q4H PRN    insulin lispro (HUMALOG,ADMELOG) injection vial 0-4 Units  0-4 Units SubCUTAneous 4x Daily AC & HS       Signed:  Kisha Juan DO    Part of this note may have been written by using a voice dictation software.  The note has been proof read but may still contain some grammatical/other typographical errors.

## 2025-02-25 NOTE — CARE COORDINATION
Chart reviewed by RNCM    CM following for continued stay. No discharge needs identified by RNCM. Please consult case management if any discharge needs arise.   
Chart reviewed by RNCM    CM following for continued stay. No discharge needs identified by RNCM. Please consult case management if any discharge needs arise.   
Patient with discharge order for today. Referral made to [facility] (RN). Patient has met all treatment goals and milestones for discharge. Patient/family in agreement with discharge plan. Family to provide transportation home. CM following until patient is discharged.   02/25/25 9636   Service Assessment   Patient Orientation Alert and Oriented   Cognition Alert   History Provided By Patient   Accompanied By/Relationship family   Support Systems Spouse/Significant Other   Patient's Healthcare Decision Maker is: Legal Next of Kin   PCP Verified by CM Yes   Last Visit to PCP Within last 6 months   Prior Functional Level Independent in ADLs/IADLs   Current Functional Level Independent in ADLs/IADLs   Can patient return to prior living arrangement Yes   Ability to make needs known: Good   Family able to assist with home care needs: Yes   Would you like for me to discuss the discharge plan with any other family members/significant others, and if so, who? No   Financial Resources Medicare Community Resources None   Social/Functional History   Lives With Spouse   Type of Home House   Home Layout Two level   Home Access Stairs to enter without rails   Entrance Stairs - Number of Steps 3   Home Equipment None   Prior Level of Assist for ADLs Independent   Ambulation Assistance Independent   Prior Level of Assist for Transfers Independent   Active  Yes   Discharge Planning   Current Services Prior To Admission None   DME Ordered? No   Potential Assistance Purchasing Medications No   Patient expects to be discharged to: House   One/Two Story Residence Two story   Services At/After Discharge   Transition of Care Consult (CM Consult) Discharge Planning   Services At/After Discharge Home Health    Resource Information Provided? No   Mode of Transport at Discharge Self   Confirm Follow Up Transport Self   Condition of Participation: Discharge Planning   The Plan for Transition of Care is related to the 
02-Mar-2017 14:23

## 2025-02-25 NOTE — PLAN OF CARE
Problem: Gastrointestinal - Adult  Goal: Maintains or returns to baseline bowel function  2/25/2025 0000 by Jess Oh RN  Outcome: Progressing     Problem: Cardiovascular - Adult  Goal: Absence of cardiac dysrhythmias or at baseline  2/25/2025 0000 by Jess Oh RN  Outcome: Progressing     Problem: Gastrointestinal - Adult  Goal: Maintains or returns to baseline bowel function  2/25/2025 0000 by Jess Oh RN  Outcome: Progressing     Problem: Chronic Conditions and Co-morbidities  Goal: Patient's chronic conditions and co-morbidity symptoms are monitored and maintained or improved  2/25/2025 1225 by Chandni Howell RN  Outcome: Adequate for Discharge  Flowsheets (Taken 2/25/2025 0822)  Care Plan - Patient's Chronic Conditions and Co-Morbidity Symptoms are Monitored and Maintained or Improved:   Monitor and assess patient's chronic conditions and comorbid symptoms for stability, deterioration, or improvement   Collaborate with multidisciplinary team to address chronic and comorbid conditions and prevent exacerbation or deterioration  2/25/2025 0747 by Chandni Howell RN  Outcome: Progressing  2/25/2025 0000 by Jess Oh RN  Outcome: Progressing     Problem: Discharge Planning  Goal: Discharge to home or other facility with appropriate resources  2/25/2025 1225 by Chandni Howell RN  Outcome: Adequate for Discharge  Flowsheets (Taken 2/25/2025 0822)  Discharge to home or other facility with appropriate resources:   Arrange for needed discharge resources and transportation as appropriate   Identify barriers to discharge with patient and caregiver  2/25/2025 0747 by Chandni Howell RN  Outcome: Progressing  2/25/2025 0000 by Jess Oh RN  Outcome: Progressing     Problem: Pain  Goal: Verbalizes/displays adequate comfort level or baseline comfort level  2/25/2025 1225 by Chandni Howell RN  Outcome: Adequate for Discharge  2/25/2025 0747 by Chandni Howell

## 2025-02-25 NOTE — PLAN OF CARE
Problem: Chronic Conditions and Co-morbidities  Goal: Patient's chronic conditions and co-morbidity symptoms are monitored and maintained or improved  Outcome: Progressing     Problem: Discharge Planning  Goal: Discharge to home or other facility with appropriate resources  Outcome: Progressing     Problem: Pain  Goal: Verbalizes/displays adequate comfort level or baseline comfort level  Outcome: Progressing     Problem: Safety - Adult  Goal: Free from fall injury  Outcome: Progressing     Problem: Respiratory - Adult  Goal: Achieves optimal ventilation and oxygenation  Outcome: Progressing     Problem: Cardiovascular - Adult  Goal: Maintains optimal cardiac output and hemodynamic stability  Outcome: Progressing  Goal: Absence of cardiac dysrhythmias or at baseline  Outcome: Progressing     Problem: Skin/Tissue Integrity - Adult  Goal: Skin integrity remains intact  Description: 1.  Monitor for areas of redness and/or skin breakdown  2.  Assess vascular access sites hourly  3.  Every 4-6 hours minimum:  Change oxygen saturation probe site  4.  Every 4-6 hours:  If on nasal continuous positive airway pressure, respiratory therapy assess nares and determine need for appliance change or resting period  Outcome: Progressing  Goal: Incisions, wounds, or drain sites healing without S/S of infection  Outcome: Progressing  Goal: Oral mucous membranes remain intact  Outcome: Progressing     Problem: Gastrointestinal - Adult  Goal: Minimal or absence of nausea and vomiting  Outcome: Progressing  Goal: Maintains or returns to baseline bowel function  Outcome: Progressing  Goal: Maintains adequate nutritional intake  Outcome: Progressing     Problem: Genitourinary - Adult  Goal: Absence of urinary retention  Outcome: Progressing     Problem: Skin/Tissue Integrity  Goal: Skin integrity remains intact  Description: 1.  Monitor for areas of redness and/or skin breakdown  2.  Assess vascular access sites hourly  3.  Every 4-6

## 2025-02-28 ENCOUNTER — TELEPHONE (OUTPATIENT)
Dept: INTERNAL MEDICINE CLINIC | Facility: CLINIC | Age: 65
End: 2025-02-28

## 2025-02-28 NOTE — TELEPHONE ENCOUNTER
Pt notified of Chani Galvan's advise to follow discharge instructions and For further questions she may want to consult call her nephrologist. They may be better to advise that she needs to restart her Lasix. Or she can call her cardiologist.  She voiced understanding and agrees to reach out to them.

## 2025-02-28 NOTE — TELEPHONE ENCOUNTER
Pt called wanting to know if she can start her lasix back up now. States she is about to pop. Pt was recently released from the hospital.

## 2025-02-28 NOTE — TELEPHONE ENCOUNTER
Pt states that when she was discharged Tuesday she was advised to not take her Lasix until Monday march 3rd. She is not sure why they had her hold it until Monday. She said maybe because of her kidneys.   Normal Lasix dosage has been 20 mg twice daily  Pt states that she can not breath because of the swelling abd ,chest, feet and legs. She does not know the weight increase.

## 2025-02-28 NOTE — TELEPHONE ENCOUNTER
Hospital Course:  Patient is a 64-year-old -American female with history of CKD stage IIIb, CAD, DM2, systolic CHF, dyslipidemia, hypertension, PVD, thrombocytopenia admitted with intractable nausea vomiting on 2/14/2025.  Patient was found to have UTI which was treated with antibiotic appropriately.  Patient was unable to tolerate p.o. and continue to have significant nausea vomiting with abdominal discomfort.  GI was consulted, recommended to do EGD which was done on 2/24/2025 which showed mild gastritis, normal esophagus and empiric esophageal dilatation was performed.  Since a dilatation was done, patient has been tolerating diet well without any further nausea or vomiting.  Renal function has been stable, did have some worsening with YURY on CKD on admission for which nephrology was consulted.  Creatinine plateaued at around 4.1 and has been trended downward since then with latest creatinine of 3.74 and BUN of 48.  Nephrology recommends outpatient follow-up in next 1 to 2 weeks as scheduled.  She is currently medically cleared and hemodynamically stable for discharge to home.  Patient is advised to return back to the emergency in case of recurring nausea vomiting, abdominal pain, fever chills or night sweats.    Marlon, Please call patient.  Let her know I have reviewed her hospital discharge note.  Review shows her last creatinine at 3.7.  I would go by the recommendations of her discharge to start Lasix back on Monday.  For further questions she may want to consult call her nephrologist.  They may be better to advise that she needs to restart her Lasix.  Or she can call her cardiologist.

## 2025-03-04 ENCOUNTER — TELEPHONE (OUTPATIENT)
Age: 65
End: 2025-03-04

## 2025-03-04 DIAGNOSIS — A04.8 H. PYLORI INFECTION: Primary | ICD-10-CM

## 2025-03-04 DIAGNOSIS — A04.8 H. PYLORI INFECTION: ICD-10-CM

## 2025-03-04 RX ORDER — MINOCYCLINE HYDROCHLORIDE 100 MG/1
CAPSULE ORAL
Refills: 0 | OUTPATIENT
Start: 2025-03-04

## 2025-03-04 RX ORDER — AMOXICILLIN 500 MG/1
1000 CAPSULE ORAL 3 TIMES DAILY
Qty: 84 CAPSULE | Refills: 0 | Status: SHIPPED | OUTPATIENT
Start: 2025-03-04 | End: 2025-03-18

## 2025-03-04 RX ORDER — PANTOPRAZOLE SODIUM 40 MG/1
40 TABLET, DELAYED RELEASE ORAL
Qty: 28 TABLET | Refills: 0 | Status: SHIPPED | OUTPATIENT
Start: 2025-03-04 | End: 2025-03-18

## 2025-03-04 RX ORDER — TETRACYCLINE HYDROCHLORIDE 500 MG/1
500 CAPSULE ORAL 4 TIMES DAILY
Qty: 56 CAPSULE | Refills: 0 | Status: SHIPPED | OUTPATIENT
Start: 2025-03-04 | End: 2025-03-04

## 2025-03-04 RX ORDER — METRONIDAZOLE 250 MG/1
250 TABLET ORAL 4 TIMES DAILY
Qty: 56 TABLET | Refills: 0 | Status: SHIPPED | OUTPATIENT
Start: 2025-03-04 | End: 2025-03-04 | Stop reason: ALTCHOICE

## 2025-03-04 NOTE — TELEPHONE ENCOUNTER
Called pt, reviewed EGD/biopsy results with pt per Dr. Purvis - gastric biopsies positive for H. Pylori infection. Reviewed instructions for completing quadruple therapy treatment as prescribed by Dr. Purvis with retest 2 months after completing treatment. Pt verbalized understanding, agreeable to take medications as prescribed and reach back out if any issues tolerating meds or any questions. Letter mailed to pt with written copy of instructions per pt request.     Per standing order from Dr. Purvis, orders placed for quadruple therapy treatment medications and H. Pylori Breath test due on or after 5/5/25.       -------------------------------------------------------    [Inpatient EGD 2/24/25:]  \"Impression:         -  Normal esophagus.  Dilated.         -  Biopsies were taken with a cold forceps for evaluation of            eosinophilic esophagitis.         -  Gastritis, characterized by erythema.  Biopsied.         -  Normal examined duodenum.  Recommendation:         -  Await pathology results.         - Antiemetics as needed.\"    [Pathology 2/24/25:]  \"A:  Stomach, biopsy:    - Helicobacter-associated chronic active gastritis.     B:  Distal esophagus, biopsy:    - Squamous mucosa with mild reactive changes, suggestive of reflux.    - No intestinal metaplasia.     C:  Proximal esophagus, biopsy:    - Squamous mucosa with no significant abnormality.\"    Staff message:  3/4/2025   9:02 AM EST   \"Cristiane Purvis MD Lukhard, Amanda, RN  Please let her know and treat. Thank you!\"

## 2025-03-04 NOTE — TELEPHONE ENCOUNTER
Per Dr. Purvis, prescriptions for Quadruple therapy treatment of H. Pylori canceled and new rx sent in for alternate treatment regimen including Amoxil 1g TID and Protonix 40mg BID for 14 days.     Called pt and reviewed medication changes due to insurance restrictions. Pt verbalized understanding, agreeable to follow instructions for new treatment regimen of Protonix and Amoxil only. Letter with updated H. Pylori instructions mailed to pt per request. Original letter with quadruple therapy instructions replaced.

## 2025-03-09 ENCOUNTER — APPOINTMENT (OUTPATIENT)
Dept: GENERAL RADIOLOGY | Age: 65
End: 2025-03-09
Payer: MEDICARE

## 2025-03-09 ENCOUNTER — HOSPITAL ENCOUNTER (EMERGENCY)
Age: 65
Discharge: HOME | End: 2025-03-09
Payer: MEDICARE

## 2025-03-09 VITALS
DIASTOLIC BLOOD PRESSURE: 70 MMHG | HEIGHT: 63 IN | HEART RATE: 88 BPM | OXYGEN SATURATION: 100 % | BODY MASS INDEX: 35.45 KG/M2 | TEMPERATURE: 98.3 F | WEIGHT: 200.1 LBS | SYSTOLIC BLOOD PRESSURE: 154 MMHG | RESPIRATION RATE: 14 BRPM

## 2025-03-09 DIAGNOSIS — R07.9 CHEST PAIN, UNSPECIFIED TYPE: Primary | ICD-10-CM

## 2025-03-09 DIAGNOSIS — E87.70 HYPERVOLEMIA, UNSPECIFIED HYPERVOLEMIA TYPE: ICD-10-CM

## 2025-03-09 LAB
ALBUMIN SERPL-MCNC: 2.9 G/DL (ref 3.2–4.6)
ALBUMIN/GLOB SERPL: 0.7 (ref 1–1.9)
ALP SERPL-CCNC: 66 U/L (ref 35–104)
ALT SERPL-CCNC: 19 U/L (ref 8–45)
ANION GAP SERPL CALC-SCNC: 16 MMOL/L (ref 7–16)
AST SERPL-CCNC: 27 U/L (ref 15–37)
BASOPHILS # BLD: 0.02 K/UL (ref 0–0.2)
BASOPHILS NFR BLD: 0.3 % (ref 0–2)
BILIRUB SERPL-MCNC: 0.4 MG/DL (ref 0–1.2)
BUN SERPL-MCNC: 22 MG/DL (ref 8–23)
CALCIUM SERPL-MCNC: 9.1 MG/DL (ref 8.8–10.2)
CHLORIDE SERPL-SCNC: 102 MMOL/L (ref 98–107)
CO2 SERPL-SCNC: 20 MMOL/L (ref 20–29)
CREAT SERPL-MCNC: 2.77 MG/DL (ref 0.6–1.1)
DIFFERENTIAL METHOD BLD: ABNORMAL
EOSINOPHIL # BLD: 0.17 K/UL (ref 0–0.8)
EOSINOPHIL NFR BLD: 2.8 % (ref 0.5–7.8)
ERYTHROCYTE [DISTWIDTH] IN BLOOD BY AUTOMATED COUNT: 13 % (ref 11.9–14.6)
GLOBULIN SER CALC-MCNC: 4.3 G/DL (ref 2.3–3.5)
GLUCOSE SERPL-MCNC: 88 MG/DL (ref 70–99)
HCT VFR BLD AUTO: 32.9 % (ref 35.8–46.3)
HGB BLD-MCNC: 10.7 G/DL (ref 11.7–15.4)
IMM GRANULOCYTES # BLD AUTO: 0.01 K/UL (ref 0–0.5)
IMM GRANULOCYTES NFR BLD AUTO: 0.2 % (ref 0–5)
LYMPHOCYTES # BLD: 1.86 K/UL (ref 0.5–4.6)
LYMPHOCYTES NFR BLD: 30.9 % (ref 13–44)
MCH RBC QN AUTO: 29.3 PG (ref 26.1–32.9)
MCHC RBC AUTO-ENTMCNC: 32.5 G/DL (ref 31.4–35)
MCV RBC AUTO: 90.1 FL (ref 82–102)
MONOCYTES # BLD: 0.43 K/UL (ref 0.1–1.3)
MONOCYTES NFR BLD: 7.1 % (ref 4–12)
NEUTS SEG # BLD: 3.53 K/UL (ref 1.7–8.2)
NEUTS SEG NFR BLD: 58.7 % (ref 43–78)
NRBC # BLD: 0 K/UL (ref 0–0.2)
NT PRO BNP: 1833 PG/ML (ref 0–125)
PLATELET # BLD AUTO: 244 K/UL (ref 150–450)
PMV BLD AUTO: 10.6 FL (ref 9.4–12.3)
POTASSIUM SERPL-SCNC: 4.3 MMOL/L (ref 3.5–5.1)
PROT SERPL-MCNC: 7.2 G/DL (ref 6.3–8.2)
RBC # BLD AUTO: 3.65 M/UL (ref 4.05–5.2)
SODIUM SERPL-SCNC: 138 MMOL/L (ref 136–145)
TROPONIN T SERPL HS-MCNC: 49 NG/L (ref 0–14)
TROPONIN T SERPL HS-MCNC: 67 NG/L (ref 0–14)
WBC # BLD AUTO: 6 K/UL (ref 4.3–11.1)

## 2025-03-09 PROCEDURE — 71046 X-RAY EXAM CHEST 2 VIEWS: CPT

## 2025-03-09 PROCEDURE — 6360000002 HC RX W HCPCS: Performed by: STUDENT IN AN ORGANIZED HEALTH CARE EDUCATION/TRAINING PROGRAM

## 2025-03-09 PROCEDURE — 6370000000 HC RX 637 (ALT 250 FOR IP): Performed by: STUDENT IN AN ORGANIZED HEALTH CARE EDUCATION/TRAINING PROGRAM

## 2025-03-09 PROCEDURE — 85025 COMPLETE CBC W/AUTO DIFF WBC: CPT

## 2025-03-09 PROCEDURE — 80053 COMPREHEN METABOLIC PANEL: CPT

## 2025-03-09 PROCEDURE — 96374 THER/PROPH/DIAG INJ IV PUSH: CPT

## 2025-03-09 PROCEDURE — 93005 ELECTROCARDIOGRAM TRACING: CPT | Performed by: EMERGENCY MEDICINE

## 2025-03-09 PROCEDURE — 84484 ASSAY OF TROPONIN QUANT: CPT

## 2025-03-09 PROCEDURE — 99285 EMERGENCY DEPT VISIT HI MDM: CPT

## 2025-03-09 PROCEDURE — 83880 ASSAY OF NATRIURETIC PEPTIDE: CPT

## 2025-03-09 RX ORDER — FUROSEMIDE 20 MG/1
80 TABLET ORAL 2 TIMES DAILY
Qty: 56 TABLET | Refills: 0 | Status: SHIPPED | OUTPATIENT
Start: 2025-03-09 | End: 2025-03-16

## 2025-03-09 RX ORDER — FUROSEMIDE 10 MG/ML
60 INJECTION INTRAMUSCULAR; INTRAVENOUS ONCE
Status: COMPLETED | OUTPATIENT
Start: 2025-03-09 | End: 2025-03-09

## 2025-03-09 RX ORDER — ASPIRIN 325 MG
325 TABLET ORAL
Status: COMPLETED | OUTPATIENT
Start: 2025-03-09 | End: 2025-03-09

## 2025-03-09 RX ORDER — MORPHINE SULFATE 2 MG/ML
2 INJECTION, SOLUTION INTRAMUSCULAR; INTRAVENOUS ONCE
Status: DISCONTINUED | OUTPATIENT
Start: 2025-03-09 | End: 2025-03-09 | Stop reason: HOSPADM

## 2025-03-09 RX ORDER — ONDANSETRON 2 MG/ML
4 INJECTION INTRAMUSCULAR; INTRAVENOUS
Status: DISCONTINUED | OUTPATIENT
Start: 2025-03-09 | End: 2025-03-09 | Stop reason: HOSPADM

## 2025-03-09 RX ORDER — ACETAMINOPHEN 500 MG
1000 TABLET ORAL
Status: COMPLETED | OUTPATIENT
Start: 2025-03-09 | End: 2025-03-09

## 2025-03-09 RX ADMIN — ASPIRIN 325 MG: 325 TABLET, FILM COATED ORAL at 16:57

## 2025-03-09 RX ADMIN — FUROSEMIDE 60 MG: 10 INJECTION, SOLUTION INTRAVENOUS at 17:31

## 2025-03-09 RX ADMIN — ACETAMINOPHEN 1000 MG: 500 TABLET, FILM COATED ORAL at 18:44

## 2025-03-09 RX ADMIN — NITROGLYCERIN 0.5 INCH: 20 OINTMENT TOPICAL at 16:57

## 2025-03-09 ASSESSMENT — PAIN DESCRIPTION - ORIENTATION
ORIENTATION: LEFT;UPPER
ORIENTATION: LEFT;UPPER

## 2025-03-09 ASSESSMENT — PAIN DESCRIPTION - DESCRIPTORS: DESCRIPTORS: PRESSURE

## 2025-03-09 ASSESSMENT — PAIN DESCRIPTION - LOCATION
LOCATION: CHEST
LOCATION: CHEST

## 2025-03-09 ASSESSMENT — PAIN SCALES - GENERAL
PAINLEVEL_OUTOF10: 8
PAINLEVEL_OUTOF10: 3

## 2025-03-09 NOTE — ED NOTES
Patient mobility status  with no difficulty.     I have reviewed discharge instructions with the patient.  The patient verbalized understanding.    Patient left ED via Discharge Method: wheelchair to Home with Child.    Opportunity for questions and clarification provided.     Patient given 1 e- scripts.            Dunbar, Abby, RN  03/09/25 [id number]

## 2025-03-09 NOTE — ED PROVIDER NOTES
Emergency Department Provider Note       PCP: Daniella Moses MD   Age: 64 y.o.   Sex: female     DISPOSITION Discharge - Pending Orders Complete 03/09/2025 06:29:02 PM    ICD-10-CM    1. Chest pain, unspecified type  R07.9 [facility]      2. Hypervolemia, unspecified hypervolemia type  E87.70 [facility]          Medical Decision Making     In summary this is a 64-year-old female patient who presented for evaluation with chest tightness and shortness of breath for the past few days.  On exam she was fluid overloaded with leg swelling and some rales in her lower lung fields.  Chest x-ray showed mild edema.  However, she was oxygenating at 100%.  I discussed admission with the patient but she wants to trial outpatient therapy which I feel is appropriate.  Will increase the dose of her Lasix for a week and have her follow-up with cardiology.  Based on exam today I have low suspicion for emergent pathology such as cardiac tamponade, tension pneumothorax, ACS, PE, dissection, pneumonia, etc.  I do feel she is stable for outpatient therapy.  Patient does have appointment with nephrologist tomorrow which I have encouraged her to go to and discussed today's workup and plan going forward. I have given strict return precautions.  Patient has verbalized understanding and agreed to the plan.  Discharged in stable condition.  ED Course as of 03/09/25 1842   Sun Mar 09, 2025   1619 Independent interpretation of EKG shows a sinus rhythm at ventricular rate of 92 bpm.  There is minimal ST elevation seen in leads III and questionably in lead aVF.  There is minimal reciprocal depression seen in lead I and lead aVL.  Questionable elevation in the ST wave of leads V2 through V5.  However, this EKG is comparable to EKG taken on February 15, 2025 with similar ST changes and depressions.  Will trend troponins.  I have lower suspicion for ACS given the story.  Her symptoms being

## 2025-03-09 NOTE — DISCHARGE INSTRUCTIONS
As we discussed, please increase the dose of your Lasix for the next 1 week.  You will now be taking 80 mg in the morning and 80 mg at night.  Do this for 1 week and then go back to your normal dose.  Please follow-up with cardiology.  They should call you to set your follow-up appointment up.  Return here for new or worsening symptoms    As we discussed, I did not find a life threatening cause of your symptoms today. However, THAT DOES NOT MEAN IT COULD NOT DEVELOP. If you develop ANY new or worsening symptoms, it is critical that you return for re-evaluation. This includes any symptoms that are concerning to you, especially symptoms such as fevers, worsening or changed your chest pain, worsen or change your shortness of breath.  If you do not return for re-evaluation, you risk serious complications, including death.    It was my pleasure to take care of you today in the emergency department. Thank you for coming in today. I hope that we were able to help you out and make you more comfortable. I hope you have a speedy recovery! If you do get a survey in the mail asking how your care was, it really helps me and our department out if you respond. Thank you!

## 2025-03-10 ENCOUNTER — OFFICE VISIT (OUTPATIENT)
Dept: INTERNAL MEDICINE CLINIC | Facility: CLINIC | Age: 65
End: 2025-03-10
Payer: MEDICARE

## 2025-03-10 VITALS
OXYGEN SATURATION: 97 % | BODY MASS INDEX: 34.2 KG/M2 | HEIGHT: 63 IN | DIASTOLIC BLOOD PRESSURE: 70 MMHG | WEIGHT: 193 LBS | SYSTOLIC BLOOD PRESSURE: 128 MMHG | TEMPERATURE: 98.5 F | HEART RATE: 90 BPM

## 2025-03-10 DIAGNOSIS — I50.22 CHRONIC SYSTOLIC CONGESTIVE HEART FAILURE (HCC): ICD-10-CM

## 2025-03-10 DIAGNOSIS — N18.4 CKD (CHRONIC KIDNEY DISEASE) STAGE 4, GFR 15-29 ML/MIN (HCC): ICD-10-CM

## 2025-03-10 DIAGNOSIS — R07.89 CHEST WALL TENDERNESS: Primary | ICD-10-CM

## 2025-03-10 LAB
EKG ATRIAL RATE: 92 BPM
EKG DIAGNOSIS: NORMAL
EKG P AXIS: 33 DEGREES
EKG P-R INTERVAL: 196 MS
EKG Q-T INTERVAL: 390 MS
EKG QRS DURATION: 82 MS
EKG QTC CALCULATION (BAZETT): 482 MS
EKG R AXIS: 25 DEGREES
EKG T AXIS: 101 DEGREES
EKG VENTRICULAR RATE: 92 BPM

## 2025-03-10 PROCEDURE — G8417 CALC BMI ABV UP PARAM F/U: HCPCS | Performed by: FAMILY MEDICINE

## 2025-03-10 PROCEDURE — 1111F DSCHRG MED/CURRENT MED MERGE: CPT | Performed by: FAMILY MEDICINE

## 2025-03-10 PROCEDURE — 93010 ELECTROCARDIOGRAM REPORT: CPT | Performed by: INTERNAL MEDICINE

## 2025-03-10 PROCEDURE — G8427 DOCREV CUR MEDS BY ELIG CLIN: HCPCS | Performed by: FAMILY MEDICINE

## 2025-03-10 PROCEDURE — 3017F COLORECTAL CA SCREEN DOC REV: CPT | Performed by: FAMILY MEDICINE

## 2025-03-10 PROCEDURE — 99214 OFFICE O/P EST MOD 30 MIN: CPT | Performed by: FAMILY MEDICINE

## 2025-03-10 PROCEDURE — 1036F TOBACCO NON-USER: CPT | Performed by: FAMILY MEDICINE

## 2025-03-10 PROCEDURE — 3078F DIAST BP <80 MM HG: CPT | Performed by: FAMILY MEDICINE

## 2025-03-10 PROCEDURE — 3074F SYST BP LT 130 MM HG: CPT | Performed by: FAMILY MEDICINE

## 2025-03-10 RX ORDER — AMLODIPINE BESYLATE 5 MG/1
5 TABLET ORAL DAILY
COMMUNITY

## 2025-03-10 RX ORDER — CYCLOBENZAPRINE HCL 5 MG
5 TABLET ORAL 2 TIMES DAILY PRN
Qty: 20 TABLET | Refills: 0 | Status: SHIPPED | OUTPATIENT
Start: 2025-03-10 | End: 2025-03-20

## 2025-03-10 ASSESSMENT — ENCOUNTER SYMPTOMS
BLOOD IN STOOL: 0
SHORTNESS OF BREATH: 1
ABDOMINAL PAIN: 0

## 2025-03-10 NOTE — PROGRESS NOTES
letrozole (FEMARA) 2.5 MG tablet Take 1 tablet by mouth daily 90 tablet 0    vitamin D 25 MCG (1000 UT) CAPS Take 1 capsule by mouth daily      acetaminophen (TYLENOL) 500 MG tablet Take 1 tablet by mouth every 4 hours as needed      aspirin 81 MG chewable tablet Take 1 tablet by mouth daily       No current facility-administered medications for this visit.     No Known Allergies    Objective:   /70 (BP Site: Left Upper Arm, Patient Position: Sitting)   Pulse 90   Temp 98.5 °F (36.9 °C) (Oral)   Ht 1.6 m (5' 3\")   Wt 87.5 kg (193 lb)   SpO2 97%   BMI 34.19 kg/m²     Physical Exam  Vitals and nursing note reviewed.   Constitutional:       General: She is not in acute distress.     Appearance: Normal appearance. She is normal weight. She is not ill-appearing, toxic-appearing or diaphoretic.   HENT:      Head: Normocephalic.      Nose: Nose normal.   Eyes:      Extraocular Movements: Extraocular movements intact.   Cardiovascular:      Rate and Rhythm: Normal rate and regular rhythm.      Heart sounds: No murmur heard.  Pulmonary:      Effort: Pulmonary effort is normal.      Breath sounds: Normal breath sounds.   Chest:      Chest wall: Tenderness present.       Musculoskeletal:         General: No deformity.   Skin:     General: Skin is warm.   Neurological:      General: No focal deficit present.      Mental Status: She is alert.   Psychiatric:         Mood and Affect: Mood normal.         Assessment and Plan:      Diagnosis Orders   1. Chest wall tenderness  cyclobenzaprine (FLEXERIL) 5 MG tablet      2. Chronic systolic congestive heart failure (HCC)        3. CKD (chronic kidney disease) stage 4, GFR 15-29 ml/min ([facility])            Data reviewed:  Previous notes, labs and Specialists notes, if any, reviewed and discussed with patient.  Chest pain similar to yesterday at ER.  Chest wall tenderness noted - trial of flexeril, continue tylenol. Keep appt with Cardiology or to ER if worse.  CHF - continue

## 2025-03-17 DIAGNOSIS — R23.2 FLUSHING: ICD-10-CM

## 2025-03-17 PROBLEM — E86.0 DEHYDRATION: Status: RESOLVED | Noted: 2025-02-15 | Resolved: 2025-03-17

## 2025-03-19 RX ORDER — LISINOPRIL 40 MG/1
40 TABLET ORAL DAILY
Qty: 90 TABLET | Refills: 1 | OUTPATIENT
Start: 2025-03-19

## 2025-03-19 RX ORDER — VENLAFAXINE HYDROCHLORIDE 37.5 MG/1
CAPSULE, EXTENDED RELEASE ORAL DAILY
Qty: 90 CAPSULE | Refills: 1 | OUTPATIENT
Start: 2025-03-19

## 2025-03-24 ENCOUNTER — APPOINTMENT (OUTPATIENT)
Dept: GENERAL RADIOLOGY | Age: 65
End: 2025-03-24
Payer: MEDICARE

## 2025-03-24 ENCOUNTER — HOSPITAL ENCOUNTER (EMERGENCY)
Age: 65
Discharge: HOME | End: 2025-03-25
Attending: EMERGENCY MEDICINE
Payer: MEDICARE

## 2025-03-24 DIAGNOSIS — R00.2 PALPITATIONS: ICD-10-CM

## 2025-03-24 DIAGNOSIS — I49.3 PVC'S (PREMATURE VENTRICULAR CONTRACTIONS): Primary | ICD-10-CM

## 2025-03-24 LAB
ALBUMIN SERPL-MCNC: 2.9 G/DL (ref 3.2–4.6)
ALBUMIN/GLOB SERPL: 0.7 (ref 1–1.9)
ALP SERPL-CCNC: 74 U/L (ref 35–104)
ALT SERPL-CCNC: 11 U/L (ref 8–45)
ANION GAP SERPL CALC-SCNC: 13 MMOL/L (ref 7–16)
AST SERPL-CCNC: 23 U/L (ref 15–37)
BASOPHILS # BLD: 0.04 K/UL (ref 0–0.2)
BASOPHILS NFR BLD: 0.5 % (ref 0–2)
BILIRUB SERPL-MCNC: <0.2 MG/DL (ref 0–1.2)
BUN SERPL-MCNC: 35 MG/DL (ref 8–23)
CALCIUM SERPL-MCNC: 9.1 MG/DL (ref 8.8–10.2)
CHLORIDE SERPL-SCNC: 102 MMOL/L (ref 98–107)
CO2 SERPL-SCNC: 23 MMOL/L (ref 20–29)
CREAT SERPL-MCNC: 3.12 MG/DL (ref 0.6–1.1)
DIFFERENTIAL METHOD BLD: ABNORMAL
EOSINOPHIL # BLD: 0.19 K/UL (ref 0–0.8)
EOSINOPHIL NFR BLD: 2.4 % (ref 0.5–7.8)
ERYTHROCYTE [DISTWIDTH] IN BLOOD BY AUTOMATED COUNT: 13.5 % (ref 11.9–14.6)
GLOBULIN SER CALC-MCNC: 4.2 G/DL (ref 2.3–3.5)
GLUCOSE SERPL-MCNC: 177 MG/DL (ref 70–99)
HCT VFR BLD AUTO: 32.3 % (ref 35.8–46.3)
HGB BLD-MCNC: 10.5 G/DL (ref 11.7–15.4)
IMM GRANULOCYTES # BLD AUTO: 0.04 K/UL (ref 0–0.5)
IMM GRANULOCYTES NFR BLD AUTO: 0.5 % (ref 0–5)
LYMPHOCYTES # BLD: 2.2 K/UL (ref 0.5–4.6)
LYMPHOCYTES NFR BLD: 27.9 % (ref 13–44)
MAGNESIUM SERPL-MCNC: 2.2 MG/DL (ref 1.8–2.4)
MCH RBC QN AUTO: 29 PG (ref 26.1–32.9)
MCHC RBC AUTO-ENTMCNC: 32.5 G/DL (ref 31.4–35)
MCV RBC AUTO: 89.2 FL (ref 82–102)
MONOCYTES # BLD: 0.55 K/UL (ref 0.1–1.3)
MONOCYTES NFR BLD: 7 % (ref 4–12)
NEUTS SEG # BLD: 4.87 K/UL (ref 1.7–8.2)
NEUTS SEG NFR BLD: 61.7 % (ref 43–78)
NRBC # BLD: 0 K/UL (ref 0–0.2)
PLATELET # BLD AUTO: 267 K/UL (ref 150–450)
PMV BLD AUTO: 11.1 FL (ref 9.4–12.3)
POTASSIUM SERPL-SCNC: 4.2 MMOL/L (ref 3.5–5.1)
PROT SERPL-MCNC: 7.1 G/DL (ref 6.3–8.2)
RBC # BLD AUTO: 3.62 M/UL (ref 4.05–5.2)
SODIUM SERPL-SCNC: 137 MMOL/L (ref 136–145)
TROPONIN T SERPL HS-MCNC: 43 NG/L (ref 0–14)
WBC # BLD AUTO: 7.9 K/UL (ref 4.3–11.1)

## 2025-03-24 PROCEDURE — 80053 COMPREHEN METABOLIC PANEL: CPT

## 2025-03-24 PROCEDURE — 83735 ASSAY OF MAGNESIUM: CPT

## 2025-03-24 PROCEDURE — 85025 COMPLETE CBC W/AUTO DIFF WBC: CPT

## 2025-03-24 PROCEDURE — 99285 EMERGENCY DEPT VISIT HI MDM: CPT

## 2025-03-24 PROCEDURE — 84484 ASSAY OF TROPONIN QUANT: CPT

## 2025-03-24 PROCEDURE — 93005 ELECTROCARDIOGRAM TRACING: CPT | Performed by: EMERGENCY MEDICINE

## 2025-03-24 PROCEDURE — 71046 X-RAY EXAM CHEST 2 VIEWS: CPT

## 2025-03-24 ASSESSMENT — PAIN - FUNCTIONAL ASSESSMENT
PAIN_FUNCTIONAL_ASSESSMENT: 0-10
PAIN_FUNCTIONAL_ASSESSMENT: 0-10

## 2025-03-24 ASSESSMENT — PAIN SCALES - GENERAL: PAINLEVEL_OUTOF10: 7

## 2025-03-25 VITALS
BODY MASS INDEX: 34.2 KG/M2 | HEART RATE: 78 BPM | WEIGHT: 193 LBS | HEIGHT: 63 IN | DIASTOLIC BLOOD PRESSURE: 81 MMHG | OXYGEN SATURATION: 100 % | TEMPERATURE: 98.1 F | RESPIRATION RATE: 17 BRPM | SYSTOLIC BLOOD PRESSURE: 139 MMHG

## 2025-03-25 LAB
EKG ATRIAL RATE: 85 BPM
EKG DIAGNOSIS: NORMAL
EKG P AXIS: 55 DEGREES
EKG P-R INTERVAL: 212 MS
EKG Q-T INTERVAL: 394 MS
EKG QRS DURATION: 94 MS
EKG QTC CALCULATION (BAZETT): 468 MS
EKG R AXIS: 66 DEGREES
EKG T AXIS: 24 DEGREES
EKG VENTRICULAR RATE: 85 BPM
TROPONIN T SERPL HS-MCNC: 44 NG/L (ref 0–14)

## 2025-03-25 PROCEDURE — 93010 ELECTROCARDIOGRAM REPORT: CPT | Performed by: INTERNAL MEDICINE

## 2025-03-25 NOTE — ED TRIAGE NOTES
Pt presents to triage c/o chest pain that radiates to her left side and palpitations that began today

## 2025-03-25 NOTE — ED PROVIDER NOTES
Emergency Department Provider Note       PCP: Daniella Moses MD   Age: 64 y.o.   Sex: female     DISPOSITION Decision To Discharge 03/25/2025 12:27:34 AM    ICD-10-CM    1. PVC's (premature ventricular contractions)  I49.3       2. Palpitations  R00.2           Medical Decision Making     Patient is being evaluated for chest pain.  Talking to the patient this is more of a palpitation.  Consideration of arrhythmia such as A-fib, a flutter, premature ventricular or atrial contractions.  With her history cardiac workup to look for any signs of ACS will be performed.  Chest x-ray, EKG, blood work ordered.  ED Course as of 03/25/25 0028   Mon Mar 24, 2025   2208 On the patient is EKG she does have PVCs noted.  This may be the palpitations that she is referring to.  Blood work and x-ray still pending. [ROCKY]   2245 XR CHEST (2 VW)  Per my independent interpretation 2 view chest x-ray is negative for any acute process.  There is no infiltrate, no effusion, no pulmonary edema. [ROCKY]   2317 CBC with Auto Differential(!):    WBC 7.9   RBC 3.62(!)   Hemoglobin Quant 10.5(!)   Hematocrit 32.3(!)   MCV 89.2   MCH 29.0   MCHC 32.5   RDW 13.5   Platelet Count 267   MPV 11.1   Nucleated Red Blood Cells 0.00   Differential Type AUTOMATED   Neutrophils % 61.7   Lymphocyte % 27.9   Monocytes % 7.0   Eosinophils % 2.4   Basophils % 0.5   Immature Granulocytes % 0.5   Neutrophils Absolute 4.87   Lymphocytes Absolute 2.20   Monocytes Absolute 0.55   Eosinophils Absolute 0.19   Basophils Absolute 0.04   Immature Granulocytes Absolute 0.04  No leukocytosis or left shift concerning for infectious process [ROCKY]   2345 Creatinine(!): 3.12  Slightly elevated versus previous visit but within the patient's range of past values [ROCKY]   Tue Mar 25, 2025   0026 Troponin T(!): 44.0  Troponin is stable. [ROCKY]   0028 Cardiac workup unremarkable.  Patient currently asymptomatic.  Symptoms of palpitations likely related to PVCs.  She is to follow-up

## 2025-04-18 NOTE — PROGRESS NOTES
[facility] CARDIOLOGY  [address] [place] [address], [address]  [place] [address]  PHONE: [phone]      25    NAME:  Claudette Zelaya  : 1960  MRN: [id number]         SUBJECTIVE:   Claudette Zelaya is a 64 y.o. female seen for a follow up visit regarding the following:     Chief Complaint   Patient presents with    Follow-Up from Hospital    Congestive Heart Failure    Hypertension            HPI:  Follow up  Follow-Up from Hospital, Congestive Heart Failure, and Hypertension   .    Just saw patient a couple of months ago in follow up CAD/CABG, MV repair, lymphedema, prior breast cancer, CKD, white coat hypertension.  Returns having gone to ER both 3/9 and 3/24/25 with chest discomfort and palpitations.  Workup negative apart from some isolated PVC's on the 2nd visit. She's felt ok since  admits she consumes a large amount of caffeine, mostly as Pepsi (endorses 2 a day) and she had just lost her mother prior to the ER visits, highly stressful time.        Nephrology - Dr Jasen Cao             Past cardiac history:    - XRT for left breast cancer at 4240 cGy, advised 5 years of adjuvant letrozole. She was not compliant with her letrozole and follow up.    Sep 2019- admitted with CHF, not compliant with BP meds   EF 40-45% with regional wma   10/1/19 - CABG- LIMA>LAD, SVG > OM, SVG >PDA MV repair 28 mm physio II ring and LA appendage clip    2021- recurrent left breast DCIS - mastectomy, no high risk findings - antiestrogen therapy    Sep 2021- Echo - mild LVH, normal SF, mean MV gradient 10, peak velocity 2.4 m/s, HR not reported. RVSP 35  2023       EF 59%, abn DF, MV repair normal mean 7, RVSP 35, surgical LA clip  2025       Echo - EF 59%, mild LVH, ind DF, normal MV repair, mean 11, MVA 1.3, mildTR, RVSP 34                  Cardiac Medications       ACE Inhibitors       lisinopril (PRINIVIL;ZESTRIL) 10 MG tablet Take 1 tablet by mouth daily       Calcium Channel

## 2025-04-21 ENCOUNTER — OFFICE VISIT (OUTPATIENT)
Age: 65
End: 2025-04-21
Payer: MEDICARE

## 2025-04-21 VITALS
HEART RATE: 84 BPM | HEIGHT: 63 IN | SYSTOLIC BLOOD PRESSURE: 142 MMHG | BODY MASS INDEX: 33.31 KG/M2 | DIASTOLIC BLOOD PRESSURE: 76 MMHG | WEIGHT: 188 LBS

## 2025-04-21 DIAGNOSIS — Z95.1 S/P CABG X 3: Chronic | ICD-10-CM

## 2025-04-21 DIAGNOSIS — Z98.890 S/P MVR (MITRAL VALVE REPAIR): Chronic | ICD-10-CM

## 2025-04-21 DIAGNOSIS — I50.32 CHRONIC DIASTOLIC CONGESTIVE HEART FAILURE (HCC): Primary | Chronic | ICD-10-CM

## 2025-04-21 DIAGNOSIS — I25.118 CORONARY ARTERY DISEASE OF NATIVE ARTERY OF NATIVE HEART WITH STABLE ANGINA PECTORIS: Chronic | ICD-10-CM

## 2025-04-21 DIAGNOSIS — I10 WHITE COAT SYNDROME WITH HYPERTENSION: ICD-10-CM

## 2025-04-21 PROCEDURE — 3017F COLORECTAL CA SCREEN DOC REV: CPT | Performed by: INTERNAL MEDICINE

## 2025-04-21 PROCEDURE — 3078F DIAST BP <80 MM HG: CPT | Performed by: INTERNAL MEDICINE

## 2025-04-21 PROCEDURE — G8417 CALC BMI ABV UP PARAM F/U: HCPCS | Performed by: INTERNAL MEDICINE

## 2025-04-21 PROCEDURE — 3077F SYST BP >= 140 MM HG: CPT | Performed by: INTERNAL MEDICINE

## 2025-04-21 PROCEDURE — 99214 OFFICE O/P EST MOD 30 MIN: CPT | Performed by: INTERNAL MEDICINE

## 2025-04-21 PROCEDURE — 1036F TOBACCO NON-USER: CPT | Performed by: INTERNAL MEDICINE

## 2025-04-21 PROCEDURE — G8427 DOCREV CUR MEDS BY ELIG CLIN: HCPCS | Performed by: INTERNAL MEDICINE

## 2025-04-21 ASSESSMENT — ENCOUNTER SYMPTOMS: SHORTNESS OF BREATH: 0

## 2025-05-07 ENCOUNTER — TELEPHONE (OUTPATIENT)
Age: 65
End: 2025-05-07

## 2025-05-07 NOTE — TELEPHONE ENCOUNTER
Called pt to remind her she is due to complete H. Pylori Retest to confirm eradication of H. Pylori treated in March by Dr. uPrvis.     Spoke with pt, instructed her to fast 1 hour prior to lab and hold any PPI medication for 2 full weeks prior. Instructed pt to go to any of the 4 [facility] Lab Locations to complete test, we will notify her of results once available. Pt verbalized understanding, agreeable to follow instructions.

## 2025-05-29 RX ORDER — LISINOPRIL 10 MG/1
10 TABLET ORAL DAILY
Qty: 30 TABLET | Refills: 0 | Status: SHIPPED | OUTPATIENT
Start: 2025-05-29

## 2025-05-29 NOTE — TELEPHONE ENCOUNTER
Please advise. Patient last seen on 3/10/25, follow-up scheduled for 6/2/25. Rx last wrote on 2/25/25 #30 with 2 refills. Rx pended.

## 2025-06-18 ENCOUNTER — OFFICE VISIT (OUTPATIENT)
Dept: INTERNAL MEDICINE CLINIC | Facility: CLINIC | Age: 65
End: 2025-06-18
Payer: MEDICARE

## 2025-06-18 VITALS
HEART RATE: 70 BPM | BODY MASS INDEX: 34.02 KG/M2 | DIASTOLIC BLOOD PRESSURE: 88 MMHG | SYSTOLIC BLOOD PRESSURE: 132 MMHG | WEIGHT: 192 LBS | OXYGEN SATURATION: 99 %

## 2025-06-18 DIAGNOSIS — Z79.4 TYPE 2 DIABETES MELLITUS WITH DIABETIC NEPHROPATHY, WITH LONG-TERM CURRENT USE OF INSULIN (HCC): Primary | Chronic | ICD-10-CM

## 2025-06-18 DIAGNOSIS — G62.9 NEUROPATHY: ICD-10-CM

## 2025-06-18 DIAGNOSIS — M10.9 GOUT, UNSPECIFIED CAUSE, UNSPECIFIED CHRONICITY, UNSPECIFIED SITE: ICD-10-CM

## 2025-06-18 DIAGNOSIS — I1A.0 RESISTANT HYPERTENSION: ICD-10-CM

## 2025-06-18 DIAGNOSIS — E11.21 TYPE 2 DIABETES MELLITUS WITH DIABETIC NEPHROPATHY, WITH LONG-TERM CURRENT USE OF INSULIN (HCC): Primary | Chronic | ICD-10-CM

## 2025-06-18 DIAGNOSIS — E78.2 MIXED HYPERLIPIDEMIA: ICD-10-CM

## 2025-06-18 DIAGNOSIS — D64.9 ANEMIA, UNSPECIFIED TYPE: ICD-10-CM

## 2025-06-18 PROCEDURE — 1090F PRES/ABSN URINE INCON ASSESS: CPT | Performed by: STUDENT IN AN ORGANIZED HEALTH CARE EDUCATION/TRAINING PROGRAM

## 2025-06-18 PROCEDURE — G8400 PT W/DXA NO RESULTS DOC: HCPCS | Performed by: STUDENT IN AN ORGANIZED HEALTH CARE EDUCATION/TRAINING PROGRAM

## 2025-06-18 PROCEDURE — 99214 OFFICE O/P EST MOD 30 MIN: CPT | Performed by: STUDENT IN AN ORGANIZED HEALTH CARE EDUCATION/TRAINING PROGRAM

## 2025-06-18 PROCEDURE — G8427 DOCREV CUR MEDS BY ELIG CLIN: HCPCS | Performed by: STUDENT IN AN ORGANIZED HEALTH CARE EDUCATION/TRAINING PROGRAM

## 2025-06-18 PROCEDURE — 3075F SYST BP GE 130 - 139MM HG: CPT | Performed by: STUDENT IN AN ORGANIZED HEALTH CARE EDUCATION/TRAINING PROGRAM

## 2025-06-18 PROCEDURE — 3051F HG A1C>EQUAL 7.0%<8.0%: CPT | Performed by: STUDENT IN AN ORGANIZED HEALTH CARE EDUCATION/TRAINING PROGRAM

## 2025-06-18 PROCEDURE — 3017F COLORECTAL CA SCREEN DOC REV: CPT | Performed by: STUDENT IN AN ORGANIZED HEALTH CARE EDUCATION/TRAINING PROGRAM

## 2025-06-18 PROCEDURE — 1123F ACP DISCUSS/DSCN MKR DOCD: CPT | Performed by: STUDENT IN AN ORGANIZED HEALTH CARE EDUCATION/TRAINING PROGRAM

## 2025-06-18 PROCEDURE — 3079F DIAST BP 80-89 MM HG: CPT | Performed by: STUDENT IN AN ORGANIZED HEALTH CARE EDUCATION/TRAINING PROGRAM

## 2025-06-18 PROCEDURE — G8417 CALC BMI ABV UP PARAM F/U: HCPCS | Performed by: STUDENT IN AN ORGANIZED HEALTH CARE EDUCATION/TRAINING PROGRAM

## 2025-06-18 PROCEDURE — 2022F DILAT RTA XM EVC RTNOPTHY: CPT | Performed by: STUDENT IN AN ORGANIZED HEALTH CARE EDUCATION/TRAINING PROGRAM

## 2025-06-18 PROCEDURE — 1036F TOBACCO NON-USER: CPT | Performed by: STUDENT IN AN ORGANIZED HEALTH CARE EDUCATION/TRAINING PROGRAM

## 2025-06-18 RX ORDER — GABAPENTIN 100 MG/1
100 CAPSULE ORAL NIGHTLY PRN
Qty: 90 CAPSULE | Refills: 3 | Status: SHIPPED | OUTPATIENT
Start: 2025-06-18 | End: 2026-06-13

## 2025-06-18 RX ORDER — FERROUS SULFATE 325(65) MG
325 TABLET ORAL
Qty: 90 TABLET | Refills: 3 | Status: SHIPPED | OUTPATIENT
Start: 2025-06-18

## 2025-06-18 RX ORDER — ALLOPURINOL 100 MG/1
50 TABLET ORAL EVERY OTHER DAY
Qty: 90 TABLET | Refills: 3 | Status: SHIPPED | OUTPATIENT
Start: 2025-06-18

## 2025-06-18 RX ORDER — ATORVASTATIN CALCIUM 40 MG/1
40 TABLET, FILM COATED ORAL DAILY
Qty: 90 TABLET | Refills: 4 | Status: SHIPPED | OUTPATIENT
Start: 2025-06-18

## 2025-06-18 RX ORDER — INSULIN GLARGINE 100 [IU]/ML
20 INJECTION, SOLUTION SUBCUTANEOUS NIGHTLY
Qty: 20 ML | Refills: 5 | Status: SHIPPED | OUTPATIENT
Start: 2025-06-18

## 2025-06-18 RX ORDER — LISINOPRIL 10 MG/1
10 TABLET ORAL DAILY
Qty: 90 TABLET | Refills: 1 | Status: SHIPPED | OUTPATIENT
Start: 2025-06-18

## 2025-06-18 RX ORDER — CARVEDILOL 25 MG/1
25 TABLET ORAL 2 TIMES DAILY WITH MEALS
Qty: 180 TABLET | Refills: 3 | Status: SHIPPED | OUTPATIENT
Start: 2025-06-18

## 2025-06-18 NOTE — PROGRESS NOTES
Chief Complaint   Patient presents with    Hypertension    Diabetes     Routine 4 month follow up         SUBJECTIVE      History of Present Illness  The patient is a 65-year-old female presenting for follow-up of diabetes, hypertension. Her  has dementia, recently diagnosed    She reports satisfactory blood glucose levels, with a recent reading of 120. She adheres to dietary recommendations and is on Trulicity 4.5 mg and Jardiance. She desires weight loss and is considering switching to Mounjaro.     She discontinued clonidine patches due to hypotension. Her home blood pressure readings are now normal range. She is on carvedilol 25 mg bid and lisinopril 10 mg once daily. Taking hydralazine only once daily    She was diagnosed with H. pylori infection after consuming fried chicken and completed antibiotics. Need for repeat breath test    She reports difficulty sleeping and has arranged social work assistance for  options for her .    She was hospitalized in February 2025 for nausea and vomiting. Post-discharge, she experienced bloating due to discontinuation of furosemide, which she resumed after contacting the clinic. She is currently on furosemide once daily.   Review of Systems     Current Outpatient Medications   Medication Sig Dispense Refill    allopurinol (ZYLOPRIM) 100 MG tablet Take 0.5 tablets by mouth every other day 90 tablet 3    atorvastatin (LIPITOR) 40 MG tablet Take 1 tablet by mouth daily 90 tablet 4    carvedilol (COREG) 25 MG tablet Take 1 tablet by mouth 2 times daily (with meals) 180 tablet 3    ferrous sulfate (IRON 325) 325 (65 Fe) MG tablet Take 1 tablet by mouth daily (with breakfast) 90 tablet 3    gabapentin (NEURONTIN) 100 MG capsule Take 1 capsule by mouth nightly as needed (pain) for up to 360 days. 90 capsule 3    lisinopril (PRINIVIL;ZESTRIL) 10 MG tablet Take 1 tablet by mouth daily 90 tablet 1    insulin glargine (LANTUS) 100 UNIT/ML injection vial Inject 20

## 2025-06-26 DIAGNOSIS — R23.2 FLUSHING: ICD-10-CM

## 2025-06-26 RX ORDER — VENLAFAXINE HYDROCHLORIDE 37.5 MG/1
CAPSULE, EXTENDED RELEASE ORAL DAILY
Qty: 90 CAPSULE | Refills: 1 | OUTPATIENT
Start: 2025-06-26

## 2025-06-26 RX ORDER — LISINOPRIL 40 MG/1
40 TABLET ORAL DAILY
Qty: 90 TABLET | Refills: 1 | OUTPATIENT
Start: 2025-06-26

## 2025-07-04 DIAGNOSIS — Z79.4 TYPE 2 DIABETES MELLITUS WITH DIABETIC NEPHROPATHY, WITH LONG-TERM CURRENT USE OF INSULIN (HCC): Chronic | ICD-10-CM

## 2025-07-04 DIAGNOSIS — E11.21 TYPE 2 DIABETES MELLITUS WITH DIABETIC NEPHROPATHY, WITH LONG-TERM CURRENT USE OF INSULIN (HCC): Chronic | ICD-10-CM

## 2025-07-07 RX ORDER — LISINOPRIL 40 MG/1
40 TABLET ORAL DAILY
Qty: 90 TABLET | Refills: 1 | OUTPATIENT
Start: 2025-07-07

## 2025-07-07 RX ORDER — TIRZEPATIDE 2.5 MG/.5ML
INJECTION, SOLUTION SUBCUTANEOUS
OUTPATIENT
Start: 2025-07-07

## (undated) DEVICE — REM POLYHESIVE ADULT PATIENT RETURN ELECTRODE: Brand: VALLEYLAB

## (undated) DEVICE — 1/4 FORCE SURGICAL SPRING CLIP: Brand: STEALTH® SPRING CLIP

## (undated) DEVICE — BASIC SINGLE BASIN-LF: Brand: MEDLINE INDUSTRIES, INC.

## (undated) DEVICE — 3M™ TEGADERM™ TRANSPARENT FILM DRESSING FRAME STYLE, 1628, 6 IN X 8 IN (15 CM X 20 CM), 10/CT 8CT/CASE: Brand: 3M™ TEGADERM™

## (undated) DEVICE — SS SUTURE, 3 PER SLEEVE: Brand: MYO/WIRE II

## (undated) DEVICE — DISPOSABLE BIOPSY VALVE MAJ-1555: Brand: SINGLE USE BIOPSY VALVE (STERILE)

## (undated) DEVICE — CLIP INT SM TI EZ LD LIG SYS WECK HORZ

## (undated) DEVICE — SUTURE PERMAHAND SZ 2-0 L12X18IN NONABSORBABLE BLK SILK A185H

## (undated) DEVICE — INTENDED FOR TISSUE SEPARATION, AND OTHER PROCEDURES THAT REQUIRE A SHARP SURGICAL BLADE TO PUNCTURE OR CUT.: Brand: BARD-PARKER ® STAINLESS STEEL BLADES

## (undated) DEVICE — SUTURE NONABSORBABLE BRAIDED 2-0 RB-1 10X30 IN ETHBND EXCEL 10X42

## (undated) DEVICE — TAPE UMB 1/8X30IN MP COT WHT --

## (undated) DEVICE — SUTURE MCRYL SZ 0 L27IN ABSRB VLT CT-1 L36MM 1/2 CIR TAPR Y340H

## (undated) DEVICE — AMD ANTIMICROBIAL GAUZE SPONGES,12 PLY USP TYPE VII, 0.2% POLYHEXAMETHYLENE BIGUANIDE HCI (PHMB): Brand: CURITY

## (undated) DEVICE — SOLUTION IRRIG 3000ML 0.9% SOD CHL FLX CONT 0797208] ICU MEDICAL INC]

## (undated) DEVICE — SLIM BODY SKIN STAPLER: Brand: APPOSE ULC

## (undated) DEVICE — CATHETER THOR 24FR L22IN PVC 5 EYELET STR ATRAUM

## (undated) DEVICE — CANNULA PERF L1.8MM TIP L1MM S STL SHFT BLB SHP TIP FEM

## (undated) DEVICE — Device: Brand: JELCO

## (undated) DEVICE — CATH RECT FUN END OPN TIP 22FR --

## (undated) DEVICE — SUTURE S STL SZ 6 L18IN NONABSORBABLE SIL L48MM CCS 1/2 CIR M654G

## (undated) DEVICE — SUTURE PDS II SZ 1 L36IN ABSRB VLT L48MM CTX 1/2 CIR Z371T

## (undated) DEVICE — 6 FOOT DISPOSABLE EXTENSION CABLE WITH SAFE CONNECT / SCREW-DOWN

## (undated) DEVICE — SURGIFOAM SPNG SZ 100

## (undated) DEVICE — MEDI-TRACE CADENCE ADULT, DEFIBRILLATION ELECTRODE -RTS  (10 PR/PK) - ZOLL: Brand: MEDI-TRACE CADENCE

## (undated) DEVICE — Device

## (undated) DEVICE — BLADE SAW W10XL54MM FOR PRI REPEAT STRNOTMY

## (undated) DEVICE — SUTURE

## (undated) DEVICE — 48" PROBE COVER W/GEL, ULTRASOUND, STERILE: Brand: SITE-RITE

## (undated) DEVICE — 3000CC GUARDIAN II: Brand: GUARDIAN

## (undated) DEVICE — BUTTON SWITCH PENCIL BLADE ELECTRODE, HOLSTER: Brand: EDGE

## (undated) DEVICE — AMD ANTIMICROBIAL NON-ADHERENT PAD,0.2% POLYHEXAMETHYLENE BIGUANIDE HCI (PHMB): Brand: TELFA

## (undated) DEVICE — (D)DRAPE ISOL ANTIMC 129X100IN -- DISC BY MFR

## (undated) DEVICE — DRAPE SHT 3 QTR PROXIMA 53X77 --

## (undated) DEVICE — 2000CC GUARDIAN II: Brand: GUARDIAN

## (undated) DEVICE — CATHETER URETH 18FR RED RUB INTMIT ALL PURP

## (undated) DEVICE — GAUZE,SPONGE,4"X4",12PLY,WOVEN,NS,LF: Brand: MEDLINE

## (undated) DEVICE — ENDOSCOPIC KIT 1.1+ OP4 CA DE 2 GWN AAMI LEVEL 3

## (undated) DEVICE — GLOVE SURG SZ 7 L11.2IN THK9.8MIL STRW LTX POLYMER BEAD CUF

## (undated) DEVICE — CATHETER ETER TY TEMP SENS F MBO W URIN M FOLLOWS CDC GUIDELINES TO

## (undated) DEVICE — SUTURE PROL SZ 6-0 L30IN NONABSORBABLE BLU L13MM CC-1 3/8 M8707

## (undated) DEVICE — SUTURE VCRL SZ 4-0 L27IN ABSRB UD L19MM PS-2 3/8 CIR PRIM J426H

## (undated) DEVICE — CATHETER THOR 32FR L23IN PVC 6 EYELET STR ATRAUM

## (undated) DEVICE — FORCEPS BX L240CM JAW DIA2.8MM L CAP W/ NDL MIC MESH TOOTH

## (undated) DEVICE — SUTURE ETHBND EXCEL 2-0 L30IN NONABSORBABLE GRN L26MM SH MX563

## (undated) DEVICE — SURGICAL PROCEDURE PACK BASIC ST FRANCIS

## (undated) DEVICE — (D)STRIP SKN CLSR 0.5X4IN WHT --

## (undated) DEVICE — MOUTHPIECE ENDOSCP L CTRL OPN AND SIDE PORTS DISP

## (undated) DEVICE — X-LARGE COTTON GLOVE: Brand: DEROYAL

## (undated) DEVICE — REAMER SURG L145MM DIA3.9/6MM NEAR CORT QUIK CPL FOR 6MM

## (undated) DEVICE — AIRLIFE™ OXYGEN TUBING 7 FEET (2.1 M) CRUSH RESISTANT OXYGEN TUBING, VINYL TIPPED: Brand: AIRLIFE™

## (undated) DEVICE — ROD RMR L950MM DIA2.5MM W/ EXTN BALL TIP

## (undated) DEVICE — BIT DRL L150MM DIA39X42MM 3 FLUT QUIK CPL FOR 5MM SCR

## (undated) DEVICE — SUTURE ETHBND EXCEL SZ 0 L18IN NONABSORBABLE GRN L36MM CT-1 CX21D

## (undated) DEVICE — CANNULA INJ L2.5IN BLNT TIP 3MM CLR BODY W/ 1 W VLV DLP

## (undated) DEVICE — SUT CHRMC 4-0 27IN SH BRN --

## (undated) DEVICE — SUTURE MCRYL SZ 2-0 L27IN ABSRB VLT CT-1 L36MM 1/2 CIR TAPR Y339H

## (undated) DEVICE — SOLUTION IV 1000ML 0.9% SOD CHL

## (undated) DEVICE — CLAMP INSERT: Brand: STEALTH® CLAMP INSERT

## (undated) DEVICE — CATH URETH INTMIT ROB 14FR FUN -- USE ITEM 179521

## (undated) DEVICE — AMD ANTIMICROBIAL BANDAGE ROLL,6 PLY: Brand: KERLIX

## (undated) DEVICE — (D)PREP SKN CHLRAPRP APPL 26ML -- CONVERT TO ITEM 371833

## (undated) DEVICE — SINGLE PORT MANIFOLD: Brand: NEPTUNE 2

## (undated) DEVICE — KENDALL RADIOLUCENT FOAM MONITORING ELECTRODE RECTANGULAR SHAPE: Brand: KENDALL

## (undated) DEVICE — PLEDGET VASC W3/16XL3/8IN THK1/16IN PTFE SFT

## (undated) DEVICE — OCCLUSIVE GAUZE STRIP,3% BISMUTH TRIBROMOPHENATE IN PETROLATUM BLEND: Brand: XEROFORM

## (undated) DEVICE — GUIDEWIRE WITH SPRING TIP - SINGLE USE: Brand: SAFEGUIDE®

## (undated) DEVICE — AORTIC PUNCHES ARE USED TO CREATE A UNIFORM OPENING IN BLOOD VESSELS DURING CARDIOVASCULAR SURGERY. THE VESSEL GRAFT IS INSERTED INTO THE CREATED OPENING AND SUTURED TO THE VESSEL WALL. AORTIC LANCETS ARE USED TO MAKE A SMALL UNIFORM CUT IN A BLOOD VESSEL TO FACILITATE INSERTION OF AN AORTIC PUNCH.  PUNCHES COME IN VARIOUS LENGTHS, DIAMETERS AND TIP CONFIGURATIONS.: Brand: CLEANCUT ROTATING AORTIC PUNCH

## (undated) DEVICE — SUTURE VCRL SZ 3-0 L36IN ABSRB UD L36MM CT-1 1/2 CIR J944H

## (undated) DEVICE — CONNECTOR TBNG OD5-7MM O2 END DISP

## (undated) DEVICE — 3.2MM GUIDE WIRE 400MM

## (undated) DEVICE — 1010 S-DRAPE TOWEL DRAPE 10/BX: Brand: STERI-DRAPE™

## (undated) DEVICE — CONTAINER FORMALIN PREFILLED 10% NBF 60ML

## (undated) DEVICE — COR-KNOT MINI® COMBO KITBASE PACKAGE TYPE - KITEACH STERILE PACKAGE KIT CONTAINS (2) SINGLE PATIENT USE COR-KNOT MINI® DEVICES AND (12) COR-KNOT® QUICK LOADS®.: Brand: COR-KNOT MINI®

## (undated) DEVICE — DRAPE SLUSH DISC W44XL66IN ST FOR RND BSIN HUSH SLUSH SYS

## (undated) DEVICE — SUT PROL 4-0 30IN SH1 DA BLU --

## (undated) DEVICE — BLOCK BITE AD 60FR W/ VELC STRP ADDRESSES MOST PT AND

## (undated) DEVICE — SUTURE PROL DBL ARMED 8 0 BV130 5 24IN N ABSRB MFIL BLU M8739

## (undated) DEVICE — CONNECTOR IV 3/8X3/8X3/8 Y

## (undated) DEVICE — BLADE OPHTH MINI BEAV SHRP --

## (undated) DEVICE — SUTURE TEMP PACE WIRE SZ 2-0 L24IN NONABSORBABLE LIGHT/DARK

## (undated) DEVICE — SUTURE NONABSORBABLE L24IN SZ 7-0 M0-5 BV175-8 EP 24 BLU M8745

## (undated) DEVICE — APPLIER CLP L9.375IN APER 2.1MM CLS L3.8MM 20 SM TI CLP

## (undated) DEVICE — SUT PROL 3-0 36IN SH DA BLU --

## (undated) DEVICE — SUTURE SILK PERMAHAND PRECUT 6 X 30 IN SZ 1 BLK BRAID A307H

## (undated) DEVICE — COR-KNOT® QUICK LOAD® 6-POUCH: Brand: COR-KNOT® QUICK LOAD®